# Patient Record
Sex: MALE | Race: WHITE | NOT HISPANIC OR LATINO | Employment: OTHER | URBAN - METROPOLITAN AREA
[De-identification: names, ages, dates, MRNs, and addresses within clinical notes are randomized per-mention and may not be internally consistent; named-entity substitution may affect disease eponyms.]

---

## 2017-03-29 ENCOUNTER — ALLSCRIPTS OFFICE VISIT (OUTPATIENT)
Dept: OTHER | Facility: OTHER | Age: 62
End: 2017-03-29

## 2017-04-03 ENCOUNTER — ALLSCRIPTS OFFICE VISIT (OUTPATIENT)
Dept: OTHER | Facility: OTHER | Age: 62
End: 2017-04-03

## 2017-04-03 DIAGNOSIS — E11.40 TYPE 2 DIABETES MELLITUS WITH DIABETIC NEUROPATHY (HCC): ICD-10-CM

## 2017-04-03 DIAGNOSIS — I82.409 ACUTE EMBOLISM AND THROMBOSIS OF DEEP VEIN OF LOWER EXTREMITY (HCC): ICD-10-CM

## 2017-04-03 DIAGNOSIS — R05.9 COUGH: ICD-10-CM

## 2017-04-04 ENCOUNTER — GENERIC CONVERSION - ENCOUNTER (OUTPATIENT)
Dept: OTHER | Facility: OTHER | Age: 62
End: 2017-04-04

## 2017-04-04 ENCOUNTER — HOSPITAL ENCOUNTER (OUTPATIENT)
Dept: RADIOLOGY | Facility: HOSPITAL | Age: 62
Discharge: HOME | End: 2017-04-04
Attending: FAMILY MEDICINE
Payer: COMMERCIAL

## 2017-04-04 ENCOUNTER — TRANSCRIBE ORDERS (OUTPATIENT)
Dept: ADMINISTRATIVE | Facility: HOSPITAL | Age: 62
End: 2017-04-04

## 2017-04-04 ENCOUNTER — APPOINTMENT (OUTPATIENT)
Dept: LAB | Facility: HOSPITAL | Age: 62
End: 2017-04-04
Attending: FAMILY MEDICINE
Payer: COMMERCIAL

## 2017-04-04 DIAGNOSIS — Z12.5 ENCOUNTER FOR SCREENING FOR MALIGNANT NEOPLASM OF PROSTATE: ICD-10-CM

## 2017-04-04 DIAGNOSIS — E11.9 TYPE 2 DIABETES MELLITUS WITHOUT COMPLICATIONS (HCC): ICD-10-CM

## 2017-04-04 DIAGNOSIS — R05.9 COUGH: ICD-10-CM

## 2017-04-04 DIAGNOSIS — I82.409 ACUTE EMBOLISM AND THROMBOSIS OF DEEP VEIN OF LOWER EXTREMITY (HCC): ICD-10-CM

## 2017-04-04 LAB
ALBUMIN SERPL BCP-MCNC: 3.2 G/DL (ref 3.5–5)
ALP SERPL-CCNC: 84 U/L (ref 46–116)
ALT SERPL W P-5'-P-CCNC: 15 U/L (ref 12–78)
ANION GAP SERPL CALCULATED.3IONS-SCNC: 9 MMOL/L (ref 4–13)
AST SERPL W P-5'-P-CCNC: 19 U/L (ref 5–45)
BILIRUB SERPL-MCNC: 0.6 MG/DL (ref 0.2–1)
BUN SERPL-MCNC: 21 MG/DL (ref 5–25)
CALCIUM SERPL-MCNC: 9 MG/DL (ref 8.3–10.1)
CHLORIDE SERPL-SCNC: 105 MMOL/L (ref 100–108)
CHOLEST SERPL-MCNC: 115 MG/DL (ref 50–200)
CO2 SERPL-SCNC: 27 MMOL/L (ref 21–32)
CREAT SERPL-MCNC: 1 MG/DL (ref 0.6–1.3)
CREAT UR-MCNC: 277 MG/DL
EST. AVERAGE GLUCOSE BLD GHB EST-MCNC: 183 MG/DL
GFR SERPL CREATININE-BSD FRML MDRD: >60 ML/MIN/1.73SQ M
GLUCOSE P FAST SERPL-MCNC: 178 MG/DL (ref 65–99)
HBA1C MFR BLD: 8 % (ref 4.2–6.3)
HDLC SERPL-MCNC: 37 MG/DL (ref 40–60)
INR PPP: 1.66 (ref 0.86–1.16)
LDLC SERPL CALC-MCNC: 56 MG/DL (ref 0–100)
POTASSIUM SERPL-SCNC: 4.2 MMOL/L (ref 3.5–5.3)
PROT SERPL-MCNC: 7.1 G/DL (ref 6.4–8.2)
PROT UR-MCNC: 38 MG/DL
PROT/CREAT UR: 0.14 MG/G{CREAT} (ref 0–0.1)
PROTHROMBIN TIME: 19.2 SECONDS (ref 12–14.3)
PSA SERPL-MCNC: 0.5 NG/ML (ref 0–4)
SODIUM SERPL-SCNC: 141 MMOL/L (ref 136–145)
TRIGL SERPL-MCNC: 111 MG/DL

## 2017-04-04 PROCEDURE — 83036 HEMOGLOBIN GLYCOSYLATED A1C: CPT

## 2017-04-04 PROCEDURE — 71020 HB CHEST X-RAY 2VW FRONTAL&LATL: CPT

## 2017-04-04 PROCEDURE — 80053 COMPREHEN METABOLIC PANEL: CPT

## 2017-04-04 PROCEDURE — 84156 ASSAY OF PROTEIN URINE: CPT

## 2017-04-04 PROCEDURE — 80061 LIPID PANEL: CPT

## 2017-04-04 PROCEDURE — G0103 PSA SCREENING: HCPCS

## 2017-04-04 PROCEDURE — 82570 ASSAY OF URINE CREATININE: CPT

## 2017-04-04 PROCEDURE — 36415 COLL VENOUS BLD VENIPUNCTURE: CPT

## 2017-04-04 PROCEDURE — 85610 PROTHROMBIN TIME: CPT

## 2017-04-26 ENCOUNTER — ALLSCRIPTS OFFICE VISIT (OUTPATIENT)
Dept: OTHER | Facility: OTHER | Age: 62
End: 2017-04-26

## 2017-09-18 DIAGNOSIS — E11.40 TYPE 2 DIABETES MELLITUS WITH DIABETIC NEUROPATHY (HCC): ICD-10-CM

## 2017-09-18 DIAGNOSIS — I82.409 ACUTE EMBOLISM AND THROMBOSIS OF DEEP VEIN OF LOWER EXTREMITY (HCC): ICD-10-CM

## 2017-09-19 ENCOUNTER — TRANSCRIBE ORDERS (OUTPATIENT)
Dept: ADMINISTRATIVE | Facility: HOSPITAL | Age: 62
End: 2017-09-19

## 2017-09-19 ENCOUNTER — APPOINTMENT (OUTPATIENT)
Dept: LAB | Facility: HOSPITAL | Age: 62
End: 2017-09-19
Attending: FAMILY MEDICINE
Payer: COMMERCIAL

## 2017-09-19 DIAGNOSIS — E11.40 TYPE 2 DIABETES MELLITUS WITH DIABETIC NEUROPATHY (HCC): ICD-10-CM

## 2017-09-19 LAB
ALBUMIN SERPL BCP-MCNC: 3.2 G/DL (ref 3.5–5)
ALP SERPL-CCNC: 90 U/L (ref 46–116)
ALT SERPL W P-5'-P-CCNC: 11 U/L (ref 12–78)
ANION GAP SERPL CALCULATED.3IONS-SCNC: 9 MMOL/L (ref 4–13)
AST SERPL W P-5'-P-CCNC: 11 U/L (ref 5–45)
BILIRUB SERPL-MCNC: 0.6 MG/DL (ref 0.2–1)
BUN SERPL-MCNC: 29 MG/DL (ref 5–25)
CALCIUM SERPL-MCNC: 9.4 MG/DL (ref 8.3–10.1)
CHLORIDE SERPL-SCNC: 105 MMOL/L (ref 100–108)
CHOLEST SERPL-MCNC: 118 MG/DL (ref 50–200)
CO2 SERPL-SCNC: 28 MMOL/L (ref 21–32)
CREAT SERPL-MCNC: 1.04 MG/DL (ref 0.6–1.3)
EST. AVERAGE GLUCOSE BLD GHB EST-MCNC: 154 MG/DL
GFR SERPL CREATININE-BSD FRML MDRD: 78 ML/MIN/1.73SQ M
GLUCOSE P FAST SERPL-MCNC: 91 MG/DL (ref 65–99)
HBA1C MFR BLD: 7 % (ref 4.2–6.3)
HDLC SERPL-MCNC: 44 MG/DL (ref 40–60)
LDLC SERPL CALC-MCNC: 62 MG/DL (ref 0–100)
POTASSIUM SERPL-SCNC: 4.3 MMOL/L (ref 3.5–5.3)
PROT SERPL-MCNC: 7.3 G/DL (ref 6.4–8.2)
SODIUM SERPL-SCNC: 142 MMOL/L (ref 136–145)
TRIGL SERPL-MCNC: 58 MG/DL

## 2017-09-19 PROCEDURE — 80061 LIPID PANEL: CPT

## 2017-09-19 PROCEDURE — 36415 COLL VENOUS BLD VENIPUNCTURE: CPT

## 2017-09-19 PROCEDURE — 80053 COMPREHEN METABOLIC PANEL: CPT

## 2017-09-19 PROCEDURE — 83036 HEMOGLOBIN GLYCOSYLATED A1C: CPT

## 2017-09-25 ENCOUNTER — GENERIC CONVERSION - ENCOUNTER (OUTPATIENT)
Dept: OTHER | Facility: OTHER | Age: 62
End: 2017-09-25

## 2017-10-25 DIAGNOSIS — I82.409 ACUTE EMBOLISM AND THROMBOSIS OF DEEP VEIN OF LOWER EXTREMITY (HCC): ICD-10-CM

## 2017-10-26 ENCOUNTER — GENERIC CONVERSION - ENCOUNTER (OUTPATIENT)
Dept: OTHER | Facility: OTHER | Age: 62
End: 2017-10-26

## 2017-11-25 DIAGNOSIS — I82.409 ACUTE EMBOLISM AND THROMBOSIS OF DEEP VEIN OF LOWER EXTREMITY (HCC): ICD-10-CM

## 2018-01-10 NOTE — PROGRESS NOTES
Assessment    1  Encounter for preventive health examination (V70 0) (Z00 00)   2  Type 2 diabetes mellitus (250 00) (E11 9)   3  Acute embolism and thrombosis of unspecified deep veins of unspecified lower   extremity (453 40) (I82 409)    Plan  Acute embolism and thrombosis of unspecified deep veins of unspecified lower  extremity, Prostate cancer screening    · (1) PSA (SCREEN) (Dx V76 44 Screen for Prostate Cancer); Status:Active; Requested  for:68Mqk2708;    · (1) PT WITH INR; Status:Active; Requested for:48Oka2670;   Screening for colon cancer    · COLONOSCOPY; Status:Complete;   Done: 68NLY4808 12:00AM   · COLONOSCOPY ; every 10 years; Last 25OQQ5985; Next 09KNM9421; Status:Active  Type 2 diabetes mellitus    · (1) COMPREHENSIVE METABOLIC PANEL; Status:Active; Requested for:79Qdw1474;    · (1) HEMOGLOBIN A1C; Status:Active; Requested for:91Zzl5894;    · (1) LIPID PANEL, FASTING; Status:Active; Requested for:53Sko0992;    · (1) URINE PROTEIN CREATININE RATIO; Status:Active; Requested for:77Yar9319;    · Follow-up visit in 6 months Evaluation and Treatment  Follow-up  Status: Hold For -  Scheduling  Requested for: 17Aug2016    Discussion/Summary  Impression: health maintenance visit  Currently, he eats a healthy diet  Prostate cancer screening: PSA was ordered  Testicular cancer screening: the risks and benefits of testicular cancer screening were discussed  Colorectal cancer screening: colorectal cancer screening is current  Advice and education were given regarding nutrition, weight bearing exercise and tobacco cessation  Patient discussion: discussed with the patient  Self Referrals: Yes      Chief Complaint  Pt is here for a HM exam  No issues or concerns at at this time  He is due of a foot exam and states he had an eye exam a few months ago  History of Present Illness  HM, Adult Male: The patient is being seen for a health maintenance evaluation  General Health:  The patient's health since the last visit is described as good  He has regular dental visits  He denies vision problems  He denies hearing loss  Immunizations status: up to date  Lifestyle:  He consumes a diverse and healthy diet  He does not have any weight concerns  He exercises regularly  He uses tobacco  He denies alcohol use  He denies drug use  Screening: cancer screening reviewed and current  metabolic screening reviewed and current  risk screening reviewed and current  Active Problems    1  Acute embolism and thrombosis of unspecified deep veins of unspecified lower   extremity (453 40) (I82 409)   2  Arthropathy (716 90) (M12 9)   3  Bilateral shoulder pain (719 41) (M25 511,M25 512)   4  Cough (786 2) (R05)   5  Essential hypertension (401 9) (I10)   6  Frequent headaches (784 0) (R51)   7  Headache (784 0) (R51)   8  Lumbar degenerative disc disease (722 52) (M51 36)   9  Mixed hyperlipidemia (272 2) (E78 2)   10  Need for prophylactic vaccination against Streptococcus pneumoniae (pneumococcus)    (V03 82) (Z23)   11  Need for prophylactic vaccination and inoculation against influenza (V04 81) (Z23)   12  Peripheral vascular disease (443 9) (I73 9)   13  Pure hypercholesterolemia (272 0) (E78 0)   14  Scalp cyst (706 2) (L72 9)   15  Screening for colon cancer (V76 51) (Z12 11)   16  Sinusitis (473 9) (J32 9)   17  Type 2 diabetes mellitus (250 00) (E11 9)   18  Upper respiratory tract infection, unspecified upper respiratory infection   19  Weight loss (783 21) (R63 4)    Past Medical History    · History of colonoscopy (V45 89) (Z98 89)    Surgical History    · History of Cataract Surgery   · History of Foot Surgery    Family History  Mother    · Family history of    · Family history of Dementia   · Family history of Melanoma  Father    · Family history of     Social History    · Former smoker (V15 82) (M56 989)   · Occasional alcohol use    Current Meds   1  Aspir-June 325 MG TBEC;    Therapy: (Recorded:20Oct2014) to Recorded   2  BD Insulin Syringe MicroFine 28G X 1/2" 1 ML Miscellaneous; USE AS DIRECTED with   insulin once daily  Requested for: 20Apr2016; Last Rx:20Apr2016 Ordered   3  Flonase Allergy Relief 50 MCG/ACT Nasal Suspension; USE 1 SPRAY IN EACH   NOSTRIL TWICE DAILY; Therapy: 99ACT9352 to (Last Rx:10Nov2015)  Requested for: 59VGI0383 Ordered   4  Gabapentin 600 MG Oral Tablet; take 2 tablets by mouth twice daily; Therapy: 76PSU3716 to (Evaluate:14Jun2016)  Requested for: 43GWU9919; Last   Rx:43Wrq3493 Ordered   5  Hydrocodone-Acetaminophen  MG Oral Tablet; Therapy: [id number]) to Recorded   6  Lantus 100 UNIT/ML Subcutaneous Solution; INJECT 25 UNITS SUBCUTANEOUSLY   DAILY; Last Rx:84Wij0150 Ordered   7  Lisinopril 10 MG Oral Tablet; take 1 tablet by mouth every day; Therapy: 48XWN7805 to (Evaluate:01Apr2017)  Requested for: 07GYY5693; Last   Rx:31Iwk1369 Ordered   8  MetFORMIN HCl  MG Oral Tablet Extended Release 24 Hour; take 2 tablets by   mouth twice daily; Therapy: 58WWS7580 to (Evaluate:15Apr2016)  Requested for: 17CXT5332; Last   Rx:59Wjs3325 Ordered   9  OxyCONTIN 20 MG Oral Tablet ER 12 Hour Abuse-Deterrent; TAKE 1 TABLET TWICE   DAILY; Therapy: 67TOC9025 to (Evaluate:70Kjm4741); Last Rx:06Jan2015 Ordered   10  Simvastatin 20 MG Oral Tablet; TAKE ONE TABLET BY MOUTH ONCE DAILY     Requested for: 80CMK0371; Last Rx:10Xre4129 Ordered   11  Warfarin Sodium 2 MG Oral Tablet; TAKE 3 TABLETS BY MOUTH EVERY DAY OR AS    DIRECTED; Therapy: 70PXO6973 to (Evaluate:02Mar2017)  Requested for: 04SKP4939; Last    Rx:97Lsr2572 Ordered   12  Warfarin Sodium 6 MG Oral Tablet; TAKE 1 TABLET BY MOUTH EVERY DAY AS    DIRECTED; Therapy: 84Kcy5986 to (Clare Perez)  Requested for: 386 3694; Last    Rx:31Oct2015 Ordered    Allergies    1   Byetta 10 MCG Pen SOLN    Vitals   Recorded: 87TIH7939 72:09TG   Systolic 123   Diastolic 72   Heart Rate 75   Temperature 98 6 F   O2 Saturation 98   Height 6 ft    Weight 203 lb    BMI Calculated 27 53   BSA Calculated 2 15     Physical Exam    Constitutional   General appearance: No acute distress, well appearing and well nourished  Eyes   Conjunctiva and lids: No erythema, swelling or discharge  Pupils and irises: Equal, round, reactive to light  Ears, Nose, Mouth, and Throat   Oropharynx: Normal with no erythema, edema, exudate or lesions  Neck   Neck: Supple, symmetric, trachea midline, no masses  Thyroid: Normal, no thyromegaly  Pulmonary   Auscultation of lungs: Clear to auscultation  Cardiovascular   Auscultation of heart: Normal rate and rhythm, normal S1 and S2, no murmurs  Examination of extremities for edema and/or varicosities: Normal     Abdomen   Abdomen: Non-tender, no masses  Lymphatic   Palpation of lymph nodes in neck: No lymphadenopathy  Musculoskeletal   Gait and station: Normal     Neurologic   Cranial nerves: Cranial nerves 2-12 intact  Diabetic Foot Exam: Right Foot Findings: normal foot  The toes were normal and had full range of motion  Left Foot Findings: normal foot  The toes were normal and had full range of motion  Monofilament Testing: normal tactile sensation with monofilament testing throughout both feet  Vascular: Capillary refills findings on the right were normal in the toes  Capillary refills findings on the left were normal in the toes  Assign Risk Category: 0: No loss of protective sensation, no deformity  No present risk  Psychiatric   Judgment and insight: Normal     Orientation to person, place and time: Normal        Health Management  Screening for colon cancer   COLONOSCOPY; every 10 years; Last 03ONM9992; Next Due: 29XVN3421; Active  Type 2 diabetes mellitus   (1) HEMOGLOBIN A1C; every 6 months; Last 38LWT6585; Next Due: 59Sty5068; Active  (1) MICROALBUMIN CREATININE RATIO, RANDOM URINE; every 1 year; Last  17XBA0750; Next Due: 37UQT1785;  Overdue  *VB - Eye Exam; every 1 year; Next Due: 74IXG6465; Overdue  *VB - Foot Exam; every 1 year; Next Due: 68NSE8856; Overdue  Health Maintenance   Medicare Annual Wellness Visit; every 1 year; Next Due: 09XBJ3736;  Overdue    Signatures   Electronically signed by : Shane Umanzor DO; Aug 17 2016  2:24PM EST                       (Author)

## 2018-01-13 NOTE — RESULT NOTES
Verified Results  (1) PT WITH INR 04Apr2017 02:52PM Katja Pat Order Number: [id number]     Test Name Result Flag Reference   INR 1 66 H 0 86-1 16   PT 19 2 seconds H 12 0-14 3

## 2018-01-13 NOTE — RESULT NOTES
Verified Results  (1) COMPREHENSIVE METABOLIC PANEL 99WTT0239 22:74VJ Oralee Dress Order Number: [id number]  TW Order Number: [id number] Order Number: [id number]     Test Name Result Flag Reference   GLUCOSE,RANDM 187 mg/dL H    If the patient is fasting, the ADA then defines impaired fasting glucose as > 100 mg/dL and diabetes as > or equal to 123 mg/dL  SODIUM 140 mmol/L  136-145   POTASSIUM 4 5 mmol/L  3 5-5 3   CHLORIDE 107 mmol/L  100-108   CARBON DIOXIDE 29 mmol/L  21-32   ANION GAP (CALC) 4 mmol/L  4-13   BLOOD UREA NITROGEN 23 mg/dL  5-25   CREATININE 0 93 mg/dL  0 60-1 30   Standardized to IDMS reference method   CALCIUM 8 9 mg/dL  8 3-10 1   BILI, TOTAL 0 60 mg/dL  0 20-1 00   ALK PHOSPHATAS 103 U/L     ALT (SGPT) 12 U/L  12-78   AST(SGOT) 10 U/L  5-45   ALBUMIN 3 4 g/dL L 3 5-5 0   TOTAL PROTEIN 6 7 g/dL  6 4-8 2   eGFR Non-African American      >60 0 ml/min/1 73sq [facility] Disease Education Program recommendations are as follows:  GFR calculation is accurate only with a steady state creatinine  Chronic Kidney disease less than 60 ml/min/1 73 sq  meters  Kidney failure less than 15 ml/min/1 73 sq  meters  (1) LIPID PANEL, FASTING 45LWB1880 12:07PM Oralee Dress Order Number: [id number]  TW Order Number: [id number] Order Number: [id number]     Test Name Result Flag Reference   CHOLESTEROL 131 mg/dL     HDL,DIRECT 33 mg/dL L 40-60   Specimen collection should occur prior to Metamizole administration due to the potential for falsely depressed results     LDL CHOLESTEROL CALCULATED 81 mg/dL  0-100   Triglyceride:         Normal              <150 mg/dl       Borderline High    150-199 mg/dl       High               200-499 mg/dl       Very High          >499 mg/dl  Cholesterol:         Desirable        <200 mg/dl      Borderline High  200-239 mg/dl      High             >239 mg/dl  HDL Cholesterol:        High    >59 mg/dL      Low     <41 mg/dL  LDL CALCULATED:    This screening LDL is a calculated result  It does not have the accuracy of the Direct Measured LDL in the monitoring of patients with hyperlipidemia and/or statin therapy  Direct Measure LDL (RBD581) must be ordered separately in these patients  TRIGLYCERIDES 85 mg/dL  <=150   Specimen collection should occur prior to N-Acetylcysteine or Metamizole administration due to the potential for falsely depressed results  (1) SED RATE 28TUG3325 12:07PM Yana Joe Order Number: [id number]     Test Name Result Flag Reference   SED RATE 58 mm/hour H 0-10     (1) HEMOGLOBIN A1C 12ECK6094 12:07PM Yana Joe Order Number: [id number]   Order Number: [id number]     Test Name Result Flag Reference   HEMOGLOBIN A1C 7 5 % H 4 2-6 3   EST  AVG  GLUCOSE 169 mg/dl         Plan  Type 2 diabetes mellitus    · (1) HEMOGLOBIN A1C ; every 6 months;  Last 91HKI2206; Status:Active

## 2018-01-14 VITALS
DIASTOLIC BLOOD PRESSURE: 84 MMHG | OXYGEN SATURATION: 98 % | WEIGHT: 197 LBS | BODY MASS INDEX: 26.68 KG/M2 | HEART RATE: 76 BPM | SYSTOLIC BLOOD PRESSURE: 120 MMHG | HEIGHT: 72 IN

## 2018-01-14 VITALS
WEIGHT: 198.5 LBS | SYSTOLIC BLOOD PRESSURE: 110 MMHG | DIASTOLIC BLOOD PRESSURE: 66 MMHG | BODY MASS INDEX: 26.92 KG/M2 | OXYGEN SATURATION: 97 % | TEMPERATURE: 98.6 F | HEART RATE: 73 BPM

## 2018-01-15 VITALS
OXYGEN SATURATION: 98 % | BODY MASS INDEX: 27.56 KG/M2 | HEART RATE: 101 BPM | SYSTOLIC BLOOD PRESSURE: 118 MMHG | DIASTOLIC BLOOD PRESSURE: 70 MMHG | HEIGHT: 72 IN | TEMPERATURE: 100.7 F | WEIGHT: 203.5 LBS

## 2018-01-17 NOTE — PROGRESS NOTES
History of Present Illness  Care Coordination Encounter Information:   Type of Encounter: Telephonic    Spoke to Other  Care Coordination [facility] Nurse [facility]:   The reason for call is to discuss outreach for follow up/needed services and coordination of meeting care plan treatment goals  Patient auto enrolled into care coordination  Multiple attempts made to f/u with patient in regards to health  Voicemail left; 3 with no return calls  Will close from care coordination at this time  Active Problems    1  Acute embolism and thrombosis of unspecified deep veins of unspecified lower   extremity (453 40) (I82 409)   2  Acute pharyngitis, unspecified etiology (462) (J02 9)   3  Arthropathy (716 90) (M12 9)   4  Bilateral shoulder pain (719 41) (M25 511,M25 512)   5  Cough (786 2) (R05)   6  Dermatitis (692 9) (L30 9)   7  Essential hypertension (401 9) (I10)   8  Frequent headaches (784 0) (R51)   9  Headache (784 0) (R51)   10  Lumbar degenerative disc disease (722 52) (M51 36)   11  Mixed hyperlipidemia (272 2) (E78 2)   12  Need for prophylactic vaccination against Streptococcus pneumoniae (pneumococcus)    (V03 82) (Z23)   13  Need for prophylactic vaccination and inoculation against influenza (V04 81) (Z23)   14  Noncompliance with medications (V15 81) (Z91 14)   15  Non-compliance with treatment (V15 81) (Z91 19)   16  OME (otitis media with effusion), right (381 4) (H65 91)   17  Other insomnia (780 52) (G47 09)   18  Peripheral vascular disease (443 9) (I73 9)   19  Prostate cancer screening (V76 44) (Z12 5)   20  Pure hypercholesterolemia (272 0) (E78 00)   21  Scalp cyst (706 2) (L72 9)   22  Screening for colon cancer (V76 51) (Z12 11)   23  Sinusitis (473 9) (J32 9)   24  Tinea of the body (110 5) (B35 4)   25  Type 2 diabetes mellitus (250 00) (E11 9)   26   Type 2 diabetes mellitus with diabetic neuropathy, without long-term current use of    insulin (250 60,357 2) (E11 40)   27  Upper respiratory tract infection, unspecified upper respiratory infection   28  Urinary frequency (788 41) (R35 0)   29  Weight loss (783 21) (R63 4)    Past Medical History    1  History of colonoscopy (V45 89) (T84 835)    Surgical History    1  History of Cataract Surgery   2  History of Foot Surgery    Family History  Mother    1  Family history of    2  Family history of Dementia   3  Family history of Melanoma  Father    4  Family history of     Social History    · Current smoker (305 1) (F17 200)   · Former smoker (V15 82) (B11 903)   · Occasional alcohol use    Current Meds    1  Warfarin Sodium 2 MG Oral Tablet; TAKE 3 TABLETS BY MOUTH EVERY DAY OR AS   DIRECTED; Therapy: 23WDB5909 to (Mike Castro)  Requested for: 18Utc3519; Last   Rx:59Jyv7899 Ordered   2  Warfarin Sodium 6 MG Oral Tablet; TAKE 1 TABLET BY MOUTH EVERY DAY AS   DIRECTED; Therapy: 58Cvs1375 to (YRLXFGVZ:81CJP3400)  Requested for: 87COL1832; Last   Rx:53Ljs6947 Ordered    3  Lisinopril 10 MG Oral Tablet; take 1 tablet by mouth every day; Therapy: 84MZB1021 to (MQVNVSKK:53JGP8380)  Requested for: 37PSJ2756; Last   Rx:06Gss9183 Ordered    4  Gabapentin 600 MG Oral Tablet; take 2 tablets by mouth twice daily; Therapy: 82SYY7922 to (Evaluate:84Qeg6662)  Requested for: 12DFK0498; Last   Rx:38Gtq7027 Ordered    5  Simvastatin 20 MG Oral Tablet; take 1 tablet by mouth every day; Therapy: 60LZN8841 to (Marino Smith)  Requested for: 68AIS2698; Last   Rx:68Gcg4841 Ordered    6  HydrOXYzine HCl - 25 MG Oral Tablet; take 1 tablet at bedtime as needed; Therapy: 43AQO7376 to (Last Rx:85Tcm5262)  Requested for: 43Rrk7635 Ordered    7  BD Insulin Syringe MicroFine 28G X 1/2" 1 ML Miscellaneous; USE AS DIRECTED with   insulin once daily  Requested for: 47Vde7621; Last Rx:70Gnr2552 Ordered   8  Lantus 100 UNIT/ML Subcutaneous Solution; Inject 30 units subcutaneously daily    Requested for: 82SUI7588; Last Rx:76Egw4072 Ordered   9  MetFORMIN HCl  MG Oral Tablet Extended Release 24 Hour; take 2 tablets by   mouth twice daily; Therapy: 85LXF0727 to (HIBPAHNR:25LNN7339)  Requested for: 72DCS1778; Last   Rx:01Tpq7480 Ordered    10  Aspir-June 325 MG TBEC; Therapy: (Recorded:20Oct2014) to Recorded    Allergies    1  Byetta 10 [facility]   COLONOSCOPY; every 10 years; Last 20IWS5172; Next Due: 45ZWF0460; Active   (1) HEMOGLOBIN A1C; every 6 months; Last 73RDJ2860; Next Due: 43NKQ2414; Active  (1) MICROALBUMIN CREATININE RATIO, RANDOM URINE; every 1 year; Last 80JQT6453; Next Due:  40REZ9972; Overdue  *VB - Eye Exam; every 1 year; Next Due: 63TPG3685; Overdue  *VB - Foot Exam; every 1 year; Next Due: 00RCO8107; Overdue   Medicare Annual Wellness Visit; every 1 year; Next Due: 17JXW1755; Overdue    End of Encounter Meds    1  Warfarin Sodium 2 MG Oral Tablet; TAKE 3 TABLETS BY MOUTH EVERY DAY OR AS   DIRECTED; Therapy: 17OBS0629 to (Debi Hernandez)  Requested for: 52Zon9338; Last   Rx:06Juc2269 Ordered   2  Warfarin Sodium 6 MG Oral Tablet; TAKE 1 TABLET BY MOUTH EVERY DAY AS   DIRECTED; Therapy: 06Fjd7000 to (DGBYJIGJ:41RLB1180)  Requested for: 94BOF9515; Last   Rx:88Ahw7717 Ordered    3  Lisinopril 10 MG Oral Tablet; take 1 tablet by mouth every day; Therapy: 13LGU6253 to (EWWHGJQX:55TNC5476)  Requested for: 31HOF4632; Last   Rx:89Frq6980 Ordered    4  Gabapentin 600 MG Oral Tablet; take 2 tablets by mouth twice daily; Therapy: 85MNJ4113 to (Evaluate:16Khq4138)  Requested for: 38LMX9892; Last   Rx:10Uqs2210 Ordered    5  Simvastatin 20 MG Oral Tablet; take 1 tablet by mouth every day; Therapy: 33DTZ1012 to (Ruthann Carter)  Requested for: 46IFN6256; Last   Rx:15Nhx1031 Ordered    6  HydrOXYzine HCl - 25 MG Oral Tablet; take 1 tablet at bedtime as needed; Therapy: 67HBP7315 to (Last Rx:26Apr2017)  Requested for: 26Apr2017 Ordered    7   BD Insulin Syringe MicroFine 28G X 1/2" 1 ML Miscellaneous; USE AS DIRECTED with   insulin once daily  Requested for: 20Apr2016; Last Rx:41Rdc1658 Ordered   8  Lantus 100 UNIT/ML Subcutaneous Solution; Inject 30 units subcutaneously daily    Requested for: 42DEE3815; Last Rx:23Syp8701 Ordered   9  MetFORMIN HCl  MG Oral Tablet Extended Release 24 Hour; take 2 tablets by   mouth twice daily; Therapy: 86STF2172 to (HNJHCGCQ:21WEQ9364)  Requested for: 78ISW2914; Last   Rx:01Bsz9412 Ordered    10  Aspir-June 325 MG TBEC;     Therapy: ([phone]) to Recorded    Patient Care Team    Care Team Member Role Specialty Office Number   Laci Loaiza   [phone], [place], [address] [place] [address],[address] [phone]     Signatures   Electronically signed by : Annamarie Rodriguez RN; Oct 26 2017  2:28PM EST                       (Author)

## 2018-01-22 VITALS
DIASTOLIC BLOOD PRESSURE: 80 MMHG | HEIGHT: 72 IN | RESPIRATION RATE: 16 BRPM | WEIGHT: 197 LBS | HEART RATE: 76 BPM | OXYGEN SATURATION: 98 % | SYSTOLIC BLOOD PRESSURE: 120 MMHG | BODY MASS INDEX: 26.68 KG/M2

## 2018-01-25 DIAGNOSIS — I82.409 ACUTE EMBOLISM AND THROMBOSIS OF DEEP VEIN OF LOWER EXTREMITY (HCC): ICD-10-CM

## 2018-02-13 DIAGNOSIS — I10 ESSENTIAL HYPERTENSION: ICD-10-CM

## 2018-02-13 DIAGNOSIS — E11.40 TYPE 2 DIABETES MELLITUS WITH DIABETIC NEUROPATHY, WITHOUT LONG-TERM CURRENT USE OF INSULIN (HCC): Primary | ICD-10-CM

## 2018-02-13 PROBLEM — G47.09 OTHER INSOMNIA: Status: ACTIVE | Noted: 2017-04-26

## 2018-02-13 PROCEDURE — 4010F ACE/ARB THERAPY RXD/TAKEN: CPT | Performed by: FAMILY MEDICINE

## 2018-02-13 RX ORDER — INSULIN GLARGINE 100 [IU]/ML
30 INJECTION, SOLUTION SUBCUTANEOUS DAILY
Qty: 30 ML | Refills: 3 | Status: SHIPPED | OUTPATIENT
Start: 2018-02-13 | End: 2018-02-20

## 2018-02-13 RX ORDER — BLOOD SUGAR DIAGNOSTIC
1 STRIP MISCELLANEOUS DAILY
COMMUNITY
End: 2018-02-13 | Stop reason: SDUPTHER

## 2018-02-13 RX ORDER — LISINOPRIL 10 MG/1
1 TABLET ORAL DAILY
COMMUNITY
Start: 2015-03-31 | End: 2018-02-13 | Stop reason: SDUPTHER

## 2018-02-13 RX ORDER — BLOOD SUGAR DIAGNOSTIC
1 STRIP MISCELLANEOUS DAILY
Qty: 100 EACH | Refills: 3 | Status: SHIPPED | OUTPATIENT
Start: 2018-02-13 | End: 2018-12-24 | Stop reason: SDUPTHER

## 2018-02-13 RX ORDER — LISINOPRIL 10 MG/1
10 TABLET ORAL DAILY
Qty: 90 TABLET | Refills: 3 | Status: SHIPPED | OUTPATIENT
Start: 2018-02-13 | End: 2018-12-24 | Stop reason: SDUPTHER

## 2018-02-13 RX ORDER — INSULIN GLARGINE 100 [IU]/ML
30 INJECTION, SOLUTION SUBCUTANEOUS DAILY
COMMUNITY
End: 2018-02-13 | Stop reason: SDUPTHER

## 2018-02-20 ENCOUNTER — TELEPHONE (OUTPATIENT)
Dept: FAMILY MEDICINE CLINIC | Facility: CLINIC | Age: 63
End: 2018-02-20

## 2018-02-20 DIAGNOSIS — M51.36 LUMBAR DEGENERATIVE DISC DISEASE: Primary | ICD-10-CM

## 2018-02-20 DIAGNOSIS — E11.40 TYPE 2 DIABETES MELLITUS WITH DIABETIC NEUROPATHY, WITHOUT LONG-TERM CURRENT USE OF INSULIN (HCC): ICD-10-CM

## 2018-02-20 RX ORDER — INSULIN GLARGINE 100 [IU]/ML
30 INJECTION, SOLUTION SUBCUTANEOUS
Qty: 30 ML | Refills: 0 | Status: SHIPPED | OUTPATIENT
Start: 2018-02-20 | End: 2018-12-24 | Stop reason: SDUPTHER

## 2018-02-20 RX ORDER — HYDROCODONE BITARTRATE AND ACETAMINOPHEN 5; 325 MG/1; MG/1
1 TABLET ORAL EVERY 6 HOURS PRN
Qty: 50 TABLET | Refills: 0 | Status: SHIPPED | OUTPATIENT
Start: 2018-02-20 | End: 2019-02-11

## 2018-03-20 ENCOUNTER — TELEPHONE (OUTPATIENT)
Dept: FAMILY MEDICINE CLINIC | Facility: CLINIC | Age: 63
End: 2018-03-20

## 2018-03-20 DIAGNOSIS — Z12.5 SCREENING PSA (PROSTATE SPECIFIC ANTIGEN): ICD-10-CM

## 2018-03-20 DIAGNOSIS — E11.40 TYPE 2 DIABETES MELLITUS WITH DIABETIC NEUROPATHY, WITHOUT LONG-TERM CURRENT USE OF INSULIN (HCC): Primary | ICD-10-CM

## 2018-03-20 DIAGNOSIS — I82.409 ACUTE EMBOLISM AND THROMBOSIS OF DEEP VEIN OF LOWER EXTREMITY, UNSPECIFIED LATERALITY (HCC): ICD-10-CM

## 2018-03-20 DIAGNOSIS — I10 ESSENTIAL HYPERTENSION: ICD-10-CM

## 2018-03-20 DIAGNOSIS — E78.2 MIXED HYPERLIPIDEMIA: ICD-10-CM

## 2018-04-02 RX ORDER — WARFARIN SODIUM 6 MG/1
1 TABLET ORAL DAILY
COMMUNITY
Start: 2015-09-14 | End: 2018-12-24 | Stop reason: SDUPTHER

## 2018-04-02 RX ORDER — HYDROXYZINE HYDROCHLORIDE 25 MG/1
1 TABLET, FILM COATED ORAL
COMMUNITY
Start: 2017-04-26 | End: 2021-12-21 | Stop reason: SDUPTHER

## 2018-04-02 RX ORDER — METFORMIN HYDROCHLORIDE 500 MG/1
2 TABLET, EXTENDED RELEASE ORAL 2 TIMES DAILY
COMMUNITY
Start: 2014-01-06 | End: 2018-12-24 | Stop reason: SDUPTHER

## 2018-04-02 RX ORDER — SIMVASTATIN 20 MG
1 TABLET ORAL DAILY
COMMUNITY
Start: 2017-01-20 | End: 2018-12-24 | Stop reason: SDUPTHER

## 2018-04-02 RX ORDER — GABAPENTIN 600 MG/1
2 TABLET ORAL 2 TIMES DAILY
COMMUNITY
Start: 2015-07-03 | End: 2019-08-12 | Stop reason: CLARIF

## 2018-04-03 ENCOUNTER — OFFICE VISIT (OUTPATIENT)
Dept: FAMILY MEDICINE CLINIC | Facility: CLINIC | Age: 63
End: 2018-04-03
Payer: COMMERCIAL

## 2018-04-03 VITALS
TEMPERATURE: 98.2 F | DIASTOLIC BLOOD PRESSURE: 80 MMHG | BODY MASS INDEX: 29.8 KG/M2 | WEIGHT: 220 LBS | HEIGHT: 72 IN | HEART RATE: 68 BPM | SYSTOLIC BLOOD PRESSURE: 120 MMHG | OXYGEN SATURATION: 98 %

## 2018-04-03 DIAGNOSIS — Z12.5 SCREENING PSA (PROSTATE SPECIFIC ANTIGEN): ICD-10-CM

## 2018-04-03 DIAGNOSIS — Z11.59 NEED FOR HEPATITIS C SCREENING TEST: ICD-10-CM

## 2018-04-03 DIAGNOSIS — E78.2 MIXED HYPERLIPIDEMIA: ICD-10-CM

## 2018-04-03 DIAGNOSIS — I10 ESSENTIAL HYPERTENSION: ICD-10-CM

## 2018-04-03 DIAGNOSIS — G44.209 TENSION HEADACHE: ICD-10-CM

## 2018-04-03 DIAGNOSIS — I82.409 ACUTE EMBOLISM AND THROMBOSIS OF DEEP VEIN OF LOWER EXTREMITY, UNSPECIFIED LATERALITY (HCC): ICD-10-CM

## 2018-04-03 DIAGNOSIS — E11.40 TYPE 2 DIABETES MELLITUS WITH DIABETIC NEUROPATHY, WITHOUT LONG-TERM CURRENT USE OF INSULIN (HCC): Primary | ICD-10-CM

## 2018-04-03 PROCEDURE — 99214 OFFICE O/P EST MOD 30 MIN: CPT | Performed by: FAMILY MEDICINE

## 2018-04-03 PROCEDURE — 3008F BODY MASS INDEX DOCD: CPT | Performed by: FAMILY MEDICINE

## 2018-04-03 RX ORDER — CYCLOBENZAPRINE HCL 10 MG
10 TABLET ORAL
Qty: 30 TABLET | Refills: 0 | Status: SHIPPED | OUTPATIENT
Start: 2018-04-03 | End: 2019-02-11

## 2018-04-03 NOTE — PATIENT INSTRUCTIONS
Diabetes in the Older Adult   WHAT YOU NEED TO KNOW:   Older adults with diabetes are at risk for heart disease, stroke, kidney disease, blindness, and nerve damage  You may also be at risk for any of the following:  · Poor nutrition or low blood sugar levels    · Confusion or problems with memory, attention, or learning new things    · Trouble controlling urination or frequent urinary tract infections    · Trouble with coordination or balance    · Falls and injuries    · Pain    · Depression    · Open sores on your legs or feet  DISCHARGE INSTRUCTIONS:   Call 911 for any of the following:   · You have any of the following signs of a stroke:      ¨ Numbness or drooping on one side of your face     ¨ Weakness in an arm or leg    ¨ Confusion or difficulty speaking    ¨ Dizziness, a severe headache, or vision loss    · You have any of the following signs of a heart attack:      ¨ Squeezing, pressure, or pain in your chest that lasts longer than 5 minutes or returns    ¨ Discomfort or pain in your back, neck, jaw, stomach, or arm     ¨ Trouble breathing    ¨ Nausea or vomiting    ¨ Lightheadedness or a sudden cold sweat, especially with chest pain or trouble breathing  Return to the emergency department if:   · You have severe abdominal pain, or the pain spreads to your back  You may also be vomiting  · You have trouble staying awake or focusing  · You are shaking or sweating  · You have blurred or double vision  · Your breath has a fruity, sweet smell  · Your breathing is deep and labored, or rapid and shallow  · Your heartbeat is fast and weak  · You fall and get hurt  Contact your healthcare provider if:   · You are vomiting or have diarrhea  · You have an upset stomach and cannot eat the foods on your meal plan  · You feel weak or more tired than usual      · You feel dizzy, have headaches, or are easily irritated  · Your skin is red, warm, dry, or swollen       · You have a wound that does not heal      · You have numbness in your arms or legs  · You have trouble coping with your illness, or you feel anxious or depressed  · You have problems with your memory  · You have changes in your vision  · You have questions or concerns about your condition or care  Medicines  may be given to decrease the amount of sugar in your blood  You may also need medicine to lower your blood pressure or cholesterol, or medicine to prevent blood clots  Manage the ABCs and prevent problems caused by diabetes:   · Check your blood sugar levels as directed  Your healthcare provider will tell you when and how often to check during the day  Your healthcare provider will also tell you what your blood sugar levels should be before and after a meal  You may need to check for ketones in your urine or blood if your level is higher than directed  Write down your results and show them to your healthcare provider  Your provider may use the results to make changes to your medicine, food, or exercise schedules  Ask your healthcare provider for more information about how to treat a high or low blood sugar level  · Follow your meal plan as directed  A dietitian will help you make a meal plan to keep your blood sugar level steady and make sure you get enough nutrition  Do not skip meals  Your blood sugar level may drop too low if you have taken diabetes medicine and do not eat  Ask your healthcare provider about programs in your community that can deliver the meals to your home  · Try to be active for 30 to 60 minutes most days of the week  Exercise can help keep your blood sugar level steady, decrease your risk of heart disease, and help you lose weight  It can also help improve your balance and decrease your risk for falls  Work with your healthcare provider to create an exercise plan  Ask a family member or friend to exercise with you   Start slow and exercise for 5 to 10 minutes at a time  Examples of activities include walking or swimming  Include muscle strengthening activities 2 to 3 days each week  Include balance training 2 to 3 times each week  Activities that help increase balance include yoga and taras chi      · Maintain a healthy weight  Ask your healthcare provider how much you should weigh  A healthy weight can help you control your diabetes and prevent heart disease  Ask your provider to help you create a weight loss plan if you are overweight  Together you can set manageable weight loss goals  · Do not smoke  Ask your healthcare provider for information if you currently smoke and need help to quit  Do not use e-cigarettes or smokeless tobacco in place of cigarettes or to help you quit  They still contain nicotine  · Manage stress  Stress may increase your blood sugar level  Deep breathing, muscle relaxation, and music may help you relax  Ask your healthcare provider for more information about these practices  Other ways to manage your diabetes:   · Check your feet every day for sores  Look at your whole foot, including the bottom, and between and under your toes  Check for wounds, corns, and calluses  Use a mirror to see the bottom of your feet  The skin on your feet may be shiny, tight, dry, or darker than normal  Your feet may also be cold and pale  Feel your feet by running your hands along the tops, bottoms, sides, and between your toes  Redness, swelling, and warmth are signs of blood flow problems that can lead to a foot ulcer  Do not try to remove corns or calluses yourself  · Wear medical alert identification  Wear medical alert jewelry or carry a card that says you have diabetes  Ask your healthcare provider where to get these items  · Ask about vaccines  You have a higher risk for serious illness if you get the flu, pneumonia, or hepatitis   Ask your healthcare provider if you should get a flu, pneumonia, shingles, or hepatitis B vaccine, and when to get the vaccine  · Keep all appointments  You may need to return to have your A1c checked every 3 months  You will need to return at least once each year to have your feet checked  You will need an eye exam once a year to check for retinopathy  You will also need urine tests every year to check for kidney problems  You may need tests to monitor for heart disease  Write down your questions so you remember to ask them during your visits  · Get help from family and friends  You may need help checking your blood sugar level, giving insulin injections, or preparing your meals  Ask your family and friends to help you with these tasks  Talk to your healthcare provider if you do not have someone at home to help you  A healthcare provider can come to your home to help you with these tasks  Follow up with your healthcare provider as directed: You may need to return to have your A1c checked every 3 months  You will need to return at least once each year to have your feet checked  You will need an eye exam once a year to check for retinopathy  You will also need urine tests every year to check for kidney problems  You may need tests to monitor for heart disease  Write down your questions so you remember to ask them during your visits  © 2017 [facility] Information is for End User's use only and may not be sold, redistributed or otherwise used for commercial purposes  All illustrations and images included in CareNotes® are the copyrighted property of [employer] D A M [employer] Inc  or [employer]on  The above information is an  only  It is not intended as medical advice for individual conditions or treatments  Talk to your doctor, nurse or pharmacist before following any medical regimen to see if it is safe and effective for you

## 2018-04-03 NOTE — PROGRESS NOTES
Assessment/Plan:       Diagnoses and all orders for this visit:    Type 2 diabetes mellitus with diabetic neuropathy, without long-term current use of insulin (HCC)  -     Comprehensive metabolic panel; Future  -     HEMOGLOBIN A1C W/ EAG ESTIMATION; Future    Acute embolism and thrombosis of deep vein of lower extremity, unspecified laterality (HCC)    Essential hypertension  -     CBC; Future    Mixed hyperlipidemia  -     Comprehensive metabolic panel; Future  -     Lipid panel; Future    Need for hepatitis C screening test  -     Hepatitis C antibody; Future    Screening PSA (prostate specific antigen)  -     PSA, Total Screen; Future    Tension headache  -     cyclobenzaprine (FLEXERIL) 10 mg tablet; Take 1 tablet (10 mg total) by mouth daily at bedtime    Other orders  -     warfarin (COUMADIN) 6 mg tablet; Take 1 tablet by mouth daily  -     simvastatin (ZOCOR) 20 mg tablet; Take 1 tablet by mouth daily  -     metFORMIN (GLUCOPHAGE-XR) 500 mg 24 hr tablet; Take 2 tablets by mouth 2 (two) times a day  -     hydrOXYzine HCL (ATARAX) 25 mg tablet; Take 1 tablet by mouth  -     gabapentin (NEURONTIN) 600 MG tablet; Take 2 tablets by mouth 2 (two) times a day  -     aspirin (ECOTRIN) 325 mg EC tablet; Take by mouth  -     Blood Glucose Monitoring Suppl (ACCU-CHEK MADDISON CONNECT) w/Device KIT; by In Vitro route           get  His blood work as ordered, I strongly encouraged him to make an appointment with the orthopedist, and rheumatologist   I do not want to put him on any medications at this time until he sees 1 of these for his back and hip pain  We did do blood work in the past with his elevated sed rate and I discussed this with him and emphasized the importance of him seeing the rheumatologist     Subjective:      Patient ID: Brit Colbert is a 63 y o  male  Patient comes in today for follow-up      He complains of headaches that started approximately month ago located in his left upper neck that radiate to his forehead  He states the headaches are worse when he coughs  He denies any nausea, change in vision, or dizziness  He has not got any blood work done recently as ordered, including his PT INR  He states he does check his blood sugars at home, and they are "not good"  He does continue to use his insulin daily and his metformin  He states his hip, and low back are still bothering him  He has not seen a rheumatologist or orthopedist as previously ordered  He is taking his simvastatin as prescribed, without any problems, without any compliance issues  He is taking his Coumadin, however he has not had his PT INR done recently  The following portions of the patient's history were reviewed and updated as appropriate:   He has a past medical history of Back pain; Diabetes mellitus ([address] ); and Hyperlipidemia ,   does not have any pertinent problems on file  ,   has a past surgical history that includes Hand surgery; Heel spur surgery; Cyst Removal; Cataract extraction; and Colonoscopy  ,  family history includes Dementia in his mother; Melanoma in his mother  ,   reports that he has quit smoking  He has never used smokeless tobacco  He reports that he does not drink alcohol or use drugs  ,  is allergic to byetta 10 mcg pen [exenatide]     Current Outpatient Prescriptions   Medication Sig Dispense Refill    aspirin (ECOTRIN) 325 mg EC tablet Take by mouth      Blood Glucose Monitoring Suppl (ACCU-CHEK MADDISON CONNECT) w/Device KIT by In Vitro route      HYDROcodone-acetaminophen (NORCO) 5-325 mg per tablet Take 1 tablet by mouth every 6 (six) hours as needed for pain Max Daily Amount: 4 tablets 50 tablet 0    insulin glargine (LANTUS) 100 units/mL subcutaneous injection Inject 30 Units under the skin daily at bedtime 30 mL 0    Insulin Syringe-Needle U-100 (B-D INSULIN SYRINGE) 31G X 5/16" 0 3 ML MISC 1 Syringe by Does not apply route daily 100 each 3    lisinopril (ZESTRIL) 10 mg tablet Take 1 tablet (10 mg total) by mouth daily 90 tablet 3    metFORMIN (GLUCOPHAGE-XR) 500 mg 24 hr tablet Take 2 tablets by mouth 2 (two) times a day      simvastatin (ZOCOR) 20 mg tablet Take 1 tablet by mouth daily      warfarin (COUMADIN) 6 mg tablet Take 1 tablet by mouth daily      cyclobenzaprine (FLEXERIL) 10 mg tablet Take 1 tablet (10 mg total) by mouth daily at bedtime 30 tablet 0    gabapentin (NEURONTIN) 600 MG tablet Take 2 tablets by mouth 2 (two) times a day      hydrOXYzine HCL (ATARAX) 25 mg tablet Take 1 tablet by mouth       No current facility-administered medications for this visit  Review of Systems   Constitutional: Negative for chills, fatigue and fever  HENT: Negative for congestion, ear pain, hearing loss, postnasal drip, rhinorrhea and sore throat  Eyes: Negative for pain and visual disturbance  Respiratory: Negative for chest tightness, shortness of breath and wheezing  Cardiovascular: Negative for chest pain and leg swelling  Gastrointestinal: Negative for abdominal distention, abdominal pain, constipation, diarrhea and vomiting  Endocrine: Negative for cold intolerance and heat intolerance  Genitourinary: Negative for difficulty urinating, frequency and urgency  Musculoskeletal: Positive for arthralgias, back pain and neck pain  Negative for gait problem  Skin: Negative for color change  Neurological: Positive for headaches  Negative for dizziness, tremors, syncope and numbness  Hematological: Negative for adenopathy  Psychiatric/Behavioral: Negative for agitation, confusion and sleep disturbance  The patient is not nervous/anxious  Objective:  Vitals:    04/03/18 1513   BP: 120/80   Pulse: 68   Temp: 98 2 °F (36 8 °C)   SpO2: 98%   Weight: 99 8 kg (220 lb)   Height: 6' (1 829 m)     Body mass index is 29 84 kg/m²  Physical Exam   Constitutional: He is oriented to person, place, and time  He appears well-developed and well-nourished     unkempt   HENT: Head: Normocephalic  Mouth/Throat: Oropharynx is clear and moist    Eyes: Conjunctivae are normal  No scleral icterus  Neck: Normal range of motion  No thyromegaly present  Cardiovascular: Normal rate, regular rhythm and normal heart sounds  No murmur heard  Pulmonary/Chest: Effort normal and breath sounds normal  No respiratory distress  He has no wheezes  Abdominal: Soft  Bowel sounds are normal  He exhibits no distension  There is no tenderness  Musculoskeletal: Normal range of motion  He exhibits tenderness (  Over the cervical and upper thoracic paraspinal muscles)  He exhibits no edema  Lymphadenopathy:     He has no cervical adenopathy  Neurological: He is alert and oriented to person, place, and time  No cranial nerve deficit  Skin: Skin is warm and dry  No rash noted  No pallor  Psychiatric: He has a normal mood and affect  His behavior is normal    Nursing note and vitals reviewed

## 2018-05-25 DIAGNOSIS — B35.9 TINEA: Primary | ICD-10-CM

## 2018-05-28 RX ORDER — KETOCONAZOLE 20 MG/G
CREAM TOPICAL
Qty: 15 G | Refills: 0 | Status: SHIPPED | OUTPATIENT
Start: 2018-05-28 | End: 2019-02-11

## 2018-10-08 ENCOUNTER — OFFICE VISIT (OUTPATIENT)
Dept: FAMILY MEDICINE CLINIC | Facility: CLINIC | Age: 63
End: 2018-10-08
Payer: COMMERCIAL

## 2018-10-08 VITALS
DIASTOLIC BLOOD PRESSURE: 80 MMHG | HEART RATE: 74 BPM | OXYGEN SATURATION: 97 % | BODY MASS INDEX: 29.8 KG/M2 | TEMPERATURE: 98.3 F | HEIGHT: 72 IN | SYSTOLIC BLOOD PRESSURE: 138 MMHG | WEIGHT: 220 LBS

## 2018-10-08 DIAGNOSIS — I73.9 PERIPHERAL VASCULAR DISEASE (HCC): ICD-10-CM

## 2018-10-08 DIAGNOSIS — Z12.5 SCREENING PSA (PROSTATE SPECIFIC ANTIGEN): ICD-10-CM

## 2018-10-08 DIAGNOSIS — I82.409 ACUTE EMBOLISM AND THROMBOSIS OF DEEP VEIN OF LOWER EXTREMITY, UNSPECIFIED LATERALITY (HCC): ICD-10-CM

## 2018-10-08 DIAGNOSIS — I10 ESSENTIAL HYPERTENSION: ICD-10-CM

## 2018-10-08 DIAGNOSIS — E11.40 TYPE 2 DIABETES MELLITUS WITH DIABETIC NEUROPATHY, WITHOUT LONG-TERM CURRENT USE OF INSULIN (HCC): Primary | ICD-10-CM

## 2018-10-08 DIAGNOSIS — E78.2 MIXED HYPERLIPIDEMIA: ICD-10-CM

## 2018-10-08 DIAGNOSIS — M51.36 LUMBAR DEGENERATIVE DISC DISEASE: ICD-10-CM

## 2018-10-08 PROCEDURE — 99214 OFFICE O/P EST MOD 30 MIN: CPT | Performed by: FAMILY MEDICINE

## 2018-10-08 PROCEDURE — 3079F DIAST BP 80-89 MM HG: CPT | Performed by: FAMILY MEDICINE

## 2018-10-08 PROCEDURE — 3075F SYST BP GE 130 - 139MM HG: CPT | Performed by: FAMILY MEDICINE

## 2018-10-08 NOTE — PROGRESS NOTES
Assessment/Plan:       Diagnoses and all orders for this visit:    Type 2 diabetes mellitus with diabetic neuropathy, without long-term current use of insulin (HCC)  -     Hemoglobin A1C; Future  -     Microalbumin / creatinine urine ratio; Future    Acute embolism and thrombosis of deep vein of lower extremity, unspecified laterality ([facility] )  -     Protime-INR; Future    Peripheral vascular disease ([facility] )  -     Ambulatory referral to Vascular Surgery; Future    Mixed hyperlipidemia  -     Comprehensive metabolic panel; Future  -     Lipid panel; Future    Lumbar degenerative disc disease    Essential hypertension  -     CBC; Future  -     TSH, 3rd generation; Future    Screening PSA (prostate specific antigen)  -     PSA, Total Screen; Future        No problem-specific Assessment & Plan notes found for this encounter  I emphasized the importance of him getting his blood work done especially for his Coumadin  He states he is going to get this done tomorrow  I discussed with him the importance of him seeing the vascular surgeon as I believe his leg pain is vascular in nature, include tension lead to worsening symptoms and potential for complete blockage resulting in potential loss of limb  I emphasized the importance of him checking his blood sugars, especially with the insulin  We will see him back in 4 months time, will call with results of the blood work and see him sooner if needed  Subjective:      Patient ID: Mario Membreno is a 66 y o  male  Patient comes in today for checkup  He did not get any blood work done from his previous visit  He has only been checking his blood sugars sporadically  He does continue to take his Coumadin, metformin,  Simvastatin, lisinopril, Lantus without any problems  He has not seen the neurologist, or ENT as previously referred  He does complain of worsening leg pain, especially with activity  He does still take his Coumadin as above      He states he has still Spoke with pt's mother and pt's appt was moved up to 02/20. I explained to the pt's mother that this was the soonest appt I had available due to dr Collins being in [place] all this wk, I also explained to  that if she felt that this was a really severe problem then they should go to ED and f/u with us at the next available appt.    been using his Lantus, but has not been checking his sugars  He is taking his lisinopril for his hypertension without any problems, no compliance issues  He is taking his simvastatin for his cholesterol without any problems, no compliance issues  He did not get his blood work done as previously noted        The following portions of the patient's history were reviewed and updated as appropriate:   He has a past medical history of Back pain; Diabetes mellitus ([address] ); and Hyperlipidemia ,   does not have any pertinent problems on file  ,   has a past surgical history that includes Hand surgery; Heel spur surgery; Cyst Removal; Cataract extraction; and Colonoscopy  ,  family history includes Dementia in his mother; Melanoma in his mother  ,   reports that he has quit smoking  He has never used smokeless tobacco  He reports that he does not drink alcohol or use drugs  ,  is allergic to byetta 10 mcg pen [exenatide]     Current Outpatient Prescriptions   Medication Sig Dispense Refill    aspirin (ECOTRIN) 325 mg EC tablet Take by mouth      Blood Glucose Monitoring Suppl (ACCU-CHEK MADDISON CONNECT) w/Device KIT by In Vitro route      cyclobenzaprine (FLEXERIL) 10 mg tablet Take 1 tablet (10 mg total) by mouth daily at bedtime 30 tablet 0    gabapentin (NEURONTIN) 600 MG tablet Take 2 tablets by mouth 2 (two) times a day      HYDROcodone-acetaminophen (NORCO) 5-325 mg per tablet Take 1 tablet by mouth every 6 (six) hours as needed for pain Max Daily Amount: 4 tablets 50 tablet 0    hydrOXYzine HCL (ATARAX) 25 mg tablet Take 1 tablet by mouth      insulin glargine (LANTUS) 100 units/mL subcutaneous injection Inject 30 Units under the skin daily at bedtime 30 mL 0    Insulin Syringe-Needle U-100 (B-D INSULIN SYRINGE) 31G X 5/16" 0 3 ML MISC 1 Syringe by Does not apply route daily 100 each 3    ketoconazole (NIZORAL) 2 % cream APPLY A THIN LAYER TO AFFECTED AREA(S) TWICE DAILY   15 g 0    lisinopril (ZESTRIL) 10 mg tablet Take 1 tablet (10 mg total) by mouth daily 90 tablet 3    metFORMIN (GLUCOPHAGE-XR) 500 mg 24 hr tablet Take 2 tablets by mouth 2 (two) times a day      simvastatin (ZOCOR) 20 mg tablet Take 1 tablet by mouth daily      warfarin (COUMADIN) 6 mg tablet Take 1 tablet by mouth daily       No current facility-administered medications for this visit  Review of Systems   Constitutional: Negative for chills, fatigue and fever  HENT: Negative for congestion, ear pain, hearing loss, postnasal drip, rhinorrhea and sore throat  Eyes: Negative for pain and visual disturbance  Respiratory: Negative for chest tightness, shortness of breath and wheezing  Cardiovascular: Negative for chest pain and leg swelling  Gastrointestinal: Negative for abdominal distention, abdominal pain, constipation, diarrhea and vomiting  Endocrine: Negative for cold intolerance and heat intolerance  Genitourinary: Negative for difficulty urinating, frequency and urgency  Musculoskeletal: Positive for back pain  Negative for arthralgias and gait problem  Leg pain with activity   Skin: Negative for color change  Neurological: Negative for dizziness, tremors, syncope, numbness and headaches  Hematological: Negative for adenopathy  Psychiatric/Behavioral: Negative for agitation, confusion and sleep disturbance  The patient is not nervous/anxious  Objective:  Vitals:    10/08/18 1509   BP: 138/80   Pulse: 74   Temp: 98 3 °F (36 8 °C)   SpO2: 97%   Weight: 99 8 kg (220 lb)   Height: 6' (1 829 m)     Body mass index is 29 84 kg/m²  Physical Exam   Constitutional: He is oriented to person, place, and time  He appears well-developed and well-nourished  Unkempt   HENT:   Head: Normocephalic  Mouth/Throat: Oropharynx is clear and moist    Eyes: Conjunctivae are normal  No scleral icterus  Neck: Normal range of motion  No thyromegaly present     Cardiovascular: Normal rate, regular rhythm and normal heart sounds  Pulses are weak pulses  No murmur heard  Pulses:       Dorsalis pedis pulses are 1+ on the right side, and 1+ on the left side  Posterior tibial pulses are 1+ on the right side, and 1+ on the left side  Pulmonary/Chest: Effort normal and breath sounds normal  No respiratory distress  He has no wheezes  Abdominal: Soft  Bowel sounds are normal  He exhibits no distension  There is no tenderness  Musculoskeletal: Normal range of motion  He exhibits no edema or tenderness  Feet:   Right Foot:   Skin Integrity: Negative for ulcer, skin breakdown, erythema, warmth, callus or dry skin  Left Foot:   Skin Integrity: Negative for ulcer, skin breakdown, erythema, warmth, callus or dry skin  Lymphadenopathy:     He has no cervical adenopathy  Neurological: He is alert and oriented to person, place, and time  No cranial nerve deficit  Skin: Skin is warm and dry  No rash noted  No pallor  Psychiatric: He has a normal mood and affect  His behavior is normal    Nursing note and vitals reviewed  Patient's shoes and socks removed  Right Foot/Ankle   Right Foot Inspection  Skin Exam: skin normal and skin intact no dry skin, no warmth, no callus, no erythema, no maceration, no abnormal color, no pre-ulcer, no ulcer and no callus                            Sensory       Monofilament testing: intact  Vascular    The right DP pulse is 1+  The right PT pulse is 1+  Left Foot/Ankle  Left Foot Inspection  Skin Exam: skin normal and skin intactno dry skin, no warmth, no erythema, no maceration, normal color, no pre-ulcer, no ulcer and no callus                                         Sensory       Monofilament: intact  Vascular    The left DP pulse is 1+  The left PT pulse is 1+  Assign Risk Category:  No deformity present;  No loss of protective sensation; Weak pulses       Risk: 0

## 2018-10-08 NOTE — PATIENT INSTRUCTIONS
Diabetes in the Older Adult   WHAT YOU NEED TO KNOW:   Older adults with diabetes are at risk for heart disease, stroke, kidney disease, blindness, and nerve damage  You may also be at risk for any of the following:  · Poor nutrition or low blood sugar levels    · Confusion or problems with memory, attention, or learning new things    · Trouble controlling urination or frequent urinary tract infections    · Trouble with coordination or balance    · Falls and injuries    · Pain    · Depression    · Open sores on your legs or feet  DISCHARGE INSTRUCTIONS:   Call 911 for any of the following:   · You have any of the following signs of a stroke:      ¨ Numbness or drooping on one side of your face     ¨ Weakness in an arm or leg    ¨ Confusion or difficulty speaking    ¨ Dizziness, a severe headache, or vision loss    · You have any of the following signs of a heart attack:      ¨ Squeezing, pressure, or pain in your chest that lasts longer than 5 minutes or returns    ¨ Discomfort or pain in your back, neck, jaw, stomach, or arm     ¨ Trouble breathing    ¨ Nausea or vomiting    ¨ Lightheadedness or a sudden cold sweat, especially with chest pain or trouble breathing  Return to the emergency department if:   · You have severe abdominal pain, or the pain spreads to your back  You may also be vomiting  · You have trouble staying awake or focusing  · You are shaking or sweating  · You have blurred or double vision  · Your breath has a fruity, sweet smell  · Your breathing is deep and labored, or rapid and shallow  · Your heartbeat is fast and weak  · You fall and get hurt  Contact your healthcare provider if:   · You are vomiting or have diarrhea  · You have an upset stomach and cannot eat the foods on your meal plan  · You feel weak or more tired than usual      · You feel dizzy, have headaches, or are easily irritated  · Your skin is red, warm, dry, or swollen       · You have a wound that does not heal      · You have numbness in your arms or legs  · You have trouble coping with your illness, or you feel anxious or depressed  · You have problems with your memory  · You have changes in your vision  · You have questions or concerns about your condition or care  Medicines  may be given to decrease the amount of sugar in your blood  You may also need medicine to lower your blood pressure or cholesterol, or medicine to prevent blood clots  Manage the ABCs and prevent problems caused by diabetes:   · Check your blood sugar levels as directed  Your healthcare provider will tell you when and how often to check during the day  Your healthcare provider will also tell you what your blood sugar levels should be before and after a meal  You may need to check for ketones in your urine or blood if your level is higher than directed  Write down your results and show them to your healthcare provider  Your provider may use the results to make changes to your medicine, food, or exercise schedules  Ask your healthcare provider for more information about how to treat a high or low blood sugar level  · Follow your meal plan as directed  A dietitian will help you make a meal plan to keep your blood sugar level steady and make sure you get enough nutrition  Do not skip meals  Your blood sugar level may drop too low if you have taken diabetes medicine and do not eat  Ask your healthcare provider about programs in your community that can deliver the meals to your home  · Try to be active for 30 to 60 minutes most days of the week  Exercise can help keep your blood sugar level steady, decrease your risk of heart disease, and help you lose weight  It can also help improve your balance and decrease your risk for falls  Work with your healthcare provider to create an exercise plan  Ask a family member or friend to exercise with you   Start slow and exercise for 5 to 10 minutes at a time  Examples of activities include walking or swimming  Include muscle strengthening activities 2 to 3 days each week  Include balance training 2 to 3 times each week  Activities that help increase balance include yoga and taras chi      · Maintain a healthy weight  Ask your healthcare provider how much you should weigh  A healthy weight can help you control your diabetes and prevent heart disease  Ask your provider to help you create a weight loss plan if you are overweight  Together you can set manageable weight loss goals  · Do not smoke  Ask your healthcare provider for information if you currently smoke and need help to quit  Do not use e-cigarettes or smokeless tobacco in place of cigarettes or to help you quit  They still contain nicotine  · Manage stress  Stress may increase your blood sugar level  Deep breathing, muscle relaxation, and music may help you relax  Ask your healthcare provider for more information about these practices  Other ways to manage your diabetes:   · Check your feet every day for sores  Look at your whole foot, including the bottom, and between and under your toes  Check for wounds, corns, and calluses  Use a mirror to see the bottom of your feet  The skin on your feet may be shiny, tight, dry, or darker than normal  Your feet may also be cold and pale  Feel your feet by running your hands along the tops, bottoms, sides, and between your toes  Redness, swelling, and warmth are signs of blood flow problems that can lead to a foot ulcer  Do not try to remove corns or calluses yourself  · Wear medical alert identification  Wear medical alert jewelry or carry a card that says you have diabetes  Ask your healthcare provider where to get these items  · Ask about vaccines  You have a higher risk for serious illness if you get the flu, pneumonia, or hepatitis   Ask your healthcare provider if you should get a flu, pneumonia, shingles, or hepatitis B vaccine, and when to get the vaccine  · Keep all appointments  You may need to return to have your A1c checked every 3 months  You will need to return at least once each year to have your feet checked  You will need an eye exam once a year to check for retinopathy  You will also need urine tests every year to check for kidney problems  You may need tests to monitor for heart disease  Write down your questions so you remember to ask them during your visits  · Get help from family and friends  You may need help checking your blood sugar level, giving insulin injections, or preparing your meals  Ask your family and friends to help you with these tasks  Talk to your healthcare provider if you do not have someone at home to help you  A healthcare provider can come to your home to help you with these tasks  Follow up with your healthcare provider as directed: You may need to return to have your A1c checked every 3 months  You will need to return at least once each year to have your feet checked  You will need an eye exam once a year to check for retinopathy  You will also need urine tests every year to check for kidney problems  You may need tests to monitor for heart disease  Write down your questions so you remember to ask them during your visits  © 2017 [employer] Information is for End User's use only and may not be sold, redistributed or otherwise used for commercial purposes  All illustrations and images included in CareNotes® are the copyrighted property of [employer]  or [employer]  The above information is an  only  It is not intended as medical advice for individual conditions or treatments  Talk to your doctor, nurse or pharmacist before following any medical regimen to see if it is safe and effective for you

## 2018-10-09 ENCOUNTER — APPOINTMENT (OUTPATIENT)
Dept: LAB | Facility: HOSPITAL | Age: 63
End: 2018-10-09
Attending: FAMILY MEDICINE
Payer: COMMERCIAL

## 2018-10-09 DIAGNOSIS — I10 ESSENTIAL HYPERTENSION: ICD-10-CM

## 2018-10-09 DIAGNOSIS — E11.40 TYPE 2 DIABETES MELLITUS WITH DIABETIC NEUROPATHY, WITHOUT LONG-TERM CURRENT USE OF INSULIN (HCC): ICD-10-CM

## 2018-10-09 DIAGNOSIS — I82.409 ACUTE EMBOLISM AND THROMBOSIS OF DEEP VEIN OF LOWER EXTREMITY, UNSPECIFIED LATERALITY (HCC): ICD-10-CM

## 2018-10-09 DIAGNOSIS — E78.2 MIXED HYPERLIPIDEMIA: ICD-10-CM

## 2018-10-09 DIAGNOSIS — Z12.5 SCREENING PSA (PROSTATE SPECIFIC ANTIGEN): ICD-10-CM

## 2018-10-09 LAB
ALBUMIN SERPL BCP-MCNC: 3.2 G/DL (ref 3.5–5)
ALP SERPL-CCNC: 93 U/L (ref 46–116)
ALT SERPL W P-5'-P-CCNC: 13 U/L (ref 12–78)
ANION GAP SERPL CALCULATED.3IONS-SCNC: 6 MMOL/L (ref 4–13)
AST SERPL W P-5'-P-CCNC: 11 U/L (ref 5–45)
BILIRUB SERPL-MCNC: 0.6 MG/DL (ref 0.2–1)
BUN SERPL-MCNC: 28 MG/DL (ref 5–25)
CALCIUM SERPL-MCNC: 9.1 MG/DL (ref 8.3–10.1)
CHLORIDE SERPL-SCNC: 104 MMOL/L (ref 100–108)
CHOLEST SERPL-MCNC: 141 MG/DL (ref 50–200)
CO2 SERPL-SCNC: 29 MMOL/L (ref 21–32)
CREAT SERPL-MCNC: 1.17 MG/DL (ref 0.6–1.3)
CREAT UR-MCNC: 150 MG/DL
ERYTHROCYTE [DISTWIDTH] IN BLOOD BY AUTOMATED COUNT: 14.6 % (ref 11.6–15.1)
EST. AVERAGE GLUCOSE BLD GHB EST-MCNC: 169 MG/DL
GFR SERPL CREATININE-BSD FRML MDRD: 67 ML/MIN/1.73SQ M
GLUCOSE SERPL-MCNC: 96 MG/DL (ref 65–140)
HBA1C MFR BLD: 7.5 % (ref 4.2–6.3)
HCT VFR BLD AUTO: 37.8 % (ref 36.5–49.3)
HDLC SERPL-MCNC: 42 MG/DL (ref 40–60)
HGB BLD-MCNC: 12.3 G/DL (ref 12–17)
INR PPP: 2.07 (ref 0.86–1.17)
LDLC SERPL CALC-MCNC: 85 MG/DL (ref 0–100)
MCH RBC QN AUTO: 28.3 PG (ref 26.8–34.3)
MCHC RBC AUTO-ENTMCNC: 32.5 G/DL (ref 31.4–37.4)
MCV RBC AUTO: 87 FL (ref 82–98)
MICROALBUMIN UR-MCNC: 17.9 MG/L (ref 0–20)
MICROALBUMIN/CREAT 24H UR: 12 MG/G CREATININE (ref 0–30)
NONHDLC SERPL-MCNC: 99 MG/DL
PLATELET # BLD AUTO: 265 THOUSANDS/UL (ref 149–390)
PMV BLD AUTO: 9.1 FL (ref 8.9–12.7)
POTASSIUM SERPL-SCNC: 4.6 MMOL/L (ref 3.5–5.3)
PROT SERPL-MCNC: 7.4 G/DL (ref 6.4–8.2)
PROTHROMBIN TIME: 23 SECONDS (ref 11.8–14.2)
PSA SERPL-MCNC: 0.6 NG/ML (ref 0–4)
RBC # BLD AUTO: 4.35 MILLION/UL (ref 3.88–5.62)
SODIUM SERPL-SCNC: 139 MMOL/L (ref 136–145)
TRIGL SERPL-MCNC: 70 MG/DL
TSH SERPL DL<=0.05 MIU/L-ACNC: 2.63 UIU/ML (ref 0.36–3.74)
WBC # BLD AUTO: 7.22 THOUSAND/UL (ref 4.31–10.16)

## 2018-10-09 PROCEDURE — 84443 ASSAY THYROID STIM HORMONE: CPT

## 2018-10-09 PROCEDURE — 3061F NEG MICROALBUMINURIA REV: CPT | Performed by: FAMILY MEDICINE

## 2018-10-09 PROCEDURE — 83036 HEMOGLOBIN GLYCOSYLATED A1C: CPT

## 2018-10-09 PROCEDURE — 82043 UR ALBUMIN QUANTITATIVE: CPT

## 2018-10-09 PROCEDURE — G0103 PSA SCREENING: HCPCS

## 2018-10-09 PROCEDURE — 85027 COMPLETE CBC AUTOMATED: CPT

## 2018-10-09 PROCEDURE — 82570 ASSAY OF URINE CREATININE: CPT

## 2018-10-09 PROCEDURE — 36415 COLL VENOUS BLD VENIPUNCTURE: CPT

## 2018-10-09 PROCEDURE — 85610 PROTHROMBIN TIME: CPT

## 2018-10-09 PROCEDURE — 80061 LIPID PANEL: CPT

## 2018-10-09 PROCEDURE — 80053 COMPREHEN METABOLIC PANEL: CPT

## 2018-10-10 ENCOUNTER — ANTICOAG VISIT (OUTPATIENT)
Dept: FAMILY MEDICINE CLINIC | Facility: CLINIC | Age: 63
End: 2018-10-10

## 2018-11-06 ENCOUNTER — OFFICE VISIT (OUTPATIENT)
Dept: VASCULAR SURGERY | Facility: CLINIC | Age: 63
End: 2018-11-06
Payer: COMMERCIAL

## 2018-11-06 VITALS
BODY MASS INDEX: 28.36 KG/M2 | SYSTOLIC BLOOD PRESSURE: 128 MMHG | HEIGHT: 74 IN | WEIGHT: 221 LBS | RESPIRATION RATE: 16 BRPM | HEART RATE: 77 BPM | DIASTOLIC BLOOD PRESSURE: 70 MMHG

## 2018-11-06 DIAGNOSIS — I73.9 PERIPHERAL VASCULAR DISEASE (HCC): Primary | ICD-10-CM

## 2018-11-06 DIAGNOSIS — I82.403 ACUTE EMBOLISM AND THROMBOSIS OF DEEP VEIN OF BOTH LOWER EXTREMITIES (HCC): ICD-10-CM

## 2018-11-06 DIAGNOSIS — E78.2 MIXED HYPERLIPIDEMIA: ICD-10-CM

## 2018-11-06 PROCEDURE — 99203 OFFICE O/P NEW LOW 30 MIN: CPT | Performed by: SURGERY

## 2018-11-06 RX ORDER — OXYCODONE HCL 20 MG/1
10 TABLET, FILM COATED, EXTENDED RELEASE ORAL EVERY 12 HOURS SCHEDULED
COMMUNITY
End: 2018-12-24 | Stop reason: SDUPTHER

## 2018-11-06 NOTE — ASSESSMENT & PLAN NOTE
Multiple risk factors including diabetes, hypertension, history of deep vein thrombosis noticed to have diminished pulses  Patient is here for evaluation  Overall he I believe his symptoms are related to significant lumbar spine issues rather than arthrosclerotic claudication  He does have palpable pedal pulses so I do not see the need for any further testing at this point  Patient is on aspirin Coumadin and statin which is consistent with best medical management

## 2018-11-06 NOTE — PROGRESS NOTES
Assessment/Plan:    Peripheral vascular disease (HCC)  Multiple risk factors including diabetes, hypertension, history of deep vein thrombosis noticed to have diminished pulses  Patient is here for evaluation  Overall he I believe his symptoms are related to significant lumbar spine issues rather than arthrosclerotic claudication  He does have palpable pedal pulses so I do not see the need for any further testing at this point  Patient is on aspirin Coumadin and statin which is consistent with best medical management  Acute embolism and thrombosis of unspecified deep veins of unspecified lower extremity (HCC)  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 and has been on long-term anticoagulation with Coumadin  Unclear history S patient does not recall any significant family history of blood clots  He does wear long-term compression  stockings for prevention of swelling  Mixed hyperlipidemia  Last lipid panel reviewed, appears to be well controlled with medical management       Diagnoses and all orders for this visit:    Peripheral vascular disease ([facility] [address] )  -     Ambulatory referral to Vascular Surgery    Acute embolism and thrombosis of deep vein of both lower extremities (HCC)    Mixed hyperlipidemia    Other orders  -     Cancel: VAS lower limb arterial duplex, complete bilateral; Future  -     oxyCODONE (OxyCONTIN) 20 mg 12 hr tablet; Take 10 mg by mouth every 12 (twelve) hours          Subjective: " I am here to have my legs checked "     Patient ID: Kirk Braga is a 66 y o  male  Patient is referred to see us by his family physician Dr Ruthann Mueller for peripheral vascular disease  Patient is here to see us because of bilateral lower extremity weakness  His legs feel tired and heavy making it hard for him to walk  He walks few minutes and then he has to stop  Patient is a former smoker  He currently takes Coumadin on long-term basis    He has a history of DVT in both legs   DVT was several years ago and was diagnosed at [facility] according to patient  He does not recall months family history  He used to work as a  in [employer] for many years and to leave that most of his problems secondary to joint degeneration due to hours of hard work  The following portions of the patient's history were reviewed and updated as appropriate: allergies, current medications, past family history, past medical history, past social history, past surgical history and problem list     Review of Systems   Constitutional: Positive for appetite change and unexpected weight change (wt loss 60 lbs)  HENT: Positive for dental problem, hearing loss, rhinorrhea and tinnitus  Eyes: Positive for photophobia  Respiratory: Negative  Cardiovascular: Positive for leg swelling  Endocrine: Positive for polyuria  Genitourinary: Positive for frequency  Musculoskeletal: Positive for arthralgias, back pain, gait problem, joint swelling, myalgias, neck pain and neck stiffness  Leg pain   Skin: Negative  Allergic/Immunologic: Negative  Hematological: Bruises/bleeds easily  Psychiatric/Behavioral: Negative  I have reviewed the review of systems as entered and made appropriate changes as necessary    Objective:      /70 (BP Location: Right arm, Patient Position: Sitting)   Pulse 77   Resp 16   Ht 6' 2" (1 88 m)   Wt 100 kg (221 lb)   BMI 28 37 kg/m²          Physical Exam   Constitutional: He is oriented to person, place, and time  He appears well-developed and well-nourished  HENT:   Head: Normocephalic and atraumatic  Neck:   Severe restricted painful range of motion of the neck   Cardiovascular: Normal rate, regular rhythm and intact distal pulses  Pulmonary/Chest: Effort normal    Abdominal: Soft  He exhibits no distension  There is no tenderness  There is no rebound  Musculoskeletal: Normal range of motion   He exhibits no edema  Neurological: He is alert and oriented to person, place, and time  No cranial nerve deficit  Coordination normal    Skin: Skin is warm and dry  No rash noted  No erythema  Multiple scattered varicose veins bilaterally   Psychiatric: He has a normal mood and affect  His behavior is normal    Nursing note and vitals reviewed

## 2018-11-06 NOTE — ASSESSMENT & PLAN NOTE
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

## 2018-11-06 NOTE — LETTER
November 6, 2018     [facility]  [address]  [address]    Patient: Chandler Mercedes   YOB: 1955   Date of Visit: 11/6/2018       Dear Dr Leonel Vargas: Thank you for referring Chandler Mercedes to me for evaluation  Below are my notes for this consultation  If you have questions, please do not hesitate to call me  I look forward to following your patient along with you  Sincerely,        Braeden Pardo MD        CC: No Recipients  Braeden Pardo MD  11/6/2018  6:04 PM  Sign at close encounter  Assessment/Plan:    [facility]  Multiple risk factors including diabetes, hypertension, history of deep vein thrombosis noticed to have diminished pulses  Patient is here for evaluation  Overall he I believe his symptoms are related to significant lumbar spine issues rather than arthrosclerotic claudication  He does have palpable pedal pulses so I do not see the need for any further testing at this point  Patient is on aspirin Coumadin and statin which is consistent with best medical management  Acute embolism and thrombosis of unspecified deep veins of unspecified lower extremity (HCC)  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 and has been on long-term anticoagulation with Coumadin  Unclear history S patient does not recall any significant family history of blood clots  He does wear long-term compression  stockings for prevention of swelling  Mixed hyperlipidemia  Last lipid panel reviewed, appears to be well controlled with medical management       Diagnoses and all orders for this visit:    [facility]  -     Ambulatory referral to Vascular Surgery    Acute embolism and thrombosis of deep vein of both lower extremities (HCC)    Mixed hyperlipidemia    Other orders  -     Cancel: VAS lower limb arterial duplex, complete bilateral; Future  -     oxyCODONE (OxyCONTIN) 20 mg 12 hr tablet;  Take 10 mg by mouth every 12 (twelve) hours          Subjective: " I am here to have my legs checked "     Patient ID: Roxi Norris is a 66 y o  male  Patient is referred to see us by his family physician Dr Maninder Sandoval for peripheral vascular disease  Patient is here to see us because of bilateral lower extremity weakness  His legs feel tired and heavy making it hard for him to walk  He walks few minutes and then he has to stop  Patient is a former smoker  He currently takes Coumadin on long-term basis  He has a history of DVT in both legs  DVT was several years ago and was diagnosed at [facility] according to patient  He does not recall months family history  He used to work as a  in [employer] for many years and to leave that most of his problems secondary to joint degeneration due to hours of hard work  The following portions of the patient's history were reviewed and updated as appropriate: allergies, current medications, past family history, past medical history, past social history, past surgical history and problem list     Review of Systems   Constitutional: Positive for appetite change and unexpected weight change (wt loss 60 lbs)  HENT: Positive for dental problem, hearing loss, rhinorrhea and tinnitus  Eyes: Positive for photophobia  Respiratory: Negative  Cardiovascular: Positive for leg swelling  Endocrine: Positive for polyuria  Genitourinary: Positive for frequency  Musculoskeletal: Positive for arthralgias, back pain, gait problem, joint swelling, myalgias, neck pain and neck stiffness  Leg pain   Skin: Negative  Allergic/Immunologic: Negative  Hematological: Bruises/bleeds easily  Psychiatric/Behavioral: Negative        I have reviewed the review of systems as entered and made appropriate changes as necessary    Objective:      /70 (BP Location: Right arm, Patient Position: Sitting)   Pulse 77   Resp 16   Ht 6' 2" (1 88 m)   Wt 100 kg (221 lb)   BMI 28 37 kg/m²           Physical Exam   Constitutional: He is oriented to person, place, and time  He appears well-developed and well-nourished  HENT:   Head: Normocephalic and atraumatic  Neck:   Severe restricted painful range of motion of the neck   Cardiovascular: Normal rate, regular rhythm and intact distal pulses  Pulmonary/Chest: Effort normal    Abdominal: Soft  He exhibits no distension  There is no tenderness  There is no rebound  Musculoskeletal: Normal range of motion  He exhibits no edema  Neurological: He is alert and oriented to person, place, and time  No cranial nerve deficit  Coordination normal    Skin: Skin is warm and dry  No rash noted  No erythema  Multiple scattered varicose veins bilaterally   Psychiatric: He has a normal mood and affect  His behavior is normal    Nursing note and vitals reviewed

## 2018-11-16 ENCOUNTER — TELEPHONE (OUTPATIENT)
Dept: FAMILY MEDICINE CLINIC | Facility: CLINIC | Age: 63
End: 2018-11-16

## 2018-12-24 DIAGNOSIS — I10 ESSENTIAL HYPERTENSION: ICD-10-CM

## 2018-12-24 DIAGNOSIS — M51.36 LUMBAR DEGENERATIVE DISC DISEASE: ICD-10-CM

## 2018-12-24 DIAGNOSIS — E11.40 TYPE 2 DIABETES MELLITUS WITH DIABETIC NEUROPATHY, WITHOUT LONG-TERM CURRENT USE OF INSULIN (HCC): ICD-10-CM

## 2018-12-24 DIAGNOSIS — I82.403 ACUTE EMBOLISM AND THROMBOSIS OF DEEP VEIN OF BOTH LOWER EXTREMITIES (HCC): Primary | ICD-10-CM

## 2018-12-24 DIAGNOSIS — I82.403 ACUTE EMBOLISM AND THROMBOSIS OF DEEP VEIN OF BOTH LOWER EXTREMITIES (HCC): ICD-10-CM

## 2018-12-24 RX ORDER — INSULIN GLARGINE 100 [IU]/ML
30 INJECTION, SOLUTION SUBCUTANEOUS
Qty: 30 ML | Refills: 3 | Status: SHIPPED | OUTPATIENT
Start: 2018-12-24 | End: 2019-05-14 | Stop reason: SDUPTHER

## 2018-12-24 RX ORDER — OXYCODONE HCL 20 MG/1
20 TABLET, FILM COATED, EXTENDED RELEASE ORAL EVERY 12 HOURS SCHEDULED
Qty: 60 TABLET | Refills: 0 | Status: SHIPPED | OUTPATIENT
Start: 2018-12-24 | End: 2019-06-10 | Stop reason: SDUPTHER

## 2018-12-24 RX ORDER — SIMVASTATIN 20 MG
20 TABLET ORAL DAILY
Qty: 30 TABLET | Refills: 0 | Status: SHIPPED | OUTPATIENT
Start: 2018-12-24 | End: 2019-01-28 | Stop reason: SDUPTHER

## 2018-12-24 RX ORDER — WARFARIN SODIUM 6 MG/1
6 TABLET ORAL DAILY
Qty: 30 TABLET | Refills: 0 | Status: SHIPPED | OUTPATIENT
Start: 2018-12-24 | End: 2019-01-28 | Stop reason: SDUPTHER

## 2018-12-24 RX ORDER — BLOOD SUGAR DIAGNOSTIC
1 STRIP MISCELLANEOUS DAILY
Qty: 100 EACH | Refills: 0 | Status: SHIPPED | OUTPATIENT
Start: 2018-12-24 | End: 2019-05-14 | Stop reason: SDUPTHER

## 2018-12-24 RX ORDER — METFORMIN HYDROCHLORIDE 500 MG/1
1000 TABLET, EXTENDED RELEASE ORAL 2 TIMES DAILY
Qty: 120 TABLET | Refills: 0 | Status: SHIPPED | OUTPATIENT
Start: 2018-12-24 | End: 2018-12-26 | Stop reason: SDUPTHER

## 2018-12-24 RX ORDER — LISINOPRIL 10 MG/1
10 TABLET ORAL DAILY
Qty: 90 TABLET | Refills: 0 | Status: SHIPPED | OUTPATIENT
Start: 2018-12-24 | End: 2019-01-28 | Stop reason: SDUPTHER

## 2018-12-24 NOTE — TELEPHONE ENCOUNTER
Oxycodone 10 mg   Lantus 100 units  Insulin Syring needles  Lisinopril 10 mg  Simvitatin 20 mg  Metformin  mg   Warfarin 6 mg    Needs all these printed  Call patient to make aware which office to  at  Checked site, the only medication showing on there was the [address] from April

## 2018-12-26 RX ORDER — SIMVASTATIN 20 MG
20 TABLET ORAL DAILY
Qty: 90 TABLET | Refills: 0 | Status: CANCELLED | OUTPATIENT
Start: 2018-12-26

## 2018-12-26 RX ORDER — OXYCODONE HCL 20 MG/1
20 TABLET, FILM COATED, EXTENDED RELEASE ORAL EVERY 12 HOURS SCHEDULED
Qty: 60 TABLET | Refills: 0 | Status: CANCELLED | OUTPATIENT
Start: 2018-12-26

## 2018-12-26 RX ORDER — INSULIN GLARGINE 100 [IU]/ML
30 INJECTION, SOLUTION SUBCUTANEOUS
Qty: 30 ML | Refills: 0 | Status: CANCELLED | OUTPATIENT
Start: 2018-12-26

## 2018-12-26 RX ORDER — BLOOD SUGAR DIAGNOSTIC
1 STRIP MISCELLANEOUS DAILY
Qty: 100 EACH | Refills: 0 | Status: CANCELLED | OUTPATIENT
Start: 2018-12-26

## 2018-12-26 RX ORDER — WARFARIN SODIUM 6 MG/1
6 TABLET ORAL DAILY
Qty: 90 TABLET | Refills: 0 | Status: CANCELLED | OUTPATIENT
Start: 2018-12-26

## 2018-12-26 RX ORDER — LISINOPRIL 10 MG/1
10 TABLET ORAL DAILY
Qty: 90 TABLET | Refills: 0 | Status: CANCELLED | OUTPATIENT
Start: 2018-12-26

## 2018-12-26 NOTE — TELEPHONE ENCOUNTER
Patient called in regards to medications I looked in this office and spoke to the other office, the scripts could nto be found    Patient has three weeks of medication left and is aware you are on vacation, patient is okay to wait until you are back on Monday to have medication reprinted     Patient was advised that we would send them to his mail order via the computer but he would prefer to mail in paper scripts    Patient would like meds reprinted and to be called once they are done so that he can pick them up    Sorry please reprint if you do not have them

## 2018-12-27 ENCOUNTER — ANTICOAG VISIT (OUTPATIENT)
Dept: FAMILY MEDICINE CLINIC | Facility: CLINIC | Age: 63
End: 2018-12-27

## 2018-12-27 ENCOUNTER — TELEPHONE (OUTPATIENT)
Dept: FAMILY MEDICINE CLINIC | Facility: CLINIC | Age: 63
End: 2018-12-27

## 2018-12-27 ENCOUNTER — APPOINTMENT (OUTPATIENT)
Dept: LAB | Facility: HOSPITAL | Age: 63
End: 2018-12-27
Payer: COMMERCIAL

## 2018-12-27 DIAGNOSIS — O22.30 DVT (DEEP VEIN THROMBOSIS) IN PREGNANCY: ICD-10-CM

## 2018-12-27 DIAGNOSIS — O22.30 DVT (DEEP VEIN THROMBOSIS) IN PREGNANCY: Primary | ICD-10-CM

## 2018-12-27 LAB
INR PPP: 2.06 (ref 0.86–1.17)
PROTHROMBIN TIME: 22.9 SECONDS (ref 11.8–14.2)

## 2018-12-27 PROCEDURE — 36415 COLL VENOUS BLD VENIPUNCTURE: CPT

## 2018-12-27 PROCEDURE — 85610 PROTHROMBIN TIME: CPT

## 2018-12-27 NOTE — TELEPHONE ENCOUNTER
I found the prescription in the folder, however the metformin is missing    Patient is aware that other scripts where printed and that we will complete the metformin for him on Monday    Patient will  prescriptions on Monday     Thank you

## 2018-12-27 NOTE — TELEPHONE ENCOUNTER
----- Message from [address] [place] [address]  sent at 12/27/2018  4:00 PM EST -----  Same dose and repeat in 1 month

## 2018-12-28 DIAGNOSIS — I82.403 ACUTE EMBOLISM AND THROMBOSIS OF DEEP VEIN OF BOTH LOWER EXTREMITIES (HCC): Primary | ICD-10-CM

## 2018-12-28 RX ORDER — METFORMIN HYDROCHLORIDE 500 MG/1
1000 TABLET, EXTENDED RELEASE ORAL 2 TIMES DAILY
Qty: 360 TABLET | Refills: 1 | Status: SHIPPED | OUTPATIENT
Start: 2018-12-28 | End: 2019-01-08 | Stop reason: SDUPTHER

## 2019-01-08 ENCOUNTER — TELEPHONE (OUTPATIENT)
Dept: FAMILY MEDICINE CLINIC | Facility: CLINIC | Age: 64
End: 2019-01-08

## 2019-01-08 DIAGNOSIS — E11.40 TYPE 2 DIABETES MELLITUS WITH DIABETIC NEUROPATHY, WITHOUT LONG-TERM CURRENT USE OF INSULIN (HCC): ICD-10-CM

## 2019-01-08 RX ORDER — METFORMIN HYDROCHLORIDE 500 MG/1
1000 TABLET, EXTENDED RELEASE ORAL 2 TIMES DAILY
Qty: 360 TABLET | Refills: 0 | Status: SHIPPED | OUTPATIENT
Start: 2019-01-08 | End: 2019-01-28 | Stop reason: SDUPTHER

## 2019-01-28 DIAGNOSIS — I10 ESSENTIAL HYPERTENSION: ICD-10-CM

## 2019-01-28 DIAGNOSIS — I82.403 ACUTE EMBOLISM AND THROMBOSIS OF DEEP VEIN OF BOTH LOWER EXTREMITIES (HCC): ICD-10-CM

## 2019-01-28 DIAGNOSIS — E11.40 TYPE 2 DIABETES MELLITUS WITH DIABETIC NEUROPATHY, WITHOUT LONG-TERM CURRENT USE OF INSULIN (HCC): ICD-10-CM

## 2019-01-29 PROCEDURE — 4010F ACE/ARB THERAPY RXD/TAKEN: CPT | Performed by: FAMILY MEDICINE

## 2019-01-29 RX ORDER — SIMVASTATIN 20 MG
TABLET ORAL
Qty: 90 TABLET | Refills: 3 | Status: SHIPPED | OUTPATIENT
Start: 2019-01-29 | End: 2020-01-16

## 2019-01-29 RX ORDER — LISINOPRIL 10 MG/1
TABLET ORAL
Qty: 90 TABLET | Refills: 3 | Status: SHIPPED | OUTPATIENT
Start: 2019-01-29 | End: 2020-01-16

## 2019-01-29 RX ORDER — WARFARIN SODIUM 6 MG/1
TABLET ORAL
Qty: 90 TABLET | Refills: 3 | Status: SHIPPED | OUTPATIENT
Start: 2019-01-29 | End: 2019-06-28 | Stop reason: SDUPTHER

## 2019-01-29 RX ORDER — METFORMIN HYDROCHLORIDE 500 MG/1
TABLET, EXTENDED RELEASE ORAL
Qty: 360 TABLET | Refills: 3 | Status: SHIPPED | OUTPATIENT
Start: 2019-01-29 | End: 2020-01-16

## 2019-02-11 ENCOUNTER — OFFICE VISIT (OUTPATIENT)
Dept: FAMILY MEDICINE CLINIC | Facility: CLINIC | Age: 64
End: 2019-02-11
Payer: COMMERCIAL

## 2019-02-11 VITALS
HEART RATE: 74 BPM | HEIGHT: 74 IN | WEIGHT: 222 LBS | DIASTOLIC BLOOD PRESSURE: 74 MMHG | OXYGEN SATURATION: 99 % | TEMPERATURE: 98.2 F | BODY MASS INDEX: 28.49 KG/M2 | SYSTOLIC BLOOD PRESSURE: 110 MMHG

## 2019-02-11 DIAGNOSIS — I10 ESSENTIAL HYPERTENSION: ICD-10-CM

## 2019-02-11 DIAGNOSIS — E11.40 TYPE 2 DIABETES MELLITUS WITH DIABETIC NEUROPATHY, WITHOUT LONG-TERM CURRENT USE OF INSULIN (HCC): Primary | ICD-10-CM

## 2019-02-11 DIAGNOSIS — E66.3 OVERWEIGHT (BMI 25.0-29.9): ICD-10-CM

## 2019-02-11 DIAGNOSIS — E78.2 MIXED HYPERLIPIDEMIA: ICD-10-CM

## 2019-02-11 DIAGNOSIS — Z12.11 SCREEN FOR COLON CANCER: ICD-10-CM

## 2019-02-11 DIAGNOSIS — M51.36 LUMBAR DEGENERATIVE DISC DISEASE: ICD-10-CM

## 2019-02-11 DIAGNOSIS — I82.403 ACUTE EMBOLISM AND THROMBOSIS OF DEEP VEIN OF BOTH LOWER EXTREMITIES (HCC): ICD-10-CM

## 2019-02-11 DIAGNOSIS — I73.9 PERIPHERAL VASCULAR DISEASE (HCC): ICD-10-CM

## 2019-02-11 PROCEDURE — 3074F SYST BP LT 130 MM HG: CPT | Performed by: FAMILY MEDICINE

## 2019-02-11 PROCEDURE — 3078F DIAST BP <80 MM HG: CPT | Performed by: FAMILY MEDICINE

## 2019-02-11 PROCEDURE — 3008F BODY MASS INDEX DOCD: CPT | Performed by: FAMILY MEDICINE

## 2019-02-11 PROCEDURE — 1036F TOBACCO NON-USER: CPT | Performed by: FAMILY MEDICINE

## 2019-02-11 PROCEDURE — 99214 OFFICE O/P EST MOD 30 MIN: CPT | Performed by: FAMILY MEDICINE

## 2019-02-11 NOTE — PATIENT INSTRUCTIONS
Obesity   AMBULATORY CARE:   Obesity  is when your body mass index (BMI) is greater than 30  Your healthcare provider will use your height and weight to measure your BMI  The risks of obesity include  many health problems, such as injuries or physical disability  You may need tests to check for the following:  · Diabetes     · High blood pressure or high cholesterol     · Heart disease     · Gallbladder or liver disease     · Cancer of the colon, breast, prostate, liver, or kidney     · Sleep apnea     · Arthritis or gout  Seek care immediately if:   · You have a severe headache, confusion, or difficulty speaking  · You have weakness on one side of your body  · You have chest pain, sweating, or shortness of breath  Contact your healthcare provider if:   · You have symptoms of gallbladder or liver disease, such as pain in your upper abdomen  · You have knee or hip pain and discomfort while walking  · You have symptoms of diabetes, such as intense hunger and thirst, and frequent urination  · You have symptoms of sleep apnea, such as snoring or daytime sleepiness  · You have questions or concerns about your condition or care  Treatment for obesity  focuses on helping you lose weight to improve your health  Even a small decrease in BMI can reduce the risk for many health problems  Your healthcare provider will help you set a weight-loss goal   · Lifestyle changes  are the first step in treating obesity  These include making healthy food choices and getting regular physical activity  Your healthcare provider may suggest a weight-loss program that involves coaching, education, and therapy  · Medicine  may help you lose weight when it is used with a healthy diet and physical activity  · Surgery  can help you lose weight if you are very obese and have other health problems  There are several types of weight-loss surgery  Ask your healthcare provider for more information    Be successful losing weight:   · Set small, realistic goals  An example of a small goal is to walk for 20 minutes 5 days a week  Anther goal is to lose 5% of your body weight  · Tell friends, family members, and coworkers about your goals  and ask for their support  Ask a friend to lose weight with you, or join a weight-loss support group  · Identify foods or triggers that may cause you to overeat , and find ways to avoid them  Remove tempting high-calorie foods from your home and workplace  Place a bowl of fresh fruit on your kitchen counter  If stress causes you to eat, then find other ways to cope with stress  · Keep a diary to track what you eat and drink  Also write down how many minutes of physical activity you do each day  Weigh yourself once a week and record it in your diary  Eating changes: You will need to eat 500 to 1,000 fewer calories each day than you currently eat to lose 1 to 2 pounds a week  The following changes will help you cut calories:  · Eat smaller portions  Use small plates, no larger than 9 inches in diameter  Fill your plate half full of fruits and vegetables  Measure your food using measuring cups until you know what a serving size looks like  · Eat 3 meals and 1 or 2 snacks each day  Plan your meals in advance  [place] and eat at home most of the time  Eat slowly  · Eat fruits and vegetables at every meal   They are low in calories and high in fiber, which makes you feel full  Do not add butter, margarine, or cream sauce to vegetables  Use herbs to season steamed vegetables  · Eat less fat and fewer fried foods  Eat more baked or grilled chicken and fish  These protein sources are lower in calories and fat than red meat  Limit fast food  Dress your salads with olive oil and vinegar instead of bottled dressing  · Limit the amount of sugar you eat  Do not drink sugary beverages  Limit alcohol  Activity changes:  Physical activity is good for your body in many ways   It helps you burn calories and build strong muscles  It decreases stress and depression, and improves your mood  It can also help you sleep better  Talk to your healthcare provider before you begin an exercise program   · Exercise for at least 30 minutes 5 days a week  Start slowly  Set aside time each day for physical activity that you enjoy and that is convenient for you  It is best to do both weight training and an activity that increases your heart rate, such as walking, bicycling, or swimming  · Find ways to be more active  Do yard work and housecleaning  Walk up the stairs instead of using elevators  Spend your leisure time going to events that require walking, such as outdoor festivals or fairs  This extra physical activity can help you lose weight and keep it off  Follow up with your healthcare provider as directed: You may need to meet with a dietitian  Write down your questions so you remember to ask them during your visits  © 2017 [employer] [employer] Information is for End User's use only and may not be sold, redistributed or otherwise used for commercial purposes  All illustrations and images included in CareNotes® are the copyrighted property of [employer]  or [employer]  The above information is an  only  It is not intended as medical advice for individual conditions or treatments  Talk to your doctor, nurse or pharmacist before following any medical regimen to see if it is safe and effective for you  Obesity   AMBULATORY CARE:   Obesity  is when your body mass index (BMI) is greater than 30  Your healthcare provider will use your height and weight to measure your BMI  The risks of obesity include  many health problems, such as injuries or physical disability   You may need tests to check for the following:  · Diabetes     · High blood pressure or high cholesterol     · Heart disease     · Gallbladder or liver disease     · Cancer of the colon, breast, prostate, liver, or kidney     · Sleep apnea     · Arthritis or gout  Seek care immediately if:   · You have a severe headache, confusion, or difficulty speaking  · You have weakness on one side of your body  · You have chest pain, sweating, or shortness of breath  Contact your healthcare provider if:   · You have symptoms of gallbladder or liver disease, such as pain in your upper abdomen  · You have knee or hip pain and discomfort while walking  · You have symptoms of diabetes, such as intense hunger and thirst, and frequent urination  · You have symptoms of sleep apnea, such as snoring or daytime sleepiness  · You have questions or concerns about your condition or care  Treatment for obesity  focuses on helping you lose weight to improve your health  Even a small decrease in BMI can reduce the risk for many health problems  Your healthcare provider will help you set a weight-loss goal   · Lifestyle changes  are the first step in treating obesity  These include making healthy food choices and getting regular physical activity  Your healthcare provider may suggest a weight-loss program that involves coaching, education, and therapy  · Medicine  may help you lose weight when it is used with a healthy diet and physical activity  · Surgery  can help you lose weight if you are very obese and have other health problems  There are several types of weight-loss surgery  Ask your healthcare provider for more information  Be successful losing weight:   · Set small, realistic goals  An example of a small goal is to walk for 20 minutes 5 days a week  Anther goal is to lose 5% of your body weight  · Tell friends, family members, and coworkers about your goals  and ask for their support  Ask a friend to lose weight with you, or join a weight-loss support group  · Identify foods or triggers that may cause you to overeat , and find ways to avoid them   Remove tempting high-calorie foods from your home and workplace  Place a bowl of fresh fruit on your kitchen counter  If stress causes you to eat, then find other ways to cope with stress  · Keep a diary to track what you eat and drink  Also write down how many minutes of physical activity you do each day  Weigh yourself once a week and record it in your diary  Eating changes: You will need to eat 500 to 1,000 fewer calories each day than you currently eat to lose 1 to 2 pounds a week  The following changes will help you cut calories:  · Eat smaller portions  Use small plates, no larger than 9 inches in diameter  Fill your plate half full of fruits and vegetables  Measure your food using measuring cups until you know what a serving size looks like  · Eat 3 meals and 1 or 2 snacks each day  Plan your meals in advance  [facility] and eat at home most of the time  Eat slowly  · Eat fruits and vegetables at every meal   They are low in calories and high in fiber, which makes you feel full  Do not add butter, margarine, or cream sauce to vegetables  Use herbs to season steamed vegetables  · Eat less fat and fewer fried foods  Eat more baked or grilled chicken and fish  These protein sources are lower in calories and fat than red meat  Limit fast food  Dress your salads with olive oil and vinegar instead of bottled dressing  · Limit the amount of sugar you eat  Do not drink sugary beverages  Limit alcohol  Activity changes:  Physical activity is good for your body in many ways  It helps you burn calories and build strong muscles  It decreases stress and depression, and improves your mood  It can also help you sleep better  Talk to your healthcare provider before you begin an exercise program   · Exercise for at least 30 minutes 5 days a week  Start slowly  Set aside time each day for physical activity that you enjoy and that is convenient for you   It is best to do both weight training and an activity that increases your heart rate, such as walking, bicycling, or swimming  · Find ways to be more active  Do yard work and housecleaning  Walk up the stairs instead of using elevators  Spend your leisure time going to events that require walking, such as outdoor festivals or fairs  This extra physical activity can help you lose weight and keep it off  Follow up with your healthcare provider as directed: You may need to meet with a dietitian  Write down your questions so you remember to ask them during your visits  © 2017 [address] [employer] Information is for End User's use only and may not be sold, redistributed or otherwise used for commercial purposes  All illustrations and images included in CareNotes® are the copyrighted property of [employer]  or [employer]  The above information is an  only  It is not intended as medical advice for individual conditions or treatments  Talk to your doctor, nurse or pharmacist before following any medical regimen to see if it is safe and effective for you

## 2019-02-11 NOTE — ASSESSMENT & PLAN NOTE
Lab Results   Component Value Date    HGBA1C 7 5 (H) 10/09/2018       No results for input(s): POCGLU in the last 72 hours  Blood Sugar Average: Last 72 hrs:   continue his Lantus, and metformin  Check a CMP, hemoglobin A1c  I encouraged him to monitor his blood sugar twice daily both before breakfast and before dinner

## 2019-02-11 NOTE — ASSESSMENT & PLAN NOTE
Recommended physical therapy, however he does not want to do this  He is to continue using his oxycodone for pain management

## 2019-02-11 NOTE — PROGRESS NOTES
Assessment/Plan:       Diagnoses and all orders for this visit:    Type 2 diabetes mellitus with diabetic neuropathy, without long-term current use of insulin (HCC)  -     Hemoglobin A1C; Future    Acute embolism and thrombosis of deep vein of both lower extremities (HCC)    Peripheral vascular disease (HCC)    Essential hypertension  -     CBC; Future  -     Comprehensive metabolic panel; Future  -     TSH, 3rd generation; Future    Lumbar degenerative disc disease    Mixed hyperlipidemia  -     Lipid panel; Future    Screen for colon cancer  -     Cologuard; Future    Overweight (BMI 25 0-29  9)        Type 2 diabetes mellitus with diabetic neuropathy, without long-term current use of insulin (HCC)  Lab Results   Component Value Date    HGBA1C 7 5 (H) 10/09/2018       No results for input(s): POCGLU in the last 72 hours  Blood Sugar Average: Last 72 hrs:   continue his Lantus, and metformin  Check a CMP, hemoglobin A1c  I encouraged him to monitor his blood sugar twice daily both before breakfast and before dinner  Acute embolism and thrombosis of unspecified deep veins of unspecified lower extremity (HCC)  Continue his Coumadin, he is due for PT INR, emphasized the importance of him getting this done  Essential hypertension  Controlled, continue lisinopril  Peripheral vascular disease (HCC)  Continue the Coumadin, increase activity as much as possible  Lumbar degenerative disc disease  Recommended physical therapy, however he does not want to do this  He is to continue using his oxycodone for pain management  Mixed hyperlipidemia  Continue simvastatin, check CMP, lipid profile  Emphasized the importance of him getting his PT and INR done as scheduled  He is to continue his current medications, will call with results of his blood work will see him back in 4 months time, sooner if he has any problems  Subjective:      Patient ID: Delia Sotomayor is a 66 y o  male      Patient comes in today for checkup on his diabetes, hyperlipidemia, hypertension, history of DVT  Patient continues to use his Lantus, and metformin  He states he checks his blood sugars sporadically in the morning  He states they are ranging anywhere from   He denies any hypoglycemic episodes  He continues take his lisinopril for his hypertension, no compliance issues or side effects  He continues to take his Coumadin for his history of his DVT  He had his last PT INR the end of December  He did not get it done in January  He does complain of intermittent back pain  He states that he does use his oxycodone as needed which helps  The following portions of the patient's history were reviewed and updated as appropriate:   He has a past medical history of Back pain, Diabetes mellitus ([facility] [address] ), DVT (deep vein thrombosis) in pregnancy ([facility] [address] ), Hyperlipidemia, and Hypertension  ,  does not have any pertinent problems on file  ,   has a past surgical history that includes Hand surgery; Heel spur surgery; Cyst Removal; Cataract extraction; and Colonoscopy  ,  family history includes Dementia in his mother; Melanoma in his mother  ,   reports that he has quit smoking  He has never used smokeless tobacco  He reports that he does not drink alcohol or use drugs  ,  is allergic to byetta 10 mcg pen [exenatide]     Current Outpatient Medications   Medication Sig Dispense Refill    aspirin (ECOTRIN) 325 mg EC tablet Take by mouth      Blood Glucose Monitoring Suppl (ACCU-CHEK MADDISON CONNECT) w/Device KIT by In Vitro route      insulin glargine (LANTUS) 100 units/mL subcutaneous injection Inject 30 Units under the skin daily at bedtime 30 mL 3    Insulin Syringe-Needle U-100 (B-D INSULIN SYRINGE) 31G X 5/16" 0 3 ML MISC 1 Syringe by Does not apply route daily 100 each 0    lisinopril (ZESTRIL) 10 mg tablet TAKE 1 TABLET EVERY DAY 90 tablet 3    metFORMIN (GLUCOPHAGE-XR) 500 mg 24 hr tablet TAKE 2 TABLETS TWICE DAILY 360 tablet 3    oxyCODONE (OxyCONTIN) 20 mg 12 hr tablet Take 1 tablet (20 mg total) by mouth every 12 (twelve) hours Max Daily Amount: 40 mg 60 tablet 0    simvastatin (ZOCOR) 20 mg tablet TAKE 1 TABLET EVERY DAY 90 tablet 3    warfarin (COUMADIN) 6 mg tablet TAKE 1 TABLET EVERY DAY AS DIRECTED 90 tablet 3    gabapentin (NEURONTIN) 600 MG tablet Take 2 tablets by mouth 2 (two) times a day      hydrOXYzine HCL (ATARAX) 25 mg tablet Take 1 tablet by mouth       No current facility-administered medications for this visit  Review of Systems   Constitutional: Negative for chills, fatigue and fever  HENT: Negative for congestion, ear pain, hearing loss, postnasal drip, rhinorrhea and sore throat  Eyes: Negative for pain and visual disturbance  Respiratory: Negative for chest tightness, shortness of breath and wheezing  Cardiovascular: Negative for chest pain and leg swelling  Gastrointestinal: Negative for abdominal distention, abdominal pain, constipation, diarrhea and vomiting  Endocrine: Negative for cold intolerance and heat intolerance  Genitourinary: Negative for difficulty urinating, frequency and urgency  Musculoskeletal: Positive for back pain  Negative for arthralgias and gait problem  Skin: Negative for color change  Neurological: Negative for dizziness, tremors, syncope, numbness and headaches  Hematological: Negative for adenopathy  Psychiatric/Behavioral: Negative for agitation, confusion and sleep disturbance  The patient is not nervous/anxious  Objective:  Vitals:    02/11/19 1410   BP: 110/74   Pulse: 74   Temp: 98 2 °F (36 8 °C)   SpO2: 99%   Weight: 101 kg (222 lb)   Height: 6' 2" (1 88 m)     Body mass index is 28 5 kg/m²  Physical Exam   Constitutional: He is oriented to person, place, and time  He appears well-developed and well-nourished  Unkempt   HENT:   Head: Normocephalic     Mouth/Throat: Oropharynx is clear and moist    Eyes: Conjunctivae are normal  No scleral icterus  Neck: Normal range of motion  No thyromegaly present  Cardiovascular: Normal rate, regular rhythm and normal heart sounds  No murmur heard  Pulmonary/Chest: Effort normal and breath sounds normal  No respiratory distress  He has no wheezes  Abdominal: Soft  Bowel sounds are normal  He exhibits no distension  There is no tenderness  Musculoskeletal: Normal range of motion  He exhibits no edema or tenderness  Lymphadenopathy:     He has no cervical adenopathy  Neurological: He is alert and oriented to person, place, and time  No cranial nerve deficit  Skin: Skin is warm and dry  No rash noted  No pallor  Psychiatric: He has a normal mood and affect  His behavior is normal    Nursing note and vitals reviewed  BMI Counseling: Body mass index is 28 5 kg/m²  Discussed the patient's BMI with him  The BMI is above average  BMI counseling and education was provided to the patient  Nutrition recommendations include reducing portion sizes, decreasing overall calorie intake and 3-5 servings of fruits/vegetables daily  Exercise recommendations include moderate aerobic physical activity for 150 minutes/week  BMI Counseling: Body mass index is 28 5 kg/m²  Discussed the patient's BMI with him  The BMI is above average  BMI counseling and education was provided to the patient  Nutrition recommendations include reducing portion sizes, decreasing overall calorie intake and 3-5 servings of fruits/vegetables daily  Exercise recommendations include moderate aerobic physical activity for 150 minutes/week

## 2019-03-11 ENCOUNTER — TELEPHONE (OUTPATIENT)
Dept: FAMILY MEDICINE CLINIC | Facility: CLINIC | Age: 64
End: 2019-03-11

## 2019-03-11 NOTE — TELEPHONE ENCOUNTER
----- Message from Iris Genao DO sent at 3/7/2019 12:36 PM EST -----  Call, negative, make sure health maintenance gets updated

## 2019-03-21 ENCOUNTER — OFFICE VISIT (OUTPATIENT)
Dept: FAMILY MEDICINE CLINIC | Facility: CLINIC | Age: 64
End: 2019-03-21
Payer: COMMERCIAL

## 2019-03-21 VITALS
HEIGHT: 74 IN | WEIGHT: 223 LBS | HEART RATE: 70 BPM | BODY MASS INDEX: 28.62 KG/M2 | TEMPERATURE: 97.1 F | SYSTOLIC BLOOD PRESSURE: 126 MMHG | OXYGEN SATURATION: 100 % | DIASTOLIC BLOOD PRESSURE: 72 MMHG

## 2019-03-21 DIAGNOSIS — R10.9 ABDOMINAL PAIN, UNSPECIFIED ABDOMINAL LOCATION: ICD-10-CM

## 2019-03-21 DIAGNOSIS — R31.9 HEMATURIA, UNSPECIFIED TYPE: Primary | ICD-10-CM

## 2019-03-21 PROBLEM — M54.9 BACK PAIN: Status: ACTIVE | Noted: 2019-03-21

## 2019-03-21 PROCEDURE — 99214 OFFICE O/P EST MOD 30 MIN: CPT | Performed by: FAMILY MEDICINE

## 2019-03-21 NOTE — PROGRESS NOTES
Assessment/Plan:         Diagnoses and all orders for this visit:    Hematuria, unspecified type    Abdominal pain, unspecified abdominal location        Hematuria  Discussed plan of care with patient, in light of resolution of discomfort discussed potential for passed stone, stressed importance proper hydration, will obtain steady for evaluation and referral to Urology  Abdominal pain/constipation  Stressed the importance proper hydration, discussed multiple over-the-counter remedies patient will need evaluation with GI discussed potential in regard to opioid induced constipation although patient admits to infrequent use of oxycodone, referral placed for GI eval      Subjective:      Patient ID: Bautista Piña is a 66 y o  male  Back pain last night, woke up , needing to go urinate,   Noted to have blood in the urine , over the course of the morning , today , urine has increasingly cleared   Urine frequency has also resolved ,   Denies any fever , chills, n v   No previous history of stones ,   Admittedly has a poor diet , just coffee,   Diabetes, continue take insulin controls been relatively good the past months is due for A1c ordered by      Bowel movements have been more difficult , have   But this has been an on going problem , off and on for a number of yr off and on   Has tried lisness in the past   It was rec       The following portions of the patient's history were reviewed and updated as appropriate:   He has a past medical history of Back pain, Diabetes mellitus ([address] ), DVT (deep vein thrombosis) in pregnancy ([address] ), Hyperlipidemia, and Hypertension  ,  does not have any pertinent problems on file  ,   has a past surgical history that includes Hand surgery; Heel spur surgery; Cyst Removal; Cataract extraction; and Colonoscopy  ,  family history includes Dementia in his mother; Melanoma in his mother  ,   reports that he has quit smoking   He has never used smokeless tobacco  He reports that he does not drink alcohol or use drugs  ,  is allergic to byetta 10 mcg pen [exenatide]         Review of Systems   Constitutional: Negative for appetite change, chills, fever and unexpected weight change  HENT: Negative for congestion, dental problem, ear pain, hearing loss, postnasal drip, rhinorrhea, sinus pressure, sinus pain, sneezing, sore throat, tinnitus and voice change  Eyes: Negative for visual disturbance  Respiratory: Negative for apnea, cough, chest tightness and shortness of breath  Cardiovascular: Negative for chest pain, palpitations and leg swelling  Gastrointestinal: Positive for abdominal pain and constipation  Negative for blood in stool, diarrhea, nausea and vomiting  Endocrine: Negative for cold intolerance, heat intolerance, polydipsia, polyphagia and polyuria  Genitourinary: Positive for frequency and hematuria  Negative for decreased urine volume, difficulty urinating and dysuria  Musculoskeletal: Negative for arthralgias, back pain, gait problem, joint swelling and myalgias  Skin: Negative for color change, rash and wound  Allergic/Immunologic: Negative for environmental allergies and food allergies  Neurological: Negative for dizziness, syncope, weakness, light-headedness, numbness and headaches  Hematological: Negative for adenopathy  Does not bruise/bleed easily  Psychiatric/Behavioral: Negative for sleep disturbance and suicidal ideas  The patient is not nervous/anxious  Objective:  Vitals:    03/21/19 1357   BP: 126/72   Pulse: 70   Temp: (!) 97 1 °F (36 2 °C)   SpO2: 100%      Physical Exam   Constitutional: He is oriented to person, place, and time  He appears well-developed and well-nourished  HENT:   Head: Normocephalic and atraumatic  Right Ear: External ear normal    Left Ear: External ear normal    Mouth/Throat: Oropharynx is clear and moist    Eyes: Conjunctivae and EOM are normal  No scleral icterus  Neck: Normal range of motion  Neck supple  Cardiovascular: Normal rate, regular rhythm and normal heart sounds  Pulmonary/Chest: Effort normal and breath sounds normal    Abdominal: Soft  Bowel sounds are normal  He exhibits no distension  There is no tenderness    -cva   Musculoskeletal: Normal range of motion  Neurological: He is alert and oriented to person, place, and time  He has normal reflexes  Skin: Skin is warm and dry  Psychiatric: He has a normal mood and affect   His behavior is normal  Judgment and thought content normal

## 2019-03-22 ENCOUNTER — APPOINTMENT (OUTPATIENT)
Dept: LAB | Facility: HOSPITAL | Age: 64
End: 2019-03-22
Payer: COMMERCIAL

## 2019-03-22 ENCOUNTER — TELEPHONE (OUTPATIENT)
Dept: FAMILY MEDICINE CLINIC | Facility: CLINIC | Age: 64
End: 2019-03-22

## 2019-03-22 DIAGNOSIS — R10.9 ABDOMINAL PAIN, UNSPECIFIED ABDOMINAL LOCATION: ICD-10-CM

## 2019-03-22 DIAGNOSIS — R31.9 HEMATURIA, UNSPECIFIED TYPE: ICD-10-CM

## 2019-03-22 LAB
ANION GAP SERPL CALCULATED.3IONS-SCNC: 10 MMOL/L (ref 4–13)
BACTERIA UR QL AUTO: ABNORMAL /HPF
BILIRUB UR QL STRIP: NEGATIVE
BUN SERPL-MCNC: 38 MG/DL (ref 5–25)
CALCIUM SERPL-MCNC: 9.4 MG/DL (ref 8.3–10.1)
CHLORIDE SERPL-SCNC: 105 MMOL/L (ref 100–108)
CLARITY UR: CLEAR
CO2 SERPL-SCNC: 26 MMOL/L (ref 21–32)
COLOR UR: YELLOW
CREAT SERPL-MCNC: 1.37 MG/DL (ref 0.6–1.3)
GFR SERPL CREATININE-BSD FRML MDRD: 55 ML/MIN/1.73SQ M
GLUCOSE P FAST SERPL-MCNC: 99 MG/DL (ref 65–99)
GLUCOSE UR STRIP-MCNC: NEGATIVE MG/DL
HGB UR QL STRIP.AUTO: ABNORMAL
HYALINE CASTS #/AREA URNS LPF: ABNORMAL /LPF
KETONES UR STRIP-MCNC: NEGATIVE MG/DL
LEUKOCYTE ESTERASE UR QL STRIP: NEGATIVE
NITRITE UR QL STRIP: NEGATIVE
NON-SQ EPI CELLS URNS QL MICRO: ABNORMAL /HPF
PH UR STRIP.AUTO: 5.5 [PH]
POTASSIUM SERPL-SCNC: 4.8 MMOL/L (ref 3.5–5.3)
PROT UR STRIP-MCNC: NEGATIVE MG/DL
RBC #/AREA URNS AUTO: ABNORMAL /HPF
SODIUM SERPL-SCNC: 141 MMOL/L (ref 136–145)
SP GR UR STRIP.AUTO: 1.02 (ref 1–1.03)
UROBILINOGEN UR QL STRIP.AUTO: 0.2 E.U./DL
WBC #/AREA URNS AUTO: ABNORMAL /HPF

## 2019-03-22 PROCEDURE — 81001 URINALYSIS AUTO W/SCOPE: CPT

## 2019-03-22 PROCEDURE — 80048 BASIC METABOLIC PNL TOTAL CA: CPT

## 2019-03-22 NOTE — TELEPHONE ENCOUNTER
----- Message from Margoth De La Cruz DO sent at 3/22/2019  3:58 PM EDT -----  Call, same dose, re-check one month

## 2019-03-25 ENCOUNTER — TELEPHONE (OUTPATIENT)
Dept: FAMILY MEDICINE CLINIC | Facility: CLINIC | Age: 64
End: 2019-03-25

## 2019-03-25 NOTE — TELEPHONE ENCOUNTER
----- Message from [address] [employer] sent at 3/25/2019  2:53 PM EDT -----  Positive for hematuria maintain appointment with Urology  [facility]

## 2019-03-26 NOTE — TELEPHONE ENCOUNTER
Patient returned call and informed of results and recommendations  He hasn't noticed any more blood in the urine and  has an appointment with urology but not until May

## 2019-03-27 ENCOUNTER — OFFICE VISIT (OUTPATIENT)
Dept: FAMILY MEDICINE CLINIC | Facility: CLINIC | Age: 64
End: 2019-03-27
Payer: COMMERCIAL

## 2019-03-27 VITALS
SYSTOLIC BLOOD PRESSURE: 132 MMHG | TEMPERATURE: 98.9 F | BODY MASS INDEX: 28.75 KG/M2 | OXYGEN SATURATION: 98 % | HEART RATE: 86 BPM | WEIGHT: 224 LBS | HEIGHT: 74 IN | RESPIRATION RATE: 19 BRPM | DIASTOLIC BLOOD PRESSURE: 86 MMHG

## 2019-03-27 DIAGNOSIS — J22 ACUTE RESPIRATORY INFECTION: Primary | ICD-10-CM

## 2019-03-27 DIAGNOSIS — R06.2 WHEEZES: ICD-10-CM

## 2019-03-27 DIAGNOSIS — R05.9 COUGH IN ADULT: ICD-10-CM

## 2019-03-27 PROCEDURE — 3008F BODY MASS INDEX DOCD: CPT | Performed by: NURSE PRACTITIONER

## 2019-03-27 PROCEDURE — 99213 OFFICE O/P EST LOW 20 MIN: CPT | Performed by: NURSE PRACTITIONER

## 2019-03-27 PROCEDURE — 1036F TOBACCO NON-USER: CPT | Performed by: NURSE PRACTITIONER

## 2019-03-27 RX ORDER — DEXTROMETHORPHAN HYDROBROMIDE AND PROMETHAZINE HYDROCHLORIDE 15; 6.25 MG/5ML; MG/5ML
5 SYRUP ORAL 4 TIMES DAILY PRN
Qty: 118 ML | Refills: 0 | Status: SHIPPED | OUTPATIENT
Start: 2019-03-27 | End: 2019-06-10 | Stop reason: ALTCHOICE

## 2019-03-27 RX ORDER — PREDNISONE 20 MG/1
20 TABLET ORAL DAILY
Qty: 5 TABLET | Refills: 0 | Status: SHIPPED | OUTPATIENT
Start: 2019-03-27 | End: 2019-06-10 | Stop reason: ALTCHOICE

## 2019-03-27 NOTE — PATIENT INSTRUCTIONS
Increase oral hydration  Adequate rest  Tylenol or Motrin for pain and/or fever  Nasal mist  Humidify room  Use decongestant- Sudafed or mucinex  Zinc lozenges   Good handwashing  Avoid Smoking  Antihistamine - Zyrtec      Upper Respiratory Infection   WHAT YOU NEED TO KNOW:   An upper respiratory infection is also called the common cold  It is an infection that can affect your nose, throat, ears, and sinuses  For healthy people, the common cold is usually not serious and does not need special treatment  Cold symptoms are usually worst for the first 3 to 5 days  Most people get better in 7 to 14 days  You may continue to cough for 2 to 3 weeks  Colds are caused by viruses and do not get better with antibiotics  DISCHARGE INSTRUCTIONS:   Return to the emergency department if:   · You have chest pain or trouble breathing  Contact your healthcare provider if:   · You have a fever over 102ºF (39°C)  · Your sore throat gets worse or you see white or yellow spots in your throat  · Your symptoms get worse after 3 to 5 days or your cold is not better in 14 days  · You have a rash anywhere on your skin  · You have large, tender lumps in your neck  · You have thick, green or yellow drainage from your nose  · You cough up thick yellow, green, or bloody mucus  · You have vomiting for more than 24 hours and cannot keep fluids down  · You have a bad earache  · You have questions or concerns about your condition or care  Medicines: You may need any of the following:  · Decongestants  help reduce nasal congestion and help you breathe more easily  If you take decongestant pills, they may make you feel restless or cause problems with your sleep  Do not use decongestant sprays for more than a few days  · Cough suppressants  help reduce coughing  Ask your healthcare provider which type of cough medicine is best for you  · NSAIDs , such as ibuprofen, help decrease swelling, pain, and fever  NSAIDs can cause stomach bleeding or kidney problems in certain people  If you take blood thinner medicine, always ask your healthcare provider if NSAIDs are safe for you  Always read the medicine label and follow directions  · Acetaminophen  decreases pain and fever  It is available without a doctor's order  Ask how much to take and how often to take it  Follow directions  Read the labels of all other medicines you are using to see if they also contain acetaminophen, or ask your doctor or pharmacist  Acetaminophen can cause liver damage if not taken correctly  Do not use more than 4 grams (4,000 milligrams) total of acetaminophen in one day  · Take your medicine as directed  Contact your healthcare provider if you think your medicine is not helping or if you have side effects  Tell him or her if you are allergic to any medicine  Keep a list of the medicines, vitamins, and herbs you take  Include the amounts, and when and why you take them  Bring the list or the pill bottles to follow-up visits  Carry your medicine list with you in case of an emergency  Follow up with your healthcare provider as directed:  Write down your questions so you remember to ask them during your visits  Self-care:   · Rest as much as possible  Slowly start to do more each day  · Drink more liquids as directed  Liquids will help thin and loosen mucus so you can cough it up  Liquids will also help prevent dehydration  Liquids that help prevent dehydration include water, fruit juice, and broth  Do not drink liquids that contain caffeine  Caffeine can increase your risk for dehydration  Ask your healthcare provider how much liquid to drink each day  · Soothe a sore throat  Gargle with warm salt water  This helps your sore throat feel better  Make salt water by dissolving ¼ teaspoon salt in 1 cup warm water  You may also suck on hard candy or throat lozenges  You may use a sore throat spray      · Use a humidifier or vaporizer  Use a cool mist humidifier or a vaporizer to increase air moisture in your home  This may make it easier for you to breathe and help decrease your cough  · Use saline nasal drops as directed  These help relieve congestion  · Apply petroleum-based jelly around the outside of your nostrils  This can decrease irritation from blowing your nose  · Do not smoke  Nicotine and other chemicals in cigarettes and cigars can make your symptoms worse  They can also cause infections such as bronchitis or pneumonia  Ask your healthcare provider for information if you currently smoke and need help to quit  E-cigarettes or smokeless tobacco still contain nicotine  Talk to your healthcare provider before you use these products  Prevent spreading your cold to others:   · Try to stay away from other people during the first 2 to 3 days of your cold when it is more easily spread  · Do not share food or drinks  · Do not share hand towels with household members  · Wash your hands often, especially after you blow your nose  Turn away from other people and cover your mouth and nose with a tissue when you sneeze or cough  © 2017 [facility] Information is for End User's use only and may not be sold, redistributed or otherwise used for commercial purposes  All illustrations and images included in CareNotes® are the copyrighted property of [employer]  or [employer]  The above information is an  only  It is not intended as medical advice for individual conditions or treatments  Talk to your doctor, nurse or pharmacist before following any medical regimen to see if it is safe and effective for you

## 2019-03-27 NOTE — PROGRESS NOTES
Assessment/Plan:    Cough in adult  Take 1 tessalon arturo up to 3 times a day for cough  Wheezes  Prednisone daily x 5 days      Acute respiratory infection  Increase oral hydration  Adequate rest  Tylenol or Motrin for pain and/or fever  Nasal mist  Humidify room  Use decongestant- Sudafed or mucinex  Zinc lozenges   Good handwashing  Avoid Smoking  Antihistamine - Zyrtec         Problem List Items Addressed This Visit        Respiratory    Acute respiratory infection - Primary     Increase oral hydration  Adequate rest  Tylenol or Motrin for pain and/or fever  Nasal mist  Humidify room  Use decongestant- Sudafed or mucinex  Zinc lozenges   Good handwashing  Avoid Smoking  Antihistamine - Zyrtec            Other    Cough in adult     Take 1 tessalon arturo up to 3 times a day for cough  Relevant Medications    predniSONE 20 mg tablet    promethazine-dextromethorphan (PHENERGAN-DM) 6 25-15 mg/5 mL oral syrup    Wheezes     Prednisone daily x 5 days           Relevant Medications    predniSONE 20 mg tablet            Subjective:      Patient ID: Rosa Abreu is a 66 y o  male  Patient is here with son for complaints of ear pain  Reports a productive cough,  pain, cough, congestion, sore throat  Has had symptoms for about 2 days  Has not been taking any medication  Has been afebrile  He was seen last week for hematuria, has an appointment for CT scan tomorrow  Denies having any more blood in his urine  The following portions of the patient's history were reviewed and updated as appropriate: allergies, current medications, past family history, past medical history, past social history, past surgical history and problem list     Review of Systems   HENT: Positive for congestion  Respiratory: Positive for chest tightness, shortness of breath and wheezing  Cardiovascular: Positive for palpitations  Gastrointestinal: Negative  Endocrine: Negative  Genitourinary: Negative  Musculoskeletal: Negative  Allergic/Immunologic: Negative  Neurological: Negative  Hematological: Negative  Psychiatric/Behavioral: Negative  Objective:      /86   Pulse 86   Temp 98 9 °F (37 2 °C)   Resp 19   Ht 6' 2" (1 88 m)   Wt 102 kg (224 lb)   SpO2 98%   BMI 28 76 kg/m²          Physical Exam   Constitutional: He is oriented to person, place, and time  He appears well-developed and well-nourished  He appears ill  HENT:   Head: Normocephalic and atraumatic  Eyes: Pupils are equal, round, and reactive to light  Neck: Normal range of motion  Cardiovascular: Normal rate and regular rhythm  Pulmonary/Chest: He is in respiratory distress  He has wheezes  He exhibits tenderness  Musculoskeletal: Normal range of motion  Neurological: He is oriented to person, place, and time  Skin: Skin is warm and dry  Psychiatric: He has a normal mood and affect  His behavior is normal  Judgment and thought content normal    Nursing note and vitals reviewed

## 2019-03-28 ENCOUNTER — HOSPITAL ENCOUNTER (OUTPATIENT)
Dept: CT IMAGING | Facility: HOSPITAL | Age: 64
Discharge: HOME | End: 2019-03-28
Payer: COMMERCIAL

## 2019-03-28 DIAGNOSIS — R31.9 HEMATURIA, UNSPECIFIED TYPE: ICD-10-CM

## 2019-03-28 DIAGNOSIS — R10.9 ABDOMINAL PAIN, UNSPECIFIED ABDOMINAL LOCATION: ICD-10-CM

## 2019-03-28 PROCEDURE — 74176 CT ABD & PELVIS W/O CONTRAST: CPT

## 2019-03-28 NOTE — ASSESSMENT & PLAN NOTE
Increase oral hydration  Adequate rest  Tylenol or Motrin for pain and/or fever  Nasal mist  Humidify room  Use decongestant- Sudafed or mucinex  Zinc lozenges   Good handwashing  Avoid Smoking  Antihistamine - Zyrtec

## 2019-05-14 DIAGNOSIS — E11.40 TYPE 2 DIABETES MELLITUS WITH DIABETIC NEUROPATHY, WITHOUT LONG-TERM CURRENT USE OF INSULIN (HCC): ICD-10-CM

## 2019-05-14 RX ORDER — INSULIN GLARGINE 100 [IU]/ML
INJECTION, SOLUTION SUBCUTANEOUS
Qty: 30 ML | Refills: 3 | Status: SHIPPED | OUTPATIENT
Start: 2019-05-14 | End: 2019-06-10 | Stop reason: SDUPTHER

## 2019-05-14 RX ORDER — PEN NEEDLE, DIABETIC 29 G X1/2"
NEEDLE, DISPOSABLE MISCELLANEOUS
Qty: 90 EACH | Refills: 3 | Status: SHIPPED | OUTPATIENT
Start: 2019-05-14 | End: 2020-08-03

## 2019-05-14 RX ORDER — ISOPROPYL ALCOHOL 0.75 G/1
SWAB TOPICAL
Qty: 100 EACH | Refills: 3 | Status: SHIPPED | OUTPATIENT
Start: 2019-05-14 | End: 2020-08-31

## 2019-06-10 ENCOUNTER — OFFICE VISIT (OUTPATIENT)
Dept: FAMILY MEDICINE CLINIC | Facility: CLINIC | Age: 64
End: 2019-06-10
Payer: COMMERCIAL

## 2019-06-10 VITALS
SYSTOLIC BLOOD PRESSURE: 126 MMHG | HEIGHT: 74 IN | HEART RATE: 76 BPM | WEIGHT: 227 LBS | TEMPERATURE: 98.5 F | BODY MASS INDEX: 29.13 KG/M2 | OXYGEN SATURATION: 98 % | DIASTOLIC BLOOD PRESSURE: 74 MMHG

## 2019-06-10 DIAGNOSIS — I10 ESSENTIAL HYPERTENSION: ICD-10-CM

## 2019-06-10 DIAGNOSIS — I82.403 ACUTE EMBOLISM AND THROMBOSIS OF DEEP VEIN OF BOTH LOWER EXTREMITIES (HCC): ICD-10-CM

## 2019-06-10 DIAGNOSIS — E78.2 MIXED HYPERLIPIDEMIA: ICD-10-CM

## 2019-06-10 DIAGNOSIS — Z12.5 SCREENING PSA (PROSTATE SPECIFIC ANTIGEN): ICD-10-CM

## 2019-06-10 DIAGNOSIS — E11.40 TYPE 2 DIABETES MELLITUS WITH DIABETIC NEUROPATHY, WITHOUT LONG-TERM CURRENT USE OF INSULIN (HCC): Primary | ICD-10-CM

## 2019-06-10 DIAGNOSIS — M51.36 LUMBAR DEGENERATIVE DISC DISEASE: ICD-10-CM

## 2019-06-10 DIAGNOSIS — I73.9 PERIPHERAL VASCULAR DISEASE (HCC): ICD-10-CM

## 2019-06-10 PROBLEM — J22 ACUTE RESPIRATORY INFECTION: Status: RESOLVED | Noted: 2019-03-27 | Resolved: 2019-06-10

## 2019-06-10 PROBLEM — R05.9 COUGH IN ADULT: Status: RESOLVED | Noted: 2019-03-27 | Resolved: 2019-06-10

## 2019-06-10 PROBLEM — R06.2 WHEEZES: Status: RESOLVED | Noted: 2019-03-27 | Resolved: 2019-06-10

## 2019-06-10 LAB — SL AMB POCT HEMOGLOBIN AIC: 8.4 (ref ?–6.5)

## 2019-06-10 PROCEDURE — 83036 HEMOGLOBIN GLYCOSYLATED A1C: CPT | Performed by: FAMILY MEDICINE

## 2019-06-10 PROCEDURE — 3045F PR MOST RECENT HEMOGLOBIN A1C LEVEL 7.0-9.0%: CPT | Performed by: FAMILY MEDICINE

## 2019-06-10 PROCEDURE — 3008F BODY MASS INDEX DOCD: CPT | Performed by: FAMILY MEDICINE

## 2019-06-10 PROCEDURE — 3078F DIAST BP <80 MM HG: CPT | Performed by: FAMILY MEDICINE

## 2019-06-10 PROCEDURE — 99214 OFFICE O/P EST MOD 30 MIN: CPT | Performed by: FAMILY MEDICINE

## 2019-06-10 PROCEDURE — 1036F TOBACCO NON-USER: CPT | Performed by: FAMILY MEDICINE

## 2019-06-10 PROCEDURE — 3074F SYST BP LT 130 MM HG: CPT | Performed by: FAMILY MEDICINE

## 2019-06-10 RX ORDER — INSULIN GLARGINE 100 [IU]/ML
35 INJECTION, SOLUTION SUBCUTANEOUS
Qty: 30 ML | Refills: 3
Start: 2019-06-10 | End: 2019-07-02 | Stop reason: SDUPTHER

## 2019-06-10 RX ORDER — BLOOD SUGAR DIAGNOSTIC
STRIP MISCELLANEOUS
Qty: 100 EACH | Refills: 3 | Status: SHIPPED | OUTPATIENT
Start: 2019-06-10 | End: 2020-08-03 | Stop reason: SDUPTHER

## 2019-06-10 RX ORDER — OXYCODONE HCL 20 MG/1
20 TABLET, FILM COATED, EXTENDED RELEASE ORAL EVERY 12 HOURS SCHEDULED
Qty: 14 TABLET | Refills: 0 | Status: SHIPPED | OUTPATIENT
Start: 2019-06-10 | End: 2019-10-14

## 2019-06-26 ENCOUNTER — APPOINTMENT (OUTPATIENT)
Dept: LAB | Facility: HOSPITAL | Age: 64
End: 2019-06-26
Attending: FAMILY MEDICINE
Payer: COMMERCIAL

## 2019-06-26 DIAGNOSIS — E11.40 TYPE 2 DIABETES MELLITUS WITH DIABETIC NEUROPATHY, WITHOUT LONG-TERM CURRENT USE OF INSULIN (HCC): ICD-10-CM

## 2019-06-26 DIAGNOSIS — E78.2 MIXED HYPERLIPIDEMIA: ICD-10-CM

## 2019-06-26 DIAGNOSIS — Z12.5 SCREENING PSA (PROSTATE SPECIFIC ANTIGEN): ICD-10-CM

## 2019-06-26 DIAGNOSIS — I10 ESSENTIAL HYPERTENSION: ICD-10-CM

## 2019-06-26 DIAGNOSIS — I82.403 ACUTE EMBOLISM AND THROMBOSIS OF DEEP VEIN OF BOTH LOWER EXTREMITIES (HCC): ICD-10-CM

## 2019-06-26 LAB
ALBUMIN SERPL BCP-MCNC: 3.2 G/DL (ref 3.5–5)
ALP SERPL-CCNC: 97 U/L (ref 46–116)
ALT SERPL W P-5'-P-CCNC: 9 U/L (ref 12–78)
ANION GAP SERPL CALCULATED.3IONS-SCNC: 2 MMOL/L (ref 4–13)
AST SERPL W P-5'-P-CCNC: 10 U/L (ref 5–45)
BILIRUB SERPL-MCNC: 0.5 MG/DL (ref 0.2–1)
BUN SERPL-MCNC: 22 MG/DL (ref 5–25)
CALCIUM SERPL-MCNC: 8.9 MG/DL (ref 8.3–10.1)
CHLORIDE SERPL-SCNC: 115 MMOL/L (ref 100–108)
CHOLEST SERPL-MCNC: 125 MG/DL (ref 50–200)
CO2 SERPL-SCNC: 25 MMOL/L (ref 21–32)
CREAT SERPL-MCNC: 1.21 MG/DL (ref 0.6–1.3)
ERYTHROCYTE [DISTWIDTH] IN BLOOD BY AUTOMATED COUNT: 15.1 % (ref 11.6–15.1)
GFR SERPL CREATININE-BSD FRML MDRD: 64 ML/MIN/1.73SQ M
GLUCOSE P FAST SERPL-MCNC: 114 MG/DL (ref 65–99)
HCT VFR BLD AUTO: 36.8 % (ref 36.5–49.3)
HDLC SERPL-MCNC: 40 MG/DL (ref 40–60)
HGB BLD-MCNC: 11.7 G/DL (ref 12–17)
INR PPP: 1.95 (ref 0.84–1.19)
LDLC SERPL CALC-MCNC: 68 MG/DL (ref 0–100)
MCH RBC QN AUTO: 27.5 PG (ref 26.8–34.3)
MCHC RBC AUTO-ENTMCNC: 31.8 G/DL (ref 31.4–37.4)
MCV RBC AUTO: 87 FL (ref 82–98)
NONHDLC SERPL-MCNC: 85 MG/DL
PLATELET # BLD AUTO: 282 THOUSANDS/UL (ref 149–390)
PMV BLD AUTO: 9.7 FL (ref 8.9–12.7)
POTASSIUM SERPL-SCNC: 4.5 MMOL/L (ref 3.5–5.3)
PROT SERPL-MCNC: 7.4 G/DL (ref 6.4–8.2)
PROTHROMBIN TIME: 22.4 SECONDS (ref 11.6–14.5)
PSA SERPL-MCNC: 0.9 NG/ML (ref 0–4)
RBC # BLD AUTO: 4.25 MILLION/UL (ref 3.88–5.62)
SODIUM SERPL-SCNC: 142 MMOL/L (ref 136–145)
TRIGL SERPL-MCNC: 86 MG/DL
WBC # BLD AUTO: 7.36 THOUSAND/UL (ref 4.31–10.16)

## 2019-06-26 PROCEDURE — 80061 LIPID PANEL: CPT

## 2019-06-26 PROCEDURE — G0103 PSA SCREENING: HCPCS

## 2019-06-26 PROCEDURE — 36415 COLL VENOUS BLD VENIPUNCTURE: CPT | Performed by: FAMILY MEDICINE

## 2019-06-26 PROCEDURE — 85027 COMPLETE CBC AUTOMATED: CPT

## 2019-06-26 PROCEDURE — 80053 COMPREHEN METABOLIC PANEL: CPT

## 2019-06-26 PROCEDURE — 85610 PROTHROMBIN TIME: CPT | Performed by: FAMILY MEDICINE

## 2019-06-27 ENCOUNTER — TELEPHONE (OUTPATIENT)
Dept: FAMILY MEDICINE CLINIC | Facility: CLINIC | Age: 64
End: 2019-06-27

## 2019-06-28 ENCOUNTER — ANTICOAG VISIT (OUTPATIENT)
Dept: FAMILY MEDICINE CLINIC | Facility: CLINIC | Age: 64
End: 2019-06-28

## 2019-06-28 DIAGNOSIS — I82.403 ACUTE EMBOLISM AND THROMBOSIS OF DEEP VEIN OF BOTH LOWER EXTREMITIES (HCC): ICD-10-CM

## 2019-06-28 RX ORDER — WARFARIN SODIUM 6 MG/1
6 TABLET ORAL DAILY
Qty: 90 TABLET | Refills: 3 | Status: SHIPPED | OUTPATIENT
Start: 2019-06-28 | End: 2020-01-16

## 2019-07-01 ENCOUNTER — TELEPHONE (OUTPATIENT)
Dept: FAMILY MEDICINE CLINIC | Facility: CLINIC | Age: 64
End: 2019-07-01

## 2019-07-02 ENCOUNTER — TELEPHONE (OUTPATIENT)
Dept: FAMILY MEDICINE CLINIC | Facility: CLINIC | Age: 64
End: 2019-07-02

## 2019-07-02 DIAGNOSIS — E11.40 TYPE 2 DIABETES MELLITUS WITH DIABETIC NEUROPATHY, WITHOUT LONG-TERM CURRENT USE OF INSULIN (HCC): ICD-10-CM

## 2019-07-02 DIAGNOSIS — M51.36 LUMBAR DEGENERATIVE DISC DISEASE: Primary | ICD-10-CM

## 2019-07-02 RX ORDER — INSULIN GLARGINE 100 [IU]/ML
35 INJECTION, SOLUTION SUBCUTANEOUS
Qty: 30 ML | Refills: 3
Start: 2019-07-02 | End: 2019-08-12 | Stop reason: SDUPTHER

## 2019-07-02 NOTE — TELEPHONE ENCOUNTER
Pt will try the xtampza ER  Pt refuses morphine or fentanyl d/t fear of the addiction that is reported on the news

## 2019-07-02 NOTE — TELEPHONE ENCOUNTER
Pt saw something on TV about insulin resistance  He wants to know if he is at risk for that since he has gained weight and his dose has been increased?   Pt needs Lantus refill at 35-40 u Qhs

## 2019-08-12 ENCOUNTER — OFFICE VISIT (OUTPATIENT)
Dept: FAMILY MEDICINE CLINIC | Facility: CLINIC | Age: 64
End: 2019-08-12

## 2019-08-12 VITALS
WEIGHT: 227.2 LBS | HEART RATE: 74 BPM | DIASTOLIC BLOOD PRESSURE: 76 MMHG | SYSTOLIC BLOOD PRESSURE: 118 MMHG | OXYGEN SATURATION: 98 % | BODY MASS INDEX: 29.16 KG/M2 | HEIGHT: 74 IN

## 2019-08-12 DIAGNOSIS — E11.40 TYPE 2 DIABETES MELLITUS WITH DIABETIC NEUROPATHY, WITHOUT LONG-TERM CURRENT USE OF INSULIN (HCC): ICD-10-CM

## 2019-08-12 DIAGNOSIS — M51.36 LUMBAR DEGENERATIVE DISC DISEASE: ICD-10-CM

## 2019-08-12 DIAGNOSIS — R29.898 WEAKNESS OF BOTH LOWER LIMBS: Primary | ICD-10-CM

## 2019-08-12 DIAGNOSIS — G89.29 CHRONIC BACK PAIN, UNSPECIFIED BACK LOCATION, UNSPECIFIED BACK PAIN LATERALITY: ICD-10-CM

## 2019-08-12 DIAGNOSIS — M54.9 CHRONIC BACK PAIN, UNSPECIFIED BACK LOCATION, UNSPECIFIED BACK PAIN LATERALITY: ICD-10-CM

## 2019-08-12 DIAGNOSIS — I10 ESSENTIAL HYPERTENSION: ICD-10-CM

## 2019-08-12 PROCEDURE — 3008F BODY MASS INDEX DOCD: CPT | Performed by: FAMILY MEDICINE

## 2019-08-12 PROCEDURE — 1036F TOBACCO NON-USER: CPT | Performed by: FAMILY MEDICINE

## 2019-08-12 PROCEDURE — 3078F DIAST BP <80 MM HG: CPT | Performed by: FAMILY MEDICINE

## 2019-08-12 PROCEDURE — 3074F SYST BP LT 130 MM HG: CPT | Performed by: FAMILY MEDICINE

## 2019-08-12 PROCEDURE — 99214 OFFICE O/P EST MOD 30 MIN: CPT | Performed by: FAMILY MEDICINE

## 2019-08-12 RX ORDER — INSULIN GLARGINE 100 [IU]/ML
35 INJECTION, SOLUTION SUBCUTANEOUS
Qty: 30 ML | Refills: 3
Start: 2019-08-12 | End: 2020-03-23

## 2019-08-12 NOTE — PROGRESS NOTES
Assessment/Plan:         Diagnoses and all orders for this visit:    Weakness of both lower limbs  -     Ambulatory referral to Neurology; Future    Lumbar degenerative disc disease  -     Ambulatory referral to Neurology; Future    Essential hypertension    Type 2 diabetes mellitus with diabetic neuropathy, without long-term current use of insulin (HCC)    Chronic back pain, unspecified back location, unspecified back pain laterality        Weakness bilateral lower extremities  Discussed plan care with patient previously scheduled with Neurology additional referral placed encourage patient to maintain this appointment  Chronic back pain/ degenerative disc disease  Discussed with patient if he is on happy with medications prescribed by MD Santa Dodson he is to call and schedule follow-up appointment with him, no prescription given  Diabetes  Stable, to continue current medications schedule follow-up appointment after lab work  DVT  Stable, currently under Coumadin therapy  htn  Stable, to continue current medications  DVT chronic  Currently on anticoagulant therapy          Subjective:      Patient ID: Ace Mari is a 66 y o  male  Does not like the pain management meds that was given by md mccarty , , does not go to pain management , [facility] deneid my oxy   where is my son rx what pharmacy   Dm sugars last a1c 8    Has not been checking home blood sugars continues with insulin 35 units was adjust at last visit  Denies any hypoglycemic episodes    Has been having problems with my legs, for months   General weakness and difficulty with strength , its been a long term problem was supposed to go to neurology , but has yet to get an appt   Will not go into MRI  2018, Seen by md robles, Cardiology was not felt to be related to vascular disease referrals were made  Did not follow-up  Denies swelling to extremities does wear compression hose  Continues to smoke  DVT history continues take Coumadin as directed  htn, continues with blood pressure checks infrequent continues with medications    Negative chest pain palpitations shortness of breath difficulty breathing cough lesions rash                 The following portions of the patient's history were reviewed and updated as appropriate:   He has a past medical history of Back pain, Diabetes mellitus ([facility] [address] ), DVT (deep vein thrombosis) in pregnancy ([facility] [address] ), Hyperlipidemia, and Hypertension  ,  does not have any pertinent problems on file  ,   has a past surgical history that includes Hand surgery; Heel spur surgery; Cyst Removal; Cataract extraction; and Colonoscopy  ,  family history includes Dementia in his mother; Melanoma in his mother  ,   reports that he has quit smoking  He has never used smokeless tobacco  He reports that he does not drink alcohol or use drugs  ,  is allergic to byetta 10 mcg pen [exenatide]         Review of Systems   Constitutional: Negative for appetite change, chills, fever and unexpected weight change  HENT: Negative for congestion, dental problem, ear pain, hearing loss, postnasal drip, rhinorrhea, sinus pressure, sinus pain, sneezing, sore throat, tinnitus and voice change  Eyes: Negative for visual disturbance  Respiratory: Negative for apnea, cough, chest tightness and shortness of breath  Cardiovascular: Negative for chest pain, palpitations and leg swelling  Gastrointestinal: Negative for abdominal pain, blood in stool, constipation, diarrhea, nausea and vomiting  Endocrine: Negative for cold intolerance, heat intolerance, polydipsia, polyphagia and polyuria  Genitourinary: Negative for decreased urine volume, difficulty urinating, dysuria, frequency and hematuria  Musculoskeletal: Positive for gait problem  Negative for arthralgias, back pain, joint swelling and myalgias  Skin: Negative for color change, rash and wound  Allergic/Immunologic: Negative for environmental allergies and food allergies     Neurological: Positive for weakness (Lower extremities)  Negative for dizziness, syncope, light-headedness, numbness and headaches  Hematological: Negative for adenopathy  Does not bruise/bleed easily  Psychiatric/Behavioral: Negative for sleep disturbance and suicidal ideas  The patient is not nervous/anxious  Objective:  Vitals:    08/12/19 0802   BP: 118/76   Pulse: 74   SpO2: 98%      Physical Exam   Constitutional: He is oriented to person, place, and time  He appears well-developed and well-nourished  No distress  Chronically ill in appearance  Appears older than stated age   HENT:   Head: Normocephalic and atraumatic  Eyes: EOM are normal    Neck: Normal range of motion  Neck supple  Cardiovascular: Normal rate, regular rhythm and normal heart sounds  Pulmonary/Chest: Effort normal and breath sounds normal    Abdominal: Soft  Bowel sounds are normal    Musculoskeletal: Normal range of motion  Neurological: He is alert and oriented to person, place, and time  He has normal reflexes  Skin: Skin is warm and dry  Psychiatric: He has a normal mood and affect   His behavior is normal  Judgment and thought content normal

## 2019-10-07 RX ORDER — PROMETHAZINE HYDROCHLORIDE AND CODEINE PHOSPHATE 6.25; 1 MG/5ML; MG/5ML
5 SYRUP ORAL EVERY 4 HOURS PRN
Qty: 120 ML | Refills: 0 | Status: CANCELLED | OUTPATIENT
Start: 2019-10-07

## 2019-10-08 PROBLEM — E11.51 TYPE 2 DIABETES MELLITUS WITH DIABETIC PERIPHERAL ANGIOPATHY WITHOUT GANGRENE (HCC): Status: ACTIVE | Noted: 2019-10-08

## 2019-10-09 ENCOUNTER — OFFICE VISIT (OUTPATIENT)
Dept: FAMILY MEDICINE CLINIC | Facility: CLINIC | Age: 64
End: 2019-10-09
Payer: COMMERCIAL

## 2019-10-09 VITALS
BODY MASS INDEX: 29.75 KG/M2 | DIASTOLIC BLOOD PRESSURE: 76 MMHG | HEART RATE: 93 BPM | OXYGEN SATURATION: 98 % | TEMPERATURE: 98.9 F | HEIGHT: 74 IN | WEIGHT: 231.8 LBS | SYSTOLIC BLOOD PRESSURE: 124 MMHG

## 2019-10-09 DIAGNOSIS — R06.2 WHEEZES: Primary | ICD-10-CM

## 2019-10-09 DIAGNOSIS — H65.91 OTHER NONSUPPURATIVE OTITIS MEDIA OF RIGHT EAR, UNSPECIFIED CHRONICITY: ICD-10-CM

## 2019-10-09 DIAGNOSIS — R05.9 COUGH IN ADULT: ICD-10-CM

## 2019-10-09 PROBLEM — K59.09 OTHER CONSTIPATION: Status: ACTIVE | Noted: 2019-10-09

## 2019-10-09 PROBLEM — H66.91 RIGHT OTITIS MEDIA: Status: ACTIVE | Noted: 2019-10-09

## 2019-10-09 PROCEDURE — 3008F BODY MASS INDEX DOCD: CPT | Performed by: NURSE PRACTITIONER

## 2019-10-09 PROCEDURE — 99213 OFFICE O/P EST LOW 20 MIN: CPT | Performed by: NURSE PRACTITIONER

## 2019-10-09 RX ORDER — PREDNISONE 20 MG/1
20 TABLET ORAL DAILY
Qty: 5 TABLET | Refills: 0 | Status: SHIPPED | OUTPATIENT
Start: 2019-10-09 | End: 2019-10-14

## 2019-10-09 RX ORDER — AZITHROMYCIN 250 MG/1
TABLET, FILM COATED ORAL
Qty: 6 TABLET | Refills: 0 | Status: SHIPPED | OUTPATIENT
Start: 2019-10-09 | End: 2019-10-14

## 2019-10-09 RX ORDER — PROMETHAZINE HYDROCHLORIDE AND CODEINE PHOSPHATE 6.25; 1 MG/5ML; MG/5ML
5 SYRUP ORAL EVERY 4 HOURS PRN
Qty: 60 ML | Refills: 0 | Status: SHIPPED | OUTPATIENT
Start: 2019-10-09 | End: 2019-10-14

## 2019-10-09 NOTE — PATIENT INSTRUCTIONS
Prednisone daily for 5 days  Zithromax 2 pills today, then 1 pill a day for 4 days  Cough every 6 hours as needed for cough  Increase rest and hydration  Increase nutrition  Constipation   WHAT YOU NEED TO KNOW:   Constipation is when you have hard, dry bowel movements, or you go longer than usual between bowel movements  DISCHARGE INSTRUCTIONS:   Return to the emergency department if:   · You have blood in your bowel movements  · You have a fever and abdominal pain with the constipation  Contact your healthcare provider if:   · Your constipation gets worse  · You start to vomit  · You have questions or concerns about your condition or care  Medicines:   · Medicine or a fiber supplement  may help make your bowel movement softer  A laxative may help relax and loosen your intestines to help you have a bowel movement  You may also be given medicine to increase fluid in your intestines  The fluid may help move bowel movements through your intestines  · Take your medicine as directed  Contact your healthcare provider if you think your medicine is not helping or if you have side effects  Tell him of her if you are allergic to any medicine  Keep a list of the medicines, vitamins, and herbs you take  Include the amounts, and when and why you take them  Bring the list or the pill bottles to follow-up visits  Carry your medicine list with you in case of an emergency  Manage your constipation:   · Drink liquids as directed  You may need to drink extra liquids to help soften and move your bowels  Ask how much liquid to drink each day and which liquids are best for you  · Eat high-fiber foods  This may help decrease constipation by adding bulk to your bowel movements  High-fiber foods include fruit, vegetables, whole-grain breads and cereals, and beans  Your healthcare provider or dietitian can help you create a high-fiber meal plan  · Exercise regularly    Regular physical activity can help stimulate your intestines  Ask which exercises are best for you  · Schedule a time each day to have a bowel movement  This may help train your body to have regular bowel movements  Bend forward while you are on the toilet to help move the bowel movement out  Sit on the toilet for at least 10 minutes, even if you do not have a bowel movement  Follow up with your healthcare provider as directed:  Write down your questions so you remember to ask them during your visits  © 2017 [address]  Information is for End User's use only and may not be sold, redistributed or otherwise used for commercial purposes  All illustrations and images included in CareNotes® are the copyrighted property of [employer]  or [employer]  The above information is an  only  It is not intended as medical advice for individual conditions or treatments  Talk to your doctor, nurse or pharmacist before following any medical regimen to see if it is safe and effective for you  Upper Respiratory Infection   WHAT YOU NEED TO KNOW:   An upper respiratory infection is also called the common cold  It is an infection that can affect your nose, throat, ears, and sinuses  For healthy people, the common cold is usually not serious and does not need special treatment  Cold symptoms are usually worst for the first 3 to 5 days  Most people get better in 7 to 14 days  You may continue to cough for 2 to 3 weeks  Colds are caused by viruses and do not get better with antibiotics  DISCHARGE INSTRUCTIONS:   Return to the emergency department if:   · You have chest pain or trouble breathing  Contact your healthcare provider if:   · You have a fever over 102ºF (39°C)  · Your sore throat gets worse or you see white or yellow spots in your throat  · Your symptoms get worse after 3 to 5 days or your cold is not better in 14 days  · You have a rash anywhere on your skin      · You have large, tender lumps in your neck  · You have thick, green or yellow drainage from your nose  · You cough up thick yellow, green, or bloody mucus  · You have vomiting for more than 24 hours and cannot keep fluids down  · You have a bad earache  · You have questions or concerns about your condition or care  Medicines: You may need any of the following:  · Decongestants  help reduce nasal congestion and help you breathe more easily  If you take decongestant pills, they may make you feel restless or cause problems with your sleep  Do not use decongestant sprays for more than a few days  · Cough suppressants  help reduce coughing  Ask your healthcare provider which type of cough medicine is best for you  · NSAIDs , such as ibuprofen, help decrease swelling, pain, and fever  NSAIDs can cause stomach bleeding or kidney problems in certain people  If you take blood thinner medicine, always ask your healthcare provider if NSAIDs are safe for you  Always read the medicine label and follow directions  · Acetaminophen  decreases pain and fever  It is available without a doctor's order  Ask how much to take and how often to take it  Follow directions  Read the labels of all other medicines you are using to see if they also contain acetaminophen, or ask your doctor or pharmacist  Acetaminophen can cause liver damage if not taken correctly  Do not use more than 4 grams (4,000 milligrams) total of acetaminophen in one day  · Take your medicine as directed  Contact your healthcare provider if you think your medicine is not helping or if you have side effects  Tell him or her if you are allergic to any medicine  Keep a list of the medicines, vitamins, and herbs you take  Include the amounts, and when and why you take them  Bring the list or the pill bottles to follow-up visits  Carry your medicine list with you in case of an emergency    Follow up with your healthcare provider as directed:  Write down your questions so you remember to ask them during your visits  Self-care:   · Rest as much as possible  Slowly start to do more each day  · Drink more liquids as directed  Liquids will help thin and loosen mucus so you can cough it up  Liquids will also help prevent dehydration  Liquids that help prevent dehydration include water, fruit juice, and broth  Do not drink liquids that contain caffeine  Caffeine can increase your risk for dehydration  Ask your healthcare provider how much liquid to drink each day  · Soothe a sore throat  Gargle with warm salt water  This helps your sore throat feel better  Make salt water by dissolving ¼ teaspoon salt in 1 cup warm water  You may also suck on hard candy or throat lozenges  You may use a sore throat spray  · Use a humidifier or vaporizer  Use a cool mist humidifier or a vaporizer to increase air moisture in your home  This may make it easier for you to breathe and help decrease your cough  · Use saline nasal drops as directed  These help relieve congestion  · Apply petroleum-based jelly around the outside of your nostrils  This can decrease irritation from blowing your nose  · Do not smoke  Nicotine and other chemicals in cigarettes and cigars can make your symptoms worse  They can also cause infections such as bronchitis or pneumonia  Ask your healthcare provider for information if you currently smoke and need help to quit  E-cigarettes or smokeless tobacco still contain nicotine  Talk to your healthcare provider before you use these products  Prevent spreading your cold to others:   · Try to stay away from other people during the first 2 to 3 days of your cold when it is more easily spread  · Do not share food or drinks  · Do not share hand towels with household members  · Wash your hands often, especially after you blow your nose  Turn away from other people and cover your mouth and nose with a tissue when you sneeze or cough         © 2017 [facility] [address] is for End User's use only and may not be sold, redistributed or otherwise used for commercial purposes  All illustrations and images included in CareNotes® are the copyrighted property of [employer] M [employer]  or [employer]  The above information is an  only  It is not intended as medical advice for individual conditions or treatments  Talk to your doctor, nurse or pharmacist before following any medical regimen to see if it is safe and effective for you

## 2019-10-09 NOTE — ASSESSMENT & PLAN NOTE
Swollen, tender  Patient reports it being very painful  Zithromax ordered  Increase rest and hydration  Use Mucinex to help decongest   May take Tylenol for fever or pain

## 2019-10-09 NOTE — ASSESSMENT & PLAN NOTE
Discussed the importance of increasing water hydration and fiber intake  Patient states that he can't drink a lot a water because of his acid reflux  Has not been taking any medication for acid reflux    Discussed the importance of increasing activity as tolerated help increasing peristalsis and eliminating constipation

## 2019-10-09 NOTE — PROGRESS NOTES
Assessment/Plan:     Wheezes  Prednisone daily for 5 days  Cough in adult  Phenergan with codeine ordered to help prevent cough at night and maximize rest   Patient advised that it will not be refilled  Limited amount ordered  Patient reports he has Tessalon Perles at home with does not work  Right otitis media  Swollen, tender  Patient reports it being very painful  Zithromax ordered  Increase rest and hydration  Use Mucinex to help decongest   May take Tylenol for fever or pain  Other constipation  Discussed the importance of increasing water hydration and fiber intake  Patient states that he can't drink a lot a water because of his acid reflux  Has not been taking any medication for acid reflux  Discussed the importance of increasing activity as tolerated help increasing peristalsis and eliminating constipation         Problem List Items Addressed This Visit        Nervous and Auditory    Right otitis media     Swollen, tender  Patient reports it being very painful  Zithromax ordered  Increase rest and hydration  Use Mucinex to help decongest   May take Tylenol for fever or pain  Relevant Medications    azithromycin (ZITHROMAX) 250 mg tablet       Other    Cough in adult     Phenergan with codeine ordered to help prevent cough at night and maximize rest   Patient advised that it will not be refilled  Limited amount ordered  Patient reports he has Tessalon Perles at home with does not work  Relevant Medications    promethazine-codeine (PHENERGAN WITH CODEINE) 6 25-10 mg/5 mL syrup    predniSONE 20 mg tablet    Wheezes - Primary     Prednisone daily for 5 days  Relevant Medications    predniSONE 20 mg tablet            Subjective:      Patient ID: Marisa Rodas is a 60 y o  male  All patient is here with complaints of cough, congestion, wheezing  He also has ear pain  Has multiple complaints about not getting his oxycodone due to restrictions    Patient did not follow up with Neurology regarding leg weakness  Reports having fever and chills at home  States that he has been taking all his medications  Patient also reports having a lot of constipation  States that he can't move a lot because of his back and neck pain issues  The following portions of the patient's history were reviewed and updated as appropriate: allergies, current medications, past family history, past medical history, past social history, past surgical history and problem list     Review of Systems   Constitutional: Negative  HENT: Positive for ear pain and sore throat  Eyes: Negative  Respiratory: Positive for cough, shortness of breath and wheezing  Cardiovascular: Negative  Gastrointestinal: Negative  Endocrine: Negative  Genitourinary: Negative  Musculoskeletal: Negative  Skin: Negative  Allergic/Immunologic: Negative  Neurological: Negative  Hematological: Negative  Psychiatric/Behavioral: Negative  Objective:      /76   Pulse 93   Temp 98 9 °F (37 2 °C) (Tympanic)   Ht 6' 2" (1 88 m)   Wt 105 kg (231 lb 12 8 oz)   SpO2 98%   BMI 29 76 kg/m²          Physical Exam   Constitutional: He is oriented to person, place, and time  He appears well-developed and well-nourished  HENT:   Head: Normocephalic and atraumatic  Right Ear: There is swelling and tenderness  A middle ear effusion is present  Left Ear: Hearing, tympanic membrane, external ear and ear canal normal    Eyes: Pupils are equal, round, and reactive to light  Neck: Normal range of motion  Cardiovascular: Normal rate and regular rhythm  Pulmonary/Chest: He is in respiratory distress  He has wheezes  He exhibits tenderness  Musculoskeletal: Normal range of motion  Neurological: He is oriented to person, place, and time  Skin: Skin is warm and dry  Psychiatric: He has a normal mood and affect   His behavior is normal  Judgment and thought content normal  Nursing note and vitals reviewed          Labs:    Lab Results   Component Value Date    WBC 7 36 06/26/2019    HGB 11 7 (L) 06/26/2019    HCT 36 8 06/26/2019    MCV 87 06/26/2019     06/26/2019     Lab Results   Component Value Date     12/11/2015    K 4 5 06/26/2019     (H) 06/26/2019    CO2 25 06/26/2019    ANIONGAP 2 (L) 12/11/2015    BUN 22 06/26/2019    CREATININE 1 21 06/26/2019    GLUCOSE 83 12/11/2015    GLUF 114 (H) 06/26/2019    CALCIUM 8 9 06/26/2019    AST 10 06/26/2019    ALT 9 (L) 06/26/2019    ALKPHOS 97 06/26/2019    PROT 7 0 12/11/2015    BILITOT 0 48 12/11/2015    EGFR 64 06/26/2019     Lab Results   Component Value Date    GLUCOSE 83 12/11/2015    CALCIUM 8 9 06/26/2019     12/11/2015    K 4 5 06/26/2019    CO2 25 06/26/2019     (H) 06/26/2019    BUN 22 06/26/2019    CREATININE 1 21 06/26/2019

## 2019-10-09 NOTE — ASSESSMENT & PLAN NOTE
Phenergan with codeine ordered to help prevent cough at night and maximize rest   Patient advised that it will not be refilled  Limited amount ordered  Patient reports he has Tessalon Perles at home with does not work

## 2019-10-14 ENCOUNTER — OFFICE VISIT (OUTPATIENT)
Dept: FAMILY MEDICINE CLINIC | Facility: CLINIC | Age: 64
End: 2019-10-14
Payer: COMMERCIAL

## 2019-10-14 VITALS
OXYGEN SATURATION: 95 % | SYSTOLIC BLOOD PRESSURE: 114 MMHG | BODY MASS INDEX: 29.65 KG/M2 | TEMPERATURE: 99.6 F | HEART RATE: 90 BPM | HEIGHT: 74 IN | WEIGHT: 231 LBS | DIASTOLIC BLOOD PRESSURE: 74 MMHG

## 2019-10-14 DIAGNOSIS — R10.32 LLQ PAIN: Primary | ICD-10-CM

## 2019-10-14 DIAGNOSIS — E11.51 TYPE 2 DIABETES MELLITUS WITH DIABETIC PERIPHERAL ANGIOPATHY WITHOUT GANGRENE, WITH LONG-TERM CURRENT USE OF INSULIN (HCC): ICD-10-CM

## 2019-10-14 DIAGNOSIS — I82.403 ACUTE EMBOLISM AND THROMBOSIS OF DEEP VEIN OF BOTH LOWER EXTREMITIES (HCC): ICD-10-CM

## 2019-10-14 DIAGNOSIS — E11.40 TYPE 2 DIABETES MELLITUS WITH DIABETIC NEUROPATHY, WITHOUT LONG-TERM CURRENT USE OF INSULIN (HCC): ICD-10-CM

## 2019-10-14 DIAGNOSIS — Z79.4 TYPE 2 DIABETES MELLITUS WITH DIABETIC PERIPHERAL ANGIOPATHY WITHOUT GANGRENE, WITH LONG-TERM CURRENT USE OF INSULIN (HCC): ICD-10-CM

## 2019-10-14 DIAGNOSIS — M51.36 LUMBAR DEGENERATIVE DISC DISEASE: ICD-10-CM

## 2019-10-14 DIAGNOSIS — I10 ESSENTIAL HYPERTENSION: ICD-10-CM

## 2019-10-14 LAB — SL AMB POCT HEMOGLOBIN AIC: 8 (ref ?–6.5)

## 2019-10-14 PROCEDURE — 3078F DIAST BP <80 MM HG: CPT | Performed by: FAMILY MEDICINE

## 2019-10-14 PROCEDURE — 3074F SYST BP LT 130 MM HG: CPT | Performed by: FAMILY MEDICINE

## 2019-10-14 PROCEDURE — 83036 HEMOGLOBIN GLYCOSYLATED A1C: CPT | Performed by: FAMILY MEDICINE

## 2019-10-14 PROCEDURE — 99214 OFFICE O/P EST MOD 30 MIN: CPT | Performed by: FAMILY MEDICINE

## 2019-10-14 RX ORDER — CIPROFLOXACIN 500 MG/1
500 TABLET, FILM COATED ORAL EVERY 12 HOURS SCHEDULED
Qty: 20 TABLET | Refills: 0 | Status: SHIPPED | OUTPATIENT
Start: 2019-10-14 | End: 2019-10-24

## 2019-10-14 RX ORDER — METRONIDAZOLE 500 MG/1
500 TABLET ORAL EVERY 8 HOURS SCHEDULED
Qty: 21 TABLET | Refills: 0 | Status: SHIPPED | OUTPATIENT
Start: 2019-10-14 | End: 2019-10-21

## 2019-10-14 RX ORDER — OXYCODONE HCL 20 MG/1
20 TABLET, FILM COATED, EXTENDED RELEASE ORAL EVERY 12 HOURS SCHEDULED
Qty: 30 TABLET | Refills: 0 | Status: SHIPPED | OUTPATIENT
Start: 2019-10-14 | End: 2020-08-03 | Stop reason: SDUPTHER

## 2019-10-14 NOTE — ASSESSMENT & PLAN NOTE
Continue his Lantus insulin 35 units daily, I emphasized the importance of him using this regularly    Lab Results   Component Value Date    HGBA1C 8 0 (A) 10/14/2019

## 2019-10-14 NOTE — ASSESSMENT & PLAN NOTE
Patient is noncompliant with his monitoring of his Coumadin, he was given another lab slip for his PT INR and emphasized the importance of him continuing to get this checked

## 2019-10-14 NOTE — PROGRESS NOTES
Assessment/Plan:       Diagnoses and all orders for this visit:    LLQ pain  Comments:  Believe this to be diverticulitis, check CT scan, Flagyl 500 mg t i d  And Cipro 500 mg b i d  Emphasized the importance of the CT scan  Orders:  -     metroNIDAZOLE (FLAGYL) 500 mg tablet; Take 1 tablet (500 mg total) by mouth every 8 (eight) hours for 7 days  -     ciprofloxacin (CIPRO) 500 mg tablet; Take 1 tablet (500 mg total) by mouth every 12 (twelve) hours for 10 days  -     CT abdomen pelvis w contrast; Future    Lumbar degenerative disc disease  -     oxyCODONE (OxyCONTIN) 20 mg 12 hr tablet; Take 1 tablet (20 mg total) by mouth every 12 (twelve) hoursMax Daily Amount: 40 mg    Essential hypertension  -     CBC; Future    Acute embolism and thrombosis of deep vein of both lower extremities ([facility] [address] )  -     Protime-INR; Future    Type 2 diabetes mellitus with diabetic peripheral angiopathy without gangrene, with long-term current use of insulin ([facility])  -     Comprehensive metabolic panel; Future  -     Lipid panel; Future    Type 2 diabetes mellitus with diabetic neuropathy, without long-term current use of insulin ([facility])  -     POCT hemoglobin A1c        Type 2 diabetes mellitus with diabetic neuropathy, without long-term current use of insulin ([facility])  Continue his Lantus insulin 35 units daily, I emphasized the importance of him using this regularly  Lab Results   Component Value Date    HGBA1C 8 0 (A) 10/14/2019       Type 2 diabetes mellitus with diabetic peripheral angiopathy without gangrene ([facility] [address] )  Continue to try to improve his blood sugar control, emphasized the importance of using his insulin regularly, in addition to his metformin  Continue his Coumadin, he was given a new lab slip for his PT INR    Lab Results   Component Value Date    HGBA1C 8 0 (A) 10/14/2019       Acute embolism and thrombosis of unspecified deep veins of unspecified lower extremity ([facility] [address] )  Patient is noncompliant with his monitoring of his Coumadin, he was given another lab slip for his PT INR and emphasized the importance of him continuing to get this checked  Essential hypertension  Controlled, continue his lisinopril  Lumbar degenerative disc disease  Given refills of his OxyContin to take as needed  PDMP was queried  Follow-up in 4 months, he is to get his blood work done as soon as possible, he is to get the CT scan done and we will call with results  I did discuss with him that his abdominal pain worsens or he develops any blood in his stool, he is unable to eat  He is to go to the emergency room  Subjective:      Patient ID: Onur Payan is a 60 y o  male  Patient comes in for checkup on his diabetes, hypertension, peripheral vascular disease, history DVT  He does complain of a weeks history of left lower quadrant pain, he states that he has not had normal bowel movements  He states he has also had fevers in the last 24 hours  He states that his appetite has been decreased, he has not been using his insulin since the pain started  He did not get his PT INR done as ordered, he does continue to take his Coumadin  He states that he has not been regular with using his insulin, he does take his metformin regularly  He does not monitor his blood sugars  He continues take his lisinopril for his hypertension, he does not check his blood pressures at home      The following portions of the patient's history were reviewed and updated as appropriate:   He has a past medical history of Back pain, Diabetes mellitus ([address] ), DVT (deep vein thrombosis) in pregnancy, Hyperlipidemia, and Hypertension  ,  does not have any pertinent problems on file  ,   has a past surgical history that includes Hand surgery; Heel spur surgery; Cyst Removal; Cataract extraction; and Colonoscopy  ,  family history includes Dementia in his mother; Melanoma in his mother  ,   reports that he has quit smoking   He has never used smokeless tobacco  He reports that he does not drink alcohol or use drugs  ,  is allergic to byetta 10 mcg pen [exenatide]     Current Outpatient Medications   Medication Sig Dispense Refill    Alcohol Swabs (B-D SINGLE USE SWABS REGULAR) PADS USE TO CHECK BLOOD SUGAR EVERY  each 3    aspirin (ECOTRIN) 325 mg EC tablet Take by mouth      BD INSULIN SYRINGE U/F 31G X 5/16" 0 3 ML MISC USE  TO INJECT EVERY DAY 90 each 3    Blood Glucose Monitoring Suppl (ACCU-CHEK MADDISON [employer]CT) w/Device KIT by In Vitro route      hydrOXYzine HCL (ATARAX) 25 mg tablet Take 1 tablet by mouth      insulin glargine (LANTUS) 100 units/mL subcutaneous injection Inject 35 Units under the skin daily at bedtime 30 mL 3    Insulin Syringe-Needle U-100 (B-D INS SYRINGE 0 5CC/31GX5/16) 31G X 5/16" 0 5 ML MISC For use with insulin daily 100 each 3    lisinopril (ZESTRIL) 10 mg tablet TAKE 1 TABLET EVERY DAY 90 tablet 3    metFORMIN (GLUCOPHAGE-XR) 500 mg 24 hr tablet TAKE 2 TABLETS TWICE DAILY 360 tablet 3    oxyCODONE (OxyCONTIN) 20 mg 12 hr tablet Take 1 tablet (20 mg total) by mouth every 12 (twelve) hoursMax Daily Amount: 40 mg 30 tablet 0    simvastatin (ZOCOR) 20 mg tablet TAKE 1 TABLET EVERY DAY 90 tablet 3    warfarin (COUMADIN) 6 mg tablet Take 1 tablet (6 mg total) by mouth daily As directed 90 tablet 3    ciprofloxacin (CIPRO) 500 mg tablet Take 1 tablet (500 mg total) by mouth every 12 (twelve) hours for 10 days 20 tablet 0    metroNIDAZOLE (FLAGYL) 500 mg tablet Take 1 tablet (500 mg total) by mouth every 8 (eight) hours for 7 days 21 tablet 0     No current facility-administered medications for this visit  Review of Systems   Constitutional: Negative for chills, fatigue and fever  HENT: Negative for congestion, ear pain, hearing loss, postnasal drip, rhinorrhea and sore throat  Eyes: Negative for pain and visual disturbance  Respiratory: Negative for chest tightness, shortness of breath and wheezing      Cardiovascular: Negative for chest pain and leg swelling  Gastrointestinal: Positive for abdominal pain  Negative for abdominal distention, constipation, diarrhea and vomiting  Endocrine: Negative for cold intolerance and heat intolerance  Genitourinary: Negative for difficulty urinating, frequency and urgency  Musculoskeletal: Positive for back pain  Negative for arthralgias and gait problem  Skin: Negative for color change  Neurological: Negative for dizziness, tremors, syncope, numbness and headaches  Hematological: Negative for adenopathy  Psychiatric/Behavioral: Negative for agitation, confusion and sleep disturbance  The patient is not nervous/anxious  Objective:  Vitals:    10/14/19 1351   BP: 114/74   Pulse: 90   Temp: 99 6 °F (37 6 °C)   TempSrc: Tympanic   SpO2: 95%   Weight: 105 kg (231 lb)   Height: 6' 2" (1 88 m)     Body mass index is 29 66 kg/m²  Physical Exam   Constitutional: He is oriented to person, place, and time  He appears well-developed and well-nourished  HENT:   Head: Normocephalic  Mouth/Throat: Oropharynx is clear and moist    Eyes: Conjunctivae are normal  No scleral icterus  Neck: Normal range of motion  No thyromegaly present  Cardiovascular: Normal rate, regular rhythm and normal heart sounds  Pulses are weak pulses  No murmur heard  Pulses:       Dorsalis pedis pulses are 1+ on the right side, and 1+ on the left side  Posterior tibial pulses are 1+ on the right side, and 1+ on the left side  Pulmonary/Chest: Effort normal and breath sounds normal  No respiratory distress  He has no wheezes  Abdominal: Soft  Bowel sounds are normal  He exhibits no distension  There is tenderness in the left lower quadrant  Musculoskeletal: Normal range of motion  He exhibits no edema or tenderness  Feet:   Right Foot:   Skin Integrity: Negative for ulcer, skin breakdown, erythema, warmth, callus or dry skin     Left Foot:   Skin Integrity: Negative for ulcer, skin breakdown, erythema, warmth, callus or dry skin  Lymphadenopathy:     He has no cervical adenopathy  Neurological: He is alert and oriented to person, place, and time  No cranial nerve deficit  Skin: Skin is warm and dry  No rash noted  No pallor  Psychiatric: He has a normal mood and affect  His behavior is normal    Nursing note and vitals reviewed  Patient's shoes and socks removed  Right Foot/Ankle   Right Foot Inspection  Skin Exam: skin normal and skin intact no dry skin, no warmth, no callus, no erythema, no maceration, no abnormal color, no pre-ulcer, no ulcer and no callus                            Sensory       Monofilament testing: diminished  Vascular    The right DP pulse is 1+  The right PT pulse is 1+  Left Foot/Ankle  Left Foot Inspection  Skin Exam: skin normal and skin intactno dry skin, no warmth, no erythema, no maceration, normal color, no pre-ulcer, no ulcer and no callus                                         Sensory       Monofilament: diminished  Vascular    The left DP pulse is 1+  The left PT pulse is 1+  Assign Risk Category:  No deformity present;  Loss of protective sensation; Weak pulses       Risk: 1

## 2019-10-14 NOTE — ASSESSMENT & PLAN NOTE
Continue to try to improve his blood sugar control, emphasized the importance of using his insulin regularly, in addition to his metformin  Continue his Coumadin, he was given a new lab slip for his PT INR    Lab Results   Component Value Date    HGBA1C 8 0 (A) 10/14/2019

## 2019-10-15 ENCOUNTER — TELEPHONE (OUTPATIENT)
Dept: FAMILY MEDICINE CLINIC | Facility: CLINIC | Age: 64
End: 2019-10-15

## 2019-10-15 NOTE — TELEPHONE ENCOUNTER
Called pharmacy  Spoke with [employer]  Oxycodone needs PA  Pt wanted to pay out of pocket but pharmacy is not able to give a prescription for narcotic without Dr approval     Please advise   Proceed with PA?:    Phone: [phone]  ID: [id number]

## 2019-10-15 NOTE — TELEPHONE ENCOUNTER
Patient called and would like you to call the pharmacy to tell them that the RX for OxyCodone is legitimate  That is what the pharmacy said they need to fill his RX

## 2019-10-30 ENCOUNTER — TELEPHONE (OUTPATIENT)
Dept: NEUROLOGY | Facility: CLINIC | Age: 64
End: 2019-10-30

## 2020-01-16 DIAGNOSIS — E11.40 TYPE 2 DIABETES MELLITUS WITH DIABETIC NEUROPATHY, WITHOUT LONG-TERM CURRENT USE OF INSULIN (HCC): ICD-10-CM

## 2020-01-16 DIAGNOSIS — I82.403 ACUTE EMBOLISM AND THROMBOSIS OF DEEP VEIN OF BOTH LOWER EXTREMITIES (HCC): ICD-10-CM

## 2020-01-16 DIAGNOSIS — I10 ESSENTIAL HYPERTENSION: ICD-10-CM

## 2020-01-16 RX ORDER — METFORMIN HYDROCHLORIDE 500 MG/1
TABLET, EXTENDED RELEASE ORAL
Qty: 360 TABLET | Refills: 3 | Status: SHIPPED | OUTPATIENT
Start: 2020-01-16 | End: 2020-04-02 | Stop reason: SDUPTHER

## 2020-01-16 RX ORDER — LISINOPRIL 10 MG/1
TABLET ORAL
Qty: 90 TABLET | Refills: 3 | Status: SHIPPED | OUTPATIENT
Start: 2020-01-16 | End: 2020-04-02 | Stop reason: SDUPTHER

## 2020-01-16 RX ORDER — WARFARIN SODIUM 6 MG/1
TABLET ORAL
Qty: 90 TABLET | Refills: 0 | Status: SHIPPED | OUTPATIENT
Start: 2020-01-16 | End: 2020-03-23

## 2020-01-16 RX ORDER — SIMVASTATIN 20 MG
TABLET ORAL
Qty: 90 TABLET | Refills: 3 | Status: SHIPPED | OUTPATIENT
Start: 2020-01-16 | End: 2020-04-02 | Stop reason: SDUPTHER

## 2020-02-17 ENCOUNTER — OFFICE VISIT (OUTPATIENT)
Dept: FAMILY MEDICINE CLINIC | Facility: CLINIC | Age: 65
End: 2020-02-17
Payer: COMMERCIAL

## 2020-02-17 VITALS
SYSTOLIC BLOOD PRESSURE: 120 MMHG | DIASTOLIC BLOOD PRESSURE: 60 MMHG | WEIGHT: 229 LBS | TEMPERATURE: 98.9 F | HEART RATE: 78 BPM | HEIGHT: 74 IN | BODY MASS INDEX: 29.39 KG/M2 | RESPIRATION RATE: 18 BRPM | OXYGEN SATURATION: 99 %

## 2020-02-17 DIAGNOSIS — M51.36 LUMBAR DEGENERATIVE DISC DISEASE: ICD-10-CM

## 2020-02-17 DIAGNOSIS — I82.403 ACUTE EMBOLISM AND THROMBOSIS OF DEEP VEIN OF BOTH LOWER EXTREMITIES (HCC): ICD-10-CM

## 2020-02-17 DIAGNOSIS — E11.40 TYPE 2 DIABETES MELLITUS WITH DIABETIC NEUROPATHY, WITHOUT LONG-TERM CURRENT USE OF INSULIN (HCC): Primary | ICD-10-CM

## 2020-02-17 DIAGNOSIS — E78.2 MIXED HYPERLIPIDEMIA: ICD-10-CM

## 2020-02-17 DIAGNOSIS — Z79.4 TYPE 2 DIABETES MELLITUS WITH DIABETIC PERIPHERAL ANGIOPATHY WITHOUT GANGRENE, WITH LONG-TERM CURRENT USE OF INSULIN (HCC): ICD-10-CM

## 2020-02-17 DIAGNOSIS — E11.51 TYPE 2 DIABETES MELLITUS WITH DIABETIC PERIPHERAL ANGIOPATHY WITHOUT GANGRENE, WITH LONG-TERM CURRENT USE OF INSULIN (HCC): ICD-10-CM

## 2020-02-17 DIAGNOSIS — I73.9 PERIPHERAL VASCULAR DISEASE (HCC): ICD-10-CM

## 2020-02-17 DIAGNOSIS — I10 ESSENTIAL HYPERTENSION: ICD-10-CM

## 2020-02-17 LAB — SL AMB POCT HEMOGLOBIN AIC: 7.8 (ref ?–6.5)

## 2020-02-17 PROCEDURE — 3074F SYST BP LT 130 MM HG: CPT | Performed by: FAMILY MEDICINE

## 2020-02-17 PROCEDURE — 83036 HEMOGLOBIN GLYCOSYLATED A1C: CPT | Performed by: FAMILY MEDICINE

## 2020-02-17 PROCEDURE — 3051F HG A1C>EQUAL 7.0%<8.0%: CPT | Performed by: FAMILY MEDICINE

## 2020-02-17 PROCEDURE — 3078F DIAST BP <80 MM HG: CPT | Performed by: FAMILY MEDICINE

## 2020-02-17 PROCEDURE — 3008F BODY MASS INDEX DOCD: CPT | Performed by: FAMILY MEDICINE

## 2020-02-17 PROCEDURE — 1036F TOBACCO NON-USER: CPT | Performed by: FAMILY MEDICINE

## 2020-02-17 PROCEDURE — 99214 OFFICE O/P EST MOD 30 MIN: CPT | Performed by: FAMILY MEDICINE

## 2020-02-17 NOTE — ASSESSMENT & PLAN NOTE
Continue his metformin, Lantus  Check CMP, lipid profile    Lab Results   Component Value Date    HGBA1C 7 8 (A) 02/17/2020

## 2020-02-17 NOTE — ASSESSMENT & PLAN NOTE
He is to continue his OxyContin as needed, this is working well for  He is not able to get a new prescription with his insurance

## 2020-02-17 NOTE — ASSESSMENT & PLAN NOTE
Continue on his aspirin and Coumadin    Lab Results   Component Value Date    HGBA1C 7 8 (A) 02/17/2020

## 2020-02-17 NOTE — PROGRESS NOTES
Assessment/Plan:       Diagnoses and all orders for this visit:    Type 2 diabetes mellitus with diabetic neuropathy, without long-term current use of insulin ([facility])  -     POCT hemoglobin A1c  -     Comprehensive metabolic panel; Future  -     CBC; Future    Type 2 diabetes mellitus with diabetic peripheral angiopathy without gangrene, with long-term current use of insulin ([facility])    Acute embolism and thrombosis of deep vein of both lower extremities ([facility] [address] )  -     Protime-INR; Standing  -     Protime-INR    Essential hypertension    Peripheral vascular disease (HCC)    Lumbar degenerative disc disease    Mixed hyperlipidemia  -     Lipid panel; Future        Type 2 diabetes mellitus with diabetic neuropathy, without long-term current use of insulin ([facility]CC)    Continue his metformin, Lantus  Check CMP, lipid profile  Lab Results   Component Value Date    HGBA1C 7 8 (A) 02/17/2020       Type 2 diabetes mellitus with diabetic peripheral angiopathy without gangrene ([facility] [address] )    Continue on his aspirin and Coumadin  Lab Results   Component Value Date    HGBA1C 7 8 (A) 02/17/2020       Acute embolism and thrombosis of unspecified deep veins of unspecified lower extremity ([facility] [address] )    Continue on his Coumadin, check his PT INR, I emphasized the importance of him getting this done  Essential hypertension    Controlled, continue his lisinopril  Peripheral vascular disease ([facility] [address] )    He is to continue his Coumadin  Follow-up with vascular surgeon as scheduled  Lumbar degenerative disc disease    He is to continue his OxyContin as needed, this is working well for  He is not able to get a new prescription with his insurance  Mixed hyperlipidemia    Continue simvastatin, check CMP, lipid profile     emphasized the importance of him getting his PT INR done, he is to get his other blood work done that was ordered today  Encouraged him to make a neurology appointment  We will see him back in 6 months      Subjective: Patient ID: Leonardo Heller is a 60 y o  male  Patient comes in today for checkup on his diabetes, hypertension, hyperlipidemia, low back pain, history DVT  Since his last visit he has not got his blood work done which includes his PT INR  He denies any easy bleeding or bruising  He has not seen any the specialists I recommend seeing including a neurologist for his probable radiculopathy of his lower extremities  He does  Report that he is taking his medications as prescribed  He does not check his blood sugars routinely  The following portions of the patient's history were reviewed and updated as appropriate:   He has a past medical history of Back pain, Diabetes mellitus ([address] ), DVT (deep vein thrombosis) in pregnancy, Hyperlipidemia, and Hypertension  ,  does not have any pertinent problems on file  ,   has a past surgical history that includes Hand surgery; Heel spur surgery; Cyst Removal; Cataract extraction; and Colonoscopy  ,  family history includes Dementia in his mother; Melanoma in his mother  ,   reports that he has quit smoking  He has never used smokeless tobacco  He reports that he does not drink alcohol or use drugs  ,  is allergic to byetta 10 mcg pen [exenatide] and pollen extract     Current Outpatient Medications   Medication Sig Dispense Refill    Alcohol Swabs (B-D SINGLE USE SWABS REGULAR) PADS USE TO CHECK BLOOD SUGAR EVERY  each 3    aspirin (ECOTRIN) 325 mg EC tablet Take by mouth      BD INSULIN SYRINGE U/F 31G X 5/16" 0 3 ML MISC USE  TO INJECT EVERY DAY 90 each 3    Blood Glucose Monitoring Suppl (ACCU-CHEK MADDISON [facility]) w/Device KIT by In Vitro route      hydrOXYzine HCL (ATARAX) 25 mg tablet Take 1 tablet by mouth      insulin glargine (LANTUS) 100 units/mL subcutaneous injection Inject 35 Units under the skin daily at bedtime 30 mL 3    Insulin Syringe-Needle U-100 (B-D INS SYRINGE 0 5CC/31GX5/16) 31G X 5/16" 0 5 ML MISC For use with insulin daily 100 each 3    lisinopril (ZESTRIL) 10 mg tablet TAKE 1 TABLET EVERY DAY 90 tablet 3    metFORMIN (GLUCOPHAGE-XR) 500 mg 24 hr tablet TAKE 2 TABLETS TWICE DAILY 360 tablet 3    oxyCODONE (OxyCONTIN) 20 mg 12 hr tablet Take 1 tablet (20 mg total) by mouth every 12 (twelve) hoursMax Daily Amount: 40 mg 30 tablet 0    simvastatin (ZOCOR) 20 mg tablet TAKE 1 TABLET EVERY DAY 90 tablet 3    warfarin (COUMADIN) 6 mg tablet TAKE 1 TABLET EVERY DAY AS DIRECTED 90 tablet 0     No current facility-administered medications for this visit  Review of Systems   Constitutional: Negative for chills, fatigue and fever  HENT: Negative for congestion, ear pain, hearing loss, postnasal drip, rhinorrhea and sore throat  Eyes: Negative for pain and visual disturbance  Respiratory: Negative for chest tightness, shortness of breath and wheezing  Cardiovascular: Negative for chest pain and leg swelling  Gastrointestinal: Negative for abdominal distention, abdominal pain, constipation, diarrhea and vomiting  Endocrine: Negative for cold intolerance and heat intolerance  Genitourinary: Negative for difficulty urinating, frequency and urgency  Musculoskeletal: Positive for back pain  Negative for arthralgias and gait problem  Skin: Negative for color change  Neurological: Positive for headaches  Negative for dizziness, tremors, syncope and numbness  Hematological: Negative for adenopathy  Psychiatric/Behavioral: Negative for agitation, confusion and sleep disturbance  The patient is not nervous/anxious  Objective:  Vitals:    02/17/20 1342   BP: 120/60   Pulse: 78   Resp: 18   Temp: 98 9 °F (37 2 °C)   TempSrc: Tympanic   SpO2: 99%   Weight: 104 kg (229 lb)   Height: 6' 2" (1 88 m)     Body mass index is 29 4 kg/m²  Physical Exam   Constitutional: He is oriented to person, place, and time  He appears well-developed and well-nourished  unkempt   HENT:   Head: Normocephalic     Mouth/Throat: Oropharynx is clear and moist    Eyes: Conjunctivae are normal  No scleral icterus  Neck: Normal range of motion  No thyromegaly present  Cardiovascular: Normal rate, regular rhythm and normal heart sounds  No murmur heard  Pulmonary/Chest: Effort normal  No respiratory distress  He has wheezes  Abdominal: Soft  Bowel sounds are normal  He exhibits no distension  There is no tenderness  Musculoskeletal: Normal range of motion  He exhibits tenderness (paraspinal muscle tenderness)  He exhibits no edema  Lymphadenopathy:     He has no cervical adenopathy  Neurological: He is alert and oriented to person, place, and time  No cranial nerve deficit  Skin: Skin is warm and dry  No rash noted  No pallor  Psychiatric: He has a normal mood and affect  His behavior is normal    Nursing note and vitals reviewed  BMI Counseling: Body mass index is 29 4 kg/m²  The BMI is above normal  Nutrition recommendations include decreasing portion sizes and encouraging healthy choices of fruits and vegetables  Exercise recommendations include moderate physical activity 150 minutes/week  No pharmacotherapy was ordered

## 2020-03-23 DIAGNOSIS — E11.40 TYPE 2 DIABETES MELLITUS WITH DIABETIC NEUROPATHY, WITHOUT LONG-TERM CURRENT USE OF INSULIN (HCC): ICD-10-CM

## 2020-03-23 DIAGNOSIS — I82.403 ACUTE EMBOLISM AND THROMBOSIS OF DEEP VEIN OF BOTH LOWER EXTREMITIES (HCC): ICD-10-CM

## 2020-03-23 RX ORDER — WARFARIN SODIUM 6 MG/1
TABLET ORAL
Qty: 90 TABLET | Refills: 0 | Status: SHIPPED | OUTPATIENT
Start: 2020-03-23 | End: 2020-05-04

## 2020-03-23 RX ORDER — INSULIN GLARGINE 100 [IU]/ML
INJECTION, SOLUTION SUBCUTANEOUS
Qty: 30 ML | Refills: 3 | Status: SHIPPED | OUTPATIENT
Start: 2020-03-23 | End: 2020-08-03

## 2020-04-02 DIAGNOSIS — I10 ESSENTIAL HYPERTENSION: ICD-10-CM

## 2020-04-02 DIAGNOSIS — E11.40 TYPE 2 DIABETES MELLITUS WITH DIABETIC NEUROPATHY, WITHOUT LONG-TERM CURRENT USE OF INSULIN (HCC): ICD-10-CM

## 2020-04-02 PROCEDURE — 4010F ACE/ARB THERAPY RXD/TAKEN: CPT | Performed by: FAMILY MEDICINE

## 2020-04-02 RX ORDER — SIMVASTATIN 20 MG
20 TABLET ORAL DAILY
Qty: 90 TABLET | Refills: 3 | Status: SHIPPED | OUTPATIENT
Start: 2020-04-02 | End: 2021-06-02

## 2020-04-02 RX ORDER — LISINOPRIL 10 MG/1
10 TABLET ORAL DAILY
Qty: 90 TABLET | Refills: 3 | Status: SHIPPED | OUTPATIENT
Start: 2020-04-02 | End: 2021-06-02

## 2020-04-02 RX ORDER — METFORMIN HYDROCHLORIDE 500 MG/1
1000 TABLET, EXTENDED RELEASE ORAL 2 TIMES DAILY
Qty: 360 TABLET | Refills: 3 | Status: SHIPPED | OUTPATIENT
Start: 2020-04-02 | End: 2021-06-02

## 2020-05-01 DIAGNOSIS — I82.403 ACUTE EMBOLISM AND THROMBOSIS OF DEEP VEIN OF BOTH LOWER EXTREMITIES (HCC): ICD-10-CM

## 2020-05-04 RX ORDER — WARFARIN SODIUM 6 MG/1
TABLET ORAL
Qty: 90 TABLET | Refills: 0 | Status: SHIPPED | OUTPATIENT
Start: 2020-05-04 | End: 2020-08-03 | Stop reason: SDUPTHER

## 2020-07-07 ENCOUNTER — TELEPHONE (OUTPATIENT)
Dept: FAMILY MEDICINE CLINIC | Facility: CLINIC | Age: 65
End: 2020-07-07

## 2020-07-07 NOTE — TELEPHONE ENCOUNTER
Please call patient and let him know that we cannot give him a written prescription if he is going to fill this in the state of [place]

## 2020-07-07 NOTE — TELEPHONE ENCOUNTER
Patient called wanting a written script for oxyCODONE 20mg  He asked for Dr Randi Carrizales, I did inform him that he is not in the office  Are you able to make this script for him? He would like it printed rather than sent electronically

## 2020-07-08 ENCOUNTER — ANTICOAG VISIT (OUTPATIENT)
Dept: FAMILY MEDICINE CLINIC | Facility: CLINIC | Age: 65
End: 2020-07-08

## 2020-08-03 ENCOUNTER — LAB (OUTPATIENT)
Dept: LAB | Facility: HOSPITAL | Age: 65
End: 2020-08-03
Attending: FAMILY MEDICINE
Payer: COMMERCIAL

## 2020-08-03 ENCOUNTER — OFFICE VISIT (OUTPATIENT)
Dept: FAMILY MEDICINE CLINIC | Facility: CLINIC | Age: 65
End: 2020-08-03
Payer: COMMERCIAL

## 2020-08-03 VITALS
HEIGHT: 74 IN | TEMPERATURE: 97.5 F | BODY MASS INDEX: 29.53 KG/M2 | DIASTOLIC BLOOD PRESSURE: 66 MMHG | OXYGEN SATURATION: 97 % | HEART RATE: 81 BPM | SYSTOLIC BLOOD PRESSURE: 108 MMHG | WEIGHT: 230.08 LBS

## 2020-08-03 DIAGNOSIS — E11.51 TYPE 2 DIABETES MELLITUS WITH DIABETIC PERIPHERAL ANGIOPATHY WITHOUT GANGRENE, WITH LONG-TERM CURRENT USE OF INSULIN (HCC): ICD-10-CM

## 2020-08-03 DIAGNOSIS — Z11.59 NEED FOR HEPATITIS C SCREENING TEST: ICD-10-CM

## 2020-08-03 DIAGNOSIS — M51.36 LUMBAR DEGENERATIVE DISC DISEASE: ICD-10-CM

## 2020-08-03 DIAGNOSIS — Z79.4 TYPE 2 DIABETES MELLITUS WITH DIABETIC PERIPHERAL ANGIOPATHY WITHOUT GANGRENE, WITH LONG-TERM CURRENT USE OF INSULIN (HCC): ICD-10-CM

## 2020-08-03 DIAGNOSIS — E78.2 MIXED HYPERLIPIDEMIA: ICD-10-CM

## 2020-08-03 DIAGNOSIS — E11.40 TYPE 2 DIABETES MELLITUS WITH DIABETIC NEUROPATHY, WITHOUT LONG-TERM CURRENT USE OF INSULIN (HCC): ICD-10-CM

## 2020-08-03 DIAGNOSIS — I10 ESSENTIAL HYPERTENSION: ICD-10-CM

## 2020-08-03 DIAGNOSIS — I82.403 ACUTE EMBOLISM AND THROMBOSIS OF DEEP VEIN OF BOTH LOWER EXTREMITIES (HCC): ICD-10-CM

## 2020-08-03 DIAGNOSIS — I73.9 PERIPHERAL VASCULAR DISEASE (HCC): ICD-10-CM

## 2020-08-03 DIAGNOSIS — E11.40 TYPE 2 DIABETES MELLITUS WITH DIABETIC NEUROPATHY, WITHOUT LONG-TERM CURRENT USE OF INSULIN (HCC): Primary | ICD-10-CM

## 2020-08-03 PROBLEM — H66.91 RIGHT OTITIS MEDIA: Status: RESOLVED | Noted: 2019-10-09 | Resolved: 2020-08-03

## 2020-08-03 LAB
ALBUMIN SERPL BCP-MCNC: 3.2 G/DL (ref 3.5–5)
ALP SERPL-CCNC: 94 U/L (ref 46–116)
ALT SERPL W P-5'-P-CCNC: 12 U/L (ref 12–78)
ANION GAP SERPL CALCULATED.3IONS-SCNC: 11 MMOL/L (ref 4–13)
AST SERPL W P-5'-P-CCNC: 11 U/L (ref 5–45)
BILIRUB SERPL-MCNC: 0.4 MG/DL (ref 0.2–1)
BUN SERPL-MCNC: 35 MG/DL (ref 5–25)
CALCIUM SERPL-MCNC: 9.3 MG/DL (ref 8.3–10.1)
CHLORIDE SERPL-SCNC: 107 MMOL/L (ref 100–108)
CHOLEST SERPL-MCNC: 127 MG/DL (ref 50–200)
CO2 SERPL-SCNC: 23 MMOL/L (ref 21–32)
CREAT SERPL-MCNC: 1.41 MG/DL (ref 0.6–1.3)
ERYTHROCYTE [DISTWIDTH] IN BLOOD BY AUTOMATED COUNT: 15.3 % (ref 11.6–15.1)
GFR SERPL CREATININE-BSD FRML MDRD: 53 ML/MIN/1.73SQ M
GLUCOSE P FAST SERPL-MCNC: 189 MG/DL (ref 65–99)
HCT VFR BLD AUTO: 37.5 % (ref 36.5–49.3)
HCV AB SER QL: NORMAL
HDLC SERPL-MCNC: 39 MG/DL
HGB BLD-MCNC: 12.1 G/DL (ref 12–17)
LDLC SERPL CALC-MCNC: 68 MG/DL (ref 0–100)
MCH RBC QN AUTO: 27.6 PG (ref 26.8–34.3)
MCHC RBC AUTO-ENTMCNC: 32.3 G/DL (ref 31.4–37.4)
MCV RBC AUTO: 85 FL (ref 82–98)
NONHDLC SERPL-MCNC: 88 MG/DL
PLATELET # BLD AUTO: 246 THOUSANDS/UL (ref 149–390)
PMV BLD AUTO: 9.5 FL (ref 8.9–12.7)
POTASSIUM SERPL-SCNC: 5.1 MMOL/L (ref 3.5–5.3)
PROT SERPL-MCNC: 7.3 G/DL (ref 6.4–8.2)
RBC # BLD AUTO: 4.39 MILLION/UL (ref 3.88–5.62)
SL AMB POCT HEMOGLOBIN AIC: 8.5 (ref ?–6.5)
SODIUM SERPL-SCNC: 141 MMOL/L (ref 136–145)
TRIGL SERPL-MCNC: 100 MG/DL
WBC # BLD AUTO: 7.24 THOUSAND/UL (ref 4.31–10.16)

## 2020-08-03 PROCEDURE — 80053 COMPREHEN METABOLIC PANEL: CPT

## 2020-08-03 PROCEDURE — 80061 LIPID PANEL: CPT

## 2020-08-03 PROCEDURE — 86803 HEPATITIS C AB TEST: CPT

## 2020-08-03 PROCEDURE — 3052F HG A1C>EQUAL 8.0%<EQUAL 9.0%: CPT | Performed by: FAMILY MEDICINE

## 2020-08-03 PROCEDURE — 3725F SCREEN DEPRESSION PERFORMED: CPT | Performed by: FAMILY MEDICINE

## 2020-08-03 PROCEDURE — 83036 HEMOGLOBIN GLYCOSYLATED A1C: CPT | Performed by: FAMILY MEDICINE

## 2020-08-03 PROCEDURE — 1036F TOBACCO NON-USER: CPT | Performed by: FAMILY MEDICINE

## 2020-08-03 PROCEDURE — 3008F BODY MASS INDEX DOCD: CPT | Performed by: FAMILY MEDICINE

## 2020-08-03 PROCEDURE — 85027 COMPLETE CBC AUTOMATED: CPT

## 2020-08-03 PROCEDURE — 3078F DIAST BP <80 MM HG: CPT | Performed by: FAMILY MEDICINE

## 2020-08-03 PROCEDURE — 3074F SYST BP LT 130 MM HG: CPT | Performed by: FAMILY MEDICINE

## 2020-08-03 PROCEDURE — 99214 OFFICE O/P EST MOD 30 MIN: CPT | Performed by: FAMILY MEDICINE

## 2020-08-03 RX ORDER — BLOOD SUGAR DIAGNOSTIC
STRIP MISCELLANEOUS
Qty: 100 EACH | Refills: 3 | Status: SHIPPED | OUTPATIENT
Start: 2020-08-03 | End: 2021-12-01

## 2020-08-03 RX ORDER — OXYCODONE HCL 20 MG/1
20 TABLET, FILM COATED, EXTENDED RELEASE ORAL EVERY 12 HOURS SCHEDULED
Qty: 30 TABLET | Refills: 0 | Status: SHIPPED | OUTPATIENT
Start: 2020-08-03 | End: 2020-12-07

## 2020-08-03 RX ORDER — WARFARIN SODIUM 6 MG/1
6 TABLET ORAL DAILY
Qty: 90 TABLET | Refills: 0 | Status: SHIPPED | OUTPATIENT
Start: 2020-08-03 | End: 2020-08-28

## 2020-08-03 RX ORDER — INSULIN GLARGINE 100 [IU]/ML
40 INJECTION, SOLUTION SUBCUTANEOUS DAILY
Qty: 30 ML | Refills: 3 | Status: SHIPPED | OUTPATIENT
Start: 2020-08-03 | End: 2021-06-02

## 2020-08-03 NOTE — PROGRESS NOTES
Assessment/Plan:         Problem List Items Addressed This Visit        Endocrine    Type 2 diabetes mellitus with diabetic neuropathy, without long-term current use of insulin (HCC) - Primary    Relevant Medications    insulin glargine (Lantus) 100 units/mL subcutaneous injection    Insulin Syringe-Needle U-100 (B-D INS SYRINGE 0 5CC/31GX5/16) 31G X 5/16" 0 5 ML MISC    Other Relevant Orders    POCT hemoglobin A1c (Completed)    Type 2 diabetes mellitus with diabetic peripheral angiopathy without gangrene (HCC)       Increase his Lantus to 40 units subcu daily, he is to get his blood work done as previously ordered  Encouraged him to quit smoking  Lab Results   Component Value Date    HGBA1C 8 5 (A) 08/03/2020            Relevant Medications    insulin glargine (Lantus) 100 units/mL subcutaneous injection       Cardiovascular and Mediastinum    Acute embolism and thrombosis of unspecified deep veins of unspecified lower extremity (HCC)       Continue his Coumadin, he is to get his PT INR done as he is overdue  Relevant Medications    warfarin (COUMADIN) 6 mg tablet    Essential hypertension       Well controlled, continue his lisinopril  Peripheral vascular disease ([address] )       Stop smoking, continue his Coumadin  Musculoskeletal and Integument    Lumbar degenerative disc disease       He was given  A refill of his oxycodone which he is using sparingly  This is working well for him  Relevant Medications    oxyCODONE (OxyCONTIN) 20 mg 12 hr tablet       Other    Mixed hyperlipidemia       Continue his simvastatin, get his blood work done as previously ordered  Other Visit Diagnoses     Need for hepatitis C screening test        Relevant Orders    Hepatitis C antibody            Subjective:      Patient ID: Cassidy Nelson is a 60 y o  male        Patient comes in today for checkup on his diabetes, peripheral arterial disease, hypertension, hyperlipidemia, history of pulmonary embolism  He did not get his blood work done as previously ordered  He has been sporadically checking his blood sugars, he denies any hypoglycemia  He does need refills of his oxycodone which he has been using very sparingly as he has to pay out-of-pocket for these  The following portions of the patient's history were reviewed and updated as appropriate:   He has a past medical history of Back pain, Diabetes mellitus ([address] ), DVT (deep vein thrombosis) in pregnancy, Hyperlipidemia, and Hypertension  ,  does not have any pertinent problems on file  ,   has a past surgical history that includes Hand surgery; Heel spur surgery; Cyst Removal; Cataract extraction; and Colonoscopy  ,  family history includes Dementia in his mother; Melanoma in his mother  ,   reports that he has quit smoking  He has never used smokeless tobacco  He reports that he does not drink alcohol or use drugs  ,  is allergic to byetta 10 mcg pen [exenatide] and pollen extract     Current Outpatient Medications   Medication Sig Dispense Refill    aspirin (ECOTRIN) 325 mg EC tablet Take by mouth      insulin glargine (Lantus) 100 units/mL subcutaneous injection Inject 40 Units under the skin daily 30 mL 3    lisinopril (ZESTRIL) 10 mg tablet Take 1 tablet (10 mg total) by mouth daily 90 tablet 3    metFORMIN (GLUCOPHAGE-XR) 500 mg 24 hr tablet Take 2 tablets (1,000 mg total) by mouth 2 (two) times a day 360 tablet 3    oxyCODONE (OxyCONTIN) 20 mg 12 hr tablet Take 1 tablet (20 mg total) by mouth every 12 (twelve) hoursMax Daily Amount: 40 mg 30 tablet 0    simvastatin (ZOCOR) 20 mg tablet Take 1 tablet (20 mg total) by mouth daily 90 tablet 3    warfarin (COUMADIN) 6 mg tablet Take 1 tablet (6 mg total) by mouth daily As directed 90 tablet 0    Alcohol Swabs (B-D SINGLE USE SWABS REGULAR) PADS USE TO CHECK BLOOD SUGAR EVERY DAY (Patient not taking: Reported on 8/3/2020) 100 each 3    Blood Glucose Monitoring Suppl (ACCU-CHEK MADDISON CONNECT) w/Device KIT by In Vitro route      hydrOXYzine HCL (ATARAX) 25 mg tablet Take 1 tablet by mouth      Insulin Syringe-Needle U-100 (B-D INS SYRINGE 0 5CC/31GX5/16) 31G X 5/16" 0 5 ML MISC For use with insulin daily 100 each 3     No current facility-administered medications for this visit  Review of Systems   Constitutional: Negative for chills, fatigue and fever  HENT: Negative for congestion, ear pain, hearing loss, postnasal drip, rhinorrhea and sore throat  Eyes: Negative for pain and visual disturbance  Respiratory: Negative for chest tightness, shortness of breath and wheezing  Cardiovascular: Negative for chest pain and leg swelling  Gastrointestinal: Negative for abdominal distention, abdominal pain, constipation, diarrhea and vomiting  Endocrine: Negative for cold intolerance and heat intolerance  Genitourinary: Negative for difficulty urinating, frequency and urgency  Musculoskeletal: Positive for back pain  Negative for arthralgias and gait problem  Skin: Negative for color change  Neurological: Negative for dizziness, tremors, syncope, numbness and headaches  Hematological: Negative for adenopathy  Psychiatric/Behavioral: Negative for agitation, confusion and sleep disturbance  The patient is not nervous/anxious  Objective:  Vitals:    08/03/20 1055   BP: 108/66   Pulse: 81   Temp: 97 5 °F (36 4 °C)   TempSrc: Tympanic   SpO2: 97%   Weight: 104 kg (230 lb 1 3 oz)   Height: 6' 2"     Body mass index is 29 54 kg/m²  Physical Exam   Constitutional: He is oriented to person, place, and time  He appears well-developed  Unkempt   HENT:   Head: Normocephalic  Eyes: Conjunctivae are normal  No scleral icterus  Neck: Normal range of motion  No thyromegaly present  Cardiovascular: Normal rate, regular rhythm and normal heart sounds  Pulses are weak pulses  No murmur heard    Pulses:       Dorsalis pedis pulses are 1+ on the right side and 1+ on the left side  Posterior tibial pulses are 1+ on the right side and 1+ on the left side  Pulmonary/Chest: Effort normal and breath sounds normal  No respiratory distress  He has no wheezes  Abdominal: Soft  Bowel sounds are normal  He exhibits no distension  There is no abdominal tenderness  Musculoskeletal: Normal range of motion  General: No tenderness  Feet:   Right Foot:   Skin Integrity: Negative for ulcer, skin breakdown, erythema, warmth, callus or dry skin  Left Foot:   Skin Integrity: Negative for ulcer, skin breakdown, erythema, warmth, callus or dry skin  Lymphadenopathy:     He has no cervical adenopathy  Neurological: He is alert and oriented to person, place, and time  No cranial nerve deficit  Skin: Skin is warm and dry  No rash noted  No pallor  Psychiatric: His behavior is normal    Nursing note and vitals reviewed  Patient's shoes and socks removed  Right Foot/Ankle   Right Foot Inspection  Skin Exam: skin normal and skin intact no dry skin, no warmth, no callus, no erythema, no maceration, no abnormal color, no pre-ulcer, no ulcer and no callus                            Sensory       Monofilament testing: diminished  Vascular    The right DP pulse is 1+  The right PT pulse is 1+  Left Foot/Ankle  Left Foot Inspection  Skin Exam: skin normal and skin intactno dry skin, no warmth, no erythema, no maceration, normal color, no pre-ulcer, no ulcer and no callus                                         Sensory       Monofilament: diminished  Vascular    The left DP pulse is 1+  The left PT pulse is 1+  Assign Risk Category:  No deformity present;  Loss of protective sensation; Weak pulses       Risk: 2

## 2020-08-03 NOTE — ASSESSMENT & PLAN NOTE
Increase his Lantus to 40 units subcu daily, he is to get his blood work done as previously ordered  Encouraged him to quit smoking    Lab Results   Component Value Date    HGBA1C 8 5 (A) 08/03/2020

## 2020-08-04 ENCOUNTER — TELEPHONE (OUTPATIENT)
Dept: FAMILY MEDICINE CLINIC | Facility: CLINIC | Age: 65
End: 2020-08-04

## 2020-08-04 NOTE — TELEPHONE ENCOUNTER
----- Message from Jacobo Chowdhury DO sent at 8/3/2020  5:07 PM EDT -----  Call, same dose, recheck in 1 month

## 2020-08-04 NOTE — TELEPHONE ENCOUNTER
----- Message from "[facility] [place] sent at 8/4/2020  9:14 AM EDT -----  Call, tests ok

## 2020-08-12 ENCOUNTER — TELEPHONE (OUTPATIENT)
Dept: FAMILY MEDICINE CLINIC | Facility: CLINIC | Age: 65
End: 2020-08-12

## 2020-08-12 NOTE — TELEPHONE ENCOUNTER
He called to let you know he finally got his oxy filled and it was $260 for 30 days  His insurance stiles not want to cover it, but he did get it anyway  He wanted me to let you know he will be stopping by on Monday with papers for his son Karel Calles 08/04/1994  They have to do with his mental conditions he has

## 2020-08-24 ENCOUNTER — TELEPHONE (OUTPATIENT)
Dept: FAMILY MEDICINE CLINIC | Facility: CLINIC | Age: 65
End: 2020-08-24

## 2020-08-24 NOTE — TELEPHONE ENCOUNTER
Called patient and left message that forms were completed, scanned in, and up front at our [id number] office for him to

## 2020-08-28 DIAGNOSIS — I82.403 ACUTE EMBOLISM AND THROMBOSIS OF DEEP VEIN OF BOTH LOWER EXTREMITIES (HCC): ICD-10-CM

## 2020-08-28 DIAGNOSIS — E11.40 TYPE 2 DIABETES MELLITUS WITH DIABETIC NEUROPATHY, WITHOUT LONG-TERM CURRENT USE OF INSULIN (HCC): ICD-10-CM

## 2020-08-28 RX ORDER — WARFARIN SODIUM 6 MG/1
TABLET ORAL
Qty: 90 TABLET | Refills: 0 | Status: SHIPPED | OUTPATIENT
Start: 2020-08-28 | End: 2021-01-04

## 2020-08-31 RX ORDER — ISOPROPYL ALCOHOL 0.75 G/1
SWAB TOPICAL
Qty: 100 EACH | Refills: 5 | Status: SHIPPED | OUTPATIENT
Start: 2020-08-31 | End: 2021-09-01

## 2020-10-12 ENCOUNTER — TELEPHONE (OUTPATIENT)
Dept: FAMILY MEDICINE CLINIC | Facility: CLINIC | Age: 65
End: 2020-10-12

## 2020-11-05 ENCOUNTER — TELEPHONE (OUTPATIENT)
Dept: FAMILY MEDICINE CLINIC | Facility: CLINIC | Age: 65
End: 2020-11-05

## 2020-11-05 DIAGNOSIS — M51.36 LUMBAR DEGENERATIVE DISC DISEASE: Primary | ICD-10-CM

## 2020-11-16 ENCOUNTER — VBI (OUTPATIENT)
Dept: ADMINISTRATIVE | Facility: OTHER | Age: 65
End: 2020-11-16

## 2020-12-07 ENCOUNTER — OFFICE VISIT (OUTPATIENT)
Dept: FAMILY MEDICINE CLINIC | Facility: CLINIC | Age: 65
End: 2020-12-07
Payer: COMMERCIAL

## 2020-12-07 VITALS
OXYGEN SATURATION: 98 % | TEMPERATURE: 97.7 F | SYSTOLIC BLOOD PRESSURE: 116 MMHG | DIASTOLIC BLOOD PRESSURE: 84 MMHG | HEART RATE: 76 BPM

## 2020-12-07 DIAGNOSIS — Z79.4 TYPE 2 DIABETES MELLITUS WITH DIABETIC PERIPHERAL ANGIOPATHY WITHOUT GANGRENE, WITH LONG-TERM CURRENT USE OF INSULIN (HCC): ICD-10-CM

## 2020-12-07 DIAGNOSIS — I73.9 PERIPHERAL VASCULAR DISEASE (HCC): ICD-10-CM

## 2020-12-07 DIAGNOSIS — I82.403 ACUTE EMBOLISM AND THROMBOSIS OF DEEP VEIN OF BOTH LOWER EXTREMITIES (HCC): ICD-10-CM

## 2020-12-07 DIAGNOSIS — E11.51 TYPE 2 DIABETES MELLITUS WITH DIABETIC PERIPHERAL ANGIOPATHY WITHOUT GANGRENE, WITH LONG-TERM CURRENT USE OF INSULIN (HCC): ICD-10-CM

## 2020-12-07 DIAGNOSIS — E78.2 MIXED HYPERLIPIDEMIA: ICD-10-CM

## 2020-12-07 DIAGNOSIS — E11.40 TYPE 2 DIABETES MELLITUS WITH DIABETIC NEUROPATHY, WITHOUT LONG-TERM CURRENT USE OF INSULIN (HCC): Primary | ICD-10-CM

## 2020-12-07 DIAGNOSIS — I10 ESSENTIAL HYPERTENSION: ICD-10-CM

## 2020-12-07 DIAGNOSIS — M51.36 LUMBAR DEGENERATIVE DISC DISEASE: ICD-10-CM

## 2020-12-07 PROBLEM — K59.09 OTHER CONSTIPATION: Status: RESOLVED | Noted: 2019-10-09 | Resolved: 2020-12-07

## 2020-12-07 PROBLEM — R05.9 COUGH IN ADULT: Status: RESOLVED | Noted: 2019-03-27 | Resolved: 2020-12-07

## 2020-12-07 PROBLEM — R06.2 WHEEZES: Status: RESOLVED | Noted: 2019-03-27 | Resolved: 2020-12-07

## 2020-12-07 PROBLEM — M54.9 BACK PAIN: Status: RESOLVED | Noted: 2019-03-21 | Resolved: 2020-12-07

## 2020-12-07 PROCEDURE — 1036F TOBACCO NON-USER: CPT | Performed by: FAMILY MEDICINE

## 2020-12-07 PROCEDURE — 3074F SYST BP LT 130 MM HG: CPT | Performed by: FAMILY MEDICINE

## 2020-12-07 PROCEDURE — 99214 OFFICE O/P EST MOD 30 MIN: CPT | Performed by: FAMILY MEDICINE

## 2020-12-07 PROCEDURE — 3079F DIAST BP 80-89 MM HG: CPT | Performed by: FAMILY MEDICINE

## 2020-12-07 RX ORDER — OXYCODONE HYDROCHLORIDE 10 MG/1
10 TABLET, FILM COATED, EXTENDED RELEASE ORAL 2 TIMES DAILY PRN
Status: ON HOLD | COMMUNITY
Start: 2020-11-18 | End: 2021-11-03 | Stop reason: SDUPTHER

## 2021-01-02 DIAGNOSIS — I82.403 ACUTE EMBOLISM AND THROMBOSIS OF DEEP VEIN OF BOTH LOWER EXTREMITIES (HCC): ICD-10-CM

## 2021-01-04 RX ORDER — WARFARIN SODIUM 6 MG/1
TABLET ORAL
Qty: 90 TABLET | Refills: 0 | Status: SHIPPED | OUTPATIENT
Start: 2021-01-04 | End: 2021-03-15

## 2021-01-11 ENCOUNTER — TELEPHONE (OUTPATIENT)
Dept: FAMILY MEDICINE CLINIC | Facility: CLINIC | Age: 66
End: 2021-01-11

## 2021-01-11 DIAGNOSIS — R10.9 FLANK PAIN: Primary | ICD-10-CM

## 2021-01-11 NOTE — TELEPHONE ENCOUNTER
He has had LLQ pain since last Thursday  Urinating blood since last Wednesday night  He just urinated now and no blood  He does not want to go to the ER and he wants a CT scan done  Has a history of passing Kidney stones  He does not want to go to the ER    Please advise

## 2021-01-15 ENCOUNTER — HOSPITAL ENCOUNTER (OUTPATIENT)
Dept: CT IMAGING | Facility: CLINIC | Age: 66
Discharge: HOME | End: 2021-01-15
Payer: COMMERCIAL

## 2021-01-15 DIAGNOSIS — R10.9 FLANK PAIN: ICD-10-CM

## 2021-01-15 PROCEDURE — 74176 CT ABD & PELVIS W/O CONTRAST: CPT

## 2021-01-15 PROCEDURE — G1004 CDSM NDSC: HCPCS

## 2021-01-21 ENCOUNTER — TELEPHONE (OUTPATIENT)
Dept: FAMILY MEDICINE CLINIC | Facility: CLINIC | Age: 66
End: 2021-01-21

## 2021-01-21 DIAGNOSIS — N20.0 NEPHROLITHIASIS: Primary | ICD-10-CM

## 2021-01-21 NOTE — TELEPHONE ENCOUNTER
----- Message from Masha Wylie DO sent at 1/21/2021  9:23 AM EST -----  Call, left kidney stone, he needs to drink plenty of fluids and see a urologist, this stone is too large to pass on it's own    Does he have a preference for a urologist?

## 2021-01-26 ENCOUNTER — TELEPHONE (OUTPATIENT)
Dept: UROLOGY | Facility: MEDICAL CENTER | Age: 66
End: 2021-01-26

## 2021-01-26 NOTE — TELEPHONE ENCOUNTER
New patient    Patient being referred to urology by PCP due to flank pain and gross hematuria  Patient does have history of kidney stones  Patient had CT abdomen pelvis wo contrast done on 1/15/21  IMPRESSION:     Mild left hydronephrosis and perinephric stranding, the cause of which appears to be a 7 mm calculus within the proximal ureter      Bilateral subcentimeter nonobstructing intrarenal calculi  Please review and advise  Thank you

## 2021-01-26 NOTE — TELEPHONE ENCOUNTER
Please Triage - [facility] Patient- Kidney Stones    What is the reason for the patients appointment? Patient having left side flank pain  History of Kidney stones  He was referred by this family Dr Dr Sandro Philip       Imaging/Lab Results:ct scan in epic      Do we accept the patient's insurance or is the patient Self-Pay? [employer]  Provider & Plan:   Member ID#: Has the patient had any previous urologist(s)?no        Have patient records been requested?        Has the patient had any outside testing done?[facility]      Does the patient have a personal history of cancer?no      Patient can be reached at :[phone]

## 2021-01-27 NOTE — TELEPHONE ENCOUNTER
Called and spoke to patients wife  Patient accepted appointment for Friday at 11:30 at the [facility] office  Reviewed ER precautions  They are aware to arrive 15 minutes early and were given address to the office

## 2021-01-27 NOTE — TELEPHONE ENCOUNTER
Patient should be seen by next available provider within the next 1 week  ER precautions in the meantime

## 2021-01-28 NOTE — PATIENT INSTRUCTIONS
Ureteroscopy   WHAT YOU NEED TO KNOW:   A ureteroscopy is a procedure to examine in the inside of your urinary tract, which includes your urethra, bladder, ureters, and kidneys  A ureteroscope is a small, thin tube with a light and camera on the end  Ureteroscopy can help your healthcare provider diagnose and treat problems in your urinary tract, such as kidney stones  HOW TO PREPARE:   The week before your procedure:   · Write down the correct date, time, and location of your procedure  · Ask your healthcare provider if you need to stop using aspirin or any other prescribed or over-the-counter medicine before your procedure or surgery  · Bring your medicine bottles or a list of your medicines when you see your healthcare provider  Tell your provider if you are allergic to any medicine  Tell your provider if you use any herbs, food supplements, or over-the-counter medicine  · Arrange a ride home  Ask a family member or friend to drive you home after your surgery or procedure  Do not drive yourself home  · You may need blood or urine tests before your procedure  You may also need an EKG  Ask your healthcare provider for more information about these and other tests that you may need  Write down the date, time, and location of each test     The night before your procedure:  Ask healthcare providers about directions for eating and drinking  The day of your procedure:   · Ask your healthcare provider before taking any medicine on the day of your procedure  These medicines include insulin, diabetic pills, high blood pressure pills, or heart pills  Bring a list of all the medicines you take, or your pill bottles, with you to the hospital     · You or a close family member will be asked to sign a legal document called a consent form  It gives healthcare providers permission to do the procedure or surgery  It also explains the problems that may happen, and your choices   Make sure all your questions are answered before you sign this form  · An anesthesiologist will talk to you before your surgery  You may need medicine to keep you asleep or numb an area of your body during surgery  Tell healthcare providers if you or anyone in your family has had a problem with anesthesia in the past     · Healthcare providers may insert an intravenous tube (IV) into your vein  A vein in the arm is usually chosen  Through the IV tube, you may be given liquids and medicine  WHAT WILL HAPPEN:   What will happen: Your healthcare provider will place the ureteroscope into your urethra  He will pass it through your bladder and into your ureters and kidneys  Your healthcare provider may place tools through the scope that will help him remove tissue or stones  The tools may also help him place stents or sheaths to help keep your ureters open  After your procedure: You will be taken to a room to rest until you are fully awake  Healthcare providers will monitor you closely for any problems  Do not get out of bed until your healthcare provider says it is okay  When your healthcare provider sees that you are okay, you will be able to go home or be taken to your hospital room  CONTACT YOUR HEALTHCARE PROVIDER IF:   · You cannot make it to your procedure  · You have a fever  · You get a cold or the flu  · You have questions or concerns about your procedure  SEEK CARE IMMEDIATELY IF:   · Your symptoms get worse  RISKS:   You may bleed more than expected or get an infection  One of your ureters may be injured  You may have a blockage in one of your ureters  You may need another procedure or surgery  CARE AGREEMENT:   You have the right to help plan your care  Learn about your health condition and how it may be treated  Discuss treatment options with your healthcare providers to decide what care you want to receive  You always have the right to refuse treatment     © Copyright [employer] 2018 Information is for End User's use only and may not be sold, redistributed or otherwise used for commercial purposes  All illustrations and images included in CareNotes® are the copyrighted property of A D A M , Inc  or Balwinder Wiseman  The above information is an  only  It is not intended as medical advice for individual conditions or treatments  Talk to your doctor, nurse or pharmacist before following any medical regimen to see if it is safe and effective for you

## 2021-01-28 NOTE — PROGRESS NOTES
Problem List Items Addressed This Visit     None      Visit Diagnoses     Ureterolithiasis    -  Primary    Relevant Orders    Case request operating room: CYSTOSCOPY URETEROSCOPY WITH LITHOTRIPSY HOLMIUM LASER, RETROGRADE PYELOGRAM AND INSERTION STENT URETERAL (Completed)    Left flank pain        Relevant Orders    Case request operating room: CYSTOSCOPY URETEROSCOPY WITH LITHOTRIPSY HOLMIUM LASER, RETROGRADE PYELOGRAM AND INSERTION STENT URETERAL (Completed)            Discussion:    Preet Dimas is having LUTS and some flank pain from his stone  He has a baseline of pain due to multiple orthopaedic and spine complaints  Imaging reviewed with him shows a proximal left stone with hydronephrosis  He is interested in ESWL but is not a candidate for this as he has a history of DVT/PE and must stay on anticoagulation  I discussed with him an emergency action plan for if he has fevers or chills or worsening pain  I have also booked him for our stone center option for left ureteroscopy and laser lithotripsy  Denies heart attacks and stroke history  Functional status limited by orthopaedic complaints somewhat  Assessment and plan:       ,Please see problem oriented charting for the assessment plan of today's urological complaints    Liv Huang MD      Chief Complaint     Gross hematuria  Flank pain  Left ureteroscopy      History of Present Illness     Cristina Espinoza is a 63 y o  with left flank pain and gross hematuria due to stone disease  Discussion as above  Interested in left ureteroscopy and laser lithotripsy    Voiding well, has ED as well    The following portions of the patient's history were reviewed and updated as appropriate: allergies, current medications, past family history, past medical history, past social history, past surgical history and problem list       Detailed Urologic History     - please refer to HPI    Review of Systems     Review of Systems   Constitutional: Negative      HENT: Negative  Eyes: Negative  Respiratory: Negative  Cardiovascular: Negative  Gastrointestinal: Negative  Endocrine: Negative  Genitourinary: Positive for flank pain and hematuria  Skin: Negative  Allergic/Immunologic: Negative  Neurological: Negative  Hematological: Negative  Psychiatric/Behavioral: Negative  Allergies     Allergies   Allergen Reactions    Byetta 10 Mcg Pen [Exenatide] Swelling    Pollen Extract Myalgia       Physical Exam     Physical Exam  Vitals signs reviewed  Constitutional:       General: He is in acute distress (appears to be in pain)  Appearance: Normal appearance  He is not ill-appearing, toxic-appearing or diaphoretic  HENT:      Head: Normocephalic and atraumatic  Eyes:      General: No scleral icterus  Right eye: No discharge  Left eye: No discharge  Cardiovascular:      Pulses: Normal pulses  Pulmonary:      Effort: Pulmonary effort is normal    Abdominal:      General: Abdomen is flat  There is no distension  Palpations: There is no mass  Tenderness: There is no abdominal tenderness  There is no right CVA tenderness, left CVA tenderness, guarding or rebound  Hernia: No hernia is present  Musculoskeletal: Normal range of motion  General: Tenderness and deformity present  Skin:     General: Skin is warm  Neurological:      General: No focal deficit present  Mental Status: He is alert and oriented to person, place, and time  Gait: Gait abnormal (due to pain)  Psychiatric:         Mood and Affect: Mood normal          Behavior: Behavior normal          Thought Content:  Thought content normal          Judgment: Judgment normal              Vital Signs  Vitals:    01/29/21 1129   BP: 142/68   Weight: 105 kg (232 lb 3 2 oz)   Height: 6' 2" (1 88 m)         Current Medications       Current Outpatient Medications:     Alcohol Swabs (B-D SINGLE USE SWABS REGULAR) PADS, USE TO CHECK BLOOD SUGAR EVERY DAY, Disp: 100 each, Rfl: 5    aspirin (ECOTRIN) 325 mg EC tablet, Take by mouth, Disp: , Rfl:     Blood Glucose Monitoring Suppl (ACCU-CHEK MADDISON CONNECT) w/Device KIT, by In Vitro route, Disp: , Rfl:     hydrOXYzine HCL (ATARAX) 25 mg tablet, Take 1 tablet by mouth, Disp: , Rfl:     insulin glargine (Lantus) 100 units/mL subcutaneous injection, Inject 40 Units under the skin daily, Disp: 30 mL, Rfl: 3    Insulin Syringe-Needle U-100 (B-D INS SYRINGE 0 5CC/31GX5/16) 31G X 5/16" 0 5 ML MISC, For use with insulin daily, Disp: 100 each, Rfl: 3    lisinopril (ZESTRIL) 10 mg tablet, Take 1 tablet (10 mg total) by mouth daily, Disp: 90 tablet, Rfl: 3    metFORMIN (GLUCOPHAGE-XR) 500 mg 24 hr tablet, Take 2 tablets (1,000 mg total) by mouth 2 (two) times a day, Disp: 360 tablet, Rfl: 3    OxyCONTIN 10 MG 12 hr tablet, Take 10 mg by mouth 2 (two) times a day as needed, Disp: , Rfl:     simvastatin (ZOCOR) 20 mg tablet, Take 1 tablet (20 mg total) by mouth daily, Disp: 90 tablet, Rfl: 3    warfarin (COUMADIN) 6 mg tablet, TAKE 1 TABLET EVERY DAY AS DIRECTED, Disp: 90 tablet, Rfl: 0      Active Problems     Patient Active Problem List   Diagnosis    Acute embolism and thrombosis of unspecified deep veins of unspecified lower extremity (HCC)    Essential hypertension    Lumbar degenerative disc disease    Mixed hyperlipidemia    Other insomnia    Peripheral vascular disease (HCC)    Type 2 diabetes mellitus with diabetic neuropathy, without long-term current use of insulin (HCC)    Type 2 diabetes mellitus with diabetic peripheral angiopathy without gangrene [facility])         Past Medical History     Past Medical History:   Diagnosis Date    Back pain     Diabetes mellitus ([address] )     DVT (deep vein thrombosis) in pregnancy     Hyperlipidemia     Hypertension          Surgical History     Past Surgical History:   Procedure Laterality Date    CATARACT EXTRACTION      COLONOSCOPY 6/2014    CYST REMOVAL      HAND SURGERY      HEEL SPUR SURGERY      foot surgery         Family History     Family History   Problem Relation Age of Onset    Dementia Mother     Melanoma Mother          Social History     Social History     Social History     Tobacco Use   Smoking Status Former Smoker   Smokeless Tobacco Never Used   Tobacco Comment    Per [employer], current smoker         Pertinent Lab Values     Lab Results   Component Value Date    CREATININE 1 41 (H) 08/03/2020       Lab Results   Component Value Date    PSA 0 9 06/26/2019    PSA 0 6 10/09/2018    PSA 0 5 04/04/2017             Pertinent Imaging       The patient's images were reviewed by me personally and also in real time with them in the examination room using our PACS imaging system  The imaging findings are significant for left hydronephrosis and proximal left ureteral stone

## 2021-01-29 ENCOUNTER — OFFICE VISIT (OUTPATIENT)
Dept: UROLOGY | Facility: CLINIC | Age: 66
End: 2021-01-29
Payer: COMMERCIAL

## 2021-01-29 ENCOUNTER — TELEPHONE (OUTPATIENT)
Dept: UROLOGY | Facility: CLINIC | Age: 66
End: 2021-01-29

## 2021-01-29 VITALS
SYSTOLIC BLOOD PRESSURE: 142 MMHG | HEIGHT: 74 IN | DIASTOLIC BLOOD PRESSURE: 68 MMHG | BODY MASS INDEX: 29.8 KG/M2 | WEIGHT: 232.2 LBS

## 2021-01-29 DIAGNOSIS — N20.1 URETEROLITHIASIS: Primary | ICD-10-CM

## 2021-01-29 DIAGNOSIS — R10.9 LEFT FLANK PAIN: ICD-10-CM

## 2021-01-29 PROCEDURE — 3078F DIAST BP <80 MM HG: CPT | Performed by: UROLOGY

## 2021-01-29 PROCEDURE — 3077F SYST BP >= 140 MM HG: CPT | Performed by: UROLOGY

## 2021-01-29 PROCEDURE — 1036F TOBACCO NON-USER: CPT | Performed by: UROLOGY

## 2021-01-29 PROCEDURE — 99204 OFFICE O/P NEW MOD 45 MIN: CPT | Performed by: UROLOGY

## 2021-01-29 PROCEDURE — 3008F BODY MASS INDEX DOCD: CPT | Performed by: UROLOGY

## 2021-01-29 RX ORDER — ACETAMINOPHEN 325 MG/1
975 TABLET ORAL ONCE
Status: CANCELLED | OUTPATIENT
Start: 2021-01-29 | End: 2021-01-29

## 2021-01-29 NOTE — TELEPHONE ENCOUNTER
LM advising that I am working on getting patient in for Tues 2/2 at [facility] for stone intervention  ER precautions advised for over the weekend  I will call him on Monday to confirm

## 2021-02-01 ENCOUNTER — PREP FOR PROCEDURE (OUTPATIENT)
Dept: UROLOGY | Facility: CLINIC | Age: 66
End: 2021-02-01

## 2021-02-01 NOTE — TELEPHONE ENCOUNTER
Spoke with patients wife and patient is sched for 2/4 at [facility] with Dr Shanks  Due to inclement weather they are not able to leave their house for [facility] testing  She is aware the hospital will call them day prior with time of arrival  ER precautions have been advised, she will contact us with any questions or concerns

## 2021-02-03 NOTE — TELEPHONE ENCOUNTER
Pt's wife calling to report pt had passed and collected stone last night,please contact her with physicians recommendation

## 2021-02-03 NOTE — TELEPHONE ENCOUNTER
Patient is scheduled for CYSTOSCOPY URETEROSCOPY WITH LITHOTRIPSY HOLMIUM LASER, RETROGRADE PYELOGRAM AND INSERTION STENT URETERAL (Left Bladder) tomorrow 2/3/21 with Dr Shanks     Called and spoke to patient  Patient reports that he passed stone last night  He was able to catch stone and reports that it was pretty large  He is aware that he can take stone to lab for stone analysis  Order placed for this  Patient report that he did have some pain last night right after stone came out  He denies any pain, hematuria, nausea, vomiting at this time  He reports that he is feeling much better since stone passed  Please advise on if surgery for tomorrow should be canceled/follow up plan  Thank you

## 2021-02-03 NOTE — TELEPHONE ENCOUNTER
Patient's surgery can be canceled for tomorrow    Patient should be scheduled for follow-up in approximately 2-3 months with a KUB and ultrasound

## 2021-02-03 NOTE — TELEPHONE ENCOUNTER
Called and spoke to patients wife  Made them aware that surgery can be canceled for tomorrow  Made them aware that follow up plan is for follow-up in approximately 2-3 months with a KUB and ultrasound  KUB and US ordered  They are aware that we will call them back with appointment details

## 2021-02-26 NOTE — TELEPHONE ENCOUNTER
L/M to C/B and confirm apt on 4/19 with Shanice Etienne   Informed patient they will also need to call central scheduling (provided number) and schedule US and have Xray done as well before apt

## 2021-03-09 ENCOUNTER — TELEPHONE (OUTPATIENT)
Dept: FAMILY MEDICINE CLINIC | Facility: CLINIC | Age: 66
End: 2021-03-09

## 2021-03-09 NOTE — TELEPHONE ENCOUNTER
Patient called stating he was watching "News Week" and they said 1,000 people  from the vaccine and he wants to know if with his health issues he has, if he should wait to get the vaccine?

## 2021-03-09 NOTE — TELEPHONE ENCOUNTER
I am not aware of 1000 people dying from the vaccine, I do know over 500,000 have  from covid, so I recommend he get the vaccine

## 2021-03-10 DIAGNOSIS — Z23 ENCOUNTER FOR IMMUNIZATION: ICD-10-CM

## 2021-03-15 DIAGNOSIS — I82.403 ACUTE EMBOLISM AND THROMBOSIS OF DEEP VEIN OF BOTH LOWER EXTREMITIES (HCC): ICD-10-CM

## 2021-03-15 RX ORDER — WARFARIN SODIUM 6 MG/1
TABLET ORAL
Qty: 90 TABLET | Refills: 0 | Status: SHIPPED | OUTPATIENT
Start: 2021-03-15 | End: 2021-06-02

## 2021-05-10 ENCOUNTER — VBI (OUTPATIENT)
Dept: ADMINISTRATIVE | Facility: OTHER | Age: 66
End: 2021-05-10

## 2021-05-25 ENCOUNTER — VBI (OUTPATIENT)
Dept: ADMINISTRATIVE | Facility: OTHER | Age: 66
End: 2021-05-25

## 2021-05-27 ENCOUNTER — IMMUNIZATIONS (OUTPATIENT)
Dept: FAMILY MEDICINE CLINIC | Facility: HOSPITAL | Age: 66
End: 2021-05-27

## 2021-05-27 PROCEDURE — 91303: CPT

## 2021-05-27 PROCEDURE — 0031A: CPT

## 2021-06-02 DIAGNOSIS — E11.40 TYPE 2 DIABETES MELLITUS WITH DIABETIC NEUROPATHY, WITHOUT LONG-TERM CURRENT USE OF INSULIN (HCC): ICD-10-CM

## 2021-06-02 DIAGNOSIS — I10 ESSENTIAL HYPERTENSION: ICD-10-CM

## 2021-06-02 DIAGNOSIS — I82.403 ACUTE EMBOLISM AND THROMBOSIS OF DEEP VEIN OF BOTH LOWER EXTREMITIES (HCC): ICD-10-CM

## 2021-06-02 PROCEDURE — 4010F ACE/ARB THERAPY RXD/TAKEN: CPT | Performed by: FAMILY MEDICINE

## 2021-06-02 RX ORDER — METFORMIN HYDROCHLORIDE 500 MG/1
TABLET, EXTENDED RELEASE ORAL
Qty: 360 TABLET | Refills: 3 | Status: SHIPPED | OUTPATIENT
Start: 2021-06-02 | End: 2021-12-01

## 2021-06-02 RX ORDER — SIMVASTATIN 20 MG
TABLET ORAL
Qty: 90 TABLET | Refills: 3 | Status: SHIPPED | OUTPATIENT
Start: 2021-06-02 | End: 2022-07-11 | Stop reason: SDUPTHER

## 2021-06-02 RX ORDER — WARFARIN SODIUM 6 MG/1
TABLET ORAL
Qty: 90 TABLET | Refills: 0 | Status: SHIPPED | OUTPATIENT
Start: 2021-06-02 | End: 2021-07-31

## 2021-06-02 RX ORDER — LISINOPRIL 10 MG/1
TABLET ORAL
Qty: 90 TABLET | Refills: 3 | Status: SHIPPED | OUTPATIENT
Start: 2021-06-02 | End: 2022-07-11 | Stop reason: SDUPTHER

## 2021-06-02 RX ORDER — INSULIN GLARGINE 100 [IU]/ML
INJECTION, SOLUTION SUBCUTANEOUS
Qty: 40 ML | Refills: 3 | Status: SHIPPED | OUTPATIENT
Start: 2021-06-02 | End: 2021-06-07

## 2021-06-02 NOTE — TELEPHONE ENCOUNTER
Requested medication(s) are due for refill today: Yes  Patient has already received a courtesy refill: No  Other reason request has been forwarded to provider:REquirements not met

## 2021-06-07 ENCOUNTER — OFFICE VISIT (OUTPATIENT)
Dept: FAMILY MEDICINE CLINIC | Facility: CLINIC | Age: 66
End: 2021-06-07
Payer: COMMERCIAL

## 2021-06-07 VITALS
BODY MASS INDEX: 30.67 KG/M2 | HEIGHT: 74 IN | OXYGEN SATURATION: 97 % | TEMPERATURE: 97.8 F | DIASTOLIC BLOOD PRESSURE: 72 MMHG | SYSTOLIC BLOOD PRESSURE: 112 MMHG | WEIGHT: 239 LBS | HEART RATE: 95 BPM

## 2021-06-07 DIAGNOSIS — E78.2 MIXED HYPERLIPIDEMIA: ICD-10-CM

## 2021-06-07 DIAGNOSIS — I82.403 ACUTE EMBOLISM AND THROMBOSIS OF DEEP VEIN OF BOTH LOWER EXTREMITIES (HCC): ICD-10-CM

## 2021-06-07 DIAGNOSIS — E11.51 TYPE 2 DIABETES MELLITUS WITH DIABETIC PERIPHERAL ANGIOPATHY WITHOUT GANGRENE, WITH LONG-TERM CURRENT USE OF INSULIN (HCC): ICD-10-CM

## 2021-06-07 DIAGNOSIS — I73.9 PERIPHERAL VASCULAR DISEASE (HCC): ICD-10-CM

## 2021-06-07 DIAGNOSIS — M51.36 LUMBAR DEGENERATIVE DISC DISEASE: ICD-10-CM

## 2021-06-07 DIAGNOSIS — I10 ESSENTIAL HYPERTENSION: ICD-10-CM

## 2021-06-07 DIAGNOSIS — Z12.5 SCREENING PSA (PROSTATE SPECIFIC ANTIGEN): ICD-10-CM

## 2021-06-07 DIAGNOSIS — E11.40 TYPE 2 DIABETES MELLITUS WITH DIABETIC NEUROPATHY, WITHOUT LONG-TERM CURRENT USE OF INSULIN (HCC): Primary | ICD-10-CM

## 2021-06-07 DIAGNOSIS — Z79.4 TYPE 2 DIABETES MELLITUS WITH DIABETIC PERIPHERAL ANGIOPATHY WITHOUT GANGRENE, WITH LONG-TERM CURRENT USE OF INSULIN (HCC): ICD-10-CM

## 2021-06-07 LAB — SL AMB POCT HEMOGLOBIN AIC: 9 (ref ?–6.5)

## 2021-06-07 PROCEDURE — 3008F BODY MASS INDEX DOCD: CPT | Performed by: FAMILY MEDICINE

## 2021-06-07 PROCEDURE — 3052F HG A1C>EQUAL 8.0%<EQUAL 9.0%: CPT | Performed by: FAMILY MEDICINE

## 2021-06-07 PROCEDURE — 83036 HEMOGLOBIN GLYCOSYLATED A1C: CPT | Performed by: FAMILY MEDICINE

## 2021-06-07 PROCEDURE — 3078F DIAST BP <80 MM HG: CPT | Performed by: FAMILY MEDICINE

## 2021-06-07 PROCEDURE — 3074F SYST BP LT 130 MM HG: CPT | Performed by: FAMILY MEDICINE

## 2021-06-07 PROCEDURE — 1036F TOBACCO NON-USER: CPT | Performed by: FAMILY MEDICINE

## 2021-06-07 PROCEDURE — 99214 OFFICE O/P EST MOD 30 MIN: CPT | Performed by: FAMILY MEDICINE

## 2021-06-07 RX ORDER — INSULIN GLARGINE 100 [IU]/ML
45 INJECTION, SOLUTION SUBCUTANEOUS DAILY
Qty: 40 ML | Refills: 3
Start: 2021-06-07 | End: 2021-09-02 | Stop reason: SDUPTHER

## 2021-06-07 NOTE — ASSESSMENT & PLAN NOTE
Increase his insulin as above, continue his Coumadin    Lab Results   Component Value Date    HGBA1C 9 0 (A) 06/07/2021

## 2021-06-07 NOTE — PROGRESS NOTES
Assessment/Plan:         Problem List Items Addressed This Visit        Endocrine    Type 2 diabetes mellitus with diabetic neuropathy, without long-term current use of insulin (HCC) - Primary       Increase his Lantus to 45 units subcu daily, continue his metformin  Recheck CMP, hemoglobin A1c in 6 months  I also encouraged him to  Check his sugar at home  He is to get a CMP done now  Lab Results   Component Value Date    HGBA1C 9 0 (A) 06/07/2021            Relevant Medications    insulin glargine (Lantus) 100 units/mL subcutaneous injection    Other Relevant Orders    POCT hemoglobin A1c (Completed)    Comprehensive metabolic panel    Type 2 diabetes mellitus with diabetic peripheral angiopathy without gangrene (HCC)       Increase his insulin as above, continue his Coumadin  Lab Results   Component Value Date    HGBA1C 9 0 (A) 06/07/2021            Relevant Medications    insulin glargine (Lantus) 100 units/mL subcutaneous injection    Other Relevant Orders    Comprehensive metabolic panel       Cardiovascular and Mediastinum    Acute embolism and thrombosis of unspecified deep veins of unspecified lower extremity (HCC)       Continue Coumadin, he is to get his PT INR done as previously ordered         Essential hypertension       Controlled, continue his lisinopril  Recheck his blood pressure in 6 months         Relevant Orders    TSH, 3rd generation    Peripheral vascular disease ([address] )      Continue his Coumadin, he is no longer smoking  Musculoskeletal and Integument    Lumbar degenerative disc disease       Continue his OxyContin as needed, as prescribed by his neurologist             Other    Mixed hyperlipidemia       Continue his simvastatin, check lipid profile now  Relevant Orders    Lipid panel    BMI 30 0-30 9,adult      Other Visit Diagnoses     Screening PSA (prostate specific antigen)        Relevant Orders    PSA, Total Screen        BMI Counseling:  Body mass index is 30 69 kg/m²  The BMI is above normal  Nutrition recommendations include decreasing portion sizes and encouraging healthy choices of fruits and vegetables  Exercise recommendations include moderate physical activity 150 minutes/week  No pharmacotherapy was ordered  Subjective:      Patient ID: George Barakat is a 63 y o  male  Patient comes in today for checkup on his diabetes with peripheral vascular disease, hypertension, hyperlipidemia, history DVT  He has not gotten any of his blood work done which includes his PT INR  He states that he is taking his medications as prescribed however his diet has been not very good as he has been eating a lot of sugar and carbohydrates  He does take his lisinopril for his hypertension, simvastatin for his hyperlipidemia he does continue to take his Coumadin despite not getting his blood work done  The following portions of the patient's history were reviewed and updated as appropriate:   Past Medical History:  He has a past medical history of Back pain, Diabetes mellitus ([address] ), DVT (deep vein thrombosis) in pregnancy, Hyperlipidemia, and Hypertension  ,  _______________________________________________________________________  Medical Problems:  does not have any pertinent problems on file ,  _______________________________________________________________________  Past Surgical History:   has a past surgical history that includes Hand surgery; Heel spur surgery; Cyst Removal; Cataract extraction; and Colonoscopy  ,  _______________________________________________________________________  Family History:  family history includes Dementia in his mother; Melanoma in his mother ,  _______________________________________________________________________  Social History:   reports that he has quit smoking  He has never used smokeless tobacco  He reports previous drug use   He reports that he does not drink alcohol ,  _______________________________________________________________________  Allergies:  is allergic to byetta 10 mcg pen [exenatide] and pollen extract     _______________________________________________________________________  Current Outpatient Medications   Medication Sig Dispense Refill    Alcohol Swabs (B-D SINGLE USE SWABS REGULAR) PADS USE TO CHECK BLOOD SUGAR EVERY  each 5    aspirin (ECOTRIN) 325 mg EC tablet Take by mouth      Blood Glucose Monitoring Suppl (ACCU-CHEK MADDISON CONNECT) w/Device KIT by In Vitro route      hydrOXYzine HCL (ATARAX) 25 mg tablet Take 1 tablet by mouth      insulin glargine (Lantus) 100 units/mL subcutaneous injection Inject 45 Units under the skin daily 40 mL 3    Insulin Syringe-Needle U-100 (B-D INS SYRINGE 0 5CC/31GX5/16) 31G X 5/16" 0 5 ML MISC For use with insulin daily 100 each 3    lisinopril (ZESTRIL) 10 mg tablet TAKE 1 TABLET EVERY DAY 90 tablet 3    metFORMIN (GLUCOPHAGE-XR) 500 mg 24 hr tablet TAKE 2 TABLETS TWICE DAILY 360 tablet 3    OxyCONTIN 10 MG 12 hr tablet Take 10 mg by mouth 2 (two) times a day as needed      simvastatin (ZOCOR) 20 mg tablet TAKE 1 TABLET EVERY DAY 90 tablet 3    warfarin (COUMADIN) 6 mg tablet TAKE 1 TABLET EVERY DAY AS DIRECTED 90 tablet 0     No current facility-administered medications for this visit       _______________________________________________________________________  Review of Systems   Constitutional: Negative for chills, fatigue and fever  HENT: Negative for congestion, ear pain, hearing loss, postnasal drip, rhinorrhea and sore throat  Eyes: Negative for pain and visual disturbance  Respiratory: Negative for chest tightness, shortness of breath and wheezing  Cardiovascular: Negative for chest pain and leg swelling  Gastrointestinal: Negative for abdominal distention, abdominal pain, constipation, diarrhea and vomiting  Endocrine: Negative for cold intolerance and heat intolerance  Genitourinary: Negative for difficulty urinating, frequency and urgency  Musculoskeletal: Positive for back pain  Negative for arthralgias and gait problem  Skin: Negative for color change  Neurological: Negative for dizziness, tremors, syncope, numbness and headaches  Hematological: Negative for adenopathy  Psychiatric/Behavioral: Negative for agitation, confusion and sleep disturbance  The patient is not nervous/anxious  Objective:  Vitals:    06/07/21 1423   BP: 112/72   BP Location: Left arm   Patient Position: Sitting   Pulse: 95   Temp: 97 8 °F (36 6 °C)   TempSrc: Tympanic   SpO2: 97%   Weight: 108 kg (239 lb)   Height: 6' 2" (1 88 m)     Body mass index is 30 69 kg/m²  Physical Exam  Vitals signs and nursing note reviewed  Constitutional:       Appearance: He is well-developed  HENT:      Head: Normocephalic  Eyes:      General: No scleral icterus  Conjunctiva/sclera: Conjunctivae normal    Neck:      Musculoskeletal: Normal range of motion  Thyroid: No thyromegaly  Cardiovascular:      Rate and Rhythm: Normal rate and regular rhythm  Pulses: no weak pulses          Dorsalis pedis pulses are 1+ on the right side and 1+ on the left side  Posterior tibial pulses are 1+ on the right side and 1+ on the left side  Heart sounds: Normal heart sounds  No murmur  Pulmonary:      Effort: Pulmonary effort is normal  No respiratory distress  Breath sounds: Normal breath sounds  No wheezing  Abdominal:      General: Bowel sounds are normal  There is no distension  Palpations: Abdomen is soft  Tenderness: There is no abdominal tenderness  Musculoskeletal: Normal range of motion  General: No tenderness  Feet:      Right foot:      Skin integrity: No ulcer, skin breakdown, erythema, warmth, callus or dry skin  Left foot:      Skin integrity: No ulcer, skin breakdown, erythema, warmth, callus or dry skin     Lymphadenopathy: Cervical: No cervical adenopathy  Skin:     General: Skin is warm and dry  Coloration: Skin is not pale  Findings: No rash  Neurological:      Mental Status: He is alert and oriented to person, place, and time  Cranial Nerves: No cranial nerve deficit  Psychiatric:         Behavior: Behavior normal        Patient's shoes and socks removed  Right Foot/Ankle   Right Foot Inspection  Skin Exam: skin normal and skin intact no dry skin, no warmth, no callus, no erythema, no maceration, no abnormal color, no pre-ulcer, no ulcer and no callus                            Sensory       Monofilament testing: diminished  Vascular    The right DP pulse is 1+  The right PT pulse is 1+  Left Foot/Ankle  Left Foot Inspection  Skin Exam: skin normal and skin intactno dry skin, no warmth, no erythema, no maceration, normal color, no pre-ulcer, no ulcer and no callus                                         Sensory       Monofilament: diminished  Vascular    The left DP pulse is 1+  The left PT pulse is 1+  Assign Risk Category:  No deformity present; No loss of protective sensation;  No weak pulses       Risk: 0

## 2021-06-07 NOTE — ASSESSMENT & PLAN NOTE
Increase his Lantus to 45 units subcu daily, continue his metformin  Recheck CMP, hemoglobin A1c in 6 months  I also encouraged him to  Check his sugar at home  He is to get a CMP done now    Lab Results   Component Value Date    HGBA1C 9 0 (A) 06/07/2021

## 2021-06-10 ENCOUNTER — APPOINTMENT (OUTPATIENT)
Dept: LAB | Facility: HOSPITAL | Age: 66
End: 2021-06-10
Attending: FAMILY MEDICINE
Payer: COMMERCIAL

## 2021-06-10 DIAGNOSIS — E78.2 MIXED HYPERLIPIDEMIA: ICD-10-CM

## 2021-06-10 DIAGNOSIS — Z12.5 SCREENING PSA (PROSTATE SPECIFIC ANTIGEN): ICD-10-CM

## 2021-06-10 DIAGNOSIS — Z79.4 TYPE 2 DIABETES MELLITUS WITH DIABETIC PERIPHERAL ANGIOPATHY WITHOUT GANGRENE, WITH LONG-TERM CURRENT USE OF INSULIN (HCC): ICD-10-CM

## 2021-06-10 DIAGNOSIS — E11.51 TYPE 2 DIABETES MELLITUS WITH DIABETIC PERIPHERAL ANGIOPATHY WITHOUT GANGRENE, WITH LONG-TERM CURRENT USE OF INSULIN (HCC): ICD-10-CM

## 2021-06-10 DIAGNOSIS — I10 ESSENTIAL HYPERTENSION: ICD-10-CM

## 2021-06-10 DIAGNOSIS — E11.40 TYPE 2 DIABETES MELLITUS WITH DIABETIC NEUROPATHY, WITHOUT LONG-TERM CURRENT USE OF INSULIN (HCC): ICD-10-CM

## 2021-06-10 LAB
ALBUMIN SERPL BCP-MCNC: 3.3 G/DL (ref 3.5–5)
ALP SERPL-CCNC: 103 U/L (ref 46–116)
ALT SERPL W P-5'-P-CCNC: 17 U/L (ref 12–78)
ANION GAP SERPL CALCULATED.3IONS-SCNC: 10 MMOL/L (ref 4–13)
AST SERPL W P-5'-P-CCNC: 13 U/L (ref 5–45)
BILIRUB SERPL-MCNC: 0.48 MG/DL (ref 0.2–1)
BUN SERPL-MCNC: 23 MG/DL (ref 5–25)
CALCIUM ALBUM COR SERPL-MCNC: 9.5 MG/DL (ref 8.3–10.1)
CALCIUM SERPL-MCNC: 8.9 MG/DL (ref 8.3–10.1)
CHLORIDE SERPL-SCNC: 108 MMOL/L (ref 100–108)
CHOLEST SERPL-MCNC: 128 MG/DL (ref 50–200)
CO2 SERPL-SCNC: 25 MMOL/L (ref 21–32)
CREAT SERPL-MCNC: 1.32 MG/DL (ref 0.6–1.3)
GFR SERPL CREATININE-BSD FRML MDRD: 57 ML/MIN/1.73SQ M
GLUCOSE P FAST SERPL-MCNC: 140 MG/DL (ref 65–99)
HDLC SERPL-MCNC: 35 MG/DL
LDLC SERPL CALC-MCNC: 58 MG/DL (ref 0–100)
NONHDLC SERPL-MCNC: 93 MG/DL
POTASSIUM SERPL-SCNC: 4.4 MMOL/L (ref 3.5–5.3)
PROT SERPL-MCNC: 6.9 G/DL (ref 6.4–8.2)
PSA SERPL-MCNC: 1 NG/ML (ref 0–4)
SODIUM SERPL-SCNC: 143 MMOL/L (ref 136–145)
TRIGL SERPL-MCNC: 174 MG/DL
TSH SERPL DL<=0.05 MIU/L-ACNC: 1.61 UIU/ML (ref 0.36–3.74)

## 2021-06-10 PROCEDURE — 80053 COMPREHEN METABOLIC PANEL: CPT

## 2021-06-10 PROCEDURE — G0103 PSA SCREENING: HCPCS

## 2021-06-10 PROCEDURE — 80061 LIPID PANEL: CPT

## 2021-06-10 PROCEDURE — 84443 ASSAY THYROID STIM HORMONE: CPT

## 2021-06-11 ENCOUNTER — ANTICOAG VISIT (OUTPATIENT)
Dept: FAMILY MEDICINE CLINIC | Facility: CLINIC | Age: 66
End: 2021-06-11

## 2021-08-16 ENCOUNTER — VBI (OUTPATIENT)
Dept: ADMINISTRATIVE | Facility: OTHER | Age: 66
End: 2021-08-16

## 2021-08-23 ENCOUNTER — VBI (OUTPATIENT)
Dept: ADMINISTRATIVE | Facility: OTHER | Age: 66
End: 2021-08-23

## 2021-08-30 DIAGNOSIS — I82.403 ACUTE EMBOLISM AND THROMBOSIS OF DEEP VEIN OF BOTH LOWER EXTREMITIES (HCC): ICD-10-CM

## 2021-08-31 RX ORDER — WARFARIN SODIUM 6 MG/1
TABLET ORAL
Qty: 30 TABLET | Refills: 0 | Status: SHIPPED | OUTPATIENT
Start: 2021-08-31 | End: 2021-12-01

## 2021-09-02 DIAGNOSIS — E11.40 TYPE 2 DIABETES MELLITUS WITH DIABETIC NEUROPATHY, WITHOUT LONG-TERM CURRENT USE OF INSULIN (HCC): ICD-10-CM

## 2021-09-02 RX ORDER — INSULIN GLARGINE 100 [IU]/ML
45 INJECTION, SOLUTION SUBCUTANEOUS DAILY
Qty: 40 ML | Refills: 3 | Status: SHIPPED | OUTPATIENT
Start: 2021-09-02 | End: 2021-11-10

## 2021-09-02 RX ORDER — INSULIN GLARGINE 100 [IU]/ML
45 INJECTION, SOLUTION SUBCUTANEOUS DAILY
Qty: 40 ML | Refills: 3 | Status: SHIPPED | OUTPATIENT
Start: 2021-09-02 | End: 2021-09-02 | Stop reason: SDUPTHER

## 2021-10-04 ENCOUNTER — RA CDI HCC (OUTPATIENT)
Dept: OTHER | Facility: HOSPITAL | Age: 66
End: 2021-10-04

## 2021-10-05 PROBLEM — E11.65 TYPE 2 DIABETES MELLITUS WITH HYPERGLYCEMIA (HCC): Status: ACTIVE | Noted: 2021-10-05

## 2021-10-12 PROCEDURE — 90471 IMMUNIZATION ADMIN: CPT

## 2021-10-12 PROCEDURE — 99285 EMERGENCY DEPT VISIT HI MDM: CPT

## 2021-10-13 ENCOUNTER — APPOINTMENT (EMERGENCY)
Dept: RADIOLOGY | Facility: HOSPITAL | Age: 66
DRG: 493 | End: 2021-10-13
Payer: COMMERCIAL

## 2021-10-13 ENCOUNTER — HOSPITAL ENCOUNTER (INPATIENT)
Facility: HOSPITAL | Age: 66
LOS: 21 days | Discharge: NURSING, REHAB OR LONG-TERM CARE | DRG: 493 | End: 2021-11-03
Attending: INTERNAL MEDICINE | Admitting: INTERNAL MEDICINE
Payer: COMMERCIAL

## 2021-10-13 ENCOUNTER — APPOINTMENT (OUTPATIENT)
Dept: RADIOLOGY | Facility: HOSPITAL | Age: 66
DRG: 493 | End: 2021-10-13
Payer: COMMERCIAL

## 2021-10-13 ENCOUNTER — APPOINTMENT (EMERGENCY)
Dept: CT IMAGING | Facility: HOSPITAL | Age: 66
DRG: 493 | End: 2021-10-13
Payer: COMMERCIAL

## 2021-10-13 DIAGNOSIS — S82.109A: Primary | ICD-10-CM

## 2021-10-13 DIAGNOSIS — S82.191A OTHER CLOSED FRACTURE OF PROXIMAL END OF RIGHT TIBIA, INITIAL ENCOUNTER: ICD-10-CM

## 2021-10-13 DIAGNOSIS — R26.2 UNABLE TO AMBULATE: ICD-10-CM

## 2021-10-13 DIAGNOSIS — S80.819A: ICD-10-CM

## 2021-10-13 DIAGNOSIS — R22.31 ARM MASS, RIGHT: ICD-10-CM

## 2021-10-13 PROBLEM — S82.101A CLOSED FRACTURE OF PROXIMAL END OF RIGHT TIBIA: Status: ACTIVE | Noted: 2021-10-13

## 2021-10-13 PROBLEM — N17.9 AKI (ACUTE KIDNEY INJURY) (HCC): Status: ACTIVE | Noted: 2021-10-13

## 2021-10-13 PROBLEM — E87.5 HYPERKALEMIA: Status: ACTIVE | Noted: 2021-10-13

## 2021-10-13 PROBLEM — Z86.718 HISTORY OF DVT (DEEP VEIN THROMBOSIS): Status: ACTIVE | Noted: 2021-10-13

## 2021-10-13 LAB
ANION GAP SERPL CALCULATED.3IONS-SCNC: 14 MMOL/L (ref 4–13)
BACTERIA UR QL AUTO: ABNORMAL /HPF
BASOPHILS # BLD AUTO: 0.04 THOUSANDS/ΜL (ref 0–0.1)
BASOPHILS NFR BLD AUTO: 0 % (ref 0–1)
BILIRUB UR QL STRIP: NEGATIVE
BUN SERPL-MCNC: 32 MG/DL (ref 5–25)
CALCIUM SERPL-MCNC: 9.3 MG/DL (ref 8.3–10.1)
CHLORIDE SERPL-SCNC: 103 MMOL/L (ref 100–108)
CLARITY UR: CLEAR
CO2 SERPL-SCNC: 23 MMOL/L (ref 21–32)
COLOR UR: YELLOW
CREAT SERPL-MCNC: 1.99 MG/DL (ref 0.6–1.3)
EOSINOPHIL # BLD AUTO: 0.01 THOUSAND/ΜL (ref 0–0.61)
EOSINOPHIL NFR BLD AUTO: 0 % (ref 0–6)
ERYTHROCYTE [DISTWIDTH] IN BLOOD BY AUTOMATED COUNT: 15.2 % (ref 11.6–15.1)
GFR SERPL CREATININE-BSD FRML MDRD: 34 ML/MIN/1.73SQ M
GLUCOSE SERPL-MCNC: 263 MG/DL (ref 65–140)
GLUCOSE SERPL-MCNC: 348 MG/DL (ref 65–140)
GLUCOSE SERPL-MCNC: 416 MG/DL (ref 65–140)
GLUCOSE UR STRIP-MCNC: ABNORMAL MG/DL
HCT VFR BLD AUTO: 38.5 % (ref 36.5–49.3)
HGB BLD-MCNC: 12.4 G/DL (ref 12–17)
HGB UR QL STRIP.AUTO: ABNORMAL
HYALINE CASTS #/AREA URNS LPF: ABNORMAL /LPF
IMM GRANULOCYTES # BLD AUTO: 0.07 THOUSAND/UL (ref 0–0.2)
IMM GRANULOCYTES NFR BLD AUTO: 1 % (ref 0–2)
INR PPP: 2.21 (ref 0.84–1.19)
KETONES UR STRIP-MCNC: ABNORMAL MG/DL
LEUKOCYTE ESTERASE UR QL STRIP: ABNORMAL
LYMPHOCYTES # BLD AUTO: 1.48 THOUSANDS/ΜL (ref 0.6–4.47)
LYMPHOCYTES NFR BLD AUTO: 11 % (ref 14–44)
MCH RBC QN AUTO: 28.9 PG (ref 26.8–34.3)
MCHC RBC AUTO-ENTMCNC: 32.2 G/DL (ref 31.4–37.4)
MCV RBC AUTO: 90 FL (ref 82–98)
MONOCYTES # BLD AUTO: 1.13 THOUSAND/ΜL (ref 0.17–1.22)
MONOCYTES NFR BLD AUTO: 8 % (ref 4–12)
NEUTROPHILS # BLD AUTO: 10.8 THOUSANDS/ΜL (ref 1.85–7.62)
NEUTS SEG NFR BLD AUTO: 80 % (ref 43–75)
NITRITE UR QL STRIP: NEGATIVE
NON-SQ EPI CELLS URNS QL MICRO: ABNORMAL /HPF
NRBC BLD AUTO-RTO: 0 /100 WBCS
PH UR STRIP.AUTO: 5 [PH]
PLATELET # BLD AUTO: 276 THOUSANDS/UL (ref 149–390)
PMV BLD AUTO: 9.4 FL (ref 8.9–12.7)
POTASSIUM SERPL-SCNC: 5.3 MMOL/L (ref 3.5–5.3)
PROT UR STRIP-MCNC: NEGATIVE MG/DL
PROTHROMBIN TIME: 23.4 SECONDS (ref 11.6–14.5)
RBC # BLD AUTO: 4.29 MILLION/UL (ref 3.88–5.62)
RBC #/AREA URNS AUTO: ABNORMAL /HPF
SODIUM SERPL-SCNC: 140 MMOL/L (ref 136–145)
SP GR UR STRIP.AUTO: 1.02 (ref 1–1.03)
TROPONIN I SERPL-MCNC: 0.02 NG/ML
UROBILINOGEN UR QL STRIP.AUTO: 0.2 E.U./DL
WBC # BLD AUTO: 13.53 THOUSAND/UL (ref 4.31–10.16)
WBC #/AREA URNS AUTO: ABNORMAL /HPF

## 2021-10-13 PROCEDURE — 84484 ASSAY OF TROPONIN QUANT: CPT | Performed by: EMERGENCY MEDICINE

## 2021-10-13 PROCEDURE — 73560 X-RAY EXAM OF KNEE 1 OR 2: CPT

## 2021-10-13 PROCEDURE — 82948 REAGENT STRIP/BLOOD GLUCOSE: CPT

## 2021-10-13 PROCEDURE — 84300 ASSAY OF URINE SODIUM: CPT | Performed by: INTERNAL MEDICINE

## 2021-10-13 PROCEDURE — 80048 BASIC METABOLIC PNL TOTAL CA: CPT | Performed by: EMERGENCY MEDICINE

## 2021-10-13 PROCEDURE — 36415 COLL VENOUS BLD VENIPUNCTURE: CPT | Performed by: EMERGENCY MEDICINE

## 2021-10-13 PROCEDURE — 85610 PROTHROMBIN TIME: CPT | Performed by: INTERNAL MEDICINE

## 2021-10-13 PROCEDURE — 73700 CT LOWER EXTREMITY W/O DYE: CPT

## 2021-10-13 PROCEDURE — 73590 X-RAY EXAM OF LOWER LEG: CPT

## 2021-10-13 PROCEDURE — 82607 VITAMIN B-12: CPT | Performed by: INTERNAL MEDICINE

## 2021-10-13 PROCEDURE — 90471 IMMUNIZATION ADMIN: CPT | Performed by: INTERNAL MEDICINE

## 2021-10-13 PROCEDURE — 82570 ASSAY OF URINE CREATININE: CPT | Performed by: INTERNAL MEDICINE

## 2021-10-13 PROCEDURE — 99284 EMERGENCY DEPT VISIT MOD MDM: CPT | Performed by: PHYSICIAN ASSISTANT

## 2021-10-13 PROCEDURE — 90682 RIV4 VACC RECOMBINANT DNA IM: CPT | Performed by: INTERNAL MEDICINE

## 2021-10-13 PROCEDURE — 73610 X-RAY EXAM OF ANKLE: CPT

## 2021-10-13 PROCEDURE — 99255 IP/OBS CONSLTJ NEW/EST HI 80: CPT | Performed by: ORTHOPAEDIC SURGERY

## 2021-10-13 PROCEDURE — 82306 VITAMIN D 25 HYDROXY: CPT | Performed by: INTERNAL MEDICINE

## 2021-10-13 PROCEDURE — 90715 TDAP VACCINE 7 YRS/> IM: CPT | Performed by: EMERGENCY MEDICINE

## 2021-10-13 PROCEDURE — 99223 1ST HOSP IP/OBS HIGH 75: CPT | Performed by: INTERNAL MEDICINE

## 2021-10-13 PROCEDURE — 85025 COMPLETE CBC W/AUTO DIFF WBC: CPT | Performed by: EMERGENCY MEDICINE

## 2021-10-13 PROCEDURE — 81001 URINALYSIS AUTO W/SCOPE: CPT | Performed by: INTERNAL MEDICINE

## 2021-10-13 RX ORDER — INSULIN GLARGINE 100 [IU]/ML
30 INJECTION, SOLUTION SUBCUTANEOUS
Status: DISCONTINUED | OUTPATIENT
Start: 2021-10-13 | End: 2021-10-13

## 2021-10-13 RX ORDER — ACETAMINOPHEN 325 MG/1
650 TABLET ORAL EVERY 6 HOURS PRN
Status: DISCONTINUED | OUTPATIENT
Start: 2021-10-13 | End: 2021-11-03 | Stop reason: HOSPADM

## 2021-10-13 RX ORDER — CALCIUM CARBONATE 200(500)MG
500 TABLET,CHEWABLE ORAL DAILY PRN
Status: DISCONTINUED | OUTPATIENT
Start: 2021-10-13 | End: 2021-11-03 | Stop reason: HOSPADM

## 2021-10-13 RX ORDER — INSULIN GLARGINE 100 [IU]/ML
30 INJECTION, SOLUTION SUBCUTANEOUS DAILY
Status: DISCONTINUED | OUTPATIENT
Start: 2021-10-13 | End: 2021-10-14

## 2021-10-13 RX ORDER — HYDROMORPHONE HCL IN WATER/PF 6 MG/30 ML
0.2 PATIENT CONTROLLED ANALGESIA SYRINGE INTRAVENOUS EVERY 4 HOURS PRN
Status: DISCONTINUED | OUTPATIENT
Start: 2021-10-13 | End: 2021-10-14

## 2021-10-13 RX ORDER — LIDOCAINE 50 MG/G
1 PATCH TOPICAL DAILY
Status: DISCONTINUED | OUTPATIENT
Start: 2021-10-13 | End: 2021-11-03 | Stop reason: HOSPADM

## 2021-10-13 RX ORDER — OXYCODONE HYDROCHLORIDE 10 MG/1
10 TABLET ORAL ONCE
Status: COMPLETED | OUTPATIENT
Start: 2021-10-13 | End: 2021-10-13

## 2021-10-13 RX ORDER — SODIUM CHLORIDE, SODIUM GLUCONATE, SODIUM ACETATE, POTASSIUM CHLORIDE, MAGNESIUM CHLORIDE, SODIUM PHOSPHATE, DIBASIC, AND POTASSIUM PHOSPHATE .53; .5; .37; .037; .03; .012; .00082 G/100ML; G/100ML; G/100ML; G/100ML; G/100ML; G/100ML; G/100ML
75 INJECTION, SOLUTION INTRAVENOUS CONTINUOUS
Status: DISPENSED | OUTPATIENT
Start: 2021-10-13 | End: 2021-10-14

## 2021-10-13 RX ORDER — LISINOPRIL 10 MG/1
10 TABLET ORAL DAILY
Status: DISCONTINUED | OUTPATIENT
Start: 2021-10-13 | End: 2021-10-13

## 2021-10-13 RX ORDER — OXYCODONE HCL 10 MG/1
10 TABLET, FILM COATED, EXTENDED RELEASE ORAL EVERY 12 HOURS SCHEDULED
Status: DISCONTINUED | OUTPATIENT
Start: 2021-10-13 | End: 2021-11-03 | Stop reason: HOSPADM

## 2021-10-13 RX ORDER — OXYCODONE HYDROCHLORIDE 5 MG/1
10 TABLET ORAL EVERY 4 HOURS PRN
Qty: 30 TABLET | Refills: 0 | Status: SHIPPED | OUTPATIENT
Start: 2021-10-13 | End: 2021-11-03 | Stop reason: SDUPTHER

## 2021-10-13 RX ORDER — ONDANSETRON 2 MG/ML
4 INJECTION INTRAMUSCULAR; INTRAVENOUS EVERY 6 HOURS PRN
Status: DISCONTINUED | OUTPATIENT
Start: 2021-10-13 | End: 2021-11-03 | Stop reason: HOSPADM

## 2021-10-13 RX ORDER — PRAVASTATIN SODIUM 40 MG
40 TABLET ORAL
Status: DISCONTINUED | OUTPATIENT
Start: 2021-10-13 | End: 2021-11-03 | Stop reason: HOSPADM

## 2021-10-13 RX ADMIN — ONDANSETRON 4 MG: 2 INJECTION INTRAMUSCULAR; INTRAVENOUS at 22:49

## 2021-10-13 RX ADMIN — HYDROMORPHONE HYDROCHLORIDE 0.2 MG: 0.2 INJECTION, SOLUTION INTRAMUSCULAR; INTRAVENOUS; SUBCUTANEOUS at 22:36

## 2021-10-13 RX ADMIN — INFLUENZA A VIRUS A/WISCONSIN/588/2019 (H1N1) RECOMBINANT HEMAGGLUTININ ANTIGEN, INFLUENZA A VIRUS A/TASMANIA/503/2020 (H3N2) RECOMBINANT HEMAGGLUTININ ANTIGEN, INFLUENZA B VIRUS B/WASHINGTON/02/2019 RECOMBINANT HEMAGGLUTININ ANTIGEN, AND INFLUENZA B VIRUS B/PHUKET/3073/2013 RECOMBINANT HEMAGGLUTININ ANTIGEN 0.5 ML: 45; 45; 45; 45 INJECTION INTRAMUSCULAR at 21:25

## 2021-10-13 RX ADMIN — INSULIN LISPRO 3 UNITS: 100 INJECTION, SOLUTION INTRAVENOUS; SUBCUTANEOUS at 22:36

## 2021-10-13 RX ADMIN — OXYCODONE HYDROCHLORIDE 10 MG: 10 TABLET ORAL at 07:28

## 2021-10-13 RX ADMIN — INSULIN GLARGINE 30 UNITS: 100 INJECTION, SOLUTION SUBCUTANEOUS at 16:11

## 2021-10-13 RX ADMIN — HYDROMORPHONE HYDROCHLORIDE 0.2 MG: 0.2 INJECTION, SOLUTION INTRAMUSCULAR; INTRAVENOUS; SUBCUTANEOUS at 17:48

## 2021-10-13 RX ADMIN — SODIUM CHLORIDE, SODIUM GLUCONATE, SODIUM ACETATE, POTASSIUM CHLORIDE, MAGNESIUM CHLORIDE, SODIUM PHOSPHATE, DIBASIC, AND POTASSIUM PHOSPHATE 75 ML/HR: .53; .5; .37; .037; .03; .012; .00082 INJECTION, SOLUTION INTRAVENOUS at 16:16

## 2021-10-13 RX ADMIN — TETANUS TOXOID, REDUCED DIPHTHERIA TOXOID AND ACELLULAR PERTUSSIS VACCINE, ADSORBED 0.5 ML: 5; 2.5; 8; 8; 2.5 SUSPENSION INTRAMUSCULAR at 06:53

## 2021-10-13 RX ADMIN — OXYCODONE HYDROCHLORIDE 10 MG: 10 TABLET, FILM COATED, EXTENDED RELEASE ORAL at 21:25

## 2021-10-13 RX ADMIN — INSULIN LISPRO 4 UNITS: 100 INJECTION, SOLUTION INTRAVENOUS; SUBCUTANEOUS at 16:11

## 2021-10-13 NOTE — ASSESSMENT & PLAN NOTE
Lab Results   Component Value Date    HGBA1C 9 0 (A) 06/07/2021       Recent Labs     10/13/21  1532   POCGLU 348*       Blood Sugar Average: Last 72 hrs:  (P) 348  Known diabetic with diabetic neuropathy  On metformin and lantus at home  Blood sugar on presentation 418  Start sliding scale insulin  Increase Lantus to 33 u HS ]  Hypoglycemic protocol  Goal blood sugar 140-180

## 2021-10-13 NOTE — ED NOTES
Pt unable to get up off the bed with crutches and assist of 2, provider notified     Steffany Mueller RN  10/13/21 7931

## 2021-10-13 NOTE — ASSESSMENT & PLAN NOTE
Presented with Right knee / proximal leg pain and inability to bear weight after ground level fall after twisting leg in his kitchen  Assoc deformity,mild abrasion  10/13-Imaging findings including CT confirmed Acute comminuted displaced intra-articular fracture of the proximal tibia as described above  No dislocation  Small lipohemarthrosis  Moderate distal femoral and proximal tibiofibular subcutaneous edema/contusion  On knee immobilizer  Non weight bearing  Orthopedic input noted and appreciated  Patient will need surgical correction of fracture  Continue analgesia, will increased dosing today  Add stool softeners  Once INR low patient planned for surgery, tentative on Monday by orthopedic notes

## 2021-10-13 NOTE — ASSESSMENT & PLAN NOTE
History 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  Unprovoked  Last episode was 10yrs ago  On coumadin  Currently held  Check INR  Would need heparin if INR is subtherapeutic  DVT prophylaxis with venodyne , pending INR

## 2021-10-13 NOTE — ASSESSMENT & PLAN NOTE
ROSE on CKD  May be prerenal   Cr on  Pres 1 99  Baseline 1 21 months ago  Avoid nephrotoxics  Avoid hypotension  Monitor I/O  Continue Light hydration with isolyte at 75cc/h

## 2021-10-13 NOTE — CONSULTS
Orthopedics   Stephanie León 61 y o  male MRN: [id number]  Unit/Bed#: ED 15-01      Chief Complaint:   Right leg pain    HPI:  61 y o  male with PMH of diabetes, DVT bilateral lower extremities on Warfarin complaining of Right lower extremity pain s/p mechanical fall in his home yesterday  Patient reports he slipped on his tile floor and immediately noted Right leg pain and inability to bear weight  Patient was brought to the ED by EMS where x-rays were taken demonstrating fracture of Right proximal tibia with intraarticular extension  Patient was placed in knee immobilizer and admitted to [facility]  He still complains of moderate Right leg pain  He tried to ambulate with crutches/walker while maintaining nonweightbearing with moderate difficulty and pain  At this time, patient denies chest pain, shortness of breath, fevers, chills      Review Of Systems:   · Skin: Normal  · Neuro: See HPI  · Musculoskeletal: See HPI  · 14 point review of systems negative except as stated above     Past Medical History:   Past Medical History:   Diagnosis Date    Back pain     Diabetes mellitus ([address] )     DVT (deep vein thrombosis) in pregnancy     Hyperlipidemia     Hypertension        Past Surgical History:   Past Surgical History:   Procedure Laterality Date    CATARACT EXTRACTION      COLONOSCOPY      6/2014    CYST REMOVAL      HAND SURGERY      HEEL SPUR SURGERY      foot surgery       Family History:  Family history reviewed and non-contributory  Family History   Problem Relation Age of Onset    Dementia Mother     Melanoma Mother        Social History:  Social History     Socioeconomic History    Marital status: /Civil Union     Spouse name: None    Number of children: None    Years of education: None    Highest education level: None   Occupational History    None   Tobacco Use    Smoking status: Light Tobacco Smoker    Smokeless tobacco: Never Used    Tobacco comment: Per allscripts, current smoker   Vaping Use    Vaping Use: Never used   Substance and Sexual Activity    Alcohol use: No     Comment: Per allscripts, occasional use    Drug use: Not Currently    Sexual activity: None   Other Topics Concern    None   Social History Narrative    None     Social Determinants of Health     Financial Resource Strain:     Difficulty of Paying Living Expenses:    Food Insecurity:     Worried About Running Out of Food in the Last Year:     Ran Out of Food in the Last Year:    Transportation Needs:     Lack of Transportation (Medical):  Lack of Transportation (Non-Medical):    Physical Activity:     Days of Exercise per Week:     Minutes of Exercise per Session:    Stress:     Feeling of Stress :    Social Connections:     Frequency of Communication with Friends and Family:     Frequency of Social Gatherings with Friends and Family:     Attends Yarsani Services:     Active Member of Clubs or Organizations:     Attends Club or Organization Meetings:     Marital Status:    Intimate Partner Violence:     Fear of Current or Ex-Partner:     Emotionally Abused:     Physically Abused:     Sexually Abused: Allergies:    Allergies   Allergen Reactions    Byetta 10 Mcg Pen [Exenatide] Swelling    Pollen Extract Myalgia           Labs:  0   Lab Value Date/Time    HCT 38 5 10/13/2021 0621    HCT 37 5 08/03/2020 1241    HCT 36 8 06/26/2019 1224    HCT 38 4 07/13/2015 1448    HCT 38 6 10/20/2014 1132    HGB 12 4 10/13/2021 0621    HGB 12 1 08/03/2020 1241    HGB 11 7 (L) 06/26/2019 1224    HGB 13 1 07/13/2015 1448    HGB 13 1 10/20/2014 1132    INR 2 21 (H) 10/13/2021 1536    INR 2 94 (H) 12/11/2015 1543    WBC 13 53 (H) 10/13/2021 0621    WBC 7 24 08/03/2020 1241    WBC 7 36 06/26/2019 1224    WBC 7 7 07/13/2015 1448    WBC 8 30 10/20/2014 1132    ESR 58 (H) 05/20/2016 1207       Meds:    Current Facility-Administered Medications:     acetaminophen (TYLENOL) tablet 650 mg, 650 mg, Oral, Q6H PRN, Malaika Nunez Rebecca Cardoso MD    insulin glargine (LANTUS) subcutaneous injection 30 Units 0 3 mL, 30 Units, Subcutaneous, Daily, Elisa Aleman MD, 30 Units at 10/13/21 1611    insulin lispro (HumaLOG) 100 units/mL subcutaneous injection 1-6 Units, 1-6 Units, Subcutaneous, TID AC, 4 Units at 10/13/21 1611 **AND** Fingerstick Glucose (POCT), , , TID AC, Elisa Aleman MD    lidocaine (LIDODERM) 5 % patch 1 patch, 1 patch, Topical, Daily, Elisa Aleman MD    multi-electrolyte (PLASMALYTE-A/ISOLYTE-S PH 7 4) IV solution, 75 mL/hr, Intravenous, Continuous, Elisa Aleman MD, Last Rate: 75 mL/hr at 10/13/21 1616, 75 mL/hr at 10/13/21 1616    ondansetron (ZOFRAN) injection 4 mg, 4 mg, Intravenous, Q6H PRN, Elisa Aleman MD    oxyCODONE (OxyCONTIN) 12 hr tablet 10 mg, 10 mg, Oral, Q12H [facility], Elisa Aleman MD    pravastatin (PRAVACHOL) tablet 40 mg, 40 mg, Oral, Daily With Gabe Mederos MD    Current Outpatient Medications:     Alcohol Swabs (B-D SINGLE USE SWABS REGULAR) PADS, USE TO CHECK BLOOD SUGAR EVERY DAY, Disp: 100 each, Rfl: 3    aspirin (ECOTRIN) 325 mg EC tablet, Take by mouth, Disp: , Rfl:     Blood Glucose Monitoring Suppl (ACCU-CHEK MADDISON CONNECT) w/Device KIT, by In Vitro route, Disp: , Rfl:     hydrOXYzine HCL (ATARAX) 25 mg tablet, Take 1 tablet by mouth, Disp: , Rfl:     insulin glargine (Lantus) 100 units/mL subcutaneous injection, Inject 45 Units under the skin daily, Disp: 40 mL, Rfl: 3    Insulin Syringe-Needle U-100 (B-D INS SYRINGE 0 5CC/31GX5/16) 31G X 5/16" 0 5 ML MISC, For use with insulin daily, Disp: 100 each, Rfl: 3    lisinopril (ZESTRIL) 10 mg tablet, TAKE 1 TABLET EVERY DAY, Disp: 90 tablet, Rfl: 3    metFORMIN (GLUCOPHAGE-XR) 500 mg 24 hr tablet, TAKE 2 TABLETS TWICE DAILY, Disp: 360 tablet, Rfl: 3    oxyCODONE (Roxicodone) 5 immediate release tablet, Take 2 tablets (10 mg total) by mouth every 4 (four) hours as needed for severe pain (dx tibia fracture/ongoing rx ) for up to 10 daysMax Daily Amount: 60 mg, Disp: 30 tablet, Rfl: 0    OxyCONTIN 10 MG 12 hr tablet, Take 10 mg by mouth 2 (two) times a day as needed, Disp: , Rfl:     simvastatin (ZOCOR) 20 mg tablet, TAKE 1 TABLET EVERY DAY, Disp: 90 tablet, Rfl: 3    warfarin (COUMADIN) 6 mg tablet, TAKE 1 TABLET EVERY DAY AS DIRECTED, Disp: 30 tablet, Rfl: 0    Blood Culture:   No results found for: BLOODCX    Wound Culture:   No results found for: WOUNDCULT    Ins and Outs:  No intake/output data recorded  Physical Exam:   /75 (BP Location: Left arm)   Pulse 85   Temp 98 6 °F (37 °C) (Oral)   Resp 18   Ht 6' 1" (1 854 m)   Wt 104 kg (230 lb)   SpO2 98%   BMI 30 34 kg/m²   Gen: Alert and oriented to person, place, time  HEENT: EOMI, eyes clear, moist mucus membranes, hearing intact  Respiratory: Bilateral chest rise  No audible wheezing found  Cardiovascular: Regular Rate and Rhythm  Abdomen: soft nontender/nondistended    Musculoskeletal:  Right lower extremity   · Skin with superficial abrasion located inferior to patella without active drainage   TTP throughout distal Right lower extremity at fracture site  Compartments soft and compressible   · Full active range of motion noted at ankles and toes  · SILT s/s/sp/dp/t    · 2+ DP pulse    Tertiary: no tenderness over all other joints/long bones as except already stated  Radiology:   I personally reviewed the films  CT scan performed of Right lower extremity demonstrates comminuted, displaced fracture at proximal tibial metaphysis with intraarticular extension      _*_*_*_*_*_*_*_*_*_*_*_*_*_*_*_*_*_*_*_*_*_*_*_*_*_*_*_*_*_*_*_*_*_*_*_*_*_*_*_*_*    Assessment:  66 y o male Right proximal tibia fracture      Plan:   · NWB RLE  · PT/OT  · Pain control per primary team  · DVT ppx per primary team  · Dispo: Placed compressive dressing on Right lower extremity and reapplied knee immobilizer   CT scan and XR briefly discussed with patient demonstrating fracture  Maintain NWB stats Right lower extremity  Possible transition to heparin while inpatient; discussed with SLIM  Will continue to follow for possible surgical intervention while inpatient  Ice application to Right lower extremity with continued application of knee immobilizer           Messi Barnes PA-C

## 2021-10-13 NOTE — ED PROVIDER NOTES
History  Chief Complaint   Patient presents with    Fall     pt brought via ems for fall from standing position approx 9pm, no headstrike no loc  pt on BT   Leg Pain     right sided leg pain from knee to ankle,  swelling, pain and laceration below the knee pain 9/10 pain      30-year-old male with past medical history significant for diabetes, hyperlipidemia, DVT on anticoagulation presents to the emergency department via EMS with chief complaint of fall from standing resulting in right leg injury  Onset reported as 9 p m  Last night  Location of symptoms reported as the right leg  Quality is reported as sharp pain  Severity reported as moderate to severe  Associated symptoms:  Denies paralysis paresthesia or weakness to the right lower extremity  Denies head injury or loss of consciousness  Denies chest or abdominal pain  Modifying factors: Movement exacerbates pain  Context:  Patient reports that he fell from standing around 9 p m  Last night  He did not hit his head  There was no loss of consciousness  He is on blood thinners for DVT  He reports right-sided leg pain is result of the fall  History provided by:  Patient, EMS personnel and spouse   used: No    Fall  Associated symptoms: no abdominal pain, no back pain, no chest pain, no headaches, no hearing loss, no nausea, no neck pain, no seizures and no vomiting    Leg Pain  Associated symptoms: no back pain, no fatigue, no fever and no neck pain        Prior to Admission Medications   Prescriptions Last Dose Informant Patient Reported? Taking?    Alcohol Swabs (B-D SINGLE USE SWABS REGULAR) PADS   No No   Sig: USE TO CHECK BLOOD SUGAR EVERY DAY   Blood Glucose Monitoring Suppl (ACCU-CHEK MADDISON CONNECT) w/Device KIT  Self Yes No   Sig: by In Vitro route   Insulin Syringe-Needle U-100 (B-D INS SYRINGE 0 5CC/31GX5/16) 31G X 5/16" 0 5 ML MISC  Self No No   Sig: For use with insulin daily   OxyCONTIN 10 MG 12 hr tablet  Self Yes No   Sig: Take 10 mg by mouth 2 (two) times a day as needed   aspirin (ECOTRIN) 325 mg EC tablet  Self Yes No   Sig: Take by mouth   hydrOXYzine HCL (ATARAX) 25 mg tablet  Self Yes No   Sig: Take 1 tablet by mouth   insulin glargine (Lantus) 100 units/mL subcutaneous injection   No No   Sig: Inject 45 Units under the skin daily   lisinopril (ZESTRIL) 10 mg tablet   No No   Sig: TAKE 1 TABLET EVERY DAY   metFORMIN (GLUCOPHAGE-XR) 500 mg 24 hr tablet   No No   Sig: TAKE 2 TABLETS TWICE DAILY   simvastatin (ZOCOR) 20 mg tablet   No No   Sig: TAKE 1 TABLET EVERY DAY   warfarin (COUMADIN) 6 mg tablet   No No   Sig: TAKE 1 TABLET EVERY DAY AS DIRECTED      Facility-Administered Medications: None       Past Medical History:   Diagnosis Date    Back pain     Diabetes mellitus (HCC)     DVT (deep vein thrombosis) in pregnancy     Hyperlipidemia     Hypertension        Past Surgical History:   Procedure Laterality Date    CATARACT EXTRACTION      COLONOSCOPY      6/2014    CYST REMOVAL      HAND SURGERY      HEEL SPUR SURGERY      foot surgery       Family History   Problem Relation Age of Onset    Dementia Mother     Melanoma Mother      I have reviewed and agree with the history as documented  E-Cigarette/Vaping    E-Cigarette Use Never User      E-Cigarette/Vaping Substances     Social History     Tobacco Use    Smoking status: Light Tobacco Smoker    Smokeless tobacco: Never Used    Tobacco comment: Per allscripts, current smoker   Vaping Use    Vaping Use: Never used   Substance Use Topics    Alcohol use: No     Comment: Per allscripts, occasional use    Drug use: Not Currently       Review of Systems   Constitutional: Negative for activity change, appetite change, chills, diaphoresis, fatigue, fever and unexpected weight change     HENT: Negative for congestion, dental problem, drooling, ear discharge, ear pain, facial swelling, hearing loss, mouth sores, nosebleeds, postnasal drip, rhinorrhea, sinus pressure, sinus pain, sneezing, sore throat, tinnitus, trouble swallowing and voice change  Eyes: Negative for photophobia, pain, discharge, redness, itching and visual disturbance  Respiratory: Negative for cough, chest tightness, shortness of breath and wheezing  Cardiovascular: Negative for chest pain, palpitations and leg swelling  Gastrointestinal: Negative for abdominal distention, abdominal pain, constipation, diarrhea, nausea and vomiting  Endocrine: Negative for cold intolerance, heat intolerance, polydipsia, polyphagia and polyuria  Genitourinary: Negative for difficulty urinating, dysuria, flank pain, frequency, hematuria and urgency  Musculoskeletal: Positive for arthralgias, gait problem and joint swelling  Negative for back pain, myalgias, neck pain and neck stiffness  Skin: Positive for wound  Negative for color change, pallor and rash  Allergic/Immunologic: Negative for environmental allergies, food allergies and immunocompromised state  Neurological: Negative for dizziness, tremors, seizures, syncope, facial asymmetry, speech difficulty, weakness, light-headedness, numbness and headaches  Hematological: Negative for adenopathy  Does not bruise/bleed easily  Psychiatric/Behavioral: Negative for agitation, confusion and hallucinations  The patient is not nervous/anxious  All other systems reviewed and are negative  Physical Exam  Physical Exam  Vitals and nursing note reviewed  Constitutional:       General: He is not in acute distress  Appearance: Normal appearance  He is not ill-appearing  Comments: /86 (BP Location: Right arm)   Pulse 81   Temp 98 6 °F (37 °C) (Oral)   Resp 18   Ht 6' 1" (1 854 m)   Wt 104 kg (230 lb)   SpO2 100%   BMI 30 34 kg/m²      HENT:      Head: Normocephalic and atraumatic  Right Ear: External ear normal       Left Ear: External ear normal       Nose: Nose normal    Eyes:      General: No scleral icterus  Right eye: No discharge  Left eye: No discharge  Extraocular Movements: Extraocular movements intact  Conjunctiva/sclera: Conjunctivae normal    Cardiovascular:      Rate and Rhythm: Normal rate  Pulses: Normal pulses  Pulmonary:      Effort: Pulmonary effort is normal  No respiratory distress  Musculoskeletal:         General: Swelling, tenderness, deformity and signs of injury present  Cervical back: Normal range of motion and neck supple  No rigidity or tenderness  Comments: Right lower extremity exam   There is soft tissue swelling noted to the right knee and lower leg  Right hip is normal to inspection nontender to palpation all surfaces  The right knee is tender to palpation in the anterior and medial lateral surfaces  The right ankle is normal to inspection, nontender to palpation all surfaces  Posterior tibial pulse and dorsalis pedis pulse 2/4 normal   Capillary refills less than 3 seconds to all toes  Patient is able to wiggle all 5 toes without restriction  Patient does report pain radiating from the knee down the leg  Lower extremity is warm, soft and well perfused  Skin:     Capillary Refill: Capillary refill takes less than 2 seconds  Coloration: Skin is not jaundiced or pale  Findings: No erythema or rash  Comments: Superficial abrasion right lower leg just inferior to the knee  Neurological:      General: No focal deficit present  Mental Status: He is alert and oriented to person, place, and time  Mental status is at baseline  Cranial Nerves: No cranial nerve deficit  Sensory: No sensory deficit  Motor: No weakness        Gait: Gait normal    Psychiatric:         Mood and Affect: Mood normal          Behavior: Behavior normal          Vital Signs  ED Triage Vitals [10/12/21 2353]   Temperature Pulse Respirations Blood Pressure SpO2   98 6 °F (37 °C) 96 20 158/74 100 %      Temp Source Heart Rate Source Patient Position - Orthostatic VS BP Location FiO2 (%)   Oral Monitor Sitting Left arm --      Pain Score       9           Vitals:    10/12/21 2353 10/13/21 0613   BP: 158/74 129/86   Pulse: 96 81   Patient Position - Orthostatic VS: Sitting Lying         Visual Acuity  Visual Acuity      Most Recent Value   L Pupil Size (mm)  2   R Pupil Size (mm)  2          ED Medications  Medications   tetanus-diphtheria-acellular pertussis (BOOSTRIX) IM injection 0 5 mL (0 5 mL Intramuscular Given 10/13/21 0653)   oxyCODONE (ROXICODONE) immediate release tablet 10 mg (10 mg Oral Given 10/13/21 0728)       Diagnostic Studies  Results Reviewed     Procedure Component Value Units Date/Time    Troponin I [[id number]]  (Normal) Collected: 10/13/21 0621    Lab Status: Final result Specimen: Blood from Arm, Left Updated: 10/13/21 0657     Troponin I 0 02 ng/mL     Basic metabolic panel [[id number]]  (Abnormal) Collected: 10/13/21 0621    Lab Status: Final result Specimen: Blood from Arm, Left Updated: 10/13/21 0649     Sodium 140 mmol/L      Potassium 5 3 mmol/L      Chloride 103 mmol/L      CO2 23 mmol/L      ANION GAP 14 mmol/L      BUN 32 mg/dL      Creatinine 1 99 mg/dL      Glucose 416 mg/dL      Calcium 9 3 mg/dL      eGFR 34 ml/min/1 73sq m     Narrative:      Arina guidelines for Chronic Kidney Disease (CKD):     Stage 1 with normal or high GFR (GFR > 90 mL/min/1 73 square meters)    Stage 2 Mild CKD (GFR = 60-89 mL/min/1 73 square meters)    Stage 3A Moderate CKD (GFR = 45-59 mL/min/1 73 square meters)    Stage 3B Moderate CKD (GFR = 30-44 mL/min/1 73 square meters)    Stage 4 Severe CKD (GFR = 15-29 mL/min/1 73 square meters)    Stage 5 End Stage CKD (GFR <15 mL/min/1 73 square meters)  Note: GFR calculation is accurate only with a steady state creatinine    CBC and differential [[id number]]  (Abnormal) Collected: 10/13/21 0621    Lab Status: Final result Specimen: Blood from Arm, Left Updated: 10/13/21 0630     WBC 13 53 Thousand/uL      RBC 4 29 Million/uL      Hemoglobin 12 4 g/dL      Hematocrit 38 5 %      MCV 90 fL      MCH 28 9 pg      MCHC 32 2 g/dL      RDW 15 2 %      MPV 9 4 fL      Platelets 683 Thousands/uL      nRBC 0 /100 WBCs      Neutrophils Relative 80 %      Immat GRANS % 1 %      Lymphocytes Relative 11 %      Monocytes Relative 8 %      Eosinophils Relative 0 %      Basophils Relative 0 %      Neutrophils Absolute 10 80 Thousands/µL      Immature Grans Absolute 0 07 Thousand/uL      Lymphocytes Absolute 1 48 Thousands/µL      Monocytes Absolute 1 13 Thousand/µL      Eosinophils Absolute 0 01 Thousand/µL      Basophils Absolute 0 04 Thousands/µL                  CT lower extremity wo contrast right   Final Result by Angy Crouch MD (10/13 0710)      Acute comminuted displaced intra-articular fracture of the proximal tibia as described above  No dislocation  This study demonstrates a significant  finding and was documented as such in [facility] for liaison and referring practitioner notification  Workstation performed: [id number]         XR tibia fibula 2 views RIGHT    (Results Pending)   XR ankle 3+ views RIGHT    (Results Pending)   XR knee 1 or 2 vw right    (Results Pending)              Procedures  Procedures         ED Course                             SBIRT 20yo+      Most Recent Value   SBIRT (24 yo +)   In order to provide better care to our patients, we are screening all of our patients for alcohol and drug use  Would it be okay to ask you these screening questions?   Unable to answer at this time Filed at: 10/13/2021 0546                    MDM  Number of Diagnoses or Management Options  Abrasion, lower leg, anterior: new and requires workup  Closed fracture of proximal tibia: new and requires workup  Unable to ambulate  Diagnosis management comments: ddx includes but is not limited to fracture, contusion, sprain, strain, nerve injury, tendon injury, vascular injury, tendinitis, bursitis, dislocation, plan xray to rule out fracture or dislocation  X-ray images of right tibia and fibula independently visualized interpreted by me demonstrate comminuted tibial plateau fracture  X-ray images of the right knee independently visualized interpreted by me demonstrate acute comminuted fracture of the proximal tibia  X-ray images of the right ankle independently visualized interpreted by me demonstrate no acute fracture or dislocation  Lab results reviewed  CBC demonstrates mildly elevated white blood cell count of 13, hemoglobin of 12 hematocrit 38 are normal   No anemia  Basic metabolic panel reviewed, BUN of 32 and creatinine 1 9 are elevated  Troponin is normal at 0 02  Amount and/or Complexity of Data Reviewed  Clinical lab tests: ordered and reviewed  Tests in the radiology section of CPT®: ordered and reviewed  Discussion of test results with the performing providers: yes  Obtain history from someone other than the patient: yes (EMS, spouse)  Review and summarize past medical records: yes  Discuss the patient with other providers: yes ([facility])  Independent visualization of images, tracings, or specimens: yes    Risk of Complications, Morbidity, and/or Mortality  General comments: ED course:  72-year-old male presents to the emergency department via EMS after a fall from standing resulting in a right leg injury  Patient is clinically tender to palpation to the proximal aspect of the right lower leg just below the knee on clinical exam   Neurovascular intact to the right lower extremity  Pulses intact to right lower extremity  No signs of compartment syndrome on clinical exam   Reviewed x-ray results patient at bedside showing comminuted proximal tibial fracture which appears to extend to the tibial plateau    Case was discussed with Orthopedics, Dr Damien Posada, who recommended bulky dressing, knee immobilizer and follow-up in the office this Friday for further evaluation and management of fracture  Ortho requested ct of joint for further operative management  X-ray results reviewed with patient at bedside  Discussed diagnosis of proximal tibia fracture  After patient was splinted in the emergency department attempted to ambulate patient with crutches and walker patient unable to tolerate either  Patient able to ambulate, patient currently on blood thinners and patient a high fall risk  Due to inability to ambulate because of fracture and right leg and inability tolerate crutches or walker will admit for further management and for safety  Case discussed with Dr Boris Garcia regarding admission  TT sent to Dr China Rojas, orthopedics updating him on patient's disposition  Splint check: location right lower leg,   Type static knee immobilizer, SILT, NVI, cap refill less than 3 seconds  Skin intact without redness or breakdown  Splint applied by ed tech  Splint checked by me  Patient Progress  Patient progress: stable      Disposition  Final diagnoses:   Closed fracture of proximal tibia   Abrasion, lower leg, anterior   Unable to ambulate     Time reflects when diagnosis was documented in both MDM as applicable and the Disposition within this note     Time User Action Codes Description Comment    10/13/2021  7:33 AM Ardeen Dryer Add [S82 109A] Closed fracture of proximal tibia     10/13/2021  7:33 AM Ardeen Dryer Add [S80 819A] Abrasion, lower leg, anterior     10/13/2021 10:35 AM Ardeen Dryer Add [R26 2] Unable to ambulate       ED Disposition     ED Disposition Condition Date/Time Comment    Admit Stable Wed Oct 13, 2021 10:35 AM Case was discussed with Dr Karla Rincon and the patient's admission status was agreed to be Admission Status: inpatient status to the service of Dr James Shaw           Follow-up Information     Follow up With Specialties Details Why Contact Info Additional Information    Ruperto Cramer DO Family Medicine Call in 2 days for further evaluation of symptoms [address]  [address]  [address] Emergency Department Emergency Medicine Go to  If symptoms worsen 34 [facility] 109 [facility] Emergency Department, [address] [place] [address], [facility] Giorgi Mayfield DO Orthopedic Surgery Call in 1 day for further evaluation of symptoms [address] [place] [address]  [phone]             Patient's Medications   Discharge Prescriptions    OXYCODONE (ROXICODONE) 5 IMMEDIATE RELEASE TABLET    Take 2 tablets (10 mg total) by mouth every 4 (four) hours as needed for severe pain (dx tibia fracture/ongoing rx ) for up to 10 daysMax Daily Amount: 60 mg       Start Date: 10/13/2021End Date: 10/23/2021       Order Dose: 10 mg       Quantity: 30 tablet    Refills: 0     No discharge procedures on file      PDMP Review       Value Time User    PDMP Reviewed  Yes 6/7/2021  2:46 PM Jacobo Chowdhury DO          ED Provider  Electronically Signed by           Ethan Reid PA-C  10/13/21 Kaycee Ramos PA-C  10/13/21 1038

## 2021-10-13 NOTE — H&P
INTERNAL MEDICINE RESIDENCY ADMISSION H&P     Name: Eloisa Donnelly   Age & Sex: 61 y o  male   MRN: [id number]  Unit/Bed#: -01   Encounter: [id number]  Primary Care Provider: Koffi Rivera DO    Code Status: Level 1 - Full Code  Admission Status: INPATIENT   Disposition: Patient requires Med/Surg    Admit to team: SLIM    ASSESSMENT/PLAN     Principal Problem:    Closed fracture of proximal end of right tibia  Active Problems:    ROSE (acute kidney injury) ([facility] )    Essential hypertension    Mixed hyperlipidemia    Type 2 diabetes mellitus with diabetic neuropathy, without long-term current use of insulin (HCC)    History of DVT (deep vein thrombosis)    Hyperkalemia      * Closed fracture of proximal end of right tibia  Assessment & Plan  Presented with Right knee / proximal leg pain and inability to bear weight after ground level fall after twisting leg in his kitchen  Assoc deformity,mild abrasion  10/13-Imaging findings including CT confirmed Acute comminuted displaced intra-articular fracture of the proximal tibia as described above  No dislocation  Small lipohemarthrosis  Moderate distal femoral and proximal tibiofibular subcutaneous edema/contusion  On knee immobilizer  Non weight bearing  Elevate LE  Orthopedic on board  Follow recs  Dilaudid prn for severe pain, oxycodone 10 mg q12 crystal  Tylenol prn for mild pain  Pending ortho procedure  PT/OT  ROSE (acute kidney injury) ([facility] )  Assessment & Plan  ROSE on CKD  May be prerenal   Cr on  Pres 1 99  Baseline 1 21 months ago  Check urine electrolytes, U/A , Bladder scan  Avoid nephrotoxics  Avoid hypotension  Monitor I/O  Light hydration with isolyte at 75cc/h  Hyperkalemia  Assessment & Plan  Mild -5 3 on presentation  Recheck BMP  History of DVT (deep vein thrombosis)  Assessment & Plan  History 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  Unprovoked  Last episode was 10yrs ago  On coumadin  Currently held  Check INR    Would need heparin if INR is subtherapeutic  DVT prophylaxis with venodyne , pending INR  Type 2 diabetes mellitus with diabetic neuropathy, without long-term current use of insulin [facility])  Assessment & Plan  Lab Results   Component Value Date    HGBA1C 9 0 (A) 06/07/2021       Recent Labs     10/13/21  1532   POCGLU 348*       Blood Sugar Average: Last 72 hrs:  (P) 348  Known diabetic with diabetic neuropathy  On metformin and lantus at home  Blood sugar on presentation 418  Start sliding scale insulin  Start lantus 30 u HS ]  Hypoglycemic protocol  Goal blood sugar 140-180  Mixed hyperlipidemia  Assessment & Plan  Continue statin  Essential hypertension  Assessment & Plan  On lisinopril 10mg daily at home  Hold for ROSE  BP stable  Monitor BP  VTE Pharmacologic Prophylaxis: Sequential compression device (Venodyne)   VTE Mechanical Prophylaxis: sequential compression device    CHIEF COMPLAINT     Chief Complaint   Patient presents with    Fall     pt brought via ems for fall from standing position approx 9pm, no headstrike no loc  pt on BT   Leg Pain     right sided leg pain from knee to ankle,  swelling, pain and laceration below the knee pain 9/10 pain       HISTORY OF PRESENT ILLNESS     Patient is a 74-year-old male with a past medical history type 2 diabetes complicated by diabetic neuropathy, hypertension, hyperlipidemia, CKD, history of DVT x2 on warfarin DVT x2 on warfarin who presented to [facility] on 10/13 following fall from a standing height  Patient was in his kitchen and fell after twisting his ankle on turning  while he was carrying food for his son   He landed on outstretched hand and right knee  This was associated with severe right knee pain, deformity, right upper leg swelling and pain,inability to bear weight on the right LE, abrasion in the right knee/ upper R leg  He reports some difficulty with his balance which has been ongoing    Denied any recent falls, dizziness or lightheadedness, weight loss, anorexia, fever, chills, leg swellings, chest pain shortness of breath, palpitation preceding fall  At the ED, vitals showed heart rate 96 bpm, respiratory rate 20 breaths per minute, blood pressure 158/74mmHg, oxygen saturation 100% on room air  Labs showed leukocytosis 13 53 with 80% neutrophilia, electrolytes potassium 5 3, BUN 32, cr 1 99  Glc 416  Agap 14  Imaging findings confirmed Acute comminuted displaced intra-articular fracture of the proximal tibia as described above  No dislocation  Small lipohemarthrosis  Moderate distal femoral and proximal tibiofibular subcutaneous edema/contusion  Diffuse vascular calcification  Meniscal chondrocalcinosis  During my evaluation, Knee immobilizer/splint is present  Patient is complaining of pain at the fracture site  Denied numbness or tingling, he is able to move distal extremities on pulsation intact  Patient will be admitted for further evaluation  Orthopedic saw the patient in the ED  He denied any recent falls  No alcohol or recreational drug use  Nonsmoker  REVIEW OF SYSTEMS     Review of Systems   Constitutional: Negative for chills, diaphoresis, fatigue, fever and unexpected weight change  HENT: Negative for sore throat, trouble swallowing and voice change  Respiratory: Negative for chest tightness, shortness of breath and wheezing  Cardiovascular: Negative for chest pain, palpitations and leg swelling  Gastrointestinal: Negative for abdominal pain, diarrhea and vomiting  Genitourinary: Negative for difficulty urinating and hematuria  Musculoskeletal: Positive for back pain, joint swelling and neck pain  Skin: Negative for pallor and rash  Neurological: Negative for dizziness, facial asymmetry and numbness  Psychiatric/Behavioral: Negative for agitation and behavioral problems       OBJECTIVE     Vitals:    10/13/21 1200 10/13/21 1400 10/13/21 1528 10/13/21 1851   BP: 152/71 143/71 150/75 153/82 BP Location:   Left arm    Pulse: 78 91 85 91   Resp: 16 16 18 20   Temp:    100 °F (37 8 °C)   TempSrc:       SpO2: 98% 97% 98% 97%   Weight:       Height:          Temperature:   Temp (24hrs), Av 3 °F (37 4 °C), Min:98 6 °F (37 °C), Max:100 °F (37 8 °C)    Temperature: 100 °F (37 8 °C)  Intake & Output:  I/O     None        Weights:   IBW (Ideal Body Weight): 79 9 kg    Body mass index is 30 34 kg/m²  Weight (last 2 days)     Date/Time   Weight    10/12/21 2353   104 (230)            Physical Exam  Constitutional:       General: He is not in acute distress  Appearance: Normal appearance  HENT:      Head: Normocephalic and atraumatic  Mouth/Throat:      Mouth: Mucous membranes are moist    Eyes:      Extraocular Movements: Extraocular movements intact  Cardiovascular:      Rate and Rhythm: Normal rate and regular rhythm  Heart sounds: No murmur heard  No friction rub  No gallop  Pulmonary:      Effort: Pulmonary effort is normal  No respiratory distress  Breath sounds: Normal breath sounds  No stridor  No wheezing, rhonchi or rales  Chest:      Chest wall: No tenderness  Abdominal:      General: Abdomen is flat  There is no distension  Palpations: Abdomen is soft  There is no mass  Tenderness: There is no abdominal tenderness  There is no guarding or rebound  Hernia: No hernia is present  Musculoskeletal:         General: Swelling, tenderness and signs of injury present  No deformity  Cervical back: Normal range of motion and neck supple  Right lower leg: No edema  Left lower leg: No edema  Comments: Knee immobilizer in place on the right lower extremity  Distal pulsation intact  -DP P and Posterior tibial   Skin:     General: Skin is warm  Capillary Refill: Capillary refill takes less than 2 seconds  Findings: Bruising present  Neurological:      General: No focal deficit present        Mental Status: He is alert and oriented to person, place, and time  Psychiatric:         Mood and Affect: Mood normal          Behavior: Behavior normal        PAST MEDICAL HISTORY     Past Medical History:   Diagnosis Date    Back pain     Diabetes mellitus ([address] )     DVT (deep vein thrombosis) in pregnancy     Hyperlipidemia     Hypertension      PAST SURGICAL HISTORY     Past Surgical History:   Procedure Laterality Date    CATARACT EXTRACTION      COLONOSCOPY      6/2014    CYST REMOVAL      HAND SURGERY      HEEL SPUR SURGERY      foot surgery     SOCIAL & FAMILY HISTORY     Social History     Substance and Sexual Activity   Alcohol Use No    Comment: Per allscripts, occasional use     Substance and Sexual Activity   Alcohol Use No    Comment: Per allscripts, occasional use        Substance and Sexual Activity   Drug Use Not Currently     Social History     Tobacco Use   Smoking Status Light Tobacco Smoker   Smokeless Tobacco Never Used   Tobacco Comment    Per allscripts, current smoker     Family History   Problem Relation Age of Onset    Dementia Mother     Melanoma Mother      LABORATORY DATA     Labs: I have personally reviewed pertinent reports  Results from last 7 days   Lab Units 10/13/21  0621   WBC Thousand/uL 13 53*   HEMOGLOBIN g/dL 12 4   HEMATOCRIT % 38 5   PLATELETS Thousands/uL 276   NEUTROS PCT % 80*   MONOS PCT % 8      Results from last 7 days   Lab Units 10/13/21  0621   POTASSIUM mmol/L 5 3   CHLORIDE mmol/L 103   CO2 mmol/L 23   BUN mg/dL 32*   CREATININE mg/dL 1 99*   CALCIUM mg/dL 9 3              Results from last 7 days   Lab Units 10/13/21  1536   INR  2 21*         Results from last 7 days   Lab Units 10/13/21  0621   TROPONIN I ng/mL 0 02     Micro:  No results found for: Rafia Ingram, WOUNDCULT, SPUTUMCULTUR  IMAGING & DIAGNOSTIC TESTS     Imaging: I have personally reviewed pertinent reports      XR knee 1 or 2 vw right    Result Date: 10/13/2021  Impression: Comminuted intra-articular right knee fracture  Findings concur with the referring clinician's preliminary interpretation already in the patient's electronic health record  Workstation performed: [id number]     XR tibia fibula 2 views RIGHT    Result Date: 10/13/2021  Impression: Comminuted fracture of the right proximal tibia described in further detail on right knee x-ray report  No additional fractures further distally Workstation performed: [id number]     XR ankle 3+ views RIGHT    Result Date: 10/13/2021  Impression: No acute osseous abnormality  Workstation performed: [id number]     CT lower extremity wo contrast right    Result Date: 10/13/2021  Impression: Acute comminuted displaced intra-articular fracture of the proximal tibia as described above  No dislocation  This study demonstrates a significant  finding and was documented as such in [facility] for liaison and referring practitioner notification  Workstation performed: [id number]     EKG, Pathology, and Other Studies: I have personally reviewed pertinent reports  ALLERGIES     Allergies   Allergen Reactions    Byetta 10 Mcg Pen [Exenatide] Swelling    Pollen Extract Myalgia     MEDICATIONS PRIOR TO ARRIVAL     Prior to Admission medications    Medication Sig Start Date End Date Taking?  Authorizing Provider   Alcohol Swabs (B-D SINGLE USE SWABS REGULAR) PADS USE TO CHECK BLOOD SUGAR EVERY DAY 9/1/21   Jose Nair DO   aspirin (ECOTRIN) 325 mg EC tablet Take by mouth    Historical Provider, MD   Blood Glucose Monitoring Suppl (ACCU-CHEK MADDISON CONNECT) w/Device KIT by In Vitro route 1/17/18   Historical Provider, MD   hydrOXYzine HCL (ATARAX) 25 mg tablet Take 1 tablet by mouth 4/26/17   Historical Provider, MD   insulin glargine (Lantus) 100 units/mL subcutaneous injection Inject 45 Units under the skin daily 9/2/21   Jose Nair DO   Insulin Syringe-Needle U-100 (B-D INS SYRINGE 0 5CC/31GX5/16) 31G X 5/16" 0 5 ML MISC For use with insulin daily 8/3/20   Jose Nair DO lisinopril (ZESTRIL) 10 mg tablet TAKE 1 TABLET EVERY DAY 6/2/21   Bettie Fritz DO   metFORMIN (GLUCOPHAGE-XR) 500 mg 24 hr tablet TAKE 2 TABLETS TWICE DAILY 6/2/21   Bettie Fritz DO   oxyCODONE (Roxicodone) 5 immediate release tablet Take 2 tablets (10 mg total) by mouth every 4 (four) hours as needed for severe pain (dx tibia fracture/ongoing rx ) for up to 10 daysMax Daily Amount: 60 mg 10/13/21 10/23/21  Yareli Garcia PA-C   OxyCONTIN 10 MG 12 hr tablet Take 10 mg by mouth 2 (two) times a day as needed 11/18/20   Historical Provider, MD   simvastatin (ZOCOR) 20 mg tablet TAKE 1 TABLET EVERY DAY 6/2/21   Bettie Fritz DO   warfarin (COUMADIN) 6 mg tablet TAKE 1 TABLET EVERY DAY AS DIRECTED 8/31/21   Bettie Fritz DO     MEDICATIONS ADMINISTERED IN LAST 24 HOURS     Medication Administration - last 24 hours from 10/12/2021 1854 to 10/13/2021 1854       Date/Time Order Dose Route Action Action by     10/13/2021 0653 tetanus-diphtheria-acellular pertussis (BOOSTRIX) IM injection 0 5 mL 0 5 mL Intramuscular Given Amaury Estrada RN     10/13/2021 0728 oxyCODONE (ROXICODONE) immediate release tablet 10 mg 10 mg Oral Given Jose F Clay RN     10/13/2021 1611 insulin lispro (HumaLOG) 100 units/mL subcutaneous injection 1-6 Units 4 Units Subcutaneous Given Jose F Clay RN     10/13/2021 1616 multi-electrolyte (PLASMALYTE-A/ISOLYTE-S PH 7 4) IV solution 75 mL/hr [facility]venous [facility]     10/13/2021 1611 insulin glargine (LANTUS) subcutaneous injection 30 Units 0 3 mL 30 Units Subcutaneous Given Jose F Clay RN     10/13/2021 1748 HYDROmorphone HCl (DILAUDID) injection 0 2 mg 0 2 mg Intravenous Given Jose F Clay RN        CURRENT MEDICATIONS     Current Facility-Administered Medications   Medication Dose Route Frequency Provider Last Rate    acetaminophen  650 mg Oral Q6H PRN David Rainey MD      HYDROmorphone  0 2 mg Intravenous Q4H PRN David Rainey MD  insulin glargine  30 Units Subcutaneous Daily David Rainey MD      insulin lispro  1-6 Units Subcutaneous TID AC David Rainey MD      lidocaine  1 patch Topical Daily David Rainey MD      multi-electrolyte  75 mL/hr Intravenous Continuous David Rainey MD 75 mL/hr (10/13/21 1616)    ondansetron  4 mg Intravenous Q6H PRN David Rainey MD      oxyCODONE  10 mg Oral Q12H [address] David Rainey MD      pravastatin  40 mg Oral Daily With Vanna Saba MD       multi-electrolyte, 75 mL/hr, Last Rate: 75 mL/hr (10/13/21 1616)      acetaminophen, 650 mg, Q6H PRN  HYDROmorphone, 0 2 mg, Q4H PRN  ondansetron, 4 mg, Q6H PRN        Admission Time  I spent 30 minutes admitting the patient  This involved direct patient contact where I performed a full history and physical, reviewing previous records, and reviewing laboratory and other diagnostic studies  Portions of the record may have been created with voice recognition software  Occasional wrong word or "sound a like" substitutions may have occurred due to the inherent limitations of voice recognition software    Read the chart carefully and recognize, using context, where substitutions have occurred     ==  David Rainey MD  [address]  Internal Medicine Residency PGY-3

## 2021-10-14 PROBLEM — R79.1 ELEVATED INR: Status: ACTIVE | Noted: 2021-10-14

## 2021-10-14 LAB
25(OH)D3 SERPL-MCNC: 24.2 NG/ML (ref 30–100)
ANION GAP SERPL CALCULATED.3IONS-SCNC: 11 MMOL/L (ref 4–13)
BASOPHILS # BLD AUTO: 0.02 THOUSANDS/ΜL (ref 0–0.1)
BASOPHILS NFR BLD AUTO: 0 % (ref 0–1)
BUN SERPL-MCNC: 28 MG/DL (ref 5–25)
CALCIUM SERPL-MCNC: 8.9 MG/DL (ref 8.3–10.1)
CHLORIDE SERPL-SCNC: 105 MMOL/L (ref 100–108)
CO2 SERPL-SCNC: 25 MMOL/L (ref 21–32)
CREAT SERPL-MCNC: 1.78 MG/DL (ref 0.6–1.3)
CREAT UR-MCNC: 157 MG/DL
EOSINOPHIL # BLD AUTO: 0.02 THOUSAND/ΜL (ref 0–0.61)
EOSINOPHIL NFR BLD AUTO: 0 % (ref 0–6)
ERYTHROCYTE [DISTWIDTH] IN BLOOD BY AUTOMATED COUNT: 14.9 % (ref 11.6–15.1)
GFR SERPL CREATININE-BSD FRML MDRD: 39 ML/MIN/1.73SQ M
GLUCOSE SERPL-MCNC: 141 MG/DL (ref 65–140)
GLUCOSE SERPL-MCNC: 153 MG/DL (ref 65–140)
GLUCOSE SERPL-MCNC: 230 MG/DL (ref 65–140)
GLUCOSE SERPL-MCNC: 235 MG/DL (ref 65–140)
GLUCOSE SERPL-MCNC: 304 MG/DL (ref 65–140)
HCT VFR BLD AUTO: 33.2 % (ref 36.5–49.3)
HGB BLD-MCNC: 10.9 G/DL (ref 12–17)
IMM GRANULOCYTES # BLD AUTO: 0.05 THOUSAND/UL (ref 0–0.2)
IMM GRANULOCYTES NFR BLD AUTO: 0 % (ref 0–2)
INR PPP: 2.14 (ref 0.84–1.19)
LYMPHOCYTES # BLD AUTO: 1.83 THOUSANDS/ΜL (ref 0.6–4.47)
LYMPHOCYTES NFR BLD AUTO: 16 % (ref 14–44)
MAGNESIUM SERPL-MCNC: 2.1 MG/DL (ref 1.6–2.6)
MCH RBC QN AUTO: 29.2 PG (ref 26.8–34.3)
MCHC RBC AUTO-ENTMCNC: 32.8 G/DL (ref 31.4–37.4)
MCV RBC AUTO: 89 FL (ref 82–98)
MONOCYTES # BLD AUTO: 1.37 THOUSAND/ΜL (ref 0.17–1.22)
MONOCYTES NFR BLD AUTO: 12 % (ref 4–12)
NEUTROPHILS # BLD AUTO: 8.14 THOUSANDS/ΜL (ref 1.85–7.62)
NEUTS SEG NFR BLD AUTO: 72 % (ref 43–75)
NRBC BLD AUTO-RTO: 0 /100 WBCS
PHOSPHATE SERPL-MCNC: 3 MG/DL (ref 2.3–4.1)
PLATELET # BLD AUTO: 234 THOUSANDS/UL (ref 149–390)
PMV BLD AUTO: 9.6 FL (ref 8.9–12.7)
POTASSIUM SERPL-SCNC: 4.4 MMOL/L (ref 3.5–5.3)
PROTHROMBIN TIME: 22.9 SECONDS (ref 11.6–14.5)
RBC # BLD AUTO: 3.73 MILLION/UL (ref 3.88–5.62)
SODIUM 24H UR-SCNC: 36 MOL/L
SODIUM SERPL-SCNC: 141 MMOL/L (ref 136–145)
VIT B12 SERPL-MCNC: 141 PG/ML (ref 100–900)
WBC # BLD AUTO: 11.43 THOUSAND/UL (ref 4.31–10.16)

## 2021-10-14 PROCEDURE — 85025 COMPLETE CBC W/AUTO DIFF WBC: CPT | Performed by: INTERNAL MEDICINE

## 2021-10-14 PROCEDURE — 83735 ASSAY OF MAGNESIUM: CPT | Performed by: INTERNAL MEDICINE

## 2021-10-14 PROCEDURE — NC001 PR NO CHARGE: Performed by: PHYSICIAN ASSISTANT

## 2021-10-14 PROCEDURE — 84100 ASSAY OF PHOSPHORUS: CPT | Performed by: INTERNAL MEDICINE

## 2021-10-14 PROCEDURE — 99233 SBSQ HOSP IP/OBS HIGH 50: CPT | Performed by: INTERNAL MEDICINE

## 2021-10-14 PROCEDURE — 80048 BASIC METABOLIC PNL TOTAL CA: CPT | Performed by: INTERNAL MEDICINE

## 2021-10-14 PROCEDURE — 97530 THERAPEUTIC ACTIVITIES: CPT

## 2021-10-14 PROCEDURE — 82948 REAGENT STRIP/BLOOD GLUCOSE: CPT

## 2021-10-14 PROCEDURE — 85610 PROTHROMBIN TIME: CPT | Performed by: INTERNAL MEDICINE

## 2021-10-14 PROCEDURE — 97163 PT EVAL HIGH COMPLEX 45 MIN: CPT

## 2021-10-14 PROCEDURE — 97167 OT EVAL HIGH COMPLEX 60 MIN: CPT

## 2021-10-14 RX ORDER — INSULIN GLARGINE 100 [IU]/ML
33 INJECTION, SOLUTION SUBCUTANEOUS DAILY
Status: DISCONTINUED | OUTPATIENT
Start: 2021-10-15 | End: 2021-10-15

## 2021-10-14 RX ORDER — HYDROMORPHONE HCL/PF 1 MG/ML
0.5 SYRINGE (ML) INJECTION
Status: DISCONTINUED | OUTPATIENT
Start: 2021-10-14 | End: 2021-11-03 | Stop reason: HOSPADM

## 2021-10-14 RX ORDER — SENNOSIDES 8.6 MG
2 TABLET ORAL 2 TIMES DAILY
Status: DISCONTINUED | OUTPATIENT
Start: 2021-10-14 | End: 2021-11-03 | Stop reason: HOSPADM

## 2021-10-14 RX ADMIN — INSULIN LISPRO 1 UNITS: 100 INJECTION, SOLUTION INTRAVENOUS; SUBCUTANEOUS at 17:42

## 2021-10-14 RX ADMIN — INSULIN LISPRO 4 UNITS: 100 INJECTION, SOLUTION INTRAVENOUS; SUBCUTANEOUS at 08:18

## 2021-10-14 RX ADMIN — INSULIN GLARGINE 30 UNITS: 100 INJECTION, SOLUTION SUBCUTANEOUS at 08:20

## 2021-10-14 RX ADMIN — STANDARDIZED SENNA CONCENTRATE 17.2 MG: 8.6 TABLET ORAL at 16:48

## 2021-10-14 RX ADMIN — CALCIUM CARBONATE (ANTACID) CHEW TAB 500 MG 500 MG: 500 CHEW TAB at 00:06

## 2021-10-14 RX ADMIN — HYDROMORPHONE HYDROCHLORIDE 0.5 MG: 1 INJECTION, SOLUTION INTRAMUSCULAR; INTRAVENOUS; SUBCUTANEOUS at 16:48

## 2021-10-14 RX ADMIN — LIDOCAINE 5% 1 PATCH: 700 PATCH TOPICAL at 08:19

## 2021-10-14 RX ADMIN — PRAVASTATIN SODIUM 40 MG: 40 TABLET ORAL at 16:48

## 2021-10-14 RX ADMIN — HYDROMORPHONE HYDROCHLORIDE 0.2 MG: 0.2 INJECTION, SOLUTION INTRAMUSCULAR; INTRAVENOUS; SUBCUTANEOUS at 03:05

## 2021-10-14 RX ADMIN — OXYCODONE HYDROCHLORIDE 10 MG: 10 TABLET, FILM COATED, EXTENDED RELEASE ORAL at 22:44

## 2021-10-14 RX ADMIN — HYDROMORPHONE HYDROCHLORIDE 0.5 MG: 1 INJECTION, SOLUTION INTRAMUSCULAR; INTRAVENOUS; SUBCUTANEOUS at 11:00

## 2021-10-14 RX ADMIN — INSULIN LISPRO 2 UNITS: 100 INJECTION, SOLUTION INTRAVENOUS; SUBCUTANEOUS at 11:00

## 2021-10-14 RX ADMIN — SODIUM CHLORIDE, SODIUM GLUCONATE, SODIUM ACETATE, POTASSIUM CHLORIDE, MAGNESIUM CHLORIDE, SODIUM PHOSPHATE, DIBASIC, AND POTASSIUM PHOSPHATE 75 ML/HR: .53; .5; .37; .037; .03; .012; .00082 INJECTION, SOLUTION INTRAVENOUS at 03:12

## 2021-10-14 RX ADMIN — STANDARDIZED SENNA CONCENTRATE 17.2 MG: 8.6 TABLET ORAL at 12:45

## 2021-10-14 RX ADMIN — OXYCODONE HYDROCHLORIDE 10 MG: 10 TABLET, FILM COATED, EXTENDED RELEASE ORAL at 08:20

## 2021-10-14 NOTE — UTILIZATION REVIEW
Inpatient Admission Authorization Request   NOTIFICATION OF INPATIENT ADMISSION/INPATIENT AUTHORIZATION REQUEST   SERVICING FACILITY:   [address] [place] [address]  Tax ID: [id number]  NPI: [id number]  Place of Service: Inpatient [facility]  60[address]  Place of Service Code: 24     ATTENDING PROVIDER:  Attending Name and NPI#: Isreal Figueroa [place] [[id number]]  Address: [address] [place] [address]  Phone: [phone]     UTILIZATION REVIEW CONTACT:  [facility] Utilization   Network Utilization Review Department  Phone: [phone]  Fax [phone]  Email: [email or web address]     PHYSICIAN ADVISORY SERVICES:  FOR [id number] REVIEW - MEDICAL NECESSITY DENIAL  Phone: [phone]  Fax: [phone]  Email: [email or web address]     TYPE OF REQUEST:  Inpatient Status     ADMISSION INFORMATION:  ADMISSION DATE/TIME: 10/13/21 10:35 AM  PATIENT DIAGNOSIS CODE/DESCRIPTION:  Leg injury [S89 90XA]  Closed fracture of proximal tibia [S82 109A]  Unable to ambulate [R26 2]  Abrasion, lower leg, anterior [S80 819A]  DISCHARGE DATE/TIME: No discharge date for patient encounter  DISCHARGE DISPOSITION (IF DISCHARGED): Home/Self Care     IMPORTANT INFORMATION:  Please contact the [facility] directly with any questions or concerns regarding this request  Department voicemails are confidential     Send requests for admission clinical reviews, concurrent reviews, approvals, and administrative denials due to lack of clinical to fax [phone]

## 2021-10-14 NOTE — CASE MANAGEMENT
Case Management Assessment & Discharge Planning Note    Patient name Macario Felty  Location [address] 209/-01 MRN [id number]  : 1955 Date 10/14/2021       Current Admission Date: 10/13/2021  Current Admission Diagnosis:  Closed fracture of proximal end of right tibia  Previous Admission - Discharge Date:16   LOS (days): 1  Geometric Mean LOS (GMLOS) (days):   Days to GMLOS: Previous Discharge Diagnosis:  There are no discharge diagnoses documented for the most recent discharge  OBJECTIVE:    Risk of Unplanned Readmission Score: 14   Bundle(if applicable): Bundled Patient Payment:  (no)  Current admission status: Inpatient       Preferred Pharmacy:   [facility], [address] [address]  Phone: [phone] Fax: [phone]    [address] [address]  Phone: [phone] Fax: [phone]  [address] [place] [address]  Phone: [phone] Fax: [phone]    Primary Care Provider: Alexandra Bonilla DO    Primary Insurance: BLUE CROSS  Secondary Insurance: [facility] REP    ASSESSMENT:  [address] 26 Agents    There are no active Health Care Agents on file         Advance Directives  Does patient have a [address]?: No  Was patient offered paperwork?: Yes (would like for it be his wife, unsure if it was completed)  Does patient currently have a Health Care decision maker?: No  Does patient have Advance Directives?: No  Was patient offered paperwork?: Yes              Readmission Root Cause  30 Day Readmission: No    Patient Information  Admitted from[de-identified] Home  Mental Status: Alert  During Assessment patient was accompanied by: Spouse  Assessment information provided by[de-identified] Patient  Primary Caregiver: Self  Support Systems: [facility] of Residence: [address] do you live in?: [address] [facility] entry access options  Select all that apply : Stairs  Number of steps to enter home : 7  Do the steps have railings?: Yes  Type of Current Residence: 2 story home  Upon entering residence, is there a bedroom on the main floor (no further steps)?: Yes  Upon entering residence, is there a bathroom on the main floor (no further steps)?: Yes  Living Arrangements: Lives w/ Spouse/significant other  Is patient a ?: No    Activities of Daily Living Prior to Admission  Functional Status: Independent  Completes ADLs independently?: Yes  Ambulates independently?: Yes  Does patient use assisted devices?: No  Does patient have a history of Outpatient Therapy (PT/OT)?: No  Does the patient have a history of Short-Term Rehab?: No  Does patient have a history of HHC?: No  Does patient currently have [employer] ?: No         Patient Information Continued  Income Source: Employed  Does patient have prescription coverage?: Yes  Does patient receive dialysis treatments?: No  Does patient have a history of substance abuse?: No  Does patient have a history of Mental Health Diagnosis?: No    PHQ 2/9 Screening   Reviewed PHQ 2/9 Depression Screening Score?: No    Means of Transportation  Means of Transport to Appts[de-identified] Drives Self  In the past 12 months, has lack of transportation kept you from medical appointments or from getting medications?: No  In the past 12 months, has lack of transportation kept you from meetings, work, or from getting things needed for daily living?: No  Was application for public transport provided?: No    DISCHARGE DETAILS:    Discharge planning discussed with[de-identified] pt and spouse  Freedom of Choice: Yes                 DME Referral Provided  Referral made for DME?: No    Other Referral/Resources/Interventions Provided:  Interventions: Short Term Rehab (PT eval rec STR, pt and wife would like to talk about it and discuss options   would like to wait until after surgery to make decisions)         Discharge Destination Plan[de-identified] Short Term Rehab

## 2021-10-14 NOTE — PROGRESS NOTES
[address] [facility]  Progress Note Faiza Lighter 1955, 61 y o  male MRN: [id number]  Unit/Bed#: [facility] Encounter: [id number]  Primary Care Provider: Etta Mejia DO   Date and time admitted to hospital: 10/13/2021  5:17 AM    * Closed fracture of proximal end of right tibia  Assessment & Plan  Presented with Right knee / proximal leg pain and inability to bear weight after ground level fall after twisting leg in his kitchen  Assoc deformity,mild abrasion  10/13-Imaging findings including CT confirmed Acute comminuted displaced intra-articular fracture of the proximal tibia as described above  No dislocation  Small lipohemarthrosis  Moderate distal femoral and proximal tibiofibular subcutaneous edema/contusion  On knee immobilizer  Non weight bearing  Orthopedic input noted and appreciated  Patient will need surgical correction of fracture  Continue analgesia, will increased dosing today  Add stool softeners  Once INR low patient planned for surgery, tentative on Monday by orthopedic notes  Elevated INR  Assessment & Plan  Patient previously on Coumadin due to history of DVTs  This is currently on hold due to need for surgery  Currently INR is still therapeutic  Continue to hold Coumadin  Monitor INR daily    Hyperkalemia  Assessment & Plan  Mild -5 3 on presentation  Replaced and improved  Monitor BMP  ROSE (acute kidney injury) ([facility] [address] )  Assessment & Plan  ROSE on CKD  May be prerenal   Cr on  Pres 1 99  Baseline 1 21 months ago  Avoid nephrotoxics  Avoid hypotension  Monitor I/O  Continue Light hydration with isolyte at 75cc/h  History of DVT (deep vein thrombosis)  Assessment & Plan  History 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  Unprovoked  Last episode was 10yrs ago  On coumadin  Currently held  Check INR  Would need heparin if INR is subtherapeutic  DVT prophylaxis with venodyne , pending INR      Type 2 diabetes mellitus with diabetic neuropathy, without long-term current use of insulin [facility])  Assessment & Plan  Lab Results   Component Value Date    HGBA1C 9 0 (A) 2021       Recent Labs     10/13/21  1532   POCGLU 348*       Blood Sugar Average: Last 72 hrs:  (P) 348  Known diabetic with diabetic neuropathy  On metformin and lantus at home  Blood sugar on presentation 418  Start sliding scale insulin  Increase Lantus to 33 u HS ]  Hypoglycemic protocol  Goal blood sugar 140-180  Mixed hyperlipidemia  Assessment & Plan  Continue statin  Essential hypertension  Assessment & Plan  On lisinopril 10mg daily at home  On hold for ROSE  BP stable  Monitor BP  VTE Pharmacologic Prophylaxis:   Pharmacologic: Pharmacologic VTE Prophylaxis contraindicated due to Patient previously on warfarin, INR still between 2 and 3  Mechanical VTE Prophylaxis in Place: Yes    Patient Centered Rounds: I have performed bedside rounds with nursing staff today  Discussions with Specialists or Other Care Team Provider: Discussed with care management team    Education and Discussions with Family / Patient: Patient    Time Spent for Care: 45 minutes  More than 50% of total time spent on counseling and coordination of care as described above  Current Length of Stay: 1 day(s)    Current Patient Status: Inpatient   Certification Statement: The patient will continue to require additional inpatient hospital stay due to need for surgery    Discharge Plan: Once stable    Code Status: Level 1 - Full Code      Subjective:     Patient evaluated this morning  Still complains significant pain in his right lower extremity  Pain is 9/10  INR is still therapeutic  Otherwise denies nausea vomiting, diarrhea constipation  No Other events reported      Objective:     Vitals:   Temp (24hrs), Av 7 °F (37 6 °C), Min:98 °F (36 7 °C), Max:100 4 °F (38 °C)    Temp:  [98 °F (36 7 °C)-100 4 °F (38 °C)] 98 °F (36 7 °C)  HR:  [78-99] 93  Resp:  [16-20] 20  BP: (136-161)/(67-82) 161/81  SpO2:  [95 %-98 %] 95 %  Body mass index is 30 25 kg/m²  Input and Output Summary (last 24 hours): Intake/Output Summary (Last 24 hours) at 10/14/2021 1118  Last data filed at 10/14/2021 0639  Gross per 24 hour   Intake 1240 ml   Output 300 ml   Net 940 ml       Physical Exam:     Physical Exam  Vitals and nursing note reviewed  Constitutional:       Appearance: Normal appearance  Comments: Male patient in bed, awake   HENT:      Head: Normocephalic and atraumatic  Right Ear: External ear normal       Left Ear: External ear normal       Nose: Nose normal  No congestion or rhinorrhea  Mouth/Throat:      Mouth: Mucous membranes are moist       Pharynx: Oropharynx is clear  No oropharyngeal exudate or posterior oropharyngeal erythema  Eyes:      General: No scleral icterus  Right eye: No discharge  Left eye: No discharge  Pupils: Pupils are equal, round, and reactive to light  Neck:      Vascular: No carotid bruit  Cardiovascular:      Rate and Rhythm: Normal rate and regular rhythm  Pulses: Normal pulses  Heart sounds: No murmur heard  No friction rub  No gallop  Pulmonary:      Effort: Pulmonary effort is normal  No respiratory distress  Breath sounds: Normal breath sounds  No stridor  No wheezing, rhonchi or rales  Abdominal:      General: Abdomen is flat  Bowel sounds are normal  There is no distension  Palpations: Abdomen is soft  There is no mass  Tenderness: There is no abdominal tenderness  There is no guarding or rebound  Hernia: No hernia is present  Musculoskeletal:         General: No swelling, tenderness, deformity or signs of injury  Normal range of motion  Cervical back: Normal range of motion  No rigidity  No muscular tenderness  Comments: Right lower extremities mobilize currently  There is distal pulses and good capillary refill noted  Lymphadenopathy:      Cervical: No cervical adenopathy     Skin:     General: Skin is warm and dry  Capillary Refill: Capillary refill takes less than 2 seconds  Coloration: Skin is not jaundiced or pale  Findings: No bruising or erythema  Neurological:      General: No focal deficit present  Mental Status: He is alert and oriented to person, place, and time  Mental status is at baseline  Cranial Nerves: No cranial nerve deficit  Sensory: No sensory deficit  Motor: No weakness  Coordination: Coordination normal       Deep Tendon Reflexes: Reflexes normal    Psychiatric:         Mood and Affect: Mood normal          Behavior: Behavior normal          Thought Content: Thought content normal          Judgment: Judgment normal            Additional Data:     Labs:    Results from last 7 days   Lab Units 10/14/21  0500   WBC Thousand/uL 11 43*   HEMOGLOBIN g/dL 10 9*   HEMATOCRIT % 33 2*   PLATELETS Thousands/uL 234   NEUTROS PCT % 72   LYMPHS PCT % 16   MONOS PCT % 12   EOS PCT % 0     Results from last 7 days   Lab Units 10/14/21  0500   SODIUM mmol/L 141   POTASSIUM mmol/L 4 4   CHLORIDE mmol/L 105   CO2 mmol/L 25   BUN mg/dL 28*   CREATININE mg/dL 1 78*   ANION GAP mmol/L 11   CALCIUM mg/dL 8 9   GLUCOSE RANDOM mg/dL 230*     Results from last 7 days   Lab Units 10/14/21  0500   INR  2 14*     Results from last 7 days   Lab Units 10/14/21  1100 10/14/21  0637 10/13/21  2108 10/13/21  1532   POC GLUCOSE mg/dl 235* 304* 263* 348*                   * I Have Reviewed All Lab Data Listed Above  * Additional Pertinent Lab Tests Reviewed:  All [facility] Admission Reviewed      Recent Cultures (last 7 days):           Last 24 Hours Medication List:   Current Facility-Administered Medications   Medication Dose Route Frequency Provider Last Rate    acetaminophen  650 mg Oral Q6H PRN Ashok Boogie MD      calcium carbonate  500 mg Oral Daily PRN Fidelia Ahn PA-C      HYDROmorphone  0 5 mg Intravenous Q3H PRN MD Stacie Ho [START ON 10/15/2021] insulin glargine  33 Units Subcutaneous Daily Dylan Catalan MD      insulin lispro  1-6 Units Subcutaneous 4x Daily (AC & HS) Sayra Pandey PA-C      lidocaine  1 patch Topical Daily Zaida Avila MD      multi-electrolyte  75 mL/hr Intravenous Continuous Zaida Avila MD 75 mL/hr (10/14/21 1807)    ondansetron  4 mg Intravenous Q6H PRN Zaida Avila MD      oxyCODONE  10 mg Oral Q12H [address] Zaida Avila MD      pravastatin  40 mg Oral Daily With Maryana Argueta MD      senna  2 tablet Oral BID Dylan Catalan MD          Today, Patient Was Seen By: Dylan Catalan MD    ** Please Note: Dictation voice to text software may have been used in the creation of this document   **

## 2021-10-14 NOTE — PROGRESS NOTES
Patient was evaluated this morning and skin check performed Right lower extremity  Abrasion inferior to patella without evidence of active drainage  All compartments of Right lower extremity soft  Skin without evidence of additional integrity disruption, ecchymosis, erythema  Knee immobilizer replaced  AROM of toes and ankle intact  Neurovascularly intact distally  Plan to go to OR Monday with Dr Nolon Bamberger for ORIF Right tibia fracture  Will continue to observe INR and glucose for pre op management  Will continue to follow

## 2021-10-14 NOTE — PLAN OF CARE
Problem: OCCUPATIONAL THERAPY ADULT  Goal: Performs self-care activities at highest level of function for planned discharge setting  See evaluation for individualized goals  Description: Treatment Interventions: ADL retraining, Functional transfer training, UE strengthening/ROM, Endurance training, Patient/family training, Compensatory technique education, Continued evaluation, Energy conservation, Activityengagement          See flowsheet documentation for full assessment, interventions and recommendations  Note: Limitation: Decreased ADL status, Decreased endurance, Decreased self-care trans, Decreased high-level ADLs  Prognosis: Good  Assessment: Patient is a 61 y o  male seen for OT evaluation s/p admit to [id number] [place] [address] on 10/13/2021 w/Closed fracture of proximal end of right tibia  Commorbidities affecting patient's functional performance at time of assessment include:essential hypertension, mixed hyperlipidemia, type 2 DM, history of DVT, ROSE, hyperkalemia, and elevated INR  Orders placed for OT evaluation and treatment  Performed at least two patient identifiers during session including name and wristband  Prior to admission, Patient reported independnet with ADLs/ IADLs, ambulatory with no AD, lives with spouse in a  2 story house, [address], 1st floor set up  Personal factors affecting patient at time of initial evaluation include: steps to enter, decreased initiation and engagement, difficulty performing ADLs and difficulty performing IADLs  Upon evaluation, patient requires minimal  assist for UB ADLs, maximal assist for LB ADLs  Patient is alert and oriented x 4    Occupational performance is affected by the following deficits: degenerative arthritic joint changes, impaired gross motor coordination, dynamic sit/ stand balance deficit with poor standing tolerance time for self care and functional mobility, decreased activity tolerance, decreased safety awareness, increased pain, orthopedic restrictions and postural control and postural alignment deficit, requiring external assistance to complete transitional movements  Patient to benefit from continued Occupational Therapy treatment while in the hospital to address deficits as defined above and maximize level of functional independence with ADLs and functional mobility  Occupational Performance areas to address include: bathing/ shower, dressing, toilet hygiene, transfer to all surfaces, functional ambulation and functional mobility  From OT standpoint, recommendation at time of d/c would be Short Term Rehab         OT Discharge Recommendation: Post acute rehabilitation services

## 2021-10-14 NOTE — PHYSICAL THERAPY NOTE
Physical Therapy Evaluation     Patient's Name: Inder Villasenor    Admitting Diagnosis  Leg injury [S89 90XA]  Closed fracture of proximal tibia [S82 109A]  Unable to ambulate [R26 2]  Abrasion, lower leg, anterior [S80 819A]    Problem List  Patient Active Problem List   Diagnosis    Acute embolism and thrombosis of unspecified deep veins of unspecified lower extremity ([facility] [address] )    Essential hypertension    Lumbar degenerative disc disease    Mixed hyperlipidemia    Other insomnia    Peripheral vascular disease ([facility] [address] )    Type 2 diabetes mellitus with diabetic neuropathy, without long-term current use of insulin ([facility] [address] )    Type 2 diabetes mellitus with diabetic peripheral angiopathy without gangrene ([facility] [address] )    BMI 30 0-30 9,adult    Type 2 diabetes mellitus with hyperglycemia ([facility] [address] )    History of DVT (deep vein thrombosis)    ROSE (acute kidney injury) ([facility] [address] )    Hyperkalemia    Closed fracture of proximal end of right tibia    Elevated INR     Past Medical History  Past Medical History:   Diagnosis Date    Back pain     Diabetes mellitus ([facility] [address] )     DVT (deep vein thrombosis) in pregnancy     Hyperlipidemia     Hypertension      Past Surgical History  Past Surgical History:   Procedure Laterality Date    CATARACT EXTRACTION      COLONOSCOPY      6/2014    CYST REMOVAL      HAND SURGERY      HEEL SPUR SURGERY      foot surgery      10/14/21 0834   PT Last Visit   PT Visit Date 10/14/21   Note Type   Note type Evaluation and Treatment   Pain Assessment   Pain Assessment Tool 0-10   Pain Score 9   Pain Location/Orientation Orientation: Right;Location: Leg   Home Living   Type of Home House   Home Layout Two level; Able to live on main level with bedroom/bathroom;Stairs to enter with rails  ([address])   Bathroom Shower/Tub Tub/shower unit   Bathroom Toilet Raised   Bathroom Equipment Shower chair   [facility]; Wheelchair-manual   Additional Comments Pt ambulates without an AD    Prior Function   Level of [place] Independent with ADLs and functional mobility   Lives With Spouse   Receives Help From Family   ADL Assistance Independent   IADLs Independent   Falls in the last 6 months 1 to 4   Vocational Retired   Comments (+) driving   Restrictions/Precautions   [place] Bearing Precautions Per Order Yes   RLE [place] Bearing Per Order NWB  (per ortho)   Braces or Orthoses LE Immobilizer  (right)   Other Precautions Chair Alarm; Bed Alarm;WBS;Multiple lines; Fall Risk;Pain   General   Family/Caregiver Present Yes  (pt's wife)   Cognition   Overall Cognitive Status WFL   Arousal/Participation Alert   Orientation Level Oriented X4   Memory Within functional limits   Following Commands Follows all commands and directions without difficulty   Comments Pt agreeable to PT    RUE Assessment   RUE Assessment   (defer to OT assessment)   LUE Assessment   LUE Assessment   (defer to OT assessment)   RLE Assessment   RLE Assessment X   RLE Overall AROM   R Knee Flexion +knee immobilizer; unable to assess at this time   Strength RLE   R Hip Flexion 2-/5   R Knee Extension   (deferred)   R Ankle Dorsiflexion 3/5   LLE Assessment   LLE Assessment X   Strength LLE   LLE Overall Strength 3+/5   Light Touch   RLE Light Touch Grossly intact   LLE Light Touch Grossly intact   Bed Mobility   Supine to Sit 3  Moderate assistance   Additional items Assist x 2;HOB elevated; Bedrails; Increased time required;Verbal cues;LE management   Sit to Supine 3  Moderate assistance   Additional items Assist x 2;HOB elevated; Bedrails; Increased time required;Verbal cues;LE management   Balance   Static Sitting Poor +   Dynamic Sitting Poor   Endurance Deficit   Endurance Deficit Yes   Endurance Deficit Description decreased activity tolerance   Activity Tolerance   Activity Tolerance Patient limited by pain   Medical Staff Made Aware OT Pat Farias  Co-evaluation performed with OT secondary to complex medical condition of patient  PT/OT goals were addressed separately  Nurse Made Aware Discussed case with RN [facility]; post session pt was left supine in bed in NAD, all belongings within reach, +bed alarm   Assessment   Prognosis Good   Problem List Decreased strength;Decreased range of motion;Decreased endurance; Impaired balance;Decreased mobility;Orthopedic restrictions;Pain   Assessment Pt is 61year old male seen for PT evaluation s/p admit [facility] on 10/13/2021 with Closed fracture of proximal end of right tibia  PT consulted to assess pt's functional mobility and d/c needs  Order placed for PT eval and tx, with up and OOB as tolerated and NWB R LE orders  Comorbidities affecting pt's physical performance at time of assessment include essential hypertension, mixed hyperlipidemia, type 2 DM, history of DVT, ROSE, hyperkalemia, and elevated INR  PTA, pt was independent with all functional mobility without an AD and ambulates community distances and elevations  Personal factors affecting pt at time of IE include lives in a two story house, stairs to enter home, inability to ambulate household distances, inability to navigate community distances, inability to navigate level surfaces without external assistance, unable to perform dynamic tasks in community, positive fall history, inability to perform IADLs, and inability to perform ADLs  Please find objective findings from PT assessment regarding body systems outlined above with impairments and limitations including weakness, decreased ROM, impaired balance, decreased endurance, inability to ambulate, pain, decreased activity tolerance, decreased functional mobility tolerance, fall risk, and orthopedic restrictions  The following objective measures performed on IE also reveal limitations: Barthel Index: 40/100 and Modified [place]: 4 (moderate/severe disability)   Pt's clinical presentation is currently unstable/unpredictable seen in pt's presentation of need for ongoing medical management/monitoring, pt is a fall risk, pt is NWB on R LE and has pain which is limiting functional mobility, and pt requires cues/assist for safety with functional mobility  Pt to benefit from continued PT tx to address deficits as defined above and maximize level of functional independent mobility and consistency  From PT/mobility standpoint, recommendation at time of d/c would be STR pending progress in order to facilitate return to PLOF  Barriers to Discharge Inaccessible home environment;Decreased caregiver support   Goals   STG Expiration Date 10/24/21   Short Term Goal #1 In 7-10 days: Increase bilateral LE strength 1/2 grade to facilitate independent mobility, Perform all bed mobility tasks with min A of 1 to decrease caregiver burden, Perform all transfers with mod A of 1 to improve independence, Increase static sitting and dynamic sitting balance 1/2 grade to decrease risk for falls and PT to see and establish goals for gait and dynamic standing balance when appropriate   Plan   Treatment/Interventions Functional transfer training;LE strengthening/ROM; Therapeutic exercise; Endurance training;Patient/family training;Bed mobility;Continued evaluation;Spoke to nursing;OT;Family   PT Frequency 5x/wk   Recommendation   PT Discharge Recommendation Post acute rehabilitation services   PT - OK to Discharge Yes  (when medically cleared, if to STR)   AM-PAC Basic Mobility Inpatient   Turning in Bed Without Bedrails 2   Lying on Back to Sitting on Edge of Flat Bed 1   Moving Bed to Chair 1   Standing Up From Chair 1   Walk in Room 1   Climb 3-5 Stairs 1   Basic Mobility Inpatient Raw Score 7   Turning Head Towards Sound 4   Follow Simple Instructions 4   Low Function Basic Mobility Raw Score 15   Low Function Basic Mobility Standardized Score 23 9   Modified [place] Scale   Modified [place] Scale 4   Barthel Index   Feeding 5   Bathing 0   Grooming Score 0   Dressing Score 5   Bladder Score 10   Bowels Score 10 Toilet Use Score 5   Transfers (Bed/Chair) Score 5   Mobility (Level Surface) Score 0   Stairs Score 0   Barthel Index Score 40     PT Evaluation Time: 7422-3052    Rolanda Parks PT, DPT    Physical Therapy Treatment Note       S:  Pt agreeable to PT treatment session following PT evaluation      O:  Pt seen for PT treatment session this date with interventions consisting of therapeutic activity  Focus was on safe performance and technique of sit to stand transfers  Performed 3 sit to stand transfers with patient and maximum assistance of two      A:  Pt tolerated treatment session well  Pt requires cues for proper hand and foot placement with sit to stand transfers  Pt was able to clear 5% of buttocks from bed with assist of two  Pt requires cues and assist to maintain NWB on R LE  Pt required seated rest break between each sit to stand trial secondary to fatigue  Pt continues to be functioning below baseline secondary to pain, decreased lower extremity range of motion and strength, impaired balance, decreased endurance, inability to ambulate, and decreased functional mobility      P:  Continue to recommend STR at time of d/c in order to maximize pt's functional independence and safety with mobility  PT will continue to see pt while here in order to address the deficits listed above and provide interventions consistent with POC in effort to achieve STGs      PT Treatment Time: 1043-8109  16 minutes    Rolanda Parks PT, DPT

## 2021-10-14 NOTE — ASSESSMENT & PLAN NOTE
Patient previously on Coumadin due to history of DVTs  This is currently on hold due to need for surgery  Currently INR is still therapeutic  Continue to hold Coumadin    Monitor INR daily

## 2021-10-14 NOTE — PLAN OF CARE
Problem: PHYSICAL THERAPY ADULT  Goal: Performs mobility at highest level of function for planned discharge setting  See evaluation for individualized goals  Description: Treatment/Interventions: Functional transfer training, LE strengthening/ROM, Therapeutic exercise, Endurance training, Patient/family training, Bed mobility, Continued evaluation, Spoke to nursing, OT, Family          See flowsheet documentation for full assessment, interventions and recommendations  Note: Prognosis: Good  Problem List: Decreased strength, Decreased range of motion, Decreased endurance, Impaired balance, Decreased mobility, Orthopedic restrictions, Pain  Assessment: Pt is 61year old male seen for PT evaluation s/p admit to [facility] on 10/13/2021 with Closed fracture of proximal end of right tibia  PT consulted to assess pt's functional mobility and d/c needs  Order placed for PT eval and tx, with up and OOB as tolerated and NWB R LE orders  Comorbidities affecting pt's physical performance at time of assessment include essential hypertension, mixed hyperlipidemia, type 2 DM, history of DVT, ROSE, hyperkalemia, and elevated INR  PTA, pt was independent with all functional mobility without an AD and ambulates community distances and elevations  Personal factors affecting pt at time of IE include lives in a two story house, stairs to enter home, inability to ambulate household distances, inability to navigate community distances, inability to navigate level surfaces without external assistance, unable to perform dynamic tasks in community, positive fall history, inability to perform IADLs, and inability to perform ADLs   Please find objective findings from PT assessment regarding body systems outlined above with impairments and limitations including weakness, decreased ROM, impaired balance, decreased endurance, inability to ambulate, pain, decreased activity tolerance, decreased functional mobility tolerance, fall risk, and orthopedic restrictions  The following objective measures performed on IE also reveal limitations: Barthel Index: 40/100 and Modified [place]: 4 (moderate/severe disability)  Pt's clinical presentation is currently unstable/unpredictable seen in pt's presentation of need for ongoing medical management/monitoring, pt is a fall risk, pt is NWB on R LE and has pain which is limiting functional mobility, and pt requires cues/assist for safety with functional mobility  Pt to benefit from continued PT tx to address deficits as defined above and maximize level of functional independent mobility and consistency  From PT/mobility standpoint, recommendation at time of d/c would be STR pending progress in order to facilitate return to PLOF  Barriers to Discharge: Inaccessible home environment, Decreased caregiver support     PT Discharge Recommendation: Post acute rehabilitation services     PT - OK to Discharge: Yes (when medically cleared, if to STR)    See flowsheet documentation for full assessment

## 2021-10-14 NOTE — PLAN OF CARE
Problem: Potential for Falls  Goal: Patient will remain free of falls  Description: INTERVENTIONS:  - Educate patient/family on patient safety including physical limitations  - Instruct patient to call for assistance with activity   - Consult OT/PT to assist with strengthening/mobility   - Keep Call bell within reach  - Keep bed low and locked with side rails adjusted as appropriate  - Keep care items and personal belongings within reach  - Initiate and maintain comfort rounds  - Make Fall Risk Sign visible to staff  - Offer Toileting every 2 Hours, in advance of need  - Initiate/Maintain bed alarm  - Obtain necessary fall risk management equipment: yellow socks and bracelet   - Apply yellow socks and bracelet for high fall risk patients  - Consider moving patient to room near nurses station  Outcome: Not Progressing     Problem: Prexisting or High Potential for Compromised Skin Integrity  Goal: Skin integrity is maintained or improved  Description: INTERVENTIONS:  - Identify patients at risk for skin breakdown  - Assess and monitor skin integrity  - Assess and monitor nutrition and hydration status  - Monitor labs   - Assess for incontinence   - Turn and reposition patient  - Assist with mobility/ambulation  - Relieve pressure over bony prominences  - Avoid friction and shearing  - Provide appropriate hygiene as needed including keeping skin clean and dry  - Evaluate need for skin moisturizer/barrier cream  - Collaborate with interdisciplinary team   - Patient/family teaching  - Consider wound care consult   Outcome: Not Progressing     Problem: PAIN - ADULT  Goal: Verbalizes/displays adequate comfort level or baseline comfort level  Description: Interventions:  - Encourage patient to monitor pain and request assistance  - Assess pain using appropriate pain scale  - Administer analgesics based on type and severity of pain and evaluate response  - Implement non-pharmacological measures as appropriate and evaluate response  - Consider cultural and social influences on pain and pain management  - Notify physician/advanced practitioner if interventions unsuccessful or patient reports new pain  Outcome: Not Progressing     Problem: INFECTION - ADULT  Goal: Absence or prevention of progression during hospitalization  Description: INTERVENTIONS:  - Assess and monitor for signs and symptoms of infection  - Monitor lab/diagnostic results  - Monitor all insertion sites, i e  indwelling lines, tubes, and drains  - Monitor endotracheal if appropriate and nasal secretions for changes in amount and color  - [place] appropriate cooling/warming therapies per order  - Administer medications as ordered  - Instruct and encourage patient and family to use good hand hygiene technique  - Identify and instruct in appropriate isolation precautions for identified infection/condition  Outcome: Not Progressing  Goal: Absence of fever/infection during neutropenic period  Description: INTERVENTIONS:  - Monitor WBC    Outcome: Not Progressing     Problem: SAFETY ADULT  Goal: Patient will remain free of falls  Description: INTERVENTIONS:  - Educate patient/family on patient safety including physical limitations  - Instruct patient to call for assistance with activity   - Consult OT/PT to assist with strengthening/mobility   - Keep Call bell within reach  - Keep bed low and locked with side rails adjusted as appropriate  - Keep care items and personal belongings within reach  - Initiate and maintain comfort rounds  - Make Fall Risk Sign visible to staff  - Offer Toileting every 2 Hours, in advance of need  - Initiate/Maintain alarm  - Obtain necessary fall risk management equipment:   - Apply yellow socks and bracelet for high fall risk patients  - Consider moving patient to room near nurses station  Outcome: Not Progressing  Goal: Maintain or return to baseline ADL function  Description: INTERVENTIONS:  -  Assess patient's ability to carry out ADLs; assess patient's baseline for ADL function and identify physical deficits which impact ability to perform ADLs (bathing, care of mouth/teeth, toileting, grooming, dressing, etc )  - Assess/evaluate cause of self-care deficits   - Assess range of motion  - Assess patient's mobility; develop plan if impaired  - Assess patient's need for assistive devices and provide as appropriate  - Encourage maximum independence but intervene and supervise when necessary  - Involve family in performance of ADLs  - Assess for home care needs following discharge   - Consider OT consult to assist with ADL evaluation and planning for discharge  - Provide patient education as appropriate  Outcome: Not Progressing  Goal: Maintains/Returns to pre admission functional level  Description: INTERVENTIONS:  - Perform BMAT or MOVE assessment daily    - Set and communicate daily mobility goal to care team and patient/family/caregiver  - Collaborate with rehabilitation services on mobility goals if consulted  - Perform Range of Motion 3 times a day  - Reposition patient every 2 hours    - Dangle patient 3 times a day  - Stand patient 3 times a day  - Ambulate patient 3 times a day  - Out of bed to chair 3 times a day   - Out of bed for meals 3 times a day  - Out of bed for toileting  - Record patient progress and toleration of activity level   Outcome: Not Progressing     Problem: DISCHARGE PLANNING  Goal: Discharge to home or other facility with appropriate resources  Description: INTERVENTIONS:  - Identify barriers to discharge w/patient and caregiver  - Arrange for needed discharge resources and transportation as appropriate  - Identify discharge learning needs (meds, wound care, etc )  - Arrange for interpretive services to assist at discharge as needed  - Refer to Case Management Department for coordinating discharge planning if the patient needs post-hospital services based on physician/advanced practitioner order or complex needs related to functional status, cognitive ability, or social support system  Outcome: Not Progressing     Problem: Knowledge Deficit  Goal: Patient/family/caregiver demonstrates understanding of disease process, treatment plan, medications, and discharge instructions  Description: Complete learning assessment and assess knowledge base    Interventions:  - Provide teaching at level of understanding  - Provide teaching via preferred learning methods  Outcome: Not Progressing

## 2021-10-14 NOTE — PLAN OF CARE
Problem: Potential for Falls  Goal: Patient will remain free of falls  Description: INTERVENTIONS:  - Educate patient/family on patient safety including physical limitations  - Instruct patient to call for assistance with activity   - Consult OT/PT to assist with strengthening/mobility   - Keep Call bell within reach  - Keep bed low and locked with side rails adjusted as appropriate  - Keep care items and personal belongings within reach  - Initiate and maintain comfort rounds  - Make Fall Risk Sign visible to staff  -- Apply yellow socks and bracelet for high fall risk patients  - Consider moving patient to room near nurses station  Outcome: Progressing     Problem: Prexisting or High Potential for Compromised Skin Integrity  Goal: Skin integrity is maintained or improved  Description: INTERVENTIONS:  - Identify patients at risk for skin breakdown  - Assess and monitor skin integrity  - Assess and monitor nutrition and hydration status  - Monitor labs   - Assess for incontinence   - Turn and reposition patient  - Assist with mobility/ambulation  - Relieve pressure over bony prominences  - Avoid friction and shearing  - Provide appropriate hygiene as needed including keeping skin clean and dry  - Evaluate need for skin moisturizer/barrier cream  - Collaborate with interdisciplinary team   - Patient/family teaching  - Consider wound care consult   Outcome: Progressing     Problem: PAIN - ADULT  Goal: Verbalizes/displays adequate comfort level or baseline comfort level  Description: Interventions:  - Encourage patient to monitor pain and request assistance  - Assess pain using appropriate pain scale  - Administer analgesics based on type and severity of pain and evaluate response  - Implement non-pharmacological measures as appropriate and evaluate response  - Consider cultural and social influences on pain and pain management  - Notify physician/advanced practitioner if interventions unsuccessful or patient reports new pain  Outcome: Progressing     Problem: INFECTION - ADULT  Goal: Absence or prevention of progression during hospitalization  Description: INTERVENTIONS:  - Assess and monitor for signs and symptoms of infection  - Monitor lab/diagnostic results  - Monitor all insertion sites, i e  indwelling lines, tubes, and drains  - Monitor endotracheal if appropriate and nasal secretions for changes in amount and color  - [place] appropriate cooling/warming therapies per order  - Administer medications as ordered  - Instruct and encourage patient and family to use good hand hygiene technique  - Identify and instruct in appropriate isolation precautions for identified infection/condition  Outcome: Progressing  Goal: Absence of fever/infection during neutropenic period  Description: INTERVENTIONS:  - Monitor WBC    Outcome: Progressing     Problem: SAFETY ADULT  Goal: Patient will remain free of falls  Description: INTERVENTIONS:  - Educate patient/family on patient safety including physical limitations  - Instruct patient to call for assistance with activity   - Consult OT/PT to assist with strengthening/mobility   - Keep Call bell within reach  - Keep bed low and locked with side rails adjusted as appropriate  - Keep care items and personal belongings within reach  - Initiate and maintain comfort rounds  - Make Fall Risk Sign visible to staff  - - Consider moving patient to room near nurses station  Outcome: Progressing  Goal: Maintain or return to baseline ADL function  Description: INTERVENTIONS:  -  Assess patient's ability to carry out ADLs; assess patient's baseline for ADL function and identify physical deficits which impact ability to perform ADLs (bathing, care of mouth/teeth, toileting, grooming, dressing, etc )  - Assess/evaluate cause of self-care deficits   - Assess range of motion  - Assess patient's mobility; develop plan if impaired  - Assess patient's need for assistive devices and provide as appropriate  - Encourage maximum independence but intervene and supervise when necessary  - Involve family in performance of ADLs  - Assess for home care needs following discharge   - Consider OT consult to assist with ADL evaluation and planning for discharge  - Provide patient education as appropriate  Outcome: Progressing  Goal: Maintains/Returns to pre admission functional level  Description: INTERVENTIONS:  - Perform BMAT or MOVE assessment daily    - Set and communicate daily mobility goal to care team and patient/family/caregiver  - Collaborate with rehabilitation services on mobility goals if consulted  -- Record patient progress and toleration of activity level   Outcome: Progressing     Problem: DISCHARGE PLANNING  Goal: Discharge to home or other facility with appropriate resources  Description: INTERVENTIONS:  - Identify barriers to discharge w/patient and caregiver  - Arrange for needed discharge resources and transportation as appropriate  - Identify discharge learning needs (meds, wound care, etc )  - Arrange for interpretive services to assist at discharge as needed  - Refer to Case Management Department for coordinating discharge planning if the patient needs post-hospital services based on physician/advanced practitioner order or complex needs related to functional status, cognitive ability, or social support system  Outcome: Progressing     Problem: Knowledge Deficit  Goal: Patient/family/caregiver demonstrates understanding of disease process, treatment plan, medications, and discharge instructions  Description: Complete learning assessment and assess knowledge base    Interventions:  - Provide teaching at level of understanding  - Provide teaching via preferred learning methods  Outcome: Progressing     Problem: MOBILITY - ADULT  Goal: Maintain or return to baseline ADL function  Description: INTERVENTIONS:  -  Assess patient's ability to carry out ADLs; assess patient's baseline for ADL function and identify physical deficits which impact ability to perform ADLs (bathing, care of mouth/teeth, toileting, grooming, dressing, etc )  - Assess/evaluate cause of self-care deficits   - Assess range of motion  - Assess patient's mobility; develop plan if impaired  - Assess patient's need for assistive devices and provide as appropriate  - Encourage maximum independence but intervene and supervise when necessary  - Involve family in performance of ADLs  - Assess for home care needs following discharge   - Consider OT consult to assist with ADL evaluation and planning for discharge  - Provide patient education as appropriate  Outcome: Progressing  Goal: Maintains/Returns to pre admission functional level  Description: INTERVENTIONS:  - Perform BMAT or MOVE assessment daily    - Set and communicate daily mobility goal to care team and patient/family/caregiver     - Collaborate with rehabilitation services on mobility goals if consulted  - - Record patient progress and toleration of activity level   Outcome: Progressing

## 2021-10-14 NOTE — UTILIZATION REVIEW
Initial Clinical Review    Admission: Date/Time/Statement:   Admission Orders (From admission, onward)     Ordered        10/13/21 1035  Inpatient Admission  Once                   Orders Placed This Encounter   Procedures    Inpatient Admission     Standing Status:   Standing     Number of Occurrences:   1     Order Specific Question:   Level of Care     Answer:   Med Surg [16]     Order Specific Question:   Estimated length of stay     Answer:   More than 2 Midnights     Order Specific Question:   Certification     Answer:   I certify that inpatient services are medically necessary for this patient for a duration of greater than two midnights  See H&P and MD Progress Notes for additional information about the patient's course of treatment  ED Arrival Information     Expected Arrival Acuity    - 10/12/2021 23:33 Urgent         Means of arrival Escorted by Service Admission type    [facility] Urgent         Arrival complaint    fall;leg pain        Chief Complaint   Patient presents with   Shakira  Fall     pt brought via ems for fall from standing position approx 9pm, no headstrike no loc  pt on BT   Leg Pain     right sided leg pain from knee to ankle,  swelling, pain and laceration below the knee pain 9/10 pain        Initial Presentation:61year old male to the ED from home via EMS with complaints of fall from standing injuring his right leg  Admitted to inpatient for closed fracture end of right tibia, ROSE  Mechanical fall the night prior  H/O DVT on warfarin  Arrives with right lower extremity swelling, tenderness, deformity, pain, superficial abrasion to R lower leg  CT RLE shows: Acute comminuted displaced intra-articular fracture of the proximal tibia as described above  KNee immoblizer placed in ED, attempted to ambulate with crutches and was unable to  Ortho consult  Given IV dilaudid while in the ED  ELevated LE  Creat on arrival 1 99  Baseline 1 21    Check urine electrolytes, UA, bladder scan  WBCs elevated  Ortho consult:  Continues with pain  Skin with superficial abrasion located inferior to patella without active drainage   PT/OT  Compressive dressing placed on RLE and knee immoblizer placed back on  Continue NWB RLE  Possible transition to heparin  Date: 10/14   Day 2:Per PT/OT recommend post acute rehab when cleared for DC  Increase pain medications  Add stool softener  Has elevated INR  Continue to hold Coumadin  Continue with gentle IV fluids  Recheck CReat  Elevation may be prerenal    Ortho: Wound intact  Knee immoblizer in place  Plan to observe INR and glucose for pre-op management  Plan for OR       ED Triage Vitals [10/12/21 2353]   Temperature Pulse Respirations Blood Pressure SpO2   98 6 °F (37 °C) 96 20 158/74 100 %      Temp Source Heart Rate Source Patient Position - Orthostatic VS BP Location FiO2 (%)   Oral Monitor Sitting Left arm --      Pain Score       9          Wt Readings from Last 1 Encounters:   10/14/21 104 kg (229 lb 4 5 oz)     Additional Vital Signs:   Time  Temp  Pulse  Resp  BP  MAP (mmHg)  SpO2  O2 Device Patient Position - Orthostatic VS   10/14/21 0826  --  --  --  --  --  95 %  None (Room air) --   10/14/21 06:37:45  98 °F (36 7 °C)  93  --  161/81  108  95 %  -- --   10/14/21 00:07:28  100 4 °F (38 °C)  99  --  --  --  96 %  -- --   10/13/21 21:11:17  100 3 °F (37 9 °C)  93  20  153/81  105  97 %  -- --   10/13/21 1937  --  --  --  --  --  97 %  None (Room air) --   10/13/21 1851  100 °F (37 8 °C)  91  20  153/82  106  97 %  -- --   10/13/21 1528  --  85  18  150/75  --  98 %  None (Room air) Sitting   10/13/21 1400  --  91  16  143/71  100  97 %  -- --   10/13/21 1200  --  78  16  152/71  102  98 %  -- --   10/13/21 1130  --  85  16  136/67  95  96 %  -- --   10/13/21 1100  --  84  16  119/59  80  97 %  -- --   10/13/21 1033  --  87  18  136/67  91  98 %  -- --   10/13/21 1000  --  88  16  128/69  --  100 %  -- -- 10/13/21 0900  --  79  18  131/85  --  99 %  -- --   10/13/21 0800  --  78  16  128/80  --  100 %  -- --   10/13/21 0700  --  86  18  134/84  --  99 %  None (Room air) --   10/13/21 0613  --  81  18  129/86  --  100 %  None (Room air) Lying   10/12/21 2353  98 6 °F (37 °C)  96  20  158/74  106  100 %          Pertinent Labs/Diagnostic Test Results:   10/13 CT RLE: Acute comminuted displaced intra-articular fracture of the proximal tibia as described above  No dislocation  10/13 Xray right TIb/fib: Comminuted fracture of the right proximal tibia described in further detail on right knee x-ray report   No additional fractures further distally   10/13Xray right knee: Comminuted intra-articular right knee fracture  10/13 Xray right ankle: No acute osseous abnormality         Results from last 7 days   Lab Units 10/14/21  0500 10/13/21  0621   WBC Thousand/uL 11 43* 13 53*   HEMOGLOBIN g/dL 10 9* 12 4   HEMATOCRIT % 33 2* 38 5   PLATELETS Thousands/uL 234 276   NEUTROS ABS Thousands/µL 8 14* 10 80*         Results from last 7 days   Lab Units 10/14/21  0500 10/13/21  0621   SODIUM mmol/L 141 140   POTASSIUM mmol/L 4 4 5 3   CHLORIDE mmol/L 105 103   CO2 mmol/L 25 23   ANION GAP mmol/L 11 14*   BUN mg/dL 28* 32*   CREATININE mg/dL 1 78* 1 99*   EGFR ml/min/1 73sq m 39 34   CALCIUM mg/dL 8 9 9 3   MAGNESIUM mg/dL 2 1  --    PHOSPHORUS mg/dL 3 0  --          Results from last 7 days   Lab Units 10/14/21  1100 10/14/21  0637 10/13/21  2108 10/13/21  1532   POC GLUCOSE mg/dl 235* 304* 263* 348*     Results from last 7 days   Lab Units 10/14/21  0500 10/13/21  0621   GLUCOSE RANDOM mg/dL 230* 416*         Results from last 7 days   Lab Units 10/13/21  0621   TROPONIN I ng/mL 0 02         Results from last 7 days   Lab Units 10/14/21  0500 10/13/21  1536   PROTIME seconds 22 9* 23 4*   INR  2 14* 2 21*     Results from last 7 days   Lab Units 10/13/21  1730   CLARITY UA  Clear   COLOR UA  Yellow   SPEC GRAV UA  1 025 PH UA  5 0   GLUCOSE UA mg/dl >=1000 (1%)*   KETONES UA mg/dl Trace*   BLOOD UA  Trace-Intact*   PROTEIN UA mg/dl Negative   NITRITE UA  Negative   BILIRUBIN UA  Negative   UROBILINOGEN UA E U /dl 0 2   LEUKOCYTES UA  Elevated glucose may cause decreased leukocyte values  See urine microscopic for [facility] result/*   WBC UA /hpf 0-1*   RBC UA /hpf 0-1*   BACTERIA UA /hpf Occasional   EPITHELIAL CELLS WET PREP /hpf Occasional   SODIUM UR  36   CREATININE UR mg/dL 157 0     ED Treatment:   Medication Administration from 10/12/2021 2333 to 10/13/2021 1844       Date/Time Order Dose Route Action     10/13/2021 0653 tetanus-diphtheria-acellular pertussis (BOOSTRIX) IM injection 0 5 mL 0 5 mL Intramuscular Given     10/13/2021 1151 oxyCODONE (ROXICODONE) immediate release tablet 10 mg 10 mg Oral Given     10/13/2021 1611 insulin lispro (HumaLOG) 100 units/mL subcutaneous injection 1-6 Units 4 Units Subcutaneous Given     10/13/2021 1616 multi-electrolyte (PLASMALYTE-A/ISOLYTE-S PH 7 4) IV solution 75 mL/hr Intravenous New Bag     10/13/2021 1611 insulin glargine (LANTUS) subcutaneous injection 30 Units 0 3 mL 30 Units Subcutaneous Given     10/13/2021 1748 HYDROmorphone HCl (DILAUDID) injection 0 2 mg 0 2 mg Intravenous Given        Past Medical History:   Diagnosis Date    Back pain     Diabetes mellitus ([facility] [address] )     DVT (deep vein thrombosis) in pregnancy     Hyperlipidemia     Hypertension      Present on Admission:   Essential hypertension   Type 2 diabetes mellitus with diabetic neuropathy, without long-term current use of insulin ([facility])   Mixed hyperlipidemia   Elevated INR      Admitting Diagnosis: Leg injury [S89 90XA]  Closed fracture of proximal tibia [S82 109A]  Unable to ambulate [R26 2]  Abrasion, lower leg, anterior [S80 819A]  Age/Sex: 61 y o  male  Admission Orders:   Ice to affected area  SCDS   up and oob  CBC, CMP, PT/inr, BMP  Scheduled Medications:  [START ON 10/15/2021] insulin glargine, 33 Units, Subcutaneous, Daily  insulin lispro, 1-6 Units, Subcutaneous, 4x Daily (AC & HS)  lidocaine, 1 patch, Topical, Daily  oxyCODONE, 10 mg, Oral, Q12H SHASHI  pravastatin, 40 mg, Oral, Daily With Dinner  senna, 2 tablet, Oral, BID      Continuous IV Infusions:  multi-electrolyte, 75 mL/hr, Intravenous, Continuous      PRN Meds:  acetaminophen, 650 mg, Oral, Q6H PRN  calcium carbonate, 500 mg, Oral, Daily PRN  HYDROmorphone, 0 5 mg, Intravenous, Q3H PRN x1  ondansetron, 4 mg, Intravenous, Q6H PRN  Hydromorphone 0 2 IV x 3      IP CONSULT TO ORTHOPEDIC SURGERY    Network Utilization Review Department  ATTENTION: Please call with any questions or concerns to [phone] and carefully listen to the prompts so that you are directed to the right person  All voicemails are confidential   [facility] all requests for admission clinical reviews, approved or denied determinations and any other requests to dedicated fax number below belonging to the campus where the patient is receiving treatment   List of dedicated fax numbers for the Facilities:  [address] [place] [address] DENIALS (Administrative/Medical Necessity) [phone]   [address] [place] [address] (Maternity/NICU/Pediatrics) [phone]   [address]  [address] [address] [place] [address] [address] [facility] [phone]

## 2021-10-14 NOTE — OCCUPATIONAL THERAPY NOTE
Occupational Therapy Evaluation        Patient Name: Bautista Piña  NDPPV'B Date: 10/14/2021           10/14/21 0911   OT Last Visit   OT Visit Date 10/14/21   Note Type   Note type Evaluation   Restrictions/Precautions   Weight Bearing Precautions Per Order Yes   RLE Weight Bearing Per Order NWB  (per ortho)   Braces or Orthoses LE Immobilizer  (right)   Other Precautions Bed Alarm;Limb alert; Fall Risk;Pain   Pain Assessment   Pain Assessment Tool 0-10   Pain Score 9   Pain Location/Orientation Orientation: Right;Location: Leg   Home Living   Type of [address] [place] [address] Two level; Able to live on main level with bedroom/bathroom;Stairs to enter with rails  ([address])   Bathroom Shower/Tub Tub/shower unit   Bathroom Toilet Raised   Bathroom Equipment Shower chair   [facility]; Wheelchair-manual   Additional Comments ambulatory  with no AD   Prior Function   Level of Person Independent with ADLs and functional mobility   Lives With Spouse   Receives Help From Family   ADL Assistance Independent   IADLs Independent   Falls in the last 6 months 1 to 4   Vocational Retired   Comments patient drives   Lifestyle   Autonomy Patient reported independnet with ADLs/ IADLs, ambulatory with no AD, lives with spouse in a  2 story house, [address], 1st floor set up    Reciprocal Relationships Supportive family   Service to Others Retired    Psychosocial   Psychosocial (WDL) [address]  Modified independent   [address] 5  [address] [facility] 4  Minimal Assistance   LB [address] 2  [address] 4  [address] 2  Maximal [address]  3  [address] [facility] [address] 3  Moderate Assistance   Bed Mobility   Supine to Sit 3  Moderate assistance   Additional items Assist x 2;Bedrails; Increased time required;Verbal cues;LE management   Sit to Supine 4  Minimal assistance   Additional items Assist x 2; Increased time required;Verbal cues; Bedrails;LE management   Transfers   Sit to Stand 2  Maximal assistance   Additional items Assist x 2;Verbal cues   Stand to Sit 2  Maximal assistance   Additional items Assist x 2; Increased time required;Verbal cues   Balance   Static Sitting Poor +   Dynamic Sitting Poor   Activity Tolerance   Activity Tolerance Patient limited by pain   Nurse Made Aware Discussed case with MARY Bob; post session pt was left supine in bed in NAD, all belongings within reach, +bed alarm   RUE Assessment   RUE Assessment WFL   LUE Assessment   LUE Assessment WFL   Hand Function   Gross Motor Coordination Impaired   Fine Motor Coordination Functional   Sensation   Light Touch No apparent deficits  (BUEs)   Vision-Basic Assessment   Current Vision Does not wear glasses   Cognition   Overall Cognitive Status WFL   Arousal/Participation Alert; Responsive; Cooperative   Attention Within functional limits   Orientation Level Oriented X4   Memory Within functional limits   Following Commands Follows all commands and directions without difficulty   Assessment   Limitation Decreased ADL status; Decreased endurance;Decreased self-care trans;Decreased high-level ADLs   Prognosis Good   Assessment Patient is a 61 y o  male seen for OT evaluation s/p admit to [id number] [facility] on 10/13/2021 w/Closed fracture of proximal end of right tibia  Commorbidities affecting patient's functional performance at time of assessment include:essential hypertension, mixed hyperlipidemia, type 2 DM, history of DVT, ROSE, hyperkalemia, and elevated INR  Orders placed for OT evaluation and treatment  Performed at least two patient identifiers during session including name and wristband    Prior to admission, Patient reported independnet with ADLs/ IADLs, ambulatory with no AD, lives with spouse in a  2 story house, [address], 1st floor set up  Personal factors affecting patient at time of initial evaluation include: steps to enter, decreased initiation and engagement, difficulty performing ADLs and difficulty performing IADLs  Upon evaluation, patient requires minimal  assist for UB ADLs, maximal assist for LB ADLs  Patient is alert and oriented x 4  Occupational performance is affected by the following deficits: degenerative arthritic joint changes, impaired gross motor coordination, dynamic sit/ stand balance deficit with poor standing tolerance time for self care and functional mobility, decreased activity tolerance, decreased safety awareness, increased pain, orthopedic restrictions and postural control and postural alignment deficit, requiring external assistance to complete transitional movements  Patient to benefit from continued Occupational Therapy treatment while in the hospital to address deficits as defined above and maximize level of functional independence with ADLs and functional mobility  Occupational Performance areas to address include: bathing/ shower, dressing, toilet hygiene, transfer to all surfaces, functional ambulation and functional mobility  From OT standpoint, recommendation at time of d/c would be Short Term Rehab  Plan   Treatment Interventions ADL retraining;Functional transfer training;UE strengthening/ROM; Endurance training;Patient/family training; Compensatory technique education;Continued evaluation; Energy conservation; Activityengagement   Goal Expiration Date 10/28/21   OT Frequency 3-5x/wk   Recommendation   OT Discharge Recommendation Post acute rehabilitation services   AM-PAC Daily Activity Inpatient   Lower Body Dressing 2   Bathing 2   Toileting 2   Upper Body Dressing 3   Grooming 3   Eating 4   Daily Activity Raw Score 16   Daily Activity Standardized Score (Calc for Raw Score >=11) 35 96   AM-PAC Applied Cognition Inpatient   Following a Speech/Presentation 4   Understanding Ordinary Conversation 4   Taking Medications 4   Remembering Where Things Are Placed or Put Away 4   Remembering List of 4-5 Errands 4   Taking Care of Complicated Tasks 4   Applied Cognition Raw Score 24   Applied Cognition Standardized Score 62 21   Barthel Index   Feeding 10   Bathing 0   Grooming Score 0   Dressing Score 5   Bladder Score 10   Bowels Score 10   Toilet Use Score 5   Transfers (Bed/Chair) Score 5   Mobility (Level Surface) Score 0   Stairs Score 0   Barthel Index Score 45         1 - Patient will verbalize and demonstrate use of energy conservation/ deep breathing technique and work simplification skills during functional activity with no verbal cues  2 - Patient will verbalize and demonstrate good body mechanics and joint protection techniques during  ADLs/ IADLs with no verbal cues  3 - Patient will increase OOB/ sitting tolerance to 2-4 hours per day for increased participation in self care and leisure tasks with no s/s of exertion  4 - Patient will increase standing tolerance time to 5  minutes with unilateral UE support to complete sink level ADLs@ mod I level  5 - Patient will increase sitting tolerance at edge of bed to 20 minutes to complete UB ADLs @ set up assist level  6 - Patient will transfer bed to Chair / toilet at Set up assist level with AD as indicated  7 - Patient will complete UB ADLs with set up assist      8 - Patient will complete LB ADLs with min assist with the use of adaptive equipment       9 - Patient will complete toileting hygiene with set up assist/ supervision for thoroughness    10 - Patient/ Family  will demonstrate competency with UE Home Exercise Program

## 2021-10-15 ENCOUNTER — APPOINTMENT (INPATIENT)
Dept: RADIOLOGY | Facility: HOSPITAL | Age: 66
DRG: 493 | End: 2021-10-15
Payer: COMMERCIAL

## 2021-10-15 PROBLEM — E87.5 HYPERKALEMIA: Status: RESOLVED | Noted: 2021-10-13 | Resolved: 2021-10-15

## 2021-10-15 LAB
ALBUMIN SERPL BCP-MCNC: 2.6 G/DL (ref 3.5–5)
ALP SERPL-CCNC: 79 U/L (ref 46–116)
ALT SERPL W P-5'-P-CCNC: 11 U/L (ref 12–78)
ANION GAP SERPL CALCULATED.3IONS-SCNC: 10 MMOL/L (ref 4–13)
AST SERPL W P-5'-P-CCNC: 18 U/L (ref 5–45)
BASOPHILS # BLD AUTO: 0.03 THOUSANDS/ΜL (ref 0–0.1)
BASOPHILS NFR BLD AUTO: 0 % (ref 0–1)
BILIRUB SERPL-MCNC: 1.57 MG/DL (ref 0.2–1)
BUN SERPL-MCNC: 28 MG/DL (ref 5–25)
CALCIUM ALBUM COR SERPL-MCNC: 9.7 MG/DL (ref 8.3–10.1)
CALCIUM SERPL-MCNC: 8.6 MG/DL (ref 8.3–10.1)
CHLORIDE SERPL-SCNC: 104 MMOL/L (ref 100–108)
CO2 SERPL-SCNC: 25 MMOL/L (ref 21–32)
CREAT SERPL-MCNC: 1.88 MG/DL (ref 0.6–1.3)
EOSINOPHIL # BLD AUTO: 0 THOUSAND/ΜL (ref 0–0.61)
EOSINOPHIL NFR BLD AUTO: 0 % (ref 0–6)
ERYTHROCYTE [DISTWIDTH] IN BLOOD BY AUTOMATED COUNT: 15.1 % (ref 11.6–15.1)
GFR SERPL CREATININE-BSD FRML MDRD: 37 ML/MIN/1.73SQ M
GLUCOSE SERPL-MCNC: 158 MG/DL (ref 65–140)
GLUCOSE SERPL-MCNC: 173 MG/DL (ref 65–140)
GLUCOSE SERPL-MCNC: 219 MG/DL (ref 65–140)
GLUCOSE SERPL-MCNC: 260 MG/DL (ref 65–140)
GLUCOSE SERPL-MCNC: 280 MG/DL (ref 65–140)
HCT VFR BLD AUTO: 31.3 % (ref 36.5–49.3)
HGB BLD-MCNC: 10.3 G/DL (ref 12–17)
IMM GRANULOCYTES # BLD AUTO: 0.08 THOUSAND/UL (ref 0–0.2)
IMM GRANULOCYTES NFR BLD AUTO: 1 % (ref 0–2)
INR PPP: 1.89 (ref 0.84–1.19)
LYMPHOCYTES # BLD AUTO: 1.91 THOUSANDS/ΜL (ref 0.6–4.47)
LYMPHOCYTES NFR BLD AUTO: 14 % (ref 14–44)
MCH RBC QN AUTO: 29.5 PG (ref 26.8–34.3)
MCHC RBC AUTO-ENTMCNC: 32.9 G/DL (ref 31.4–37.4)
MCV RBC AUTO: 90 FL (ref 82–98)
MONOCYTES # BLD AUTO: 1.22 THOUSAND/ΜL (ref 0.17–1.22)
MONOCYTES NFR BLD AUTO: 9 % (ref 4–12)
NEUTROPHILS # BLD AUTO: 10.71 THOUSANDS/ΜL (ref 1.85–7.62)
NEUTS SEG NFR BLD AUTO: 76 % (ref 43–75)
NRBC BLD AUTO-RTO: 0 /100 WBCS
PLATELET # BLD AUTO: 197 THOUSANDS/UL (ref 149–390)
PMV BLD AUTO: 9.3 FL (ref 8.9–12.7)
POTASSIUM SERPL-SCNC: 4.3 MMOL/L (ref 3.5–5.3)
PROT SERPL-MCNC: 6.6 G/DL (ref 6.4–8.2)
PROTHROMBIN TIME: 20.8 SECONDS (ref 11.6–14.5)
RBC # BLD AUTO: 3.49 MILLION/UL (ref 3.88–5.62)
SODIUM SERPL-SCNC: 139 MMOL/L (ref 136–145)
WBC # BLD AUTO: 13.95 THOUSAND/UL (ref 4.31–10.16)

## 2021-10-15 PROCEDURE — 82948 REAGENT STRIP/BLOOD GLUCOSE: CPT

## 2021-10-15 PROCEDURE — 99232 SBSQ HOSP IP/OBS MODERATE 35: CPT | Performed by: PHYSICIAN ASSISTANT

## 2021-10-15 PROCEDURE — 85610 PROTHROMBIN TIME: CPT | Performed by: INTERNAL MEDICINE

## 2021-10-15 PROCEDURE — 99233 SBSQ HOSP IP/OBS HIGH 50: CPT | Performed by: INTERNAL MEDICINE

## 2021-10-15 PROCEDURE — 80053 COMPREHEN METABOLIC PANEL: CPT | Performed by: INTERNAL MEDICINE

## 2021-10-15 PROCEDURE — 85025 COMPLETE CBC W/AUTO DIFF WBC: CPT | Performed by: INTERNAL MEDICINE

## 2021-10-15 PROCEDURE — 73560 X-RAY EXAM OF KNEE 1 OR 2: CPT

## 2021-10-15 RX ORDER — POLYETHYLENE GLYCOL 3350 17 G/17G
17 POWDER, FOR SOLUTION ORAL DAILY PRN
Status: DISCONTINUED | OUTPATIENT
Start: 2021-10-15 | End: 2021-11-03 | Stop reason: HOSPADM

## 2021-10-15 RX ORDER — INSULIN GLARGINE 100 [IU]/ML
5 INJECTION, SOLUTION SUBCUTANEOUS ONCE
Status: COMPLETED | OUTPATIENT
Start: 2021-10-15 | End: 2021-10-15

## 2021-10-15 RX ORDER — HEPARIN SODIUM 5000 [USP'U]/ML
5000 INJECTION, SOLUTION INTRAVENOUS; SUBCUTANEOUS EVERY 8 HOURS SCHEDULED
Status: DISCONTINUED | OUTPATIENT
Start: 2021-10-15 | End: 2021-10-16

## 2021-10-15 RX ORDER — INSULIN GLARGINE 100 [IU]/ML
38 INJECTION, SOLUTION SUBCUTANEOUS DAILY
Status: DISCONTINUED | OUTPATIENT
Start: 2021-10-16 | End: 2021-10-16

## 2021-10-15 RX ORDER — SODIUM CHLORIDE, SODIUM GLUCONATE, SODIUM ACETATE, POTASSIUM CHLORIDE, MAGNESIUM CHLORIDE, SODIUM PHOSPHATE, DIBASIC, AND POTASSIUM PHOSPHATE .53; .5; .37; .037; .03; .012; .00082 G/100ML; G/100ML; G/100ML; G/100ML; G/100ML; G/100ML; G/100ML
75 INJECTION, SOLUTION INTRAVENOUS CONTINUOUS
Status: DISCONTINUED | OUTPATIENT
Start: 2021-10-15 | End: 2021-10-25

## 2021-10-15 RX ADMIN — OXYCODONE HYDROCHLORIDE 10 MG: 10 TABLET, FILM COATED, EXTENDED RELEASE ORAL at 21:16

## 2021-10-15 RX ADMIN — OXYCODONE HYDROCHLORIDE 10 MG: 10 TABLET, FILM COATED, EXTENDED RELEASE ORAL at 09:22

## 2021-10-15 RX ADMIN — INSULIN LISPRO 4 UNITS: 100 INJECTION, SOLUTION INTRAVENOUS; SUBCUTANEOUS at 17:29

## 2021-10-15 RX ADMIN — DICLOFENAC SODIUM 2 G: 10 GEL TOPICAL at 21:16

## 2021-10-15 RX ADMIN — DICLOFENAC SODIUM 2 G: 10 GEL TOPICAL at 09:28

## 2021-10-15 RX ADMIN — LIDOCAINE 5% 1 PATCH: 700 PATCH TOPICAL at 09:21

## 2021-10-15 RX ADMIN — INSULIN LISPRO 1 UNITS: 100 INJECTION, SOLUTION INTRAVENOUS; SUBCUTANEOUS at 08:23

## 2021-10-15 RX ADMIN — INSULIN GLARGINE 5 UNITS: 100 INJECTION, SOLUTION SUBCUTANEOUS at 13:25

## 2021-10-15 RX ADMIN — SODIUM CHLORIDE, SODIUM GLUCONATE, SODIUM ACETATE, POTASSIUM CHLORIDE, MAGNESIUM CHLORIDE, SODIUM PHOSPHATE, DIBASIC, AND POTASSIUM PHOSPHATE 100 ML/HR: .53; .5; .37; .037; .03; .012; .00082 INJECTION, SOLUTION INTRAVENOUS at 21:17

## 2021-10-15 RX ADMIN — HYDROMORPHONE HYDROCHLORIDE 0.5 MG: 1 INJECTION, SOLUTION INTRAMUSCULAR; INTRAVENOUS; SUBCUTANEOUS at 05:53

## 2021-10-15 RX ADMIN — HEPARIN SODIUM 5000 UNITS: 5000 INJECTION INTRAVENOUS; SUBCUTANEOUS at 21:16

## 2021-10-15 RX ADMIN — STANDARDIZED SENNA CONCENTRATE 17.2 MG: 8.6 TABLET ORAL at 17:28

## 2021-10-15 RX ADMIN — INSULIN GLARGINE 33 UNITS: 100 INJECTION, SOLUTION SUBCUTANEOUS at 09:23

## 2021-10-15 RX ADMIN — DICLOFENAC SODIUM 2 G: 10 GEL TOPICAL at 12:25

## 2021-10-15 RX ADMIN — HYDROMORPHONE HYDROCHLORIDE 0.5 MG: 1 INJECTION, SOLUTION INTRAMUSCULAR; INTRAVENOUS; SUBCUTANEOUS at 17:28

## 2021-10-15 RX ADMIN — HEPARIN SODIUM 5000 UNITS: 5000 INJECTION INTRAVENOUS; SUBCUTANEOUS at 13:25

## 2021-10-15 RX ADMIN — INSULIN LISPRO 3 UNITS: 100 INJECTION, SOLUTION INTRAVENOUS; SUBCUTANEOUS at 12:25

## 2021-10-15 RX ADMIN — SODIUM CHLORIDE, SODIUM GLUCONATE, SODIUM ACETATE, POTASSIUM CHLORIDE, MAGNESIUM CHLORIDE, SODIUM PHOSPHATE, DIBASIC, AND POTASSIUM PHOSPHATE 100 ML/HR: .53; .5; .37; .037; .03; .012; .00082 INJECTION, SOLUTION INTRAVENOUS at 09:34

## 2021-10-15 RX ADMIN — INSULIN LISPRO 2 UNITS: 100 INJECTION, SOLUTION INTRAVENOUS; SUBCUTANEOUS at 21:16

## 2021-10-15 RX ADMIN — PRAVASTATIN SODIUM 40 MG: 40 TABLET ORAL at 17:28

## 2021-10-15 RX ADMIN — STANDARDIZED SENNA CONCENTRATE 17.2 MG: 8.6 TABLET ORAL at 09:22

## 2021-10-15 RX ADMIN — DICLOFENAC SODIUM 2 G: 10 GEL TOPICAL at 17:29

## 2021-10-15 NOTE — ASSESSMENT & PLAN NOTE
Patient previously on Coumadin due to history of DVTs  This is currently on hold due to need for surgery  Ortho recommends 1 4-1 6 for ortho procedure  INR 1 47  Continue to hold Coumadin    Monitor INR daily  Will hold heparin drip at midnight

## 2021-10-15 NOTE — PLAN OF CARE
Problem: Potential for Falls  Goal: Patient will remain free of falls  Description: INTERVENTIONS:  - Educate patient/family on patient safety including physical limitations  - Instruct patient to call for assistance with activity   - Consult OT/PT to assist with strengthening/mobility   - Keep Call bell within reach  - Keep bed low and locked with side rails adjusted as appropriate  - Keep care items and personal belongings within reach  - Initiate and maintain comfort rounds  - Make Fall Risk Sign visible to staff  - Offer Toileting every  Hours, in advance of need  - Initiate/Maintain alarm  - Obtain necessary fall risk management equipment:   - Apply yellow socks and bracelet for high fall risk patients  - Consider moving patient to room near nurses station  Outcome: Progressing     Problem: Prexisting or High Potential for Compromised Skin Integrity  Goal: Skin integrity is maintained or improved  Description: INTERVENTIONS:  - Identify patients at risk for skin breakdown  - Assess and monitor skin integrity  - Assess and monitor nutrition and hydration status  - Monitor labs   - Assess for incontinence   - Turn and reposition patient  - Assist with mobility/ambulation  - Relieve pressure over bony prominences  - Avoid friction and shearing  - Provide appropriate hygiene as needed including keeping skin clean and dry  - Evaluate need for skin moisturizer/barrier cream  - Collaborate with interdisciplinary team   - Patient/family teaching  - Consider wound care consult   Outcome: Progressing     Problem: PAIN - ADULT  Goal: Verbalizes/displays adequate comfort level or baseline comfort level  Description: Interventions:  - Encourage patient to monitor pain and request assistance  - Assess pain using appropriate pain scale  - Administer analgesics based on type and severity of pain and evaluate response  - Implement non-pharmacological measures as appropriate and evaluate response  - Consider cultural and social influences on pain and pain management  - Notify physician/advanced practitioner if interventions unsuccessful or patient reports new pain  Outcome: Progressing     Problem: INFECTION - ADULT  Goal: Absence or prevention of progression during hospitalization  Description: INTERVENTIONS:  - Assess and monitor for signs and symptoms of infection  - Monitor lab/diagnostic results  - Monitor all insertion sites, i e  indwelling lines, tubes, and drains  - Monitor endotracheal if appropriate and nasal secretions for changes in amount and color  - [place] appropriate cooling/warming therapies per order  - Administer medications as ordered  - Instruct and encourage patient and family to use good hand hygiene technique  - Identify and instruct in appropriate isolation precautions for identified infection/condition  Outcome: Progressing  Goal: Absence of fever/infection during neutropenic period  Description: INTERVENTIONS:  - Monitor WBC    Outcome: Progressing     Problem: SAFETY ADULT  Goal: Patient will remain free of falls  Description: INTERVENTIONS:  - Educate patient/family on patient safety including physical limitations  - Instruct patient to call for assistance with activity   - Consult OT/PT to assist with strengthening/mobility   - Keep Call bell within reach  - Keep bed low and locked with side rails adjusted as appropriate  - Keep care items and personal belongings within reach  - Initiate and maintain comfort rounds  - Make Fall Risk Sign visible to staff  - Offer Toileting every  Hours, in advance of need  - Initiate/Maintain alarm  - Obtain necessary fall risk management equipment:   - Apply yellow socks and bracelet for high fall risk patients  - Consider moving patient to room near nurses station  Outcome: Progressing  Goal: Maintain or return to baseline ADL function  Description: INTERVENTIONS:  -  Assess patient's ability to carry out ADLs; assess patient's baseline for ADL function and identify physical deficits which impact ability to perform ADLs (bathing, care of mouth/teeth, toileting, grooming, dressing, etc )  - Assess/evaluate cause of self-care deficits   - Assess range of motion  - Assess patient's mobility; develop plan if impaired  - Assess patient's need for assistive devices and provide as appropriate  - Encourage maximum independence but intervene and supervise when necessary  - Involve family in performance of ADLs  - Assess for home care needs following discharge   - Consider OT consult to assist with ADL evaluation and planning for discharge  - Provide patient education as appropriate  Outcome: Progressing  Goal: Maintains/Returns to pre admission functional level  Description: INTERVENTIONS:  - Perform BMAT or MOVE assessment daily    - Set and communicate daily mobility goal to care team and patient/family/caregiver  - Collaborate with rehabilitation services on mobility goals if consulted  - Perform Range of Motion  times a day  - Reposition patient every  hours    - Dangle patient  times a day  - Stand patient  times a day  - Ambulate patient  times a day  - Out of bed to chair  times a day   - Out of bed for meals  times a day  - Out of bed for toileting  - Record patient progress and toleration of activity level   Outcome: Progressing     Problem: DISCHARGE PLANNING  Goal: Discharge to home or other facility with appropriate resources  Description: INTERVENTIONS:  - Identify barriers to discharge w/patient and caregiver  - Arrange for needed discharge resources and transportation as appropriate  - Identify discharge learning needs (meds, wound care, etc )  - Arrange for interpretive services to assist at discharge as needed  - Refer to Case Management Department for coordinating discharge planning if the patient needs post-hospital services based on physician/advanced practitioner order or complex needs related to functional status, cognitive ability, or social support system  Outcome: Progressing     Problem: Knowledge Deficit  Goal: Patient/family/caregiver demonstrates understanding of disease process, treatment plan, medications, and discharge instructions  Description: Complete learning assessment and assess knowledge base  Interventions:  - Provide teaching at level of understanding  - Provide teaching via preferred learning methods  Outcome: Progressing     Problem: MOBILITY - ADULT  Goal: Maintain or return to baseline ADL function  Description: INTERVENTIONS:  -  Assess patient's ability to carry out ADLs; assess patient's baseline for ADL function and identify physical deficits which impact ability to perform ADLs (bathing, care of mouth/teeth, toileting, grooming, dressing, etc )  - Assess/evaluate cause of self-care deficits   - Assess range of motion  - Assess patient's mobility; develop plan if impaired  - Assess patient's need for assistive devices and provide as appropriate  - Encourage maximum independence but intervene and supervise when necessary  - Involve family in performance of ADLs  - Assess for home care needs following discharge   - Consider OT consult to assist with ADL evaluation and planning for discharge  - Provide patient education as appropriate  Outcome: Progressing  Goal: Maintains/Returns to pre admission functional level  Description: INTERVENTIONS:  - Perform BMAT or MOVE assessment daily    - Set and communicate daily mobility goal to care team and patient/family/caregiver  - Collaborate with rehabilitation services on mobility goals if consulted  - Perform Range of Motion  times a day  - Reposition patient every  hours    - Dangle patient  times a day  - Stand patient  times a day  - Ambulate patient  times a day  - Out of bed to chair  times a day   - Out of bed for meals  times a day  - Out of bed for toileting  - Record patient progress and toleration of activity level   Outcome: Progressing

## 2021-10-15 NOTE — PROGRESS NOTES
Progress Note - Orthopedics   Chacha Ridley 61 y o  male MRN: [id number]  Unit/Bed#: -01      Subjective:    61 y o male with Right tibia metaphyseal fracture, resting comfortably in bed, and in no acute distress  Patient reports he is doing okay this morning and pain is controlled at this time  He admits to Left knee pain since working with PT/OT  Locates his pain to entire knee with worst pain lateral knee  He has maintained knee immobilizer and ice application to Right lower extremity  Denies chest pain, shortness of breath, fevers, chills      Labs:  0   Lab Value Date/Time    HCT 31 3 (L) 10/15/2021 0522    HCT 33 2 (L) 10/14/2021 0500    HCT 38 5 10/13/2021 0621    HCT 38 4 07/13/2015 1448    HCT 38 6 10/20/2014 1132    HGB 10 3 (L) 10/15/2021 0522    HGB 10 9 (L) 10/14/2021 0500    HGB 12 4 10/13/2021 0621    HGB 13 1 07/13/2015 1448    HGB 13 1 10/20/2014 1132    INR 1 89 (H) 10/15/2021 0522    INR 2 94 (H) 12/11/2015 1543    WBC 13 95 (H) 10/15/2021 0522    WBC 11 43 (H) 10/14/2021 0500    WBC 13 53 (H) 10/13/2021 0621    WBC 7 7 07/13/2015 1448    WBC 8 30 10/20/2014 1132    ESR 58 (H) 05/20/2016 1207       Meds:    Current Facility-Administered Medications:     acetaminophen (TYLENOL) tablet 650 mg, 650 mg, Oral, Q6H PRN, Pankaj Che MD    calcium carbonate (TUMS) chewable tablet 500 mg, 500 mg, Oral, Daily PRN, Sayra Pandey PA-C, 500 mg at 10/14/21 0006    HYDROmorphone (DILAUDID) injection 0 5 mg, 0 5 mg, Intravenous, Q3H PRN, Dinora Romero MD, 0 5 mg at 10/15/21 0553    insulin glargine (LANTUS) subcutaneous injection 33 Units 0 33 mL, 33 Units, Subcutaneous, Daily, Dinora Romero MD    insulin lispro (HumaLOG) 100 units/mL subcutaneous injection 1-6 Units, 1-6 Units, Subcutaneous, 4x Daily (AC & HS), 1 Units at 10/14/21 1742 **AND** Fingerstick Glucose (POCT), , , 4x Daily AC and at bedtime, Sayra Pandey PA-C    lidocaine (LIDODERM) 5 % patch 1 patch, 1 patch, Topical, Daily, Oneida Draper MD, 1 patch at 10/14/21 0819    ondansetron (ZOFRAN) injection 4 mg, 4 mg, Intravenous, Q6H PRN, Oneida Draper MD, 4 mg at 10/13/21 2249    oxyCODONE (OxyCONTIN) 12 hr tablet 10 mg, 10 mg, Oral, Q12H [facility], Oneida Draper MD, 10 mg at 10/14/21 2244    pravastatin (PRAVACHOL) tablet 40 mg, 40 mg, Oral, Daily With Dinner, Oneida Draper MD, 40 mg at 10/14/21 1648    senna (SENOKOT) tablet 17 2 mg, 2 tablet, Oral, BID, Ana Lerma MD, 17 2 mg at 10/14/21 1648    Blood Culture:   No results found for: BLOODCX    Wound Culture:   No results found for: WOUNDCULT    Ins and Outs:  No intake/output data recorded  Physical:  Vitals:    10/15/21 0638   BP: 136/77   Pulse: 92   Resp: 18   Temp: 99 °F (37 2 °C)   SpO2: 95%     Musculoskeletal:   Right lower extremity   · Skin with mild yellow ecchymosis noted  Abrasion inferior to patella healing well without evidence of drainage  · TTP over fracture site  · Compartments soft, compressible, and nontender   · Range of motion intact of toes and ankle   · SILT s/s/sp/dp/t    · 2+ DP pulse    Left knee  · Mild effusion palpated  · No evidence of erythema, ecchymosis, or infection  · Passive range of motion to approximately 70-80 degrees flexion with diffuse knee pain   · Compartments and calf soft, nontender, and compressible   · Sensation intact L2-S1 dermatomes   · +2 DP pulse palpated     Assessment:    66 y o male Right tibia metaphyseal fracture; new onset Left knee pain      Plan:  · Nonweightbearing Right lower extremity, WBAT Left lower extremity   · Pain control as per primary team   · DVT ppx as per primary team  INR should be between 1 4-1 6 and blood glucose well controlled prior to surgical intervention  Will discuss with [facility]IM for pre op optimization  · Dispo: Plan for ORIF of Right tibia fracture Monday with Dr Roque Velez  NPO and anticoagulation held at midnight Sunday   INR and blood glucose recommendations see above  Will obtain x-rays of Left knee secondary to swelling and pain  Will continue to follow  Please continue to apply ice to Right lower extremity and keep knee immobilizer intact at all times       Marcelina Velazquez PA-C

## 2021-10-15 NOTE — ASSESSMENT & PLAN NOTE
Presented with Right knee / proximal leg pain and inability to bear weight after ground level fall after twisting leg in his kitchen  Assoc deformity,mild abrasion  10/13-Imaging findings including CT confirmed Acute comminuted displaced intra-articular fracture of the proximal tibia as described above  No dislocation  Small lipohemarthrosis  Moderate distal femoral and proximal tibiofibular subcutaneous edema/contusion  On knee immobilizer  Non weight bearing  Orthopedic input noted and appreciated  Patient will need surgical correction of fracture  Continue analgesia, will increased dosing today  Add stool softeners    Will not administer corticosteroid injection at this time into left knee as patient is having surgery tomorrow on right knee  NPO from midnight  Anticoagulation held at midnight  Surgery will be performed tomorrow

## 2021-10-15 NOTE — PROGRESS NOTES
INTERNAL MEDICINE RESIDENCY PROGRESS NOTE     Name: Inder Villasenor   Age & Sex: 61 y o  male   MRN: [id number]  Unit/Bed#: -01   Encounter: [id number]  Team:SLIM    PATIENT INFORMATION     Name: Inder Villasenor   Age & Sex: 61 y o  male   MRN: [id number]  Hospital Stay Days: 2    ASSESSMENT/PLAN     Principal Problem:    Closed fracture of proximal end of right tibia  Active Problems:    ROSE (acute kidney injury) ([facility] )    Essential hypertension    Mixed hyperlipidemia    Type 2 diabetes mellitus with diabetic neuropathy, without long-term current use of insulin (HCC)    History of DVT (deep vein thrombosis)    Elevated INR      * Closed fracture of proximal end of right tibia  Assessment & Plan  Presented with Right knee / proximal leg pain and inability to bear weight after ground level fall after twisting leg in his kitchen  Assoc deformity,mild abrasion  10/13-Imaging findings including CT confirmed Acute comminuted displaced intra-articular fracture of the proximal tibia as described above  No dislocation  Small lipohemarthrosis  Moderate distal femoral and proximal tibiofibular subcutaneous edema/contusion  On knee immobilizer  Non weight bearing  Orthopedic input noted and appreciated  Patient will need surgical correction of fracture  Continue analgesia, will increased dosing today  Add stool softeners  Once INR low patient planned for surgery, tentative on Sunday night by orthopedic notes  ROSE (acute kidney injury) ([facility] )  Assessment & Plan  ROSE on CKD  May be prerenal   Cr on  Pres 1 99  Baseline 1 21 months ago  Avoid nephrotoxics  Avoid hypotension  Monitor I/O  Continue Light hydration with isolyte at 100cc/h  Elevated INR  Assessment & Plan  Patient previously on Coumadin due to history of DVTs  This is currently on hold due to need for surgery  Ortho recommends 1 4-1 6 for ortho procedure  INR 1 89  Continue to hold Coumadin    Monitor INR daily    History of DVT (deep vein thrombosis)  Assessment & Plan  History 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  Unprovoked  Last episode was 10yrs ago  On AC with coumadin  Currently held  INR now 1 89  Would need heparin if INR is subtherapeutic  Start prophylactic heparin  DVT prophylaxis with venodyne   Daily INR  Type 2 diabetes mellitus with diabetic neuropathy, without long-term current use of insulin [facility])  Assessment & Plan  Lab Results   Component Value Date    HGBA1C 9 0 (A) 2021       Recent Labs     10/14/21  1606 10/14/21  1953 10/15/21  0703 10/15/21  1138   POCGLU 153* 141* 158* 260*       Blood Sugar Average: Last 72 hrs:  (P) 232 75  Known diabetic with diabetic neuropathy  On metformin and lantus at home  Blood sugar on presentation 418  Start sliding scale insulin  Increase Lantus to 38 u HS  Hypoglycemic protocol  Goal blood sugar 140-180  Mixed hyperlipidemia  Assessment & Plan  Continue statin  Essential hypertension  Assessment & Plan  On lisinopril 10mg daily at home  On hold for ROSE  BP stable  Monitor BP  Hyperkalemia-resolved as of 10/15/2021  Assessment & Plan  Mild -5 3 on presentation  Resolved  Monitor BMP  Disposition: continue IP     SUBJECTIVE     Patient seen and examined  No acute events overnight  Reported swelling in the left knee / above it   Noticed after physical therapy session yesterday  Assoc  Pain  No obvious trauma but feels he sprained knee  Denied SOB, chest pain, n/v, dizziness or lightheadedness        OBJECTIVE     Vitals:    10/15/21 0329 10/15/21 0638 10/15/21 1458 10/15/21 1458   BP:  136/77  147/75   BP Location:  Left arm  Left arm   Pulse:  92 98 99   Resp:  18  19   Temp: 99 6 °F (37 6 °C) 99 °F (37 2 °C) (!) 100 7 °F (38 2 °C) (!) 100 7 °F (38 2 °C)   TempSrc:    Oral   SpO2:  95% 96% 96%   Weight:       Height:          Temperature:   Temp (24hrs), Av °F (37 8 °C), Min:99 °F (37 2 °C), Max:100 7 °F (38 2 °C)    Temperature: (!) 100 7 °F (38 2 °C)  Intake & Output:  I/O       10/13 0701 - 10/14 0700 10/14 0701 - 10/15 0700 10/15 0701 - 10/16 0700    P  O  240      I V  (mL/kg) 1000 (9 6)      Total Intake(mL/kg) 1240 (11 9)      Urine (mL/kg/hr) 300 (0 1)      Total Output 300      Net +940                 Weights:   IBW (Ideal Body Weight): 79 9 kg    Body mass index is 30 25 kg/m²  Weight (last 2 days)     Date/Time   Weight    10/14/21 0600   104 (229 28)            Physical Exam  Constitutional:       General: He is not in acute distress  Appearance: Normal appearance  HENT:      Head: Normocephalic and atraumatic  Mouth/Throat:      Mouth: Mucous membranes are moist    Eyes:      Extraocular Movements: Extraocular movements intact  Cardiovascular:      Rate and Rhythm: Normal rate and regular rhythm  Heart sounds: No murmur heard  No friction rub  No gallop  Pulmonary:      Effort: Pulmonary effort is normal  No respiratory distress  Breath sounds: Normal breath sounds  No stridor  No wheezing, rhonchi or rales  Chest:      Chest wall: No tenderness  Abdominal:      General: Abdomen is flat  There is no distension  Palpations: Abdomen is soft  There is no mass  Tenderness: There is no abdominal tenderness  There is no guarding or rebound  Hernia: No hernia is present  Musculoskeletal:         General: Swelling, tenderness and signs of injury present  No deformity  Cervical back: Normal range of motion and neck supple  Right lower leg: No edema  Left lower leg: No edema  Comments: Knee immobilizer in place on the right lower extremity  Distal pulsation intact  -DP P and Posterior tibial   Skin:     General: Skin is warm  Capillary Refill: Capillary refill takes less than 2 seconds  Findings: Bruising present  Neurological:      General: No focal deficit present  Mental Status: He is alert and oriented to person, place, and time     Psychiatric:         Mood and Affect: Mood normal  Behavior: Behavior normal        LABORATORY DATA     Labs: I have personally reviewed pertinent reports  Results from last 7 days   Lab Units 10/15/21  0522 10/14/21  0500 10/13/21  0621   WBC Thousand/uL 13 95* 11 43* 13 53*   HEMOGLOBIN g/dL 10 3* 10 9* 12 4   HEMATOCRIT % 31 3* 33 2* 38 5   PLATELETS Thousands/uL 197 234 276   NEUTROS PCT % 76* 72 80*   MONOS PCT % 9 12 8      Results from last 7 days   Lab Units 10/15/21  0522 10/14/21  0500 10/13/21  0621   POTASSIUM mmol/L 4 3 4 4 5 3   CHLORIDE mmol/L 104 105 103   CO2 mmol/L 25 25 23   BUN mg/dL 28* 28* 32*   CREATININE mg/dL 1 88* 1 78* 1 99*   CALCIUM mg/dL 8 6 8 9 9 3   ALK PHOS U/L 79  --   --    ALT U/L 11*  --   --    AST U/L 18  --   --      Results from last 7 days   Lab Units 10/14/21  0500   MAGNESIUM mg/dL 2 1     Results from last 7 days   Lab Units 10/14/21  0500   PHOSPHORUS mg/dL 3 0      Results from last 7 days   Lab Units 10/15/21  0522 10/14/21  0500 10/13/21  1536   INR  1 89* 2 14* 2 21*         Results from last 7 days   Lab Units 10/13/21  0621   TROPONIN I ng/mL 0 02       IMAGING & DIAGNOSTIC TESTING     Radiology Results: I have personally reviewed pertinent reports  XR knee 1 or 2 vw right    Result Date: 10/13/2021  Impression: Comminuted intra-articular right knee fracture  Findings concur with the referring clinician's preliminary interpretation already in the patient's electronic health record  Workstation performed: [id number]     XR tibia fibula 2 views RIGHT    Result Date: 10/13/2021  Impression: Comminuted fracture of the right proximal tibia described in further detail on right knee x-ray report  No additional fractures further distally Workstation performed: [id number]     XR ankle 3+ views RIGHT    Result Date: 10/13/2021  Impression: No acute osseous abnormality   Workstation performed: [id number]     CT lower extremity wo contrast right    Result Date: 10/13/2021  Impression: Acute comminuted displaced intra-articular fracture of the proximal tibia as described above  No dislocation  This study demonstrates a significant  finding and was documented as such in [facility] for liaison and referring practitioner notification  Workstation performed: [id number]     Other Diagnostic Testing: I have personally reviewed pertinent reports  ACTIVE MEDICATIONS     Current Facility-Administered Medications   Medication Dose Route Frequency    acetaminophen (TYLENOL) tablet 650 mg  650 mg Oral Q6H PRN    calcium carbonate (TUMS) chewable tablet 500 mg  500 mg Oral Daily PRN    Diclofenac Sodium (VOLTAREN) 1 % topical gel 2 g  2 g Topical 4x Daily    heparin (porcine) subcutaneous injection 5,000 Units  5,000 Units Subcutaneous Q8H [address]    HYDROmorphone (DILAUDID) injection 0 5 mg  0 5 mg Intravenous Q3H PRN    [START ON 10/16/2021] insulin glargine (LANTUS) subcutaneous injection 38 Units 0 38 mL  38 Units Subcutaneous Daily    insulin lispro (HumaLOG) 100 units/mL subcutaneous injection 1-6 Units  1-6 Units Subcutaneous 4x Daily (AC & HS)    lidocaine (LIDODERM) 5 % patch 1 patch  1 patch Topical Daily    multi-electrolyte (PLASMALYTE-A/ISOLYTE-S PH 7 4) IV solution  100 mL/hr Intravenous Continuous    ondansetron (ZOFRAN) injection 4 mg  4 mg Intravenous Q6H PRN    oxyCODONE (OxyCONTIN) 12 hr tablet 10 mg  10 mg Oral Q12H SHASHI    polyethylene glycol (MIRALAX) packet 17 g  17 g Oral Daily PRN    pravastatin (PRAVACHOL) tablet 40 mg  40 mg Oral Daily With Dinner    senna (SENOKOT) tablet 17 2 mg  2 tablet Oral BID       VTE Pharmacologic Prophylaxis: Heparin  VTE Mechanical Prophylaxis: sequential compression device    Portions of the record may have been created with voice recognition software  Occasional wrong word or "sound a like" substitutions may have occurred due to the inherent limitations of voice recognition software    Read the chart carefully and recognize, using context, where substitutions have occurred   ==  Sherry Anthony MD  [address]  Internal Medicine Residency PGY-3

## 2021-10-15 NOTE — PLAN OF CARE
Problem: Potential for Falls  Goal: Patient will remain free of falls  Description: INTERVENTIONS:  - Educate patient/family on patient safety including physical limitations  - Instruct patient to call for assistance with activity   - Consult OT/PT to assist with strengthening/mobility   - Keep Call bell within reach  - Keep bed low and locked with side rails adjusted as appropriate  - Keep care items and personal belongings within reach  - Initiate and maintain comfort rounds  - Make Fall Risk Sign visible to staff  - Offer Toileting every 2 Hours, in advance of need  - Initiate/Maintain bed alarm  - Obtain necessary fall risk management equipment: yellow socks and bracelet   - Apply yellow socks and bracelet for high fall risk patients  - Consider moving patient to room near nurses station  Outcome: Progressing     Problem: Prexisting or High Potential for Compromised Skin Integrity  Goal: Skin integrity is maintained or improved  Description: INTERVENTIONS:  - Identify patients at risk for skin breakdown  - Assess and monitor skin integrity  - Assess and monitor nutrition and hydration status  - Monitor labs   - Assess for incontinence   - Turn and reposition patient  - Assist with mobility/ambulation  - Relieve pressure over bony prominences  - Avoid friction and shearing  - Provide appropriate hygiene as needed including keeping skin clean and dry  - Evaluate need for skin moisturizer/barrier cream  - Collaborate with interdisciplinary team   - Patient/family teaching  - Consider wound care consult   Outcome: Progressing     Problem: PAIN - ADULT  Goal: Verbalizes/displays adequate comfort level or baseline comfort level  Description: Interventions:  - Encourage patient to monitor pain and request assistance  - Assess pain using appropriate pain scale  - Administer analgesics based on type and severity of pain and evaluate response  - Implement non-pharmacological measures as appropriate and evaluate response  - Consider cultural and social influences on pain and pain management  - Notify physician/advanced practitioner if interventions unsuccessful or patient reports new pain  Outcome: Progressing     Problem: INFECTION - ADULT  Goal: Absence or prevention of progression during hospitalization  Description: INTERVENTIONS:  - Assess and monitor for signs and symptoms of infection  - Monitor lab/diagnostic results  - Monitor all insertion sites, i e  indwelling lines, tubes, and drains  - Monitor endotracheal if appropriate and nasal secretions for changes in amount and color  - [place] appropriate cooling/warming therapies per order  - Administer medications as ordered  - Instruct and encourage patient and family to use good hand hygiene technique  - Identify and instruct in appropriate isolation precautions for identified infection/condition  Outcome: Progressing  Goal: Absence of fever/infection during neutropenic period  Description: INTERVENTIONS:  - Monitor WBC    Outcome: Progressing     Problem: SAFETY ADULT  Goal: Patient will remain free of falls  Description: INTERVENTIONS:  - Educate patient/family on patient safety including physical limitations  - Instruct patient to call for assistance with activity   - Consult OT/PT to assist with strengthening/mobility   - Keep Call bell within reach  - Keep bed low and locked with side rails adjusted as appropriate  - Keep care items and personal belongings within reach  - Initiate and maintain comfort rounds  - Make Fall Risk Sign visible to staff  - Offer Toileting every 2 Hours, in advance of need  - Initiate/Maintain alarm  - Obtain necessary fall risk management equipment:   - Apply yellow socks and bracelet for high fall risk patients  - Consider moving patient to room near nurses station  Outcome: Progressing  Goal: Maintain or return to baseline ADL function  Description: INTERVENTIONS:  -  Assess patient's ability to carry out ADLs; assess patient's baseline for ADL function and identify physical deficits which impact ability to perform ADLs (bathing, care of mouth/teeth, toileting, grooming, dressing, etc )  - Assess/evaluate cause of self-care deficits   - Assess range of motion  - Assess patient's mobility; develop plan if impaired  - Assess patient's need for assistive devices and provide as appropriate  - Encourage maximum independence but intervene and supervise when necessary  - Involve family in performance of ADLs  - Assess for home care needs following discharge   - Consider OT consult to assist with ADL evaluation and planning for discharge  - Provide patient education as appropriate  Outcome: Progressing  Goal: Maintains/Returns to pre admission functional level  Description: INTERVENTIONS:  - Perform BMAT or MOVE assessment daily    - Set and communicate daily mobility goal to care team and patient/family/caregiver  - Collaborate with rehabilitation services on mobility goals if consulted  - Perform Range of Motion 3 times a day  - Reposition patient every 2 hours    - Dangle patient 3 times a day  - Stand patient 3 times a day  - Ambulate patient 3 times a day  - Out of bed to chair 3 times a day   - Out of bed for meals 3 times a day  - Out of bed for toileting  - Record patient progress and toleration of activity level   Outcome: Progressing     Problem: DISCHARGE PLANNING  Goal: Discharge to home or other facility with appropriate resources  Description: INTERVENTIONS:  - Identify barriers to discharge w/patient and caregiver  - Arrange for needed discharge resources and transportation as appropriate  - Identify discharge learning needs (meds, wound care, etc )  - Arrange for interpretive services to assist at discharge as needed  - Refer to Case Management Department for coordinating discharge planning if the patient needs post-hospital services based on physician/advanced practitioner order or complex needs related to functional status, cognitive ability, or social support system  Outcome: Progressing     Problem: Knowledge Deficit  Goal: Patient/family/caregiver demonstrates understanding of disease process, treatment plan, medications, and discharge instructions  Description: Complete learning assessment and assess knowledge base  Interventions:  - Provide teaching at level of understanding  - Provide teaching via preferred learning methods  Outcome: Progressing     Problem: MOBILITY - ADULT  Goal: Maintain or return to baseline ADL function  Description: INTERVENTIONS:  -  Assess patient's ability to carry out ADLs; assess patient's baseline for ADL function and identify physical deficits which impact ability to perform ADLs (bathing, care of mouth/teeth, toileting, grooming, dressing, etc )  - Assess/evaluate cause of self-care deficits   - Assess range of motion  - Assess patient's mobility; develop plan if impaired  - Assess patient's need for assistive devices and provide as appropriate  - Encourage maximum independence but intervene and supervise when necessary  - Involve family in performance of ADLs  - Assess for home care needs following discharge   - Consider OT consult to assist with ADL evaluation and planning for discharge  - Provide patient education as appropriate  Outcome: Progressing  Goal: Maintains/Returns to pre admission functional level  Description: INTERVENTIONS:  - Perform BMAT or MOVE assessment daily    - Set and communicate daily mobility goal to care team and patient/family/caregiver  - Collaborate with rehabilitation services on mobility goals if consulted  - Perform Range of Motion 3 times a day  - Reposition patient every 2 hours    - Dangle patient 3 times a day  - Stand patient 3 times a day  - Ambulate patient 3 times a day  - Out of bed to chair 3 times a day   - Out of bed for meals 3 times a day  - Out of bed for toileting  - Record patient progress and toleration of activity level   Outcome: Progressing

## 2021-10-15 NOTE — ASSESSMENT & PLAN NOTE
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

## 2021-10-15 NOTE — ASSESSMENT & PLAN NOTE
Lab Results   Component Value Date    HGBA1C 9 0 (A) 06/07/2021       Recent Labs     10/14/21  1606 10/14/21  1953 10/15/21  0703 10/15/21  1138   POCGLU 153* 141* 158* 260*       Blood Sugar Average: Last 72 hrs:  (P) 232 75  Known diabetic with diabetic neuropathy  On metformin and lantus at home  Blood sugar on presentation 418  Contiunue sliding scale insulin  Increase Lantus to 46 u HS  Hypoglycemic protocol  Goal blood sugar 140-180  Diabetic diet

## 2021-10-16 PROBLEM — M25.562 PAIN AND SWELLING OF LEFT KNEE: Status: ACTIVE | Noted: 2021-10-16

## 2021-10-16 PROBLEM — M25.462 PAIN AND SWELLING OF LEFT KNEE: Status: ACTIVE | Noted: 2021-10-16

## 2021-10-16 LAB
ANION GAP SERPL CALCULATED.3IONS-SCNC: 9 MMOL/L (ref 4–13)
APPEARANCE FLD: ABNORMAL
APTT PPP: 45 SECONDS (ref 23–37)
APTT PPP: 50 SECONDS (ref 23–37)
BASOPHILS # BLD AUTO: 0.04 THOUSANDS/ΜL (ref 0–0.1)
BASOPHILS NFR BLD AUTO: 0 % (ref 0–1)
BUN SERPL-MCNC: 28 MG/DL (ref 5–25)
CALCIUM SERPL-MCNC: 8.5 MG/DL (ref 8.3–10.1)
CHLORIDE SERPL-SCNC: 103 MMOL/L (ref 100–108)
CO2 SERPL-SCNC: 25 MMOL/L (ref 21–32)
COLOR FLD: YELLOW
CREAT SERPL-MCNC: 1.78 MG/DL (ref 0.6–1.3)
CRYSTALS SNV QL MICRO: NORMAL
EOSINOPHIL # BLD AUTO: 0.06 THOUSAND/ΜL (ref 0–0.61)
EOSINOPHIL NFR BLD AUTO: 1 % (ref 0–6)
ERYTHROCYTE [DISTWIDTH] IN BLOOD BY AUTOMATED COUNT: 15 % (ref 11.6–15.1)
EST. AVERAGE GLUCOSE BLD GHB EST-MCNC: 189 MG/DL
GFR SERPL CREATININE-BSD FRML MDRD: 39 ML/MIN/1.73SQ M
GLUCOSE SERPL-MCNC: 199 MG/DL (ref 65–140)
GLUCOSE SERPL-MCNC: 201 MG/DL (ref 65–140)
GLUCOSE SERPL-MCNC: 204 MG/DL (ref 65–140)
GLUCOSE SERPL-MCNC: 217 MG/DL (ref 65–140)
GLUCOSE SERPL-MCNC: 227 MG/DL (ref 65–140)
HBA1C MFR BLD: 8.2 %
HCT VFR BLD AUTO: 28.3 % (ref 36.5–49.3)
HGB BLD-MCNC: 9.4 G/DL (ref 12–17)
IMM GRANULOCYTES # BLD AUTO: 0.05 THOUSAND/UL (ref 0–0.2)
IMM GRANULOCYTES NFR BLD AUTO: 1 % (ref 0–2)
INR PPP: 1.64 (ref 0.84–1.19)
LYMPHOCYTES # BLD AUTO: 1.84 THOUSANDS/ΜL (ref 0.6–4.47)
LYMPHOCYTES # SNV MANUAL: 2 %
LYMPHOCYTES NFR BLD AUTO: 18 % (ref 14–44)
MCH RBC QN AUTO: 29.5 PG (ref 26.8–34.3)
MCHC RBC AUTO-ENTMCNC: 33.2 G/DL (ref 31.4–37.4)
MCV RBC AUTO: 89 FL (ref 82–98)
MONOCYTES # BLD AUTO: 0.77 THOUSAND/ΜL (ref 0.17–1.22)
MONOCYTES NFR BLD AUTO: 8 % (ref 4–12)
MONOCYTES NFR SNV MANUAL: 2 %
NEUTROPHILS # BLD AUTO: 7.35 THOUSANDS/ΜL (ref 1.85–7.62)
NEUTROPHILS NFR SNV MANUAL: 96 %
NEUTS SEG NFR BLD AUTO: 72 % (ref 43–75)
NRBC BLD AUTO-RTO: 0 /100 WBCS
PLATELET # BLD AUTO: 187 THOUSANDS/UL (ref 149–390)
PMV BLD AUTO: 9.4 FL (ref 8.9–12.7)
POTASSIUM SERPL-SCNC: 4.1 MMOL/L (ref 3.5–5.3)
PROTHROMBIN TIME: 18.7 SECONDS (ref 11.6–14.5)
RBC # BLD AUTO: 3.19 MILLION/UL (ref 3.88–5.62)
SITE: ABNORMAL
SODIUM SERPL-SCNC: 137 MMOL/L (ref 136–145)
TOTAL CELLS COUNTED SPEC: 100
URATE SERPL-MCNC: 8.5 MG/DL (ref 4.2–8)
WBC # BLD AUTO: 10.11 THOUSAND/UL (ref 4.31–10.16)
WBC # FLD MANUAL: ABNORMAL /UL (ref 0–200)

## 2021-10-16 PROCEDURE — 85730 THROMBOPLASTIN TIME PARTIAL: CPT | Performed by: INTERNAL MEDICINE

## 2021-10-16 PROCEDURE — 85025 COMPLETE CBC W/AUTO DIFF WBC: CPT | Performed by: INTERNAL MEDICINE

## 2021-10-16 PROCEDURE — 89060 EXAM SYNOVIAL FLUID CRYSTALS: CPT | Performed by: PHYSICIAN ASSISTANT

## 2021-10-16 PROCEDURE — 82948 REAGENT STRIP/BLOOD GLUCOSE: CPT

## 2021-10-16 PROCEDURE — 87070 CULTURE OTHR SPECIMN AEROBIC: CPT | Performed by: PHYSICIAN ASSISTANT

## 2021-10-16 PROCEDURE — 89051 BODY FLUID CELL COUNT: CPT | Performed by: PHYSICIAN ASSISTANT

## 2021-10-16 PROCEDURE — 99233 SBSQ HOSP IP/OBS HIGH 50: CPT | Performed by: INTERNAL MEDICINE

## 2021-10-16 PROCEDURE — 80048 BASIC METABOLIC PNL TOTAL CA: CPT | Performed by: INTERNAL MEDICINE

## 2021-10-16 PROCEDURE — 20610 DRAIN/INJ JOINT/BURSA W/O US: CPT | Performed by: PHYSICIAN ASSISTANT

## 2021-10-16 PROCEDURE — 83036 HEMOGLOBIN GLYCOSYLATED A1C: CPT | Performed by: INTERNAL MEDICINE

## 2021-10-16 PROCEDURE — 87205 SMEAR GRAM STAIN: CPT | Performed by: PHYSICIAN ASSISTANT

## 2021-10-16 PROCEDURE — 85610 PROTHROMBIN TIME: CPT | Performed by: INTERNAL MEDICINE

## 2021-10-16 PROCEDURE — 99232 SBSQ HOSP IP/OBS MODERATE 35: CPT | Performed by: ORTHOPAEDIC SURGERY

## 2021-10-16 PROCEDURE — 84550 ASSAY OF BLOOD/URIC ACID: CPT | Performed by: INTERNAL MEDICINE

## 2021-10-16 RX ORDER — INSULIN GLARGINE 100 [IU]/ML
5 INJECTION, SOLUTION SUBCUTANEOUS ONCE
Status: COMPLETED | OUTPATIENT
Start: 2021-10-16 | End: 2021-10-16

## 2021-10-16 RX ORDER — HEPARIN SODIUM 10000 [USP'U]/100ML
3-20 INJECTION, SOLUTION INTRAVENOUS
Status: DISCONTINUED | OUTPATIENT
Start: 2021-10-16 | End: 2021-10-18

## 2021-10-16 RX ORDER — COLCHICINE 0.6 MG/1
0.6 TABLET ORAL DAILY
Status: DISCONTINUED | OUTPATIENT
Start: 2021-10-16 | End: 2021-10-16

## 2021-10-16 RX ORDER — INSULIN GLARGINE 100 [IU]/ML
42 INJECTION, SOLUTION SUBCUTANEOUS DAILY
Status: DISCONTINUED | OUTPATIENT
Start: 2021-10-17 | End: 2021-10-17

## 2021-10-16 RX ORDER — HEPARIN SODIUM 1000 [USP'U]/ML
2000 INJECTION, SOLUTION INTRAVENOUS; SUBCUTANEOUS
Status: DISCONTINUED | OUTPATIENT
Start: 2021-10-16 | End: 2021-10-18

## 2021-10-16 RX ORDER — COLCHICINE 0.6 MG/1
0.6 TABLET ORAL DAILY
Status: COMPLETED | OUTPATIENT
Start: 2021-10-17 | End: 2021-10-21

## 2021-10-16 RX ORDER — COLCHICINE 0.6 MG/1
0.6 TABLET ORAL 2 TIMES DAILY
Status: COMPLETED | OUTPATIENT
Start: 2021-10-16 | End: 2021-10-16

## 2021-10-16 RX ORDER — HEPARIN SODIUM 1000 [USP'U]/ML
4000 INJECTION, SOLUTION INTRAVENOUS; SUBCUTANEOUS
Status: DISCONTINUED | OUTPATIENT
Start: 2021-10-16 | End: 2021-10-18

## 2021-10-16 RX ADMIN — INSULIN LISPRO 2 UNITS: 100 INJECTION, SOLUTION INTRAVENOUS; SUBCUTANEOUS at 11:31

## 2021-10-16 RX ADMIN — ACETAMINOPHEN 650 MG: 325 TABLET, FILM COATED ORAL at 23:41

## 2021-10-16 RX ADMIN — COLCHICINE 0.6 MG: 0.6 TABLET ORAL at 14:01

## 2021-10-16 RX ADMIN — COLCHICINE 0.6 MG: 0.6 TABLET ORAL at 18:13

## 2021-10-16 RX ADMIN — ACETAMINOPHEN 650 MG: 325 TABLET, FILM COATED ORAL at 00:05

## 2021-10-16 RX ADMIN — OXYCODONE HYDROCHLORIDE 10 MG: 10 TABLET, FILM COATED, EXTENDED RELEASE ORAL at 20:50

## 2021-10-16 RX ADMIN — HYDROMORPHONE HYDROCHLORIDE 0.5 MG: 1 INJECTION, SOLUTION INTRAMUSCULAR; INTRAVENOUS; SUBCUTANEOUS at 00:05

## 2021-10-16 RX ADMIN — OXYCODONE HYDROCHLORIDE 10 MG: 10 TABLET, FILM COATED, EXTENDED RELEASE ORAL at 09:49

## 2021-10-16 RX ADMIN — INSULIN LISPRO 2 UNITS: 100 INJECTION, SOLUTION INTRAVENOUS; SUBCUTANEOUS at 07:50

## 2021-10-16 RX ADMIN — DICLOFENAC SODIUM 2 G: 10 GEL TOPICAL at 13:01

## 2021-10-16 RX ADMIN — INSULIN GLARGINE 38 UNITS: 100 INJECTION, SOLUTION SUBCUTANEOUS at 09:50

## 2021-10-16 RX ADMIN — PRAVASTATIN SODIUM 40 MG: 40 TABLET ORAL at 18:13

## 2021-10-16 RX ADMIN — INSULIN LISPRO 2 UNITS: 100 INJECTION, SOLUTION INTRAVENOUS; SUBCUTANEOUS at 21:00

## 2021-10-16 RX ADMIN — INSULIN LISPRO 2 UNITS: 100 INJECTION, SOLUTION INTRAVENOUS; SUBCUTANEOUS at 16:53

## 2021-10-16 RX ADMIN — SODIUM CHLORIDE, SODIUM GLUCONATE, SODIUM ACETATE, POTASSIUM CHLORIDE, MAGNESIUM CHLORIDE, SODIUM PHOSPHATE, DIBASIC, AND POTASSIUM PHOSPHATE 100 ML/HR: .53; .5; .37; .037; .03; .012; .00082 INJECTION, SOLUTION INTRAVENOUS at 20:50

## 2021-10-16 RX ADMIN — STANDARDIZED SENNA CONCENTRATE 17.2 MG: 8.6 TABLET ORAL at 09:49

## 2021-10-16 RX ADMIN — HEPARIN SODIUM 2000 UNITS: 1000 INJECTION INTRAVENOUS; SUBCUTANEOUS at 22:02

## 2021-10-16 RX ADMIN — HEPARIN SODIUM 11.1 UNITS/KG/HR: 10000 INJECTION, SOLUTION INTRAVENOUS at 13:59

## 2021-10-16 RX ADMIN — LIDOCAINE 5% 1 PATCH: 700 PATCH TOPICAL at 09:50

## 2021-10-16 RX ADMIN — HEPARIN SODIUM 5000 UNITS: 5000 INJECTION INTRAVENOUS; SUBCUTANEOUS at 05:04

## 2021-10-16 RX ADMIN — INSULIN GLARGINE 5 UNITS: 100 INJECTION, SOLUTION SUBCUTANEOUS at 13:01

## 2021-10-16 RX ADMIN — DICLOFENAC SODIUM 2 G: 10 GEL TOPICAL at 21:00

## 2021-10-16 RX ADMIN — STANDARDIZED SENNA CONCENTRATE 17.2 MG: 8.6 TABLET ORAL at 18:12

## 2021-10-16 RX ADMIN — DICLOFENAC SODIUM 2 G: 10 GEL TOPICAL at 09:50

## 2021-10-16 NOTE — ASSESSMENT & PLAN NOTE
Pain and swelling in the left anterior knee, superiorly  Tender to touch  Developed after PT  Xray -unremarkable  Started on colchicine 0 6mg bid x1 day, the 0 6mg daily    Synovial fluid analysis shows calcium pyrophosphate crystals

## 2021-10-16 NOTE — PROGRESS NOTES
Progress Note - Orthopedics   Berta Brito 61 y o  male MRN: [id number]  Unit/Bed#: [facility] XRAY      Subjective:    61 y o male with Right tibia metaphyseal fracture and Left knee effusion, resting in bed, and in no acute distress  He reports continued Right leg pain, but adherence to ice and knee immobilizer application  He continues to report Left knee pain and difficulty with range of motion  He denies history of Left knee pain, gout, or injury  Denies chest pain, shortness of breath, fevers, chills       Labs:  0   Lab Value Date/Time    HCT 28 3 (L) 10/16/2021 0507    HCT 31 3 (L) 10/15/2021 0522    HCT 33 2 (L) 10/14/2021 0500    HCT 38 4 07/13/2015 1448    HCT 38 6 10/20/2014 1132    HGB 9 4 (L) 10/16/2021 0507    HGB 10 3 (L) 10/15/2021 0522    HGB 10 9 (L) 10/14/2021 0500    HGB 13 1 07/13/2015 1448    HGB 13 1 10/20/2014 1132    INR 1 64 (H) 10/16/2021 0507    INR 2 94 (H) 12/11/2015 1543    WBC 10 11 10/16/2021 0507    WBC 13 95 (H) 10/15/2021 0522    WBC 11 43 (H) 10/14/2021 0500    WBC 7 7 07/13/2015 1448    WBC 8 30 10/20/2014 1132    ESR 58 (H) 05/20/2016 1207       Meds:    Current Facility-Administered Medications:     acetaminophen (TYLENOL) tablet 650 mg, 650 mg, Oral, Q6H PRN, Meena Maloney MD, 650 mg at 10/16/21 0005    calcium carbonate (TUMS) chewable tablet 500 mg, 500 mg, Oral, Daily PRN, Sayra Pandey PA-C, 500 mg at 10/14/21 0006    Diclofenac Sodium (VOLTAREN) 1 % topical gel 2 g, 2 g, Topical, 4x Daily, Betty Lynn MD, 2 g at 10/16/21 0950    heparin (porcine) subcutaneous injection 5,000 Units, 5,000 Units, Subcutaneous, Q8H [address], Meena Maloney MD, 5,000 Units at 10/16/21 0504    HYDROmorphone (DILAUDID) injection 0 5 mg, 0 5 mg, Intravenous, Q3H PRN, Betty Lynn MD, 0 5 mg at 10/16/21 0005    insulin glargine (LANTUS) subcutaneous injection 38 Units 0 38 mL, 38 Units, Subcutaneous, Daily, Meena Maloney MD, 38 Units at 10/16/21 0950    insulin lispro (HumaLOG) 100 units/mL subcutaneous injection 1-6 Units, 1-6 Units, Subcutaneous, 4x Daily (AC & HS), 2 Units at 10/16/21 0750 **AND** Fingerstick Glucose (POCT), , , 4x Daily AC and at bedtime, Sayra Pandey PA-C    lidocaine (LIDODERM) 5 % patch 1 patch, 1 patch, Topical, Daily, Lonnie Pelaez MD, 1 patch at 10/16/21 0950    multi-electrolyte (PLASMALYTE-A/ISOLYTE-S PH 7 4) IV solution, 100 mL/hr, Intravenous, Continuous, Lonnie Pelaez MD, Last Rate: 100 mL/hr at 10/15/21 2117, 100 mL/hr at 10/15/21 2117    ondansetron (ZOFRAN) injection 4 mg, 4 mg, Intravenous, Q6H PRN, Lonnie Pelaez MD, 4 mg at 10/13/21 2249    oxyCODONE (OxyCONTIN) 12 hr tablet 10 mg, 10 mg, Oral, Q12H [address], Lonnie Pelaez MD, 10 mg at 10/16/21 0949    polyethylene glycol (MIRALAX) packet 17 g, 17 g, Oral, Daily PRN, Lonnie Pelaez MD    pravastatin (PRAVACHOL) tablet 40 mg, 40 mg, Oral, Daily With Dinner, Lonnie Pelaez MD, 40 mg at 10/15/21 1728    senna (SENOKOT) tablet 17 2 mg, 2 tablet, Oral, BID, Mark Nash MD, 17 2 mg at 10/16/21 0949    Blood Culture:   No results found for: BLOODCX    Wound Culture:   No results found for: WOUNDCULT    Ins and Outs:  I/O last 24 hours: In: 480 [P O :480]  Out: 700 [Urine:700]          Physical:  Vitals:    10/16/21 0510   BP: 131/80   Pulse: 102   Resp: 18   Temp: 99 6 °F (37 6 °C)   SpO2: 96%     Musculoskeletal:  Right lower extremity   · Ice and knee immobilizer applied  · Skin with continued yellow ecchymosis distal extremity  · Tenderness to palpation at fracture site   · Compartments and calf soft, compressible  · +2 DP pulse palpated     Left lower extremity    · Skin overlying Left knee without evidence of infection     · Effusion palpated  · Mild tenderness anterior knee  · Range of motion to approximately 70 degrees flexion with pain and subjective tightness    Assessment:    66 y o male  Right tibia metaphyseal fracture, Left knee osteoarthritis exacerbation vs gout/pseudo gout    Plan:  · Nonweightbearing Right lower extremity, weightbearing as tolerated Left lower extremity   · PT/OT gentle range of motion of Left knee   · Pain control as per primary team   · DVT ppx- INR should be between 1 4-1 6 pre operatively  · Continue insulin to improve blood glucose pre operatively   · Dispo: Overall, patient doing well this morning  Advised patient ORIF of Right tibial will be on Monday with Dr Blank Carrero  Will obtain formal surgical consent tomorrow  NPO and hold all anticoagulation at midnight tomorrow  Reapplied ice  In regards to Left knee, advised patient of possible knee aspiration and lab evaluation of synovial fluid  Verbalized understanding of the above  Risks and benefits of aspiration were discussed in detail  Risks including:  Post aspiration pain, bruising,  reaccumulation of effusion, and infection were discussed in detail  The patient understood and elected to proceed forward  After sterile preparation the Left knee was aspirated and 90cc of cloudy yellow synovial fluid were removed  The patient tolerated the procedure well and no immediate complications were noted  Fluid was sent for analysis with crystals, WBC, and cultures  Ace wrap and ice placed on Left knee  Advised patient to work with PT/OT for gentle range of motion of Left knee  Patient reports mild improvement of Left knee pain s/p aspiration  No injection therapy at this time  Reviewed X-rays with patient and wife  Will continue to follow and await lab results         Mary May PA-C

## 2021-10-16 NOTE — PLAN OF CARE
Problem: Potential for Falls  Goal: Patient will remain free of falls  Description: INTERVENTIONS:  - Educate patient/family on patient safety including physical limitations  - Instruct patient to call for assistance with activity   - Consult OT/PT to assist with strengthening/mobility   - Keep Call bell within reach  - Keep bed low and locked with side rails adjusted as appropriate  - Keep care items and personal belongings within reach  - Initiate and maintain comfort rounds  - Make Fall Risk Sign visible to staff  - Offer Toileting every  Hours, in advance of need  - Initiate/Maintain alarm  - Obtain necessary fall risk management equipment:   - Apply yellow socks and bracelet for high fall risk patients  - Consider moving patient to room near nurses station  Outcome: Progressing     Problem: Prexisting or High Potential for Compromised Skin Integrity  Goal: Skin integrity is maintained or improved  Description: INTERVENTIONS:  - Identify patients at risk for skin breakdown  - Assess and monitor skin integrity  - Assess and monitor nutrition and hydration status  - Monitor labs   - Assess for incontinence   - Turn and reposition patient  - Assist with mobility/ambulation  - Relieve pressure over bony prominences  - Avoid friction and shearing  - Provide appropriate hygiene as needed including keeping skin clean and dry  - Evaluate need for skin moisturizer/barrier cream  - Collaborate with interdisciplinary team   - Patient/family teaching  - Consider wound care consult   Outcome: Progressing     Problem: PAIN - ADULT  Goal: Verbalizes/displays adequate comfort level or baseline comfort level  Description: Interventions:  - Encourage patient to monitor pain and request assistance  - Assess pain using appropriate pain scale  - Administer analgesics based on type and severity of pain and evaluate response  - Implement non-pharmacological measures as appropriate and evaluate response  - Consider cultural and social influences on pain and pain management  - Notify physician/advanced practitioner if interventions unsuccessful or patient reports new pain  Outcome: Progressing     Problem: INFECTION - ADULT  Goal: Absence or prevention of progression during hospitalization  Description: INTERVENTIONS:  - Assess and monitor for signs and symptoms of infection  - Monitor lab/diagnostic results  - Monitor all insertion sites, i e  indwelling lines, tubes, and drains  - Monitor endotracheal if appropriate and nasal secretions for changes in amount and color  - [place] appropriate cooling/warming therapies per order  - Administer medications as ordered  - Instruct and encourage patient and family to use good hand hygiene technique  - Identify and instruct in appropriate isolation precautions for identified infection/condition  Outcome: Progressing  Goal: Absence of fever/infection during neutropenic period  Description: INTERVENTIONS:  - Monitor WBC    Outcome: Progressing     Problem: SAFETY ADULT  Goal: Patient will remain free of falls  Description: INTERVENTIONS:  - Educate patient/family on patient safety including physical limitations  - Instruct patient to call for assistance with activity   - Consult OT/PT to assist with strengthening/mobility   - Keep Call bell within reach  - Keep bed low and locked with side rails adjusted as appropriate  - Keep care items and personal belongings within reach  - Initiate and maintain comfort rounds  - Make Fall Risk Sign visible to staff  - Offer Toileting every  Hours, in advance of need  - Initiate/Maintain alarm  - Obtain necessary fall risk management equipment:   - Apply yellow socks and bracelet for high fall risk patients  - Consider moving patient to room near nurses station  Outcome: Progressing  Goal: Maintain or return to baseline ADL function  Description: INTERVENTIONS:  -  Assess patient's ability to carry out ADLs; assess patient's baseline for ADL function and identify physical deficits which impact ability to perform ADLs (bathing, care of mouth/teeth, toileting, grooming, dressing, etc )  - Assess/evaluate cause of self-care deficits   - Assess range of motion  - Assess patient's mobility; develop plan if impaired  - Assess patient's need for assistive devices and provide as appropriate  - Encourage maximum independence but intervene and supervise when necessary  - Involve family in performance of ADLs  - Assess for home care needs following discharge   - Consider OT consult to assist with ADL evaluation and planning for discharge  - Provide patient education as appropriate  Outcome: Progressing  Goal: Maintains/Returns to pre admission functional level  Description: INTERVENTIONS:  - Perform BMAT or MOVE assessment daily    - Set and communicate daily mobility goal to care team and patient/family/caregiver  - Collaborate with rehabilitation services on mobility goals if consulted  - Perform Range of Motion  times a day  - Reposition patient every  hours    - Dangle patient  times a day  - Stand patient  times a day  - Ambulate patient  times a day  - Out of bed to chair  times a day   - Out of bed for meals  times a day  - Out of bed for toileting  - Record patient progress and toleration of activity level   Outcome: Progressing     Problem: DISCHARGE PLANNING  Goal: Discharge to home or other facility with appropriate resources  Description: INTERVENTIONS:  - Identify barriers to discharge w/patient and caregiver  - Arrange for needed discharge resources and transportation as appropriate  - Identify discharge learning needs (meds, wound care, etc )  - Arrange for interpretive services to assist at discharge as needed  - Refer to Case Management Department for coordinating discharge planning if the patient needs post-hospital services based on physician/advanced practitioner order or complex needs related to functional status, cognitive ability, or social support system  Outcome: Progressing     Problem: Knowledge Deficit  Goal: Patient/family/caregiver demonstrates understanding of disease process, treatment plan, medications, and discharge instructions  Description: Complete learning assessment and assess knowledge base  Interventions:  - Provide teaching at level of understanding  - Provide teaching via preferred learning methods  Outcome: Progressing     Problem: MOBILITY - ADULT  Goal: Maintain or return to baseline ADL function  Description: INTERVENTIONS:  -  Assess patient's ability to carry out ADLs; assess patient's baseline for ADL function and identify physical deficits which impact ability to perform ADLs (bathing, care of mouth/teeth, toileting, grooming, dressing, etc )  - Assess/evaluate cause of self-care deficits   - Assess range of motion  - Assess patient's mobility; develop plan if impaired  - Assess patient's need for assistive devices and provide as appropriate  - Encourage maximum independence but intervene and supervise when necessary  - Involve family in performance of ADLs  - Assess for home care needs following discharge   - Consider OT consult to assist with ADL evaluation and planning for discharge  - Provide patient education as appropriate  Outcome: Progressing  Goal: Maintains/Returns to pre admission functional level  Description: INTERVENTIONS:  - Perform BMAT or MOVE assessment daily    - Set and communicate daily mobility goal to care team and patient/family/caregiver  - Collaborate with rehabilitation services on mobility goals if consulted  - Perform Range of Motion  times a day  - Reposition patient every  hours    - Dangle patient  times a day  - Stand patient  times a day  - Ambulate patient  times a day  - Out of bed to chair  times a day   - Out of bed for meals times a day  - Out of bed for toileting  - Record patient progress and toleration of activity level   Outcome: Progressing

## 2021-10-16 NOTE — PROGRESS NOTES
INTERNAL MEDICINE RESIDENCY PROGRESS NOTE     Name: Brooke Avina   Age & Sex: 61 y o  male   MRN: [id number]  Unit/Bed#: -01   Encounter: [id number]  Team: SLIM    PATIENT INFORMATION     Name: Brooke Avina   Age & Sex: 61 y o  male   MRN: [id number]  Hospital Stay Days: 3    ASSESSMENT/PLAN     Principal Problem:    Closed fracture of proximal end of right tibia  Active Problems:    ROSE (acute kidney injury) ([facility] )    Essential hypertension    Mixed hyperlipidemia    Type 2 diabetes mellitus with diabetic neuropathy, without long-term current use of insulin (HCC)    History of DVT (deep vein thrombosis)    Elevated INR    Pain and swelling of left knee      * Closed fracture of proximal end of right tibia  Assessment & Plan  Presented with Right knee / proximal leg pain and inability to bear weight after ground level fall after twisting leg in his kitchen  Assoc deformity,mild abrasion  10/13-Imaging findings including CT confirmed Acute comminuted displaced intra-articular fracture of the proximal tibia as described above  No dislocation  Small lipohemarthrosis  Moderate distal femoral and proximal tibiofibular subcutaneous edema/contusion  On knee immobilizer  Non weight bearing  Orthopedic input noted and appreciated  Patient will need surgical correction of fracture  Continue analgesia, will increased dosing today  Add stool softeners  Once INR low patient planned for surgery, tentative on Sunday night by orthopedic notes  ROSE (acute kidney injury) ([facility] )  Assessment & Plan  ROSE on CKD  May be prerenal   Cr on  Pres 1 99  Baseline 1 21 months ago  Avoid nephrotoxics  Avoid hypotension  Monitor I/O  Continue Light hydration with isolyte at 100cc/h  Pain and swelling of left knee  Assessment & Plan  Pain and swelling in the left anterior knee, superiorly  Tender to touch  Developed after PT  Xray -unremarkable  May be exacerbation of gout  Follow final synovial fluid analysis    Started on colchicine 0 6mg bid x1 day, the 0 6mg daily  Elevated INR  Assessment & Plan  Patient previously on Coumadin due to history of DVTs  This is currently on hold due to need for surgery  Ortho recommends 1 4-1 6 for ortho procedure  INR 1 89  Continue to hold Coumadin  Monitor INR daily    History of DVT (deep vein thrombosis)  Assessment & Plan  History 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  Unprovoked  Last episode was 10yrs ago  On AC with coumadin  Currently held  INR now 1 89  Would need heparin if INR is subtherapeutic  Start heparin gtt, can d/c 6h before surgery  DVT prophylaxis with venodyne   Daily INR  Type 2 diabetes mellitus with diabetic neuropathy, without long-term current use of insulin [facility])  Assessment & Plan  Lab Results   Component Value Date    HGBA1C 9 0 (A) 06/07/2021       Recent Labs     10/14/21  1606 10/14/21  1953 10/15/21  0703 10/15/21  1138   POCGLU 153* 141* 158* 260*       Blood Sugar Average: Last 72 hrs:  (P) 232 75  Known diabetic with diabetic neuropathy  On metformin and lantus at home  Blood sugar on presentation 418  Start sliding scale insulin  Increase Lantus to 42 u HS  Hypoglycemic protocol  Goal blood sugar 140-180  Diabetic diet  Mixed hyperlipidemia  Assessment & Plan  Continue statin  Essential hypertension  Assessment & Plan  On lisinopril 10mg daily at home  On hold for ROSE  BP stable  Monitor BP  Hyperkalemia-resolved as of 10/15/2021  Assessment & Plan  Mild -5 3 on presentation  Resolved  Monitor BMP  Disposition: continue IP     SUBJECTIVE     Patient seen and examined  No acute events overnight  Denied fever,chills,chest pain, shortness of breath, dizziness or lightheadedness  Still endorses pain      OBJECTIVE     Vitals:    10/16/21 0000 10/16/21 0510 10/16/21 0600 10/16/21 1439   BP: 123/84 131/80  141/82   BP Location:    Right arm   Pulse: 102 102  98   Resp:  18     Temp: 99 5 °F (37 5 °C) 99 6 °F (37 6 °C)  98 7 °F (37 1 °C) TempSrc:    Oral   SpO2: 96% 96%  97%   Weight:   104 kg (229 lb 8 oz)    Height:          Temperature:   Temp (24hrs), Av 3 °F (37 4 °C), Min:98 7 °F (37 1 °C), Max:99 6 °F (37 6 °C)    Temperature: 98 7 °F (37 1 °C)  Intake & Output:  I/O       10/14 07 - 10/15 0700 10/15 0701 - 10/16 0700 10/16 0701 - 10/17 07    P  O   480 240    I V  (mL/kg)       Total Intake(mL/kg)  480 (4 6) 240 (2 3)    Urine (mL/kg/hr)  400 (0 2) 300 (0 3)    Total Output  400 300    Net  +80 -60               Weights:   IBW (Ideal Body Weight): 79 9 kg    Body mass index is 30 28 kg/m²  Weight (last 2 days)     Date/Time   Weight    10/16/21 06   104 (229 5)    10/14/21 06   104 (229 28)            Physical Exam  Constitutional:       General: He is not in acute distress  Appearance: Normal appearance  HENT:      Head: Normocephalic and atraumatic  Mouth/Throat:      Mouth: Mucous membranes are moist    Eyes:      Extraocular Movements: Extraocular movements intact  Cardiovascular:      Rate and Rhythm: Normal rate and regular rhythm  Heart sounds: No murmur heard  No friction rub  No gallop  Pulmonary:      Effort: Pulmonary effort is normal  No respiratory distress  Breath sounds: Normal breath sounds  No stridor  No wheezing, rhonchi or rales  Chest:      Chest wall: No tenderness  Abdominal:      General: Abdomen is flat  There is no distension  Palpations: Abdomen is soft  There is no mass  Tenderness: There is no abdominal tenderness  There is no guarding or rebound  Hernia: No hernia is present  Musculoskeletal:         General: Swelling, tenderness and signs of injury present  No deformity  Cervical back: Normal range of motion and neck supple  Right lower leg: No edema  Left lower leg: No edema  Comments: Knee immobilizer in place on the right lower extremity  Tenderness and swelling in the anterior superior aspect of L knee    Distal pulsation intact  -DP P and Posterior tibial   Skin:     General: Skin is warm  Capillary Refill: Capillary refill takes less than 2 seconds  Findings: Bruising present  Neurological:      General: No focal deficit present  Mental Status: He is alert and oriented to person, place, and time  Psychiatric:         Mood and Affect: Mood normal          Behavior: Behavior normal        LABORATORY DATA     Labs: I have personally reviewed pertinent reports  Results from last 7 days   Lab Units 10/16/21  0507 10/15/21  0522 10/14/21  0500   WBC Thousand/uL 10 11 13 95* 11 43*   HEMOGLOBIN g/dL 9 4* 10 3* 10 9*   HEMATOCRIT % 28 3* 31 3* 33 2*   PLATELETS Thousands/uL 187 197 234   NEUTROS PCT % 72 76* 72   MONOS PCT % 8 9 12      Results from last 7 days   Lab Units 10/16/21  0507 10/15/21  0522 10/14/21  0500   POTASSIUM mmol/L 4 1 4 3 4 4   CHLORIDE mmol/L 103 104 105   CO2 mmol/L 25 25 25   BUN mg/dL 28* 28* 28*   CREATININE mg/dL 1 78* 1 88* 1 78*   CALCIUM mg/dL 8 5 8 6 8 9   ALK PHOS U/L  --  79  --    ALT U/L  --  11*  --    AST U/L  --  18  --      Results from last 7 days   Lab Units 10/14/21  0500   MAGNESIUM mg/dL 2 1     Results from last 7 days   Lab Units 10/14/21  0500   PHOSPHORUS mg/dL 3 0      Results from last 7 days   Lab Units 10/16/21  1352 10/16/21  0507 10/15/21  0522 10/14/21  0500   INR   --  1 64* 1 89* 2 14*   PTT seconds 45*  --   --   --          Results from last 7 days   Lab Units 10/13/21  0621   TROPONIN I ng/mL 0 02       IMAGING & DIAGNOSTIC TESTING     Radiology Results: I have personally reviewed pertinent reports  XR knee 1 or 2 vw right    Result Date: 10/13/2021  Impression: Comminuted intra-articular right knee fracture  Findings concur with the referring clinician's preliminary interpretation already in the patient's electronic health record    Workstation performed: [id number]     XR tibia fibula 2 views RIGHT    Result Date: 10/13/2021  Impression: Comminuted fracture of the right proximal tibia described in further detail on right knee x-ray report  No additional fractures further distally Workstation performed: [id number]     XR ankle 3+ views RIGHT    Result Date: 10/13/2021  Impression: No acute osseous abnormality  Workstation performed: [id number]     CT lower extremity wo contrast right    Result Date: 10/13/2021  Impression: Acute comminuted displaced intra-articular fracture of the proximal tibia as described above  No dislocation  This study demonstrates a significant  finding and was documented as such in [facility] for liaison and referring practitioner notification  Workstation performed: [id number]     Other Diagnostic Testing: I have personally reviewed pertinent reports      ACTIVE MEDICATIONS     Current Facility-Administered Medications   Medication Dose Route Frequency    acetaminophen (TYLENOL) tablet 650 mg  650 mg Oral Q6H PRN    calcium carbonate (TUMS) chewable tablet 500 mg  500 mg Oral Daily PRN    colchicine (COLCRYS) tablet 0 6 mg  0 6 mg Oral BID    [START ON 10/17/2021] colchicine (COLCRYS) tablet 0 6 mg  0 6 mg Oral Daily    Diclofenac Sodium (VOLTAREN) 1 % topical gel 2 g  2 g Topical 4x Daily    heparin (porcine) 25,000 units in 0 45% NaCl 250 mL infusion (premix)  3-20 Units/kg/hr (Order-Specific) Intravenous Titrated    heparin (porcine) injection 2,000 Units  2,000 Units Intravenous Q1H PRN    heparin (porcine) injection 4,000 Units  4,000 Units Intravenous Q1H PRN    HYDROmorphone (DILAUDID) injection 0 5 mg  0 5 mg Intravenous Q3H PRN    [START ON 10/17/2021] insulin glargine (LANTUS) subcutaneous injection 42 Units 0 42 mL  42 Units Subcutaneous Daily    insulin lispro (HumaLOG) 100 units/mL subcutaneous injection 1-6 Units  1-6 Units Subcutaneous 4x Daily (AC & HS)    lidocaine (LIDODERM) 5 % patch 1 patch  1 patch Topical Daily    multi-electrolyte (PLASMALYTE-A/ISOLYTE-S PH 7 4) IV solution  100 mL/hr Intravenous Continuous    ondansetron (ZOFRAN) injection 4 mg  4 mg Intravenous Q6H PRN    oxyCODONE (OxyCONTIN) 12 hr tablet 10 mg  10 mg Oral Q12H SHASHI    polyethylene glycol (MIRALAX) packet 17 g  17 g Oral Daily PRN    pravastatin (PRAVACHOL) tablet 40 mg  40 mg Oral Daily With Dinner    senna (SENOKOT) tablet 17 2 mg  2 tablet Oral BID       VTE Pharmacologic Prophylaxis: Heparin  VTE Mechanical Prophylaxis: sequential compression device    Portions of the record may have been created with voice recognition software  Occasional wrong word or "sound a like" substitutions may have occurred due to the inherent limitations of voice recognition software    Read the chart carefully and recognize, using context, where substitutions have occurred   ==  Zaida Avila MD  [address]  Internal Medicine Residency PGY-3

## 2021-10-17 PROBLEM — M11.262 PSEUDOGOUT OF KNEE, LEFT: Status: ACTIVE | Noted: 2021-10-17

## 2021-10-17 LAB
ANION GAP SERPL CALCULATED.3IONS-SCNC: 10 MMOL/L (ref 4–13)
APTT PPP: 58 SECONDS (ref 23–37)
APTT PPP: 61 SECONDS (ref 23–37)
APTT PPP: 63 SECONDS (ref 23–37)
BASOPHILS # BLD AUTO: 0.03 THOUSANDS/ΜL (ref 0–0.1)
BASOPHILS NFR BLD AUTO: 0 % (ref 0–1)
BUN SERPL-MCNC: 26 MG/DL (ref 5–25)
CALCIUM SERPL-MCNC: 8.4 MG/DL (ref 8.3–10.1)
CHLORIDE SERPL-SCNC: 103 MMOL/L (ref 100–108)
CO2 SERPL-SCNC: 26 MMOL/L (ref 21–32)
CREAT SERPL-MCNC: 1.61 MG/DL (ref 0.6–1.3)
EOSINOPHIL # BLD AUTO: 0.11 THOUSAND/ΜL (ref 0–0.61)
EOSINOPHIL NFR BLD AUTO: 1 % (ref 0–6)
ERYTHROCYTE [DISTWIDTH] IN BLOOD BY AUTOMATED COUNT: 14.7 % (ref 11.6–15.1)
GFR SERPL CREATININE-BSD FRML MDRD: 45 ML/MIN/1.73SQ M
GLUCOSE SERPL-MCNC: 207 MG/DL (ref 65–140)
GLUCOSE SERPL-MCNC: 224 MG/DL (ref 65–140)
GLUCOSE SERPL-MCNC: 240 MG/DL (ref 65–140)
GLUCOSE SERPL-MCNC: 257 MG/DL (ref 65–140)
GLUCOSE SERPL-MCNC: 262 MG/DL (ref 65–140)
GLUCOSE SERPL-MCNC: 271 MG/DL (ref 65–140)
HCT VFR BLD AUTO: 27.4 % (ref 36.5–49.3)
HGB BLD-MCNC: 9 G/DL (ref 12–17)
IMM GRANULOCYTES # BLD AUTO: 0.03 THOUSAND/UL (ref 0–0.2)
IMM GRANULOCYTES NFR BLD AUTO: 0 % (ref 0–2)
INR PPP: 1.47 (ref 0.84–1.19)
LYMPHOCYTES # BLD AUTO: 1.83 THOUSANDS/ΜL (ref 0.6–4.47)
LYMPHOCYTES NFR BLD AUTO: 20 % (ref 14–44)
MCH RBC QN AUTO: 29.4 PG (ref 26.8–34.3)
MCHC RBC AUTO-ENTMCNC: 32.8 G/DL (ref 31.4–37.4)
MCV RBC AUTO: 90 FL (ref 82–98)
MONOCYTES # BLD AUTO: 0.58 THOUSAND/ΜL (ref 0.17–1.22)
MONOCYTES NFR BLD AUTO: 6 % (ref 4–12)
NEUTROPHILS # BLD AUTO: 6.55 THOUSANDS/ΜL (ref 1.85–7.62)
NEUTS SEG NFR BLD AUTO: 73 % (ref 43–75)
NRBC BLD AUTO-RTO: 0 /100 WBCS
PLATELET # BLD AUTO: 200 THOUSANDS/UL (ref 149–390)
PMV BLD AUTO: 9.4 FL (ref 8.9–12.7)
POTASSIUM SERPL-SCNC: 4.5 MMOL/L (ref 3.5–5.3)
PROTHROMBIN TIME: 17.2 SECONDS (ref 11.6–14.5)
RBC # BLD AUTO: 3.06 MILLION/UL (ref 3.88–5.62)
SODIUM SERPL-SCNC: 139 MMOL/L (ref 136–145)
WBC # BLD AUTO: 9.13 THOUSAND/UL (ref 4.31–10.16)

## 2021-10-17 PROCEDURE — 82948 REAGENT STRIP/BLOOD GLUCOSE: CPT

## 2021-10-17 PROCEDURE — 85610 PROTHROMBIN TIME: CPT | Performed by: INTERNAL MEDICINE

## 2021-10-17 PROCEDURE — 85730 THROMBOPLASTIN TIME PARTIAL: CPT | Performed by: INTERNAL MEDICINE

## 2021-10-17 PROCEDURE — 80048 BASIC METABOLIC PNL TOTAL CA: CPT | Performed by: INTERNAL MEDICINE

## 2021-10-17 PROCEDURE — NC001 PR NO CHARGE: Performed by: PHYSICIAN ASSISTANT

## 2021-10-17 PROCEDURE — 85025 COMPLETE CBC W/AUTO DIFF WBC: CPT | Performed by: INTERNAL MEDICINE

## 2021-10-17 PROCEDURE — 99233 SBSQ HOSP IP/OBS HIGH 50: CPT | Performed by: INTERNAL MEDICINE

## 2021-10-17 RX ORDER — INSULIN GLARGINE 100 [IU]/ML
4 INJECTION, SOLUTION SUBCUTANEOUS ONCE
Status: COMPLETED | OUTPATIENT
Start: 2021-10-17 | End: 2021-10-17

## 2021-10-17 RX ORDER — INSULIN GLARGINE 100 [IU]/ML
46 INJECTION, SOLUTION SUBCUTANEOUS DAILY
Status: DISCONTINUED | OUTPATIENT
Start: 2021-10-18 | End: 2021-10-17

## 2021-10-17 RX ORDER — INSULIN GLARGINE 100 [IU]/ML
42 INJECTION, SOLUTION SUBCUTANEOUS DAILY
Status: DISCONTINUED | OUTPATIENT
Start: 2021-10-18 | End: 2021-10-20

## 2021-10-17 RX ADMIN — DICLOFENAC SODIUM 2 G: 10 GEL TOPICAL at 13:31

## 2021-10-17 RX ADMIN — OXYCODONE HYDROCHLORIDE 10 MG: 10 TABLET, FILM COATED, EXTENDED RELEASE ORAL at 22:00

## 2021-10-17 RX ADMIN — PRAVASTATIN SODIUM 40 MG: 40 TABLET ORAL at 17:30

## 2021-10-17 RX ADMIN — DICLOFENAC SODIUM 2 G: 10 GEL TOPICAL at 17:31

## 2021-10-17 RX ADMIN — SODIUM CHLORIDE, SODIUM GLUCONATE, SODIUM ACETATE, POTASSIUM CHLORIDE, MAGNESIUM CHLORIDE, SODIUM PHOSPHATE, DIBASIC, AND POTASSIUM PHOSPHATE 100 ML/HR: .53; .5; .37; .037; .03; .012; .00082 INJECTION, SOLUTION INTRAVENOUS at 05:42

## 2021-10-17 RX ADMIN — HEPARIN SODIUM 2000 UNITS: 1000 INJECTION INTRAVENOUS; SUBCUTANEOUS at 11:14

## 2021-10-17 RX ADMIN — INSULIN LISPRO 3 UNITS: 100 INJECTION, SOLUTION INTRAVENOUS; SUBCUTANEOUS at 22:01

## 2021-10-17 RX ADMIN — STANDARDIZED SENNA CONCENTRATE 17.2 MG: 8.6 TABLET ORAL at 09:50

## 2021-10-17 RX ADMIN — POLYETHYLENE GLYCOL 3350 17 G: 17 POWDER, FOR SOLUTION ORAL at 17:31

## 2021-10-17 RX ADMIN — STANDARDIZED SENNA CONCENTRATE 17.2 MG: 8.6 TABLET ORAL at 17:30

## 2021-10-17 RX ADMIN — HEPARIN SODIUM 15.1 UNITS/KG/HR: 10000 INJECTION, SOLUTION INTRAVENOUS at 11:14

## 2021-10-17 RX ADMIN — COLCHICINE 0.6 MG: 0.6 TABLET ORAL at 09:51

## 2021-10-17 RX ADMIN — OXYCODONE HYDROCHLORIDE 10 MG: 10 TABLET, FILM COATED, EXTENDED RELEASE ORAL at 09:50

## 2021-10-17 RX ADMIN — DICLOFENAC SODIUM 2 G: 10 GEL TOPICAL at 09:49

## 2021-10-17 RX ADMIN — INSULIN LISPRO 2 UNITS: 100 INJECTION, SOLUTION INTRAVENOUS; SUBCUTANEOUS at 09:48

## 2021-10-17 RX ADMIN — INSULIN GLARGINE 4 UNITS: 100 INJECTION, SOLUTION SUBCUTANEOUS at 13:41

## 2021-10-17 RX ADMIN — DICLOFENAC SODIUM 2 G: 10 GEL TOPICAL at 22:02

## 2021-10-17 RX ADMIN — INSULIN GLARGINE 42 UNITS: 100 INJECTION, SOLUTION SUBCUTANEOUS at 09:47

## 2021-10-17 RX ADMIN — LIDOCAINE 5% 1 PATCH: 700 PATCH TOPICAL at 09:50

## 2021-10-17 NOTE — ASSESSMENT & PLAN NOTE
Lab Results   Component Value Date    HGBA1C 8 2 (H) 10/16/2021       Recent Labs     10/16/21  2053 10/17/21  0123 10/17/21  0755 10/17/21  1152   POCGLU 204* 207* 224* 271*       Blood Sugar Average: Last 72 hrs:  (P) 756 [id number]  Known diabetic with diabetic neuropathy  On metformin and lantus at home  Blood sugar on presentation 418  Contiunue sliding scale insulin  Hypoglycemic protocol  Goal blood sugar 140-180  Diabetic diet  Received Lantus 46 u in Am 10/17  In the hospital, we will continue Lantus 45 u HS, and meal time insulin 8 TIDAC as blood sugars was slightly on the higher side    Continue SSI coverage

## 2021-10-17 NOTE — PROGRESS NOTES
Progress Note - Orthopedics   Sjolesya QuezadaSauk 61 y o  male MRN: [id number]  Unit/Bed#: -01      Subjective:    66 y o male Right tibia fracture and Left knee pseudogout, resting in bed, and in no acute distress  Yesterday, aspiration of Left knee was performed with mild alleviation of symptoms  This morning, patient reports his Left knee is feeling much better s/p aspiration  Still admits to Right leg pain; denies numbness or tingling  Has been compliant with knee immobilizer and ice application  Patient to go to OR tomorrow with Dr Antony Cortes for ORIF Right tibia fracture, all associated procedures  At this time, patient denies chest pain, shortness of breath, fevers, chills       Labs:  0   Lab Value Date/Time    HCT 27 4 (L) 10/17/2021 0333    HCT 28 3 (L) 10/16/2021 0507    HCT 31 3 (L) 10/15/2021 0522    HCT 38 4 07/13/2015 1448    HCT 38 6 10/20/2014 1132    HGB 9 0 (L) 10/17/2021 0333    HGB 9 4 (L) 10/16/2021 0507    HGB 10 3 (L) 10/15/2021 0522    HGB 13 1 07/13/2015 1448    HGB 13 1 10/20/2014 1132    INR 1 47 (H) 10/17/2021 0328    INR 2 94 (H) 12/11/2015 1543    WBC 9 13 10/17/2021 0333    WBC 10 11 10/16/2021 0507    WBC 13 95 (H) 10/15/2021 0522    WBC 7 7 07/13/2015 1448    WBC 8 30 10/20/2014 1132    ESR 58 (H) 05/20/2016 1207       Meds:    Current Facility-Administered Medications:     acetaminophen (TYLENOL) tablet 650 mg, 650 mg, Oral, Q6H PRN, Antonieta Roth MD, 650 mg at 10/16/21 2341    calcium carbonate (TUMS) chewable tablet 500 mg, 500 mg, Oral, Daily PRN, Sayra Pandey PA-C, 500 mg at 10/14/21 0006    colchicine (COLCRYS) tablet 0 6 mg, 0 6 mg, Oral, Daily, Antonieta Roth MD    Diclofenac Sodium (VOLTAREN) 1 % topical gel 2 g, 2 g, Topical, 4x Daily, Alannah Grier MD, 2 g at 10/16/21 2100    heparin (porcine) 25,000 units in 0 45% NaCl 250 mL infusion (premix), 3-20 Units/kg/hr (Order-Specific), Intravenous, Titrated, Antonieta Roth MD, Last Rate: 11 8 mL/hr at 10/16/21 2203, 13 1 Units/kg/hr at 10/16/21 2203    heparin (porcine) injection 2,000 Units, 2,000 Units, Intravenous, Q1H PRN, Vazquez Vera MD, 2,000 Units at 10/16/21 2202    heparin (porcine) injection 4,000 Units, 4,000 Units, Intravenous, Q1H PRN, Vazquez Vera MD    HYDROmorphone (DILAUDID) injection 0 5 mg, 0 5 mg, Intravenous, Q3H PRN, Jere Hines MD, 0 5 mg at 10/16/21 0005    insulin glargine (LANTUS) subcutaneous injection 42 Units 0 42 mL, 42 Units, Subcutaneous, Daily, Vazquez Vera MD    insulin lispro (HumaLOG) 100 units/mL subcutaneous injection 1-6 Units, 1-6 Units, Subcutaneous, 4x Daily (AC & HS), 2 Units at 10/16/21 2100 **AND** Fingerstick Glucose (POCT), , , 4x Daily AC and at bedtime, Sayra Pandey PA-C    lidocaine (LIDODERM) 5 % patch 1 patch, 1 patch, Topical, Daily, Vazquez Vera MD, 1 patch at 10/16/21 0950    multi-electrolyte (PLASMALYTE-A/ISOLYTE-S PH 7 4) IV solution, 100 mL/hr, Intravenous, Continuous, Vazquez Vera MD, Last Rate: 100 mL/hr at 10/17/21 0542, 100 mL/hr at 10/17/21 0542    ondansetron (ZOFRAN) injection 4 mg, 4 mg, Intravenous, Q6H PRN, Vazquez Vera MD, 4 mg at 10/13/21 2249    oxyCODONE (OxyCONTIN) 12 hr tablet 10 mg, 10 mg, Oral, Q12H [address], Vazquez Vera MD, 10 mg at 10/16/21 2050    polyethylene glycol (MIRALAX) packet 17 g, 17 g, Oral, Daily PRN, Vazquez Vera MD    pravastatin (PRAVACHOL) tablet 40 mg, 40 mg, Oral, Daily With Dinner, Vazquez Vera MD, 40 mg at 10/16/21 1813    senna (SENOKOT) tablet 17 2 mg, 2 tablet, Oral, BID, Jere Hines MD, 17 2 mg at 10/16/21 1812    Blood Culture:   No results found for: BLOODCX    Wound Culture:   No results found for: WOUNDCULT    Ins and Outs:  I/O last 24 hours:   In: 3026 [P O :480; I V :980]  Out: 1325 [Urine:1325]          Physical:  Vitals:    10/17/21 0758   BP: 169/80   Pulse: 92   Resp:    Temp: 98 8 °F (37 1 °C)   SpO2: 95%     Musculoskeletal: Right lower extremity  · Continued yellow ecchymosis throughout Right distal lower extremity  · Calf and compartments soft, compressible, and nontender  · Range of motion intact Right ankle and toes   · +2 DP pulse palpated     Left lower extremity   · Range of motion to about  degrees flexion, improved since previous exams  · Trace effusion palpated  · Decreased tenderness to medial and lateral aspects of Left knee  · No pain or laxity with valgus and varus stress  · +2 DP pulse palpated  · Sensation intact L2-S1 dermatomes     Assessment:    66 y o male Right tibia metaphyseal fracture, Left knee pseudogout     Plan:  · Nonweightbearing Right lower extremity, Weightbearing as tolerated Left lower extremity  Ice application to Right leg and Left knee   · PT/OT  · Pain control  · DVT ppx  · Dispo: Will not administer corticosteroid injection at this time into Left knee secondary to surgery tomorrow on Right leg  Surgical consent discussed with patient and wife present upon exam  Patients wife did take a picture of surgical consent with phone prior to Dr Silver Arias signature  Risks and benefits of surgical intervention discussed with patient  NPO at midnight tonight and all anticoagulation held for surgical intervention tomorrow  Will continue to follow       Dion Henriquez PA-C

## 2021-10-18 ENCOUNTER — ANESTHESIA (INPATIENT)
Dept: PERIOP | Facility: HOSPITAL | Age: 66
DRG: 493 | End: 2021-10-18
Payer: COMMERCIAL

## 2021-10-18 ENCOUNTER — ANESTHESIA EVENT (INPATIENT)
Dept: PERIOP | Facility: HOSPITAL | Age: 66
DRG: 493 | End: 2021-10-18
Payer: COMMERCIAL

## 2021-10-18 ENCOUNTER — APPOINTMENT (INPATIENT)
Dept: RADIOLOGY | Facility: HOSPITAL | Age: 66
DRG: 493 | End: 2021-10-18
Payer: COMMERCIAL

## 2021-10-18 PROBLEM — E66.811 CLASS 1 OBESITY IN ADULT: Status: ACTIVE | Noted: 2021-06-07

## 2021-10-18 PROBLEM — T88.4XXA DIFFICULT INTUBATION: Status: ACTIVE | Noted: 2021-10-18

## 2021-10-18 PROBLEM — E66.9 CLASS 1 OBESITY IN ADULT: Status: ACTIVE | Noted: 2021-06-07

## 2021-10-18 PROBLEM — T88.59XA DELAYED EMERGENCE FROM ANESTHESIA: Status: ACTIVE | Noted: 2021-10-18

## 2021-10-18 LAB
ANION GAP SERPL CALCULATED.3IONS-SCNC: 9 MMOL/L (ref 4–13)
APTT PPP: 40 SECONDS (ref 23–37)
ATRIAL RATE: 81 BPM
BASOPHILS # BLD AUTO: 0.02 THOUSANDS/ΜL (ref 0–0.1)
BASOPHILS NFR BLD AUTO: 0 % (ref 0–1)
BUN SERPL-MCNC: 28 MG/DL (ref 5–25)
CALCIUM SERPL-MCNC: 8.6 MG/DL (ref 8.3–10.1)
CHLORIDE SERPL-SCNC: 103 MMOL/L (ref 100–108)
CO2 SERPL-SCNC: 26 MMOL/L (ref 21–32)
CREAT SERPL-MCNC: 1.47 MG/DL (ref 0.6–1.3)
EOSINOPHIL # BLD AUTO: 0.15 THOUSAND/ΜL (ref 0–0.61)
EOSINOPHIL NFR BLD AUTO: 2 % (ref 0–6)
ERYTHROCYTE [DISTWIDTH] IN BLOOD BY AUTOMATED COUNT: 14.6 % (ref 11.6–15.1)
GFR SERPL CREATININE-BSD FRML MDRD: 50 ML/MIN/1.73SQ M
GLUCOSE SERPL-MCNC: 222 MG/DL (ref 65–140)
GLUCOSE SERPL-MCNC: 224 MG/DL (ref 65–140)
GLUCOSE SERPL-MCNC: 245 MG/DL (ref 65–140)
GLUCOSE SERPL-MCNC: 261 MG/DL (ref 65–140)
GLUCOSE SERPL-MCNC: 274 MG/DL (ref 65–140)
GLUCOSE SERPL-MCNC: 311 MG/DL (ref 65–140)
HCT VFR BLD AUTO: 28 % (ref 36.5–49.3)
HGB BLD-MCNC: 9.2 G/DL (ref 12–17)
IMM GRANULOCYTES # BLD AUTO: 0.03 THOUSAND/UL (ref 0–0.2)
IMM GRANULOCYTES NFR BLD AUTO: 0 % (ref 0–2)
INR PPP: 1.18 (ref 0.84–1.19)
LYMPHOCYTES # BLD AUTO: 0.91 THOUSANDS/ΜL (ref 0.6–4.47)
LYMPHOCYTES NFR BLD AUTO: 13 % (ref 14–44)
MCH RBC QN AUTO: 29.2 PG (ref 26.8–34.3)
MCHC RBC AUTO-ENTMCNC: 32.9 G/DL (ref 31.4–37.4)
MCV RBC AUTO: 89 FL (ref 82–98)
MONOCYTES # BLD AUTO: 0.64 THOUSAND/ΜL (ref 0.17–1.22)
MONOCYTES NFR BLD AUTO: 9 % (ref 4–12)
NEUTROPHILS # BLD AUTO: 5.05 THOUSANDS/ΜL (ref 1.85–7.62)
NEUTS SEG NFR BLD AUTO: 76 % (ref 43–75)
NRBC BLD AUTO-RTO: 0 /100 WBCS
P AXIS: 41 DEGREES
PLATELET # BLD AUTO: 246 THOUSANDS/UL (ref 149–390)
PMV BLD AUTO: 9.4 FL (ref 8.9–12.7)
POTASSIUM SERPL-SCNC: 4.1 MMOL/L (ref 3.5–5.3)
PR INTERVAL: 140 MS
PROTHROMBIN TIME: 14.6 SECONDS (ref 11.6–14.5)
QRS AXIS: 20 DEGREES
QRSD INTERVAL: 100 MS
QT INTERVAL: 378 MS
QTC INTERVAL: 439 MS
RBC # BLD AUTO: 3.15 MILLION/UL (ref 3.88–5.62)
SODIUM SERPL-SCNC: 138 MMOL/L (ref 136–145)
T WAVE AXIS: 40 DEGREES
VENTRICULAR RATE: 81 BPM
WBC # BLD AUTO: 6.8 THOUSAND/UL (ref 4.31–10.16)

## 2021-10-18 PROCEDURE — C1713 ANCHOR/SCREW BN/BN,TIS/BN: HCPCS | Performed by: ORTHOPAEDIC SURGERY

## 2021-10-18 PROCEDURE — 20690 APPL UNIPLN UNI EXT FIXJ SYS: CPT | Performed by: ORTHOPAEDIC SURGERY

## 2021-10-18 PROCEDURE — 99232 SBSQ HOSP IP/OBS MODERATE 35: CPT | Performed by: INTERNAL MEDICINE

## 2021-10-18 PROCEDURE — 82948 REAGENT STRIP/BLOOD GLUCOSE: CPT

## 2021-10-18 PROCEDURE — 27752 TREATMENT OF TIBIA FRACTURE: CPT | Performed by: ORTHOPAEDIC SURGERY

## 2021-10-18 PROCEDURE — 85730 THROMBOPLASTIN TIME PARTIAL: CPT | Performed by: INTERNAL MEDICINE

## 2021-10-18 PROCEDURE — 27752 TREATMENT OF TIBIA FRACTURE: CPT | Performed by: PHYSICIAN ASSISTANT

## 2021-10-18 PROCEDURE — 85025 COMPLETE CBC W/AUTO DIFF WBC: CPT | Performed by: INTERNAL MEDICINE

## 2021-10-18 PROCEDURE — 85610 PROTHROMBIN TIME: CPT | Performed by: INTERNAL MEDICINE

## 2021-10-18 PROCEDURE — 73590 X-RAY EXAM OF LOWER LEG: CPT

## 2021-10-18 PROCEDURE — 93005 ELECTROCARDIOGRAM TRACING: CPT

## 2021-10-18 PROCEDURE — 20690 APPL UNIPLN UNI EXT FIXJ SYS: CPT | Performed by: PHYSICIAN ASSISTANT

## 2021-10-18 PROCEDURE — 93010 ELECTROCARDIOGRAM REPORT: CPT | Performed by: INTERNAL MEDICINE

## 2021-10-18 PROCEDURE — 0QSG35Z REPOSITION RIGHT TIBIA WITH EXTERNAL FIXATION DEVICE, PERCUTANEOUS APPROACH: ICD-10-PCS | Performed by: ORTHOPAEDIC SURGERY

## 2021-10-18 PROCEDURE — 80048 BASIC METABOLIC PNL TOTAL CA: CPT | Performed by: INTERNAL MEDICINE

## 2021-10-18 DEVICE — OUTRIGGER POST 11MM 30DEG
Type: IMPLANTABLE DEVICE | Site: LEG | Status: NON-FUNCTIONAL
Removed: 2021-10-25

## 2021-10-18 DEVICE — 5.0MM SELF-DRILLING SCHANZ SCREW 60MM THRD/175MM
Type: IMPLANTABLE DEVICE | Site: LEG | Status: NON-FUNCTIONAL
Removed: 2021-10-25

## 2021-10-18 DEVICE — 5.0MM SELF-DRILLING SCHANZ SCREW 80MM THRD/200MM
Type: IMPLANTABLE DEVICE | Site: LEG | Status: NON-FUNCTIONAL
Removed: 2021-10-25

## 2021-10-18 DEVICE — CLAMP EXFIX LRG 6 POS PIN MRI SAFE LRG SYNTHES 390.010
Type: IMPLANTABLE DEVICE | Site: LEG | Status: NON-FUNCTIONAL
Removed: 2021-10-25

## 2021-10-18 DEVICE — ROD CARBON FIBER 11MM X 200MM
Type: IMPLANTABLE DEVICE | Site: LEG | Status: NON-FUNCTIONAL
Removed: 2021-10-25

## 2021-10-18 DEVICE — ROD CARBON FIBER 11MM X 400MM
Type: IMPLANTABLE DEVICE | Site: LEG | Status: NON-FUNCTIONAL
Removed: 2021-10-25

## 2021-10-18 DEVICE — OUT RIGGER POST STRAIGHT 11MM
Type: IMPLANTABLE DEVICE | Site: LEG | Status: NON-FUNCTIONAL
Removed: 2021-10-25

## 2021-10-18 DEVICE — CLAMP EXFIX LRG COMBINATION MRI SAFE LRG
Type: IMPLANTABLE DEVICE | Site: LEG | Status: NON-FUNCTIONAL
Removed: 2021-10-25

## 2021-10-18 RX ORDER — SUCCINYLCHOLINE/SOD CL,ISO/PF 100 MG/5ML
SYRINGE (ML) INTRAVENOUS AS NEEDED
Status: DISCONTINUED | OUTPATIENT
Start: 2021-10-18 | End: 2021-10-18

## 2021-10-18 RX ORDER — SODIUM CHLORIDE, SODIUM LACTATE, POTASSIUM CHLORIDE, CALCIUM CHLORIDE 600; 310; 30; 20 MG/100ML; MG/100ML; MG/100ML; MG/100ML
INJECTION, SOLUTION INTRAVENOUS CONTINUOUS PRN
Status: DISCONTINUED | OUTPATIENT
Start: 2021-10-18 | End: 2021-10-18

## 2021-10-18 RX ORDER — MAGNESIUM HYDROXIDE 1200 MG/15ML
LIQUID ORAL AS NEEDED
Status: DISCONTINUED | OUTPATIENT
Start: 2021-10-18 | End: 2021-10-18 | Stop reason: HOSPADM

## 2021-10-18 RX ORDER — ALBUMIN, HUMAN INJ 5% 5 %
SOLUTION INTRAVENOUS CONTINUOUS PRN
Status: DISCONTINUED | OUTPATIENT
Start: 2021-10-18 | End: 2021-10-18

## 2021-10-18 RX ORDER — ROCURONIUM BROMIDE 10 MG/ML
INJECTION, SOLUTION INTRAVENOUS AS NEEDED
Status: DISCONTINUED | OUTPATIENT
Start: 2021-10-18 | End: 2021-10-18

## 2021-10-18 RX ORDER — CEFAZOLIN SODIUM 2 G/50ML
2000 SOLUTION INTRAVENOUS ONCE
Status: COMPLETED | OUTPATIENT
Start: 2021-10-18 | End: 2021-10-18

## 2021-10-18 RX ORDER — ONDANSETRON 2 MG/ML
INJECTION INTRAMUSCULAR; INTRAVENOUS AS NEEDED
Status: DISCONTINUED | OUTPATIENT
Start: 2021-10-18 | End: 2021-10-18

## 2021-10-18 RX ORDER — SODIUM CHLORIDE, SODIUM LACTATE, POTASSIUM CHLORIDE, CALCIUM CHLORIDE 600; 310; 30; 20 MG/100ML; MG/100ML; MG/100ML; MG/100ML
50 INJECTION, SOLUTION INTRAVENOUS CONTINUOUS
Status: DISCONTINUED | OUTPATIENT
Start: 2021-10-18 | End: 2021-10-25

## 2021-10-18 RX ORDER — CEFAZOLIN SODIUM 2 G/50ML
2000 SOLUTION INTRAVENOUS EVERY 8 HOURS
Status: COMPLETED | OUTPATIENT
Start: 2021-10-18 | End: 2021-10-20

## 2021-10-18 RX ORDER — SODIUM CHLORIDE, SODIUM LACTATE, POTASSIUM CHLORIDE, CALCIUM CHLORIDE 600; 310; 30; 20 MG/100ML; MG/100ML; MG/100ML; MG/100ML
75 INJECTION, SOLUTION INTRAVENOUS CONTINUOUS
Status: DISCONTINUED | OUTPATIENT
Start: 2021-10-18 | End: 2021-11-01

## 2021-10-18 RX ORDER — HYDROMORPHONE HCL/PF 1 MG/ML
0.25 SYRINGE (ML) INJECTION
Status: DISCONTINUED | OUTPATIENT
Start: 2021-10-18 | End: 2021-10-18 | Stop reason: HOSPADM

## 2021-10-18 RX ORDER — LABETALOL 20 MG/4 ML (5 MG/ML) INTRAVENOUS SYRINGE
5 ONCE
Status: COMPLETED | OUTPATIENT
Start: 2021-10-18 | End: 2021-10-18

## 2021-10-18 RX ORDER — FENTANYL CITRATE 50 UG/ML
INJECTION, SOLUTION INTRAMUSCULAR; INTRAVENOUS AS NEEDED
Status: DISCONTINUED | OUTPATIENT
Start: 2021-10-18 | End: 2021-10-18

## 2021-10-18 RX ORDER — ALBUTEROL SULFATE 2.5 MG/3ML
2.5 SOLUTION RESPIRATORY (INHALATION) ONCE AS NEEDED
Status: DISCONTINUED | OUTPATIENT
Start: 2021-10-18 | End: 2021-10-18 | Stop reason: HOSPADM

## 2021-10-18 RX ORDER — MIDAZOLAM HYDROCHLORIDE 2 MG/2ML
INJECTION, SOLUTION INTRAMUSCULAR; INTRAVENOUS AS NEEDED
Status: DISCONTINUED | OUTPATIENT
Start: 2021-10-18 | End: 2021-10-18

## 2021-10-18 RX ORDER — PROPOFOL 10 MG/ML
INJECTION, EMULSION INTRAVENOUS AS NEEDED
Status: DISCONTINUED | OUTPATIENT
Start: 2021-10-18 | End: 2021-10-18

## 2021-10-18 RX ORDER — FENTANYL CITRATE/PF 50 MCG/ML
25 SYRINGE (ML) INJECTION
Status: DISCONTINUED | OUTPATIENT
Start: 2021-10-18 | End: 2021-10-18 | Stop reason: HOSPADM

## 2021-10-18 RX ORDER — HEPARIN SODIUM 5000 [USP'U]/ML
5000 INJECTION, SOLUTION INTRAVENOUS; SUBCUTANEOUS EVERY 8 HOURS SCHEDULED
Status: DISCONTINUED | OUTPATIENT
Start: 2021-10-19 | End: 2021-10-19

## 2021-10-18 RX ORDER — DEXAMETHASONE SODIUM PHOSPHATE 4 MG/ML
INJECTION, SOLUTION INTRA-ARTICULAR; INTRALESIONAL; INTRAMUSCULAR; INTRAVENOUS; SOFT TISSUE AS NEEDED
Status: DISCONTINUED | OUTPATIENT
Start: 2021-10-18 | End: 2021-10-18

## 2021-10-18 RX ORDER — ONDANSETRON 2 MG/ML
4 INJECTION INTRAMUSCULAR; INTRAVENOUS ONCE AS NEEDED
Status: DISCONTINUED | OUTPATIENT
Start: 2021-10-18 | End: 2021-10-18 | Stop reason: HOSPADM

## 2021-10-18 RX ORDER — METOCLOPRAMIDE HYDROCHLORIDE 5 MG/ML
10 INJECTION INTRAMUSCULAR; INTRAVENOUS ONCE AS NEEDED
Status: DISCONTINUED | OUTPATIENT
Start: 2021-10-18 | End: 2021-10-18 | Stop reason: HOSPADM

## 2021-10-18 RX ORDER — SODIUM CHLORIDE, SODIUM LACTATE, POTASSIUM CHLORIDE, CALCIUM CHLORIDE 600; 310; 30; 20 MG/100ML; MG/100ML; MG/100ML; MG/100ML
125 INJECTION, SOLUTION INTRAVENOUS CONTINUOUS
Status: DISCONTINUED | OUTPATIENT
Start: 2021-10-18 | End: 2021-10-25

## 2021-10-18 RX ADMIN — SODIUM CHLORIDE, SODIUM GLUCONATE, SODIUM ACETATE, POTASSIUM CHLORIDE, MAGNESIUM CHLORIDE, SODIUM PHOSPHATE, DIBASIC, AND POTASSIUM PHOSPHATE 75 ML/HR: .53; .5; .37; .037; .03; .012; .00082 INJECTION, SOLUTION INTRAVENOUS at 20:20

## 2021-10-18 RX ADMIN — PROPOFOL 50 MG: 10 INJECTION, EMULSION INTRAVENOUS at 13:46

## 2021-10-18 RX ADMIN — SODIUM CHLORIDE, SODIUM LACTATE, POTASSIUM CHLORIDE, AND CALCIUM CHLORIDE 50 ML/HR: .6; .31; .03; .02 INJECTION, SOLUTION INTRAVENOUS at 18:16

## 2021-10-18 RX ADMIN — SODIUM CHLORIDE, SODIUM LACTATE, POTASSIUM CHLORIDE, AND CALCIUM CHLORIDE: .6; .31; .03; .02 INJECTION, SOLUTION INTRAVENOUS at 13:20

## 2021-10-18 RX ADMIN — COLCHICINE 0.6 MG: 0.6 TABLET ORAL at 08:59

## 2021-10-18 RX ADMIN — PROPOFOL 50 MG: 10 INJECTION, EMULSION INTRAVENOUS at 13:50

## 2021-10-18 RX ADMIN — PROPOFOL 50 MG: 10 INJECTION, EMULSION INTRAVENOUS at 13:48

## 2021-10-18 RX ADMIN — STANDARDIZED SENNA CONCENTRATE 17.2 MG: 8.6 TABLET ORAL at 18:16

## 2021-10-18 RX ADMIN — PROPOFOL 50 MG: 10 INJECTION, EMULSION INTRAVENOUS at 13:53

## 2021-10-18 RX ADMIN — FENTANYL CITRATE 50 MCG: 50 INJECTION, SOLUTION INTRAMUSCULAR; INTRAVENOUS at 13:40

## 2021-10-18 RX ADMIN — PROPOFOL 50 MG: 10 INJECTION, EMULSION INTRAVENOUS at 13:44

## 2021-10-18 RX ADMIN — LIDOCAINE 5% 1 PATCH: 700 PATCH TOPICAL at 09:00

## 2021-10-18 RX ADMIN — ROCURONIUM BROMIDE 30 MG: 10 INJECTION, SOLUTION INTRAVENOUS at 14:07

## 2021-10-18 RX ADMIN — ALBUMIN (HUMAN): 12.5 INJECTION, SOLUTION INTRAVENOUS at 14:18

## 2021-10-18 RX ADMIN — DICLOFENAC SODIUM 2 G: 10 GEL TOPICAL at 22:19

## 2021-10-18 RX ADMIN — SUGAMMADEX 200 MG: 100 INJECTION, SOLUTION INTRAVENOUS at 15:30

## 2021-10-18 RX ADMIN — PROPOFOL 50 MG: 10 INJECTION, EMULSION INTRAVENOUS at 13:55

## 2021-10-18 RX ADMIN — HYDROMORPHONE HYDROCHLORIDE 0.5 MG: 1 INJECTION, SOLUTION INTRAMUSCULAR; INTRAVENOUS; SUBCUTANEOUS at 17:55

## 2021-10-18 RX ADMIN — PHENYLEPHRINE HYDROCHLORIDE 30 MCG/MIN: 10 INJECTION INTRAVENOUS at 14:05

## 2021-10-18 RX ADMIN — FENTANYL CITRATE 50 MCG: 50 INJECTION, SOLUTION INTRAMUSCULAR; INTRAVENOUS at 14:54

## 2021-10-18 RX ADMIN — Medication 100 MG: at 13:41

## 2021-10-18 RX ADMIN — CEFAZOLIN SODIUM 2000 MG: 2 SOLUTION INTRAVENOUS at 20:20

## 2021-10-18 RX ADMIN — LABETALOL 20 MG/4 ML (5 MG/ML) INTRAVENOUS SYRINGE 5 MG: at 16:22

## 2021-10-18 RX ADMIN — OXYCODONE HYDROCHLORIDE 10 MG: 10 TABLET, FILM COATED, EXTENDED RELEASE ORAL at 20:19

## 2021-10-18 RX ADMIN — PROPOFOL 50 MG: 10 INJECTION, EMULSION INTRAVENOUS at 14:01

## 2021-10-18 RX ADMIN — LIDOCAINE HYDROCHLORIDE 100 MG: 20 INJECTION, SOLUTION INTRAVENOUS at 13:39

## 2021-10-18 RX ADMIN — DEXAMETHASONE SODIUM PHOSPHATE 4 MG: 4 INJECTION, SOLUTION INTRAMUSCULAR; INTRAVENOUS at 13:55

## 2021-10-18 RX ADMIN — MIDAZOLAM HYDROCHLORIDE 2 MG: 1 INJECTION, SOLUTION INTRAMUSCULAR; INTRAVENOUS at 13:22

## 2021-10-18 RX ADMIN — INSULIN GLARGINE 42 UNITS: 100 INJECTION, SOLUTION SUBCUTANEOUS at 09:00

## 2021-10-18 RX ADMIN — PRAVASTATIN SODIUM 40 MG: 40 TABLET ORAL at 18:16

## 2021-10-18 RX ADMIN — INSULIN LISPRO 5 UNITS: 100 INJECTION, SOLUTION INTRAVENOUS; SUBCUTANEOUS at 22:18

## 2021-10-18 RX ADMIN — DICLOFENAC SODIUM 2 G: 10 GEL TOPICAL at 09:01

## 2021-10-18 RX ADMIN — ROCURONIUM BROMIDE 20 MG: 10 INJECTION, SOLUTION INTRAVENOUS at 15:04

## 2021-10-18 RX ADMIN — PROPOFOL 50 MG: 10 INJECTION, EMULSION INTRAVENOUS at 13:40

## 2021-10-18 RX ADMIN — PROPOFOL 50 MG: 10 INJECTION, EMULSION INTRAVENOUS at 13:58

## 2021-10-18 RX ADMIN — INSULIN LISPRO 4 UNITS: 100 INJECTION, SOLUTION INTRAVENOUS; SUBCUTANEOUS at 18:18

## 2021-10-18 RX ADMIN — STANDARDIZED SENNA CONCENTRATE 17.2 MG: 8.6 TABLET ORAL at 08:59

## 2021-10-18 RX ADMIN — ONDANSETRON 4 MG: 2 INJECTION INTRAMUSCULAR; INTRAVENOUS at 15:33

## 2021-10-18 RX ADMIN — PROPOFOL 50 MG: 10 INJECTION, EMULSION INTRAVENOUS at 13:41

## 2021-10-18 RX ADMIN — CEFAZOLIN SODIUM 2000 MG: 2 SOLUTION INTRAVENOUS at 14:00

## 2021-10-18 RX ADMIN — PROPOFOL 50 MG: 10 INJECTION, EMULSION INTRAVENOUS at 13:42

## 2021-10-18 RX ADMIN — HYDROMORPHONE HYDROCHLORIDE 0.5 MG: 1 INJECTION, SOLUTION INTRAMUSCULAR; INTRAVENOUS; SUBCUTANEOUS at 04:00

## 2021-10-18 RX ADMIN — HYDROMORPHONE HYDROCHLORIDE 0.5 MG: 1 INJECTION, SOLUTION INTRAMUSCULAR; INTRAVENOUS; SUBCUTANEOUS at 23:26

## 2021-10-18 RX ADMIN — OXYCODONE HYDROCHLORIDE 10 MG: 10 TABLET, FILM COATED, EXTENDED RELEASE ORAL at 08:59

## 2021-10-18 RX ADMIN — INSULIN LISPRO 3 UNITS: 100 INJECTION, SOLUTION INTRAVENOUS; SUBCUTANEOUS at 08:05

## 2021-10-18 NOTE — ASSESSMENT & PLAN NOTE
Now subtherapeutic ,  Patient previously on Coumadin due to history of DVTs  This is currently on hold due to need for surgery  Ortho recommends 1 4-1 6 for ortho procedure  INR 1 47--  1 18  Continue to hold Coumadin

## 2021-10-18 NOTE — PROGRESS NOTES
INTERNAL MEDICINE RESIDENCY PROGRESS NOTE     Name: Luli Dennis   Age & Sex: 61 y o  male   MRN: [id number]  Unit/Bed#: OR POOL   Encounter: [id number]  Team: SLIM    PATIENT INFORMATION     Name: Luli Dennis   Age & Sex: 61 y o  male   MRN: [id number]  Hospital Stay Days: 5    ASSESSMENT/PLAN     Principal Problem:    Closed fracture of proximal end of right tibia  Active Problems:    ROSE (acute kidney injury) ([facility] )    Essential hypertension    Mixed hyperlipidemia    Type 2 diabetes mellitus with diabetic neuropathy, without long-term current use of insulin (HCC)    History of DVT (deep vein thrombosis)    Elevated INR    Pseudogout of knee, left    Delayed emergence from anesthesia    Difficult intubation      * Closed fracture of proximal end of right tibia  Assessment & Plan  Presented with Right knee / proximal leg pain and inability to bear weight after ground level fall after twisting leg in his kitchen  Assoc deformity,mild abrasion  10/13-Imaging findings including CT confirmed Acute comminuted displaced intra-articular fracture of the proximal tibia as described above  No dislocation  Small lipohemarthrosis  Moderate distal femoral and proximal tibiofibular subcutaneous edema/contusion  On knee immobilizer  Non weight bearing  Orthopedic input noted and appreciated  Patient will need surgical correction of fracture  Continue analgesia, will increased dosing today  Add stool softeners  Will not administer corticosteroid injection at this time into left knee as patient is having surgery tomorrow on right knee  NPO from midnight  Anticoagulation since midnight  Surgery will be performed today  ROSE (acute kidney injury) ([facility] )  Assessment & Plan  ROSE on CKD  May be prerenal   Cr on  Pres 1 99  Baseline 1 21 months ago  Avoid nephrotoxics  Avoid hypotension  Monitor I/O  Continue Light hydration with isolyte at 75cc/h      Elevated INR  Assessment & Plan  Patient previously on Coumadin due to history of DVTs  This is currently on hold due to need for surgery  Ortho recommends 1 4-1 6 for ortho procedure  INR 1 47--  1 18  Continue to hold Coumadin  Monitor INR daily  Holding heparin gtt  For procedure  History of DVT (deep vein thrombosis)  Assessment & Plan  History 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  Unprovoked  Last episode was 10yrs ago  On AC with coumadin  Currently held  INR now 1 89  Would need heparin if INR is subtherapeutic  DVT prophylaxis with venodyne   Daily INR  Heparin drip will be stopped at midnight for procedure tomorrow     Type 2 diabetes mellitus with diabetic neuropathy, without long-term current use of insulin [facility])  Assessment & Plan  Lab Results   Component Value Date    HGBA1C 8 2 (H) 10/16/2021       Recent Labs     10/16/21  2053 10/17/21  0123 10/17/21  0755 10/17/21  1152   POCGLU 204* 207* 224* 271*       Blood Sugar Average: Last 72 hrs:  (P) 058 [id number]  Known diabetic with diabetic neuropathy  On metformin and lantus at home  Blood sugar on presentation 418  Contiunue sliding scale insulin  Hypoglycemic protocol  Goal blood sugar 140-180  Diabetic diet  Received Lantus 46 u in Am 10/17  Will continue Lantus 42 u HS, and consider meal time insulin after procedure  Continue SSI for now as patient will be NPO         Mixed hyperlipidemia  Assessment & Plan  Continue statin  Essential hypertension  Assessment & Plan  On lisinopril 10mg daily at home  On hold for ROSE  BP stable  Monitor BP  Disposition: continue IP     SUBJECTIVE     Patient seen and examined  No acute events overnight  Feels about the same  Pain in the left knee slightly improved  Denied fever, chills, chest pain , nausea or vomiting  Endorsed constipation      OBJECTIVE     Vitals:    10/18/21 1545 10/18/21 1600 10/18/21 1615 10/18/21 1630   BP: (!) 199/93  (!) 181/76 (!) 174/78   Pulse: 97  89 79   Resp: 21 20 17   Temp: 98 1 °F (36 7 °C) 97 8 °F (36 6 °C) 98 9 °F (37 2 °C) 99 °F (37 2 °C)   TempSrc:       SpO2: 99%  96% 94%   Weight:       Height:          Temperature:   Temp (24hrs), Av 7 °F (37 1 °C), Min:97 8 °F (36 6 °C), Max:100 1 °F (37 8 °C)    Temperature: 99 °F (37 2 °C)  Intake & Output:  I/O       10/16 07 - 10/17 0700 10/17 07 - 10/18 0700 10/18 07 - 10/19 07    P  O  480      I V  (mL/kg) 980 (9 4)  800 (7)    IV Piggyback   250    Total Intake(mL/kg) 1460 (14)  1050 (9 1)    Urine (mL/kg/hr) 1325 (0 5) 400 (0 1) 500 (0 4)    Total Output 1325 400 500    Net +135 -400 +550               Weights:   IBW (Ideal Body Weight): 79 9 kg    Body mass index is 33 54 kg/m²  Weight (last 2 days)     Date/Time   Weight    10/18/21 06   115 (254 19)    10/17/21 0600   104 (229 72)    10/16/21 0600   104 (229 5)            Physical Exam  Constitutional:       General: He is not in acute distress  Appearance: Normal appearance  HENT:      Head: Normocephalic and atraumatic  Mouth/Throat:      Mouth: Mucous membranes are moist    Eyes:      Extraocular Movements: Extraocular movements intact  Cardiovascular:      Rate and Rhythm: Normal rate and regular rhythm  Heart sounds: No murmur heard  No friction rub  No gallop  Pulmonary:      Effort: Pulmonary effort is normal  No respiratory distress  Breath sounds: Normal breath sounds  No stridor  No wheezing, rhonchi or rales  Chest:      Chest wall: No tenderness  Abdominal:      General: Abdomen is flat  There is no distension  Palpations: Abdomen is soft  There is no mass  Tenderness: There is no abdominal tenderness  There is no guarding or rebound  Hernia: No hernia is present  Musculoskeletal:         General: Swelling, tenderness and signs of injury present  No deformity  Cervical back: Normal range of motion and neck supple  Right lower leg: No edema  Left lower leg: No edema  Comments: Knee immobilizer in place on the right lower extremity    Tenderness and swelling in the anterior superior aspect of L knee  Distal pulsation intact  -DP P and Posterior tibial   Skin:     General: Skin is warm  Capillary Refill: Capillary refill takes less than 2 seconds  Findings: Bruising present  Neurological:      General: No focal deficit present  Mental Status: He is alert and oriented to person, place, and time  Psychiatric:         Mood and Affect: Mood normal          Behavior: Behavior normal        LABORATORY DATA     Labs: I have personally reviewed pertinent reports  Results from last 7 days   Lab Units 10/18/21  0645 10/17/21  0333 10/16/21  0507   WBC Thousand/uL 6 80 9 13 10 11   HEMOGLOBIN g/dL 9 2* 9 0* 9 4*   HEMATOCRIT % 28 0* 27 4* 28 3*   PLATELETS Thousands/uL 246 200 187   NEUTROS PCT % 76* 73 72   MONOS PCT % 9 6 8      Results from last 7 days   Lab Units 10/18/21  0645 10/17/21  0333 10/16/21  0507 10/15/21  0522   POTASSIUM mmol/L 4 1 4 5 4 1 4 3   CHLORIDE mmol/L 103 103 103 104   CO2 mmol/L 26 26 25 25   BUN mg/dL 28* 26* 28* 28*   CREATININE mg/dL 1 47* 1 61* 1 78* 1 88*   CALCIUM mg/dL 8 6 8 4 8 5 8 6   ALK PHOS U/L  --   --   --  79   ALT U/L  --   --   --  11*   AST U/L  --   --   --  18     Results from last 7 days   Lab Units 10/14/21  0500   MAGNESIUM mg/dL 2 1     Results from last 7 days   Lab Units 10/14/21  0500   PHOSPHORUS mg/dL 3 0      Results from last 7 days   Lab Units 10/18/21  0645 10/18/21  0054 10/17/21  1844 10/17/21  1038 10/17/21  0328 10/16/21  0507   INR  1 18  --   --   --  1 47* 1 64*   PTT seconds  --  40* 61* 58* 63*  --          Results from last 7 days   Lab Units 10/13/21  0621   TROPONIN I ng/mL 0 02       IMAGING & DIAGNOSTIC TESTING     Radiology Results: I have personally reviewed pertinent reports  XR knee 1 or 2 vw left    Result Date: 10/18/2021  Impression: Left knee joint effusion without an acute osseous abnormality   Workstation performed: [id number]     XR knee 1 or 2 vw right    Result Date: 10/13/2021  Impression: Comminuted intra-articular right knee fracture  Findings concur with the referring clinician's preliminary interpretation already in the patient's electronic health record  Workstation performed: [id number]     XR tibia fibula 2 views RIGHT    Result Date: 10/13/2021  Impression: Comminuted fracture of the right proximal tibia described in further detail on right knee x-ray report  No additional fractures further distally Workstation performed: [id number]     XR ankle 3+ views RIGHT    Result Date: 10/13/2021  Impression: No acute osseous abnormality  Workstation performed: [id number]     CT lower extremity wo contrast right    Result Date: 10/13/2021  Impression: Acute comminuted displaced intra-articular fracture of the proximal tibia as described above  No dislocation  This study demonstrates a significant  finding and was documented as such in [facility] for liaison and referring practitioner notification  Workstation performed: [id number]     Other Diagnostic Testing: I have personally reviewed pertinent reports      ACTIVE MEDICATIONS     Current Facility-Administered Medications   Medication Dose Route Frequency    [MAR Hold] acetaminophen (TYLENOL) tablet 650 mg  650 mg Oral Q6H PRN    albuterol inhalation solution 2 5 mg  2 5 mg Nebulization Once PRN    [MAR Hold] calcium carbonate (TUMS) chewable tablet 500 mg  500 mg Oral Daily PRN    [MAR Hold] colchicine (COLCRYS) tablet 0 6 mg  0 6 mg Oral Daily    [MAR Hold] Diclofenac Sodium (VOLTAREN) 1 % topical gel 2 g  2 g Topical 4x Daily    fentaNYL (SUBLIMAZE) injection 25 mcg  25 mcg Intravenous Q5 Min PRN    HYDROmorphone (DILAUDID) injection 0 25 mg  0 25 mg Intravenous Q10 Min PRN    [MAR Hold] HYDROmorphone (DILAUDID) injection 0 5 mg  0 5 mg Intravenous Q3H PRN    [MAR Hold] insulin glargine (LANTUS) subcutaneous injection 42 Units 0 42 mL  42 Units Subcutaneous Daily    [MAR Hold] insulin lispro (HumaLOG) 100 units/mL subcutaneous injection 1-6 Units  1-6 Units Subcutaneous 4x Daily (AC & HS)    [MAR Hold] insulin regular (HumuLIN R,NovoLIN R) injection 4 Units  4 Units Intravenous Once    lactated ringers infusion  125 mL/hr Intravenous Continuous    lactated ringers infusion  125 mL/hr Intravenous Continuous    [MAR Hold] lidocaine (LIDODERM) 5 % patch 1 patch  1 patch Topical Daily    metoclopramide (REGLAN) injection 10 mg  10 mg Intravenous Once PRN    multi-electrolyte (PLASMALYTE-A/ISOLYTE-S PH 7 4) IV solution  75 mL/hr Intravenous Continuous    [MAR Hold] ondansetron (ZOFRAN) injection 4 mg  4 mg Intravenous Q6H PRN    ondansetron (ZOFRAN) injection 4 mg  4 mg Intravenous Once PRN    [MAR Hold] oxyCODONE (OxyCONTIN) 12 hr tablet 10 mg  10 mg Oral Q12H [facility]    [MAR Hold] polyethylene glycol (MIRALAX) packet 17 g  17 g Oral Daily PRN    [MAR Hold] pravastatin (PRAVACHOL) tablet 40 mg  40 mg Oral Daily With Dinner    [MAR Hold] senna (SENOKOT) tablet 17 2 mg  2 tablet Oral BID       VTE Pharmacologic Prophylaxis: Sequential compression device (Venodyne)   VTE Mechanical Prophylaxis: sequential compression device    Portions of the record may have been created with voice recognition software  Occasional wrong word or "sound a like" substitutions may have occurred due to the inherent limitations of voice recognition software    Read the chart carefully and recognize, using context, where substitutions have occurred   ==  Partha Evans MD  [address]  Internal Medicine Residency PGY-3

## 2021-10-18 NOTE — PLAN OF CARE
Problem: Potential for Falls  Goal: Patient will remain free of falls  Description: INTERVENTIONS:  - Educate patient/family on patient safety including physical limitations  - Instruct patient to call for assistance with activity   - Consult OT/PT to assist with strengthening/mobility   - Keep Call bell within reach  - Keep bed low and locked with side rails adjusted as appropriate  - Keep care items and personal belongings within reach  - Initiate and maintain comfort rounds  - Make Fall Risk Sign visible to staff  - Offer Toileting every  Hours, in advance of need  - Initiate/Maintain alarm  - Obtain necessary fall risk management equipment:   - Apply yellow socks and bracelet for high fall risk patients  - Consider moving patient to room near nurses station  Outcome: Progressing     Problem: Prexisting or High Potential for Compromised Skin Integrity  Goal: Skin integrity is maintained or improved  Description: INTERVENTIONS:  - Identify patients at risk for skin breakdown  - Assess and monitor skin integrity  - Assess and monitor nutrition and hydration status  - Monitor labs   - Assess for incontinence   - Turn and reposition patient  - Assist with mobility/ambulation  - Relieve pressure over bony prominences  - Avoid friction and shearing  - Provide appropriate hygiene as needed including keeping skin clean and dry  - Evaluate need for skin moisturizer/barrier cream  - Collaborate with interdisciplinary team   - Patient/family teaching  - Consider wound care consult   Outcome: Progressing     Problem: PAIN - ADULT  Goal: Verbalizes/displays adequate comfort level or baseline comfort level  Description: Interventions:  - Encourage patient to monitor pain and request assistance  - Assess pain using appropriate pain scale  - Administer analgesics based on type and severity of pain and evaluate response  - Implement non-pharmacological measures as appropriate and evaluate response  - Consider cultural and social influences on pain and pain management  - Notify physician/advanced practitioner if interventions unsuccessful or patient reports new pain  Outcome: Progressing     Problem: INFECTION - ADULT  Goal: Absence or prevention of progression during hospitalization  Description: INTERVENTIONS:  - Assess and monitor for signs and symptoms of infection  - Monitor lab/diagnostic results  - Monitor all insertion sites, i e  indwelling lines, tubes, and drains  - Monitor endotracheal if appropriate and nasal secretions for changes in amount and color  - [place] appropriate cooling/warming therapies per order  - Administer medications as ordered  - Instruct and encourage patient and family to use good hand hygiene technique  - Identify and instruct in appropriate isolation precautions for identified infection/condition  Outcome: Progressing  Goal: Absence of fever/infection during neutropenic period  Description: INTERVENTIONS:  - Monitor WBC    Outcome: Progressing     Problem: SAFETY ADULT  Goal: Patient will remain free of falls  Description: INTERVENTIONS:  - Educate patient/family on patient safety including physical limitations  - Instruct patient to call for assistance with activity   - Consult OT/PT to assist with strengthening/mobility   - Keep Call bell within reach  - Keep bed low and locked with side rails adjusted as appropriate  - Keep care items and personal belongings within reach  - Initiate and maintain comfort rounds  - Make Fall Risk Sign visible to staff  - Offer Toileting every  Hours, in advance of need  - Initiate/Maintain alarm  - Obtain necessary fall risk management equipment:   - Apply yellow socks and bracelet for high fall risk patients  - Consider moving patient to room near nurses station  Outcome: Progressing  Goal: Maintain or return to baseline ADL function  Description: INTERVENTIONS:  -  Assess patient's ability to carry out ADLs; assess patient's baseline for ADL function and identify physical deficits which impact ability to perform ADLs (bathing, care of mouth/teeth, toileting, grooming, dressing, etc )  - Assess/evaluate cause of self-care deficits   - Assess range of motion  - Assess patient's mobility; develop plan if impaired  - Assess patient's need for assistive devices and provide as appropriate  - Encourage maximum independence but intervene and supervise when necessary  - Involve family in performance of ADLs  - Assess for home care needs following discharge   - Consider OT consult to assist with ADL evaluation and planning for discharge  - Provide patient education as appropriate  Outcome: Progressing  Goal: Maintains/Returns to pre admission functional level  Description: INTERVENTIONS:  - Perform BMAT or MOVE assessment daily    - Set and communicate daily mobility goal to care team and patient/family/caregiver  - Collaborate with rehabilitation services on mobility goals if consulted  - Perform Range of Motion  times a day  - Reposition patient every  hours    - Dangle patient  times a day  - Stand patient  times a day  - Ambulate patient  times a day  - Out of bed to chair  times a day   - Out of bed for meals  times a day  - Out of bed for toileting  - Record patient progress and toleration of activity level   Outcome: Progressing     Problem: DISCHARGE PLANNING  Goal: Discharge to home or other facility with appropriate resources  Description: INTERVENTIONS:  - Identify barriers to discharge w/patient and caregiver  - Arrange for needed discharge resources and transportation as appropriate  - Identify discharge learning needs (meds, wound care, etc )  - Arrange for interpretive services to assist at discharge as needed  - Refer to Case Management Department for coordinating discharge planning if the patient needs post-hospital services based on physician/advanced practitioner order or complex needs related to functional status, cognitive ability, or social support system  Outcome: Progressing     Problem: Knowledge Deficit  Goal: Patient/family/caregiver demonstrates understanding of disease process, treatment plan, medications, and discharge instructions  Description: Complete learning assessment and assess knowledge base  Interventions:  - Provide teaching at level of understanding  - Provide teaching via preferred learning methods  Outcome: Progressing     Problem: MOBILITY - ADULT  Goal: Maintain or return to baseline ADL function  Description: INTERVENTIONS:  -  Assess patient's ability to carry out ADLs; assess patient's baseline for ADL function and identify physical deficits which impact ability to perform ADLs (bathing, care of mouth/teeth, toileting, grooming, dressing, etc )  - Assess/evaluate cause of self-care deficits   - Assess range of motion  - Assess patient's mobility; develop plan if impaired  - Assess patient's need for assistive devices and provide as appropriate  - Encourage maximum independence but intervene and supervise when necessary  - Involve family in performance of ADLs  - Assess for home care needs following discharge   - Consider OT consult to assist with ADL evaluation and planning for discharge  - Provide patient education as appropriate  Outcome: Progressing  Goal: Maintains/Returns to pre admission functional level  Description: INTERVENTIONS:  - Perform BMAT or MOVE assessment daily    - Set and communicate daily mobility goal to care team and patient/family/caregiver  - Collaborate with rehabilitation services on mobility goals if consulted  - Perform Range of Motion  times a day  - Reposition patient every  hours    - Dangle patient  times a day  - Stand patient  times a day  - Ambulate patient  times a day  - Out of bed to chair  times a day   - Out of bed for meals  times a day  - Out of bed for toileting  - Record patient progress and toleration of activity level   Outcome: Progressing     Problem: Nutrition/Hydration-ADULT  Goal: Nutrient/Hydration intake appropriate for improving, restoring or maintaining nutritional needs  Description: Monitor and assess patient's nutrition/hydration status for malnutrition  Collaborate with interdisciplinary team and initiate plan and interventions as ordered  Monitor patient's weight and dietary intake as ordered or per policy  Utilize nutrition screening tool and intervene as necessary  Determine patient's food preferences and provide high-protein, high-caloric foods as appropriate       INTERVENTIONS:  - Monitor oral intake, urinary output, labs, and treatment plans  - Assess nutrition and hydration status and recommend course of action  - Evaluate amount of meals eaten  - Assist patient with eating if necessary   - Allow adequate time for meals  - Recommend/ encourage appropriate diets, oral nutritional supplements, and vitamin/mineral supplements  - Order, calculate, and assess calorie counts as needed  - Recommend, monitor, and adjust tube feedings and TPN/PPN based on assessed needs  - Assess need for intravenous fluids  - Provide specific nutrition/hydration education as appropriate  - Include patient/family/caregiver in decisions related to nutrition  Outcome: Progressing

## 2021-10-18 NOTE — OP NOTE
OPERATIVE REPORT  PATIENT NAME: Keri Hunter    :  1955  MRN: [id number]  Pt Location: [facility] OR ROOM 04    SURGERY DATE: 10/18/2021    Surgeon(s) and Role:     * Debbie Medina MD - Primary     * Rondal Kanner, PA-C - Assisting    Preop Diagnosis:  Displaced right tibia fracture    Post-Op Diagnosis Codes:  Same    Procedure(s) (LRB):  Application External Fixation Device right lower extremity (Right)    Procedures:  Application RLE uniplanar external fixator (CPT 26968)    Specimen(s):  * No specimens in log *    Estimated Blood Loss:   Minimal    Drains:  * No LDAs found *    Anesthesia Type:   General    Operative Indications:  Displaced, comminuted R tibia shaft fracture with shortening and varus deformity, soft tissues not amenable to definitive fixation    Operative Findings:  See below    Complications:   None    Implants: Synthes pins x 4    Procedure and Technique:  The patient is an obese, diabetic, active smoker who sustained a complex right proximal tibia displaced comminuted shaft fracture  Patient had what was likely an anterior fracture blister that has delaminated in the meantime full with no deep wound communication  On examination of patient's skin in the preoperative area, the area was found to not be wrinkling appropriate for definitive fixation, as such decision was made for external fixation of fracture  The patient operative site, laterality, procedure, consent were verified in the preoperative area the patient was transitioned to the operating room  General anesthesia was provided by the anesthesia team in the right lower extremity was prepped and draped in usual sterile fashion  After time-out for safety, 2 stab incisions made anteriorly over the femoral shaft I made an pre-drilling with irrigation took place and 2 pins were introduced by hand and found to have satisfactory bony purchase    Two stab incisions were then made at the anterior medial aspect of the tibia taking care to remain well distal to the zone of injury, hemostat was utilized to protect soft tissue structures  The pins were then placed here as well clamps and bars were then connected from the femur 2 tibia pins and an additional bar was placed at the level of the tibia for extra of control in the coronal plane  Fracture was then brought out to length and brought out of varus deformity  All clamps were tightened  Alignment was found to be satisfactory after final tightening on biplanar fluoroscopic imaging  Wounds were cleansed and sterile dressing consisted of Adaptic, Kerlix bolsters between the skin and clamps, sterile Webril was wraps and the leg was wrapped from foot to thigh with Webril and Ace wrap  All final counts, including but not limited to instrument, sponge, needle counts were correct  I was present for the entire procedure  The patient was extubated and awakened and transitioned to the PACU in stable condition  There were no immediate complications  There was no qualified resident available for the procedure  Critical assistance from Po Lane PA-C was required for all components of the procedure including but not limited to positioning, passage of instrumentation, assistance with proper alignment of fracture  This was particularly important given the complex fracture pattern as well as the patient's BMI of 33 54  Patient will be nonweightbearing on the operative extremity for time being  He will require return to the OR for definitive fixation which we will plan to undertake in 1 week from today's time pending soft tissue appropriate appearance  As he is a Coumadin patient, he will likely require heparin while inpatient before resuming Coumadin given the need for definitive fixation next week      Patient Disposition:  PACU     SIGNATURE: Humberto Rodarte MD  DATE: October 18, 2021  TIME: 4:29 PM

## 2021-10-18 NOTE — ASSESSMENT & PLAN NOTE
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

## 2021-10-18 NOTE — PLAN OF CARE
Problem: INFECTION - ADULT  Goal: Absence or prevention of progression during hospitalization  Description: INTERVENTIONS:  - Assess and monitor for signs and symptoms of infection  - Monitor lab/diagnostic results  - Monitor all insertion sites, i e  indwelling lines, tubes, and drains  - Monitor endotracheal if appropriate and nasal secretions for changes in amount and color  - [place] appropriate cooling/warming therapies per order  - Administer medications as ordered  - Instruct and encourage patient and family to use good hand hygiene technique  - Identify and instruct in appropriate isolation precautions for identified infection/condition  Outcome: Progressing  Goal: Absence of fever/infection during neutropenic period  Description: INTERVENTIONS:  - Monitor WBC    Outcome: Progressing

## 2021-10-18 NOTE — ASSESSMENT & PLAN NOTE
ROSE on CKD  May be prerenal   Cr on  Pres 1 99  Baseline 1 21 months ago  Avoid nephrotoxics  Avoid hypotension  Monitor I/O    Continue Light hydration while encouraging oral diet    Results from last 7 days   Lab Units 10/20/21  0640 10/18/21  0645 10/17/21  0333 10/16/21  0507 10/15/21  0522 10/14/21  0500   SODIUM mmol/L 141 138 139 137 139 141   POTASSIUM mmol/L 3 8 4 1 4 5 4 1 4 3 4 4   CHLORIDE mmol/L 106 103 103 103 104 105   CO2 mmol/L 26 26 26 25 25 25   ANION GAP mmol/L 9 9 10 9 10 11   BUN mg/dL 27* 28* 26* 28* 28* 28*   CREATININE mg/dL 1 35* 1 47* 1 61* 1 78* 1 88* 1 78*   CALCIUM mg/dL 8 1* 8 6 8 4 8 5 8 6 8 9   ALBUMIN g/dL  --   --   --   --  2 6*  --    TOTAL BILIRUBIN mg/dL  --   --   --   --  1 57*  --    ALK PHOS U/L  --   --   --   --  79  --    ALT U/L  --   --   --   --  11*  --    AST U/L  --   --   --   --  18  --    EGFR ml/min/1 73sq m 55 50 45 39 37 39   GLUCOSE RANDOM mg/dL 252* 261* 240* 217* 173* 230*

## 2021-10-18 NOTE — ASSESSMENT & PLAN NOTE
Presented with Right knee / proximal leg pain and inability to bear weight after ground level fall after twisting leg in his kitchen  Assoc deformity,mild abrasion  10/13-Imaging findings including CT confirmed Acute comminuted displaced intra-articular fracture of the proximal tibia as described above  No dislocation  Small lipohemarthrosis  Moderate distal femoral and proximal tibiofibular subcutaneous edema/contusion  On knee immobilizer  Non weight bearing  Orthopedic input noted and appreciated  Patient will need full surgical correction of fracture  Continue analgesia, will optimize dose  Add stool softeners  1 DPO application of external fixation to RLE  Ortho plan to return to OR on Monday for definitive fixation of tibial #  Restart heparin gtt > 12hr post procedure  Monitor LE  Non weight beating

## 2021-10-18 NOTE — ASSESSMENT & PLAN NOTE
On lisinopril 10mg daily at home  On hold for ROSE  BP stable  Blood pressure 113/90, pulse 72, temperature 97 9 °F (36 6 °C), resp  rate 18, height 6' 1" (1 854 m), weight 135 kg (298 lb 4 5 oz), SpO2 97 %

## 2021-10-19 LAB
APTT PPP: 46 SECONDS (ref 23–37)
APTT PPP: 48 SECONDS (ref 23–37)
BACTERIA SPEC BFLD CULT: NO GROWTH
ERYTHROCYTE [DISTWIDTH] IN BLOOD BY AUTOMATED COUNT: 14.5 % (ref 11.6–15.1)
GLUCOSE SERPL-MCNC: 229 MG/DL (ref 65–140)
GLUCOSE SERPL-MCNC: 299 MG/DL (ref 65–140)
GLUCOSE SERPL-MCNC: 319 MG/DL (ref 65–140)
GLUCOSE SERPL-MCNC: 377 MG/DL (ref 65–140)
GRAM STN SPEC: NORMAL
GRAM STN SPEC: NORMAL
HCT VFR BLD AUTO: 24.9 % (ref 36.5–49.3)
HGB BLD-MCNC: 8.2 G/DL (ref 12–17)
INR PPP: 1.35 (ref 0.84–1.19)
MCH RBC QN AUTO: 29 PG (ref 26.8–34.3)
MCHC RBC AUTO-ENTMCNC: 32.9 G/DL (ref 31.4–37.4)
MCV RBC AUTO: 88 FL (ref 82–98)
PLATELET # BLD AUTO: 256 THOUSANDS/UL (ref 149–390)
PMV BLD AUTO: 9.7 FL (ref 8.9–12.7)
PROTHROMBIN TIME: 16.1 SECONDS (ref 11.6–14.5)
RBC # BLD AUTO: 2.83 MILLION/UL (ref 3.88–5.62)
WBC # BLD AUTO: 7.33 THOUSAND/UL (ref 4.31–10.16)

## 2021-10-19 PROCEDURE — 85027 COMPLETE CBC AUTOMATED: CPT | Performed by: INTERNAL MEDICINE

## 2021-10-19 PROCEDURE — 97530 THERAPEUTIC ACTIVITIES: CPT

## 2021-10-19 PROCEDURE — 99232 SBSQ HOSP IP/OBS MODERATE 35: CPT | Performed by: INTERNAL MEDICINE

## 2021-10-19 PROCEDURE — 85610 PROTHROMBIN TIME: CPT | Performed by: INTERNAL MEDICINE

## 2021-10-19 PROCEDURE — 85730 THROMBOPLASTIN TIME PARTIAL: CPT | Performed by: PHYSICIAN ASSISTANT

## 2021-10-19 PROCEDURE — 99024 POSTOP FOLLOW-UP VISIT: CPT | Performed by: ORTHOPAEDIC SURGERY

## 2021-10-19 PROCEDURE — 82948 REAGENT STRIP/BLOOD GLUCOSE: CPT

## 2021-10-19 PROCEDURE — 85730 THROMBOPLASTIN TIME PARTIAL: CPT | Performed by: INTERNAL MEDICINE

## 2021-10-19 RX ORDER — HEPARIN SODIUM 1000 [USP'U]/ML
4000 INJECTION, SOLUTION INTRAVENOUS; SUBCUTANEOUS
Status: DISPENSED | OUTPATIENT
Start: 2021-10-19 | End: 2021-10-24

## 2021-10-19 RX ORDER — HEPARIN SODIUM 10000 [USP'U]/100ML
3-20 INJECTION, SOLUTION INTRAVENOUS
Status: DISCONTINUED | OUTPATIENT
Start: 2021-10-19 | End: 2021-10-24

## 2021-10-19 RX ORDER — HEPARIN SODIUM 1000 [USP'U]/ML
2000 INJECTION, SOLUTION INTRAVENOUS; SUBCUTANEOUS
Status: DISPENSED | OUTPATIENT
Start: 2021-10-19 | End: 2021-10-24

## 2021-10-19 RX ADMIN — SODIUM CHLORIDE, SODIUM LACTATE, POTASSIUM CHLORIDE, AND CALCIUM CHLORIDE 50 ML/HR: .6; .31; .03; .02 INJECTION, SOLUTION INTRAVENOUS at 18:26

## 2021-10-19 RX ADMIN — COLCHICINE 0.6 MG: 0.6 TABLET ORAL at 08:24

## 2021-10-19 RX ADMIN — INSULIN LISPRO 8 UNITS: 100 INJECTION, SOLUTION INTRAVENOUS; SUBCUTANEOUS at 18:31

## 2021-10-19 RX ADMIN — DICLOFENAC SODIUM 2 G: 10 GEL TOPICAL at 21:15

## 2021-10-19 RX ADMIN — INSULIN LISPRO 5 UNITS: 100 INJECTION, SOLUTION INTRAVENOUS; SUBCUTANEOUS at 08:26

## 2021-10-19 RX ADMIN — INSULIN LISPRO 5 UNITS: 100 INJECTION, SOLUTION INTRAVENOUS; SUBCUTANEOUS at 18:30

## 2021-10-19 RX ADMIN — INSULIN LISPRO 10 UNITS: 100 INJECTION, SOLUTION INTRAVENOUS; SUBCUTANEOUS at 08:25

## 2021-10-19 RX ADMIN — DICLOFENAC SODIUM 2 G: 10 GEL TOPICAL at 08:25

## 2021-10-19 RX ADMIN — PRAVASTATIN SODIUM 40 MG: 40 TABLET ORAL at 18:31

## 2021-10-19 RX ADMIN — HEPARIN SODIUM 5000 UNITS: 5000 INJECTION INTRAVENOUS; SUBCUTANEOUS at 00:47

## 2021-10-19 RX ADMIN — CEFAZOLIN SODIUM 2000 MG: 2 SOLUTION INTRAVENOUS at 04:45

## 2021-10-19 RX ADMIN — HYDROMORPHONE HYDROCHLORIDE 0.5 MG: 1 INJECTION, SOLUTION INTRAMUSCULAR; INTRAVENOUS; SUBCUTANEOUS at 04:46

## 2021-10-19 RX ADMIN — LIDOCAINE 5% 1 PATCH: 700 PATCH TOPICAL at 08:24

## 2021-10-19 RX ADMIN — HEPARIN SODIUM 5000 UNITS: 5000 INJECTION INTRAVENOUS; SUBCUTANEOUS at 08:24

## 2021-10-19 RX ADMIN — HEPARIN SODIUM 2000 UNITS: 1000 INJECTION INTRAVENOUS; SUBCUTANEOUS at 19:26

## 2021-10-19 RX ADMIN — OXYCODONE HYDROCHLORIDE 10 MG: 10 TABLET, FILM COATED, EXTENDED RELEASE ORAL at 21:15

## 2021-10-19 RX ADMIN — STANDARDIZED SENNA CONCENTRATE 17.2 MG: 8.6 TABLET ORAL at 08:24

## 2021-10-19 RX ADMIN — DICLOFENAC SODIUM 2 G: 10 GEL TOPICAL at 11:59

## 2021-10-19 RX ADMIN — SODIUM CHLORIDE, SODIUM GLUCONATE, SODIUM ACETATE, POTASSIUM CHLORIDE, MAGNESIUM CHLORIDE, SODIUM PHOSPHATE, DIBASIC, AND POTASSIUM PHOSPHATE 75 ML/HR: .53; .5; .37; .037; .03; .012; .00082 INJECTION, SOLUTION INTRAVENOUS at 11:49

## 2021-10-19 RX ADMIN — INSULIN GLARGINE 42 UNITS: 100 INJECTION, SOLUTION SUBCUTANEOUS at 08:26

## 2021-10-19 RX ADMIN — INSULIN LISPRO 6 UNITS: 100 INJECTION, SOLUTION INTRAVENOUS; SUBCUTANEOUS at 11:59

## 2021-10-19 RX ADMIN — OXYCODONE HYDROCHLORIDE 10 MG: 10 TABLET, FILM COATED, EXTENDED RELEASE ORAL at 08:24

## 2021-10-19 RX ADMIN — HEPARIN SODIUM 11.1 UNITS/KG/HR: 10000 INJECTION, SOLUTION INTRAVENOUS at 11:49

## 2021-10-19 RX ADMIN — STANDARDIZED SENNA CONCENTRATE 17.2 MG: 8.6 TABLET ORAL at 18:31

## 2021-10-19 RX ADMIN — INSULIN LISPRO 5 UNITS: 100 INJECTION, SOLUTION INTRAVENOUS; SUBCUTANEOUS at 11:58

## 2021-10-19 NOTE — PROGRESS NOTES
Progress Note - Orthopedics   Marky Lamar 61 y o  male MRN: [id number]  Unit/Bed#: -01      Subjective:    61 y o male POD1 s/p application of external fixator for displaced right tibia fracture  No acute events, no complaints  Pt doing well  Pain controlled  Denies fevers chills, CP, SOB  The patient also denies any numbness or tingling in the lower extremity       Labs:  0   Lab Value Date/Time    HCT 28 0 (L) 10/18/2021 0645    HCT 27 4 (L) 10/17/2021 0333    HCT 28 3 (L) 10/16/2021 0507    HCT 38 4 07/13/2015 1448    HCT 38 6 10/20/2014 1132    HGB 9 2 (L) 10/18/2021 0645    HGB 9 0 (L) 10/17/2021 0333    HGB 9 4 (L) 10/16/2021 0507    HGB 13 1 07/13/2015 1448    HGB 13 1 10/20/2014 1132    INR 1 18 10/18/2021 0645    INR 2 94 (H) 12/11/2015 1543    WBC 6 80 10/18/2021 0645    WBC 9 13 10/17/2021 0333    WBC 10 11 10/16/2021 0507    WBC 7 7 07/13/2015 1448    WBC 8 30 10/20/2014 1132    ESR 58 (H) 05/20/2016 1207       Meds:    Current Facility-Administered Medications:     acetaminophen (TYLENOL) tablet 650 mg, 650 mg, Oral, Q6H PRN, Yogesh Morejon PA-C, 650 mg at 10/16/21 2341    calcium carbonate (TUMS) chewable tablet 500 mg, 500 mg, Oral, Daily PRN, Yogesh Morejon PA-C, 500 mg at 10/14/21 0006    colchicine (COLCRYS) tablet 0 6 mg, 0 6 mg, Oral, Daily, Yogesh Morejon PA-C, 0 6 mg at 10/19/21 0919    Diclofenac Sodium (VOLTAREN) 1 % topical gel 2 g, 2 g, Topical, 4x Daily, Morgan Garcia PA-C, 2 g at 10/19/21 0825    heparin (porcine) 25,000 units in 0 45% NaCl 250 mL infusion (premix), 3-20 Units/kg/hr (Order-Specific), Intravenous, Titrated, Bo An MD    heparin (porcine) injection 2,000 Units, 2,000 Units, Intravenous, Q1H PRN, Bo An MD    heparin (porcine) injection 4,000 Units, 4,000 Units, Intravenous, Q1H PRN, Bo An MD    HYDROmorphone (DILAUDID) injection 0 5 mg, 0 5 mg, Intravenous, Q3H PRN, Morgan Garcia PA-C, 0 5 mg at 10/19/21 0446    insulin glargine (LANTUS) subcutaneous injection 42 Units 0 42 mL, 42 Units, Subcutaneous, Daily, Guido Pinzon PA-C, 42 Units at 10/19/21 0826    insulin lispro (HumaLOG) 100 units/mL subcutaneous injection 2-12 Units, 2-12 Units, Subcutaneous, TID AC, 10 Units at 10/19/21 0825 **AND** Fingerstick Glucose (POCT), , , TID AC, Pankaj Che MD    insulin lispro (HumaLOG) 100 units/mL subcutaneous injection 5 Units, 5 Units, Subcutaneous, TID With Meals, Pankaj Che MD, 5 Units at 10/19/21 0826    insulin regular (HumuLIN R,NovoLIN R) injection 4 Units, 4 Units, Intravenous, Once, Guido Pinzon PA-C    lactated ringers infusion, 125 mL/hr, Intravenous, Continuous, Morgan Garcia PA-C    lactated ringers infusion, 125 mL/hr, Intravenous, Continuous, Guido Pinzon PA-C    lactated ringers infusion, 50 mL/hr, Intravenous, Continuous, Piper Guzman, XOCHILT, Last Rate: 50 mL/hr at 10/18/21 1816, 50 mL/hr at 10/18/21 1816    lidocaine (LIDODERM) 5 % patch 1 patch, 1 patch, Topical, Daily, Guido Pinzon PA-C, 1 patch at 10/19/21 0824    multi-electrolyte (PLASMALYTE-A/ISOLYTE-S PH 7 4) IV solution, 75 mL/hr, Intravenous, Continuous, Guido Pinzon PA-C, Last Rate: 75 mL/hr at 10/18/21 2020, 75 mL/hr at 10/18/21 2020    ondansetron (ZOFRAN) injection 4 mg, 4 mg, Intravenous, Q6H PRN, Guido Pinzon PA-C, 4 mg at 10/13/21 2249    oxyCODONE (OxyCONTIN) 12 hr tablet 10 mg, 10 mg, Oral, Q12H [address], Morgan Garcia PA-C, 10 mg at 10/19/21 0824    polyethylene glycol (MIRALAX) packet 17 g, 17 g, Oral, Daily PRN, Guido Pinzon PA-C, 17 g at 10/17/21 1731    pravastatin (PRAVACHOL) tablet 40 mg, 40 mg, Oral, Daily With Ayaz Garcia PA-C, 40 mg at 10/18/21 1816    senna (SENOKOT) tablet 17 2 mg, 2 tablet, Oral, BID, Morgan Garcia PA-C, 17 2 mg at 10/19/21 0824    Blood Culture:   No results found for: BLOODCX    Wound Culture: No results found for: WOUNDCULT    Ins and Outs:  I/O last 24 hours: In: 1150 [I V :900; IV Piggyback:250]  Out: 1300 [Urine:1300]      Physical:  Vitals:    10/19/21 0841   BP: 145/67   Pulse: 77   Resp:    Temp:    SpO2: 97%     Musculoskeletal: right Lower Extremity  · Patient lying in bed comfortably, external fixator intact  · Dressing clean, dry, and intact  Skin above and below dressings is clean, without signs of irritation, drainage, erythema, warmth, or other signs of infection  · SILT s/s/sp/dp/t  · Patient able to wiggle toes, with intact sensation to light touch   · Soft lower extremity compartments  · 2+ DP pulse    Assessment:    66 y o male POD1 s/p application of external fixation of the right lower extremity    Plan:  · Non-weight bearing right lower extremity  · HGB pending this AM  Greater than 2 gram drop which qualifies for diagnosis of acute blood loss anemia, will monitor and administer IVF/prbc as indicated   · PT/OT  · Pain control  · DVT ppx with Heparin   · Dispo: Ortho will follow  Patient doing well postoperatively  Plan is to return to the OR next week for definitive fixation of the tibial fracture       Maurizio Bales PA-C

## 2021-10-19 NOTE — PLAN OF CARE
Problem: Potential for Falls  Goal: Patient will remain free of falls  Description: INTERVENTIONS:  - Educate patient/family on patient safety including physical limitations  - Instruct patient to call for assistance with activity   - Consult OT/PT to assist with strengthening/mobility   - Keep Call bell within reach  - Keep bed low and locked with side rails adjusted as appropriate  - Keep care items and personal belongings within reach  - Initiate and maintain comfort rounds  - Make Fall Risk Sign visible to staff  - Offer Toileting every  Hours, in advance of need  - Initiate/Maintain alarm  - Obtain necessary fall risk management equipment:   - Apply yellow socks and bracelet for high fall risk patients  - Consider moving patient to room near nurses station  Outcome: Progressing     Problem: Prexisting or High Potential for Compromised Skin Integrity  Goal: Skin integrity is maintained or improved  Description: INTERVENTIONS:  - Identify patients at risk for skin breakdown  - Assess and monitor skin integrity  - Assess and monitor nutrition and hydration status  - Monitor labs   - Assess for incontinence   - Turn and reposition patient  - Assist with mobility/ambulation  - Relieve pressure over bony prominences  - Avoid friction and shearing  - Provide appropriate hygiene as needed including keeping skin clean and dry  - Evaluate need for skin moisturizer/barrier cream  - Collaborate with interdisciplinary team   - Patient/family teaching  - Consider wound care consult   Outcome: Progressing     Problem: PAIN - ADULT  Goal: Verbalizes/displays adequate comfort level or baseline comfort level  Description: Interventions:  - Encourage patient to monitor pain and request assistance  - Assess pain using appropriate pain scale  - Administer analgesics based on type and severity of pain and evaluate response  - Implement non-pharmacological measures as appropriate and evaluate response  - Consider cultural and social influences on pain and pain management  - Notify physician/advanced practitioner if interventions unsuccessful or patient reports new pain  Outcome: Progressing     Problem: INFECTION - ADULT  Goal: Absence or prevention of progression during hospitalization  Description: INTERVENTIONS:  - Assess and monitor for signs and symptoms of infection  - Monitor lab/diagnostic results  - Monitor all insertion sites, i e  indwelling lines, tubes, and drains  - Monitor endotracheal if appropriate and nasal secretions for changes in amount and color  - [place] appropriate cooling/warming therapies per order  - Administer medications as ordered  - Instruct and encourage patient and family to use good hand hygiene technique  - Identify and instruct in appropriate isolation precautions for identified infection/condition  Outcome: Progressing  Goal: Absence of fever/infection during neutropenic period  Description: INTERVENTIONS:  - Monitor WBC    Outcome: Progressing     Problem: SAFETY ADULT  Goal: Patient will remain free of falls  Description: INTERVENTIONS:  - Educate patient/family on patient safety including physical limitations  - Instruct patient to call for assistance with activity   - Consult OT/PT to assist with strengthening/mobility   - Keep Call bell within reach  - Keep bed low and locked with side rails adjusted as appropriate  - Keep care items and personal belongings within reach  - Initiate and maintain comfort rounds  - Make Fall Risk Sign visible to staff  - Offer Toileting every  Hours, in advance of need  - Initiate/Maintain alarm  - Obtain necessary fall risk management equipment:   - Apply yellow socks and bracelet for high fall risk patients  - Consider moving patient to room near nurses station  Outcome: Progressing  Goal: Maintain or return to baseline ADL function  Description: INTERVENTIONS:  -  Assess patient's ability to carry out ADLs; assess patient's baseline for ADL function and identify physical deficits which impact ability to perform ADLs (bathing, care of mouth/teeth, toileting, grooming, dressing, etc )  - Assess/evaluate cause of self-care deficits   - Assess range of motion  - Assess patient's mobility; develop plan if impaired  - Assess patient's need for assistive devices and provide as appropriate  - Encourage maximum independence but intervene and supervise when necessary  - Involve family in performance of ADLs  - Assess for home care needs following discharge   - Consider OT consult to assist with ADL evaluation and planning for discharge  - Provide patient education as appropriate  Outcome: Progressing  Goal: Maintains/Returns to pre admission functional level  Description: INTERVENTIONS:  - Perform BMAT or MOVE assessment daily    - Set and communicate daily mobility goal to care team and patient/family/caregiver  - Collaborate with rehabilitation services on mobility goals if consulted  - Perform Range of Motion  times a day  - Reposition patient every  hours    - Dangle patient  times a day  - Stand patient  times a day  - Ambulate patient  times a day  - Out of bed to chair  times a day   - Out of bed for meals  times a day  - Out of bed for toileting  - Record patient progress and toleration of activity level   Outcome: Progressing     Problem: DISCHARGE PLANNING  Goal: Discharge to home or other facility with appropriate resources  Description: INTERVENTIONS:  - Identify barriers to discharge w/patient and caregiver  - Arrange for needed discharge resources and transportation as appropriate  - Identify discharge learning needs (meds, wound care, etc )  - Arrange for interpretive services to assist at discharge as needed  - Refer to Case Management Department for coordinating discharge planning if the patient needs post-hospital services based on physician/advanced practitioner order or complex needs related to functional status, cognitive ability, or social support system  Outcome: Progressing     Problem: Knowledge Deficit  Goal: Patient/family/caregiver demonstrates understanding of disease process, treatment plan, medications, and discharge instructions  Description: Complete learning assessment and assess knowledge base  Interventions:  - Provide teaching at level of understanding  - Provide teaching via preferred learning methods  Outcome: Progressing     Problem: MOBILITY - ADULT  Goal: Maintain or return to baseline ADL function  Description: INTERVENTIONS:  -  Assess patient's ability to carry out ADLs; assess patient's baseline for ADL function and identify physical deficits which impact ability to perform ADLs (bathing, care of mouth/teeth, toileting, grooming, dressing, etc )  - Assess/evaluate cause of self-care deficits   - Assess range of motion  - Assess patient's mobility; develop plan if impaired  - Assess patient's need for assistive devices and provide as appropriate  - Encourage maximum independence but intervene and supervise when necessary  - Involve family in performance of ADLs  - Assess for home care needs following discharge   - Consider OT consult to assist with ADL evaluation and planning for discharge  - Provide patient education as appropriate  Outcome: Progressing  Goal: Maintains/Returns to pre admission functional level  Description: INTERVENTIONS:  - Perform BMAT or MOVE assessment daily    - Set and communicate daily mobility goal to care team and patient/family/caregiver  - Collaborate with rehabilitation services on mobility goals if consulted  - Perform Range of Motion  times a day  - Reposition patient every  hours    - Dangle patient  times a day  - Stand patient  times a day  - Ambulate patient  times a day  - Out of bed to chair  times a day   - Out of bed for meals  times a day  - Out of bed for toileting  - Record patient progress and toleration of activity level   Outcome: Progressing     Problem: Nutrition/Hydration-ADULT  Goal: Nutrient/Hydration intake appropriate for improving, restoring or maintaining nutritional needs  Description: Monitor and assess patient's nutrition/hydration status for malnutrition  Collaborate with interdisciplinary team and initiate plan and interventions as ordered  Monitor patient's weight and dietary intake as ordered or per policy  Utilize nutrition screening tool and intervene as necessary  Determine patient's food preferences and provide high-protein, high-caloric foods as appropriate       INTERVENTIONS:  - Monitor oral intake, urinary output, labs, and treatment plans  - Assess nutrition and hydration status and recommend course of action  - Evaluate amount of meals eaten  - Assist patient with eating if necessary   - Allow adequate time for meals  - Recommend/ encourage appropriate diets, oral nutritional supplements, and vitamin/mineral supplements  - Order, calculate, and assess calorie counts as needed  - Recommend, monitor, and adjust tube feedings and TPN/PPN based on assessed needs  - Assess need for intravenous fluids  - Provide specific nutrition/hydration education as appropriate  - Include patient/family/caregiver in decisions related to nutrition  Outcome: Progressing

## 2021-10-19 NOTE — PLAN OF CARE
Problem: PHYSICAL THERAPY ADULT  Goal: Performs mobility at highest level of function for planned discharge setting  See evaluation for individualized goals  Description: Treatment/Interventions: Functional transfer training, LE strengthening/ROM, Therapeutic exercise, Endurance training, Patient/family training, Bed mobility, Continued evaluation, Spoke to nursing, OT, Family          See flowsheet documentation for full assessment, interventions and recommendations  Outcome: Progressing  Note: Prognosis: Good  Problem List: Decreased strength, Decreased range of motion, Decreased endurance, Impaired balance, Decreased mobility, Orthopedic restrictions, Pain  Assessment: Chart reviewed  Patient was received supine in bed in NAD and agreeable to PT session  Today's PT treatment session consisted of therapeutic activity for facilitation of transitional movements and safe performance of correct technique for bed mobility and sit to stand transfers  In comparison to the previous session the patient has made progress as evident by ability to achieve a full upright stand  Pt performed 2 sit to stand transfers: pt achieved 75% of stand on 1st trial and achieved full upright posture on second attempt  Pt required maximum assist of two and assist of a third person to ensure that the patient maintained NWB on R LE  Pt also demonstrated improved sitting tolerance at EOB  Pt exhibited fair/fair- sitting balance and was able to sit for ~12 minute with bilateral upper extremity support  Co-treatment was performed with OT secondary to complex medical condition of pt  Pt requires assist of two to achieve and maintain transitional movements; requiring the need of skilled therapeutic intervention of two therapists to achieve the delivery of services  PT/OT goals were addressed separately  Overall, patient tolerated today's session well and continues to be making progress towards achieving his STG's   Patient's prognosis for achieving their STG's is good as evident by pt's motivation  PT intervention continues to be appropriate as the patient continues to be limited by pain, decreased lower extremity range of motion and strength, impaired balance, decreased endurance, inability to ambulate, and decreased functional mobility  Continue to recommend STR  PT to continue to see patient in order to address the deficits listed above and provide interventions consistent with the POC in order to achieve STG's and optimize the patient's independence with functional mobility  Barriers to Discharge: Inaccessible home environment, Decreased caregiver support    PT Discharge Recommendation: Post acute rehabilitation services     PT - OK to Discharge: Yes (when medically cleared, if to STR)    See flowsheet documentation for full assessment

## 2021-10-19 NOTE — PHYSICAL THERAPY NOTE
Physical Therapy Treatment Note    Patient Name: Yuan Che    Diagnosis: Leg injury [S89 90XA]  Closed fracture of proximal tibia [S82 109A]  Unable to ambulate [R26 2]  Abrasion, lower leg, anterior [S80 819A]     10/19/21 1344   PT Last Visit   PT Visit Date 10/19/21   Note Type   Note Type Treatment   Pain Assessment   Pain Assessment Tool 0-10   Pain Score 5   Pain Location/Orientation Location: Back; Location: Generalized   Restrictions/Precautions   Weight Bearing Precautions Per Order Yes   RLE Weight Bearing Per Order NWB  (per ortho)   Braces or Orthoses Other (Comment)  (right LE external fixator)   Other Precautions Bed Alarm;WBS;Multiple lines; Fall Risk;Pain   General   Chart Reviewed Yes   Additional Pertinent History Pt is POD 1 s/p application of external fixator for displaced right tibia fracture  Pt remains NWB on R LE  Response to Previous Treatment Patient with no complaints from previous session  Family/Caregiver Present No   Cognition   Overall Cognitive Status   (questionable)   Arousal/Participation Alert; Responsive; Cooperative   Attention Within functional limits   Memory Within functional limits   Following Commands Follows all commands and directions without difficulty   Comments Pt agreeable to PT  Subjective   Subjective "I can sit up, if I do it my way "   Bed Mobility   Supine to Sit 3  Moderate assistance   Additional items Assist x 2;HOB elevated; Bedrails; Increased time required;Verbal cues;LE management   Sit to Supine 3  Moderate assistance   Additional items Assist x 2;HOB elevated; Bedrails; Increased time required;Verbal cues;LE management   Transfers   Sit to Stand 2  Maximal assistance   Additional items Assist x 2; Increased time required;Verbal cues  (assist of 3rd to maintain NWB on R LE)   Stand to Sit 2  Maximal assistance   Additional items Assist x 2; Increased time required;Verbal cues  (assist of 3rd to maintain NWB on R LE)   Additional Comments Pt performed 2x; able to acheive full upright stand on 2nd attempt; acheived 75% on 1st trial   Ambulation/Elevation   Gait pattern Not tested   Balance   Static Sitting Fair   Dynamic Sitting Fair -   Static Standing Poor -   Endurance Deficit   Endurance Deficit Yes   Endurance Deficit Description decreased activity tolerance   Activity Tolerance   Activity Tolerance Patient tolerated treatment well   Medical Staff Made Aware OT Ciaran Sellers performed with OT secondary to complex medical condition of patient  PT/OT goals were addressed separately  Nurse Made Aware Discussed case with RN Felipe Klinefelter; post session pt was left supine in bed in NAD, all belongings within reach, +bed alarm   Assessment   Prognosis Good   Problem List Decreased strength;Decreased range of motion;Decreased endurance; Impaired balance;Decreased mobility;Orthopedic restrictions;Pain   Assessment Chart reviewed  Patient was received supine in bed in NAD and agreeable to PT session  Today's PT treatment session consisted of therapeutic activity for facilitation of transitional movements and safe performance of correct technique for bed mobility and sit to stand transfers  In comparison to the previous session the patient has made progress as evident by ability to achieve a full upright stand  Pt performed 2 sit to stand transfers: pt achieved 75% of stand on 1st trial and achieved full upright posture on second attempt  Pt required maximum assist of two and assist of a third person to ensure that the patient maintained NWB on R LE  Pt also demonstrated improved sitting tolerance at EOB  Pt exhibited fair/fair- sitting balance and was able to sit for ~12 minute with bilateral upper extremity support  Co-treatment was performed with OT secondary to complex medical condition of pt   Pt requires assist of two to achieve and maintain transitional movements; requiring the need of skilled therapeutic intervention of two therapists to achieve the delivery of services  PT/OT goals were addressed separately  Overall, patient tolerated today's session well and continues to be making progress towards achieving his STG's  Patient's prognosis for achieving their STG's is good as evident by pt's motivation  PT intervention continues to be appropriate as the patient continues to be limited by pain, decreased lower extremity range of motion and strength, impaired balance, decreased endurance, inability to ambulate, and decreased functional mobility  Continue to recommend STR  PT to continue to see patient in order to address the deficits listed above and provide interventions consistent with the POC in order to achieve STG's and optimize the patient's independence with functional mobility  Barriers to Discharge Inaccessible home environment;Decreased caregiver support   Goals   STG Expiration Date 10/24/21   Plan   Treatment/Interventions Functional transfer training;LE strengthening/ROM; Therapeutic exercise; Endurance training;Patient/family training;Bed mobility;Continued evaluation;Spoke to nursing;OT;Family   Progress Progressing toward goals   PT Frequency 5x/wk   Recommendation   PT Discharge Recommendation Post acute rehabilitation services   PT - OK to Discharge Yes  (when medically cleared, if to STR)   AM-PAC Basic Mobility Inpatient   Turning in Bed Without Bedrails 2   Lying on Back to Sitting on Edge of Flat Bed 1   Moving Bed to Chair 1   Standing Up From Chair 1   Walk in Room 1   Climb 3-5 Stairs 1   Basic Mobility Inpatient Raw Score 7   Turning Head Towards Sound 4   Follow Simple Instructions 3   Low Function Basic Mobility Raw Score 14   Low Function Basic Mobility Standardized Score 22 01     Marques Mcdermott PT, DPT    Time of PT treatment session: 3771-7254  27 minutes

## 2021-10-19 NOTE — PROGRESS NOTES
INTERNAL MEDICINE RESIDENCY PROGRESS NOTE     Name: Kavya Marcelo   Age & Sex: 61 y o  male   MRN: [id number]  Unit/Bed#: -01   Encounter: [id number]  Team: SLIM    PATIENT INFORMATION     Name: Kavya Marcelo   Age & Sex: 61 y o  male   MRN: [id number]  Hospital Stay Days: 6    ASSESSMENT/PLAN     Principal Problem:    Closed fracture of proximal end of right tibia  Active Problems:    ROSE (acute kidney injury) ([facility] )    Essential hypertension    Mixed hyperlipidemia    Type 2 diabetes mellitus with diabetic neuropathy, without long-term current use of insulin (HCC)    History of DVT (deep vein thrombosis)    Elevated INR    Pseudogout of knee, left    Delayed emergence from anesthesia    Difficult intubation      * Closed fracture of proximal end of right tibia  Assessment & Plan  Presented with Right knee / proximal leg pain and inability to bear weight after ground level fall after twisting leg in his kitchen  Assoc deformity,mild abrasion  10/13-Imaging findings including CT confirmed Acute comminuted displaced intra-articular fracture of the proximal tibia as described above  No dislocation  Small lipohemarthrosis  Moderate distal femoral and proximal tibiofibular subcutaneous edema/contusion  On knee immobilizer  Non weight bearing  Orthopedic input noted and appreciated  Patient will need surgical correction of fracture  Continue analgesia, will increased dosing today  Add stool softeners  1 DPO application of external fixation to RLE  Ortho plan to return to OR on Monday for definitive fixation of tibial #  Restart heparin gtt > 12hr post procedure  Monitor LE  Non weight beating  ROSE (acute kidney injury) ([facility] )  Assessment & Plan  ROSE on CKD  May be prerenal   Cr on  Pres 1 99  Baseline 1 21 months ago  Avoid nephrotoxics  Avoid hypotension  Monitor I/O  Continue Light hydration with isolyte at 75cc/h      Difficult intubation  Assessment & Plan  Had difficult intubation during ortho procedure on 10/18  Pseudogout of knee, left  Assessment & Plan  Pain and swelling in the left anterior knee, superiorly  Tender to touch  Developed after PT  Xray -unremarkable  Started on colchicine 0 6mg bid x1 day, the 0 6mg daily  Synovial fluid analysis shows calcium pyrophosphate crystals    Elevated INR  Assessment & Plan  Now subtherapeutic ,  Patient previously on Coumadin due to history of DVTs  This is currently on hold due to need for surgery  Ortho recommends 1 4-1 6 for ortho procedure  INR 1 47--  1 18  Continue to hold Coumadin  History of DVT (deep vein thrombosis)  Assessment & Plan  History 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  Unprovoked  Last episode was 10yrs ago  On AC with coumadin  Currently held  INR now 1 89  Would need heparin if INR is subtherapeutic  DVT prophylaxis with venodyne   Hold coumadin for now  Restart heparin gtt > 12hr post procedure  Type 2 diabetes mellitus with diabetic neuropathy, without long-term current use of insulin [facility])  Assessment & Plan  Lab Results   Component Value Date    HGBA1C 8 2 (H) 10/16/2021       Recent Labs     10/16/21  2053 10/17/21  0123 10/17/21  0755 10/17/21  1152   POCGLU 204* 207* 224* 271*       Blood Sugar Average: Last 72 hrs:  (P) 957 [id number]  Known diabetic with diabetic neuropathy  On metformin and lantus at home  Blood sugar on presentation 418  Contiunue sliding scale insulin  Hypoglycemic protocol  Goal blood sugar 140-180  Diabetic diet  Received Lantus 46 u in Am 10/17  Will continue Lantus 42 u HS, and   Start meal time insulin 5 TIDAC  Continue SSI   Mixed hyperlipidemia  Assessment & Plan  Continue statin  Essential hypertension  Assessment & Plan  On lisinopril 10mg daily at home  On hold for ROSE  BP stable  Monitor BP  Disposition: continue IP     SUBJECTIVE     Patient seen and examined  No acute events overnight  S/p ortho ext fixation  Still complained of pain at periop site   Denied cp, sob, fever or chills,dizziness or lightheadedness  Complained of some sore throat but had a difficult intubation  OBJECTIVE     Vitals:    10/19/21 0442 10/19/21 0600 10/19/21 0841 10/19/21 1422   BP: 146/66  145/67 139/66   BP Location: Right arm      Pulse: 80  77 82   Resp: 20      Temp:       TempSrc:       SpO2: 96%  97% 94%   Weight:  124 kg (274 lb 7 6 oz)     Height:          Temperature:   Temp (24hrs), Av 3 °F (36 8 °C), Min:97 5 °F (36 4 °C), Max:99 °F (37 2 °C)    Temperature: 98 7 °F (37 1 °C)  Intake & Output:  I/O       10/17 0701 - 10/18 0700 10/18 0701 - 10/19 0700 10/19 0701 - 10/20 07    P  O        I V  (mL/kg)  900 (7 3)     IV Piggyback  250     Total Intake(mL/kg)  1150 (9 3)     Urine (mL/kg/hr) 400 (0 1) 1300 (0 4)     Total Output 400 1300     Net -400 -150                Weights:   IBW (Ideal Body Weight): 79 9 kg    Body mass index is 36 21 kg/m²  Weight (last 2 days)     Date/Time   Weight    10/19/21 06   124 (274 47)    Weight: includes leg brace weight at 10/19/21 0600    10/18/21 06   115 (254 19)    10/17/21 0600   104 (229 72)            Physical Exam  Constitutional:       General: He is not in acute distress  Appearance: Normal appearance  HENT:      Head: Normocephalic and atraumatic  Mouth/Throat:      Mouth: Mucous membranes are moist    Eyes:      Extraocular Movements: Extraocular movements intact  Cardiovascular:      Rate and Rhythm: Normal rate and regular rhythm  Heart sounds: No murmur heard  No friction rub  No gallop  Pulmonary:      Effort: Pulmonary effort is normal  No respiratory distress  Breath sounds: Normal breath sounds  No stridor  No wheezing, rhonchi or rales  Chest:      Chest wall: No tenderness  Abdominal:      General: Abdomen is flat  There is no distension  Palpations: Abdomen is soft  There is no mass  Tenderness: There is no abdominal tenderness  There is no guarding or rebound        Hernia: No hernia is present  Musculoskeletal:         General: Swelling, tenderness and signs of injury present  No deformity  Cervical back: Normal range of motion and neck supple  Right lower leg: No edema  Left lower leg: No edema  Comments: Knee immobilizer +n external fixators in place on the right lower extremity  Tenderness and swelling in the anterior superior aspect of L knee  Distal pulsation intact  -DP P and Posterior tibial   Skin:     General: Skin is warm  Capillary Refill: Capillary refill takes less than 2 seconds  Findings: Bruising present  Neurological:      General: No focal deficit present  Mental Status: He is alert and oriented to person, place, and time  Psychiatric:         Mood and Affect: Mood normal          Behavior: Behavior normal        LABORATORY DATA     Labs: I have personally reviewed pertinent reports    Results from last 7 days   Lab Units 10/18/21  0645 10/17/21  0333 10/16/21  0507   WBC Thousand/uL 6 80 9 13 10 11   HEMOGLOBIN g/dL 9 2* 9 0* 9 4*   HEMATOCRIT % 28 0* 27 4* 28 3*   PLATELETS Thousands/uL 246 200 187   NEUTROS PCT % 76* 73 72   MONOS PCT % 9 6 8      Results from last 7 days   Lab Units 10/18/21  0645 10/17/21  0333 10/16/21  0507 10/15/21  0522   POTASSIUM mmol/L 4 1 4 5 4 1 4 3   CHLORIDE mmol/L 103 103 103 104   CO2 mmol/L 26 26 25 25   BUN mg/dL 28* 26* 28* 28*   CREATININE mg/dL 1 47* 1 61* 1 78* 1 88*   CALCIUM mg/dL 8 6 8 4 8 5 8 6   ALK PHOS U/L  --   --   --  79   ALT U/L  --   --   --  11*   AST U/L  --   --   --  18     Results from last 7 days   Lab Units 10/14/21  0500   MAGNESIUM mg/dL 2 1     Results from last 7 days   Lab Units 10/14/21  0500   PHOSPHORUS mg/dL 3 0      Results from last 7 days   Lab Units 10/18/21  0645 10/18/21  0054 10/17/21  1844 10/17/21  1038 10/17/21  0328 10/16/21  0507   INR  1 18  --   --   --  1 47* 1 64*   PTT seconds  --  40* 61* 58* 63*  --          Results from last 7 days   Lab Units 10/13/21  0621   TROPONIN I ng/mL 0 02       IMAGING & DIAGNOSTIC TESTING     Radiology Results: I have personally reviewed pertinent reports  XR knee 1 or 2 vw left    Result Date: 10/18/2021  Impression: Left knee joint effusion without an acute osseous abnormality  Workstation performed: [id number]     XR knee 1 or 2 vw right    Result Date: 10/13/2021  Impression: Comminuted intra-articular right knee fracture  Findings concur with the referring clinician's preliminary interpretation already in the patient's electronic health record  Workstation performed: [id number]     XR tibia fibula 2 vw right    Result Date: 10/19/2021  Impression: Fluoroscopic guidance provided for procedure guidance  Please refer to the separate procedure notes for additional details  Workstation performed: [id number]     XR tibia fibula 2 views RIGHT    Result Date: 10/13/2021  Impression: Comminuted fracture of the right proximal tibia described in further detail on right knee x-ray report  No additional fractures further distally Workstation performed: [id number]     XR ankle 3+ views RIGHT    Result Date: 10/13/2021  Impression: No acute osseous abnormality  Workstation performed: [id number]     CT lower extremity wo contrast right    Result Date: 10/13/2021  Impression: Acute comminuted displaced intra-articular fracture of the proximal tibia as described above  No dislocation  This study demonstrates a significant  finding and was documented as such in [facility] for liaison and referring practitioner notification  Workstation performed: [id number]     Other Diagnostic Testing: I have personally reviewed pertinent reports      ACTIVE MEDICATIONS     Current Facility-Administered Medications   Medication Dose Route Frequency    acetaminophen (TYLENOL) tablet 650 mg  650 mg Oral Q6H PRN    calcium carbonate (TUMS) chewable tablet 500 mg  500 mg Oral Daily PRN    colchicine (COLCRYS) tablet 0 6 mg  0 6 mg Oral Daily    Diclofenac Sodium (VOLTAREN) 1 % topical gel 2 g  2 g Topical 4x Daily    heparin (porcine) 25,000 units in 0 45% NaCl 250 mL infusion (premix)  3-20 Units/kg/hr (Order-Specific) Intravenous Titrated    heparin (porcine) injection 2,000 Units  2,000 Units Intravenous Q1H PRN    heparin (porcine) injection 4,000 Units  4,000 Units Intravenous Q1H PRN    HYDROmorphone (DILAUDID) injection 0 5 mg  0 5 mg Intravenous Q3H PRN    insulin glargine (LANTUS) subcutaneous injection 42 Units 0 42 mL  42 Units Subcutaneous Daily    insulin lispro (HumaLOG) 100 units/mL subcutaneous injection 2-12 Units  2-12 Units Subcutaneous TID AC    insulin lispro (HumaLOG) 100 units/mL subcutaneous injection 5 Units  5 Units Subcutaneous TID With Meals    lactated ringers infusion  125 mL/hr Intravenous Continuous    lactated ringers infusion  125 mL/hr Intravenous Continuous    lactated ringers infusion  50 mL/hr Intravenous Continuous    lidocaine (LIDODERM) 5 % patch 1 patch  1 patch Topical Daily    multi-electrolyte (PLASMALYTE-A/ISOLYTE-S PH 7 4) IV solution  75 mL/hr Intravenous Continuous    ondansetron (ZOFRAN) injection 4 mg  4 mg Intravenous Q6H PRN    oxyCODONE (OxyCONTIN) 12 hr tablet 10 mg  10 mg Oral Q12H SHASHI    polyethylene glycol (MIRALAX) packet 17 g  17 g Oral Daily PRN    pravastatin (PRAVACHOL) tablet 40 mg  40 mg Oral Daily With Dinner    senna (SENOKOT) tablet 17 2 mg  2 tablet Oral BID       VTE Pharmacologic Prophylaxis: Heparin  VTE Mechanical Prophylaxis: sequential compression device    Portions of the record may have been created with voice recognition software  Occasional wrong word or "sound a like" substitutions may have occurred due to the inherent limitations of voice recognition software    Read the chart carefully and recognize, using context, where substitutions have occurred   ==  Oneida Draper MD  [address] [facility]  Internal Medicine Residency PGY-3

## 2021-10-19 NOTE — ASSESSMENT & PLAN NOTE
Had difficult intubation during ortho procedure on 10/18    Will keep in mind because he is going to have a definitive surgery again next week

## 2021-10-20 LAB
ANION GAP SERPL CALCULATED.3IONS-SCNC: 9 MMOL/L (ref 4–13)
APTT PPP: 25 SECONDS (ref 23–37)
APTT PPP: 69 SECONDS (ref 23–37)
APTT PPP: 83 SECONDS (ref 23–37)
APTT PPP: 86 SECONDS (ref 23–37)
BASOPHILS # BLD AUTO: 0.03 THOUSANDS/ΜL (ref 0–0.1)
BASOPHILS NFR BLD AUTO: 0 % (ref 0–1)
BUN SERPL-MCNC: 27 MG/DL (ref 5–25)
CALCIUM SERPL-MCNC: 8.1 MG/DL (ref 8.3–10.1)
CHLORIDE SERPL-SCNC: 106 MMOL/L (ref 100–108)
CO2 SERPL-SCNC: 26 MMOL/L (ref 21–32)
CREAT SERPL-MCNC: 1.35 MG/DL (ref 0.6–1.3)
EOSINOPHIL # BLD AUTO: 0.22 THOUSAND/ΜL (ref 0–0.61)
EOSINOPHIL NFR BLD AUTO: 3 % (ref 0–6)
ERYTHROCYTE [DISTWIDTH] IN BLOOD BY AUTOMATED COUNT: 14.6 % (ref 11.6–15.1)
GFR SERPL CREATININE-BSD FRML MDRD: 55 ML/MIN/1.73SQ M
GLUCOSE SERPL-MCNC: 155 MG/DL (ref 65–140)
GLUCOSE SERPL-MCNC: 218 MG/DL (ref 65–140)
GLUCOSE SERPL-MCNC: 234 MG/DL (ref 65–140)
GLUCOSE SERPL-MCNC: 252 MG/DL (ref 65–140)
GLUCOSE SERPL-MCNC: 299 MG/DL (ref 65–140)
HCT VFR BLD AUTO: 22.7 % (ref 36.5–49.3)
HGB BLD-MCNC: 7.5 G/DL (ref 12–17)
IMM GRANULOCYTES # BLD AUTO: 0.03 THOUSAND/UL (ref 0–0.2)
IMM GRANULOCYTES NFR BLD AUTO: 0 % (ref 0–2)
LYMPHOCYTES # BLD AUTO: 1.65 THOUSANDS/ΜL (ref 0.6–4.47)
LYMPHOCYTES NFR BLD AUTO: 24 % (ref 14–44)
MCH RBC QN AUTO: 29.3 PG (ref 26.8–34.3)
MCHC RBC AUTO-ENTMCNC: 33 G/DL (ref 31.4–37.4)
MCV RBC AUTO: 89 FL (ref 82–98)
MONOCYTES # BLD AUTO: 0.64 THOUSAND/ΜL (ref 0.17–1.22)
MONOCYTES NFR BLD AUTO: 9 % (ref 4–12)
NEUTROPHILS # BLD AUTO: 4.42 THOUSANDS/ΜL (ref 1.85–7.62)
NEUTS SEG NFR BLD AUTO: 64 % (ref 43–75)
NRBC BLD AUTO-RTO: 0 /100 WBCS
PLATELET # BLD AUTO: 313 THOUSANDS/UL (ref 149–390)
PMV BLD AUTO: 10 FL (ref 8.9–12.7)
POTASSIUM SERPL-SCNC: 3.8 MMOL/L (ref 3.5–5.3)
RBC # BLD AUTO: 2.56 MILLION/UL (ref 3.88–5.62)
SODIUM SERPL-SCNC: 141 MMOL/L (ref 136–145)
WBC # BLD AUTO: 6.99 THOUSAND/UL (ref 4.31–10.16)

## 2021-10-20 PROCEDURE — 82948 REAGENT STRIP/BLOOD GLUCOSE: CPT

## 2021-10-20 PROCEDURE — 85730 THROMBOPLASTIN TIME PARTIAL: CPT | Performed by: PHYSICIAN ASSISTANT

## 2021-10-20 PROCEDURE — 99232 SBSQ HOSP IP/OBS MODERATE 35: CPT | Performed by: INTERNAL MEDICINE

## 2021-10-20 PROCEDURE — 85730 THROMBOPLASTIN TIME PARTIAL: CPT | Performed by: INTERNAL MEDICINE

## 2021-10-20 PROCEDURE — 80048 BASIC METABOLIC PNL TOTAL CA: CPT | Performed by: INTERNAL MEDICINE

## 2021-10-20 PROCEDURE — 85025 COMPLETE CBC W/AUTO DIFF WBC: CPT | Performed by: INTERNAL MEDICINE

## 2021-10-20 PROCEDURE — 99024 POSTOP FOLLOW-UP VISIT: CPT | Performed by: PHYSICIAN ASSISTANT

## 2021-10-20 RX ORDER — INSULIN GLARGINE 100 [IU]/ML
5 INJECTION, SOLUTION SUBCUTANEOUS ONCE
Status: COMPLETED | OUTPATIENT
Start: 2021-10-20 | End: 2021-10-20

## 2021-10-20 RX ORDER — INSULIN GLARGINE 100 [IU]/ML
45 INJECTION, SOLUTION SUBCUTANEOUS DAILY
Status: DISCONTINUED | OUTPATIENT
Start: 2021-10-21 | End: 2021-10-20

## 2021-10-20 RX ORDER — INSULIN GLARGINE 100 [IU]/ML
50 INJECTION, SOLUTION SUBCUTANEOUS DAILY
Status: DISCONTINUED | OUTPATIENT
Start: 2021-10-21 | End: 2021-10-27

## 2021-10-20 RX ADMIN — STANDARDIZED SENNA CONCENTRATE 17.2 MG: 8.6 TABLET ORAL at 09:42

## 2021-10-20 RX ADMIN — INSULIN LISPRO 4 UNITS: 100 INJECTION, SOLUTION INTRAVENOUS; SUBCUTANEOUS at 09:54

## 2021-10-20 RX ADMIN — OXYCODONE HYDROCHLORIDE 10 MG: 10 TABLET, FILM COATED, EXTENDED RELEASE ORAL at 09:26

## 2021-10-20 RX ADMIN — HEPARIN SODIUM 13.11 UNITS/KG/HR: 10000 INJECTION, SOLUTION INTRAVENOUS at 09:45

## 2021-10-20 RX ADMIN — OXYCODONE HYDROCHLORIDE 10 MG: 10 TABLET, FILM COATED, EXTENDED RELEASE ORAL at 23:02

## 2021-10-20 RX ADMIN — DICLOFENAC SODIUM 2 G: 10 GEL TOPICAL at 14:38

## 2021-10-20 RX ADMIN — COLCHICINE 0.6 MG: 0.6 TABLET ORAL at 09:26

## 2021-10-20 RX ADMIN — INSULIN LISPRO 4 UNITS: 100 INJECTION, SOLUTION INTRAVENOUS; SUBCUTANEOUS at 18:53

## 2021-10-20 RX ADMIN — HEPARIN SODIUM 4000 UNITS: 1000 INJECTION INTRAVENOUS; SUBCUTANEOUS at 09:47

## 2021-10-20 RX ADMIN — INSULIN GLARGINE 42 UNITS: 100 INJECTION, SOLUTION SUBCUTANEOUS at 09:27

## 2021-10-20 RX ADMIN — STANDARDIZED SENNA CONCENTRATE 17.2 MG: 8.6 TABLET ORAL at 18:17

## 2021-10-20 RX ADMIN — INSULIN GLARGINE 5 UNITS: 100 INJECTION, SOLUTION SUBCUTANEOUS at 18:17

## 2021-10-20 RX ADMIN — DICLOFENAC SODIUM 2 G: 10 GEL TOPICAL at 23:03

## 2021-10-20 RX ADMIN — SODIUM CHLORIDE, SODIUM GLUCONATE, SODIUM ACETATE, POTASSIUM CHLORIDE, MAGNESIUM CHLORIDE, SODIUM PHOSPHATE, DIBASIC, AND POTASSIUM PHOSPHATE 75 ML/HR: .53; .5; .37; .037; .03; .012; .00082 INJECTION, SOLUTION INTRAVENOUS at 16:15

## 2021-10-20 RX ADMIN — DICLOFENAC SODIUM 2 G: 10 GEL TOPICAL at 18:19

## 2021-10-20 RX ADMIN — POLYETHYLENE GLYCOL 3350 17 G: 17 POWDER, FOR SOLUTION ORAL at 16:17

## 2021-10-20 RX ADMIN — HYDROMORPHONE HYDROCHLORIDE 0.5 MG: 1 INJECTION, SOLUTION INTRAMUSCULAR; INTRAVENOUS; SUBCUTANEOUS at 01:19

## 2021-10-20 RX ADMIN — DICLOFENAC SODIUM 2 G: 10 GEL TOPICAL at 09:43

## 2021-10-20 RX ADMIN — LIDOCAINE 5% 1 PATCH: 700 PATCH TOPICAL at 09:43

## 2021-10-20 RX ADMIN — PRAVASTATIN SODIUM 40 MG: 40 TABLET ORAL at 18:18

## 2021-10-20 RX ADMIN — INSULIN LISPRO 6 UNITS: 100 INJECTION, SOLUTION INTRAVENOUS; SUBCUTANEOUS at 14:37

## 2021-10-20 RX ADMIN — STANDARDIZED SENNA CONCENTRATE 17.2 MG: 8.6 TABLET ORAL at 09:26

## 2021-10-20 NOTE — UTILIZATION REVIEW
Continued Stay Review    Date: 10/20                         Current Patient Class: INpatient  Current Level of Care: MEd/surg    HPI:66 y o  male initially admitted on 10/13     Assessment/Plan:Synovial fluid analysis shows calcium pyrophosphate crystals  Continue to hold Coumadin  COntinue with IV fluids  Continue with IV Heparin drip  Non weight bearing to RLE  Add stool softeners  10/20 ORtho consult: Nonweight bearing to RLE  hgb with greater than 2 gram drop  Acute blood loss anemia  Continue to monitor  HGB from 8 2 to 7 5  PT/OT  Plan for return to OR for definitive fixation of tibial fracture  KEep on mind, difficult intubation  OPERATIVE NOTE:  SURGERY DATE: 10/18/2021  Procedures:  Application RLE uniplanar external fixator (CPT 93026)   Anesthesia Type:   General     Vital Signs:   Time  Temp  Pulse  Resp  BP  MAP (mmHg)  SpO2  O2 Flow Rate (L/min)  O2 Device  Cardiac (WDL)  Patient Position - Orthostatic VS   10/20/21 08:00:33  97 9 °F (36 6 °C)  72  --  113/90  98  97 %  --  --  --  --   10/19/21 21:25:49  98 3 °F (36 8 °C)  73  18  138/64  89  97 %  --  None (Room air)  --  Lying   10/19/21 21:19:45  98 4 °F (36 9 °C)  76  18  141/66  91  96 %  --  None (Room air)  --  Lying   10/19/21 14:22:57  98 6 °F (37 °C)  82  19  139/66  90  94 %  --  None (Room air)  --  Lying   10/19/21 08:41:02  --  77  --  145/67  93  97 %  --  --  --  --   10/19/21 0700  --  --  --                   Pertinent Labs/Diagnostic Results:   10/18 Xray left knee:   Left knee joint effusion without an acute osseous abnormality         Results from last 7 days   Lab Units 10/20/21  0640 10/19/21  1809 10/18/21  0645 10/17/21  0333 10/16/21  0507   WBC Thousand/uL 6 99 7 33 6 80 9 13 10 11   HEMOGLOBIN g/dL 7 5* 8 2* 9 2* 9 0* 9 4*   HEMATOCRIT % 22 7* 24 9* 28 0* 27 4* 28 3*   PLATELETS Thousands/uL 313 256 246 200 187   NEUTROS ABS Thousands/µL 4 42  --  5 05 6 55 7 35         Results from last 7 days   Lab Units 10/20/21  0640 10/18/21  0645 10/17/21  0333 10/16/21  0507 10/15/21  0522 10/14/21  0500   SODIUM mmol/L 141 138 139 137 139 141   POTASSIUM mmol/L 3 8 4 1 4 5 4 1 4 3 4 4   CHLORIDE mmol/L 106 103 103 103 104 105   CO2 mmol/L 26 26 26 25 25 25   ANION GAP mmol/L 9 9 10 9 10 11   BUN mg/dL 27* 28* 26* 28* 28* 28*   CREATININE mg/dL 1 35* 1 47* 1 61* 1 78* 1 88* 1 78*   EGFR ml/min/1 73sq m 55 50 45 39 37 39   CALCIUM mg/dL 8 1* 8 6 8 4 8 5 8 6 8 9   MAGNESIUM mg/dL  --   --   --   --   --  2 1   PHOSPHORUS mg/dL  --   --   --   --   --  3 0     Results from last 7 days   Lab Units 10/15/21  0522   AST U/L 18   ALT U/L 11*   ALK PHOS U/L 79   TOTAL PROTEIN g/dL 6 6   ALBUMIN g/dL 2 6*   TOTAL BILIRUBIN mg/dL 1 57*     Results from last 7 days   Lab Units 10/20/21  0758 10/19/21  2124 10/19/21  1546 10/19/21  1115 10/19/21  0801 10/18/21  2034 10/18/21  1747 10/18/21  1545 10/18/21  1201 10/18/21  0801 10/17/21  2121 10/17/21  1646   POC GLUCOSE mg/dl 218* 229* 319* 299* 377* 311* 274* 224* 222* 245* 257* 262*     Results from last 7 days   Lab Units 10/20/21  0640 10/18/21  0645 10/17/21  0333 10/16/21  0507 10/15/21  0522 10/14/21  0500   GLUCOSE RANDOM mg/dL 252* 261* 240* 217* 173* 230*         Results from last 7 days   Lab Units 10/16/21  0507   HEMOGLOBIN A1C % 8 2*   EAG mg/dl 189         Results from last 7 days   Lab Units 10/20/21  0640 10/20/21  0136 10/19/21  2241 10/19/21  1809 10/18/21  0645 10/17/21  0328   PROTIME seconds  --   --   --  16 1* 14 6* 17 2*   INR   --   --   --  1 35* 1 18 1 47*   PTT seconds 25 69* 48* 46*  --  63*             Results from last 7 days   Lab Units 10/13/21  1730   CLARITY UA  Clear   COLOR UA  Yellow   SPEC GRAV UA  1 025   PH UA  5 0   GLUCOSE UA mg/dl >=1000 (1%)*   KETONES UA mg/dl Trace*   BLOOD UA  Trace-Intact*   PROTEIN UA mg/dl Negative   NITRITE UA  Negative   BILIRUBIN UA  Negative   UROBILINOGEN UA E U /dl 0 2   LEUKOCYTES UA  Elevated glucose may cause decreased leukocyte values  See urine microscopic for UWBC result/*   WBC UA /hpf 0-1*   RBC UA /hpf 0-1*   BACTERIA UA /hpf Occasional   EPITHELIAL CELLS WET PREP /hpf Occasional   SODIUM UR  36   CREATININE UR mg/dL 157 0     Results from last 7 days   Lab Units 10/16/21  0925   GRAM STAIN RESULT  Rare Polys  No bacteria seen   BODY FLUID CULTURE, STERILE  No growth     Results from last 7 days   Lab Units 10/16/21  0925   TOTAL COUNTED  100   NEUTROPHIL % (SYNOVIAL) % 96   MONOCYTE % (SYNOVIAL) % 2   WBC FLUID /ul 22,607*   CRYSTALS, SYNOVIAL FLUID  Positive for Calcium Pyrophosphate Crystals         Medications:   Scheduled Medications:  colchicine, 0 6 mg, Oral, Daily  Diclofenac Sodium, 2 g, Topical, 4x Daily  [START ON 10/21/2021] insulin glargine, 45 Units, Subcutaneous, Daily  insulin lispro, 2-12 Units, Subcutaneous, TID AC  insulin lispro, 8 Units, Subcutaneous, TID With Meals  lidocaine, 1 patch, Topical, Daily  oxyCODONE, 10 mg, Oral, Q12H SHASHI  pravastatin, 40 mg, Oral, Daily With Dinner  senna, 2 tablet, Oral, BID      Continuous IV Infusions:  heparin (porcine), 3-20 Units/kg/hr (Order-Specific), Intravenous, Titrated  lactated ringers, 125 mL/hr, Intravenous, Continuous  lactated ringers, 125 mL/hr, Intravenous, Continuous  lactated ringers, 50 mL/hr, Intravenous, Continuous  multi-electrolyte, 75 mL/hr, Intravenous, Continuous      PRN Meds:  acetaminophen, 650 mg, Oral, Q6H PRN  calcium carbonate, 500 mg, Oral, Daily PRN  heparin (porcine), 2,000 Units, Intravenous, Q1H PRN  heparin (porcine), 4,000 Units, Intravenous, Q1H PRN  HYDROmorphone, 0 5 mg, Intravenous, Q3H PRN  ondansetron, 4 mg, Intravenous, Q6H PRN  polyethylene glycol, 17 g, Oral, Daily PRN        Discharge Plan:D  Network Utilization Review Department  ATTENTION: Please call with any questions or concerns to [phone] and carefully listen to the prompts so that you are directed to the right person   All voicemails are confidential   Molina Charity all requests for admission clinical reviews, approved or denied determinations and any other requests to dedicated fax number below belonging to the campus where the patient is receiving treatment   List of dedicated fax numbers for the Facilities:  [address] DENIALS (Administrative/Medical Necessity) [phone]   [address] (Maternity/NICU/Pediatrics) [phone] [address] [place] [address]  [address] Johnida Reggie Ann 8735 [address] [address] Jaleel Lin 1481 [address] [facility] [place] [address] [phone]

## 2021-10-20 NOTE — PROGRESS NOTES
Progress Note - Orthopedics   Eloisa Donnelly 61 y o  male MRN: [id number]  Unit/Bed#: -01      Subjective:    61 y o male POD2 s/p application of external fixator for displaced right tibia fracture  No acute events, no complaints  Pt doing well  Pain controlled  Denies fevers chills, CP, SOB  The patient also denies any numbness or tingling in the lower extremity  He states that he does feel some lightheadedness but states its because he was awoken from his sleep       Labs:  0   Lab Value Date/Time    HCT 22 7 (L) 10/20/2021 0640    HCT 24 9 (L) 10/19/2021 1809    HCT 28 0 (L) 10/18/2021 0645    HCT 38 4 07/13/2015 1448    HCT 38 6 10/20/2014 1132    HGB 7 5 (L) 10/20/2021 0640    HGB 8 2 (L) 10/19/2021 1809    HGB 9 2 (L) 10/18/2021 0645    HGB 13 1 07/13/2015 1448    HGB 13 1 10/20/2014 1132    INR 1 35 (H) 10/19/2021 1809    INR 2 94 (H) 12/11/2015 1543    WBC 6 99 10/20/2021 0640    WBC 7 33 10/19/2021 1809    WBC 6 80 10/18/2021 0645    WBC 7 7 07/13/2015 1448    WBC 8 30 10/20/2014 1132    ESR 58 (H) 05/20/2016 1207       Meds:    Current Facility-Administered Medications:     acetaminophen (TYLENOL) tablet 650 mg, 650 mg, Oral, Q6H PRN, Sukh Pierre PA-C, 650 mg at 10/16/21 2341    calcium carbonate (TUMS) chewable tablet 500 mg, 500 mg, Oral, Daily PRN, Sukh Pierre PA-C, 500 mg at 10/14/21 0006    colchicine (COLCRYS) tablet 0 6 mg, 0 6 mg, Oral, Daily, Sukh Pierre PA-C, 0 6 mg at 10/19/21 0102    Diclofenac Sodium (VOLTAREN) 1 % topical gel 2 g, 2 g, Topical, 4x Daily, Sukh Pierre PA-C, 2 g at 10/19/21 2115    heparin (porcine) 25,000 units in 0 45% NaCl 250 mL infusion (premix), 3-20 Units/kg/hr (Order-Specific), Intravenous, Titrated, Noel Gould MD, Last Rate: 11 8 mL/hr at 10/19/21 1930, 13 1 Units/kg/hr at 10/19/21 1930    heparin (porcine) injection 2,000 Units, 2,000 Units, Intravenous, Q1H PRN, Noel Gould MD, 2,000 Units at 10/19/21 1926   heparin (porcine) injection 4,000 Units, 4,000 Units, Intravenous, Q1H PRN, Latrice Shaffer MD    HYDROmorphone (DILAUDID) injection 0 5 mg, 0 5 mg, Intravenous, Q3H PRN, Susana Chavez PA-C, 0 5 mg at 10/20/21 0119    insulin glargine (LANTUS) subcutaneous injection 42 Units 0 42 mL, 42 Units, Subcutaneous, Daily, Susana Chavez PA-C, 42 Units at 10/19/21 0826    insulin lispro (HumaLOG) 100 units/mL subcutaneous injection 2-12 Units, 2-12 Units, Subcutaneous, TID AC, 8 Units at 10/19/21 1831 **AND** Fingerstick Glucose (POCT), , , TID AC, Latrice Shaffer MD    insulin lispro (HumaLOG) 100 units/mL subcutaneous injection 5 Units, 5 Units, Subcutaneous, TID With Meals, Latrice Shaffer MD, 5 Units at 10/19/21 1830    lactated ringers infusion, 125 mL/hr, Intravenous, Continuous, Morgan Garcia PA-C    lactated ringers infusion, 125 mL/hr, Intravenous, Continuous, Susana Chavez PA-C    lactated ringers infusion, 50 mL/hr, Intravenous, Continuous, Katherne Landau, CRNA, Last Rate: 50 mL/hr at 10/19/21 1826, 50 mL/hr at 10/19/21 1826    lidocaine (LIDODERM) 5 % patch 1 patch, 1 patch, Topical, Daily, Susana Chavez PA-C, 1 patch at 10/19/21 0824    multi-electrolyte (PLASMALYTE-A/ISOLYTE-S PH 7 4) IV solution, 75 mL/hr, Intravenous, Continuous, Morgan Garcia PA-C, Last Rate: 75 mL/hr at 10/19/21 1149, 75 mL/hr at 10/19/21 1149    ondansetron (ZOFRAN) injection 4 mg, 4 mg, Intravenous, Q6H PRN, Susana Chavez PA-C, 4 mg at 10/13/21 2249    oxyCODONE (OxyCONTIN) 12 hr tablet 10 mg, 10 mg, Oral, Q12H [facility], Morgan Garcia PA-C, 10 mg at 10/19/21 2115    polyethylene glycol (MIRALAX) packet 17 g, 17 g, Oral, Daily PRN, Susana Chavez PA-C, 17 g at 10/17/21 1731    pravastatin (PRAVACHOL) tablet 40 mg, 40 mg, Oral, Daily With Tanya Garcia PA-C, 40 mg at 10/19/21 1831    senna (SENOKOT) tablet 17 2 mg, 2 tablet, Oral, BID, Susana Chavez, JOSEF, 17 2 mg at 10/19/21 1831    Blood Culture:   No results found for: BLOODCX    Wound Culture:   No results found for: WOUNDCULT    Ins and Outs:  I/O last 24 hours: In: -   Out: 900 [Urine:900]      Physical:  Vitals:    10/19/21 2125   BP: 138/64   Pulse: 73   Resp: 18   Temp: 98 3 °F (36 8 °C)   SpO2: 97%     Musculoskeletal: right Lower Extremity  · Patient lying in bed comfortably and slightly lethargic, external fixator intact  · Dressing clean, dry, and intact  Skin above and below dressings is clean, without signs of irritation, drainage, erythema, warmth, or other signs of infection  · SILT s/s/sp/dp/t  · Patient able to move his toes, with intact sensation to light touch   · Soft lower extremity compartments  · Calf is supple and nontender  · 2+ DP pulse    Assessment:    66 y o male POD2 s/p application of external fixation of the right lower extremity    Plan:  · Non-weight bearing right lower extremity  · HGB pending this AM  Greater than 2 gram drop which qualifies for diagnosis of acute blood loss anemia, will monitor and administer IVF/prbc as indicated, patient did feel lightheaded this morning but states it was because he was awoken from his sleep  His hemoglobin did drop from 8 2 to 7 5 today   · PT/OT  · Pain control  · DVT ppx with Heparin   · Dispo: Ortho will follow  Patient doing well postoperatively  Plan is to return to the OR next week for definitive fixation of the tibial fracture  Patient should be re-evaluated by medicine team little later today when not as lethargic to evaluate for lightheadedness as his hemoglobin continued to drop from 8 2 to 7 5  Yanira Robledo PA-C

## 2021-10-20 NOTE — PLAN OF CARE
Problem: OCCUPATIONAL THERAPY ADULT  Goal: Performs self-care activities at highest level of function for planned discharge setting  See evaluation for individualized goals  Description: Treatment Interventions: ADL retraining, Functional transfer training, UE strengthening/ROM, Endurance training, Patient/family training, Equipment evaluation/education, Compensatory technique education, Continued evaluation, Energy conservation, Activityengagement          See flowsheet documentation for full assessment, interventions and recommendations  Note: Limitation: Decreased ADL status, Decreased endurance, Decreased self-care trans, Decreased high-level ADLs  Prognosis: Good  Assessment: (P) Patient participated in Skilled OT session this date with interventions consisting of  therapeutic activities to: increase activity tolerance, increase cardiovascular endurance , increase dynamic sit/ stand balance during functional activity , increase postural control and increase OOB/ sitting tolerance   Patient agreeable to OT treatment session, upon arrival patient was found supine in bed, alert and responsive   Patient completed bed mobility training, sitting tolerance exercises at edge of bed and sit to stand trials with assist of 2  Patient required assist of 2 skilled therapists to facilitate neuromuscular components of movement, provide weight bearing assistance to increase self performance in transitional movements and achieve functional sitting/ standing activities  Patient continues to be functioning below baseline level, occupational performance remains limited secondary to factors listed above and increased risk for falls and injury  From OT standpoint, recommendation at time of d/c would be Short Term Rehab     Patient to benefit from continued Occupational Therapy treatment while in the hospital to address deficits as defined above and maximize level of functional independence with ADLs and functional mobility OT Discharge Recommendation: Post acute rehabilitation services

## 2021-10-20 NOTE — OCCUPATIONAL THERAPY NOTE
Occupational Therapy Treatment Note        Patient Name: Tobias Flowers  [id number] Date: 10/20/2021     10/19/21 1411   OT Last Visit   OT Visit Date 10/19/21   Note Type   Note Type Treatment   Restrictions/Precautions   Weight Bearing Precautions Per Order Yes   RLE Weight Bearing Per Order NWB  (per ortho)   Braces or Orthoses Other (Comment)  (right LE external fixator)   Other Precautions Bed Alarm;WBS;Multiple lines; Fall Risk;Pain   Pain Assessment   Pain Assessment Tool 0-10   Pain Score 5   Pain Location/Orientation Location: Back; Location: Generalized   ADL   Eating Assistance 5  Supervision/Setup   Eating Deficit Setup; Increased time to complete   Bed Mobility   Supine to Sit 3  Moderate assistance   Additional items Assist x 2;HOB elevated; Bedrails; Increased time required;Verbal cues;LE management   Sit to Supine 3  Moderate assistance   Additional items Assist x 2; Increased time required;Verbal cues;LE management;HOB elevated; Bedrails   Transfers   Sit to Stand 2  Maximal assistance   Additional items Assist x 2; Increased time required;Verbal cues  (assist of 3rd person tosupport RLE)   Stand to Sit 2  Maximal assistance   Additional items Assist x 2; Increased time required;Verbal cues; Bed elevated   Additional Comments Pt performed 2x; able to acheive full upright stand on 2nd attempt; acheived 75% on 1st trial   Cognition   Overall Cognitive Status   (questionable)   Arousal/Participation Alert; Responsive; Cooperative   Attention Within functional limits   Memory Within functional limits   Following Commands Follows all commands and directions without difficulty   Activity Tolerance   Activity Tolerance Patient tolerated treatment well   Assessment   Assessment Patient participated in Skilled OT session this date with interventions consisting of  therapeutic activities to: increase activity tolerance, increase cardiovascular endurance , increase dynamic sit/ stand balance during functional activity , increase postural control and increase OOB/ sitting tolerance   Patient agreeable to OT treatment session, upon arrival patient was found supine in bed, alert and responsive   Patient completed bed mobility training, sitting tolerance exercises at edge of bed and sit to stand trials with assist of 2  Patient required assist of 2 skilled therapists to facilitate neuromuscular components of movement, provide weight bearing assistance to increase self performance in transitional movements and achieve functional sitting/ standing activities  Patient continues to be functioning below baseline level, occupational performance remains limited secondary to factors listed above and increased risk for falls and injury  From OT standpoint, recommendation at time of d/c would be Short Term Rehab  Patient to benefit from continued Occupational Therapy treatment while in the hospital to address deficits as defined above and maximize level of functional independence with ADLs and functional mobility   Plan   Treatment Interventions ADL retraining;Functional transfer training;UE strengthening/ROM; Endurance training;Patient/family training;Equipment evaluation/education; Compensatory technique education;Continued evaluation; Energy conservation; Activityengagement   Goal Expiration Date 10/28/21   OT Treatment Day 1   OT Frequency 3-5x/wk   Recommendation   OT Discharge Recommendation Post acute rehabilitation services   AM-PAC Daily Activity Inpatient   Lower Body Dressing 2   Bathing 2   Toileting 2   Upper Body Dressing 3   Grooming 3   Eating 4   Daily Activity Raw Score 16   Daily Activity Standardized Score (Calc for Raw Score >=11) 35 96   AM-PAC Applied Cognition Inpatient   Following a Speech/Presentation 4   Understanding Ordinary Conversation 4   Taking Medications 4   Remembering Where Things Are Placed or Put Away 4   Remembering List of 4-5 Errands 4   Taking Care of Complicated Tasks 4   Applied Cognition Raw Score 24   Applied Cognition Standardized Score 62 21

## 2021-10-20 NOTE — PROGRESS NOTES
[address] [facility]  Progress Note Remi Major 1955, 61 y o  male MRN: [id number]  Unit/Bed#: -Jeffery Encounter: [id number]  Primary Care Provider: Prasad Montenegro DO   Date and time admitted to hospital: 10/13/2021  5:17 AM    Difficult intubation  Assessment & Plan  Had difficult intubation during ortho procedure on 10/18  Will keep in mind because he is going to have a definitive surgery again next week    Pseudogout of knee, left  Assessment & Plan  Pain and swelling in the left anterior knee, superiorly  Tender to touch  Developed after PT  Xray -unremarkable  Started on colchicine 0 6mg bid x1 day, the 0 6mg daily  Synovial fluid analysis shows calcium pyrophosphate crystals    Elevated INR  Assessment & Plan  Now subtherapeutic ,  Patient previously on Coumadin due to history of DVTs  This is currently on hold due to need for surgery  Ortho recommends 1 4-1 6 for ortho procedure  INR 1 47--  1 18  Continue to hold Coumadin  ROSE (acute kidney injury) ([facility] )  Assessment & Plan  ROSE on CKD  May be prerenal   Cr on  Pres 1 99  Baseline 1 21 months ago  Avoid nephrotoxics  Avoid hypotension  Monitor I/O    Continue Light hydration while encouraging oral diet    Results from last 7 days   Lab Units 10/20/21  0640 10/18/21  0645 10/17/21  0333 10/16/21  0507 10/15/21  0522 10/14/21  0500   SODIUM mmol/L 141 138 139 137 139 141   POTASSIUM mmol/L 3 8 4 1 4 5 4 1 4 3 4 4   CHLORIDE mmol/L 106 103 103 103 104 105   CO2 mmol/L 26 26 26 25 25 25   ANION GAP mmol/L 9 9 10 9 10 11   BUN mg/dL 27* 28* 26* 28* 28* 28*   CREATININE mg/dL 1 35* 1 47* 1 61* 1 78* 1 88* 1 78*   CALCIUM mg/dL 8 1* 8 6 8 4 8 5 8 6 8 9   ALBUMIN g/dL  --   --   --   --  2 6*  --    TOTAL BILIRUBIN mg/dL  --   --   --   --  1 57*  --    ALK PHOS U/L  --   --   --   --  79  --    ALT U/L  --   --   --   --  11*  --    AST U/L  --   --   --   --  18  --    EGFR ml/min/1 73sq m 55 50 45 39 37 39   GLUCOSE RANDOM mg/dL 252* 261* 240* 217* 173* 230*         History of DVT (deep vein thrombosis)  Assessment & Plan  History 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  Unprovoked  Last episode was 10yrs ago  On AC with coumadin  Currently held  INR now 1 89  Would need heparin if INR is subtherapeutic  DVT prophylaxis with venodyne   Hold coumadin for now  Restarted heparin gtt > 12hr post procedure  Type 2 diabetes mellitus with diabetic neuropathy, without long-term current use of insulin [facility])  Assessment & Plan  Lab Results   Component Value Date    HGBA1C 8 2 (H) 10/16/2021       Recent Labs     10/16/21  2053 10/17/21  0123 10/17/21  0755 10/17/21  1152   POCGLU 204* 207* 224* 271*       Blood Sugar Average: Last 72 hrs:  (P) 239 [id number]  Known diabetic with diabetic neuropathy  On metformin and lantus at home  Blood sugar on presentation 418  Contiunue sliding scale insulin  Hypoglycemic protocol  Goal blood sugar 140-180  Diabetic diet  Received Lantus 46 u in Am 10/17  In the hospital, we will continue Lantus 45 u HS, and meal time insulin 8 TIDAC as blood sugars was slightly on the higher side  Continue SSI coverage          Mixed hyperlipidemia  Assessment & Plan  Continue statin    Essential hypertension  Assessment & Plan  On lisinopril 10mg daily at home  On hold for ROSE  BP stable  Blood pressure 113/90, pulse 72, temperature 97 9 °F (36 6 °C), resp  rate 18, height 6' 1" (1 854 m), weight 135 kg (298 lb 4 5 oz), SpO2 97 %  * Closed fracture of proximal end of right tibia  Assessment & Plan  Presented with Right knee / proximal leg pain and inability to bear weight after ground level fall after twisting leg in his kitchen  Assoc deformity,mild abrasion  10/13-Imaging findings including CT confirmed Acute comminuted displaced intra-articular fracture of the proximal tibia as described above  No dislocation  Small lipohemarthrosis   Moderate distal femoral and proximal tibiofibular subcutaneous edema/contusion  On knee immobilizer  Non weight bearing  Orthopedic input noted and appreciated  Patient will need full surgical correction of fracture  Continue analgesia, will optimize dose  Add stool softeners  1 DPO application of external fixation to RLE  Ortho plan to return to OR on Monday for definitive fixation of tibial #  Restart heparin gtt > 12hr post procedure  Monitor LE  Non weight beating  TE Pharmacologic Prophylaxis: Heparin Drip    Patient Centered Rounds: I have performed bedside rounds with nursing staff today  Discussions with Specialists or Other Care Team Provider:  Orthopedics  Education and Discussions with Family / Patient:  Patient and wife    Current Length of Stay: 7 day(s)  Current Patient Status: Inpatient     Certification Statement: The patient will continue to require additional inpatient hospital stay due to Closed fracture of proximal end of right tibia  Discharge Plan / Estimated Discharge Date:  1-2 days    Code Status: Level 1 - Full Code  ______________________________________________________________________________    Subjective:   Patient seen and examined by me  Patient states that he has pain in the leg at some of the screws  Patient does not have any chest pain, palpitations or diaphoresis  Patient is upset that he has to wait another week for the surgery  Blood sugars are slightly on the elevated side  Appetite okay  No nausea or vomiting  No high fever    Objective:   Vitals: Blood pressure 113/90, pulse 72, temperature 97 9 °F (36 6 °C), resp  rate 18, height 6' 1" (1 854 m), weight 135 kg (298 lb 4 5 oz), SpO2 97 %      Physical Exam:   General appearance: alert, appears stated age and cooperative  Constitutional- looks a little weak  HEENT - atraumatic and normocephalic  Neck- supple  Skin - no fresh rash  Extremities- status post closed fracture of the proximal end of the right tibia  Cardiovascular- S1-S2 heard  Respiratory- bilateral air entry present, no crackles or rhonchi  Skin - no fresh rash  Abdomen - normal bowel sounds present, no rebound tenderness  CNS- No fresh focal deficits  Psych- no acute psychosis     Additional Data:   Labs:  Results from last 7 days   Lab Units 10/20/21  0640 10/19/21  1809 10/18/21  0645 10/17/21  0333 10/17/21  0328 10/16/21  0507 10/15/21  0522 10/14/21  0500 10/13/21  1536   WBC Thousand/uL 6 99 7 33 6 80 9 13  --  10 11 13 95* 11 43*  --    HEMOGLOBIN g/dL 7 5* 8 2* 9 2* 9 0*  --  9 4* 10 3* 10 9*  --    HEMATOCRIT % 22 7* 24 9* 28 0* 27 4*  --  28 3* 31 3* 33 2*  --    MCV fL 89 88 89 90  --  89 90 89  --    PLATELETS Thousands/uL 313 256 246 200  --  187 197 234  --    INR   --  1 35* 1 18  --  1 47* 1 64* 1 89* 2 14* 2 21*     Results from last 7 days   Lab Units 10/20/21  0640 10/18/21  0645 10/17/21  0333 10/16/21  0507 10/15/21  0522 10/14/21  0500   SODIUM mmol/L 141 138 139 137 139 141   POTASSIUM mmol/L 3 8 4 1 4 5 4 1 4 3 4 4   CHLORIDE mmol/L 106 103 103 103 104 105   CO2 mmol/L 26 26 26 25 25 25   ANION GAP mmol/L 9 9 10 9 10 11   BUN mg/dL 27* 28* 26* 28* 28* 28*   CREATININE mg/dL 1 35* 1 47* 1 61* 1 78* 1 88* 1 78*   CALCIUM mg/dL 8 1* 8 6 8 4 8 5 8 6 8 9   ALBUMIN g/dL  --   --   --   --  2 6*  --    TOTAL BILIRUBIN mg/dL  --   --   --   --  1 57*  --    ALK PHOS U/L  --   --   --   --  79  --    ALT U/L  --   --   --   --  11*  --    AST U/L  --   --   --   --  18  --    EGFR ml/min/1 73sq m 55 50 45 39 37 39   GLUCOSE RANDOM mg/dL 252* 261* 240* 217* 173* 230*     Results from last 7 days   Lab Units 10/14/21  0500   MAGNESIUM mg/dL 2 1   PHOSPHORUS mg/dL 3 0                  Results from last 7 days   Lab Units 10/20/21  0758 10/19/21  2124 10/19/21  1546 10/19/21  1115 10/19/21  0801 10/18/21  2034 10/18/21  1747 10/18/21  1545 10/18/21  1201 10/18/21  0801 10/17/21  2121 10/17/21  1646   POC GLUCOSE mg/dl 218* 229* 319* 299* 377* 311* 274* 224* 222* 245* 257* 262*     Results from last 7 days   Lab Units 10/16/21  0507   HEMOGLOBIN A1C % 8 2*         * I Have Reviewed All Lab Data Listed Above  Cultures:   Results from last 7 days   Lab Units 10/16/21  0925   GRAM STAIN RESULT  Rare Polys  No bacteria seen   BODY FLUID CULTURE, STERILE  No growth             Imaging:  Imaging Reports Reviewed Today Include:   XR knee 1 or 2 vw left    Result Date: 10/18/2021  Impression: Left knee joint effusion without an acute osseous abnormality  Workstation performed: [id number]     XR knee 1 or 2 vw right    Result Date: 10/13/2021  Impression: Comminuted intra-articular right knee fracture  Findings concur with the referring clinician's preliminary interpretation already in the patient's electronic health record  Workstation performed: [id number]     XR tibia fibula 2 vw right    Result Date: 10/19/2021  Impression: Fluoroscopic guidance provided for procedure guidance  Please refer to the separate procedure notes for additional details  Workstation performed: [id number]     XR tibia fibula 2 views RIGHT    Result Date: 10/13/2021  Impression: Comminuted fracture of the right proximal tibia described in further detail on right knee x-ray report  No additional fractures further distally Workstation performed: [id number]     XR ankle 3+ views RIGHT    Result Date: 10/13/2021  Impression: No acute osseous abnormality  Workstation performed: [id number]     CT lower extremity wo contrast right    Result Date: 10/13/2021  Impression: Acute comminuted displaced intra-articular fracture of the proximal tibia as described above  No dislocation  This study demonstrates a significant  finding and was documented as such in [facility] for liaison and referring practitioner notification   Workstation performed: [id number]     Scheduled Meds:  Current Facility-Administered Medications   Medication Dose Route Frequency Provider Last Rate    acetaminophen  650 mg Oral Q6H PRN Rondal Kanner, PA-C      calcium carbonate  500 mg Oral Daily PRN Rogelio Velarde, PA-C      colchicine  0 6 mg Oral Daily Earleia Prachi, PA-C      Diclofenac Sodium  2 g Topical 4x Daily Rogelio Prachi, PA-C      heparin (porcine)  3-20 Units/kg/hr (Order-Specific) Intravenous Titrated Partha Evans MD 17 1 Units/kg/hr (10/20/21 0946)    heparin (porcine)  2,000 Units Intravenous Q1H PRN Partha Evans MD      heparin (porcine)  4,000 Units Intravenous Q1H PRN Partha Evans MD      HYDROmorphone  0 5 mg Intravenous Q3H PRN Rogelio Velarde, PA-C      [START ON 10/21/2021] insulin glargine  45 Units Subcutaneous Daily Marina Mora MD      insulin lispro  2-12 Units Subcutaneous TID AC Partha Evans MD      insulin lispro  8 Units Subcutaneous TID With Meals Partha Evans MD      lactated ringers  125 mL/hr Intravenous Continuous Rogelio Prachi, PA-C      lactated ringers  125 mL/hr Intravenous Continuous Rogelio Prachi, PA-C      lactated ringers  50 mL/hr Intravenous Continuous Zygmunt [place], CRNA Stopped (10/20/21 1000)    lidocaine  1 patch Topical Daily Rogelio Prachi, PA-C      multi-electrolyte  75 mL/hr Intravenous Continuous Rogelio Prachi, PA-C 75 mL/hr (10/19/21 1149)    ondansetron  4 mg Intravenous Q6H PRN Rogelio Prachi, PA-C      oxyCODONE  10 mg Oral Q12H [address] Morgan Garcia PA-C      polyethylene glycol  17 g Oral Daily PRN Rogelio Prachi, PA-C      pravastatin  40 mg Oral Daily With NewsPinJOSEF      senna  2 tablet Oral BID PRITI Trent-C         MD Charly Castellano [facility] Internal Medicine    ** Please Note: This note has been constructed using a voice recognition system   **

## 2021-10-20 NOTE — CASE MANAGEMENT
Case Management Discharge Planning Note    Patient name Eloisa Donnelly  Location [address] 209/-01 MRN [id number]  : 1955 Date 10/20/2021       Current Admission Date: 10/13/2021  Current Admission Diagnosis:  Closed fracture of proximal end of right tibia  Previous Admission - Discharge Date:16   LOS (days): 7  Geometric Mean LOS (GMLOS) (days):   Days to GMLOS: Previous Discharge Diagnosis:  There are no discharge diagnoses documented for the most recent discharge  OBJECTIVE:  Risk of Unplanned Readmission Score: 19   Bundle(if applicable): Bundled Patient Payment:  (no)  Current admission status: Inpatient  Preferred Pharmacy:   [facility], [address] [address]  Phone: [phone] Fax: [phone]    [address] [address]  Phone: [phone] Fax: [phone]  [address] [place] [address]  Phone: [phone] Fax: [phone]    Primary Care Provider: Koffi Rivera DO    Primary Insurance: BLUE CROSS  Secondary Insurance: [facility] [employer] RE[employer]    DISCHARGE DETAILS:    Discharge planning discussed with[de-identified] patient and wife-Rebecca  Freedom of Choice: Yes  Comments - Freedom of Choice: patient and spouse are agreeable to rehab  Contacts  Patient Contacts: wife-Rebecca  Relationship to Patient[de-identified] Family  Contact Method:  In Person  Reason/Outcome: Continuity of Care, Emergency Contact, Discharge [address]         Is the patient interested in [address] at discharge?: No    DME Referral Provided  Referral made for DME?: No    Other Referral/Resources/Interventions Provided:  Interventions: Short Term Rehab  Referral Comments: referrals pended 50 mile radius    Would you like to participate in our [address] service program?  : No - Declined    Discharge Destination Plan[de-identified] Short Term Rehab     Type of Transport: BLS Ambulance  Dispatcher Called: No

## 2021-10-20 NOTE — PROGRESS NOTES
Patient refusing bowel medication  Last BM 10/12  Patient said, " its my  body and he knows when it works " Told wife ," I told you not to bring it up  Everyone is an expert " Patient states, " will take one teasp of miralax    This nurse  gave miralax 8 OZ to patient and left at bedside and encouraged patient to drink to help avoid other complications   Dr Frye aware of same

## 2021-10-21 LAB
ANION GAP SERPL CALCULATED.3IONS-SCNC: 7 MMOL/L (ref 4–13)
APTT PPP: 72 SECONDS (ref 23–37)
BUN SERPL-MCNC: 24 MG/DL (ref 5–25)
CALCIUM SERPL-MCNC: 8.4 MG/DL (ref 8.3–10.1)
CHLORIDE SERPL-SCNC: 108 MMOL/L (ref 100–108)
CO2 SERPL-SCNC: 28 MMOL/L (ref 21–32)
CREAT SERPL-MCNC: 1.28 MG/DL (ref 0.6–1.3)
ERYTHROCYTE [DISTWIDTH] IN BLOOD BY AUTOMATED COUNT: 14.6 % (ref 11.6–15.1)
GFR SERPL CREATININE-BSD FRML MDRD: 59 ML/MIN/1.73SQ M
GLUCOSE SERPL-MCNC: 131 MG/DL (ref 65–140)
GLUCOSE SERPL-MCNC: 135 MG/DL (ref 65–140)
GLUCOSE SERPL-MCNC: 153 MG/DL (ref 65–140)
GLUCOSE SERPL-MCNC: 227 MG/DL (ref 65–140)
GLUCOSE SERPL-MCNC: 237 MG/DL (ref 65–140)
GLUCOSE SERPL-MCNC: 279 MG/DL (ref 65–140)
HCT VFR BLD AUTO: 26.6 % (ref 36.5–49.3)
HGB BLD-MCNC: 8.7 G/DL (ref 12–17)
MCH RBC QN AUTO: 29.1 PG (ref 26.8–34.3)
MCHC RBC AUTO-ENTMCNC: 32.7 G/DL (ref 31.4–37.4)
MCV RBC AUTO: 89 FL (ref 82–98)
PLATELET # BLD AUTO: 347 THOUSANDS/UL (ref 149–390)
PMV BLD AUTO: 9.8 FL (ref 8.9–12.7)
POTASSIUM SERPL-SCNC: 4.6 MMOL/L (ref 3.5–5.3)
RBC # BLD AUTO: 2.99 MILLION/UL (ref 3.88–5.62)
SODIUM SERPL-SCNC: 143 MMOL/L (ref 136–145)
WBC # BLD AUTO: 6.54 THOUSAND/UL (ref 4.31–10.16)

## 2021-10-21 PROCEDURE — 97530 THERAPEUTIC ACTIVITIES: CPT

## 2021-10-21 PROCEDURE — 85730 THROMBOPLASTIN TIME PARTIAL: CPT | Performed by: INTERNAL MEDICINE

## 2021-10-21 PROCEDURE — 80048 BASIC METABOLIC PNL TOTAL CA: CPT | Performed by: INTERNAL MEDICINE

## 2021-10-21 PROCEDURE — 99024 POSTOP FOLLOW-UP VISIT: CPT | Performed by: PHYSICIAN ASSISTANT

## 2021-10-21 PROCEDURE — 85027 COMPLETE CBC AUTOMATED: CPT | Performed by: INTERNAL MEDICINE

## 2021-10-21 PROCEDURE — 82948 REAGENT STRIP/BLOOD GLUCOSE: CPT

## 2021-10-21 PROCEDURE — 99232 SBSQ HOSP IP/OBS MODERATE 35: CPT | Performed by: INTERNAL MEDICINE

## 2021-10-21 RX ADMIN — INSULIN LISPRO 6 UNITS: 100 INJECTION, SOLUTION INTRAVENOUS; SUBCUTANEOUS at 16:28

## 2021-10-21 RX ADMIN — INSULIN LISPRO 4 UNITS: 100 INJECTION, SOLUTION INTRAVENOUS; SUBCUTANEOUS at 12:46

## 2021-10-21 RX ADMIN — HEPARIN SODIUM 17.1 UNITS/KG/HR: 10000 INJECTION, SOLUTION INTRAVENOUS at 21:08

## 2021-10-21 RX ADMIN — DICLOFENAC SODIUM 2 G: 10 GEL TOPICAL at 09:24

## 2021-10-21 RX ADMIN — OXYCODONE HYDROCHLORIDE 10 MG: 10 TABLET, FILM COATED, EXTENDED RELEASE ORAL at 22:55

## 2021-10-21 RX ADMIN — DICLOFENAC SODIUM 2 G: 10 GEL TOPICAL at 22:19

## 2021-10-21 RX ADMIN — ACETAMINOPHEN 650 MG: 325 TABLET, FILM COATED ORAL at 16:25

## 2021-10-21 RX ADMIN — PRAVASTATIN SODIUM 40 MG: 40 TABLET ORAL at 16:20

## 2021-10-21 RX ADMIN — COLCHICINE 0.6 MG: 0.6 TABLET ORAL at 09:23

## 2021-10-21 RX ADMIN — LIDOCAINE 5% 1 PATCH: 700 PATCH TOPICAL at 09:24

## 2021-10-21 RX ADMIN — HEPARIN SODIUM 17.1 UNITS/KG/HR: 10000 INJECTION, SOLUTION INTRAVENOUS at 02:51

## 2021-10-21 RX ADMIN — DICLOFENAC SODIUM 2 G: 10 GEL TOPICAL at 12:41

## 2021-10-21 RX ADMIN — STANDARDIZED SENNA CONCENTRATE 17.2 MG: 8.6 TABLET ORAL at 09:24

## 2021-10-21 RX ADMIN — POLYETHYLENE GLYCOL 3350 17 G: 17 POWDER, FOR SOLUTION ORAL at 16:20

## 2021-10-21 RX ADMIN — OXYCODONE HYDROCHLORIDE 10 MG: 10 TABLET, FILM COATED, EXTENDED RELEASE ORAL at 10:55

## 2021-10-21 RX ADMIN — SODIUM CHLORIDE, SODIUM GLUCONATE, SODIUM ACETATE, POTASSIUM CHLORIDE, MAGNESIUM CHLORIDE, SODIUM PHOSPHATE, DIBASIC, AND POTASSIUM PHOSPHATE 75 ML/HR: .53; .5; .37; .037; .03; .012; .00082 INJECTION, SOLUTION INTRAVENOUS at 12:38

## 2021-10-21 RX ADMIN — INSULIN GLARGINE 50 UNITS: 100 INJECTION, SOLUTION SUBCUTANEOUS at 12:41

## 2021-10-21 NOTE — CASE MANAGEMENT
Case Management Discharge Planning Note    Patient name Delia Sotomayor  Location [address] 209/-06 MRN [id number]  : 1955 Date 10/21/2021       Current Admission Date: 10/13/2021  Current Admission Diagnosis:Closed fracture of proximal end of right tibia   Patient Active Problem List    Diagnosis Date Noted    Delayed emergence from anesthesia 10/18/2021    Difficult intubation 10/18/2021    Pseudogout of knee, left 10/17/2021    Pain and swelling of left knee 10/16/2021    Elevated INR 10/14/2021    History of DVT (deep vein thrombosis) 10/13/2021    ROSE (acute kidney injury) ([facility] ) 10/13/2021    Closed fracture of proximal end of right tibia 10/13/2021    Type 2 [facility] with hyperglycemia [facility] ) 10/05/2021    Class 1 obesity in adult 2021    Type 2 diabetes mellitus with diabetic peripheral angiopathy without gangrene ([facility] ) 10/08/2019    Other insomnia 2017    Type 2 diabetes mellitus with diabetic neuropathy, without long-term current use of insulin ([facility] ) 2017    Lumbar degenerative disc disease 10/21/2014    Acute embolism and thrombosis of unspecified deep veins of unspecified lower extremity ([facility] ) 10/20/2014    Essential hypertension 10/20/2014    Mixed hyperlipidemia 10/20/2014    Peripheral vascular disease ([facility] ) 10/20/2014      LOS (days): 8  Geometric Mean LOS (GMLOS) (days):   Days to GMLOS:     OBJECTIVE:  Risk of Unplanned Readmission Score: 13   Bundled Patient Payment:  (no)     Current admission status: Inpatient   Preferred Pharmacy:   [facility]/pharmacy #[id number] [address]  Phone: [phone] Fax: [address] [address]  Phone: [phone] Fax: [address] [address]  Phone: [phone] Fax: [phone]    Primary Care Provider: Alexandra Bonilla DO    Primary Insurance: BLUE CROSS  Secondary Insurance: [facility] RE[employer]    DISCHARGE DETAILS:                                          Other Referral/Resources/Interventions Provided:  Interventions: Short Term Rehab  Referral Comments: [facility] have accepted patient but many more STR are reviewing pt

## 2021-10-21 NOTE — ASSESSMENT & PLAN NOTE
Lab Results   Component Value Date    HGBA1C 8 2 (H) 10/16/2021       Recent Labs     10/21/21  0229 10/21/21  0726 10/21/21  1118 10/21/21  1613   POCGLU 135 153* 237* 279*       Blood Sugar Average: Last 72 hrs:  (P) 913 [id number]  Known diabetic with diabetic neuropathy  On metformin and lantus at home  Blood sugar on presentation 418  Contiunue sliding scale insulin  Hypoglycemic protocol  Goal blood sugar 140-180  Diabetic diet  Received Lantus 46 u in Am 10/17  In the hospital, we will continue Lantus 45 u HS, and meal time insulin 8 TIDAC as blood sugars was slightly on the higher side    Continue SSI coverage while in hospital

## 2021-10-21 NOTE — ASSESSMENT & PLAN NOTE
ROSE on CKD  May be prerenal   Cr on admission 1 99  Baseline 1 21 months ago  Avoid nephrotoxics  Avoid hypotension  Monitor I/O    Continue Light hydration while encouraging oral diet    Results from last 7 days   Lab Units 10/21/21  0559 10/20/21  0640 10/18/21  0645 10/17/21  0333 10/16/21  0507 10/15/21  0522   SODIUM mmol/L 143 141 138 139 137 139   POTASSIUM mmol/L 4 6 3 8 4 1 4 5 4 1 4 3   CHLORIDE mmol/L 108 106 103 103 103 104   CO2 mmol/L 28 26 26 26 25 25   ANION GAP mmol/L 7 9 9 10 9 10   BUN mg/dL 24 27* 28* 26* 28* 28*   CREATININE mg/dL 1 28 1 35* 1 47* 1 61* 1 78* 1 88*   CALCIUM mg/dL 8 4 8 1* 8 6 8 4 8 5 8 6   ALBUMIN g/dL  --   --   --   --   --  2 6*   TOTAL BILIRUBIN mg/dL  --   --   --   --   --  1 57*   ALK PHOS U/L  --   --   --   --   --  79   ALT U/L  --   --   --   --   --  11*   AST U/L  --   --   --   --   --  18   EGFR ml/min/1 73sq m 59 55 50 45 39 37   GLUCOSE RANDOM mg/dL 131 252* 261* 240* 217* 173*

## 2021-10-21 NOTE — PLAN OF CARE
Problem: Potential for Falls  Goal: Patient will remain free of falls  Description: INTERVENTIONS:  - Educate patient/family on patient safety including physical limitations  - Instruct patient to call for assistance with activity   - Consult OT/PT to assist with strengthening/mobility   - Keep Call bell within reach  - Keep bed low and locked with side rails adjusted as appropriate  - Keep care items and personal belongings within reach  - Initiate and maintain comfort rounds  - Make Fall Risk Sign visible to staff  - Offer Toileting every    Hours, in advance of need  - Initiate/Maintain   alarm  - Obtain necessary fall risk management equipment:     - Apply yellow socks and bracelet for high fall risk patients  - Consider moving patient to room near nurses station  Outcome: Progressing     Problem: Prexisting or High Potential for Compromised Skin Integrity  Goal: Skin integrity is maintained or improved  Description: INTERVENTIONS:  - Identify patients at risk for skin breakdown  - Assess and monitor skin integrity  - Assess and monitor nutrition and hydration status  - Monitor labs   - Assess for incontinence   - Turn and reposition patient  - Assist with mobility/ambulation  - Relieve pressure over bony prominences  - Avoid friction and shearing  - Provide appropriate hygiene as needed including keeping skin clean and dry  - Evaluate need for skin moisturizer/barrier cream  - Collaborate with interdisciplinary team   - Patient/family teaching  - Consider wound care consult   Outcome: Progressing     Problem: PAIN - ADULT  Goal: Verbalizes/displays adequate comfort level or baseline comfort level  Description: Interventions:  - Encourage patient to monitor pain and request assistance  - Assess pain using appropriate pain scale  - Administer analgesics based on type and severity of pain and evaluate response  - Implement non-pharmacological measures as appropriate and evaluate response  - Consider cultural and social influences on pain and pain management  - Notify physician/advanced practitioner if interventions unsuccessful or patient reports new pain  Outcome: Progressing     Problem: INFECTION - ADULT  Goal: Absence or prevention of progression during hospitalization  Description: INTERVENTIONS:  - Assess and monitor for signs and symptoms of infection  - Monitor lab/diagnostic results  - Monitor all insertion sites, i e  indwelling lines, tubes, and drains  - Monitor endotracheal if appropriate and nasal secretions for changes in amount and color  - [place] appropriate cooling/warming therapies per order  - Administer medications as ordered  - Instruct and encourage patient and family to use good hand hygiene technique  - Identify and instruct in appropriate isolation precautions for identified infection/condition  Outcome: Progressing  Goal: Absence of fever/infection during neutropenic period  Description: INTERVENTIONS:  - Monitor WBC    Outcome: Progressing     Problem: SAFETY ADULT  Goal: Patient will remain free of falls  Description: INTERVENTIONS:  - Educate patient/family on patient safety including physical limitations  - Instruct patient to call for assistance with activity   - Consult OT/PT to assist with strengthening/mobility   - Keep Call bell within reach  - Keep bed low and locked with side rails adjusted as appropriate  - Keep care items and personal belongings within reach  - Initiate and maintain comfort rounds  - Make Fall Risk Sign visible to staff  - Offer Toileting every    Hours, in advance of need  - Initiate/Maintain   alarm  - Obtain necessary fall risk management equipment:     - Apply yellow socks and bracelet for high fall risk patients  - Consider moving patient to room near nurses station  Outcome: Progressing  Goal: Maintain or return to baseline ADL function  Description: INTERVENTIONS:  -  Assess patient's ability to carry out ADLs; assess patient's baseline for ADL function and identify physical deficits which impact ability to perform ADLs (bathing, care of mouth/teeth, toileting, grooming, dressing, etc )  - Assess/evaluate cause of self-care deficits   - Assess range of motion  - Assess patient's mobility; develop plan if impaired  - Assess patient's need for assistive devices and provide as appropriate  - Encourage maximum independence but intervene and supervise when necessary  - Involve family in performance of ADLs  - Assess for home care needs following discharge   - Consider OT consult to assist with ADL evaluation and planning for discharge  - Provide patient education as appropriate  Outcome: Progressing  Goal: Maintains/Returns to pre admission functional level  Description: INTERVENTIONS:  - Perform BMAT or MOVE assessment daily    - Set and communicate daily mobility goal to care team and patient/family/caregiver  - Collaborate with rehabilitation services on mobility goals if consulted  - Perform Range of Motion    times a day  - Reposition patient every    hours    - Dangle patient    times a day  - Stand patient    times a day  - Ambulate patient    times a day  - Out of bed to chair    times a day   - Out of bed for meals    times a day  - Out of bed for toileting  - Record patient progress and toleration of activity level   Outcome: Progressing     Problem: DISCHARGE PLANNING  Goal: Discharge to home or other facility with appropriate resources  Description: INTERVENTIONS:  - Identify barriers to discharge w/patient and caregiver  - Arrange for needed discharge resources and transportation as appropriate  - Identify discharge learning needs (meds, wound care, etc )  - Arrange for interpretive services to assist at discharge as needed  - Refer to Case Management Department for coordinating discharge planning if the patient needs post-hospital services based on physician/advanced practitioner order or complex needs related to functional status, cognitive ability, or social support system  Outcome: Progressing     Problem: Knowledge Deficit  Goal: Patient/family/caregiver demonstrates understanding of disease process, treatment plan, medications, and discharge instructions  Description: Complete learning assessment and assess knowledge base  Interventions:  - Provide teaching at level of understanding  - Provide teaching via preferred learning methods  Outcome: Progressing     Problem: MOBILITY - ADULT  Goal: Maintain or return to baseline ADL function  Description: INTERVENTIONS:  -  Assess patient's ability to carry out ADLs; assess patient's baseline for ADL function and identify physical deficits which impact ability to perform ADLs (bathing, care of mouth/teeth, toileting, grooming, dressing, etc )  - Assess/evaluate cause of self-care deficits   - Assess range of motion  - Assess patient's mobility; develop plan if impaired  - Assess patient's need for assistive devices and provide as appropriate  - Encourage maximum independence but intervene and supervise when necessary  - Involve family in performance of ADLs  - Assess for home care needs following discharge   - Consider OT consult to assist with ADL evaluation and planning for discharge  - Provide patient education as appropriate  Outcome: Progressing  Goal: Maintains/Returns to pre admission functional level  Description: INTERVENTIONS:  - Perform BMAT or MOVE assessment daily    - Set and communicate daily mobility goal to care team and patient/family/caregiver  - Collaborate with rehabilitation services on mobility goals if consulted  - Perform Range of Motion    times a day  - Reposition patient every    hours    - Dangle patient    times a day  - Stand patient    times a day  - Ambulate patient    times a day  - Out of bed to chair    times a day   - Out of bed for meals    times a day  - Out of bed for toileting  - Record patient progress and toleration of activity level   Outcome: Progressing     Problem: Nutrition/Hydration-ADULT  Goal: Nutrient/Hydration intake appropriate for improving, restoring or maintaining nutritional needs  Description: Monitor and assess patient's nutrition/hydration status for malnutrition  Collaborate with interdisciplinary team and initiate plan and interventions as ordered  Monitor patient's weight and dietary intake as ordered or per policy  Utilize nutrition screening tool and intervene as necessary  Determine patient's food preferences and provide high-protein, high-caloric foods as appropriate       INTERVENTIONS:  - Monitor oral intake, urinary output, labs, and treatment plans  - Assess nutrition and hydration status and recommend course of action  - Evaluate amount of meals eaten  - Assist patient with eating if necessary   - Allow adequate time for meals  - Recommend/ encourage appropriate diets, oral nutritional supplements, and vitamin/mineral supplements  - Order, calculate, and assess calorie counts as needed  - Recommend, monitor, and adjust tube feedings and TPN/PPN based on assessed needs  - Assess need for intravenous fluids  - Provide specific nutrition/hydration education as appropriate  - Include patient/family/caregiver in decisions related to nutrition  Outcome: Progressing

## 2021-10-21 NOTE — PROGRESS NOTES
[address] [facility]  Progress Note Jesus Borders 1955, 61 y o  male MRN: [id number]  Unit/Bed#: -[place] Encounter: [id number]  Primary Care Provider: Stephie Belle DO   Date and time admitted to hospital: 10/13/2021  5:17 AM    Pseudogout of knee, left  Assessment & Plan  Pain and swelling in the left anterior knee, superiorly  Tender to touch  Developed after PT  Xray -unremarkable  Started on colchicine 0 6mg bid x1 day, the 0 6mg daily  Synovial fluid analysis shows calcium pyrophosphate crystals    ROSE (acute kidney injury) ([facility] [address] )  Assessment & Plan  ROSE on CKD  May be prerenal   Cr on admission 1 99  Baseline 1 21 months ago  Avoid nephrotoxics  Avoid hypotension  Monitor I/O  Continue Light hydration while encouraging oral diet    Results from last 7 days   Lab Units 10/21/21  0559 10/20/21  0640 10/18/21  0645 10/17/21  0333 10/16/21  0507 10/15/21  0522   SODIUM mmol/L 143 141 138 139 137 139   POTASSIUM mmol/L 4 6 3 8 4 1 4 5 4 1 4 3   CHLORIDE mmol/L 108 106 103 103 103 104   CO2 mmol/L 28 26 26 26 25 25   ANION GAP mmol/L 7 9 9 10 9 10   BUN mg/dL 24 27* 28* 26* 28* 28*   CREATININE mg/dL 1 28 1 35* 1 47* 1 61* 1 78* 1 88*   CALCIUM mg/dL 8 4 8 1* 8 6 8 4 8 5 8 6   ALBUMIN g/dL  --   --   --   --   --  2 6*   TOTAL BILIRUBIN mg/dL  --   --   --   --   --  1 57*   ALK PHOS U/L  --   --   --   --   --  79   ALT U/L  --   --   --   --   --  11*   AST U/L  --   --   --   --   --  18   EGFR ml/min/1 73sq m 59 55 50 45 39 37   GLUCOSE RANDOM mg/dL 131 252* 261* 240* 217* 173*         History of DVT (deep vein thrombosis)  Assessment & Plan  History 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  Unprovoked  Last episode was 10yrs ago  On AC with coumadin  Currently held  Would need heparin if INR is subtherapeutic  DVT prophylaxis with venodyne   Hold coumadin for now  Restarted heparin gtt > 12hr post procedure        Type 2 diabetes mellitus with diabetic neuropathy, without long-term current use of insulin [facility])  Assessment & Plan  Lab Results   Component Value Date    HGBA1C 8 2 (H) 10/16/2021       Recent Labs     10/21/21  0229 10/21/21  0726 10/21/21  1118 10/21/21  1613   POCGLU 135 153* 237* 279*       Blood Sugar Average: Last 72 hrs:  (P) 182 [id number]  Known diabetic with diabetic neuropathy  On metformin and lantus at home  Blood sugar on presentation 418  Contiunue sliding scale insulin  Hypoglycemic protocol  Goal blood sugar 140-180  Diabetic diet  Received Lantus 46 u in Am 10/17  In the hospital, we will continue Lantus 45 u HS, and meal time insulin 8 TIDAC as blood sugars was slightly on the higher side  Continue SSI coverage while in hospital          Mixed hyperlipidemia  Assessment & Plan  Continue statins    Essential hypertension  Assessment & Plan  On lisinopril 10mg daily at home  On hold for ROSE  BP stable now    Blood pressure 114/79, pulse 70, temperature 98 8 °F (37 1 °C), resp  rate 18, height 6' 1" (1 854 m), weight 135 kg (297 lb 9 9 oz), SpO2 95 %  * Closed fracture of proximal end of right tibia  Assessment & Plan  Presented with Right knee / proximal leg pain and inability to bear weight after ground level fall after twisting leg in his kitchen  Assoc deformity,mild abrasion  10/13-Imaging findings including CT confirmed Acute comminuted displaced intra-articular fracture of the proximal tibia as described above  No dislocation  Small lipohemarthrosis  Moderate distal femoral and proximal tibiofibular subcutaneous edema/contusion  On knee immobilizer  Non weight bearing  Orthopedic input noted and appreciated  Patient will need full surgical correction of fracture  Continue analgesia, will optimize dose  Add stool softeners  1 DPO application of external fixation to RLE  Ortho plan to return to OR on Monday for definitive fixation of tibial #  Restart heparin gtt > 12hr post procedure  Monitor LE  Non weight beating            TE Pharmacologic Prophylaxis: Heparin    Patient Centered Rounds: I have performed bedside rounds with nursing staff today  Discussions with Specialists or Other Care Team Provider:  Care team  Education and Discussions with Family / Patient:  Patient    Current Length of Stay: 8 day(s)  Current Patient Status: Inpatient     Certification Statement: The patient will continue to require additional inpatient hospital stay due to Closed fracture of proximal end of right tibia  Discharge Plan / Estimated Discharge Date:  1-2 days    Code Status: Level 1 - Full Code  ______________________________________________________________________________    Subjective:   Patient seen and examined by me  No chest pain, palpitations or diaphoresis at this point of time  No nausea or vomiting  Patient does have pain in the right leg and requires pain medications for the same  No high fever, chills or rigors    Objective:   Vitals: Blood pressure 114/79, pulse 70, temperature 98 8 °F (37 1 °C), resp  rate 18, height 6' 1" (1 854 m), weight 135 kg (297 lb 9 9 oz), SpO2 95 %      Physical Exam:   General appearance: alert, appears stated age and cooperative  Constitutional- looks a little weak  HEENT - atraumatic and normocephalic  Neck- supple  Skin - no fresh rash  Extremities- right leg does have immobilizer and screws  Cardiovascular- S1-S2 heard  Respiratory- bilateral air entry present, no crackles or rhonchi  Skin - no fresh rash  Abdomen - normal bowel sounds present, no rebound tenderness  CNS- No fresh focal deficits  Psych- no acute psychosis     Additional Data:   Labs:  Results from last 7 days   Lab Units 10/21/21  0559 10/20/21  0640 10/19/21  1809 10/18/21  0645 10/17/21  0333 10/17/21  0328 10/16/21  0507 10/15/21  0522   WBC Thousand/uL 6 54 6 99 7 33 6 80 9 13  --  10 11 13 95*   HEMOGLOBIN g/dL 8 7* 7 5* 8 2* 9 2* 9 0*  --  9 4* 10 3*   HEMATOCRIT % 26 6* 22 7* 24 9* 28 0* 27 4*  --  28 3* 31 3*   MCV fL 89 89 88 89 90  --  89 90 PLATELETS Thousands/uL 347 313 256 246 200  --  187 197   INR   --   --  1 35* 1 18  --  1 47* 1 64* 1 89*     Results from last 7 days   Lab Units 10/21/21  0559 10/20/21  0640 10/18/21  0645 10/17/21  0333 10/16/21  0507 10/15/21  0522   SODIUM mmol/L 143 141 138 139 137 139   POTASSIUM mmol/L 4 6 3 8 4 1 4 5 4 1 4 3   CHLORIDE mmol/L 108 106 103 103 103 104   CO2 mmol/L 28 26 26 26 25 25   ANION GAP mmol/L 7 9 9 10 9 10   BUN mg/dL 24 27* 28* 26* 28* 28*   CREATININE mg/dL 1 28 1 35* 1 47* 1 61* 1 78* 1 88*   CALCIUM mg/dL 8 4 8 1* 8 6 8 4 8 5 8 6   ALBUMIN g/dL  --   --   --   --   --  2 6*   TOTAL BILIRUBIN mg/dL  --   --   --   --   --  1 57*   ALK PHOS U/L  --   --   --   --   --  79   ALT U/L  --   --   --   --   --  11*   AST U/L  --   --   --   --   --  18   EGFR ml/min/1 73sq m 59 55 50 45 39 37   GLUCOSE RANDOM mg/dL 131 252* 261* 240* 217* 173*                      Results from last 7 days   Lab Units 10/21/21  1613 10/21/21  1118 10/21/21  0726 10/21/21  0229 10/20/21  2042 10/20/21  1541 10/20/21  1122 10/20/21  0758 10/19/21  2124 10/19/21  1546 10/19/21  1115 10/19/21  0801   POC GLUCOSE mg/dl 279* 237* 153* 135 155* 234* 299* 218* 229* 319* 299* 377*     Results from last 7 days   Lab Units 10/16/21  0507   HEMOGLOBIN A1C % 8 2*         * I Have Reviewed All Lab Data Listed Above  Cultures:   Results from last 7 days   Lab Units 10/16/21  0925   GRAM STAIN RESULT  Rare Polys  No bacteria seen   BODY FLUID CULTURE, STERILE  No growth             Imaging:  Imaging Reports Reviewed Today Include:   XR knee 1 or 2 vw left    Result Date: 10/18/2021  Impression: Left knee joint effusion without an acute osseous abnormality  Workstation performed: [id number]     XR knee 1 or 2 vw right    Result Date: 10/13/2021  Impression: Comminuted intra-articular right knee fracture  Findings concur with the referring clinician's preliminary interpretation already in the patient's electronic health record  Workstation performed: [id number]     XR tibia fibula 2 vw right    Result Date: 10/19/2021  Impression: Fluoroscopic guidance provided for procedure guidance  Please refer to the separate procedure notes for additional details  Workstation performed: [id number]     XR tibia fibula 2 views RIGHT    Result Date: 10/13/2021  Impression: Comminuted fracture of the right proximal tibia described in further detail on right knee x-ray report  No additional fractures further distally Workstation performed: [id number]     XR ankle 3+ views RIGHT    Result Date: 10/13/2021  Impression: No acute osseous abnormality  Workstation performed: [id number]     CT lower extremity wo contrast right    Result Date: 10/13/2021  Impression: Acute comminuted displaced intra-articular fracture of the proximal tibia as described above  No dislocation  This study demonstrates a significant  finding and was documented as such in [facility] for liaison and referring practitioner notification   Workstation performed: [id number]     Scheduled Meds:  Current Facility-Administered Medications   Medication Dose Route Frequency Provider Last Rate    acetaminophen  650 mg Oral Q6H PRN Victor Manuel Mason PA-C      calcium carbonate  500 mg Oral Daily PRN Victor Manuel Masno PA-C      Diclofenac Sodium  2 g Topical 4x Daily Morgan Garcia PA-C      heparin (porcine)  3-20 Units/kg/hr (Order-Specific) Intravenous Titrated Herson Caceres MD 17 1 Units/kg/hr (10/21/21 0251)    heparin (porcine)  2,000 Units Intravenous Q1H PRN Herson Caceres MD      heparin (porcine)  4,000 Units Intravenous Q1H PRN Herson Caceres MD      HYDROmorphone  0 5 mg Intravenous Q3H PRN Victor Manuel Mason PA-C      insulin glargine  50 Units Subcutaneous Daily Herson Caceres MD      insulin lispro  2-12 Units Subcutaneous TID AC Herson Caceres MD      insulin lispro  8 Units Subcutaneous TID With Meals Herson Caceres MD      lactated ringers 125 mL/hr Intravenous Continuous Michelle Silvercara, PA-C      lactated ringers  125 mL/hr Intravenous Continuous Michelle Patterson, PA-C      lactated ringers  50 mL/hr Intravenous Continuous Senia CabreraXOCHILT Stopped (10/20/21 1000)    lidocaine  1 patch Topical Daily Michelle Patterson, PA-YOMI      multi-electrolyte  75 mL/hr Intravenous Continuous Michelle Patterson, PA-C 75 mL/hr (10/21/21 1238)    ondansetron  4 mg Intravenous Q6H PRN Michelle Patterson PA-C      oxyCODONE  10 mg Oral Q12H [facility] Morgan Garcia PA-C      polyethylene glycol  17 g Oral Daily PRN Michelle Patterson PA-C      pravastatin  40 mg Oral Daily With TauntrJOSEF      senna  2 tablet Oral BID JOSEF Jay MD [address] [facility]    ** Please Note: This note has been constructed using a voice recognition system   **

## 2021-10-21 NOTE — PHYSICAL THERAPY NOTE
Physical Therapy Treatment Note    Patient Name: Matthew Healy    Diagnosis: Leg injury [S89 90XA]  Closed fracture of proximal tibia [S82 109A]  Unable to ambulate [R26 2]  Abrasion, lower leg, anterior [S80 819A]     10/21/21 1419   PT Last Visit   PT Visit Date 10/21/21   Note Type   Note Type Treatment   Pain Assessment   Pain Assessment Tool 0-10   Pain Score 5   Pain Location/Orientation Orientation: Right;Location: Leg   Pain Onset/Description Onset: Ongoing   Restrictions/Precautions   Weight Bearing Precautions Per Order Yes   RLE Weight Bearing Per Order NWB  (per ortho)   Braces or Orthoses Other (Comment)  (right external fixator; ace wrapped by ortho)   Other Precautions Bed Alarm;Multiple lines; Fall Risk;Pain   General   Chart Reviewed Yes   Response to Previous Treatment Patient with no complaints from previous session  Family/Caregiver Present No   Cognition   Overall Cognitive Status WFL   Arousal/Participation Alert; Responsive; Cooperative   Attention Within functional limits   Following Commands Follows all commands and directions without difficulty   Comments Pt agreeable to PT  Subjective   Subjective "I'll try and sit up "   Bed Mobility   Supine to Sit 3  Moderate assistance   Additional items Assist x 2;HOB elevated; Bedrails; Increased time required;Verbal cues;LE management   Sit to Supine 3  Moderate assistance   Additional items Assist x 2;HOB elevated; Bedrails; Increased time required;Verbal cues;LE management   Transfers   Sit to Stand 2  Maximal assistance   Additional items Assist x 2; Increased time required;Verbal cues   Stand to Sit 3  Moderate assistance   Additional items Assist x 2; Increased time required;Verbal cues   Additional Comments Pt requires verbal cues and assist to maintain NWB on R LE; pt acheived 80% of full stand with sit to stand transfer   Ambulation/Elevation   Gait pattern Not tested   Balance   Static Sitting Fair   Dynamic Sitting Fair -   Static Standing Poor -   Endurance Deficit   Endurance Deficit Yes   Endurance Deficit Description decreased activity tolerance   Activity Tolerance   Activity Tolerance Patient tolerated treatment well   Medical Staff Made Aware OT Yazmin Orantes   Nurse Made Aware Discussed case with RN Jenna Gutierrez; post session pt was left supine in bed in NAD, all belongings within reach, +bed alarm   Exercises   Knee AROM Long Arc Quad Sitting;10 reps;AROM; Left   Assessment   Prognosis Good   Problem List Decreased strength;Decreased range of motion;Decreased endurance; Impaired balance;Decreased mobility;Orthopedic restrictions;Pain   Assessment Chart reviewed  Patient was received supine in bed in NAD and agreeable to PT session  Today's PT treatment session consisted of therapeutic activity for facilitation of transitional movements and safe performance of correct technique for bed mobility and sit to stand transfers  In comparison to the previous session the patient continues to require assist of two for bed mobility and transfers  When performing sit to stand transfer this session pt achieved 80% of a full stand; pt requires verbal cues and assist to maintain NWB on R LE  Pt requires increased time for all transfers  Co-treatment was performed with OT secondary to complex medical condition of pt  Pt requires assist of two to achieve and maintain transitional movements; requiring the need of skilled therapeutic intervention of two therapists to achieve the delivery of services  PT/OT goals were addressed separately  Overall, patient tolerated today's session well and continues to be making progress towards achieving his STG's  Patient's prognosis for achieving their STG's is good  PT intervention continues to be appropriate as the patient continues to be limited by pain, decreased lower extremity range of motion and strength, impaired balance, decreased endurance, inability to ambulate, and decreased functional mobility  Continue to recommend STR   PT to continue to see patient in order to address the deficits listed above and provide interventions consistent with the POC in order to achieve STG's and optimize the patient's independence with functional mobility  Barriers to Discharge Inaccessible home environment;Decreased caregiver support   Goals   STG Expiration Date 10/24/21   Plan   Treatment/Interventions Functional transfer training;LE strengthening/ROM; Therapeutic exercise; Endurance training;Patient/family training;Bed mobility;Continued evaluation;Spoke to nursing;Spoke to advanced practitioner;OT   Progress Progressing toward goals   PT Frequency 5x/wk   Recommendation   PT Discharge Recommendation Post acute rehabilitation services   PT - OK to Discharge Yes  (when medically cleared, if to STR)   AM-PAC Basic Mobility Inpatient   Turning in Bed Without Bedrails 2   Lying on Back to Sitting on Edge of Flat Bed 1   Moving Bed to Chair 1   Standing Up From Chair 1   Walk in Room 1   Climb 3-5 Stairs 1   Basic Mobility Inpatient Raw Score 7   Turning Head Towards Sound 4   Follow Simple Instructions 4   Low Function Basic Mobility Raw Score 15   Low Function Basic Mobility Standardized Score 23 9     Marques Mcdermott, PT, DPT    Time of PT treatment session: [id number]  32 minutes

## 2021-10-21 NOTE — ASSESSMENT & PLAN NOTE
History 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  Unprovoked  Last episode was 10yrs ago  On AC with coumadin  Currently held  Would need heparin if INR is subtherapeutic  DVT prophylaxis with venodyne   Hold coumadin for now  Restarted heparin gtt > 12hr post procedure

## 2021-10-21 NOTE — PLAN OF CARE
Problem: PHYSICAL THERAPY ADULT  Goal: Performs mobility at highest level of function for planned discharge setting  See evaluation for individualized goals  Description: Treatment/Interventions: Functional transfer training, LE strengthening/ROM, Therapeutic exercise, Endurance training, Patient/family training, Bed mobility, Continued evaluation, Spoke to nursing, OT, Family          See flowsheet documentation for full assessment, interventions and recommendations  Outcome: Progressing  Note: Prognosis: Good  Problem List: Decreased strength, Decreased range of motion, Decreased endurance, Impaired balance, Decreased mobility, Orthopedic restrictions, Pain  Assessment: Chart reviewed  Patient was received supine in bed in NAD and agreeable to PT session  Today's PT treatment session consisted of therapeutic activity for facilitation of transitional movements and safe performance of correct technique for bed mobility and sit to stand transfers  In comparison to the previous session the patient continues to require assist of two for bed mobility and transfers  When performing sit to stand transfer this session pt achieved 80% of a full stand; pt requires verbal cues and assist to maintain NWB on R LE  Pt requires increased time for all transfers  Co-treatment was performed with OT secondary to complex medical condition of pt  Pt requires assist of two to achieve and maintain transitional movements; requiring the need of skilled therapeutic intervention of two therapists to achieve the delivery of services  PT/OT goals were addressed separately  Overall, patient tolerated today's session well and continues to be making progress towards achieving his STG's  Patient's prognosis for achieving their STG's is good   PT intervention continues to be appropriate as the patient continues to be limited by pain, decreased lower extremity range of motion and strength, impaired balance, decreased endurance, inability to ambulate, and decreased functional mobility  Continue to recommend STR  PT to continue to see patient in order to address the deficits listed above and provide interventions consistent with the POC in order to achieve STG's and optimize the patient's independence with functional mobility  Barriers to Discharge: Inaccessible home environment, Decreased caregiver support     PT Discharge Recommendation: Post acute rehabilitation services     PT - OK to Discharge: Yes (when medically cleared, if to STR)    See flowsheet documentation for full assessment

## 2021-10-21 NOTE — ASSESSMENT & PLAN NOTE
On lisinopril 10mg daily at home  On hold for ROSE  BP stable now    Blood pressure 114/79, pulse 70, temperature 98 8 °F (37 1 °C), resp  rate 18, height 6' 1" (1 854 m), weight 135 kg (297 lb 9 9 oz), SpO2 95 %

## 2021-10-21 NOTE — PLAN OF CARE
Problem: OCCUPATIONAL THERAPY ADULT  Goal: Performs self-care activities at highest level of function for planned discharge setting  See evaluation for individualized goals  Description: Treatment Interventions: ADL retraining, Functional transfer training, UE strengthening/ROM, Endurance training, Continued evaluation, Energy conservation          See flowsheet documentation for full assessment, interventions and recommendations  Note: Limitation: Decreased ADL status, Decreased endurance, Decreased self-care trans, Decreased high-level ADLs  Prognosis: Good  Assessment: Patient participated in Skilled OT session this date with interventions consisting of  therapeutic activities to: increase activity tolerance, increase dynamic sit/ stand balance during functional activity , increase postural control and increase OOB/ sitting tolerance   Patient agreeable to OT treatment session, upon arrival patient was found supine in bed, alert, responsive  and in no apparent distress  Patient completed bed mobility training, sitting tolerance exercises at edge of bed and sit to stand trials with assist of 2  Patient required assist of 2 skilled therapists to facilitate neuromuscular components of movement, provide weight bearing assistance to increase self performance in transitional movements and achieve functional sitting/ standing activities  Patient requiring verbal cues for safety and ocassional safety reminders  Patient continues to be functioning below baseline level, occupational performance remains limited secondary to factors listed above and increased risk for falls and injury  From OT standpoint, recommendation at time of d/c would be Short Term Rehab  Patient to benefit from continued Occupational Therapy treatment while in the hospital to address deficits as defined above and maximize level of functional independence with ADLs and functional mobility        OT Discharge Recommendation: Post acute rehabilitation services

## 2021-10-21 NOTE — OCCUPATIONAL THERAPY NOTE
Occupational Therapy Treatment Note        Patient Name: Kristopher Navas  XPHMB'E Date: 10/21/2021       10/21/21 1347   OT Last Visit   OT Visit Date 10/21/21   Note Type   Note Type Treatment   Restrictions/Precautions   Weight Bearing Precautions Per Order Yes   RLE Weight Bearing Per Order NWB   Other Precautions Bed Alarm;Limb alert; Fall Risk;Pain   Pain Assessment   Pain Assessment Tool 0-10   Pain Score 5   Pain Location/Orientation Orientation: Right;Location: Leg   Pain Onset/Description Onset: Ongoing   ADL   Eating Assistance 6  Modified independent   Eating Comments Based on performance during session and patient's report-   Grooming Assistance 6  Modified Independent   UB Bathing Assistance 4  Minimal Assistance   UB Bathing Deficit Setup; Increased time to complete  (assist for back area)   LB Bathing Assistance 2  Maximal Assistance   LB Bathing Deficit Increased time to complete;Supervision/safety   [address]  Supervision/Setup   UB Dressing Deficit Setup; Increased time to complete   LB Dressing Assistance 2  Maximal Assistance   LB Dressing Deficit Setup   Bed Mobility   Supine to Sit 3  Moderate assistance   Additional items Assist x 2;HOB elevated; Increased time required;Verbal cues;LE management   Sit to Supine 3  Moderate assistance   Additional items Assist x 2;HOB elevated; Increased time required;Verbal cues;LE management   Transfers   Sit to Stand 2  Maximal assistance   Additional items Assist x 2; Increased time required;Verbal cues   Stand to Sit 3  Moderate assistance   Additional items Assist x 2; Increased time required;Verbal cues; Bedrails   Cognition   Overall Cognitive Status WFL   Arousal/Participation Alert; Responsive; Cooperative   Attention Within functional limits   Orientation Level Oriented X4   Memory Within functional limits   Following Commands Follows all commands and directions without difficulty   Activity Tolerance   Activity Tolerance Patient tolerated treatment well   Assessment   Assessment Patient participated in Skilled OT session this date with interventions consisting of  therapeutic activities to: increase activity tolerance, increase dynamic sit/ stand balance during functional activity , increase postural control and increase OOB/ sitting tolerance   Patient agreeable to OT treatment session, upon arrival patient was found supine in bed, alert, responsive  and in no apparent distress  Patient completed bed mobility training, sitting tolerance exercises at edge of bed and sit to stand trials with assist of 2  Patient required assist of 2 skilled therapists to facilitate neuromuscular components of movement, provide weight bearing assistance to increase self performance in transitional movements and achieve functional sitting/ standing activities  Patient requiring verbal cues for safety and ocassional safety reminders  Patient continues to be functioning below baseline level, occupational performance remains limited secondary to factors listed above and increased risk for falls and injury  From OT standpoint, recommendation at time of d/c would be Short Term Rehab  Patient to benefit from continued Occupational Therapy treatment while in the hospital to address deficits as defined above and maximize level of functional independence with ADLs and functional mobility  Plan   Treatment Interventions ADL retraining;Functional transfer training;UE strengthening/ROM; Endurance training;Continued evaluation; Energy conservation   Goal Expiration Date 10/28/21   OT Treatment Day 2   OT Frequency 3-5x/wk   Recommendation   OT Discharge Recommendation Post acute rehabilitation services   AM-PAC Daily Activity Inpatient   Lower Body Dressing 2   Bathing 2   Toileting 2   Upper Body Dressing 3   Grooming 4   Eating 4   Daily Activity Raw Score 17   Daily Activity Standardized Score (Calc for Raw Score >=11) 37 26   AM-PAC Applied Cognition Inpatient Following a Speech/Presentation 4   Understanding Ordinary Conversation 4   Taking Medications 4   Remembering Where Things Are Placed or Put Away 4   Remembering List of 4-5 Errands 4   Taking Care of Complicated Tasks 4   Applied Cognition Raw Score 24   Applied Cognition Standardized Score 62 21

## 2021-10-22 LAB
ANION GAP SERPL CALCULATED.3IONS-SCNC: 8 MMOL/L (ref 4–13)
APTT PPP: 62 SECONDS (ref 23–37)
APTT PPP: 96 SECONDS (ref 23–37)
BASOPHILS # BLD AUTO: 0.03 THOUSANDS/ΜL (ref 0–0.1)
BASOPHILS NFR BLD AUTO: 1 % (ref 0–1)
BUN SERPL-MCNC: 18 MG/DL (ref 5–25)
CALCIUM SERPL-MCNC: 8.4 MG/DL (ref 8.3–10.1)
CHLORIDE SERPL-SCNC: 106 MMOL/L (ref 100–108)
CO2 SERPL-SCNC: 28 MMOL/L (ref 21–32)
CREAT SERPL-MCNC: 1.29 MG/DL (ref 0.6–1.3)
EOSINOPHIL # BLD AUTO: 0.29 THOUSAND/ΜL (ref 0–0.61)
EOSINOPHIL NFR BLD AUTO: 5 % (ref 0–6)
ERYTHROCYTE [DISTWIDTH] IN BLOOD BY AUTOMATED COUNT: 14.5 % (ref 11.6–15.1)
GFR SERPL CREATININE-BSD FRML MDRD: 58 ML/MIN/1.73SQ M
GLUCOSE SERPL-MCNC: 187 MG/DL (ref 65–140)
GLUCOSE SERPL-MCNC: 200 MG/DL (ref 65–140)
GLUCOSE SERPL-MCNC: 207 MG/DL (ref 65–140)
GLUCOSE SERPL-MCNC: 213 MG/DL (ref 65–140)
GLUCOSE SERPL-MCNC: 239 MG/DL (ref 65–140)
GLUCOSE SERPL-MCNC: 242 MG/DL (ref 65–140)
HCT VFR BLD AUTO: 27 % (ref 36.5–49.3)
HGB BLD-MCNC: 8.7 G/DL (ref 12–17)
IMM GRANULOCYTES # BLD AUTO: 0.04 THOUSAND/UL (ref 0–0.2)
IMM GRANULOCYTES NFR BLD AUTO: 1 % (ref 0–2)
LYMPHOCYTES # BLD AUTO: 1.73 THOUSANDS/ΜL (ref 0.6–4.47)
LYMPHOCYTES NFR BLD AUTO: 29 % (ref 14–44)
MCH RBC QN AUTO: 28.8 PG (ref 26.8–34.3)
MCHC RBC AUTO-ENTMCNC: 32.2 G/DL (ref 31.4–37.4)
MCV RBC AUTO: 89 FL (ref 82–98)
MONOCYTES # BLD AUTO: 0.59 THOUSAND/ΜL (ref 0.17–1.22)
MONOCYTES NFR BLD AUTO: 10 % (ref 4–12)
NEUTROPHILS # BLD AUTO: 3.37 THOUSANDS/ΜL (ref 1.85–7.62)
NEUTS SEG NFR BLD AUTO: 54 % (ref 43–75)
NRBC BLD AUTO-RTO: 0 /100 WBCS
PLATELET # BLD AUTO: 357 THOUSANDS/UL (ref 149–390)
PMV BLD AUTO: 9.8 FL (ref 8.9–12.7)
POTASSIUM SERPL-SCNC: 3.8 MMOL/L (ref 3.5–5.3)
RBC # BLD AUTO: 3.02 MILLION/UL (ref 3.88–5.62)
SODIUM SERPL-SCNC: 142 MMOL/L (ref 136–145)
WBC # BLD AUTO: 6.05 THOUSAND/UL (ref 4.31–10.16)

## 2021-10-22 PROCEDURE — 82948 REAGENT STRIP/BLOOD GLUCOSE: CPT

## 2021-10-22 PROCEDURE — 85025 COMPLETE CBC W/AUTO DIFF WBC: CPT | Performed by: INTERNAL MEDICINE

## 2021-10-22 PROCEDURE — 85730 THROMBOPLASTIN TIME PARTIAL: CPT | Performed by: INTERNAL MEDICINE

## 2021-10-22 PROCEDURE — 80048 BASIC METABOLIC PNL TOTAL CA: CPT | Performed by: INTERNAL MEDICINE

## 2021-10-22 PROCEDURE — 99232 SBSQ HOSP IP/OBS MODERATE 35: CPT | Performed by: INTERNAL MEDICINE

## 2021-10-22 RX ORDER — LACTULOSE 20 G/30ML
30 SOLUTION ORAL ONCE
Status: COMPLETED | OUTPATIENT
Start: 2021-10-22 | End: 2021-10-22

## 2021-10-22 RX ADMIN — SODIUM CHLORIDE, SODIUM GLUCONATE, SODIUM ACETATE, POTASSIUM CHLORIDE, MAGNESIUM CHLORIDE, SODIUM PHOSPHATE, DIBASIC, AND POTASSIUM PHOSPHATE 75 ML/HR: .53; .5; .37; .037; .03; .012; .00082 INJECTION, SOLUTION INTRAVENOUS at 15:36

## 2021-10-22 RX ADMIN — DICLOFENAC SODIUM 2 G: 10 GEL TOPICAL at 13:53

## 2021-10-22 RX ADMIN — INSULIN LISPRO 4 UNITS: 100 INJECTION, SOLUTION INTRAVENOUS; SUBCUTANEOUS at 16:03

## 2021-10-22 RX ADMIN — OXYCODONE HYDROCHLORIDE 10 MG: 10 TABLET, FILM COATED, EXTENDED RELEASE ORAL at 20:40

## 2021-10-22 RX ADMIN — INSULIN LISPRO 4 UNITS: 100 INJECTION, SOLUTION INTRAVENOUS; SUBCUTANEOUS at 14:38

## 2021-10-22 RX ADMIN — PRAVASTATIN SODIUM 40 MG: 40 TABLET ORAL at 18:02

## 2021-10-22 RX ADMIN — INSULIN GLARGINE 50 UNITS: 100 INJECTION, SOLUTION SUBCUTANEOUS at 08:53

## 2021-10-22 RX ADMIN — LIDOCAINE 5% 1 PATCH: 700 PATCH TOPICAL at 09:01

## 2021-10-22 RX ADMIN — INSULIN LISPRO 2 UNITS: 100 INJECTION, SOLUTION INTRAVENOUS; SUBCUTANEOUS at 09:40

## 2021-10-22 RX ADMIN — STANDARDIZED SENNA CONCENTRATE 17.2 MG: 8.6 TABLET ORAL at 08:52

## 2021-10-22 RX ADMIN — STANDARDIZED SENNA CONCENTRATE 17.2 MG: 8.6 TABLET ORAL at 18:02

## 2021-10-22 RX ADMIN — DICLOFENAC SODIUM 2 G: 10 GEL TOPICAL at 18:02

## 2021-10-22 RX ADMIN — LACTULOSE 30 G: 20 SOLUTION ORAL at 13:57

## 2021-10-22 RX ADMIN — HEPARIN SODIUM 15 UNITS/KG/HR: 10000 INJECTION, SOLUTION INTRAVENOUS at 18:08

## 2021-10-22 RX ADMIN — ACETAMINOPHEN 650 MG: 325 TABLET, FILM COATED ORAL at 08:58

## 2021-10-22 RX ADMIN — OXYCODONE HYDROCHLORIDE 10 MG: 10 TABLET, FILM COATED, EXTENDED RELEASE ORAL at 08:53

## 2021-10-22 RX ADMIN — SODIUM CHLORIDE, SODIUM GLUCONATE, SODIUM ACETATE, POTASSIUM CHLORIDE, MAGNESIUM CHLORIDE, SODIUM PHOSPHATE, DIBASIC, AND POTASSIUM PHOSPHATE 75 ML/HR: .53; .5; .37; .037; .03; .012; .00082 INJECTION, SOLUTION INTRAVENOUS at 01:39

## 2021-10-22 RX ADMIN — DICLOFENAC SODIUM 2 G: 10 GEL TOPICAL at 08:59

## 2021-10-22 NOTE — ASSESSMENT & PLAN NOTE
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

## 2021-10-22 NOTE — PROGRESS NOTES
Patient declined specialty bed for comfort , prevent pressure sores  States", I don;t need that bed  I'll be out of her on Monday anyway " Patient reminded that surgery on Monday and that he may be here for awhile  Patient encouraged to reposition

## 2021-10-22 NOTE — PLAN OF CARE
Problem: Potential for Falls  Goal: Patient will remain free of falls  Description: INTERVENTIONS:  - Educate patient/family on patient safety including physical limitations  - Instruct patient to call for assistance with activity   - Consult OT/PT to assist with strengthening/mobility   - Keep Call bell within reach  - Keep bed low and locked with side rails adjusted as appropriate  - Keep care items and personal belongings within reach  - Initiate and maintain comfort rounds  - Make Fall Risk Sign visible to staff  - Offer Toileting every  Hours, in advance of need  - Initiate/Maintain alarm  - Obtain necessary fall risk management equipment:   - Apply yellow socks and bracelet for high fall risk patients  - Consider moving patient to room near nurses station  Outcome: Progressing     Problem: Prexisting or High Potential for Compromised Skin Integrity  Goal: Skin integrity is maintained or improved  Description: INTERVENTIONS:  - Identify patients at risk for skin breakdown  - Assess and monitor skin integrity  - Assess and monitor nutrition and hydration status  - Monitor labs   - Assess for incontinence   - Turn and reposition patient  - Assist with mobility/ambulation  - Relieve pressure over bony prominences  - Avoid friction and shearing  - Provide appropriate hygiene as needed including keeping skin clean and dry  - Evaluate need for skin moisturizer/barrier cream  - Collaborate with interdisciplinary team   - Patient/family teaching  - Consider wound care consult   Outcome: Progressing     Problem: PAIN - ADULT  Goal: Verbalizes/displays adequate comfort level or baseline comfort level  Description: Interventions:  - Encourage patient to monitor pain and request assistance  - Assess pain using appropriate pain scale  - Administer analgesics based on type and severity of pain and evaluate response  - Implement non-pharmacological measures as appropriate and evaluate response  - Consider cultural and social influences on pain and pain management  - Notify physician/advanced practitioner if interventions unsuccessful or patient reports new pain  Outcome: Progressing     Problem: INFECTION - ADULT  Goal: Absence or prevention of progression during hospitalization  Description: INTERVENTIONS:  - Assess and monitor for signs and symptoms of infection  - Monitor lab/diagnostic results  - Monitor all insertion sites, i e  indwelling lines, tubes, and drains  - Monitor endotracheal if appropriate and nasal secretions for changes in amount and color  - [place] appropriate cooling/warming therapies per order  - Administer medications as ordered  - Instruct and encourage patient and family to use good hand hygiene technique  - Identify and instruct in appropriate isolation precautions for identified infection/condition  Outcome: Progressing  Goal: Absence of fever/infection during neutropenic period  Description: INTERVENTIONS:  - Monitor WBC    Outcome: Progressing     Problem: SAFETY ADULT  Goal: Patient will remain free of falls  Description: INTERVENTIONS:  - Educate patient/family on patient safety including physical limitations  - Instruct patient to call for assistance with activity   - Consult OT/PT to assist with strengthening/mobility   - Keep Call bell within reach  - Keep bed low and locked with side rails adjusted as appropriate  - Keep care items and personal belongings within reach  - Initiate and maintain comfort rounds  - Make Fall Risk Sign visible to staff  - Offer Toileting every  Hours, in advance of need  - Initiate/Maintain alarm  - Obtain necessary fall risk management equipment:   - Apply yellow socks and bracelet for high fall risk patients  - Consider moving patient to room near nurses station  Outcome: Progressing  Goal: Maintain or return to baseline ADL function  Description: INTERVENTIONS:  -  Assess patient's ability to carry out ADLs; assess patient's baseline for ADL function and identify physical deficits which impact ability to perform ADLs (bathing, care of mouth/teeth, toileting, grooming, dressing, etc )  - Assess/evaluate cause of self-care deficits   - Assess range of motion  - Assess patient's mobility; develop plan if impaired  - Assess patient's need for assistive devices and provide as appropriate  - Encourage maximum independence but intervene and supervise when necessary  - Involve family in performance of ADLs  - Assess for home care needs following discharge   - Consider OT consult to assist with ADL evaluation and planning for discharge  - Provide patient education as appropriate  Outcome: Progressing  Goal: Maintains/Returns to pre admission functional level  Description: INTERVENTIONS:  - Perform BMAT or MOVE assessment daily    - Set and communicate daily mobility goal to care team and patient/family/caregiver  - Collaborate with rehabilitation services on mobility goals if consulted  - Perform Range of Motion  times a day  - Reposition patient every  hours    - Dangle patient  times a day  - Stand patient  times a day  - Ambulate patient  times a day  - Out of bed to chair  times a day   - Out of bed for meals  times a day  - Out of bed for toileting  - Record patient progress and toleration of activity level   Outcome: Progressing     Problem: DISCHARGE PLANNING  Goal: Discharge to home or other facility with appropriate resources  Description: INTERVENTIONS:  - Identify barriers to discharge w/patient and caregiver  - Arrange for needed discharge resources and transportation as appropriate  - Identify discharge learning needs (meds, wound care, etc )  - Arrange for interpretive services to assist at discharge as needed  - Refer to Case Management Department for coordinating discharge planning if the patient needs post-hospital services based on physician/advanced practitioner order or complex needs related to functional status, cognitive ability, or social support system  Outcome: Progressing     Problem: Knowledge Deficit  Goal: Patient/family/caregiver demonstrates understanding of disease process, treatment plan, medications, and discharge instructions  Description: Complete learning assessment and assess knowledge base  Interventions:  - Provide teaching at level of understanding  - Provide teaching via preferred learning methods  Outcome: Progressing     Problem: MOBILITY - ADULT  Goal: Maintain or return to baseline ADL function  Description: INTERVENTIONS:  -  Assess patient's ability to carry out ADLs; assess patient's baseline for ADL function and identify physical deficits which impact ability to perform ADLs (bathing, care of mouth/teeth, toileting, grooming, dressing, etc )  - Assess/evaluate cause of self-care deficits   - Assess range of motion  - Assess patient's mobility; develop plan if impaired  - Assess patient's need for assistive devices and provide as appropriate  - Encourage maximum independence but intervene and supervise when necessary  - Involve family in performance of ADLs  - Assess for home care needs following discharge   - Consider OT consult to assist with ADL evaluation and planning for discharge  - Provide patient education as appropriate  Outcome: Progressing  Goal: Maintains/Returns to pre admission functional level  Description: INTERVENTIONS:  - Perform BMAT or MOVE assessment daily    - Set and communicate daily mobility goal to care team and patient/family/caregiver  - Collaborate with rehabilitation services on mobility goals if consulted  - Perform Range of Motion  times a day  - Reposition patient every  hours    - Dangle patient  times a day  - Stand patient  times a day  - Ambulate patient  times a day  - Out of bed to chair  times a day   - Out of bed for meals  times a day  - Out of bed for toileting  - Record patient progress and toleration of activity level   Outcome: Progressing     Problem: Nutrition/Hydration-ADULT  Goal: Nutrient/Hydration intake appropriate for improving, restoring or maintaining nutritional needs  Description: Monitor and assess patient's nutrition/hydration status for malnutrition  Collaborate with interdisciplinary team and initiate plan and interventions as ordered  Monitor patient's weight and dietary intake as ordered or per policy  Utilize nutrition screening tool and intervene as necessary  Determine patient's food preferences and provide high-protein, high-caloric foods as appropriate       INTERVENTIONS:  - Monitor oral intake, urinary output, labs, and treatment plans  - Assess nutrition and hydration status and recommend course of action  - Evaluate amount of meals eaten  - Assist patient with eating if necessary   - Allow adequate time for meals  - Recommend/ encourage appropriate diets, oral nutritional supplements, and vitamin/mineral supplements  - Order, calculate, and assess calorie counts as needed  - Recommend, monitor, and adjust tube feedings and TPN/PPN based on assessed needs  - Assess need for intravenous fluids  - Provide specific nutrition/hydration education as appropriate  - Include patient/family/caregiver in decisions related to nutrition  Outcome: Progressing

## 2021-10-22 NOTE — ASSESSMENT & PLAN NOTE
Had difficult intubation  Due to severe kyphosis during ortho procedure on 10/18    Will keep in mind because he is going to have a definitive surgery again next week

## 2021-10-22 NOTE — ASSESSMENT & PLAN NOTE
Presented with Right knee / proximal leg pain and inability to bear weight after ground level fall after twisting leg in his kitchen  Assoc deformity,mild abrasion  10/13-Imaging findings including CT confirmed Acute comminuted displaced intra-articular fracture of the proximal tibia as described above  No dislocation  Small lipohemarthrosis  Moderate distal femoral and proximal tibiofibular subcutaneous edema/contusion  On knee immobilizer  Non weight bearing  Orthopedic input noted and appreciated  Patient will need full surgical correction of fracture  Continue analgesia, will optimize dose  Add stool softeners  4 DPO application of external fixation to RLE  Ortho plan to return to OR on Monday for definitive fixation of tibial #  On heparin gtt- to be held preoperatively  Monitor LE  Non weight beating

## 2021-10-22 NOTE — PLAN OF CARE
Problem: Potential for Falls  Goal: Patient will remain free of falls  Description: INTERVENTIONS:  - Educate patient/family on patient safety including physical limitations  - Instruct patient to call for assistance with activity   - Consult OT/PT to assist with strengthening/mobility   - Keep Call bell within reach  - Keep bed low and locked with side rails adjusted as appropriate  - Keep care items and personal belongings within reach  - Initiate and maintain comfort rounds  - Make Fall Risk Sign visible to staff  - Offer Toileting every 2 Hours, in advance of need  - Initiate/Maintain 2alarm  - Obtain necessary fall risk management equipment: 2  - Apply yellow socks and bracelet for high fall risk patients  - Consider moving patient to room near nurses station  Outcome: Progressing     Problem: Prexisting or High Potential for Compromised Skin Integrity  Goal: Skin integrity is maintained or improved  Description: INTERVENTIONS:  - Identify patients at risk for skin breakdown  - Assess and monitor skin integrity  - Assess and monitor nutrition and hydration status  - Monitor labs   - Assess for incontinence   - Turn and reposition patient  - Assist with mobility/ambulation  - Relieve pressure over bony prominences  - Avoid friction and shearing  - Provide appropriate hygiene as needed including keeping skin clean and dry  - Evaluate need for skin moisturizer/barrier cream  - Collaborate with interdisciplinary team   - Patient/family teaching  - Consider wound care consult   Outcome: Progressing     Problem: PAIN - ADULT  Goal: Verbalizes/displays adequate comfort level or baseline comfort level  Description: Interventions:  - Encourage patient to monitor pain and request assistance  - Assess pain using appropriate pain scale  - Administer analgesics based on type and severity of pain and evaluate response  - Implement non-pharmacological measures as appropriate and evaluate response  - Consider cultural and social influences on pain and pain management  - Notify physician/advanced practitioner if interventions unsuccessful or patient reports new pain  Outcome: Progressing     Problem: INFECTION - ADULT  Goal: Absence or prevention of progression during hospitalization  Description: INTERVENTIONS:  - Assess and monitor for signs and symptoms of infection  - Monitor lab/diagnostic results  - Monitor all insertion sites, i e  indwelling lines, tubes, and drains  - Monitor endotracheal if appropriate and nasal secretions for changes in amount and color  - [place] appropriate cooling/warming therapies per order  - Administer medications as ordered  - Instruct and encourage patient and family to use good hand hygiene technique  - Identify and instruct in appropriate isolation precautions for identified infection/condition  Outcome: Progressing  Goal: Absence of fever/infection during neutropenic period  Description: INTERVENTIONS:  - Monitor WBC    Outcome: Progressing     Problem: SAFETY ADULT  Goal: Patient will remain free of falls  Description: INTERVENTIONS:  - Educate patient/family on patient safety including physical limitations  - Instruct patient to call for assistance with activity   - Consult OT/PT to assist with strengthening/mobility   - Keep Call bell within reach  - Keep bed low and locked with side rails adjusted as appropriate  - Keep care items and personal belongings within reach  - Initiate and maintain comfort rounds  - Make Fall Risk Sign visible to staff  - Offer Toileting every 2 Hours, in advance of need  - Initiate/Maintain 2alarm  - Obtain necessary fall risk management equipment: 2  - Apply yellow socks and bracelet for high fall risk patients  - Consider moving patient to room near nurses station  Outcome: Progressing  Goal: Maintain or return to baseline ADL function  Description: INTERVENTIONS:  -  Assess patient's ability to carry out ADLs; assess patient's baseline for ADL function and identify physical deficits which impact ability to perform ADLs (bathing, care of mouth/teeth, toileting, grooming, dressing, etc )  - Assess/evaluate cause of self-care deficits   - Assess range of motion  - Assess patient's mobility; develop plan if impaired  - Assess patient's need for assistive devices and provide as appropriate  - Encourage maximum independence but intervene and supervise when necessary  - Involve family in performance of ADLs  - Assess for home care needs following discharge   - Consider OT consult to assist with ADL evaluation and planning for discharge  - Provide patient education as appropriate  Outcome: Progressing  Goal: Maintains/Returns to pre admission functional level  Description: INTERVENTIONS:  - Perform BMAT or MOVE assessment daily    - Set and communicate daily mobility goal to care team and patient/family/caregiver  - Collaborate with rehabilitation services on mobility goals if consulted  - Perform Range of Motion 2 times a day  - Reposition patient every 2 hours    - Dangle patient 2 times a day  - Stand patient 2 times a day  - Ambulate patient 2 times a day  - Out of bed to chair 2 times a day   - Out of bed for meals 2 times a day  - Out of bed for toileting  - Record patient progress and toleration of activity level   Outcome: Progressing     Problem: DISCHARGE PLANNING  Goal: Discharge to home or other facility with appropriate resources  Description: INTERVENTIONS:  - Identify barriers to discharge w/patient and caregiver  - Arrange for needed discharge resources and transportation as appropriate  - Identify discharge learning needs (meds, wound care, etc )  - Arrange for interpretive services to assist at discharge as needed  - Refer to Case Management Department for coordinating discharge planning if the patient needs post-hospital services based on physician/advanced practitioner order or complex needs related to functional status, cognitive ability, or social support system  Outcome: Progressing     Problem: Knowledge Deficit  Goal: Patient/family/caregiver demonstrates understanding of disease process, treatment plan, medications, and discharge instructions  Description: Complete learning assessment and assess knowledge base  Interventions:  - Provide teaching at level of understanding  - Provide teaching via preferred learning methods  Outcome: Progressing     Problem: MOBILITY - ADULT  Goal: Maintain or return to baseline ADL function  Description: INTERVENTIONS:  -  Assess patient's ability to carry out ADLs; assess patient's baseline for ADL function and identify physical deficits which impact ability to perform ADLs (bathing, care of mouth/teeth, toileting, grooming, dressing, etc )  - Assess/evaluate cause of self-care deficits   - Assess range of motion  - Assess patient's mobility; develop plan if impaired  - Assess patient's need for assistive devices and provide as appropriate  - Encourage maximum independence but intervene and supervise when necessary  - Involve family in performance of ADLs  - Assess for home care needs following discharge   - Consider OT consult to assist with ADL evaluation and planning for discharge  - Provide patient education as appropriate  Outcome: Progressing  Goal: Maintains/Returns to pre admission functional level  Description: INTERVENTIONS:  - Perform BMAT or MOVE assessment daily    - Set and communicate daily mobility goal to care team and patient/family/caregiver  - Collaborate with rehabilitation services on mobility goals if consulted  - Perform Range of Motion 2 times a day  - Reposition patient every 2 hours    - Dangle patient 2 times a day  - Stand patient 2 times a day  - Ambulate patient 2 times a day  - Out of bed to chair 2 times a day   - Out of bed for meals 2 times a day  - Out of bed for toileting  - Record patient progress and toleration of activity level   Outcome: Progressing     Problem: Nutrition/Hydration-ADULT  Goal: Nutrient/Hydration intake appropriate for improving, restoring or maintaining nutritional needs  Description: Monitor and assess patient's nutrition/hydration status for malnutrition  Collaborate with interdisciplinary team and initiate plan and interventions as ordered  Monitor patient's weight and dietary intake as ordered or per policy  Utilize nutrition screening tool and intervene as necessary  Determine patient's food preferences and provide high-protein, high-caloric foods as appropriate       INTERVENTIONS:  - Monitor oral intake, urinary output, labs, and treatment plans  - Assess nutrition and hydration status and recommend course of action  - Evaluate amount of meals eaten  - Assist patient with eating if necessary   - Allow adequate time for meals  - Recommend/ encourage appropriate diets, oral nutritional supplements, and vitamin/mineral supplements  - Order, calculate, and assess calorie counts as needed  - Recommend, monitor, and adjust tube feedings and TPN/PPN based on assessed needs  - Assess need for intravenous fluids  - Provide specific nutrition/hydration education as appropriate  - Include patient/family/caregiver in decisions related to nutrition  Outcome: Progressing

## 2021-10-22 NOTE — PROGRESS NOTES
INTERNAL MEDICINE RESIDENCY PROGRESS NOTE     Name: Millicent Barajas   Age & Sex: 61 y o  male   MRN: [id number]  Unit/Bed#: -01   Encounter: [id number]  Team:SLIM    PATIENT INFORMATION     Name: Millicent Barajas   Age & Sex: 61 y o  male   MRN: [id number]  Hospital Stay Days: 9    ASSESSMENT/PLAN     Principal Problem:    Closed fracture of proximal end of right tibia  Active Problems:    ROSE (acute kidney injury) ([facility] )    Essential hypertension    Mixed hyperlipidemia    Type 2 diabetes mellitus with diabetic neuropathy, without long-term current use of insulin (HCC)    History of DVT (deep vein thrombosis)    Elevated INR    Pseudogout of knee, left    Delayed emergence from anesthesia    Difficult intubation      * Closed fracture of proximal end of right tibia  Assessment & Plan  Presented with Right knee / proximal leg pain and inability to bear weight after ground level fall after twisting leg in his kitchen  Assoc deformity,mild abrasion  10/13-Imaging findings including CT confirmed Acute comminuted displaced intra-articular fracture of the proximal tibia as described above  No dislocation  Small lipohemarthrosis  Moderate distal femoral and proximal tibiofibular subcutaneous edema/contusion  On knee immobilizer  Non weight bearing  Orthopedic input noted and appreciated  Patient will need full surgical correction of fracture  Continue analgesia, will optimize dose  Add stool softeners  4 DPO application of external fixation to RLE  Ortho plan to return to OR on Monday for definitive fixation of tibial #  Restarted heparin gtt > 12hr post procedure  Monitor LE  Non weight beating  ROSE (acute kidney injury) ([facility] )  Assessment & Plan  ROSE on CKD  May be prerenal   Cr on admission 1 99  Baseline 1 21 months ago  Avoid nephrotoxics  Avoid hypotension  Monitor I/O    Continue Light hydration while encouraging oral diet    Results from last 7 days   Lab Units 10/22/21  0959 10/21/21  0559 10/20/21  0640 10/18/21  0645 10/17/21  0333 10/16/21  0507   SODIUM mmol/L 142 143 141 138 139 137   POTASSIUM mmol/L 3 8 4 6 3 8 4 1 4 5 4 1   CHLORIDE mmol/L 106 108 106 103 103 103   CO2 mmol/L 28 28 26 26 26 25   ANION GAP mmol/L 8 7 9 9 10 9   BUN mg/dL 18 24 27* 28* 26* 28*   CREATININE mg/dL 1 29 1 28 1 35* 1 47* 1 61* 1 78*   CALCIUM mg/dL 8 4 8 4 8 1* 8 6 8 4 8 5   EGFR ml/min/1 73sq m 58 59 55 50 45 39   GLUCOSE RANDOM mg/dL 213* 131 252* 261* 240* 217*         Difficult intubation  Assessment & Plan  Had difficult intubation  Due to severe kyphosis during ortho procedure on 10/18  Will keep in mind because he is going to have a definitive surgery again next week    Pseudogout of knee, left  Assessment & Plan  Pain and swelling in the left anterior knee, superiorly  Tender to touch  Developed after PT  Xray -unremarkable  Started on colchicine 0 6mg bid x1 day, the 0 6mg daily  Synovial fluid analysis shows calcium pyrophosphate crystals    Elevated INR  Assessment & Plan  Now subtherapeutic ,  Patient previously on Coumadin due to history of DVTs  This is currently on hold due to need for surgery  Ortho recommends 1 4-1 6 for ortho procedure  INR 1 47--  1 18  Continue to hold Coumadin  History of DVT (deep vein thrombosis)  Assessment & Plan  History 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  Unprovoked  Last episode was 10yrs ago  On AC with coumadin  Currently held  Would need heparin if INR is subtherapeutic  DVT prophylaxis with venodyne   Hold coumadin for now  Restarted heparin gtt > 12hr post procedure  Type 2 diabetes mellitus with diabetic neuropathy, without long-term current use of insulin [facility])  Assessment & Plan  Lab Results   Component Value Date    HGBA1C 8 2 (H) 10/16/2021       Recent Labs     10/21/21  2044 10/22/21  0738 10/22/21  0951 10/22/21  1253   POCGLU 227* 187* 239* 207*       Blood Sugar Average: Last 72 hrs:  (P) 237 125  Known diabetic with diabetic neuropathy  On metformin and lantus at home  Blood sugar on presentation 418  Contiunue sliding scale insulin  Hypoglycemic protocol  Goal blood sugar 140-180  Diabetic diet  Received Lantus 46 u in Am 10/17  In the hospital, we will continue Lantus 50u HS, and meal time insulin 5 TIDAC as blood sugars was slightly on the higher side  Continue SSI coverage while in hospital          Mixed hyperlipidemia  Assessment & Plan  Continue statins    Essential hypertension  Assessment & Plan  On lisinopril 10mg daily at home  On hold for ROSE  BP stable now    Blood pressure 129/79, pulse 65, temperature 99 1 °F (37 3 °C), resp  rate 18, height 6' 1" (1 854 m), weight 135 kg (297 lb 9 9 oz), SpO2 95 %  Disposition: continue IP     SUBJECTIVE     Patient seen and examined  No acute events overnight  Complained of constipation  Leg pain well controlled  Denied fever, chills, dizziness or lightheadedness  OBJECTIVE     Vitals:    10/21/21 2225 10/21/21 2300 10/22/21 0300 10/22/21 0738   BP: 112/81   129/79   BP Location:       Pulse: 69   65   Resp: 16   18   Temp: 97 7 °F (36 5 °C)   99 1 °F (37 3 °C)   TempSrc:       SpO2: 97% 94% 95% 95%   Weight:       Height:          Temperature:   Temp (24hrs), Av 5 °F (36 9 °C), Min:97 7 °F (36 5 °C), Max:99 1 °F (37 3 °C)    Temperature: 99 1 °F (37 3 °C)  Intake & Output:  I/O       10/20 0701 - 10/21 0700 10/21 0701 - 10/22 0700 10/22 0701 - 10/23 0700    P  O  600 300 200    Total Intake(mL/kg) 600 (4 4) 300 (2 2) 200 (1 5)    Urine (mL/kg/hr)  (0 6) 1700 (0 5)     Total Output  1700     Net -1425 -1400 +200               Weights:   IBW (Ideal Body Weight): 79 9 kg    Body mass index is 39 27 kg/m²  Weight (last 2 days)     Date/Time   Weight    10/21/21 0538   135 (297 62)    10/20/21 0600   135 (298 28)            Physical Exam  Constitutional:       General: He is not in acute distress  Appearance: Normal appearance  HENT:      Head: Normocephalic and atraumatic  Mouth/Throat:      Mouth: Mucous membranes are moist    Eyes:      Extraocular Movements: Extraocular movements intact  Cardiovascular:      Rate and Rhythm: Normal rate and regular rhythm  Heart sounds: No murmur heard  No friction rub  No gallop  Pulmonary:      Effort: Pulmonary effort is normal  No respiratory distress  Breath sounds: Normal breath sounds  No stridor  No wheezing, rhonchi or rales  Chest:      Chest wall: No tenderness  Abdominal:      General: Abdomen is flat  There is no distension  Palpations: Abdomen is soft  There is no mass  Tenderness: There is no abdominal tenderness  There is no guarding or rebound  Hernia: No hernia is present  Musculoskeletal:         General: Swelling, tenderness and signs of injury present  No deformity  Cervical back: Normal range of motion and neck supple  Right lower leg: No edema  Left lower leg: No edema  Comments: Knee immobilizer +n external fixators in place on the right lower extremity  Tenderness and swelling in the anterior superior aspect of L knee  Distal pulsation intact  -DP P and Posterior tibial   Skin:     General: Skin is warm  Capillary Refill: Capillary refill takes less than 2 seconds  Findings: Bruising present  Neurological:      General: No focal deficit present  Mental Status: He is alert and oriented to person, place, and time  Psychiatric:         Mood and Affect: Mood normal          Behavior: Behavior normal        LABORATORY DATA     Labs: I have personally reviewed pertinent reports    Results from last 7 days   Lab Units 10/22/21  0959 10/21/21  0559 10/20/21  0640 10/18/21  0645   WBC Thousand/uL 6 05 6 54 6 99 6 80   HEMOGLOBIN g/dL 8 7* 8 7* 7 5* 9 2*   HEMATOCRIT % 27 0* 26 6* 22 7* 28 0*   PLATELETS Thousands/uL 357 347 313 246   NEUTROS PCT % 54  --  64 76*   MONOS PCT % 10  --  9 9      Results from last 7 days   Lab Units 10/22/21  0959 10/21/21  0559 10/20/21  0640   POTASSIUM mmol/L 3 8 4 6 3 8   CHLORIDE mmol/L 106 108 106   CO2 mmol/L 28 28 26   BUN mg/dL 18 24 27*   CREATININE mg/dL 1 29 1 28 1 35*   CALCIUM mg/dL 8 4 8 4 8 1*              Results from last 7 days   Lab Units 10/22/21  0602 10/21/21  0559 10/20/21  2311 10/19/21  1809 10/18/21  0645 10/17/21  0328   INR   --   --   --  1 35* 1 18 1 47*   PTT seconds 96* 72* 83* 46*  --  63*               IMAGING & DIAGNOSTIC TESTING     Radiology Results: I have personally reviewed pertinent reports  XR knee 1 or 2 vw left    Result Date: 10/18/2021  Impression: Left knee joint effusion without an acute osseous abnormality  Workstation performed: [id number]     XR knee 1 or 2 vw right    Result Date: 10/13/2021  Impression: Comminuted intra-articular right knee fracture  Findings concur with the referring clinician's preliminary interpretation already in the patient's electronic health record  Workstation performed: [id number]     XR tibia fibula 2 vw right    Result Date: 10/19/2021  Impression: Fluoroscopic guidance provided for procedure guidance  Please refer to the separate procedure notes for additional details  Workstation performed: [id number]     XR tibia fibula 2 views RIGHT    Result Date: 10/13/2021  Impression: Comminuted fracture of the right proximal tibia described in further detail on right knee x-ray report  No additional fractures further distally Workstation performed: [id number]     XR ankle 3+ views RIGHT    Result Date: 10/13/2021  Impression: No acute osseous abnormality  Workstation performed: [id number]     CT lower extremity wo contrast right    Result Date: 10/13/2021  Impression: Acute comminuted displaced intra-articular fracture of the proximal tibia as described above  No dislocation  This study demonstrates a significant  finding and was documented as such in [facility] for liaison and referring practitioner notification   Workstation performed: [id number] Other Diagnostic Testing: I have personally reviewed pertinent reports  ACTIVE MEDICATIONS     Current Facility-Administered Medications   Medication Dose Route Frequency    acetaminophen (TYLENOL) tablet 650 mg  650 mg Oral Q6H PRN    calcium carbonate (TUMS) chewable tablet 500 mg  500 mg Oral Daily PRN    Diclofenac Sodium (VOLTAREN) 1 % topical gel 2 g  2 g Topical 4x Daily    heparin (porcine) 25,000 units in 0 45% NaCl 250 mL infusion (premix)  3-20 Units/kg/hr (Order-Specific) Intravenous Titrated    heparin (porcine) injection 2,000 Units  2,000 Units Intravenous Q1H PRN    heparin (porcine) injection 4,000 Units  4,000 Units Intravenous Q1H PRN    HYDROmorphone (DILAUDID) injection 0 5 mg  0 5 mg Intravenous Q3H PRN    insulin glargine (LANTUS) subcutaneous injection 50 Units 0 5 mL  50 Units Subcutaneous Daily    insulin lispro (HumaLOG) 100 units/mL subcutaneous injection 2-12 Units  2-12 Units Subcutaneous TID AC    insulin lispro (HumaLOG) 100 units/mL subcutaneous injection 5 Units  5 Units Subcutaneous TID With Meals    lactated ringers infusion  125 mL/hr Intravenous Continuous    lactated ringers infusion  125 mL/hr Intravenous Continuous    lactated ringers infusion  50 mL/hr Intravenous Continuous    lidocaine (LIDODERM) 5 % patch 1 patch  1 patch Topical Daily    multi-electrolyte (PLASMALYTE-A/ISOLYTE-S PH 7 4) IV solution  75 mL/hr Intravenous Continuous    ondansetron (ZOFRAN) injection 4 mg  4 mg Intravenous Q6H PRN    oxyCODONE (OxyCONTIN) 12 hr tablet 10 mg  10 mg Oral Q12H SHASHI    polyethylene glycol (MIRALAX) packet 17 g  17 g Oral Daily PRN    pravastatin (PRAVACHOL) tablet 40 mg  40 mg Oral Daily With Dinner    senna (SENOKOT) tablet 17 2 mg  2 tablet Oral BID       VTE Pharmacologic Prophylaxis: Heparin  VTE Mechanical Prophylaxis: sequential compression device    Portions of the record may have been created with voice recognition software    Occasional wrong word or "sound a like" substitutions may have occurred due to the inherent limitations of voice recognition software    Read the chart carefully and recognize, using context, where substitutions have occurred   ==  Pankaj Che MD  [address]  Internal Medicine Residency PGY-3

## 2021-10-22 NOTE — ASSESSMENT & PLAN NOTE
On lisinopril 10mg daily at home  On hold for ROSE  BP stable now  Blood pressure 129/79, pulse 65, temperature 99 1 °F (37 3 °C), resp  rate 18, height 6' 1" (1 854 m), weight 135 kg (297 lb 9 9 oz), SpO2 95 %

## 2021-10-22 NOTE — ASSESSMENT & PLAN NOTE
Lab Results   Component Value Date    HGBA1C 8 2 (H) 10/16/2021       Recent Labs     10/21/21  2044 10/22/21  0738 10/22/21  0951 10/22/21  1253   POCGLU 227* 187* 239* 207*       Blood Sugar Average: Last 72 hrs:  (P) 237 125  Known diabetic with diabetic neuropathy  On metformin and lantus at home  Blood sugar on presentation 418  Contiunue sliding scale insulin  Hypoglycemic protocol  Goal blood sugar 140-180  Diabetic diet  Received Lantus 46 u in Am 10/17  In the hospital, we will continue Lantus 50u HS, and meal time insulin 5 TIDAC as blood sugars was slightly on the higher side    Continue SSI coverage while in hospital

## 2021-10-22 NOTE — ASSESSMENT & PLAN NOTE
Pain and swelling in the left anterior knee, superiorly  Tender to touch  Developed after PT  Xray -unremarkable  Started on colchicine 0 6mg daily    Synovial fluid analysis shows calcium pyrophosphate crystals

## 2021-10-22 NOTE — ASSESSMENT & PLAN NOTE
ROSE on CKD  May be prerenal   Cr on admission 1 99  Baseline 1 21 months ago  Avoid nephrotoxics  Avoid hypotension  Monitor I/O    Continue Light hydration while encouraging oral diet  Renal function stable for planned surgery tomorrow    Results from last 7 days   Lab Units 10/22/21  0959 10/21/21  0559 10/20/21  0640 10/18/21  0645 10/17/21  0333 10/16/21  0507   SODIUM mmol/L 142 143 141 138 139 137   POTASSIUM mmol/L 3 8 4 6 3 8 4 1 4 5 4 1   CHLORIDE mmol/L 106 108 106 103 103 103   CO2 mmol/L 28 28 26 26 26 25   ANION GAP mmol/L 8 7 9 9 10 9   BUN mg/dL 18 24 27* 28* 26* 28*   CREATININE mg/dL 1 29 1 28 1 35* 1 47* 1 61* 1 78*   CALCIUM mg/dL 8 4 8 4 8 1* 8 6 8 4 8 5   EGFR ml/min/1 73sq m 58 59 55 50 45 39   GLUCOSE RANDOM mg/dL 213* 131 252* 261* 240* 217*

## 2021-10-22 NOTE — PLAN OF CARE
Problem: Potential for Falls  Goal: Patient will remain free of falls  Description: INTERVENTIONS:  - Educate patient/family on patient safety including physical limitations  - Instruct patient to call for assistance with activity   - Consult OT/PT to assist with strengthening/mobility   - Keep Call bell within reach  - Keep bed low and locked with side rails adjusted as appropriate  - Keep care items and personal belongings within reach  - Initiate and maintain comfort rounds  - Make Fall Risk Sign visible to staff  - Offer Toileting every 2 Hours, in advance of need  - Initiate/Maintain bed/chair alarm  - Obtain necessary fall risk management equipment: bed/chair alarms, nonskid socks  - Apply yellow socks and bracelet for high fall risk patients  - Consider moving patient to room near nurses station  Outcome: Progressing     Problem: Prexisting or High Potential for Compromised Skin Integrity  Goal: Skin integrity is maintained or improved  Description: INTERVENTIONS:  - Identify patients at risk for skin breakdown  - Assess and monitor skin integrity  - Assess and monitor nutrition and hydration status  - Monitor labs   - Assess for incontinence   - Turn and reposition patient  - Assist with mobility/ambulation  - Relieve pressure over bony prominences  - Avoid friction and shearing  - Provide appropriate hygiene as needed including keeping skin clean and dry  - Evaluate need for skin moisturizer/barrier cream  - Collaborate with interdisciplinary team   - Patient/family teaching  - Consider wound care consult   Outcome: Progressing     Problem: PAIN - ADULT  Goal: Verbalizes/displays adequate comfort level or baseline comfort level  Description: Interventions:  - Encourage patient to monitor pain and request assistance  - Assess pain using appropriate pain scale  - Administer analgesics based on type and severity of pain and evaluate response  - Implement non-pharmacological measures as appropriate and evaluate response  - Consider cultural and social influences on pain and pain management  - Notify physician/advanced practitioner if interventions unsuccessful or patient reports new pain  Outcome: Progressing     Problem: INFECTION - ADULT  Goal: Absence or prevention of progression during hospitalization  Description: INTERVENTIONS:  - Assess and monitor for signs and symptoms of infection  - Monitor lab/diagnostic results  - Monitor all insertion sites, i e  indwelling lines, tubes, and drains  - Monitor endotracheal if appropriate and nasal secretions for changes in amount and color  - [place] appropriate cooling/warming therapies per order  - Administer medications as ordered  - Instruct and encourage patient and family to use good hand hygiene technique  - Identify and instruct in appropriate isolation precautions for identified infection/condition  Outcome: Progressing  Goal: Absence of fever/infection during neutropenic period  Description: INTERVENTIONS:  - Monitor WBC    Outcome: Progressing     Goal: Maintain or return to baseline ADL function  Description: INTERVENTIONS:  -  Assess patient's ability to carry out ADLs; assess patient's baseline for ADL function and identify physical deficits which impact ability to perform ADLs (bathing, care of mouth/teeth, toileting, grooming, dressing, etc )  - Assess/evaluate cause of self-care deficits   - Assess range of motion  - Assess patient's mobility; develop plan if impaired  - Assess patient's need for assistive devices and provide as appropriate  - Encourage maximum independence but intervene and supervise when necessary  - Involve family in performance of ADLs  - Assess for home care needs following discharge   - Consider OT consult to assist with ADL evaluation and planning for discharge  - Provide patient education as appropriate  Outcome: Progressing  Goal: Maintains/Returns to pre admission functional level  Description: INTERVENTIONS:  - Perform BMAT or MOVE assessment daily    - Set and communicate daily mobility goal to care team and patient/family/caregiver  - Collaborate with rehabilitation services on mobility goals if consulted  - Perform Range of Motion 3 times a day  - Reposition patient every 2 hours  - Dangle patient 3 times a day  - Stand patient 3 times a day  - Ambulate patient 3 times a day  - Out of bed to chair 3 times a day   - Out of bed for meals 3 times a day  - Out of bed for toileting  - Record patient progress and toleration of activity level   Outcome: Progressing     Problem: DISCHARGE PLANNING  Goal: Discharge to home or other facility with appropriate resources  Description: INTERVENTIONS:  - Identify barriers to discharge w/patient and caregiver  - Arrange for needed discharge resources and transportation as appropriate  - Identify discharge learning needs (meds, wound care, etc )  - Arrange for interpretive services to assist at discharge as needed  - Refer to Case Management Department for coordinating discharge planning if the patient needs post-hospital services based on physician/advanced practitioner order or complex needs related to functional status, cognitive ability, or social support system  Outcome: Progressing     Problem: Knowledge Deficit  Goal: Patient/family/caregiver demonstrates understanding of disease process, treatment plan, medications, and discharge instructions  Description: Complete learning assessment and assess knowledge base    Interventions:  - Provide teaching at level of understanding  - Provide teaching via preferred learning methods  Outcome: Progressing     Problem: MOBILITY - ADULT  Goal: Maintain or return to baseline ADL function  Description: INTERVENTIONS:  -  Assess patient's ability to carry out ADLs; assess patient's baseline for ADL function and identify physical deficits which impact ability to perform ADLs (bathing, care of mouth/teeth, toileting, grooming, dressing, etc )  - Assess/evaluate cause of self-care deficits   - Assess range of motion  - Assess patient's mobility; develop plan if impaired  - Assess patient's need for assistive devices and provide as appropriate  - Encourage maximum independence but intervene and supervise when necessary  - Involve family in performance of ADLs  - Assess for home care needs following discharge   - Consider OT consult to assist with ADL evaluation and planning for discharge  - Provide patient education as appropriate  Outcome: Progressing       Problem: Nutrition/Hydration-ADULT  Goal: Nutrient/Hydration intake appropriate for improving, restoring or maintaining nutritional needs  Description: Monitor and assess patient's nutrition/hydration status for malnutrition  Collaborate with interdisciplinary team and initiate plan and interventions as ordered  Monitor patient's weight and dietary intake as ordered or per policy  Utilize nutrition screening tool and intervene as necessary  Determine patient's food preferences and provide high-protein, high-caloric foods as appropriate       INTERVENTIONS:  - Monitor oral intake, urinary output, labs, and treatment plans  - Assess nutrition and hydration status and recommend course of action  - Evaluate amount of meals eaten  - Assist patient with eating if necessary   - Allow adequate time for meals  - Recommend/ encourage appropriate diets, oral nutritional supplements, and vitamin/mineral supplements  - Order, calculate, and assess calorie counts as needed  - Recommend, monitor, and adjust tube feedings and TPN/PPN based on assessed needs  - Assess need for intravenous fluids  - Provide specific nutrition/hydration education as appropriate  - Include patient/family/caregiver in decisions related to nutrition  Outcome: Progressing

## 2021-10-22 NOTE — PROGRESS NOTES
POD#3 s/p application of external fixator secondary to Right tibia fracture  Patient was seen and evaluated today during PT/OT session  Patient reports he is doing well, sitting with legs over side of the bed with Right lower extremity placed on multiple pillows  Ace wrap and bulky dressing clean, dry, and intact over external fixator  Overall, patient doing well and no current complaints

## 2021-10-23 LAB
ANION GAP SERPL CALCULATED.3IONS-SCNC: 9 MMOL/L (ref 4–13)
APTT PPP: 36 SECONDS (ref 23–37)
APTT PPP: 42 SECONDS (ref 23–37)
APTT PPP: 71 SECONDS (ref 23–37)
BASOPHILS # BLD AUTO: 0.03 THOUSANDS/ΜL (ref 0–0.1)
BASOPHILS NFR BLD AUTO: 1 % (ref 0–1)
BUN SERPL-MCNC: 17 MG/DL (ref 5–25)
CALCIUM SERPL-MCNC: 8.7 MG/DL (ref 8.3–10.1)
CHLORIDE SERPL-SCNC: 105 MMOL/L (ref 100–108)
CO2 SERPL-SCNC: 27 MMOL/L (ref 21–32)
CREAT SERPL-MCNC: 1.4 MG/DL (ref 0.6–1.3)
EOSINOPHIL # BLD AUTO: 0.28 THOUSAND/ΜL (ref 0–0.61)
EOSINOPHIL NFR BLD AUTO: 5 % (ref 0–6)
ERYTHROCYTE [DISTWIDTH] IN BLOOD BY AUTOMATED COUNT: 14.6 % (ref 11.6–15.1)
GFR SERPL CREATININE-BSD FRML MDRD: 53 ML/MIN/1.73SQ M
GLUCOSE SERPL-MCNC: 195 MG/DL (ref 65–140)
GLUCOSE SERPL-MCNC: 202 MG/DL (ref 65–140)
GLUCOSE SERPL-MCNC: 208 MG/DL (ref 65–140)
GLUCOSE SERPL-MCNC: 241 MG/DL (ref 65–140)
GLUCOSE SERPL-MCNC: 296 MG/DL (ref 65–140)
HCT VFR BLD AUTO: 28.8 % (ref 36.5–49.3)
HGB BLD-MCNC: 9.3 G/DL (ref 12–17)
IMM GRANULOCYTES # BLD AUTO: 0.03 THOUSAND/UL (ref 0–0.2)
IMM GRANULOCYTES NFR BLD AUTO: 1 % (ref 0–2)
LYMPHOCYTES # BLD AUTO: 1.68 THOUSANDS/ΜL (ref 0.6–4.47)
LYMPHOCYTES NFR BLD AUTO: 29 % (ref 14–44)
MCH RBC QN AUTO: 29 PG (ref 26.8–34.3)
MCHC RBC AUTO-ENTMCNC: 32.3 G/DL (ref 31.4–37.4)
MCV RBC AUTO: 90 FL (ref 82–98)
MONOCYTES # BLD AUTO: 0.52 THOUSAND/ΜL (ref 0.17–1.22)
MONOCYTES NFR BLD AUTO: 9 % (ref 4–12)
NEUTROPHILS # BLD AUTO: 3.31 THOUSANDS/ΜL (ref 1.85–7.62)
NEUTS SEG NFR BLD AUTO: 55 % (ref 43–75)
NRBC BLD AUTO-RTO: 0 /100 WBCS
PLATELET # BLD AUTO: 396 THOUSANDS/UL (ref 149–390)
PMV BLD AUTO: 9.9 FL (ref 8.9–12.7)
POTASSIUM SERPL-SCNC: 3.9 MMOL/L (ref 3.5–5.3)
RBC # BLD AUTO: 3.21 MILLION/UL (ref 3.88–5.62)
SODIUM SERPL-SCNC: 141 MMOL/L (ref 136–145)
WBC # BLD AUTO: 5.85 THOUSAND/UL (ref 4.31–10.16)

## 2021-10-23 PROCEDURE — 85025 COMPLETE CBC W/AUTO DIFF WBC: CPT | Performed by: INTERNAL MEDICINE

## 2021-10-23 PROCEDURE — 82948 REAGENT STRIP/BLOOD GLUCOSE: CPT

## 2021-10-23 PROCEDURE — 85730 THROMBOPLASTIN TIME PARTIAL: CPT | Performed by: INTERNAL MEDICINE

## 2021-10-23 PROCEDURE — 80048 BASIC METABOLIC PNL TOTAL CA: CPT | Performed by: INTERNAL MEDICINE

## 2021-10-23 PROCEDURE — 99024 POSTOP FOLLOW-UP VISIT: CPT | Performed by: ORTHOPAEDIC SURGERY

## 2021-10-23 RX ADMIN — DICLOFENAC SODIUM 2 G: 10 GEL TOPICAL at 20:36

## 2021-10-23 RX ADMIN — OXYCODONE HYDROCHLORIDE 10 MG: 10 TABLET, FILM COATED, EXTENDED RELEASE ORAL at 09:03

## 2021-10-23 RX ADMIN — PRAVASTATIN SODIUM 40 MG: 40 TABLET ORAL at 17:10

## 2021-10-23 RX ADMIN — HEPARIN SODIUM 4000 UNITS: 1000 INJECTION INTRAVENOUS; SUBCUTANEOUS at 10:26

## 2021-10-23 RX ADMIN — INSULIN GLARGINE 50 UNITS: 100 INJECTION, SOLUTION SUBCUTANEOUS at 09:04

## 2021-10-23 RX ADMIN — HEPARIN SODIUM 4000 UNITS: 1000 INJECTION INTRAVENOUS; SUBCUTANEOUS at 19:22

## 2021-10-23 RX ADMIN — INSULIN LISPRO 4 UNITS: 100 INJECTION, SOLUTION INTRAVENOUS; SUBCUTANEOUS at 11:28

## 2021-10-23 RX ADMIN — STANDARDIZED SENNA CONCENTRATE 17.2 MG: 8.6 TABLET ORAL at 09:03

## 2021-10-23 RX ADMIN — DICLOFENAC SODIUM 2 G: 10 GEL TOPICAL at 09:01

## 2021-10-23 RX ADMIN — INSULIN LISPRO 2 UNITS: 100 INJECTION, SOLUTION INTRAVENOUS; SUBCUTANEOUS at 07:49

## 2021-10-23 RX ADMIN — DICLOFENAC SODIUM 2 G: 10 GEL TOPICAL at 12:27

## 2021-10-23 RX ADMIN — SODIUM CHLORIDE, SODIUM GLUCONATE, SODIUM ACETATE, POTASSIUM CHLORIDE, MAGNESIUM CHLORIDE, SODIUM PHOSPHATE, DIBASIC, AND POTASSIUM PHOSPHATE 75 ML/HR: .53; .5; .37; .037; .03; .012; .00082 INJECTION, SOLUTION INTRAVENOUS at 04:24

## 2021-10-23 RX ADMIN — DICLOFENAC SODIUM 2 G: 10 GEL TOPICAL at 17:10

## 2021-10-23 RX ADMIN — STANDARDIZED SENNA CONCENTRATE 17.2 MG: 8.6 TABLET ORAL at 17:10

## 2021-10-23 RX ADMIN — INSULIN LISPRO 4 UNITS: 100 INJECTION, SOLUTION INTRAVENOUS; SUBCUTANEOUS at 16:11

## 2021-10-23 RX ADMIN — OXYCODONE HYDROCHLORIDE 10 MG: 10 TABLET, FILM COATED, EXTENDED RELEASE ORAL at 20:35

## 2021-10-23 RX ADMIN — LIDOCAINE 5% 1 PATCH: 700 PATCH TOPICAL at 09:01

## 2021-10-23 NOTE — PROGRESS NOTES
Progress Note - Orthopedics   Frida Lai 61 y o  male MRN: [id number]  Unit/Bed#: -01      Subjective:    61 y o male POD5 s/p application of external fixator secondary to right tibia fracture  No acute overnight events, no complaints  Pt doing well  Pain controlled  Denies fevers chills, CP, SOB  The patient denies numbness or tingling in the lower extremity  He has no other concerns or questions       Labs:  0   Lab Value Date/Time    HCT 28 8 (L) 10/23/2021 0924    HCT 27 0 (L) 10/22/2021 0959    HCT 26 6 (L) 10/21/2021 0559    HCT 38 4 07/13/2015 1448    HCT 38 6 10/20/2014 1132    HGB 9 3 (L) 10/23/2021 0924    HGB 8 7 (L) 10/22/2021 0959    HGB 8 7 (L) 10/21/2021 0559    HGB 13 1 07/13/2015 1448    HGB 13 1 10/20/2014 1132    INR 1 35 (H) 10/19/2021 1809    INR 2 94 (H) 12/11/2015 1543    WBC 5 85 10/23/2021 0924    WBC 6 05 10/22/2021 0959    WBC 6 54 10/21/2021 0559    WBC 7 7 07/13/2015 1448    WBC 8 30 10/20/2014 1132    ESR 58 (H) 05/20/2016 1207       Meds:    Current Facility-Administered Medications:     acetaminophen (TYLENOL) tablet 650 mg, 650 mg, Oral, Q6H PRN, Victor Manuel Mason PA-C, 650 mg at 10/22/21 0858    calcium carbonate (TUMS) chewable tablet 500 mg, 500 mg, Oral, Daily PRN, Victor Manuel Mason PA-C, 500 mg at 10/14/21 0006    Diclofenac Sodium (VOLTAREN) 1 % topical gel 2 g, 2 g, Topical, 4x Daily, Morgan Garcia PA-C, 2 g at 10/23/21 0901    heparin (porcine) 25,000 units in 0 45% NaCl 250 mL infusion (premix), 3-20 Units/kg/hr (Order-Specific), Intravenous, Titrated, Herson Caceres MD, Last Rate: 13 5 mL/hr at 10/22/21 1808, 15 Units/kg/hr at 10/22/21 1808    heparin (porcine) injection 2,000 Units, 2,000 Units, Intravenous, Q1H PRN, Herson Caceres MD, 2,000 Units at 10/19/21 1926    heparin (porcine) injection 4,000 Units, 4,000 Units, Intravenous, Q1H PRN, Herson Caceres MD, 4,000 Units at 10/20/21 0947    HYDROmorphone (DILAUDID) injection 0 5 mg, 0 5 mg, Intravenous, Q3H PRN, Derrick Hoffman PA-C, 0 5 mg at 10/20/21 0119    insulin glargine (LANTUS) subcutaneous injection 50 Units 0 5 mL, 50 Units, Subcutaneous, Daily, Raza Suarez MD, 50 Units at 10/23/21 0904    insulin lispro (HumaLOG) 100 units/mL subcutaneous injection 2-12 Units, 2-12 Units, Subcutaneous, TID AC, 2 Units at 10/23/21 0749 **AND** Fingerstick Glucose (POCT), , , TID AC, Raza Suarez MD    insulin lispro (HumaLOG) 100 units/mL subcutaneous injection 7 Units, 7 Units, Subcutaneous, TID With Meals, Raza Suarez MD    lactated ringers infusion, 125 mL/hr, Intravenous, Continuous, Morgan Garcia PA-C    lactated ringers infusion, 125 mL/hr, Intravenous, Continuous, Derrick Hoffman PA-C    lactated ringers infusion, 50 mL/hr, Intravenous, Continuous, Chao Blanco CRNA, Stopped at 10/20/21 1000    lidocaine (LIDODERM) 5 % patch 1 patch, 1 patch, Topical, Daily, Derrick Hoffman PA-C, 1 patch at 10/23/21 0901    multi-electrolyte (PLASMALYTE-A/ISOLYTE-S PH 7 4) IV solution, 75 mL/hr, Intravenous, Continuous, Morgan Garcia PA-C, Last Rate: 75 mL/hr at 10/23/21 0424, 75 mL/hr at 10/23/21 0424    ondansetron (ZOFRAN) injection 4 mg, 4 mg, Intravenous, Q6H PRN, Derrick Hoffman PA-C, 4 mg at 10/13/21 2249    oxyCODONE (OxyCONTIN) 12 hr tablet 10 mg, 10 mg, Oral, Q12H [address], Morgan Garcia PA-C, 10 mg at 10/23/21 8676    polyethylene glycol (MIRALAX) packet 17 g, 17 g, Oral, Daily PRN, Derrick Hoffman PA-C, 17 g at 10/21/21 1620    pravastatin (PRAVACHOL) tablet 40 mg, 40 mg, Oral, Daily With Teodoro Garcia PA-C, 40 mg at 10/22/21 1802    senna (SENOKOT) tablet 17 2 mg, 2 tablet, Oral, BID, Morgan Garcia PA-C, 17 2 mg at 10/23/21 6370    Blood Culture:   No results found for: BLOODCX    Wound Culture:   No results found for: WOUNDCULT    Ins and Outs:  I/O last 24 hours:   In: 800 [P O :800]  Out: 5759 [Urine:2750]      Physical:  Vitals:    10/23/21 0723   BP: 157/76   Pulse: 75   Resp: 18   Temp: 98 9 °F (37 2 °C)   SpO2: 94%     Musculoskeletal: right Lower Extremity  · Patient currently sitting in his chair, comfortably in no acute distress  · Ace wrap and bulky dressing clean, dry, and intact over external fixator  · Patient is able to wiggle toes  · SILT s/s/sp/dp/t  · Lower extremity compartments soft and nontender  · +fhl/ehl, +ankle dorsi/plantar flexion  · 2+ DP pulse  · Brisk cap refill in all toes    Assessment:    66 y o male POD5 s/p application of external fixator secondary to right tibia fracture  Patient doing well  Plan:  · Non-weight bearing RLE  · Maintain external fixator and dressings, keep clean, dry, and intact  · HGB 9 3 this AM  Greater than 2 gram drop qualifies for diagnosis of acute blood loss anemia, will monitor and administer IVF/prbc as indicated   · PT/OT  · Pain control per SLIM  · DVT ppx with Heparin  · Dispo: Ortho will follow   · Plan is to return to the OR on Monday with Dr Karis Leon for definitive fixation of tibial fracture  Patient agrees with plan  Heparin should be stopped prior to surgery at appropriate time       Alana Mims PA-C

## 2021-10-23 NOTE — PLAN OF CARE
Problem: Potential for Falls  Goal: Patient will remain free of falls  Description: INTERVENTIONS:  - Educate patient/family on patient safety including physical limitations  - Instruct patient to call for assistance with activity   - Consult OT/PT to assist with strengthening/mobility   - Keep Call bell within reach  - Keep bed low and locked with side rails adjusted as appropriate  - Keep care items and personal belongings within reach  - Initiate and maintain comfort rounds  - Make Fall Risk Sign visible to staff  - Offer Toileting every 2 Hours, in advance of need  - Initiate/Maintain bed/chair alarm  - Obtain necessary fall risk management equipment: bed/chair alarm,nonskid socks  - Apply yellow socks and bracelet for high fall risk patients  - Consider moving patient to room near nurses station  Outcome: Progressing     Problem: Prexisting or High Potential for Compromised Skin Integrity  Goal: Skin integrity is maintained or improved  Description: INTERVENTIONS:  - Identify patients at risk for skin breakdown  - Assess and monitor skin integrity  - Assess and monitor nutrition and hydration status  - Monitor labs   - Assess for incontinence   - Turn and reposition patient  - Assist with mobility/ambulation  - Relieve pressure over bony prominences  - Avoid friction and shearing  - Provide appropriate hygiene as needed including keeping skin clean and dry  - Evaluate need for skin moisturizer/barrier cream  - Collaborate with interdisciplinary team   - Patient/family teaching  - Consider wound care consult   Outcome: Progressing     Problem: PAIN - ADULT  Goal: Verbalizes/displays adequate comfort level or baseline comfort level  Description: Interventions:  - Encourage patient to monitor pain and request assistance  - Assess pain using appropriate pain scale  - Administer analgesics based on type and severity of pain and evaluate response  - Implement non-pharmacological measures as appropriate and evaluate response  - Consider cultural and social influences on pain and pain management  - Notify physician/advanced practitioner if interventions unsuccessful or patient reports new pain  Outcome: Progressing     Problem: INFECTION - ADULT  Goal: Absence or prevention of progression during hospitalization  Description: INTERVENTIONS:  - Assess and monitor for signs and symptoms of infection  - Monitor lab/diagnostic results  - Monitor all insertion sites, i e  indwelling lines, tubes, and drains  - Monitor endotracheal if appropriate and nasal secretions for changes in amount and color  - [place] appropriate cooling/warming therapies per order  - Administer medications as ordered  - Instruct and encourage patient and family to use good hand hygiene technique  - Identify and instruct in appropriate isolation precautions for identified infection/condition  Outcome: Progressing  Goal: Absence of fever/infection during neutropenic period  Description: INTERVENTIONS:  - Monitor WBC    Outcome: Progressing       Goal: Maintain or return to baseline ADL function  Description: INTERVENTIONS:  -  Assess patient's ability to carry out ADLs; assess patient's baseline for ADL function and identify physical deficits which impact ability to perform ADLs (bathing, care of mouth/teeth, toileting, grooming, dressing, etc )  - Assess/evaluate cause of self-care deficits   - Assess range of motion  - Assess patient's mobility; develop plan if impaired  - Assess patient's need for assistive devices and provide as appropriate  - Encourage maximum independence but intervene and supervise when necessary  - Involve family in performance of ADLs  - Assess for home care needs following discharge   - Consider OT consult to assist with ADL evaluation and planning for discharge  - Provide patient education as appropriate  Outcome: Progressing  Goal: Maintains/Returns to pre admission functional level  Description: INTERVENTIONS:  - Perform BMAT or MOVE assessment daily    - Set and communicate daily mobility goal to care team and patient/family/caregiver  - Collaborate with rehabilitation services on mobility goals if consulted  - Perform Range of Motion 3 times a day  - Reposition patient every 2 hours  - Dangle patient 3 times a day  - Stand patient 3 times a day  - Ambulate patient 3 times a day  - Out of bed to chair 3 times a day   - Out of bed for meals 3 times a day  - Out of bed for toileting  - Record patient progress and toleration of activity level   Outcome: Progressing     Problem: DISCHARGE PLANNING  Goal: Discharge to home or other facility with appropriate resources  Description: INTERVENTIONS:  - Identify barriers to discharge w/patient and caregiver  - Arrange for needed discharge resources and transportation as appropriate  - Identify discharge learning needs (meds, wound care, etc )  - Arrange for interpretive services to assist at discharge as needed  - Refer to Case Management Department for coordinating discharge planning if the patient needs post-hospital services based on physician/advanced practitioner order or complex needs related to functional status, cognitive ability, or social support system  Outcome: Progressing     Problem: Knowledge Deficit  Goal: Patient/family/caregiver demonstrates understanding of disease process, treatment plan, medications, and discharge instructions  Description: Complete learning assessment and assess knowledge base    Interventions:  - Provide teaching at level of understanding  - Provide teaching via preferred learning methods  Outcome: Progressing     Problem: MOBILITY - ADULT  Goal: Maintain or return to baseline ADL function  Description: INTERVENTIONS:  -  Assess patient's ability to carry out ADLs; assess patient's baseline for ADL function and identify physical deficits which impact ability to perform ADLs (bathing, care of mouth/teeth, toileting, grooming, dressing, etc )  - Assess/evaluate cause of self-care deficits   - Assess range of motion  - Assess patient's mobility; develop plan if impaired  - Assess patient's need for assistive devices and provide as appropriate  - Encourage maximum independence but intervene and supervise when necessary  - Involve family in performance of ADLs  - Assess for home care needs following discharge   - Consider OT consult to assist with ADL evaluation and planning for discharge  - Provide patient education as appropriate  Outcome: Progressing       Problem: Nutrition/Hydration-ADULT  Goal: Nutrient/Hydration intake appropriate for improving, restoring or maintaining nutritional needs  Description: Monitor and assess patient's nutrition/hydration status for malnutrition  Collaborate with interdisciplinary team and initiate plan and interventions as ordered  Monitor patient's weight and dietary intake as ordered or per policy  Utilize nutrition screening tool and intervene as necessary  Determine patient's food preferences and provide high-protein, high-caloric foods as appropriate       INTERVENTIONS:  - Monitor oral intake, urinary output, labs, and treatment plans  - Assess nutrition and hydration status and recommend course of action  - Evaluate amount of meals eaten  - Assist patient with eating if necessary   - Allow adequate time for meals  - Recommend/ encourage appropriate diets, oral nutritional supplements, and vitamin/mineral supplements  - Order, calculate, and assess calorie counts as needed  - Recommend, monitor, and adjust tube feedings and TPN/PPN based on assessed needs  - Assess need for intravenous fluids  - Provide specific nutrition/hydration education as appropriate  - Include patient/family/caregiver in decisions related to nutrition  Outcome: Progressing

## 2021-10-24 ENCOUNTER — ANESTHESIA EVENT (INPATIENT)
Dept: PERIOP | Facility: HOSPITAL | Age: 66
DRG: 493 | End: 2021-10-24
Payer: COMMERCIAL

## 2021-10-24 LAB
ANION GAP SERPL CALCULATED.3IONS-SCNC: 7 MMOL/L (ref 4–13)
APTT PPP: 104 SECONDS (ref 23–37)
APTT PPP: 35 SECONDS (ref 23–37)
APTT PPP: 37 SECONDS (ref 23–37)
BASOPHILS # BLD AUTO: 0.02 THOUSANDS/ΜL (ref 0–0.1)
BASOPHILS NFR BLD AUTO: 0 % (ref 0–1)
BUN SERPL-MCNC: 18 MG/DL (ref 5–25)
CALCIUM SERPL-MCNC: 8.8 MG/DL (ref 8.3–10.1)
CHLORIDE SERPL-SCNC: 104 MMOL/L (ref 100–108)
CO2 SERPL-SCNC: 28 MMOL/L (ref 21–32)
CREAT SERPL-MCNC: 1.46 MG/DL (ref 0.6–1.3)
EOSINOPHIL # BLD AUTO: 0.34 THOUSAND/ΜL (ref 0–0.61)
EOSINOPHIL NFR BLD AUTO: 5 % (ref 0–6)
ERYTHROCYTE [DISTWIDTH] IN BLOOD BY AUTOMATED COUNT: 15 % (ref 11.6–15.1)
GFR SERPL CREATININE-BSD FRML MDRD: 50 ML/MIN/1.73SQ M
GLUCOSE SERPL-MCNC: 188 MG/DL (ref 65–140)
GLUCOSE SERPL-MCNC: 239 MG/DL (ref 65–140)
GLUCOSE SERPL-MCNC: 240 MG/DL (ref 65–140)
GLUCOSE SERPL-MCNC: 302 MG/DL (ref 65–140)
GLUCOSE SERPL-MCNC: 318 MG/DL (ref 65–140)
HCT VFR BLD AUTO: 28.3 % (ref 36.5–49.3)
HGB BLD-MCNC: 9 G/DL (ref 12–17)
IMM GRANULOCYTES # BLD AUTO: 0.03 THOUSAND/UL (ref 0–0.2)
IMM GRANULOCYTES NFR BLD AUTO: 1 % (ref 0–2)
LYMPHOCYTES # BLD AUTO: 1.98 THOUSANDS/ΜL (ref 0.6–4.47)
LYMPHOCYTES NFR BLD AUTO: 31 % (ref 14–44)
MCH RBC QN AUTO: 28.8 PG (ref 26.8–34.3)
MCHC RBC AUTO-ENTMCNC: 31.8 G/DL (ref 31.4–37.4)
MCV RBC AUTO: 90 FL (ref 82–98)
MONOCYTES # BLD AUTO: 0.66 THOUSAND/ΜL (ref 0.17–1.22)
MONOCYTES NFR BLD AUTO: 10 % (ref 4–12)
NEUTROPHILS # BLD AUTO: 3.39 THOUSANDS/ΜL (ref 1.85–7.62)
NEUTS SEG NFR BLD AUTO: 53 % (ref 43–75)
NRBC BLD AUTO-RTO: 0 /100 WBCS
PLATELET # BLD AUTO: 362 THOUSANDS/UL (ref 149–390)
PMV BLD AUTO: 9.4 FL (ref 8.9–12.7)
POTASSIUM SERPL-SCNC: 3.9 MMOL/L (ref 3.5–5.3)
RBC # BLD AUTO: 3.13 MILLION/UL (ref 3.88–5.62)
SODIUM SERPL-SCNC: 139 MMOL/L (ref 136–145)
WBC # BLD AUTO: 6.42 THOUSAND/UL (ref 4.31–10.16)

## 2021-10-24 PROCEDURE — 85730 THROMBOPLASTIN TIME PARTIAL: CPT | Performed by: INTERNAL MEDICINE

## 2021-10-24 PROCEDURE — 80048 BASIC METABOLIC PNL TOTAL CA: CPT | Performed by: INTERNAL MEDICINE

## 2021-10-24 PROCEDURE — 82948 REAGENT STRIP/BLOOD GLUCOSE: CPT

## 2021-10-24 PROCEDURE — 99232 SBSQ HOSP IP/OBS MODERATE 35: CPT | Performed by: INTERNAL MEDICINE

## 2021-10-24 PROCEDURE — 85025 COMPLETE CBC W/AUTO DIFF WBC: CPT | Performed by: INTERNAL MEDICINE

## 2021-10-24 PROCEDURE — 99024 POSTOP FOLLOW-UP VISIT: CPT | Performed by: ORTHOPAEDIC SURGERY

## 2021-10-24 RX ORDER — HEPARIN SODIUM 10000 [USP'U]/100ML
3-20 INJECTION, SOLUTION INTRAVENOUS
Status: DISPENSED | OUTPATIENT
Start: 2021-10-24 | End: 2021-10-25

## 2021-10-24 RX ADMIN — HEPARIN SODIUM 4000 UNITS: 1000 INJECTION INTRAVENOUS; SUBCUTANEOUS at 02:32

## 2021-10-24 RX ADMIN — OXYCODONE HYDROCHLORIDE 10 MG: 10 TABLET, FILM COATED, EXTENDED RELEASE ORAL at 21:16

## 2021-10-24 RX ADMIN — DICLOFENAC SODIUM 2 G: 10 GEL TOPICAL at 21:20

## 2021-10-24 RX ADMIN — DICLOFENAC SODIUM 2 G: 10 GEL TOPICAL at 11:28

## 2021-10-24 RX ADMIN — INSULIN LISPRO 8 UNITS: 100 INJECTION, SOLUTION INTRAVENOUS; SUBCUTANEOUS at 11:28

## 2021-10-24 RX ADMIN — INSULIN LISPRO 8 UNITS: 100 INJECTION, SOLUTION INTRAVENOUS; SUBCUTANEOUS at 16:37

## 2021-10-24 RX ADMIN — HEPARIN SODIUM 4000 UNITS: 1000 INJECTION INTRAVENOUS; SUBCUTANEOUS at 09:00

## 2021-10-24 RX ADMIN — DICLOFENAC SODIUM 2 G: 10 GEL TOPICAL at 17:37

## 2021-10-24 RX ADMIN — HEPARIN SODIUM 27.1 UNITS/KG/HR: 10000 INJECTION, SOLUTION INTRAVENOUS at 02:37

## 2021-10-24 RX ADMIN — INSULIN GLARGINE 50 UNITS: 100 INJECTION, SOLUTION SUBCUTANEOUS at 09:16

## 2021-10-24 RX ADMIN — OXYCODONE HYDROCHLORIDE 10 MG: 10 TABLET, FILM COATED, EXTENDED RELEASE ORAL at 09:57

## 2021-10-24 RX ADMIN — STANDARDIZED SENNA CONCENTRATE 17.2 MG: 8.6 TABLET ORAL at 17:38

## 2021-10-24 RX ADMIN — HEPARIN SODIUM 31.1 UNITS/KG/HR: 10000 INJECTION, SOLUTION INTRAVENOUS at 15:42

## 2021-10-24 RX ADMIN — LIDOCAINE 5% 1 PATCH: 700 PATCH TOPICAL at 09:54

## 2021-10-24 RX ADMIN — HEPARIN SODIUM 31 UNITS/KG/HR: 10000 INJECTION, SOLUTION INTRAVENOUS at 11:33

## 2021-10-24 RX ADMIN — INSULIN LISPRO 4 UNITS: 100 INJECTION, SOLUTION INTRAVENOUS; SUBCUTANEOUS at 07:16

## 2021-10-24 RX ADMIN — DICLOFENAC SODIUM 2 G: 10 GEL TOPICAL at 09:56

## 2021-10-24 RX ADMIN — PRAVASTATIN SODIUM 40 MG: 40 TABLET ORAL at 16:38

## 2021-10-24 RX ADMIN — STANDARDIZED SENNA CONCENTRATE 17.2 MG: 8.6 TABLET ORAL at 09:57

## 2021-10-24 NOTE — PROGRESS NOTES
[facility]  Progress Note Ben Sifuentes 1955, 61 y o  male MRN: [id number]  Unit/Bed#: -Jeffery Encounter: [id number]  Primary Care Provider: Margoth De La Cruz DO   Date and time admitted to hospital: 10/13/2021  5:17 AM    Difficult intubation  Assessment & Plan  Had difficult intubation  Due to severe kyphosis during ortho procedure on 10/18  Will keep in mind because he is going to have a definitive surgery again next week    Pseudogout of knee, left  Assessment & Plan  Pain and swelling in the left anterior knee, superiorly  Tender to touch  Developed after PT  Xray -unremarkable  Started on colchicine 0 6mg daily  Synovial fluid analysis shows calcium pyrophosphate crystals    Elevated INR  Assessment & Plan  Now subtherapeutic ,  Patient previously on Coumadin due to history of DVTs  This is currently on hold due to need for surgery  Ortho recommends 1 4-1 6 for ortho procedure  INR 1 47--  1 18  Continue to hold Coumadin  ROSE (acute kidney injury) ([facility] [address] )  Assessment & Plan  ROSE on CKD  May be prerenal   Cr on admission 1 99  Baseline 1 21 months ago  Avoid nephrotoxics  Avoid hypotension  Monitor I/O  Continue Light hydration while encouraging oral diet  Renal function stable for planned surgery tomorrow    Results from last 7 days   Lab Units 10/22/21  0959 10/21/21  0559 10/20/21  0640 10/18/21  0645 10/17/21  0333 10/16/21  0507   SODIUM mmol/L 142 143 141 138 139 137   POTASSIUM mmol/L 3 8 4 6 3 8 4 1 4 5 4 1   CHLORIDE mmol/L 106 108 106 103 103 103   CO2 mmol/L 28 28 26 26 26 25   ANION GAP mmol/L 8 7 9 9 10 9   BUN mg/dL 18 24 27* 28* 26* 28*   CREATININE mg/dL 1 29 1 28 1 35* 1 47* 1 61* 1 78*   CALCIUM mg/dL 8 4 8 4 8 1* 8 6 8 4 8 5   EGFR ml/min/1 73sq m 58 59 55 50 45 39   GLUCOSE RANDOM mg/dL 213* 131 252* 261* 240* 217*         History of DVT (deep vein thrombosis)  Assessment & Plan  History 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  Unprovoked  Last episode was 10yrs ago  On [facility] with coumadin  Currently held  Would need heparin if INR is subtherapeutic  DVT prophylaxis with venodyne   Hold coumadin for now  On heparin drip and is preop for his orthopedic surgery tomorrow      Type 2 diabetes mellitus with diabetic neuropathy, without long-term current use of insulin [facility])  Assessment & Plan  Lab Results   Component Value Date    HGBA1C 8 2 (H) 10/16/2021       Recent Labs     10/21/21  2044 10/22/21  0738 10/22/21  0951 10/22/21  1253   POCGLU 227* 187* 239* 207*       Blood Sugar Average: Last 72 hrs:  (P) 237 125  Known diabetic with diabetic neuropathy  On metformin and lantus at home  Blood sugar on presentation 418  Contiunue sliding scale insulin  Hypoglycemic protocol  Goal blood sugar 140-180  Diabetic diet  Received Lantus 46 u in Am 10/17  In the hospital, we will continue Lantus 50u HS, and meal time insulin 5 TIDAC as blood sugars was slightly on the higher side  Continue SSI coverage while in hospital           Mixed hyperlipidemia  Assessment & Plan  Continue statins  Essential hypertension  Assessment & Plan  On lisinopril 10mg daily at home  On hold for ROSE  BP stable now  Blood pressure 129/79, pulse 65, temperature 99 1 °F (37 3 °C), resp  rate 18, height 6' 1" (1 854 m), weight 135 kg (297 lb 9 9 oz), SpO2 95 %  * Closed fracture of proximal end of right tibia  Assessment & Plan  Presented with Right knee / proximal leg pain and inability to bear weight after ground level fall after twisting leg in his kitchen  Assoc deformity,mild abrasion  10/13-Imaging findings including CT confirmed Acute comminuted displaced intra-articular fracture of the proximal tibia as described above  No dislocation  Small lipohemarthrosis  Moderate distal femoral and proximal tibiofibular subcutaneous edema/contusion  On knee immobilizer  Non weight bearing  Orthopedic input noted and appreciated  Patient will need full surgical correction of fracture    Continue analgesia, will optimize dose  Add stool softeners  4 DPO application of external fixation to RLE  Ortho plan to return to OR on Monday for definitive fixation of tibial #  On heparin gtt- to be held preoperatively  Monitor LE  Non weight beating  TE Pharmacologic Prophylaxis: Heparin Drip    Patient Centered Rounds: I have performed bedside rounds with nursing staff today  Discussions with Specialists or Other Care Team Provider:  Orthopedics  Education and Discussions with Family / Patient:  Patient    Current Length of Stay: 6 day(s)  Current Patient Status: Inpatient     Certification Statement: The patient will continue to require additional inpatient hospital stay due to Closed fracture of proximal end of right tibia  Discharge Plan / Estimated Discharge Date:  2-3 days    Code Status: Level 1 - Full Code  ______________________________________________________________________________    Subjective:   Patient seen and examined by me  Patient was anxious today when I saw him  He was upset at the long time prior to his definitive surgery  He wants to see his orthopedic physician and expressed the desire to do so prior to the surgery tomorrow  He does not have any excessive bleeding  Continues to be on heparin drip  No fever, chills or rigors  Some pain in the right leg but overall is much better    Objective:   Vitals: Blood pressure 157/72, pulse 72, temperature 98 4 °F (36 9 °C), resp  rate 18, height 6' 1" (1 854 m), weight (!) 138 kg (303 lb 2 1 oz), SpO2 95 %      Physical Exam:   General appearance: alert, appears stated age and cooperative  Constitutional- looks a little weak  HEENT - atraumatic and normocephalic  Neck- supple  Skin - no fresh rash  Extremities no fresh focal deformities  Cardiovascular- S1-S2 heard  Respiratory- bilateral air entry present, no crackles or rhonchi  Skin - no fresh rash  Abdomen - normal bowel sounds present, no rebound tenderness  CNS- No fresh focal deficits  Psych- no acute psychosis     Additional Data:   Labs:  Results from last 7 days   Lab Units 10/24/21  0859 10/23/21  0924 10/22/21  0959 10/21/21  0559 10/20/21  0640 10/19/21  1809 10/18/21  0645   WBC Thousand/uL 6 42 5 85 6 05 6 54 6 99 7 33 6 80   HEMOGLOBIN g/dL 9 0* 9 3* 8 7* 8 7* 7 5* 8 2* 9 2*   HEMATOCRIT % 28 3* 28 8* 27 0* 26 6* 22 7* 24 9* 28 0*   MCV fL 90 90 89 89 89 88 89   PLATELETS Thousands/uL 362 396* 357 347 313 256 246   INR   --   --   --   --   --  1 35* 1 18     Results from last 7 days   Lab Units 10/24/21  0859 10/23/21  0924 10/22/21  0959 10/21/21  0559 10/20/21  0640 10/18/21  0645   SODIUM mmol/L 139 141 142 143 141 138   POTASSIUM mmol/L 3 9 3 9 3 8 4 6 3 8 4 1   CHLORIDE mmol/L 104 105 106 108 106 103   CO2 mmol/L 28 27 28 28 26 26   ANION GAP mmol/L 7 9 8 7 9 9   BUN mg/dL 18 17 18 24 27* 28*   CREATININE mg/dL 1 46* 1 40* 1 29 1 28 1 35* 1 47*   CALCIUM mg/dL 8 8 8 7 8 4 8 4 8 1* 8 6   EGFR ml/min/1 73sq m 50 53 58 59 55 50   GLUCOSE RANDOM mg/dL 239* 202* 213* 131 252* 261*                      Results from last 7 days   Lab Units 10/24/21  1120 10/24/21  0709 10/23/21  2105 10/23/21  1543 10/23/21  1109 10/23/21  0721 10/22/21  2136 10/22/21  1524 10/22/21  1253 10/22/21  0951 10/22/21  0738 10/21/21  2044   POC GLUCOSE mg/dl 318* 240* 296* 208* 241* 195* 242* 200* 207* 239* 187* 227*             * I Have Reviewed All Lab Data Listed Above  Cultures:               Imaging:  Imaging Reports Reviewed Today Include:   XR knee 1 or 2 vw left    Result Date: 10/18/2021  Impression: Left knee joint effusion without an acute osseous abnormality  Workstation performed: [id number]     XR knee 1 or 2 vw right    Result Date: 10/13/2021  Impression: Comminuted intra-articular right knee fracture  Findings concur with the referring clinician's preliminary interpretation already in the patient's electronic health record    Workstation performed: [id number]     XR tibia fibula 2 vw right    Result Date: 10/19/2021  Impression: Fluoroscopic guidance provided for procedure guidance  Please refer to the separate procedure notes for additional details  Workstation performed: [id number]     XR tibia fibula 2 views RIGHT    Result Date: 10/13/2021  Impression: Comminuted fracture of the right proximal tibia described in further detail on right knee x-ray report  No additional fractures further distally Workstation performed: [id number]     XR ankle 3+ views RIGHT    Result Date: 10/13/2021  Impression: No acute osseous abnormality  Workstation performed: [id number]     CT lower extremity wo contrast right    Result Date: 10/13/2021  Impression: Acute comminuted displaced intra-articular fracture of the proximal tibia as described above  No dislocation  This study demonstrates a significant  finding and was documented as such in [facility] for liaison and referring practitioner notification   Workstation performed: [id number]     Scheduled Meds:  Current Facility-Administered Medications   Medication Dose Route Frequency Provider Last Rate    acetaminophen  650 mg Oral Q6H PRN Arlana Nabila, PA-C      calcium carbonate  500 mg Oral Daily PRN Arlana Nabila, PA-C      Diclofenac Sodium  2 g Topical 4x Daily Arlana Nabila, PA-C      heparin (porcine)  3-20 Units/kg/hr Intravenous Titrated Mar Albino, PA-C      heparin (porcine)  2,000 Units Intravenous Q1H PRN Mar Albino, PA-C      heparin (porcine)  4,000 Units Intravenous Q1H PRN Mar Albino, PA-C      HYDROmorphone  0 5 mg Intravenous Q3H PRN Arlana Nabila, PA-C      insulin glargine  50 Units Subcutaneous Daily Veronica Langley MD      insulin lispro  2-12 Units Subcutaneous TID AC Veronica Langley MD      insulin lispro  7 Units Subcutaneous TID With Meals Veronica Langley MD      lactated ringers  125 mL/hr Intravenous Continuous Arlana Nabila, PA-C      lactated ringers  125 mL/hr Intravenous Continuous Guido Pinzon [place]      lactated ringers  50 mL/hr Intravenous Continuous Remonia XOCHILT Guzman Stopped (10/20/21 1000)    lidocaine  1 patch Topical Daily Guido Pinzon PA-C      multi-electrolyte  75 mL/hr Intravenous Continuous Guido Pinzon PA-C 75 mL/hr (10/23/21 0424)    ondansetron  4 mg Intravenous Q6H PRN Guido Pinzon PA-C      oxyCODONE  10 mg Oral Q12H [address] Morgan Garcia PA-C      polyethylene glycol  17 g Oral Daily PRN Guido Pinzon PA-C      pravastatin  40 mg Oral Daily With PhoodeezJOSEF      senna  2 tablet Oral BID Ranelle Desanctis, PA-C Leopold Ewings, MD [address] Internal Medicine    ** Please Note: This note has been constructed using a voice recognition system   **

## 2021-10-24 NOTE — PLAN OF CARE
Problem: Potential for Falls  Goal: Patient will remain free of falls  Description: INTERVENTIONS:  - Educate patient/family on patient safety including physical limitations  - Instruct patient to call for assistance with activity   - Consult OT/PT to assist with strengthening/mobility   - Keep Call bell within reach  - Keep bed low and locked with side rails adjusted as appropriate  - Keep care items and personal belongings within reach  - Initiate and maintain comfort rounds  - Make Fall Risk Sign visible to staff  - Offer Toileting every    Hours, in advance of need  - Initiate/Maintain   alarm  - Obtain necessary fall risk management equipment:   - Apply yellow socks and bracelet for high fall risk patients  - Consider moving patient to room near nurses station  Outcome: Progressing

## 2021-10-24 NOTE — PROGRESS NOTES
Progress Note - Orthopedics   Farhad Terry 61 y o  male MRN: [id number]  Unit/Bed#: -01      Subjective:    61 y o male POD6 s/p application of external fixator secondary to right tibia fracture  No acute overnight events, no complaints  Pt doing well  Pain controlled  Denies fevers chills, CP, SOB  The patient denies numbness or tingling in the lower extremity  He has no other concerns or questions       Labs:  0   Lab Value Date/Time    HCT 28 3 (L) 10/24/2021 0859    HCT 28 8 (L) 10/23/2021 0924    HCT 27 0 (L) 10/22/2021 0959    HCT 38 4 07/13/2015 1448    HCT 38 6 10/20/2014 1132    HGB 9 0 (L) 10/24/2021 0859    HGB 9 3 (L) 10/23/2021 0924    HGB 8 7 (L) 10/22/2021 0959    HGB 13 1 07/13/2015 1448    HGB 13 1 10/20/2014 1132    INR 1 35 (H) 10/19/2021 1809    INR 2 94 (H) 12/11/2015 1543    WBC 6 42 10/24/2021 0859    WBC 5 85 10/23/2021 0924    WBC 6 05 10/22/2021 0959    WBC 7 7 07/13/2015 1448    WBC 8 30 10/20/2014 1132    ESR 58 (H) 05/20/2016 1207       Meds:    Current Facility-Administered Medications:     acetaminophen (TYLENOL) tablet 650 mg, 650 mg, Oral, Q6H PRN, Aristides Laboy PA-C, 650 mg at 10/22/21 0858    calcium carbonate (TUMS) chewable tablet 500 mg, 500 mg, Oral, Daily PRN, Aristides Laboy PA-C, 500 mg at 10/14/21 0006    Diclofenac Sodium (VOLTAREN) 1 % topical gel 2 g, 2 g, Topical, 4x Daily, Morgan Garcia PA-C, 2 g at 10/24/21 0956    heparin (porcine) 25,000 units in 0 45% NaCl 250 mL infusion (premix), 3-20 Units/kg/hr (Order-Specific), Intravenous, Titrated, Christian Green MD, Last Rate: 24 4 mL/hr at 10/24/21 0712, 27 1 Units/kg/hr at 10/24/21 0712    heparin (porcine) injection 2,000 Units, 2,000 Units, Intravenous, Q1H PRN, Christian Green MD, 2,000 Units at 10/19/21 1926    heparin (porcine) injection 4,000 Units, 4,000 Units, Intravenous, Q1H PRN, Christian Green MD, 4,000 Units at 10/24/21 0900    HYDROmorphone (DILAUDID) injection 0 5 mg, 0 5 mg, Intravenous, Q3H PRN, Guido Pinzon PA-C, 0 5 mg at 10/20/21 0119    insulin glargine (LANTUS) subcutaneous injection 50 Units 0 5 mL, 50 Units, Subcutaneous, Daily, Pankaj Che MD, 50 Units at 10/24/21 0916    insulin lispro (HumaLOG) 100 units/mL subcutaneous injection 2-12 Units, 2-12 Units, Subcutaneous, TID AC, 4 Units at 10/24/21 0716 **AND** Fingerstick Glucose (POCT), , , TID AC, Pankaj Che MD    insulin lispro (HumaLOG) 100 units/mL subcutaneous injection 7 Units, 7 Units, Subcutaneous, TID With Meals, Pankaj Che MD, 7 Units at 10/24/21 0717    lactated ringers infusion, 125 mL/hr, Intravenous, Continuous, Guido Pinzon PA-C    lactated ringers infusion, 125 mL/hr, Intravenous, Continuous, Guido Pinzon PA-C    lactated ringers infusion, 50 mL/hr, Intravenous, Continuous, Piper Guzman, CRNA, Stopped at 10/20/21 1000    lidocaine (LIDODERM) 5 % patch 1 patch, 1 patch, Topical, Daily, Guido Pinzon PA-C, 1 patch at 10/24/21 0954    multi-electrolyte (PLASMALYTE-A/ISOLYTE-S PH 7 4) IV solution, 75 mL/hr, Intravenous, Continuous, Morgan Garcia PA-C, Last Rate: 75 mL/hr at 10/23/21 0424, 75 mL/hr at 10/23/21 0424    ondansetron (ZOFRAN) injection 4 mg, 4 mg, Intravenous, Q6H PRN, Guido Pinzon PA-C, 4 mg at 10/13/21 2249    oxyCODONE (OxyCONTIN) 12 hr tablet 10 mg, 10 mg, Oral, Q12H [address], Morgan Garcia PA-C, 10 mg at 10/24/21 0957    polyethylene glycol (MIRALAX) packet 17 g, 17 g, Oral, Daily PRN, Guido Pinzon PA-C, 17 g at 10/21/21 1620    pravastatin (PRAVACHOL) tablet 40 mg, 40 mg, Oral, Daily With Digna Castillo PA-C, 40 mg at 10/23/21 1710    senna (SENOKOT) tablet 17 2 mg, 2 tablet, Oral, BID, Morgan Garcia PA-C, 17 2 mg at 10/24/21 0957    Blood Culture:   No results found for: BLOODCX    Wound Culture:   No results found for: WOUNDCULT    Ins and Outs:  I/O last 24 hours:   In: 65 [P O :660]  Out: 700 [Urine:700]      Physical:  Vitals:    10/24/21 0711   BP: 157/72   Pulse: 72   Resp: 18   Temp: 98 4 °F (36 9 °C)   SpO2: 95%     Gen: Alert and oriented to person, place, time  HEENT: EOMI, eyes clear, moist mucus membranes, hearing intact  Respiratory: Bilateral chest rise  No audible wheezing found  Cardiovascular: Regular Rate and Rhythm, strong distal pulses  Abdomen: soft nontender/nondistended    Musculoskeletal: right Lower Extremity  · Patient currently lying in bed, comfortably in no acute distress  · Ace wrap and bulky dressing clean, dry, and intact over external fixator upon initial evaluation  The dressings were removed for examination  Fixator pin sites were without active drainage  Swelling of the lower extremity has decreased  Skin without ecchymosis, erythema, warmth, drainage, or other signs of infection  Bulky dressings with webril and ace wrap re-applied without complication  · Patient is able to wiggle toes  · SILT s/s/sp/dp/t  · Lower extremity compartments soft and nontender  · +fhl/ehl, +ankle dorsi/plantar flexion  · 2+ DP pulse  · Brisk cap refill in all toes    Assessment:    66 y o male POD6 s/p application of external fixator secondary to right tibia fracture  Patient doing well  Dressings removed for examination prior to surgery scheduled tomorrow  The lower extremity was re-dressed with webril and ace wrap without complication  The patient tolerated the dressing change well  Plan:  · Non-weight bearing RLE  · Maintain external fixator and dressings, keep clean, dry, and intact  · HGB 9 0 this AM  Greater than 2 gram drop qualifies for diagnosis of acute blood loss anemia, will monitor and administer IVF/prbc as indicated   · PT/OT  · Pain control per SLIM  · DVT ppx with Heparin  · Heparin to be discontinued at midnight tonight in preparation for OR tomorrow  · NPO status at midnight in preparation for OR tomorrow    · Labs to be drawn tomorrow morning: CBC, BMP, INR, type and screen in preparation for OR tomorrow  · Dispo: Ortho will follow   · Plan is to return to the OR on Monday with Dr Ni Dawn for definitive fixation of tibial fracture  Patient agrees with plan      Nas Stark PA-C

## 2021-10-25 ENCOUNTER — APPOINTMENT (INPATIENT)
Dept: RADIOLOGY | Facility: HOSPITAL | Age: 66
DRG: 493 | End: 2021-10-25
Payer: COMMERCIAL

## 2021-10-25 ENCOUNTER — ANESTHESIA (INPATIENT)
Dept: PERIOP | Facility: HOSPITAL | Age: 66
DRG: 493 | End: 2021-10-25
Payer: COMMERCIAL

## 2021-10-25 PROBLEM — M40.203 KYPHOSIS OF CERVICOTHORACIC REGION: Status: ACTIVE | Noted: 2021-10-25

## 2021-10-25 PROBLEM — E66.812 CLASS 2 OBESITY IN ADULT: Status: ACTIVE | Noted: 2021-06-07

## 2021-10-25 LAB
ABO GROUP BLD: NORMAL
ABO GROUP BLD: NORMAL
ANION GAP SERPL CALCULATED.3IONS-SCNC: 8 MMOL/L (ref 4–13)
BASOPHILS # BLD AUTO: 0.02 THOUSANDS/ΜL (ref 0–0.1)
BASOPHILS NFR BLD AUTO: 0 % (ref 0–1)
BLD GP AB SCN SERPL QL: NEGATIVE
BLD GP AB SCN SERPL QL: NORMAL
BUN SERPL-MCNC: 18 MG/DL (ref 5–25)
CALCIUM SERPL-MCNC: 8.5 MG/DL (ref 8.3–10.1)
CHLORIDE SERPL-SCNC: 105 MMOL/L (ref 100–108)
CO2 SERPL-SCNC: 27 MMOL/L (ref 21–32)
CREAT SERPL-MCNC: 1.41 MG/DL (ref 0.6–1.3)
EOSINOPHIL # BLD AUTO: 0.29 THOUSAND/ΜL (ref 0–0.61)
EOSINOPHIL NFR BLD AUTO: 5 % (ref 0–6)
ERYTHROCYTE [DISTWIDTH] IN BLOOD BY AUTOMATED COUNT: 15 % (ref 11.6–15.1)
GFR SERPL CREATININE-BSD FRML MDRD: 52 ML/MIN/1.73SQ M
GLUCOSE SERPL-MCNC: 163 MG/DL (ref 65–140)
GLUCOSE SERPL-MCNC: 165 MG/DL (ref 65–140)
GLUCOSE SERPL-MCNC: 168 MG/DL (ref 65–140)
GLUCOSE SERPL-MCNC: 307 MG/DL (ref 65–140)
GLUCOSE SERPL-MCNC: 328 MG/DL (ref 65–140)
HCT VFR BLD AUTO: 27.3 % (ref 36.5–49.3)
HGB BLD-MCNC: 8.6 G/DL (ref 12–17)
IMM GRANULOCYTES # BLD AUTO: 0.02 THOUSAND/UL (ref 0–0.2)
IMM GRANULOCYTES NFR BLD AUTO: 0 % (ref 0–2)
INR PPP: 1.21 (ref 0.84–1.19)
LYMPHOCYTES # BLD AUTO: 1.84 THOUSANDS/ΜL (ref 0.6–4.47)
LYMPHOCYTES NFR BLD AUTO: 29 % (ref 14–44)
MCH RBC QN AUTO: 28.6 PG (ref 26.8–34.3)
MCHC RBC AUTO-ENTMCNC: 31.5 G/DL (ref 31.4–37.4)
MCV RBC AUTO: 91 FL (ref 82–98)
MONOCYTES # BLD AUTO: 0.64 THOUSAND/ΜL (ref 0.17–1.22)
MONOCYTES NFR BLD AUTO: 10 % (ref 4–12)
NEUTROPHILS # BLD AUTO: 3.5 THOUSANDS/ΜL (ref 1.85–7.62)
NEUTS SEG NFR BLD AUTO: 56 % (ref 43–75)
NRBC BLD AUTO-RTO: 0 /100 WBCS
PLATELET # BLD AUTO: 399 THOUSANDS/UL (ref 149–390)
PMV BLD AUTO: 9.8 FL (ref 8.9–12.7)
POTASSIUM SERPL-SCNC: 3.9 MMOL/L (ref 3.5–5.3)
PROTHROMBIN TIME: 14.8 SECONDS (ref 11.6–14.5)
RBC # BLD AUTO: 3.01 MILLION/UL (ref 3.88–5.62)
RH BLD: NORMAL
RH BLD: POSITIVE
SODIUM SERPL-SCNC: 140 MMOL/L (ref 136–145)
SPECIMEN EXPIRATION DATE: NORMAL
SPECIMEN EXPIRATION DATE: NORMAL
WBC # BLD AUTO: 6.31 THOUSAND/UL (ref 4.31–10.16)

## 2021-10-25 PROCEDURE — C1713 ANCHOR/SCREW BN/BN,TIS/BN: HCPCS | Performed by: ORTHOPAEDIC SURGERY

## 2021-10-25 PROCEDURE — 99232 SBSQ HOSP IP/OBS MODERATE 35: CPT | Performed by: INTERNAL MEDICINE

## 2021-10-25 PROCEDURE — 27536 TREAT KNEE FRACTURE: CPT | Performed by: ORTHOPAEDIC SURGERY

## 2021-10-25 PROCEDURE — 27536 TREAT KNEE FRACTURE: CPT | Performed by: PHYSICIAN ASSISTANT

## 2021-10-25 PROCEDURE — 85610 PROTHROMBIN TIME: CPT | Performed by: ORTHOPAEDIC SURGERY

## 2021-10-25 PROCEDURE — 86850 RBC ANTIBODY SCREEN: CPT | Performed by: ORTHOPAEDIC SURGERY

## 2021-10-25 PROCEDURE — 86901 BLOOD TYPING SEROLOGIC RH(D): CPT | Performed by: ORTHOPAEDIC SURGERY

## 2021-10-25 PROCEDURE — 86850 RBC ANTIBODY SCREEN: CPT | Performed by: PHYSICIAN ASSISTANT

## 2021-10-25 PROCEDURE — 20694 RMVL EXT FIXJ SYS UNDER ANES: CPT | Performed by: ORTHOPAEDIC SURGERY

## 2021-10-25 PROCEDURE — 82948 REAGENT STRIP/BLOOD GLUCOSE: CPT

## 2021-10-25 PROCEDURE — 80048 BASIC METABOLIC PNL TOTAL CA: CPT | Performed by: ORTHOPAEDIC SURGERY

## 2021-10-25 PROCEDURE — 86900 BLOOD TYPING SEROLOGIC ABO: CPT | Performed by: ORTHOPAEDIC SURGERY

## 2021-10-25 PROCEDURE — 20694 RMVL EXT FIXJ SYS UNDER ANES: CPT | Performed by: PHYSICIAN ASSISTANT

## 2021-10-25 PROCEDURE — 85025 COMPLETE CBC W/AUTO DIFF WBC: CPT | Performed by: ORTHOPAEDIC SURGERY

## 2021-10-25 PROCEDURE — 0QPGX5Z REMOVAL OF EXTERNAL FIXATION DEVICE FROM RIGHT TIBIA, EXTERNAL APPROACH: ICD-10-PCS | Performed by: ORTHOPAEDIC SURGERY

## 2021-10-25 PROCEDURE — 86901 BLOOD TYPING SEROLOGIC RH(D): CPT | Performed by: PHYSICIAN ASSISTANT

## 2021-10-25 PROCEDURE — 0QSG04Z REPOSITION RIGHT TIBIA WITH INTERNAL FIXATION DEVICE, OPEN APPROACH: ICD-10-PCS | Performed by: ORTHOPAEDIC SURGERY

## 2021-10-25 PROCEDURE — 86900 BLOOD TYPING SEROLOGIC ABO: CPT | Performed by: PHYSICIAN ASSISTANT

## 2021-10-25 PROCEDURE — 73590 X-RAY EXAM OF LOWER LEG: CPT

## 2021-10-25 DEVICE — 3.5MM CORTEX SCREW SELF-TAPPING 90MM: Type: IMPLANTABLE DEVICE | Site: TIBIA | Status: FUNCTIONAL

## 2021-10-25 DEVICE — 2.7MM CORTEX SCREW SELF-TAPPING 45MM: Type: IMPLANTABLE DEVICE | Site: TIBIA | Status: FUNCTIONAL

## 2021-10-25 DEVICE — 3.5MM CORTEX SCREW SELF-TAPPING 38MM: Type: IMPLANTABLE DEVICE | Site: TIBIA | Status: FUNCTIONAL

## 2021-10-25 DEVICE — 3.5MM CORTEX SCREW SELF-TAPPING 65MM
Type: IMPLANTABLE DEVICE | Site: TIBIA | Status: NON-FUNCTIONAL
Removed: 2022-08-08

## 2021-10-25 DEVICE — 3.5MM CORTEX SCREW SELF-TAPPING 40MM: Type: IMPLANTABLE DEVICE | Site: TIBIA | Status: FUNCTIONAL

## 2021-10-25 DEVICE — 3.5MM LOCKING SCREW SLF-TPNG W/STARDRIVE RECESS 48MM: Type: IMPLANTABLE DEVICE | Site: TIBIA | Status: FUNCTIONAL

## 2021-10-25 DEVICE — 3.5MM CORTEX SCREW SELF-TAPPING 36MM
Type: IMPLANTABLE DEVICE | Site: TIBIA | Status: NON-FUNCTIONAL
Removed: 2022-08-08

## 2021-10-25 DEVICE — 3.5MM CORTEX SCREW SELF-TAPPING 44MM: Type: IMPLANTABLE DEVICE | Site: TIBIA | Status: FUNCTIONAL

## 2021-10-25 DEVICE — 3.5MM LOCKING SCREW SLF-TPNG W/STARDRIVE(TM) RECESS 60MM: Type: IMPLANTABLE DEVICE | Site: TIBIA | Status: FUNCTIONAL

## 2021-10-25 DEVICE — 3.5MM VARIABLE ANGLE LOCKING SCREW/SLF-TPNG/STRDRV/90MM
Type: IMPLANTABLE DEVICE | Site: TIBIA | Status: NON-FUNCTIONAL
Removed: 2022-08-08

## 2021-10-25 DEVICE — 3.5MM VARIABLE ANGLE LOCKING SCREW/SLF-TPNG/STRDRV/70MM
Type: IMPLANTABLE DEVICE | Site: TIBIA | Status: NON-FUNCTIONAL
Removed: 2022-08-08

## 2021-10-25 DEVICE — 3.5MM VA-LCP PROX TIBIA PLATE SMALL BEND/10H/177MM/RIGHT
Type: IMPLANTABLE DEVICE | Site: TIBIA | Status: NON-FUNCTIONAL
Brand: VA-LCP
Removed: 2022-08-08

## 2021-10-25 DEVICE — 3.5MM LCP® MEDIAL PROXIMAL TIBIA PLATE 6H/RIGHT 119MM
Type: IMPLANTABLE DEVICE | Status: FUNCTIONAL
Brand: LCP

## 2021-10-25 DEVICE — 2.7MM CORTEX SCREW SELF-TAPPING 50MM: Type: IMPLANTABLE DEVICE | Site: TIBIA | Status: FUNCTIONAL

## 2021-10-25 DEVICE — 3.5MM VARIABLE ANGLE LOCKING SCREW/SLF-TPNG/STRDRV/80MM
Type: IMPLANTABLE DEVICE | Site: TIBIA | Status: NON-FUNCTIONAL
Removed: 2022-08-08

## 2021-10-25 DEVICE — 3.5MM VARIABLE ANGLE LOCKING SCREW/SLF-TPNG/STRDRV/85MM
Type: IMPLANTABLE DEVICE | Site: TIBIA | Status: NON-FUNCTIONAL
Removed: 2022-08-08

## 2021-10-25 RX ORDER — HYDROMORPHONE HCL/PF 1 MG/ML
0.5 SYRINGE (ML) INJECTION
Status: DISCONTINUED | OUTPATIENT
Start: 2021-10-25 | End: 2021-10-25 | Stop reason: HOSPADM

## 2021-10-25 RX ORDER — LIDOCAINE HYDROCHLORIDE 10 MG/ML
INJECTION, SOLUTION EPIDURAL; INFILTRATION; INTRACAUDAL; PERINEURAL AS NEEDED
Status: DISCONTINUED | OUTPATIENT
Start: 2021-10-25 | End: 2021-10-25

## 2021-10-25 RX ORDER — BUPIVACAINE HYDROCHLORIDE 2.5 MG/ML
INJECTION, SOLUTION EPIDURAL; INFILTRATION; INTRACAUDAL AS NEEDED
Status: DISCONTINUED | OUTPATIENT
Start: 2021-10-25 | End: 2021-10-25 | Stop reason: HOSPADM

## 2021-10-25 RX ORDER — DEXAMETHASONE SODIUM PHOSPHATE 4 MG/ML
INJECTION, SOLUTION INTRA-ARTICULAR; INTRALESIONAL; INTRAMUSCULAR; INTRAVENOUS; SOFT TISSUE AS NEEDED
Status: DISCONTINUED | OUTPATIENT
Start: 2021-10-25 | End: 2021-10-25

## 2021-10-25 RX ORDER — HYDROMORPHONE HCL 110MG/55ML
PATIENT CONTROLLED ANALGESIA SYRINGE INTRAVENOUS AS NEEDED
Status: DISCONTINUED | OUTPATIENT
Start: 2021-10-25 | End: 2021-10-25

## 2021-10-25 RX ORDER — MIDAZOLAM HYDROCHLORIDE 2 MG/2ML
INJECTION, SOLUTION INTRAMUSCULAR; INTRAVENOUS AS NEEDED
Status: DISCONTINUED | OUTPATIENT
Start: 2021-10-25 | End: 2021-10-25

## 2021-10-25 RX ORDER — EPHEDRINE SULFATE 50 MG/ML
INJECTION INTRAVENOUS AS NEEDED
Status: DISCONTINUED | OUTPATIENT
Start: 2021-10-25 | End: 2021-10-25

## 2021-10-25 RX ORDER — METOCLOPRAMIDE HYDROCHLORIDE 5 MG/ML
10 INJECTION INTRAMUSCULAR; INTRAVENOUS ONCE AS NEEDED
Status: DISCONTINUED | OUTPATIENT
Start: 2021-10-25 | End: 2021-10-25 | Stop reason: HOSPADM

## 2021-10-25 RX ORDER — CEFAZOLIN SODIUM 2 G/50ML
2000 SOLUTION INTRAVENOUS EVERY 8 HOURS
Status: COMPLETED | OUTPATIENT
Start: 2021-10-25 | End: 2021-10-27

## 2021-10-25 RX ORDER — ONDANSETRON 2 MG/ML
4 INJECTION INTRAMUSCULAR; INTRAVENOUS ONCE AS NEEDED
Status: DISCONTINUED | OUTPATIENT
Start: 2021-10-25 | End: 2021-10-25 | Stop reason: HOSPADM

## 2021-10-25 RX ORDER — ROCURONIUM BROMIDE 10 MG/ML
INJECTION, SOLUTION INTRAVENOUS AS NEEDED
Status: DISCONTINUED | OUTPATIENT
Start: 2021-10-25 | End: 2021-10-25

## 2021-10-25 RX ORDER — ONDANSETRON 2 MG/ML
INJECTION INTRAMUSCULAR; INTRAVENOUS AS NEEDED
Status: DISCONTINUED | OUTPATIENT
Start: 2021-10-25 | End: 2021-10-25

## 2021-10-25 RX ORDER — CEFAZOLIN SODIUM 1 G/3ML
INJECTION, POWDER, FOR SOLUTION INTRAMUSCULAR; INTRAVENOUS AS NEEDED
Status: DISCONTINUED | OUTPATIENT
Start: 2021-10-25 | End: 2021-10-25

## 2021-10-25 RX ORDER — FENTANYL CITRATE/PF 50 MCG/ML
25 SYRINGE (ML) INJECTION
Status: DISCONTINUED | OUTPATIENT
Start: 2021-10-25 | End: 2021-10-25 | Stop reason: HOSPADM

## 2021-10-25 RX ORDER — FENTANYL CITRATE 50 UG/ML
INJECTION, SOLUTION INTRAMUSCULAR; INTRAVENOUS AS NEEDED
Status: DISCONTINUED | OUTPATIENT
Start: 2021-10-25 | End: 2021-10-25

## 2021-10-25 RX ORDER — MAGNESIUM HYDROXIDE 1200 MG/15ML
LIQUID ORAL AS NEEDED
Status: DISCONTINUED | OUTPATIENT
Start: 2021-10-25 | End: 2021-10-25 | Stop reason: HOSPADM

## 2021-10-25 RX ORDER — PROPOFOL 10 MG/ML
INJECTION, EMULSION INTRAVENOUS AS NEEDED
Status: DISCONTINUED | OUTPATIENT
Start: 2021-10-25 | End: 2021-10-25

## 2021-10-25 RX ADMIN — CEFAZOLIN SODIUM 2000 MG: 2 SOLUTION INTRAVENOUS at 19:52

## 2021-10-25 RX ADMIN — LIDOCAINE HYDROCHLORIDE 50 MG: 10 INJECTION, SOLUTION EPIDURAL; INFILTRATION; INTRACAUDAL; PERINEURAL at 10:43

## 2021-10-25 RX ADMIN — HYDROMORPHONE HYDROCHLORIDE 0.5 MG: 1 INJECTION, SOLUTION INTRAMUSCULAR; INTRAVENOUS; SUBCUTANEOUS at 19:53

## 2021-10-25 RX ADMIN — SUGAMMADEX 275 MG: 100 INJECTION, SOLUTION INTRAVENOUS at 15:34

## 2021-10-25 RX ADMIN — OXYCODONE HYDROCHLORIDE 10 MG: 10 TABLET, FILM COATED, EXTENDED RELEASE ORAL at 22:08

## 2021-10-25 RX ADMIN — CEFAZOLIN SODIUM 3000 MG: 1 INJECTION, POWDER, FOR SOLUTION INTRAMUSCULAR; INTRAVENOUS at 14:41

## 2021-10-25 RX ADMIN — ROCURONIUM BROMIDE 50 MG: 10 INJECTION, SOLUTION INTRAVENOUS at 10:50

## 2021-10-25 RX ADMIN — DEXAMETHASONE SODIUM PHOSPHATE 4 MG: 4 INJECTION, SOLUTION INTRAMUSCULAR; INTRAVENOUS at 10:50

## 2021-10-25 RX ADMIN — EPHEDRINE SULFATE 5 MG: 50 INJECTION, SOLUTION INTRAVENOUS at 14:00

## 2021-10-25 RX ADMIN — SODIUM CHLORIDE, SODIUM LACTATE, POTASSIUM CHLORIDE, AND CALCIUM CHLORIDE: .6; .31; .03; .02 INJECTION, SOLUTION INTRAVENOUS at 10:42

## 2021-10-25 RX ADMIN — ROCURONIUM BROMIDE 20 MG: 10 INJECTION, SOLUTION INTRAVENOUS at 13:15

## 2021-10-25 RX ADMIN — SODIUM CHLORIDE, SODIUM LACTATE, POTASSIUM CHLORIDE, AND CALCIUM CHLORIDE: .6; .31; .03; .02 INJECTION, SOLUTION INTRAVENOUS at 13:18

## 2021-10-25 RX ADMIN — INSULIN LISPRO 8 UNITS: 100 INJECTION, SOLUTION INTRAVENOUS; SUBCUTANEOUS at 16:32

## 2021-10-25 RX ADMIN — Medication 3000 MG: at 10:50

## 2021-10-25 RX ADMIN — MIDAZOLAM HYDROCHLORIDE 2 MG: 1 INJECTION, SOLUTION INTRAMUSCULAR; INTRAVENOUS at 10:43

## 2021-10-25 RX ADMIN — ONDANSETRON 4 MG: 2 INJECTION INTRAMUSCULAR; INTRAVENOUS at 13:45

## 2021-10-25 RX ADMIN — HYDROMORPHONE HYDROCHLORIDE 0.5 MG: 2 INJECTION, SOLUTION INTRAMUSCULAR; INTRAVENOUS; SUBCUTANEOUS at 11:48

## 2021-10-25 RX ADMIN — PROPOFOL 200 MG: 10 INJECTION, EMULSION INTRAVENOUS at 10:46

## 2021-10-25 RX ADMIN — HYDROMORPHONE HYDROCHLORIDE 0.5 MG: 2 INJECTION, SOLUTION INTRAMUSCULAR; INTRAVENOUS; SUBCUTANEOUS at 12:11

## 2021-10-25 RX ADMIN — EPHEDRINE SULFATE 5 MG: 50 INJECTION, SOLUTION INTRAVENOUS at 14:15

## 2021-10-25 RX ADMIN — ROCURONIUM BROMIDE 30 MG: 10 INJECTION, SOLUTION INTRAVENOUS at 11:24

## 2021-10-25 RX ADMIN — FENTANYL CITRATE 100 MCG: 50 INJECTION, SOLUTION INTRAMUSCULAR; INTRAVENOUS at 10:43

## 2021-10-25 RX ADMIN — HYDROMORPHONE HYDROCHLORIDE 0.5 MG: 2 INJECTION, SOLUTION INTRAMUSCULAR; INTRAVENOUS; SUBCUTANEOUS at 14:53

## 2021-10-25 RX ADMIN — DICLOFENAC SODIUM 2 G: 10 GEL TOPICAL at 20:00

## 2021-10-25 RX ADMIN — PROPOFOL 50 MG: 10 INJECTION, EMULSION INTRAVENOUS at 10:49

## 2021-10-25 RX ADMIN — STANDARDIZED SENNA CONCENTRATE 17.2 MG: 8.6 TABLET ORAL at 19:52

## 2021-10-25 RX ADMIN — ROCURONIUM BROMIDE 20 MG: 10 INJECTION, SOLUTION INTRAVENOUS at 12:07

## 2021-10-25 RX ADMIN — HYDROMORPHONE HYDROCHLORIDE 0.5 MG: 2 INJECTION, SOLUTION INTRAMUSCULAR; INTRAVENOUS; SUBCUTANEOUS at 15:29

## 2021-10-25 RX ADMIN — SODIUM CHLORIDE, SODIUM LACTATE, POTASSIUM CHLORIDE, AND CALCIUM CHLORIDE 125 ML/HR: .6; .31; .03; .02 INJECTION, SOLUTION INTRAVENOUS at 20:00

## 2021-10-25 RX ADMIN — PHENYLEPHRINE HYDROCHLORIDE 20 MCG/MIN: 10 INJECTION INTRAVENOUS at 14:32

## 2021-10-25 NOTE — PROGRESS NOTES
[address] [facility]  Progress Note Amy Barkley 1955, 61 y o  male MRN: [id number]  Unit/Bed#: [facility] Encounter: [id number]  Primary Care Provider: Edy Bales DO   Date and time admitted to hospital: 10/13/2021  5:17 AM    * Closed fracture of proximal end of right tibia  Assessment & Plan  Presented with Right knee / proximal leg pain and inability to bear weight after ground level fall after twisting leg in his kitchen  Assoc deformity,mild abrasion  10/13-Imaging findings including CT confirmed Acute comminuted displaced intra-articular fracture of the proximal tibia as described above  No dislocation  Small lipohemarthrosis  Moderate distal femoral and proximal tibiofibular subcutaneous edema/contusion  On knee immobilizer  Non weight bearing  Orthopedic input noted and appreciated  Patient will need full surgical correction of fracture  Continue analgesia, will optimize dose  Add stool softeners  7 DPO application of external fixation to RLE  Heparin gtt held preoperatively  Monitor LE  Non weight beating  Definitive fixation of tibia fracture in OR - 10/25/2021    Type 2 diabetes mellitus with diabetic neuropathy, without long-term current use of insulin [facility])  Assessment & Plan  Lab Results   Component Value Date    HGBA1C 8 2 (H) 10/16/2021       Recent Labs     10/24/21  1120 10/24/21  1555 10/24/21  2158 10/25/21  0718   POCGLU 318* 302* 188* 163*       Blood Sugar Average: Last 72 hrs:  (P) 230 [id number]  Known diabetic with diabetic neuropathy  On metformin and lantus at home  Blood sugar on presentation 418  Contiunue sliding scale insulin  Hypoglycemic protocol  Goal blood sugar not at goal of 140-180  Diabetic diet  Initially on Lantus 46 units in a m  Now on 50 units HS and Humalog 5u TIDAC and SSI coverage  Blood glucose still on the high side    Will increase mealtime insulin to Humalog 10u with meals  Continue SSI coverage while in hospital      Difficult intubation  Assessment & Plan  Had difficult intubation  Due to severe kyphosis during ortho procedure on 10/18  Will keep in mind because he is going to have a definitive surgery again next week    Pseudogout of knee, left  Assessment & Plan  Pain and swelling in the left anterior knee, superiorly  Tender to touch  Developed after PT  Xray -unremarkable  Synovial fluid analysis shows calcium pyrophosphate crystals  Started on colchicine 0 6mg daily  ROSE (acute kidney injury) ([facility] [address] )  Assessment & Plan  ROSE on CKD  May be prerenal   Cr on admission 1 99  Baseline 1 21 months ago  Avoid nephrotoxics  Avoid hypotension  Monitor I/O  Will hold off IV infusions  Continue oral hydration as tolerated  Renal function stable for planned surgery tomorrow    Results from last 7 days   Lab Units 10/25/21  0540 10/24/21  0859 10/23/21  0924 10/22/21  0959 10/21/21  0559 10/20/21  0640   SODIUM mmol/L 140 139 141 142 143 141   POTASSIUM mmol/L 3 9 3 9 3 9 3 8 4 6 3 8   CHLORIDE mmol/L 105 104 105 106 108 106   CO2 mmol/L 27 28 27 28 28 26   ANION GAP mmol/L 8 7 9 8 7 9   BUN mg/dL 18 18 17 18 24 27*   CREATININE mg/dL 1 41* 1 46* 1 40* 1 29 1 28 1 35*   CALCIUM mg/dL 8 5 8 8 8 7 8 4 8 4 8 1*   EGFR ml/min/1 73sq m 52 50 53 58 59 55   GLUCOSE RANDOM mg/dL 165* 239* 202* 213* 131 252*         History of DVT (deep vein thrombosis)  Assessment & Plan  History 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  Unprovoked  Last episode was 10yrs ago  On AC with coumadin  Currently held  Would need heparin if INR is subtherapeutic  DVT prophylaxis with venodyne   Hold coumadin for now  Heparin drip discontinued on ground of OR procedure - ORIF today  Mixed hyperlipidemia  Assessment & Plan  Continue statins  Essential hypertension  Assessment & Plan  On lisinopril 10mg daily at home  On hold for ROSE  BP stable now  Blood pressure 167/73, pulse 73, temperature 98 8 °F (37 1 °C), resp   rate 20, height 6' 1" (1 854 m), weight (!) 137 kg (302 lb 0 5 oz), SpO2 96 %  VTE Pharmacologic Prophylaxis:   VTE Score: 5 Held due to ORIF procedure  Mechanical VTE Prophylaxis in Place: No    Patient Centered Rounds: I have performed bedside rounds with nursing staff today  Discussions with Specialists or Other Care Team Provider:  Ortho    Education and Discussions with Family / Patient: Patient declined call to   Current Length of Stay: 12 day(s)    Current Patient Status: Inpatient     Discharge Plan / Estimated Discharge Date: TBD    Code Status: Level 1 - Full Code      Subjective:   Patient seen and examined at bedside  He appeared infuriated about the frequent blood draws - explained about the necessity for blood work evaluation, however will hold off unless very necessary  He is agreeable to the plan  He scheduled for ORIF today  Objective:     Vitals:   Temp (24hrs), Av 3 °F (36 8 °C), Min:98 °F (36 7 °C), Max:98 8 °F (37 1 °C)    Temp:  [98 °F (36 7 °C)-98 8 °F (37 1 °C)] 98 8 °F (37 1 °C)  HR:  [73-80] 73  Resp:  [18-20] 20  BP: (135-167)/(57-79) 167/73  SpO2:  [94 %-96 %] 96 %  Body mass index is 39 85 kg/m²  Input and Output Summary (last 24 hours): Intake/Output Summary (Last 24 hours) at 10/25/2021 1134  Last data filed at 10/25/2021 0300  Gross per 24 hour   Intake 220 ml   Output 400 ml   Net -180 ml       Physical Exam:     Physical Exam  Vitals reviewed  Constitutional:       General: He is not in acute distress  Appearance: He is not toxic-appearing  HENT:      Head: Normocephalic and atraumatic  Eyes:      Pupils: Pupils are equal, round, and reactive to light  Cardiovascular:      Rate and Rhythm: Normal rate and regular rhythm  Pulses: Normal pulses  Heart sounds: Normal heart sounds  Pulmonary:      Effort: Pulmonary effort is normal  No respiratory distress  Breath sounds: Normal breath sounds     Abdominal:      General: Bowel sounds are normal  There is no distension  Palpations: Abdomen is soft  Musculoskeletal:      Comments: Rt LE: In external fixator   Neurological:      Mental Status: He is alert and oriented to person, place, and time            Additional Data:     Labs:  Results from last 7 days   Lab Units 10/25/21  0540   WBC Thousand/uL 6 31   HEMOGLOBIN g/dL 8 6*   HEMATOCRIT % 27 3*   PLATELETS Thousands/uL 399*   NEUTROS PCT % 56   LYMPHS PCT % 29   MONOS PCT % 10   EOS PCT % 5     Results from last 7 days   Lab Units 10/25/21  0540   SODIUM mmol/L 140   POTASSIUM mmol/L 3 9   CHLORIDE mmol/L 105   CO2 mmol/L 27   BUN mg/dL 18   CREATININE mg/dL 1 41*   ANION GAP mmol/L 8   CALCIUM mg/dL 8 5   GLUCOSE RANDOM mg/dL 165*     Results from last 7 days   Lab Units 10/25/21  0900   INR  1 21*     Results from last 7 days   Lab Units 10/25/21  1020 10/25/21  0718 10/24/21  2158 10/24/21  1555 10/24/21  1120 10/24/21  0709 10/23/21  2105 10/23/21  1543 10/23/21  1109 10/23/21  0721 10/22/21  2136 10/22/21  1524   POC GLUCOSE mg/dl 168* 163* 188* 302* 318* 240* 296* 208* 241* 195* 242* 200*               Imaging: Reviewed radiology reports from this admission including: Farnaz Reid bilateral LE    Recent Cultures (last 7 days):           Lines/Drains:  Invasive Devices     Peripheral Intravenous Line            Peripheral IV 10/21/21 Right;Ventral (anterior) Forearm 4 days    Peripheral IV 10/24/21 Distal;Left;Upper;Ventral (anterior) Arm <1 day                Telemetry:        Last 24 Hours Medication List:   Current Facility-Administered Medications   Medication Dose Route Frequency Provider Last Rate    [MAR Hold] acetaminophen  650 mg Oral Q6H PRN Milana Arnold PA-C      [facility] Hold] calcium carbonate  500 mg Oral Daily PRN Milana Arnold PA-C      [MAR Hold] Diclofenac Sodium  2 g Topical 4x Daily Milana Arnold PA-C      [facility] Hold] HYDROmorphone  0 5 mg Intravenous Q3H PRN Milana Arnold PA-C      [facility] Hold] insulin glargine  50 Units Subcutaneous Daily Herson Caceres MD      insulin lispro  10 Units Subcutaneous TID With Meals Shruthi Eric MD      [facility] Hold] insulin lispro  2-12 Units Subcutaneous TID AC Herson Caceres MD      lactated ringers  125 mL/hr Intravenous Continuous Lonia Louder, PA-C      lactated ringers  125 mL/hr Intravenous Continuous Lonia Louder, PA-C      lactated ringers  50 mL/hr Intravenous Continuous [place] XOCHILT Soliz Stopped (10/20/21 1000)   Gabriel Cramer [TMK Hold] lidocaine  1 patch Topical Daily Lonia Louder, PA-C      multi-electrolyte  75 mL/hr Intravenous Continuous Lonia Louder, PA-C 75 mL/hr (10/23/21 0424)    [MAR Hold] ondansetron  4 mg Intravenous Q6H PRN Lonia Louder, PA-C      [MAR Hold] oxyCODONE  10 mg Oral Q12H [address] Lonia Louder, PA-C      [MAR Hold] polyethylene glycol  17 g Oral Daily PRN Lonia Louder, PA-C      [facility]] pravastatin  40 mg Oral Daily With Masha Garcia PA-C      [facility] Hold] senna  2 tablet Oral BID Lonia Louder, PA-C       Facility-Administered Medications Ordered in Other Encounters   Medication Dose Route Frequency Provider Last Rate    fentanyl citrate (PF)   Intravenous PRN Tej Gee CRNA      lidocaine (PF)   Intravenous PRN Tej Gee CRNA      midazolam   Intravenous PRN eTj Gee CRNA      phenylephrine   Intravenous PRN Tej Gee, CRNA      ROCuronium   Intravenous PRN Tej Gee CRNA          Today, Patient Was Seen By: Shruthi Eric MD    ** Please Note: This note has been constructed using a voice recognition system   **

## 2021-10-25 NOTE — OP NOTE
OPERATIVE REPORT  PATIENT NAME: Roxi Norris    :  1955  MRN: [id number]  Pt Location: [facility] OR ROOM 03    SURGERY DATE: 10/25/2021    Surgeon(s) and Role:     * Debby Jay MD - Primary     * Sabine Pradhan PA-C - Assisting     * Keena Mckinnon PA-C - Assisting    Preop Diagnosis:  Right tibia bicondylar plateau fracture  Right lower extremity retained external fixator    Post-Op Diagnosis Codes:  Same    Procedure(s) (LRB):  OPEN REDUCTION W/ INTERNAL FIXATION (ORIF) RIGHT PROXIMAL TIBIA SHAFT FRACTURE, REMOVAL OF EXTERNAL FIXATOR (Right)    Procedures:  ORIF R tibia bicondylar plateau fracture (CPT 44959)  Removal RLE external fixator under anesthesia (CPT 09339)    Specimen(s):  * No specimens in log *    Estimated Blood Loss:   300 mL    Drains:  [REMOVED] Urethral Catheter Non-latex 16 Fr  (Removed)   Number of days: 0       Anesthesia Type:   General    Operative Indications:  Displaced bicondylar tibial plateau fracture in ambulatory patient    Operative Findings:  See below    Complications:   None    Implants: Synthes 6 hl medial proximal tibia 3 5 mm plate, 10 hl 3 5 mm VA proximal lateral tibia plate, 3 5 mm cortical lag screw x1, 2 7 mm cortical lag screw x2    Procedure and Technique:   The patient is a 72-year-old male who had a complex proximal tibia fracture which involved articular surface of the lateral plateau and shearing off of the entire proximal medial condyle of the tibia including articular surface, this resulted in dissociation of metaphysis and diaphysis  Given the instability of length and soft tissues not being ready for surgery 1 week ago, external fixator was placed to allow for soft tissue rest and today's return to the OR represents a planned return to the OR for definitive management of fracture    Advised the patient that he is at significantly elevated risk for postoperative complications including but not limited to infection, nerve or blood vessel damage, malunion, nonunion, numbness, posttraumatic arthritis, hardware failure, fixation failure given his diabetic and smoker and morbid obesity status and high energy injury pattern  He voiced understanding of all of these risks  The patient's operative site, laterality, procedure, consent were verified in the preoperative area the patient was transitioned to the operating room  General anesthesia was provided by the anesthesia team and a nonsterile tourniquet was applied to the the operative extremity  A Peñaloza catheter was placed which was removed at the end of the case  The tourniquet was used for a total of 120 minutes in the case and not reinflated  The entire external fixator and operative extremity were prepped and draped in the usual sterile fashion  After time-out for safety, attention was 1st turned medially given the large condylar shear component which would likely provide a satisfactory fracture read  Medial incision was made and dissection was taken down to the level of the fascia  Saphenous structures and hamstring tendons were protected and we were able to visualize the fracture which was cleared of hematoma and early callus  The patient was brought out of varus and brought out to length however we were unable to fully obtain reduction at this time  Attention was then turned to the lateral incision which was made in a curvilinear fashion over the proximal lateral tibial plateau  Dissection was taken down to the level of the anterior compartment fascia which was sharply incised as well as the ITB band  No arthrotomy was made, the distal aspect of the lateral fracture was identified and cleared of hematoma and early callus which consisted of 2 separate comminuted spikes of the lateral cortex which were reduced and held in place with pointed reduction clamps and K-wires    We were then able to reduce the medial condylar component which was held in place with a pointed reduction clamp and K-wires  A 3 5 mm cortical lag screw was placed medially from anterior to posterior to capture this fragment, to 2 7 mm cortical lag screws were placed from lateral to medial to capture the lateral fracture spikes  Plates as listed above were then applied to medial lateral aspect of the bone  Balance fixation took place medially with screws proximally and distally with locking screws proximally  Lateral fixation took place with cortical screws distally and the periarticular clamp was then placed to reduce proximal lateral plate to bone and reduce articular split; true lag screw was then placed proximally through the plate to provide compression of the articular split, locking cluster was placed without difficulty the initial cortical screw was replaced for a locking screw  Kickstand locking screw was also placed to provide buttress affect  After all clamps and wires were removed reduction was found to be satisfactory  The knee was taken through range of motion on live fluoroscopy and there was noted to be no motion at the fracture sites  As such, decision was made not to provide an anterior medial buttress plate  All wounds were copiously irrigated with sterile saline  Deep layer closure medially to place with 0 Vicryl suture in interrupted fashion, deep flare fascial layer laterally to place with 0 Vicryl suture  Subcutaneous layer closure took place with 2-0 Vicryl suture, skin layer closure took place with 3-0 nylon suture  Sterile dressing consisted of Steri-Strips, Adaptic, sterile gauze, sterile ABD pad, sterile  After the sterile dressings were placed, the external fixator components were removed in their entirety  The ex fix sites were noted not to have significant drainage and redressed with Adaptic, gauze, sterile Webril  All final counts, including but not limited to instrument, sponge, needle counts were correct  I was present for the entire procedure    The patient was placed in a knee immobilizer with the knee in full extension  The patient was extubated and awakened and transitioned to the PACU in stable condition  There were no immediate complications  No qualified resident was available for the procedure  Critical assistance from Mikel Mccarty PA-C was required for all components of the procedure including but not limited to positioning, soft tissue retraction, assistance with reduction of fracture and passage of instrumentation  This was particularly important given the high energy nature pattern of the patient's injury and BMI 39 85  The patient will be nonweightbearing on the operative extremity for 12 weeks  Will allow for knee ROM starting at POD7  The patient may resume Coumadin for deep vein thrombosis prophylaxis    Will see the patient in 2 weeks for consideration of suture removal    Patient Disposition:  PACU     SIGNATURE: Debby Jay MD  DATE: October 25, 2021  TIME: 4:34 PM

## 2021-10-25 NOTE — ASSESSMENT & PLAN NOTE
On lisinopril 10mg daily at home  On hold for ROSE  BP stable now  Blood pressure 167/73, pulse 73, temperature 98 8 °F (37 1 °C), resp  rate 20, height 6' 1" (1 854 m), weight (!) 137 kg (302 lb 0 5 oz), SpO2 96 %

## 2021-10-25 NOTE — ASSESSMENT & PLAN NOTE
Lab Results   Component Value Date    HGBA1C 8 2 (H) 10/16/2021       Recent Labs     10/24/21  1120 10/24/21  1555 10/24/21  2158 10/25/21  0718   POCGLU 318* 302* 188* 163*       Blood Sugar Average: Last 72 hrs:  (P) 230 [id number]  Known diabetic with diabetic neuropathy  On metformin and lantus at home  Blood sugar on presentation 418  Contiunue sliding scale insulin  Hypoglycemic protocol  Goal blood sugar not at goal of 140-180  Diabetic diet  Initially on Lantus 46 units in a m  Now on 50 units HS and Humalog 5u TIDAC and SSI coverage  Blood glucose still on the high side    Will increase mealtime insulin to Humalog 10u with meals  Continue SSI coverage while in hospital

## 2021-10-25 NOTE — ASSESSMENT & PLAN NOTE
History 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  Unprovoked  Last episode was 10yrs ago  On AC with coumadin  Currently held  Would need heparin if INR is subtherapeutic  DVT prophylaxis with venodyne   Hold coumadin for now  Heparin drip discontinued on ground of OR procedure - ORIF today

## 2021-10-25 NOTE — ASSESSMENT & PLAN NOTE
Pain and swelling in the left anterior knee, superiorly  Tender to touch  Developed after PT  Xray -unremarkable  Synovial fluid analysis shows calcium pyrophosphate crystals  Started on colchicine 0 6mg daily

## 2021-10-25 NOTE — ASSESSMENT & PLAN NOTE
Presented with Right knee / proximal leg pain and inability to bear weight after ground level fall after twisting leg in his kitchen  Assoc deformity,mild abrasion  10/13-Imaging findings including CT confirmed Acute comminuted displaced intra-articular fracture of the proximal tibia as described above  No dislocation  Small lipohemarthrosis  Moderate distal femoral and proximal tibiofibular subcutaneous edema/contusion  On knee immobilizer  Non weight bearing  Orthopedic input noted and appreciated  Patient will need full surgical correction of fracture  Continue analgesia, will optimize dose  Add stool softeners  7 DPO application of external fixation to RLE  Heparin gtt held preoperatively  Monitor LE   Non weight beating  Definitive fixation of tibia fracture in OR - 10/25/2021

## 2021-10-25 NOTE — PLAN OF CARE
Problem: Potential for Falls  Goal: Patient will remain free of falls  Description: INTERVENTIONS:  - Educate patient/family on patient safety including physical limitations  - Instruct patient to call for assistance with activity   - Consult OT/PT to assist with strengthening/mobility   - Keep Call bell within reach  - Keep bed low and locked with side rails adjusted as appropriate  - Keep care items and personal belongings within reach  - Initiate and maintain comfort rounds  - Make Fall Risk Sign visible to staff  - Offer Toileting every    Hours, in advance of need  - Initiate/Maintain   alarm  - Obtain necessary fall risk management equipment:     - Apply yellow socks and bracelet for high fall risk patients  - Consider moving patient to room near nurses station  Outcome: Progressing     Problem: Prexisting or High Potential for Compromised Skin Integrity  Goal: Skin integrity is maintained or improved  Description: INTERVENTIONS:  - Identify patients at risk for skin breakdown  - Assess and monitor skin integrity  - Assess and monitor nutrition and hydration status  - Monitor labs   - Assess for incontinence   - Turn and reposition patient  - Assist with mobility/ambulation  - Relieve pressure over bony prominences  - Avoid friction and shearing  - Provide appropriate hygiene as needed including keeping skin clean and dry  - Evaluate need for skin moisturizer/barrier cream  - Collaborate with interdisciplinary team   - Patient/family teaching  - Consider wound care consult   Outcome: Progressing     Problem: PAIN - ADULT  Goal: Verbalizes/displays adequate comfort level or baseline comfort level  Description: Interventions:  - Encourage patient to monitor pain and request assistance  - Assess pain using appropriate pain scale  - Administer analgesics based on type and severity of pain and evaluate response  - Implement non-pharmacological measures as appropriate and evaluate response  - Consider cultural and social influences on pain and pain management  - Notify physician/advanced practitioner if interventions unsuccessful or patient reports new pain  Outcome: Progressing     Problem: INFECTION - ADULT  Goal: Absence or prevention of progression during hospitalization  Description: INTERVENTIONS:  - Assess and monitor for signs and symptoms of infection  - Monitor lab/diagnostic results  - Monitor all insertion sites, i e  indwelling lines, tubes, and drains  - Monitor endotracheal if appropriate and nasal secretions for changes in amount and color  - [place] appropriate cooling/warming therapies per order  - Administer medications as ordered  - Instruct and encourage patient and family to use good hand hygiene technique  - Identify and instruct in appropriate isolation precautions for identified infection/condition  Outcome: Progressing  Goal: Absence of fever/infection during neutropenic period  Description: INTERVENTIONS:  - Monitor WBC    Outcome: Progressing     Problem: SAFETY ADULT  Goal: Patient will remain free of falls  Description: INTERVENTIONS:  - Educate patient/family on patient safety including physical limitations  - Instruct patient to call for assistance with activity   - Consult OT/PT to assist with strengthening/mobility   - Keep Call bell within reach  - Keep bed low and locked with side rails adjusted as appropriate  - Keep care items and personal belongings within reach  - Initiate and maintain comfort rounds  - Make Fall Risk Sign visible to staff  - Offer Toileting every    Hours, in advance of need  - Initiate/Maintain   alarm  - Obtain necessary fall risk management equipment:     - Apply yellow socks and bracelet for high fall risk patients  - Consider moving patient to room near nurses station  Outcome: Progressing  Goal: Maintain or return to baseline ADL function  Description: INTERVENTIONS:  -  Assess patient's ability to carry out ADLs; assess patient's baseline for ADL function and identify physical deficits which impact ability to perform ADLs (bathing, care of mouth/teeth, toileting, grooming, dressing, etc )  - Assess/evaluate cause of self-care deficits   - Assess range of motion  - Assess patient's mobility; develop plan if impaired  - Assess patient's need for assistive devices and provide as appropriate  - Encourage maximum independence but intervene and supervise when necessary  - Involve family in performance of ADLs  - Assess for home care needs following discharge   - Consider OT consult to assist with ADL evaluation and planning for discharge  - Provide patient education as appropriate  Outcome: Progressing  Goal: Maintains/Returns to pre admission functional level  Description: INTERVENTIONS:  - Perform BMAT or MOVE assessment daily    - Set and communicate daily mobility goal to care team and patient/family/caregiver  - Collaborate with rehabilitation services on mobility goals if consulted  - Perform Range of Motion  times a day  - Reposition patient every  hours    - Dangle patient  times a day  - Stand patient  times a day  - Ambulate patient  times a day  - Out of bed to chair  times a day   - Out of bed for meals  times a day  - Out of bed for toileting  - Record patient progress and toleration of activity level   Outcome: Progressing     Problem: DISCHARGE PLANNING  Goal: Discharge to home or other facility with appropriate resources  Description: INTERVENTIONS:  - Identify barriers to discharge w/patient and caregiver  - Arrange for needed discharge resources and transportation as appropriate  - Identify discharge learning needs (meds, wound care, etc )  - Arrange for interpretive services to assist at discharge as needed  - Refer to Case Management Department for coordinating discharge planning if the patient needs post-hospital services based on physician/advanced practitioner order or complex needs related to functional status, cognitive ability, or social support system  Outcome: Progressing     Problem: Knowledge Deficit  Goal: Patient/family/caregiver demonstrates understanding of disease process, treatment plan, medications, and discharge instructions  Description: Complete learning assessment and assess knowledge base  Interventions:  - Provide teaching at level of understanding  - Provide teaching via preferred learning methods  Outcome: Progressing     Problem: MOBILITY - ADULT  Goal: Maintain or return to baseline ADL function  Description: INTERVENTIONS:  -  Assess patient's ability to carry out ADLs; assess patient's baseline for ADL function and identify physical deficits which impact ability to perform ADLs (bathing, care of mouth/teeth, toileting, grooming, dressing, etc )  - Assess/evaluate cause of self-care deficits   - Assess range of motion  - Assess patient's mobility; develop plan if impaired  - Assess patient's need for assistive devices and provide as appropriate  - Encourage maximum independence but intervene and supervise when necessary  - Involve family in performance of ADLs  - Assess for home care needs following discharge   - Consider OT consult to assist with ADL evaluation and planning for discharge  - Provide patient education as appropriate  Outcome: Progressing  Goal: Maintains/Returns to pre admission functional level  Description: INTERVENTIONS:  - Perform BMAT or MOVE assessment daily    - Set and communicate daily mobility goal to care team and patient/family/caregiver  - Collaborate with rehabilitation services on mobility goals if consulted  - Perform Range of Motion  times a day  - Reposition patient every  hours    - Dangle patient  times a day  - Stand patient  times a day  - Ambulate patient  times a day  - Out of bed to chair  times a day   - Out of bed for meals times a day  - Out of bed for toileting  - Record patient progress and toleration of activity level   Outcome: Progressing     Problem: Nutrition/Hydration-ADULT  Goal: Nutrient/Hydration intake appropriate for improving, restoring or maintaining nutritional needs  Description: Monitor and assess patient's nutrition/hydration status for malnutrition  Collaborate with interdisciplinary team and initiate plan and interventions as ordered  Monitor patient's weight and dietary intake as ordered or per policy  Utilize nutrition screening tool and intervene as necessary  Determine patient's food preferences and provide high-protein, high-caloric foods as appropriate       INTERVENTIONS:  - Monitor oral intake, urinary output, labs, and treatment plans  - Assess nutrition and hydration status and recommend course of action  - Evaluate amount of meals eaten  - Assist patient with eating if necessary   - Allow adequate time for meals  - Recommend/ encourage appropriate diets, oral nutritional supplements, and vitamin/mineral supplements  - Order, calculate, and assess calorie counts as needed  - Recommend, monitor, and adjust tube feedings and TPN/PPN based on assessed needs  - Assess need for intravenous fluids  - Provide specific nutrition/hydration education as appropriate  - Include patient/family/caregiver in decisions related to nutrition  Outcome: Progressing

## 2021-10-25 NOTE — PLAN OF CARE
Problem: Potential for Falls  Goal: Patient will remain free of falls  Description: INTERVENTIONS:  - Educate patient/family on patient safety including physical limitations  - Instruct patient to call for assistance with activity   - Consult OT/PT to assist with strengthening/mobility   - Keep Call bell within reach  - Keep bed low and locked with side rails adjusted as appropriate  - Keep care items and personal belongings within reach  - Initiate and maintain comfort rounds  - Make Fall Risk Sign visible to staff  - Offer Toileting every 3 Hours, in advance of need  - Initiate/Maintain bed alarm  - Obtain necessary fall risk management equipment:   - Apply yellow socks and bracelet for high fall risk patients  - Consider moving patient to room near nurses station  Outcome: Progressing     Problem: Prexisting or High Potential for Compromised Skin Integrity  Goal: Skin integrity is maintained or improved  Description: INTERVENTIONS:  - Identify patients at risk for skin breakdown  - Assess and monitor skin integrity  - Assess and monitor nutrition and hydration status  - Monitor labs   - Assess for incontinence   - Turn and reposition patient  - Assist with mobility/ambulation  - Relieve pressure over bony prominences  - Avoid friction and shearing  - Provide appropriate hygiene as needed including keeping skin clean and dry  - Evaluate need for skin moisturizer/barrier cream  - Collaborate with interdisciplinary team   - Patient/family teaching  - Consider wound care consult   Outcome: Progressing     Problem: PAIN - ADULT  Goal: Verbalizes/displays adequate comfort level or baseline comfort level  Description: Interventions:  - Encourage patient to monitor pain and request assistance  - Assess pain using appropriate pain scale  - Administer analgesics based on type and severity of pain and evaluate response  - Implement non-pharmacological measures as appropriate and evaluate response  - Consider cultural and social influences on pain and pain management  - Notify physician/advanced practitioner if interventions unsuccessful or patient reports new pain  Outcome: Progressing     Problem: INFECTION - ADULT  Goal: Absence or prevention of progression during hospitalization  Description: INTERVENTIONS:  - Assess and monitor for signs and symptoms of infection  - Monitor lab/diagnostic results  - Monitor all insertion sites, i e  indwelling lines, tubes, and drains  - Monitor endotracheal if appropriate and nasal secretions for changes in amount and color  - [place] appropriate cooling/warming therapies per order  - Administer medications as ordered  - Instruct and encourage patient and family to use good hand hygiene technique  - Identify and instruct in appropriate isolation precautions for identified infection/condition  Outcome: Progressing     Problem: MOBILITY - ADULT  Goal: Maintain or return to baseline ADL function  Description: INTERVENTIONS:  -  Assess patient's ability to carry out ADLs; assess patient's baseline for ADL function and identify physical deficits which impact ability to perform ADLs (bathing, care of mouth/teeth, toileting, grooming, dressing, etc )  - Assess/evaluate cause of self-care deficits   - Assess range of motion  - Assess patient's mobility; develop plan if impaired  - Assess patient's need for assistive devices and provide as appropriate  - Encourage maximum independence but intervene and supervise when necessary  - Involve family in performance of ADLs  - Assess for home care needs following discharge   - Consider OT consult to assist with ADL evaluation and planning for discharge  - Provide patient education as appropriate  Outcome: Progressing

## 2021-10-25 NOTE — ASSESSMENT & PLAN NOTE
ROSE on CKD  May be prerenal   Cr on admission 1 99  Baseline 1 21 months ago  Avoid nephrotoxics  Avoid hypotension  Monitor I/O  Will hold off IV infusions   Continue oral hydration as tolerated  Renal function stable for planned surgery tomorrow    Results from last 7 days   Lab Units 10/25/21  0540 10/24/21  0859 10/23/21  0924 10/22/21  0959 10/21/21  0559 10/20/21  0640   SODIUM mmol/L 140 139 141 142 143 141   POTASSIUM mmol/L 3 9 3 9 3 9 3 8 4 6 3 8   CHLORIDE mmol/L 105 104 105 106 108 106   CO2 mmol/L 27 28 27 28 28 26   ANION GAP mmol/L 8 7 9 8 7 9   BUN mg/dL 18 18 17 18 24 27*   CREATININE mg/dL 1 41* 1 46* 1 40* 1 29 1 28 1 35*   CALCIUM mg/dL 8 5 8 8 8 7 8 4 8 4 8 1*   EGFR ml/min/1 73sq m 52 50 53 58 59 55   GLUCOSE RANDOM mg/dL 165* 239* 202* 213* 131 252*

## 2021-10-26 ENCOUNTER — APPOINTMENT (INPATIENT)
Dept: VASCULAR ULTRASOUND | Facility: HOSPITAL | Age: 66
DRG: 493 | End: 2021-10-26
Payer: COMMERCIAL

## 2021-10-26 ENCOUNTER — TELEPHONE (OUTPATIENT)
Dept: OBGYN CLINIC | Facility: HOSPITAL | Age: 66
End: 2021-10-26

## 2021-10-26 PROBLEM — D64.9 ANEMIA: Status: ACTIVE | Noted: 2021-10-26

## 2021-10-26 PROBLEM — R79.1 ELEVATED INR: Status: RESOLVED | Noted: 2021-10-14 | Resolved: 2021-10-26

## 2021-10-26 LAB
ANION GAP SERPL CALCULATED.3IONS-SCNC: 9 MMOL/L (ref 4–13)
APTT PPP: 36 SECONDS (ref 23–37)
APTT PPP: 56 SECONDS (ref 23–37)
APTT PPP: 65 SECONDS (ref 23–37)
BASOPHILS # BLD AUTO: 0.02 THOUSANDS/ΜL (ref 0–0.1)
BASOPHILS NFR BLD AUTO: 0 % (ref 0–1)
BUN SERPL-MCNC: 25 MG/DL (ref 5–25)
CALCIUM SERPL-MCNC: 8.2 MG/DL (ref 8.3–10.1)
CHLORIDE SERPL-SCNC: 103 MMOL/L (ref 100–108)
CO2 SERPL-SCNC: 25 MMOL/L (ref 21–32)
CREAT SERPL-MCNC: 1.62 MG/DL (ref 0.6–1.3)
EOSINOPHIL # BLD AUTO: 0.04 THOUSAND/ΜL (ref 0–0.61)
EOSINOPHIL NFR BLD AUTO: 0 % (ref 0–6)
ERYTHROCYTE [DISTWIDTH] IN BLOOD BY AUTOMATED COUNT: 15.4 % (ref 11.6–15.1)
GFR SERPL CREATININE-BSD FRML MDRD: 44 ML/MIN/1.73SQ M
GLUCOSE SERPL-MCNC: 295 MG/DL (ref 65–140)
GLUCOSE SERPL-MCNC: 323 MG/DL (ref 65–140)
GLUCOSE SERPL-MCNC: 334 MG/DL (ref 65–140)
GLUCOSE SERPL-MCNC: 359 MG/DL (ref 65–140)
GLUCOSE SERPL-MCNC: 368 MG/DL (ref 65–140)
HCT VFR BLD AUTO: 24.5 % (ref 36.5–49.3)
HGB BLD-MCNC: 7.8 G/DL (ref 12–17)
IMM GRANULOCYTES # BLD AUTO: 0.07 THOUSAND/UL (ref 0–0.2)
IMM GRANULOCYTES NFR BLD AUTO: 1 % (ref 0–2)
INR PPP: 1.35 (ref 0.84–1.19)
LYMPHOCYTES # BLD AUTO: 1.41 THOUSANDS/ΜL (ref 0.6–4.47)
LYMPHOCYTES NFR BLD AUTO: 14 % (ref 14–44)
MCH RBC QN AUTO: 28.8 PG (ref 26.8–34.3)
MCHC RBC AUTO-ENTMCNC: 31.8 G/DL (ref 31.4–37.4)
MCV RBC AUTO: 90 FL (ref 82–98)
MONOCYTES # BLD AUTO: 0.9 THOUSAND/ΜL (ref 0.17–1.22)
MONOCYTES NFR BLD AUTO: 9 % (ref 4–12)
NEUTROPHILS # BLD AUTO: 7.75 THOUSANDS/ΜL (ref 1.85–7.62)
NEUTS SEG NFR BLD AUTO: 76 % (ref 43–75)
NRBC BLD AUTO-RTO: 0 /100 WBCS
PLATELET # BLD AUTO: 341 THOUSANDS/UL (ref 149–390)
PMV BLD AUTO: 9.5 FL (ref 8.9–12.7)
POTASSIUM SERPL-SCNC: 4.3 MMOL/L (ref 3.5–5.3)
PROTHROMBIN TIME: 16.1 SECONDS (ref 11.6–14.5)
RBC # BLD AUTO: 2.71 MILLION/UL (ref 3.88–5.62)
SODIUM SERPL-SCNC: 137 MMOL/L (ref 136–145)
WBC # BLD AUTO: 10.19 THOUSAND/UL (ref 4.31–10.16)

## 2021-10-26 PROCEDURE — 85730 THROMBOPLASTIN TIME PARTIAL: CPT | Performed by: INTERNAL MEDICINE

## 2021-10-26 PROCEDURE — 85025 COMPLETE CBC W/AUTO DIFF WBC: CPT | Performed by: PHYSICIAN ASSISTANT

## 2021-10-26 PROCEDURE — 93971 EXTREMITY STUDY: CPT

## 2021-10-26 PROCEDURE — 85610 PROTHROMBIN TIME: CPT | Performed by: INTERNAL MEDICINE

## 2021-10-26 PROCEDURE — 80048 BASIC METABOLIC PNL TOTAL CA: CPT | Performed by: PHYSICIAN ASSISTANT

## 2021-10-26 PROCEDURE — 99024 POSTOP FOLLOW-UP VISIT: CPT | Performed by: PHYSICIAN ASSISTANT

## 2021-10-26 PROCEDURE — 99232 SBSQ HOSP IP/OBS MODERATE 35: CPT | Performed by: INTERNAL MEDICINE

## 2021-10-26 PROCEDURE — 93971 EXTREMITY STUDY: CPT | Performed by: SURGERY

## 2021-10-26 PROCEDURE — 82948 REAGENT STRIP/BLOOD GLUCOSE: CPT

## 2021-10-26 RX ORDER — WARFARIN SODIUM 3 MG/1
6 TABLET ORAL
Status: DISCONTINUED | OUTPATIENT
Start: 2021-10-27 | End: 2021-11-03 | Stop reason: HOSPADM

## 2021-10-26 RX ORDER — HEPARIN SODIUM 1000 [USP'U]/ML
2000 INJECTION, SOLUTION INTRAVENOUS; SUBCUTANEOUS
Status: DISCONTINUED | OUTPATIENT
Start: 2021-10-26 | End: 2021-11-01

## 2021-10-26 RX ORDER — HEPARIN SODIUM 1000 [USP'U]/ML
4000 INJECTION, SOLUTION INTRAVENOUS; SUBCUTANEOUS
Status: DISCONTINUED | OUTPATIENT
Start: 2021-10-26 | End: 2021-11-01

## 2021-10-26 RX ORDER — WARFARIN SODIUM 5 MG/1
10 TABLET ORAL
Status: COMPLETED | OUTPATIENT
Start: 2021-10-26 | End: 2021-10-26

## 2021-10-26 RX ORDER — INSULIN GLARGINE 100 [IU]/ML
25 INJECTION, SOLUTION SUBCUTANEOUS ONCE
Status: DISCONTINUED | OUTPATIENT
Start: 2021-10-26 | End: 2021-10-30

## 2021-10-26 RX ORDER — HEPARIN SODIUM 10000 [USP'U]/100ML
3-20 INJECTION, SOLUTION INTRAVENOUS
Status: DISCONTINUED | OUTPATIENT
Start: 2021-10-26 | End: 2021-11-01

## 2021-10-26 RX ORDER — INSULIN GLARGINE 100 [IU]/ML
25 INJECTION, SOLUTION SUBCUTANEOUS ONCE
Status: COMPLETED | OUTPATIENT
Start: 2021-10-26 | End: 2021-10-26

## 2021-10-26 RX ORDER — WARFARIN SODIUM 3 MG/1
6 TABLET ORAL
Status: DISCONTINUED | OUTPATIENT
Start: 2021-10-26 | End: 2021-10-26

## 2021-10-26 RX ORDER — WARFARIN SODIUM 5 MG/1
10 TABLET ORAL
Status: DISCONTINUED | OUTPATIENT
Start: 2021-10-26 | End: 2021-10-26

## 2021-10-26 RX ADMIN — CEFAZOLIN SODIUM 2000 MG: 2 SOLUTION INTRAVENOUS at 04:56

## 2021-10-26 RX ADMIN — INSULIN GLARGINE 25 UNITS: 100 INJECTION, SOLUTION SUBCUTANEOUS at 20:55

## 2021-10-26 RX ADMIN — WARFARIN SODIUM 10 MG: 5 TABLET ORAL at 17:43

## 2021-10-26 RX ADMIN — OXYCODONE HYDROCHLORIDE 10 MG: 10 TABLET, FILM COATED, EXTENDED RELEASE ORAL at 08:23

## 2021-10-26 RX ADMIN — HEPARIN SODIUM 11.1 UNITS/KG/HR: 10000 INJECTION, SOLUTION INTRAVENOUS at 08:49

## 2021-10-26 RX ADMIN — PRAVASTATIN SODIUM 40 MG: 40 TABLET ORAL at 16:22

## 2021-10-26 RX ADMIN — DICLOFENAC SODIUM 2 G: 10 GEL TOPICAL at 11:48

## 2021-10-26 RX ADMIN — SODIUM CHLORIDE, SODIUM LACTATE, POTASSIUM CHLORIDE, AND CALCIUM CHLORIDE 125 ML/HR: .6; .31; .03; .02 INJECTION, SOLUTION INTRAVENOUS at 04:56

## 2021-10-26 RX ADMIN — SODIUM CHLORIDE, SODIUM LACTATE, POTASSIUM CHLORIDE, AND CALCIUM CHLORIDE 125 ML/HR: .6; .31; .03; .02 INJECTION, SOLUTION INTRAVENOUS at 13:23

## 2021-10-26 RX ADMIN — STANDARDIZED SENNA CONCENTRATE 17.2 MG: 8.6 TABLET ORAL at 17:46

## 2021-10-26 RX ADMIN — SODIUM CHLORIDE, SODIUM LACTATE, POTASSIUM CHLORIDE, AND CALCIUM CHLORIDE 125 ML/HR: .6; .31; .03; .02 INJECTION, SOLUTION INTRAVENOUS at 21:01

## 2021-10-26 RX ADMIN — INSULIN LISPRO 8 UNITS: 100 INJECTION, SOLUTION INTRAVENOUS; SUBCUTANEOUS at 07:47

## 2021-10-26 RX ADMIN — CEFAZOLIN SODIUM 2000 MG: 2 SOLUTION INTRAVENOUS at 11:48

## 2021-10-26 RX ADMIN — STANDARDIZED SENNA CONCENTRATE 17.2 MG: 8.6 TABLET ORAL at 08:24

## 2021-10-26 RX ADMIN — DICLOFENAC SODIUM 2 G: 10 GEL TOPICAL at 21:01

## 2021-10-26 RX ADMIN — INSULIN GLARGINE 50 UNITS: 100 INJECTION, SOLUTION SUBCUTANEOUS at 08:36

## 2021-10-26 RX ADMIN — OXYCODONE HYDROCHLORIDE 10 MG: 10 TABLET, FILM COATED, EXTENDED RELEASE ORAL at 20:56

## 2021-10-26 RX ADMIN — DICLOFENAC SODIUM 2 G: 10 GEL TOPICAL at 08:26

## 2021-10-26 RX ADMIN — LIDOCAINE 5% 1 PATCH: 700 PATCH TOPICAL at 08:25

## 2021-10-26 RX ADMIN — HEPARIN SODIUM 2000 UNITS: 1000 INJECTION INTRAVENOUS; SUBCUTANEOUS at 15:22

## 2021-10-26 RX ADMIN — DICLOFENAC SODIUM 2 G: 10 GEL TOPICAL at 17:43

## 2021-10-26 RX ADMIN — INSULIN LISPRO 6 UNITS: 100 INJECTION, SOLUTION INTRAVENOUS; SUBCUTANEOUS at 11:48

## 2021-10-26 RX ADMIN — INSULIN LISPRO 10 UNITS: 100 INJECTION, SOLUTION INTRAVENOUS; SUBCUTANEOUS at 16:30

## 2021-10-26 NOTE — ASSESSMENT & PLAN NOTE
On lisinopril 10mg daily at home  On hold for ROSE  BP stable now    Blood pressure 133/62, pulse 74, temperature 99 9 °F (37 7 °C), temperature source Oral, resp  rate 18, height 6' 1" (1 854 m), weight 136 kg (299 lb 13 2 oz), SpO2 94 %

## 2021-10-26 NOTE — ASSESSMENT & PLAN NOTE
Hemoglobin holding steady at 8  Hemoglobin dropped dramatically since admission from 12 units to 7 5 and has been currently stable for few days  Most likely secondary blood loss during  surgery  Concerning for acute blood loss anemia  Patient has persistently refused blood transfusion     - will give IV Venofer 200 mg daily for 2 days  - discussed need for PRBC transfusion with patient,he appears not willing to accept transfusion  Discussed risk and benefit of said therapy and alternatives  Decline to sign consent form  Will touch base with spouse as he requests  -- no consent in the chart  -- will check current panel and replete iron if indicated     Will monitor closely

## 2021-10-26 NOTE — ASSESSMENT & PLAN NOTE
Lab Results   Component Value Date    HGBA1C 8 2 (H) 10/16/2021       Recent Labs     10/28/21  1548 10/28/21  2106 10/29/21  0726 10/29/21  1124   POCGLU 75 196* 168* 207*       Blood Sugar Average: Last 72 hrs:  (P) 243 [id number]  Known diabetic with diabetic neuropathy  On metformin and lantus at home  Blood sugar on presentation 418  Contiunue sliding scale insulin  Hypoglycemic protocol  Goal blood sugar not at goal of 140-180  · Continue Diabetic diet  · Continue Lantus 60 units q a m  · Insulin Humulog 15 unit to meals  · Continue SSI coverage while in hospital  · Continue before meals and bedtime glucose checks

## 2021-10-26 NOTE — PROGRESS NOTES
Progress Note - Orthopedics   Sol Garrido 61 y o  male MRN: [id number]  Unit/Bed#: [address] [facility]      Subjective:    61 y o male POD#1 right tibial plateau fracture ORIF  Patient states that his knees doing overall well  Patient states that he has approximately 9/10 pain in the knee at this time  Patient states that his pain is controlled with pain medication at this time  Patient states he has been compliant with anticoagulation  Patient states he has been compliant with nonweightbearing status in the right lower extremity  Patient states that status post surgery he has noticed left lower leg pain  Patient denies any fevers any chills  Patient denies any shortness of breath chest tightness chest pain  Patient denies any numbness or tingling in his legs  Patient offers no other complaints at this time      Labs:  0   Lab Value Date/Time    HCT 27 3 (L) 10/25/2021 0540    HCT 28 3 (L) 10/24/2021 0859    HCT 28 8 (L) 10/23/2021 0924    HCT 38 4 07/13/2015 1448    HCT 38 6 10/20/2014 1132    HGB 8 6 (L) 10/25/2021 0540    HGB 9 0 (L) 10/24/2021 0859    HGB 9 3 (L) 10/23/2021 0924    HGB 13 1 07/13/2015 1448    HGB 13 1 10/20/2014 1132    INR 1 21 (H) 10/25/2021 0900    INR 2 94 (H) 12/11/2015 1543    WBC 6 31 10/25/2021 0540    WBC 6 42 10/24/2021 0859    WBC 5 85 10/23/2021 0924    WBC 7 7 07/13/2015 1448    WBC 8 30 10/20/2014 1132    ESR 58 (H) 05/20/2016 1207       Meds:    Current Facility-Administered Medications:     acetaminophen (TYLENOL) tablet 650 mg, 650 mg, Oral, Q6H PRN, Randy Mercer PA-C, 650 mg at 10/22/21 5710    calcium carbonate (TUMS) chewable tablet 500 mg, 500 mg, Oral, Daily PRN, Randy Mercer PA-C, 500 mg at 10/14/21 0006    ceFAZolin (ANCEF) IVPB (premix in dextrose) 2,000 mg 50 mL, 2,000 mg, Intravenous, Q8H, Chanell Chavira PA-C, Last Rate: 100 mL/hr at 10/26/21 0456, 2,000 mg at 10/26/21 0456    Diclofenac Sodium (VOLTAREN) 1 % topical gel 2 g, 2 g, Topical, 4x Daily, Keaton Eleazar, PA-C, 2 g at 10/25/21 2000    HYDROmorphone (DILAUDID) injection 0 5 mg, 0 5 mg, Intravenous, Q3H PRN, Keaton Eleazar, PA-C, 0 5 mg at 10/25/21 1953    insulin glargine (LANTUS) subcutaneous injection 25 Units 0 25 mL, 25 Units, Subcutaneous, Once, Shruthi Eric MD    insulin glargine (LANTUS) subcutaneous injection 50 Units 0 5 mL, 50 Units, Subcutaneous, Daily, Keaton Eleazar, PA-C, 50 Units at 10/24/21 4851    insulin lispro (HumaLOG) 100 units/mL subcutaneous injection 10 Units, 10 Units, Subcutaneous, TID With Meals, Keaton Eleazar, PA-C    insulin lispro (HumaLOG) 100 units/mL subcutaneous injection 2-12 Units, 2-12 Units, Subcutaneous, TID AC, 8 Units at 10/25/21 1632 **AND** Fingerstick Glucose (POCT), , , TID AC, Keaton Eleazar, PA-C    lactated ringers infusion, 125 mL/hr, Intravenous, Continuous, Lonia Marlon PA-C, Last Rate: 125 mL/hr at 10/26/21 0456, 125 mL/hr at 10/26/21 0456    lidocaine (LIDODERM) 5 % patch 1 patch, 1 patch, Topical, Daily, Keaton Eleazar, PA-C, 1 patch at 10/24/21 0954    ondansetron [facility]) injection 4 mg, 4 mg, Intravenous, Q6H PRN, Keaton Eleazar, PA-C, 4 mg at 10/13/21 2249    oxyCODONE (OxyCONTIN) 12 hr tablet 10 mg, 10 mg, Oral, Q12H [address], Keaton Eleazar, PA-C, 10 mg at 10/25/21 2208    polyethylene glycol (MIRALAX) packet 17 g, 17 g, Oral, Daily PRN, Keaton Eleazar, PA-C, 17 g at 10/21/21 1620    pravastatin (PRAVACHOL) tablet 40 mg, 40 mg, Oral, Daily With Sumaya Salcedo PA-C, 40 mg at 10/24/21 1638    senna (SENOKOT) tablet 17 2 mg, 2 tablet, Oral, BID, Keaton Bush PA-C, 17 2 mg at 10/25/21 1952    Blood Culture:   No results found for: BLOODCX    Wound Culture:   No results found for: WOUNDCULT    Ins and Outs:  I/O last 24 hours:   In: 2516 [P O :816; I V :1700]  Out: 625 [Urine:325; Blood:300]      Physical:  Vitals:    10/26/21 0736   BP: 122/64   Pulse: 79   Resp: 18   Temp: 98 4 °F (36 9 °C)   SpO2: 93% Musculoskeletal: right Lower Extremity  · Patient resting in bed in no acute distress   · Skin  :  Right lower extremity appears well-perfused overall  No acute visible abnormalities present  · Dressing  :  Dressing clean dry and intact with no visible soilage present at this time  · TTP  :  Tenderness palpation noted over the anterior knee  Tenderness palpation appreciated in the posterior calf of the left lower extremity  No other bony or soft tissue tenderness palpation noted at this time  Compartments appear to be soft and compressible  · SILT s/s/sp/dp/t  +fhl/ehl,  2+ DP pulse  Patient unable to perform dorsiflexion or plantar flexion of the right lower extremity  Assessment:    66 y o male POD#1 right tibial plateau fracture ORIF      Plan:  · Continue nonweightbearing status of the right lower extremity  Maintain knee immobilizer at all times  · Greater than 2 gram drop which qualifies for diagnosis of acute blood loss anemia, will monitor and administer IVF/prbc as indicated   · PT/OT for ambulation assistance, gait training  · Pain control as directed  · DVT ppx per primary team  · Dispo: Ortho will follow  Continue nonweightbearing status of the right lower extremity  Stat ultrasound of the left lower extremity to evaluate acute DVT ordered  Will continue to monitor inability to perform dorsiflexion and plantar flexion of the right foot  Neurovascularly intact otherwise  DVT prophylaxis per primary team   Delmy Alexander will continue to follow while in hospital       Susan Cueva PA-C             Portions of the record may have been created with voice recognition software  Occasional wrong word or "sound a like" substitutions may have occurred due to the inherent limitations of voice recognition software  Read the chart carefully and recognize, using context, where substitutions have occurred

## 2021-10-26 NOTE — PROGRESS NOTES
[facility]  Progress Note Harriet Leary 1955, 61 y o  male MRN: [id number]  Unit/Bed#: -01 Encounter: [id number]  Primary Care Provider: Adis Babcock DO   Date and time admitted to hospital: 10/13/2021  5:17 AM    * Closed fracture of proximal end of right tibia  Assessment & Plan  Presented with Right knee / proximal leg pain and inability to bear weight after ground level fall after twisting leg in his kitchen  Assoc deformity,mild abrasion  10/13-Imaging findings including CT confirmed Acute comminuted displaced intra-articular fracture of the proximal tibia as described above  No dislocation  Small lipohemarthrosis  Moderate distal femoral and proximal tibiofibular subcutaneous edema/contusion  On knee immobilizer  Non weight bearing  Orthopedic input noted and appreciated  Patient will need full surgical correction of fracture  Continue analgesia, will optimize dose  Add stool softeners  Lower extremity Doppler study negative for DVT    7 DPO application of external fixation to RLE  Monitor LE  Non weight beating  ORIF- POD1  Will continue anticoagulation    Type 2 diabetes mellitus with diabetic neuropathy, without long-term current use of insulin [facility])  Assessment & Plan  Lab Results   Component Value Date    HGBA1C 8 2 (H) 10/16/2021       Recent Labs     10/25/21  2040 10/26/21  0733 10/26/21  1121 10/26/21  1538   POCGLU 328* 334* 295* 359*       Blood Sugar Average: Last 72 hrs:  (P) 262  8  Known diabetic with diabetic neuropathy  On metformin and lantus at home  Blood sugar on presentation 418  Contiunue sliding scale insulin  Hypoglycemic protocol  Goal blood sugar not at goal of 140-180  Diabetic diet  Initially on Lantus 46 units in a m  Now on 50 units HS and Humalog 5u TIDAC and SSI coverage  Blood glucose still on the high side    Will increase mealtime insulin to Humalog 10u with meals  Continue SSI coverage while in hospital      Anemia  Assessment & Plan  Hemoglobin of 7 8 today  Baseline of 9  Could be related to surgery  Will monitor closely  Pseudogout of knee, left  Assessment & Plan  Pain and swelling in the left anterior knee, superiorly  Tender to touch  Developed after PT  Xray -unremarkable  Synovial fluid analysis shows calcium pyrophosphate crystals  Started on colchicine 0 6mg daily  ROSE (acute kidney injury) ([facility] [address] )  Assessment & Plan  ROSE on CKD  May be prerenal   Cr on admission 1 99  Baseline 1 21 months ago  Avoid nephrotoxics  Avoid hypotension  Monitor I/O  Will hold off IV infusions  Continue oral hydration as tolerated  Renal function stable for planned surgery tomorrow    Results from last 7 days   Lab Units 10/26/21  0812 10/25/21  0540 10/24/21  0859 10/23/21  0924 10/22/21  0959 10/21/21  0559 10/20/21  0640   SODIUM mmol/L 137 140 139 141 142 143 141   POTASSIUM mmol/L 4 3 3 9 3 9 3 9 3 8 4 6 3 8   CHLORIDE mmol/L 103 105 104 105 106 108 106   CO2 mmol/L 25 27 28 27 28 28 26   ANION GAP mmol/L 9 8 7 9 8 7 9   BUN mg/dL 25 18 18 17 18 24 27*   CREATININE mg/dL 1 62* 1 41* 1 46* 1 40* 1 29 1 28 1 35*   CALCIUM mg/dL 8 2* 8 5 8 8 8 7 8 4 8 4 8 1*   EGFR ml/min/1 73sq m 44 52 50 53 58 59 55   GLUCOSE RANDOM mg/dL 323* 165* 239* 202* 213* 131 252*         History of DVT (deep vein thrombosis)  Assessment & Plan  History 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  Unprovoked  Last episode was 10yrs ago  On AC with coumadin  Currently held  Resume heparin gtt  Resume Coumadin   DVT prophylaxis with venodyne   Mixed hyperlipidemia  Assessment & Plan  Continue statins  Essential hypertension  Assessment & Plan  On lisinopril 10mg daily at home  On hold for ROSE  BP stable now  Blood pressure 133/62, pulse 74, temperature 99 9 °F (37 7 °C), temperature source Oral, resp  rate 18, height 6' 1" (1 854 m), weight 136 kg (299 lb 13 2 oz), SpO2 94 %          Elevated INR-resolved as of 10/26/2021  Assessment & Plan  Now subtherapeutic ,  Patient previously on Coumadin due to history of DVTs  This is currently on hold due to need for surgery  Ortho recommends 1 4-1 6 for ortho procedure  INR 1 47--  1 18  Continue to hold Coumadin  VTE Pharmacologic Prophylaxis:   VTE Score: 5 Moderate Risk (Score 3-4) - Pharmacological DVT Prophylaxis Ordered: Heparin Drip  Mechanical VTE Prophylaxis in Place: No    Patient Centered Rounds: I have performed bedside rounds with nursing staff today  Discussions with Specialists or Other Care Team Provider:  Ortho    Education and Discussions with Family / Patient: Patient declined call to   Current Length of Stay: 13 day(s)    Current Patient Status: Inpatient     Discharge Plan / Estimated Discharge Date: TBD    Code Status: Level 1 - Full Code      Subjective:       POD #1 ORIF  Patient seen and examined at bedside  He remains resistant to blood draws and medications  Denies any complaints and does not want to participate in PT  Objective:     Vitals:   Temp (24hrs), Av 5 °F (36 9 °C), Min:98 °F (36 7 °C), Max:99 9 °F (37 7 °C)    Temp:  [98 °F (36 7 °C)-99 9 °F (37 7 °C)] 99 9 °F (37 7 °C)  HR:  [74-92] 74  Resp:  [16-18] 18  BP: (122-149)/(62-79) 133/62  SpO2:  [93 %-95 %] 94 %  Body mass index is 39 56 kg/m²  Input and Output Summary (last 24 hours): Intake/Output Summary (Last 24 hours) at 10/26/2021 1711  Last data filed at 10/26/2021 1523  Gross per 24 hour   Intake 1416 ml   Output 750 ml   Net 666 ml       Physical Exam:     Physical Exam  Vitals reviewed  Constitutional:       General: He is not in acute distress  Appearance: He is not toxic-appearing  HENT:      Head: Normocephalic and atraumatic  Eyes:      Pupils: Pupils are equal, round, and reactive to light  Cardiovascular:      Rate and Rhythm: Normal rate and regular rhythm  Pulses: Normal pulses  Heart sounds: Normal heart sounds     Pulmonary:      Effort: Pulmonary effort is normal  No respiratory distress  Breath sounds: Normal breath sounds  Abdominal:      General: Bowel sounds are normal  There is no distension  Palpations: Abdomen is soft  Neurological:      Mental Status: He is alert and oriented to person, place, and time            Additional Data:     Labs:  Results from last 7 days   Lab Units 10/26/21  0812   WBC Thousand/uL 10 19*   HEMOGLOBIN g/dL 7 8*   HEMATOCRIT % 24 5*   PLATELETS Thousands/uL 341   NEUTROS PCT % 76*   LYMPHS PCT % 14   MONOS PCT % 9   EOS PCT % 0     Results from last 7 days   Lab Units 10/26/21  0812   SODIUM mmol/L 137   POTASSIUM mmol/L 4 3   CHLORIDE mmol/L 103   CO2 mmol/L 25   BUN mg/dL 25   CREATININE mg/dL 1 62*   ANION GAP mmol/L 9   CALCIUM mg/dL 8 2*   GLUCOSE RANDOM mg/dL 323*     Results from last 7 days   Lab Units 10/26/21  0812   INR  1 35*     Results from last 7 days   Lab Units 10/26/21  1538 10/26/21  1121 10/26/21  0733 10/25/21  2040 10/25/21  1616 10/25/21  1020 10/25/21  0718 10/24/21  2158 10/24/21  1555 10/24/21  1120 10/24/21  0709 10/23/21  2105   POC GLUCOSE mg/dl 359* 295* 334* 328* 307* 168* 163* 188* 302* 318* 240* 296*               Imaging: Reviewed radiology reports from this admission including: Avanell Cowden bilateral LE    Recent Cultures (last 7 days):           Lines/Drains:  Invasive Devices     Peripheral Intravenous Line            Peripheral IV 10/21/21 Right;Ventral (anterior) Forearm 5 days    Peripheral IV 10/24/21 Distal;Left;Upper;Ventral (anterior) Arm 2 days    Peripheral IV 10/25/21 Right Forearm 1 day                Telemetry:        Last 24 Hours Medication List:   Current Facility-Administered Medications   Medication Dose Route Frequency Provider Last Rate    acetaminophen  650 mg Oral Q6H PRN RPITI Hduson-YOMI      calcium carbonate  500 mg Oral Daily PRN PRITI Hudson-YOMI      Diclofenac Sodium  2 g Topical 4x Daily Chanell Chavira PA-C      heparin (porcine)  3-20 Units/kg/hr (Order-Specific) Intravenous Titrated Jeff Woodson MD 13 1 Units/kg/hr (10/26/21 1523)    heparin (porcine)  2,000 Units Intravenous Q1H PRN Jeff Woodson MD      heparin (porcine)  4,000 Units Intravenous Q1H PRN Jeff Woodson MD      HYDROmorphone  0 5 mg Intravenous Q3H PRN Cayetano Montenegro PA-C      insulin glargine  25 Units Subcutaneous Once Jeff Woodson MD      insulin glargine  25 Units Subcutaneous Once Jeff Woodson MD      insulin glargine  50 Units Subcutaneous Daily Cayetano Montenegro PA-C      insulin lispro  10 Units Subcutaneous TID With Meals Cayetano Montenegro PA-C      insulin lispro  2-12 Units Subcutaneous TID AC Cayetano Montenegro PA-C      lactated ringers  125 mL/hr Intravenous Continuous Lilianmanoj Reid PA-C 125 mL/hr (10/26/21 1323)    lidocaine  1 patch Topical Daily Cayetano Montenegro PA-C      ondansetron  4 mg Intravenous Q6H PRN Cayetano Montenegro PA-C      oxyCODONE  10 mg Oral Q12H [address] Cayetano Montenegro PA-C      polyethylene glycol  17 g Oral Daily PRN Cayetano Montenegro PA-C      pravastatin  40 mg Oral Daily With Jamari Carlos PA-C      senna  2 tablet Oral BID Cayetano Montenegro PA-C      warfarin  10 mg Oral Once (warfarin) Jeff Woodson MD     Stacie Lawanda Shirley ON 10/27/2021] warfarin  6 mg Oral Daily (warfarin) Jeff Woodson MD          Today, Patient Was Seen By: Jeff Woodson MD    ** Please Note: This note has been constructed using a voice recognition system   **

## 2021-10-26 NOTE — ASSESSMENT & PLAN NOTE
History 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  Unprovoked  Last episode was 10yrs ago  On AC with coumadin  Currently held  Resume heparin gtt  Patient is on Coumadin   INR today 1 4  Per medicine review, he received 10 mg of Coumadin on 10/29/2021  Nursing uncertain if medication was ingested by patient  Given decreasing  INR there is concern for medication nonadherence  Advise nursing staff on direct observed therapy  -- continue Coumadin 6 mg daily   Will add an extra 6mg today  -- continue bridging with heparin for now

## 2021-10-26 NOTE — PHYSICAL THERAPY NOTE
Physical Therapy Cancellation Note    Chart review performed  Attempted to see patient for physical therapy treatment session  Pt was received supine in bed in NAD  Pt refusing to participate with PT  Pt reported that he is in pain and doesn't want to move  Pt reported, "how am I supposed to move, if I can't put weight on my leg " Pt educated on the benefits of mobility and the risks of immobility  However, the pt continued to refuse PT at this time  PT to continue to follow and treat as appropriate        10/26/21 1326   PT Last Visit   PT Visit Date 10/26/21   Note Type   Note Type Treatment   Cancel Reasons Other  (pt refusal)     Addy Cote, PT, DPT

## 2021-10-26 NOTE — CASE MANAGEMENT
Case Management Progress Note    Patient name Adrienne Clement  Location [address] [id number] MRN [id number]  : 1955 Date 10/25/2021       LOS (days): 12  Geometric Mean LOS (GMLOS) (days):   Days to GMLOS:        OBJECTIVE:  Bundled Patient Payment:  (no)     Current admission status: Inpatient  Preferred Pharmacy:   [facility], [address] [place] [address]  Phone: [phone] Fax: [phone]    [facility] - [address] [address]  Phone: [phone] Fax: [phone] [address]  Phone: [phone] Fax: [phone]    Primary Care Provider: Eve Gan DO    Primary Insurance: BLUE CROSS  Secondary Insurance: [facility] RE[facility]    PROGRESS NOTE:    Patient is in the OR today

## 2021-10-26 NOTE — OCCUPATIONAL THERAPY NOTE
Occupational Therapy Cancel Note        Patient Name: Keri BARBOZAP'E Date: 10/26/2021         10/26/21 1327   OT Last Visit   OT Visit Date 10/26/21   Note Type   Note Type Treatment   Cancel Reasons Other  (patient declined)         Chart review performed  Attempted to see patient for ocuupational therapy treatment session  Pt was received supine in bed in NAD  Pt refusing to participate with OT/PT  Pt reported that he is in pain and doesn't want to move  Pt reported, "how am I supposed to move, if I can't put weight on my leg " Pt educated on the benefits of mobility and the risks of immobility  However, the pt continued to refuse OT/PT at this time  OT to continue to follow and treat as appropriate

## 2021-10-26 NOTE — ASSESSMENT & PLAN NOTE
Resolved  Now at baseline  ROSE on CKD  May be prerenal   Cr trended since admission 1 99->1 4  Baseline 1 21 months ago  Avoid nephrotoxics  Avoid hypotension  Monitor I/O      Results from last 7 days   Lab Units 10/29/21  0444 10/28/21  0512 10/27/21  0308 10/26/21  6206 10/25/21  0540 10/24/21  0859 10/23/21  0924   SODIUM mmol/L 142 142 139 137 140 139 141   POTASSIUM mmol/L 4 2 4 0 4 5 4 3 3 9 3 9 3 9   CHLORIDE mmol/L 108 107 105 103 105 104 105   CO2 mmol/L 28 27 29 25 27 28 27   ANION GAP mmol/L 6 8 5 9 8 7 9   BUN mg/dL 16 14 21 25 18 18 17   CREATININE mg/dL 1 40* 1 32* 1 53* 1 62* 1 41* 1 46* 1 40*   CALCIUM mg/dL 8 0* 8 0* 7 7* 8 2* 8 5 8 8 8 7   EGFR ml/min/1 73sq m 53 57 47 44 52 50 53   GLUCOSE RANDOM mg/dL 174* 199* 360* 323* 165* 239* 202*

## 2021-10-26 NOTE — PLAN OF CARE
Problem: Prexisting or High Potential for Compromised Skin Integrity  Goal: Skin integrity is maintained or improved  Description: INTERVENTIONS:  - Identify patients at risk for skin breakdown  - Assess and monitor skin integrity  - Assess and monitor nutrition and hydration status  - Monitor labs   - Assess for incontinence   - Turn and reposition patient  - Assist with mobility/ambulation  - Relieve pressure over bony prominences  - Avoid friction and shearing  - Provide appropriate hygiene as needed including keeping skin clean and dry  - Evaluate need for skin moisturizer/barrier cream  - Collaborate with interdisciplinary team   - Patient/family teaching  - Consider wound care consult   Outcome: Progressing     Problem: PAIN - ADULT  Goal: Verbalizes/displays adequate comfort level or baseline comfort level  Description: Interventions:  - Encourage patient to monitor pain and request assistance  - Assess pain using appropriate pain scale  - Administer analgesics based on type and severity of pain and evaluate response  - Implement non-pharmacological measures as appropriate and evaluate response  - Consider cultural and social influences on pain and pain management  - Notify physician/advanced practitioner if interventions unsuccessful or patient reports new pain  Outcome: Progressing     Problem: INFECTION - ADULT  Goal: Absence or prevention of progression during hospitalization  Description: INTERVENTIONS:  - Assess and monitor for signs and symptoms of infection  - Monitor lab/diagnostic results  - Monitor all insertion sites, i e  indwelling lines, tubes, and drains  - Monitor endotracheal if appropriate and nasal secretions for changes in amount and color  - [place] appropriate cooling/warming therapies per order  - Administer medications as ordered  - Instruct and encourage patient and family to use good hand hygiene technique  - Identify and instruct in appropriate isolation precautions for identified infection/condition  Outcome: Progressing     Problem: SAFETY ADULT  Goal: Patient will remain free of falls  Description: INTERVENTIONS:  - Educate patient/family on patient safety including physical limitations  - Instruct patient to call for assistance with activity   - Consult OT/PT to assist with strengthening/mobility   - Keep Call bell within reach  - Keep bed low and locked with side rails adjusted as appropriate  - Keep care items and personal belongings within reach  - Initiate and maintain comfort rounds  - Make Fall Risk Sign visible to staff  - Offer Toileting every 2 Hours, in advance of need  - Initiate/Maintain bed alarm  - Obtain necessary fall risk management equipment:   - Apply yellow socks and bracelet for high fall risk patients  - Consider moving patient to room near nurses station  Outcome: Progressing

## 2021-10-26 NOTE — ASSESSMENT & PLAN NOTE
Sustained mechanical fracture to the proximal end of the right tibia  Initially on external fixators   Status post ORIF and removal of external fixators  - POD#5   Intact distal pulsation and neurological function - able to wiggle toes    Per review of PT notes not agreeable to participating PT due to fear of falling  Reassured patient about PT measures to prevent fall  Appropriate DVT prophylaxis  PT OT   Appreciate orthopedic input

## 2021-10-26 NOTE — UTILIZATION REVIEW
Continued Stay Review    Date:   10/26                        Current Patient Class:  IP   Current Level of Care:   MS     HPI:66 y o  male initially admitted on    10/13  With complex proximal tibia fracture which involved articular surface of the lateral plateau and shearing off of the entire proximal medial condyle of the tibia including articular surface, this resulted in dissociation of metaphysis and diaphysis  Given the instability of length and soft tissues not being ready for surgery 1 week ago, external fixator was placed to allow for soft tissue rest and return to the OR on 10/25 when performed  ORIF R tibia bicondylar plateau fracture  With  Removal RLE external fixator under general anesthesia    10/26   POD 1    Pain controlled;  NWB RLE - appears well perfused overall  Weaned to rm air this AM   Cont Heparin gtt  Continue nonweightbearing status of the right lower extremity  Maintain knee immobilizer at all times  PT/OT for ambulation assistance, gait training  Pain control as directed  STAT US  LLE to r/o dvt this am  (NEG)   Unable to perform dorsiflexion and plantar flexion of the right foot  Neurovascularly intact otherwise     Cont exercise/ enc dorsiflexion     Vital Signs:     10/26/21 15:11:47  99 9 °F (37 7 °C)  74  18  133/62  86  94 %  --  --  --  None (Room air)  --  Lying   10/26/21 07:36:32  98 4 °F (36 9 °C)  79  18  122/64  83  93 %                     Pertinent Labs/Diagnostic Results:       Results from last 7 days   Lab Units 10/26/21  0812 10/25/21  0540 10/24/21  0859 10/23/21  0924 10/22/21  0959   WBC Thousand/uL 10 19* 6 31 6 42 5 85 6 05   HEMOGLOBIN g/dL 7 8* 8 6* 9 0* 9 3* 8 7*   HEMATOCRIT % 24 5* 27 3* 28 3* 28 8* 27 0*   PLATELETS Thousands/uL 341 399* 362 396* 357   NEUTROS ABS Thousands/µL 7 75* 3 50 3 39 3 31 3 37         Results from last 7 days   Lab Units 10/26/21  0812 10/25/21  0540 10/24/21  0859 10/23/21  0924 10/22/21  0959   SODIUM mmol/L 137 140 139 141 142   POTASSIUM mmol/L 4 3 3 9 3 9 3 9 3 8   CHLORIDE mmol/L 103 105 104 105 106   CO2 mmol/L 25 27 28 27 28   ANION GAP mmol/L 9 8 7 9 8   BUN mg/dL 25 18 18 17 18   CREATININE mg/dL 1 62* 1 41* 1 46* 1 40* 1 29   EGFR ml/min/1 73sq m 44 52 50 53 58   CALCIUM mg/dL 8 2* 8 5 8 8 8 7 8 4         Results from last 7 days   Lab Units 10/26/21  1538 10/26/21  1121 10/26/21  0733 10/25/21  2040 10/25/21  1616 10/25/21  1020 10/25/21  0718 10/24/21  2158 10/24/21  1555 10/24/21  1120 10/24/21  0709 10/23/21  2105   POC GLUCOSE mg/dl 359* 295* 334* 328* 307* 168* 163* 188* 302* 318* 240* 296*     Results from last 7 days   Lab Units 10/26/21  0812 10/25/21  0540 10/24/21  0859 10/23/21  0924 10/22/21  0959 10/21/21  0559 10/20/21  0640   GLUCOSE RANDOM mg/dL 323* 165* 239* 202* 213* 131 252*     Results from last 7 days   Lab Units 10/26/21  1450 10/26/21  0812 10/25/21  0900 10/24/21  1646 10/19/21  1809   PROTIME seconds  --  16 1* 14 8*  --  16 1*   INR   --  1 35* 1 21*  --  1 35*   PTT seconds 56* 36  --  104* 46*       Medications:   Scheduled Medications:  Diclofenac Sodium, 2 g, Topical, 4x Daily  insulin glargine, 25 Units, Subcutaneous, Once  insulin glargine, 25 Units, Subcutaneous, Once  insulin glargine, 50 Units, Subcutaneous, Daily  insulin lispro, 10 Units, Subcutaneous, TID With Meals  insulin lispro, 2-12 Units, Subcutaneous, TID AC  lidocaine, 1 patch, Topical, Daily  oxyCODONE, 10 mg, Oral, Q12H SHASHI  pravastatin, 40 mg, Oral, Daily With Dinner  senna, 2 tablet, Oral, BID  warfarin, 10 mg, Oral, Once (warfarin)  [START ON 10/27/2021] warfarin, 6 mg, Oral, Daily (warfarin)      Continuous IV Infusions:  heparin (porcine), 3-20 Units/kg/hr (Order-Specific), Intravenous, Titrated  lactated ringers, 125 mL/hr, Intravenous, Continuous      PRN Meds:  acetaminophen, 650 mg, Oral, Q6H PRN  calcium carbonate, 500 mg, Oral, Daily PRN  heparin (porcine), 2,000 Units, Intravenous, Q1H PRN  heparin (porcine), 4,000 Units, Intravenous, Q1H PRN  HYDROmorphone, 0 5 mg, Intravenous, Q3H PRN  ondansetron, 4 mg, Intravenous, Q6H PRN  polyethylene glycol, 17 g, Oral, Daily PRN        Discharge Plan:  [facility]    Network Utilization Review Department  ATTENTION: Please call with any questions or concerns to [phone] and carefully listen to the prompts so that you are directed to the right person  All voicemails are confidential   Dana Sinclairer all requests for admission clinical reviews, approved or denied determinations and any other requests to dedicated fax number below belonging to the campus where the patient is receiving treatment   List of dedicated fax numbers for the Facilities:  [address] DENIALS (Administrative/Medical Necessity) [phone]   [address] (Maternity/NICU/Pediatrics) [phone]   [address] [place] [address]  [address] [address] [place] [address] [address] [facility] [place] [address] [phone]

## 2021-10-27 ENCOUNTER — TELEPHONE (OUTPATIENT)
Dept: FAMILY MEDICINE CLINIC | Facility: CLINIC | Age: 66
End: 2021-10-27

## 2021-10-27 LAB
ANION GAP SERPL CALCULATED.3IONS-SCNC: 5 MMOL/L (ref 4–13)
APTT PPP: 64 SECONDS (ref 23–37)
BASOPHILS # BLD AUTO: 0.03 THOUSANDS/ΜL (ref 0–0.1)
BASOPHILS NFR BLD AUTO: 0 % (ref 0–1)
BUN SERPL-MCNC: 21 MG/DL (ref 5–25)
CALCIUM SERPL-MCNC: 7.7 MG/DL (ref 8.3–10.1)
CHLORIDE SERPL-SCNC: 105 MMOL/L (ref 100–108)
CO2 SERPL-SCNC: 29 MMOL/L (ref 21–32)
CREAT SERPL-MCNC: 1.53 MG/DL (ref 0.6–1.3)
EOSINOPHIL # BLD AUTO: 0.18 THOUSAND/ΜL (ref 0–0.61)
EOSINOPHIL NFR BLD AUTO: 2 % (ref 0–6)
ERYTHROCYTE [DISTWIDTH] IN BLOOD BY AUTOMATED COUNT: 15.6 % (ref 11.6–15.1)
GFR SERPL CREATININE-BSD FRML MDRD: 47 ML/MIN/1.73SQ M
GLUCOSE SERPL-MCNC: 186 MG/DL (ref 65–140)
GLUCOSE SERPL-MCNC: 195 MG/DL (ref 65–140)
GLUCOSE SERPL-MCNC: 293 MG/DL (ref 65–140)
GLUCOSE SERPL-MCNC: 309 MG/DL (ref 65–140)
GLUCOSE SERPL-MCNC: 360 MG/DL (ref 65–140)
HCT VFR BLD AUTO: 23.2 % (ref 36.5–49.3)
HGB BLD-MCNC: 7.4 G/DL (ref 12–17)
IMM GRANULOCYTES # BLD AUTO: 0.05 THOUSAND/UL (ref 0–0.2)
IMM GRANULOCYTES NFR BLD AUTO: 1 % (ref 0–2)
INR PPP: 1.44 (ref 0.84–1.19)
LYMPHOCYTES # BLD AUTO: 1.88 THOUSANDS/ΜL (ref 0.6–4.47)
LYMPHOCYTES NFR BLD AUTO: 24 % (ref 14–44)
MCH RBC QN AUTO: 29 PG (ref 26.8–34.3)
MCHC RBC AUTO-ENTMCNC: 31.9 G/DL (ref 31.4–37.4)
MCV RBC AUTO: 91 FL (ref 82–98)
MONOCYTES # BLD AUTO: 0.74 THOUSAND/ΜL (ref 0.17–1.22)
MONOCYTES NFR BLD AUTO: 9 % (ref 4–12)
NEUTROPHILS # BLD AUTO: 5.05 THOUSANDS/ΜL (ref 1.85–7.62)
NEUTS SEG NFR BLD AUTO: 64 % (ref 43–75)
NRBC BLD AUTO-RTO: 0 /100 WBCS
PLATELET # BLD AUTO: 344 THOUSANDS/UL (ref 149–390)
PMV BLD AUTO: 9.6 FL (ref 8.9–12.7)
POTASSIUM SERPL-SCNC: 4.5 MMOL/L (ref 3.5–5.3)
PROTHROMBIN TIME: 16.9 SECONDS (ref 11.6–14.5)
RBC # BLD AUTO: 2.55 MILLION/UL (ref 3.88–5.62)
SODIUM SERPL-SCNC: 139 MMOL/L (ref 136–145)
WBC # BLD AUTO: 7.93 THOUSAND/UL (ref 4.31–10.16)

## 2021-10-27 PROCEDURE — 97530 THERAPEUTIC ACTIVITIES: CPT

## 2021-10-27 PROCEDURE — 99232 SBSQ HOSP IP/OBS MODERATE 35: CPT | Performed by: INTERNAL MEDICINE

## 2021-10-27 PROCEDURE — 85610 PROTHROMBIN TIME: CPT | Performed by: INTERNAL MEDICINE

## 2021-10-27 PROCEDURE — 80048 BASIC METABOLIC PNL TOTAL CA: CPT | Performed by: INTERNAL MEDICINE

## 2021-10-27 PROCEDURE — 99024 POSTOP FOLLOW-UP VISIT: CPT | Performed by: ORTHOPAEDIC SURGERY

## 2021-10-27 PROCEDURE — 85730 THROMBOPLASTIN TIME PARTIAL: CPT | Performed by: INTERNAL MEDICINE

## 2021-10-27 PROCEDURE — 82948 REAGENT STRIP/BLOOD GLUCOSE: CPT

## 2021-10-27 PROCEDURE — 85025 COMPLETE CBC W/AUTO DIFF WBC: CPT | Performed by: INTERNAL MEDICINE

## 2021-10-27 RX ORDER — INSULIN GLARGINE 100 [IU]/ML
60 INJECTION, SOLUTION SUBCUTANEOUS DAILY
Status: DISCONTINUED | OUTPATIENT
Start: 2021-10-28 | End: 2021-11-03 | Stop reason: HOSPADM

## 2021-10-27 RX ORDER — INSULIN GLARGINE 100 [IU]/ML
10 INJECTION, SOLUTION SUBCUTANEOUS ONCE
Status: DISCONTINUED | OUTPATIENT
Start: 2021-10-27 | End: 2021-10-30

## 2021-10-27 RX ADMIN — SODIUM CHLORIDE, SODIUM LACTATE, POTASSIUM CHLORIDE, AND CALCIUM CHLORIDE 125 ML/HR: .6; .31; .03; .02 INJECTION, SOLUTION INTRAVENOUS at 21:57

## 2021-10-27 RX ADMIN — LIDOCAINE 5% 1 PATCH: 700 PATCH TOPICAL at 09:58

## 2021-10-27 RX ADMIN — OXYCODONE HYDROCHLORIDE 10 MG: 10 TABLET, FILM COATED, EXTENDED RELEASE ORAL at 09:59

## 2021-10-27 RX ADMIN — INSULIN LISPRO 15 UNITS: 100 INJECTION, SOLUTION INTRAVENOUS; SUBCUTANEOUS at 16:00

## 2021-10-27 RX ADMIN — SODIUM CHLORIDE, SODIUM LACTATE, POTASSIUM CHLORIDE, AND CALCIUM CHLORIDE 125 ML/HR: .6; .31; .03; .02 INJECTION, SOLUTION INTRAVENOUS at 03:12

## 2021-10-27 RX ADMIN — DICLOFENAC SODIUM 2 G: 10 GEL TOPICAL at 21:57

## 2021-10-27 RX ADMIN — DICLOFENAC SODIUM 2 G: 10 GEL TOPICAL at 18:45

## 2021-10-27 RX ADMIN — STANDARDIZED SENNA CONCENTRATE 17.2 MG: 8.6 TABLET ORAL at 09:59

## 2021-10-27 RX ADMIN — WARFARIN SODIUM 6 MG: 3 TABLET ORAL at 18:45

## 2021-10-27 RX ADMIN — STANDARDIZED SENNA CONCENTRATE 17.2 MG: 8.6 TABLET ORAL at 18:45

## 2021-10-27 RX ADMIN — INSULIN LISPRO 10 UNITS: 100 INJECTION, SOLUTION INTRAVENOUS; SUBCUTANEOUS at 07:36

## 2021-10-27 RX ADMIN — INSULIN LISPRO 2 UNITS: 100 INJECTION, SOLUTION INTRAVENOUS; SUBCUTANEOUS at 16:00

## 2021-10-27 RX ADMIN — OXYCODONE HYDROCHLORIDE 10 MG: 10 TABLET, FILM COATED, EXTENDED RELEASE ORAL at 21:57

## 2021-10-27 RX ADMIN — DICLOFENAC SODIUM 2 G: 10 GEL TOPICAL at 09:59

## 2021-10-27 RX ADMIN — HEPARIN SODIUM 13.1 UNITS/KG/HR: 10000 INJECTION, SOLUTION INTRAVENOUS at 07:23

## 2021-10-27 RX ADMIN — SODIUM CHLORIDE, SODIUM LACTATE, POTASSIUM CHLORIDE, AND CALCIUM CHLORIDE 125 ML/HR: .6; .31; .03; .02 INJECTION, SOLUTION INTRAVENOUS at 11:40

## 2021-10-27 RX ADMIN — PRAVASTATIN SODIUM 40 MG: 40 TABLET ORAL at 16:18

## 2021-10-27 RX ADMIN — INSULIN GLARGINE 50 UNITS: 100 INJECTION, SOLUTION SUBCUTANEOUS at 09:48

## 2021-10-27 RX ADMIN — DICLOFENAC SODIUM 2 G: 10 GEL TOPICAL at 11:40

## 2021-10-27 RX ADMIN — INSULIN LISPRO 6 UNITS: 100 INJECTION, SOLUTION INTRAVENOUS; SUBCUTANEOUS at 11:41

## 2021-10-27 NOTE — CASE MANAGEMENT
Case Management Discharge Planning Note    Patient name Marisela Woodard  Location [phone] MRN [id number]  : 1955 Date 10/27/2021       Current Admission Date: 10/13/2021  Current Admission Diagnosis:Closed fracture of proximal end of right tibia   Patient Active Problem List    Diagnosis Date Noted    Anemia 10/26/2021    Kyphosis of cervicothoracic region 10/25/2021    Delayed emergence from anesthesia 10/18/2021    Difficult intubation 10/18/2021    Pseudogout of knee, left 10/17/2021    Pain and swelling of left knee 10/16/2021    History of DVT (deep vein thrombosis) 10/13/2021    ROSE (acute kidney injury) ([facility] [address] ) 10/13/2021    Closed fracture of proximal end of right tibia 10/13/2021    Type 2 diabetes mellitus with hyperglycemia ([facility] [address] ) 10/05/2021    Class 2 obesity in adult 2021    Type 2 diabetes mellitus with diabetic peripheral angiopathy without gangrene ([facility] [address] ) 10/08/2019    Other insomnia 2017    Type 2 diabetes mellitus with diabetic neuropathy, without long-term current use of insulin ([facility] [address] ) 2017    Lumbar degenerative disc disease 10/21/2014    Acute embolism and thrombosis of unspecified deep veins of unspecified lower extremity ([facility] [address] ) 10/20/2014    Essential hypertension 10/20/2014    Mixed hyperlipidemia 10/20/2014    Peripheral vascular disease ([facility] [address] ) 10/20/2014      LOS (days): 14  Geometric Mean LOS (GMLOS) (days):   Days to GMLOS:     OBJECTIVE:  Risk of Unplanned Readmission Score: 19   Bundled Patient Payment:  (no)     Current admission status: Inpatient   Preferred Pharmacy:   [address] [facility], [address] [facility] [address]  Phone: [phone] Fax: [address] [address]  Phone: [phone] Fax: [phone]  #[id number] - [place] [place]  [address] [place] [address]  [address] [facility] [place] [address]  Phone: [phone] Fax: [phone]    Primary Care Provider: Jason Lo DO    Primary Insurance: BLUE CROSS  Secondary Insurance: [facility] RE[facility]    DISCHARGE DETAILS:    Discharge planning discussed with[de-identified] wife--rebecca  Freedom of Choice: Yes  Comments - Freedom of Choice: wife-rebecca want [facility] as first preference  CM called family/caregiver?: Yes  Were Treatment Team discharge recommendations reviewed with patient/caregiver?: Yes  Did patient/caregiver verbalize understanding of patient care needs?: Yes  Were patient/caregiver advised of the risks associated with not following Treatment Team discharge recommendations?: Yes    Contacts  Patient Contacts: wife-Rebecca  Relationship to Patient[de-identified] Family  Contact Method: Phone  Phone Number: see facesheet  Reason/Outcome: Continuity of Care, Emergency Contact, Discharge [address]         Is the patient interested in [facility] at discharge?: No    DME Referral Provided  Referral made for DME?: No    Other Referral/Resources/Interventions Provided:  Interventions: Acute Rehab, Short Term Rehab  Referral Comments: patient requested referral sent to [facility]   [facility] have accepted patient but many more STR are reviewing pt    Would you like to participate in our [facility] service program?  : No - Declined    Treatment Team Recommendation: Short Term Rehab, Acute Rehab  Discharge Destination Plan[de-identified] Short Term Rehab, Acute Rehab (referrals pended)  Transport at Discharge : [facility]  Dispatcher Called: No

## 2021-10-27 NOTE — QUICK NOTE
Notified by wound care nurse that patient has hospital-acquired stage III pressure ulcer on the left upper buttock and hospital-acquired deep tissue pressure injury to the right buttocks  Also stated that patient had refused P 500 low air loss mattress twice now even after being educated on its benefits and he remains very resistant to turning  Media Information     Document Information    Clinical Image - Mobile Device   Wound 10/22/21 Pressure Injury Buttocks Left   10/27/2021 10:04   Attached To: Hospital Encounter on 10/13/21   Source Information    Dior Agosto RN  [facility]               Media Information     Document Information    Clinical Image - Mobile Device   Wound 10/27/21 Buttocks Right   10/27/2021 10:17   Attached To: Hospital Encounter on 10/13/21   Source 106 Yun Hartman RN  [facility]     Reiterated  benefit of frequent turning and benefit of P-500 low air loss mattress

## 2021-10-27 NOTE — WOUND OSTOMY CARE
Progress Note - Wound   Eloisa Donnelly 61 y o  male MRN: [id number]  Unit/Bed#: -01 Encounter: [id number]      Assessment:   Patient admitted due to closed fracture of proximal end of right tibia  History of diabetes, HLD, HTN  Wound care consulted for buttock wound  Patient agreeable to assessment once my role was explained and the need to manage the wound on the buttock to prevent the wound from progressing to a worse stage  Patient alert and oriented x3, assist of 2-3 to turn for assessment, incontinent of urine at times, continent of bowel, wedges at bedside for turning and repositioning, heels elevated off of mattress, dependent for care, ORIF to right leg with brace in place  Orthopedic surgery is following right leg  Per nursing patient has been refusing turning and repositioning, refused PT session, and has refused a P-500 low air loss mattress twice  I explained to patient the importance of a P-500 low air loss mattress to assist with the healing process of the pressure injuries to his buttock and patient continues to refuse a low air loss mattress  Patient also has severe kyphosis of the spine and states it is difficult for him to turn from side to side  Primary RN at bedside for assessment and aware of assessment findings  Unit manager and SLIM made aware of assessment findings  1  Pressure injury left upper buttock, stage 3-HA- Wound is oval in shape, moist, full-thickness, is approx  100% non-blanchable beefy red tissue, small amount of sanguinous drainage noted  Ashleigh-wound is intact, macerated, blanchable pink skin  Wound bed unable to be visualized properly due to patient unable to turn any further for a closer assessment  2  Pressure injury right buttock, evolving DTI- Suspect etiology to be mixture of pressure and friction  Wound is linear in shape, dry, true depth unknown due to devitalized tissue obscuring wound bed  Wound is approx   20% dry brown adhered tissue vs eschar, 30% pink non-blanchable tissue, and 50% non-blanchable purple tissue, no drainage noted  Ashleigh-wound is dry, intact, blanchable pink skin  This wound has the potential to evolve to a full thickness injury, stage 3 or 4     3  Bilateral sacrum and heels are dry, intact, blanchable pink skin, no open aspects, no discoloration  Educated patient on importance of frequent offloading of pressure via turning, repositioning, and weight-shifting  No induration, fluctuance, odor, warmth, redness, or purulence noted to the above noted wounds  New dressings applied  Patient tolerated fairly well, reports pain to the left buttock wound  Primary nurse aware of plan of care  See flow sheets for more detailed assessment findings  Will follow along  Skin care Plan:  1-Cleanse sacro-buttocks with soap and water  Apply Maxorb Alginate over left buttock wound and cover with Allevyn foam dressing  Dawson with T for treatment  Change every other day and PRN  Peel back and check skin daily  2-Turn/reposition q2h or when medically stable for pressure re-distribution on skin   3-Elevate heels to offload pressure  4-Moisturize skin daily with skin nourishing cream  5-Ehob cushion in chair when out of bed  6-Hydraguard to B/l buttocks below allevyn foam dressing, and heels BID and PRN  7-Right buttock- Cleanse wound with NSS, pat dry  Apply Allevyn foam dressing over wound  Change every 3 days and PRN  Peel back and check skin daily  8-Wound care will follow weekly, tiger text with questions or concerns  Wound 10/22/21 Pressure Injury Buttocks Left (Active)   Wound Image   10/27/21 1004   Wound Description Beefy red 10/27/21 1004   Pressure Injury Stage 3 10/27/21 1004   Ashleigh-wound Assessment Intact; Maceration;[place] 10/27/21 1004   Wound Length (cm) 3 cm 10/27/21 1004   Wound Width (cm) 2 cm 10/27/21 1004   Wound Depth (cm) 0 3 cm 10/27/21 1004   Wound Surface Area (cm^2) 6 cm^2 10/27/21 1004   Wound Volume (cm^3) 1 8 cm^3 10/27/21 1004 Calculated Wound Volume (cm^3) 1 8 cm^3 10/27/21 1004   Tunneling 0 cm 10/27/21 1004   Undermining 0 10/27/21 1004   Wound Site Closure     Drainage Amount Small 10/27/21 1004   Drainage Description Sanguineous 10/27/21 1004   Non-staged Wound Description Full thickness 10/27/21 1004   Treatments Cleansed;Site care 10/27/21 1004   Dressing Calcium Alginate; Foam, Silicon (eg  Allevyn, etc) 10/27/21 1004   Dressing Changed Changed 10/27/21 1004   Patient Tolerance Tolerated poorly 10/27/21 1004   Dressing Status Clean;Dry; Intact 10/27/21 1004       Wound 10/27/21 Buttocks Right (Active)   Wound Image   10/27/21 1017   Wound Description Dry;Brown;Light purple;Pink 10/27/21 1017   Pressure Injury Stage DTPI 10/27/21 1017   Ashleigh-wound Assessment Dry; Intact; Pink 10/27/21 1017   Wound Length (cm) 0 3 cm 10/27/21 1017   Wound Width (cm) 2 cm 10/27/21 1017   Wound Depth (cm) 0 1 cm 10/27/21 1017   Wound Surface Area (cm^2) 0 6 cm^2 10/27/21 1017   Wound Volume (cm^3) 0 06 cm^3 10/27/21 1017   Calculated Wound Volume (cm^3) 0 06 cm^3 10/27/21 1017   Tunneling 0 cm 10/27/21 1017   Undermining 0 10/27/21 1017   Drainage Amount None 10/27/21 1017   Non-staged Wound Description Not applicable 39/86/61 7299   Treatments Cleansed;Site care 10/27/21 1017   Dressing Moisture barrier 10/27/21 1017   Wound packed?  No 10/27/21 1017   Dressing Changed New 10/27/21 1017   Patient Tolerance Tolerated well 10/27/21 1017       Call or tigertext with any questions  Wound Care will continue to follow    Juan José Mustafa RN BSN  Wound care

## 2021-10-27 NOTE — PROGRESS NOTES
Progress Note - Orthopedics   [facility] 61 y o  male MRN: [id number]  Unit/Bed#: -01      Subjective:    61 y o male POD2 status post ORIF right proximal tibia shaft fracture, removal of external fixator  No acute overnight events, no complaints  Yesterday it was noted by the PT team that the patient refused to get up from bed and participate in the session  This morning the patient states that he does not want to get up out of bed because he is concerned that he may fall again and sustain another injury  Pain controlled  Denies fevers chills, CP, SOB  He denies numbness or tingling in the lower extremity  Yesterday the patient also complained of left calf pain, a doppler was performed for DVT assessment, which was negative      Labs:  0   Lab Value Date/Time    HCT 23 2 (L) 10/27/2021 0308    HCT 24 5 (L) 10/26/2021 0812    HCT 27 3 (L) 10/25/2021 0540    HCT 38 4 07/13/2015 1448    HCT 38 6 10/20/2014 1132    HGB 7 4 (L) 10/27/2021 0308    HGB 7 8 (L) 10/26/2021 0812    HGB 8 6 (L) 10/25/2021 0540    HGB 13 1 07/13/2015 1448    HGB 13 1 10/20/2014 1132    INR 1 44 (H) 10/27/2021 0308    INR 2 94 (H) 12/11/2015 1543    WBC 7 93 10/27/2021 0308    WBC 10 19 (H) 10/26/2021 0812    WBC 6 31 10/25/2021 0540    WBC 7 7 07/13/2015 1448    WBC 8 30 10/20/2014 1132    ESR 58 (H) 05/20/2016 1207       Meds:    Current Facility-Administered Medications:     acetaminophen (TYLENOL) tablet 650 mg, 650 mg, Oral, Q6H PRN, Fatimah Ambrose PA-C, 650 mg at 10/22/21 0570    calcium carbonate (TUMS) chewable tablet 500 mg, 500 mg, Oral, Daily PRN, Fatimah Ambrose PA-C, 500 mg at 10/14/21 0006    Diclofenac Sodium (VOLTAREN) 1 % topical gel 2 g, 2 g, Topical, 4x Daily, Fatimah Ambrose PA-C, 2 g at 10/26/21 2101    heparin (porcine) 25,000 units in 0 45% NaCl 250 mL infusion (premix), 3-20 Units/kg/hr (Order-Specific), Intravenous, Titrated, José Miguel Parrish MD, Last Rate: 11 8 mL/hr at 10/27/21 0723, 13 1 Units/kg/hr at 10/27/21 0723    heparin (porcine) injection 2,000 Units, 2,000 Units, Intravenous, Q1H PRN, Chelsea Jackson MD, 2,000 Units at 10/26/21 1522    heparin (porcine) injection 4,000 Units, 4,000 Units, Intravenous, Q1H PRN, Chelsea Jackson MD    HYDROmorphone (DILAUDID) injection 0 5 mg, 0 5 mg, Intravenous, Q3H PRN, Krista Peaks, PA-C, 0 5 mg at 10/25/21 1953    insulin glargine (LANTUS) subcutaneous injection 25 Units 0 25 mL, 25 Units, Subcutaneous, Once, Chelsea Jackson MD    insulin glargine (LANTUS) subcutaneous injection 50 Units 0 5 mL, 50 Units, Subcutaneous, Daily, Krista Peaks, PA-C, 50 Units at 10/26/21 0836    insulin lispro (HumaLOG) 100 units/mL subcutaneous injection 12 Units, 12 Units, Subcutaneous, TID With Meals, Chelsea Jackson MD, 12 Units at 10/27/21 0737    insulin lispro (HumaLOG) 100 units/mL subcutaneous injection 2-12 Units, 2-12 Units, Subcutaneous, TID AC, 10 Units at 10/27/21 0736 **AND** Fingerstick Glucose (POCT), , , TID AC, Chanell Chavira, PA-YOMI    lactated ringers infusion, 125 mL/hr, Intravenous, Continuous, Karthik Brown PA-C, Last Rate: 125 mL/hr at 10/27/21 0312, 125 mL/hr at 10/27/21 0312    lidocaine (LIDODERM) 5 % patch 1 patch, 1 patch, Topical, Daily, Krista Peaks, PA-C, 1 patch at 10/26/21 0825    ondansetron [facility]) injection 4 mg, 4 mg, Intravenous, Q6H PRN, Krista Peaks, PA-C, 4 mg at 10/13/21 2249    oxyCODONE (OxyCONTIN) 12 hr tablet 10 mg, 10 mg, Oral, Q12H [address], Krista Peaks, PA-C, 10 mg at 10/26/21 2056    polyethylene glycol (MIRALAX) packet 17 g, 17 g, Oral, Daily PRN, Krista Erasmo, PA-C, 17 g at 10/21/21 1620    pravastatin (PRAVACHOL) tablet 40 mg, 40 mg, Oral, Daily With Holly Nicole, PA-C, 40 mg at 10/26/21 1622    senna (SENOKOT) tablet 17 2 mg, 2 tablet, Oral, BID, Krista Erasmo, PA-C, 17 2 mg at 10/26/21 1746    warfarin (COUMADIN) tablet 6 mg, 6 mg, Oral, Daily (warfarin), Chelsea Jackson MD    Blood Culture: No results found for: BLOODCX    Wound Culture:   No results found for: WOUNDCULT    Ins and Outs:  I/O last 24 hours: In: 850 [P O :850]  Out: 1450 [Urine:1450]        Physical:  Vitals:    10/27/21 0719   BP: 134/62   Pulse: 75   Resp: 18   Temp: 99 1 °F (37 3 °C)   SpO2: 91%     Musculoskeletal: right Lower Extremity  · Skin above and below the dressings is clean and dry, without signs of irritation, drainage, ecchymosis, or swelling  · Dressing clean, dry, and intact  · Soft lower extremity compartments  · Patient refusing to participate in active examination this morning, and did not wiggle his toes  He did admit to having normal sensation in his toes and plantar/dorsum of the foot with light touch  · 2+ DP pulse  · Brisk cap refill in all toes    Assessment:    66 y o male POD2 s/o right proximal tibia shaft fracture, removal of external fixator  Patient refused to participate in active exam this morning  Will continue to monitor and return later for examination  Plan:  · Non-weight bearing RLE  · Maintain dressings, keep clean, dry, and intact  · Maintain immobilizer at all times  · HGB 7 4 this AM, mildly decreased from 7 8 yesterday  Greater than 2 gram drop which qualifies for diagnosis of acute blood loss anemia, will monitor and administer IVF/prbc as indicated   · PT/OT  · Discussion was had with the patient regarding the importance of participating in PT, including decreasing risk of DVT, maintaining strength, and determining discharge plan from the hospital  He was made aware that all safety measures are in place per hospital protocol to prevent falls and injury while participating in PT  The patient agreed to work with PT today  · Pain control per primary team  · DVT ppx with coumadin  · Dispo: Ortho will continue to follow       Bryn Leary PA-C

## 2021-10-27 NOTE — DISCHARGE INSTR - OTHER ORDERS
Skin care Plan:  1-Cleanse sacro-buttocks with soap and water  Apply Maxorb Alginate over left buttock wound and cover with Allevyn foam dressing  Dawson with T for treatment  Change other day and PRN  Peel back and check skin daily  2-Turn/reposition q2h or when medically stable for pressure re-distribution on skin   3-Elevate heels to offload pressure  4-Moisturize skin daily with skin nourishing cream  5-Ehob cushion in chair when out of bed  6-Hydraguard to bilateral heels BID and PRN  7-Right buttock- Cleanse wound with NSS, pat dry  Apply Allevyn foam dressing over wound  Change every 3 days and PRN  Peel back and check skin daily

## 2021-10-27 NOTE — PLAN OF CARE
Problem: PHYSICAL THERAPY ADULT  Goal: Performs mobility at highest level of function for planned discharge setting  See evaluation for individualized goals  Description: Treatment/Interventions: Functional transfer training, LE strengthening/ROM, Therapeutic exercise, Endurance training, Patient/family training, Bed mobility, Continued evaluation, Spoke to nursing, OT, Family          See flowsheet documentation for full assessment, interventions and recommendations  Outcome: Progressing  Note: Prognosis: Guarded  Problem List: Decreased strength, Decreased range of motion, Decreased endurance, Impaired balance, Decreased mobility, Orthopedic restrictions, Pain  Assessment: Chart reviewed  Patient was received supine in bed in NAD, +right knee immobilizer and agreeable to PT session (sit at the edge of the bed)  Today's PT treatment session consisted of therapeutic activity for facilitation of transitional movements and safe performance of correct technique for bed mobility  In comparison to the previous session the patient required decreased assistance with bed mobility  Pt requiring significantly increased time to perform supine to sit transfer  Pt educated on the importance of mobility, as well as the risks of mobility  Pt deferred to trial sit to stand transfers  Pt tolerated sitting unsupported at EOB for ~5 minutes  Pt with one episode of LOB requiring minimal assist to recover  Overall, pt tolerated today's treatment session fairly  Pt is making slow progress towards achieving his STG's  Pt's STG's were updated this session to reflect pt's current functional status  Patient's prognosis for achieving their STG's is guarded; pt demonstrates self limiting behavior  PT intervention continues to be appropriate as the patient continues to be limited by pain, decreased lower extremity range of motion and strength, impaired balance, decreased endurance, inability to ambulate, and decreased functional mobility  Continue to recommend STR  PT to continue to see patient in order to address the deficits listed above and provide interventions consistent with the POC in order to achieve STG's and optimize the patient's independence with functional mobility  Barriers to Discharge: Inaccessible home environment, Decreased caregiver support     PT Discharge Recommendation: Post acute rehabilitation services     PT - OK to Discharge: Yes (when medically cleared, if to STR)    See flowsheet documentation for full assessment

## 2021-10-27 NOTE — OCCUPATIONAL THERAPY NOTE
Occupational Therapy Cancellation Note        Patient Name: Inder Villasenor  [id number] Date: 10/27/2021     10/27/21 1314   OT Last Visit   OT Visit Date 10/27/21   Note Type   Note Type Treatment for insurance authorization   Cancel Reasons Other  (patient declined/ agreed to sit at edge of bed with PT )         Patient educated on the benefits of engaging in self care tasks and other therapeutic exercises while sitting at edge of bed  Patient discussed his medical history related to long history of back problems, patient not in  agreement with any of the information presented by therapist   Patient stated he only agreed  to sit at edge of bed with PT, therefore OT treatment session was cancelled  OT will continue to follow patient as able

## 2021-10-27 NOTE — PHYSICAL THERAPY NOTE
Physical Therapy Treatment Note    Patient Name: Nakita Cherry    Diagnosis: Leg injury [S89 90XA]  Closed fracture of proximal tibia [S82 109A]  Unable to ambulate [R26 2]  Abrasion, lower leg, anterior [S80 819A]     10/27/21 1237   PT Last Visit   PT Visit Date 10/27/21   Note Type   Note Type Treatment   Pain Assessment   Pain Assessment Tool 0-10   Pain Score 7   Pain Location/Orientation Orientation: Right;Location: Leg   Restrictions/Precautions   Weight Bearing Precautions Per Order Yes   RLE Weight Bearing Per Order NWB  (per ortho)   Braces or Orthoses LE Immobilizer  (right)   Other Precautions Bed Alarm;WBS;Multiple lines; Fall Risk;Pain   General   Chart Reviewed Yes   Additional Pertinent History POD 2 status post ORIF right proximal tibia shaft fracture, removal of external fixator   Response to Previous Treatment Patient with no complaints from previous session  Family/Caregiver Present No   Cognition   Overall Cognitive Status WFL   Arousal/Participation Alert   Memory Within functional limits   Following Commands Follows all commands and directions without difficulty   Comments Pt agreeable to sit at the EOB  Subjective   Subjective "I will sit at the EOB at my pace "   Bed Mobility   Supine to Sit 4  Minimal assistance   Additional items Assist x 1;HOB elevated; Bedrails; Increased time required;Verbal cues;LE management   Sit to Supine 3  Moderate assistance   Additional items Assist x 1;HOB elevated; Bedrails; Increased time required;LE management;Verbal cues   Additional Comments Pt requiring significantly increased time to perform supine to sit transfer      Transfers   Sit to Stand Unable to assess  (pt deferred)   Balance   Static Sitting Fair   Dynamic Sitting Fair -  (1 episode of LOB requiring poor+)   Endurance Deficit   Endurance Deficit Yes   Endurance Deficit Description decreased activity tolerance   Activity Tolerance   Activity Tolerance Patient limited by fatigue;Patient limited by pain   Medical Staff Made Aware PT Student Felipe Hogue present in room   Nurse Made Aware Discussed case with RN Leti Sánchez; post session pt was left supine in bed in NAD, all belongings within reach, +bed alarm   Assessment   Prognosis Guarded   Problem List Decreased strength;Decreased range of motion;Decreased endurance; Impaired balance;Decreased mobility;Orthopedic restrictions;Pain   Assessment Chart reviewed  Patient was received supine in bed in NAD, +right knee immobilizer and agreeable to PT session (sit at the edge of the bed)  Today's PT treatment session consisted of therapeutic activity for facilitation of transitional movements and safe performance of correct technique for bed mobility  In comparison to the previous session the patient required decreased assistance with bed mobility  Pt requiring significantly increased time to perform supine to sit transfer  Pt educated on the importance of mobility, as well as the risks of mobility  Pt deferred to trial sit to stand transfers  Pt tolerated sitting unsupported at EOB for ~5 minutes  Pt with one episode of LOB requiring minimal assist to recover  Overall, pt tolerated today's treatment session fairly  Pt is making slow progress towards achieving his STG's  Pt's STG's were updated this session to reflect pt's current functional status  Patient's prognosis for achieving their STG's is guarded; pt demonstrates self limiting behavior  PT intervention continues to be appropriate as the patient continues to be limited by pain, decreased lower extremity range of motion and strength, impaired balance, decreased endurance, inability to ambulate, and decreased functional mobility  Continue to recommend STR  PT to continue to see patient in order to address the deficits listed above and provide interventions consistent with the POC in order to achieve STG's and optimize the patient's independence with functional mobility     Barriers to Discharge Inaccessible home environment;Decreased caregiver support   Goals   STG Expiration Date 11/06/21   Short Term Goal #1 In 7-10 days: Increase left LE and R hip srengthstrength 1/2 grade to facilitate independent mobility, Perform all bed mobility tasks with close supervision to decrease caregiver burden, Increase static sitting and dynamic sitting balance 1/2 grade to decrease risk for falls and PT to see and establish goals for transfers, gait, and standing static/dynamic balance when appropriate   Plan   Treatment/Interventions Functional transfer training;LE strengthening/ROM; Therapeutic exercise; Endurance training;Patient/family training;Bed mobility;Continued evaluation;Spoke to nursing;OT   Progress Slow progress, decreased activity tolerance   PT Frequency 5x/wk   Recommendation   PT Discharge Recommendation Post acute rehabilitation services   PT - OK to Discharge Yes  (when medically cleared, if to STR)   AM-PAC Basic Mobility Inpatient   Turning in Bed Without Bedrails 3   Lying on Back to Sitting on Edge of Flat Bed 3   Moving Bed to Chair 1   Standing Up From Chair 1   Walk in Room 1   Climb 3-5 Stairs 1   Basic Mobility Inpatient Raw Score 10   Turning Head Towards Sound 4   Follow Simple Instructions 4   Low Function Basic Mobility Raw Score 18   Low Function Basic Mobility Standardized Score 29 25     Madelaine Denny, PT, DPT  Time of PT treatment session: 5201-8545  33 minutes

## 2021-10-27 NOTE — PLAN OF CARE
Problem: Potential for Falls  Goal: Patient will remain free of falls  Description: INTERVENTIONS:  - Educate patient/family on patient safety including physical limitations  - Instruct patient to call for assistance with activity   - Consult OT/PT to assist with strengthening/mobility   - Keep Call bell within reach  - Keep bed low and locked with side rails adjusted as appropriate  - Keep care items and personal belongings within reach  - Initiate and maintain comfort rounds  - Make Fall Risk Sign visible to staff  - Offer Toileting every  Hours, in advance of need  - Initiate/Maintain alarm  - Obtain necessary fall risk management equipment:   - Apply yellow socks and bracelet for high fall risk patients  - Consider moving patient to room near nurses station  Outcome: Progressing     Problem: Prexisting or High Potential for Compromised Skin Integrity  Goal: Skin integrity is maintained or improved  Description: INTERVENTIONS:  - Identify patients at risk for skin breakdown  - Assess and monitor skin integrity  - Assess and monitor nutrition and hydration status  - Monitor labs   - Assess for incontinence   - Turn and reposition patient  - Assist with mobility/ambulation  - Relieve pressure over bony prominences  - Avoid friction and shearing  - Provide appropriate hygiene as needed including keeping skin clean and dry  - Evaluate need for skin moisturizer/barrier cream  - Collaborate with interdisciplinary team   - Patient/family teaching  - Consider wound care consult   Outcome: Progressing     Problem: PAIN - ADULT  Goal: Verbalizes/displays adequate comfort level or baseline comfort level  Description: Interventions:  - Encourage patient to monitor pain and request assistance  - Assess pain using appropriate pain scale  - Administer analgesics based on type and severity of pain and evaluate response  - Implement non-pharmacological measures as appropriate and evaluate response  - Consider cultural and social influences on pain and pain management  - Notify physician/advanced practitioner if interventions unsuccessful or patient reports new pain  Outcome: Progressing     Problem: INFECTION - ADULT  Goal: Absence or prevention of progression during hospitalization  Description: INTERVENTIONS:  - Assess and monitor for signs and symptoms of infection  - Monitor lab/diagnostic results  - Monitor all insertion sites, i e  indwelling lines, tubes, and drains  - Monitor endotracheal if appropriate and nasal secretions for changes in amount and color  - [place] appropriate cooling/warming therapies per order  - Administer medications as ordered  - Instruct and encourage patient and family to use good hand hygiene technique  - Identify and instruct in appropriate isolation precautions for identified infection/condition  Outcome: Progressing  Goal: Absence of fever/infection during neutropenic period  Description: INTERVENTIONS:  - Monitor WBC    Outcome: Progressing     Problem: SAFETY ADULT  Goal: Patient will remain free of falls  Description: INTERVENTIONS:  - Educate patient/family on patient safety including physical limitations  - Instruct patient to call for assistance with activity   - Consult OT/PT to assist with strengthening/mobility   - Keep Call bell within reach  - Keep bed low and locked with side rails adjusted as appropriate  - Keep care items and personal belongings within reach  - Initiate and maintain comfort rounds  - Make Fall Risk Sign visible to staff  - Offer Toileting every  Hours, in advance of need  - Initiate/Maintain alarm  - Obtain necessary fall risk management equipment:   - Apply yellow socks and bracelet for high fall risk patients  - Consider moving patient to room near nurses station  Outcome: Progressing  Goal: Maintain or return to baseline ADL function  Description: INTERVENTIONS:  -  Assess patient's ability to carry out ADLs; assess patient's baseline for ADL function and identify physical deficits which impact ability to perform ADLs (bathing, care of mouth/teeth, toileting, grooming, dressing, etc )  - Assess/evaluate cause of self-care deficits   - Assess range of motion  - Assess patient's mobility; develop plan if impaired  - Assess patient's need for assistive devices and provide as appropriate  - Encourage maximum independence but intervene and supervise when necessary  - Involve family in performance of ADLs  - Assess for home care needs following discharge   - Consider OT consult to assist with ADL evaluation and planning for discharge  - Provide patient education as appropriate  Outcome: Progressing  Goal: Maintains/Returns to pre admission functional level  Description: INTERVENTIONS:  - Perform BMAT or MOVE assessment daily    - Set and communicate daily mobility goal to care team and patient/family/caregiver  - Collaborate with rehabilitation services on mobility goals if consulted  - Perform Range of Motion  times a day  - Reposition patient every  hours    - Dangle patient  times a day  - Stand patient  times a day  - Ambulate patient  times a day  - Out of bed to chair  times a day   - Out of bed for meals  times a day  - Out of bed for toileting  - Record patient progress and toleration of activity level   Outcome: Progressing     Problem: DISCHARGE PLANNING  Goal: Discharge to home or other facility with appropriate resources  Description: INTERVENTIONS:  - Identify barriers to discharge w/patient and caregiver  - Arrange for needed discharge resources and transportation as appropriate  - Identify discharge learning needs (meds, wound care, etc )  - Arrange for interpretive services to assist at discharge as needed  - Refer to Case Management Department for coordinating discharge planning if the patient needs post-hospital services based on physician/advanced practitioner order or complex needs related to functional status, cognitive ability, or social support system  Outcome: Progressing     Problem: Knowledge Deficit  Goal: Patient/family/caregiver demonstrates understanding of disease process, treatment plan, medications, and discharge instructions  Description: Complete learning assessment and assess knowledge base  Interventions:  - Provide teaching at level of understanding  - Provide teaching via preferred learning methods  Outcome: Progressing     Problem: MOBILITY - ADULT  Goal: Maintain or return to baseline ADL function  Description: INTERVENTIONS:  -  Assess patient's ability to carry out ADLs; assess patient's baseline for ADL function and identify physical deficits which impact ability to perform ADLs (bathing, care of mouth/teeth, toileting, grooming, dressing, etc )  - Assess/evaluate cause of self-care deficits   - Assess range of motion  - Assess patient's mobility; develop plan if impaired  - Assess patient's need for assistive devices and provide as appropriate  - Encourage maximum independence but intervene and supervise when necessary  - Involve family in performance of ADLs  - Assess for home care needs following discharge   - Consider OT consult to assist with ADL evaluation and planning for discharge  - Provide patient education as appropriate  Outcome: Progressing  Goal: Maintains/Returns to pre admission functional level  Description: INTERVENTIONS:  - Perform BMAT or MOVE assessment daily    - Set and communicate daily mobility goal to care team and patient/family/caregiver  - Collaborate with rehabilitation services on mobility goals if consulted  - Perform Range of Motion  times a day  - Reposition patient every  hours    - Dangle patient  times a day  - Stand patient  times a day  - Ambulate patient  times a day  - Out of bed to chair  times a day   - Out of bed for meals  times a day  - Out of bed for toileting  - Record patient progress and toleration of activity level   Outcome: Progressing     Problem: Nutrition/Hydration-ADULT  Goal: Nutrient/Hydration intake appropriate for improving, restoring or maintaining nutritional needs  Description: Monitor and assess patient's nutrition/hydration status for malnutrition  Collaborate with interdisciplinary team and initiate plan and interventions as ordered  Monitor patient's weight and dietary intake as ordered or per policy  Utilize nutrition screening tool and intervene as necessary  Determine patient's food preferences and provide high-protein, high-caloric foods as appropriate       INTERVENTIONS:  - Monitor oral intake, urinary output, labs, and treatment plans  - Assess nutrition and hydration status and recommend course of action  - Evaluate amount of meals eaten  - Assist patient with eating if necessary   - Allow adequate time for meals  - Recommend/ encourage appropriate diets, oral nutritional supplements, and vitamin/mineral supplements  - Order, calculate, and assess calorie counts as needed  - Recommend, monitor, and adjust tube feedings and TPN/PPN based on assessed needs  - Assess need for intravenous fluids  - Provide specific nutrition/hydration education as appropriate  - Include patient/family/caregiver in decisions related to nutrition  Outcome: Progressing

## 2021-10-27 NOTE — [facility] ADMISSION
Referral received for consideration of patient for inpatient acute rehab  Patient will need to demonstrate willingness/ability to participate in therapy in order to be considered for acute rehab  Will continue to follow and update as able

## 2021-10-27 NOTE — PROGRESS NOTES
[address] [facility]  Progress Note Jamie Ladd 1955, 61 y o  male MRN: [id number]  Unit/Bed#: -Jeffery Encounter: [id number]  Primary Care Provider: Mariana Tanner, DO   Date and time admitted to hospital: 10/13/2021  5:17 AM    * Closed fracture of proximal end of right tibia  Assessment & Plan  Sustained mechanical fracture to the proximal end of the right tibia  Initially on external fixators   Status post ORIF and removal of external fixators  - POD#2   Intact distal pulsation and neurological function - able to wiggle toes    Per review of PT notes not agreeable to participating PT due to fear of falling  Reassured patient about PT measures to prevent fall  Appropriate DVT prophylaxis  PT OT  Appreciate orthopedic input    Type 2 diabetes mellitus with diabetic neuropathy, without long-term current use of insulin [facility])  Assessment & Plan  Lab Results   Component Value Date    HGBA1C 8 2 (H) 10/16/2021       Recent Labs     10/26/21  1121 10/26/21  1538 10/26/21  2035 10/27/21  0717   POCGLU 295* 359* 368* 309*       Blood Sugar Average: Last 72 hrs:  (P) 283  Known diabetic with diabetic neuropathy  On metformin and lantus at home  Blood sugar on presentation 418  Contiunue sliding scale insulin  Hypoglycemic protocol  Goal blood sugar not at goal of 140-180  · Continue Diabetic diet  · Continue Lantus 50 units q a m  · Increased meal time insulin Humulog 12 unit to meals  · Continue SSI coverage while in hospital  · Continue before meals and bedtime glucose checks      Anemia  Assessment & Plan  Hemoglobin of 7 4 today  Baseline of 12 on admission  Could be related to surgery  Concerning for acute blood loss anemia  Plan is to transfuse 1u PRBC, will Obtain consent     - discussed need for PRBC transfusion with patient,he appears not willing to accept transfusion  Discussed risk and benefit of said therapy and alternatives  Decline to sign consent form   Will touch base with spouse as he requests  Will monitor closely  Difficult intubation  Assessment & Plan  Had difficult intubation  Due to severe kyphosis during ortho procedure on 10/18  Will keep in mind because he is going to have a definitive surgery again next week    Pseudogout of knee, left  Assessment & Plan  Pain and swelling in the left anterior knee, superiorly  Tender to touch  Developed after PT  Xray -unremarkable  Synovial fluid analysis shows calcium pyrophosphate crystals  Started on colchicine 0 6mg daily  ROSE (acute kidney injury) ([facility] [address] )  Assessment & Plan  ROSE on CKD  May be prerenal   Cr on admission 1 99  Baseline 1 21 months ago  Avoid nephrotoxics  Avoid hypotension  Monitor I/O  Results from last 7 days   Lab Units 10/27/21  0308 10/26/21  8316 10/25/21  0540 10/24/21  0859 10/23/21  0924 10/22/21  0959 10/21/21  0559   SODIUM mmol/L 139 137 140 139 141 142 143   POTASSIUM mmol/L 4 5 4 3 3 9 3 9 3 9 3 8 4 6   CHLORIDE mmol/L 105 103 105 104 105 106 108   CO2 mmol/L 29 25 27 28 27 28 28   ANION GAP mmol/L 5 9 8 7 9 8 7   BUN mg/dL 21 25 18 18 17 18 24   CREATININE mg/dL 1 53* 1 62* 1 41* 1 46* 1 40* 1 29 1 28   CALCIUM mg/dL 7 7* 8 2* 8 5 8 8 8 7 8 4 8 4   EGFR ml/min/1 73sq m 47 44 52 50 53 58 59   GLUCOSE RANDOM mg/dL 360* 323* 165* 239* 202* 213* 131         History of DVT (deep vein thrombosis)  Assessment & Plan  History 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  Unprovoked  Last episode was 10yrs ago  On AC with coumadin  Currently held  Resume heparin gtt  Resume Coumadin   DVT prophylaxis with venodyne   Mixed hyperlipidemia  Assessment & Plan  Continue statins  Essential hypertension  Assessment & Plan  On lisinopril 10mg daily at home  On hold for ROSE  BP stable now  Blood pressure 133/62, pulse 74, temperature 99 9 °F (37 7 °C), temperature source Oral, resp  rate 18, height 6' 1" (1 854 m), weight 136 kg (299 lb 13 2 oz), SpO2 94 %          Elevated INR-resolved as of 10/26/2021  Assessment & Plan  Now subtherapeutic ,  Patient previously on Coumadin due to history of DVTs  This is currently on hold due to need for surgery  Ortho recommends 1 4-1 6 for ortho procedure  INR 1 47--  1 18  Continue to hold Coumadin  VTE Pharmacologic Prophylaxis:   VTE Score: 5 Moderate Risk (Score 3-4) - Pharmacological DVT Prophylaxis Ordered: Heparin Drip  Mechanical VTE Prophylaxis in Place: No    Patient Centered Rounds: I have performed bedside rounds with nursing staff today  Discussions with Specialists or Other Care Team Provider:  Ortho    Education and Discussions with Family / Patient: Patient declined call to   Current Length of Stay: 14 day(s)    Current Patient Status: Inpatient     Discharge Plan / Estimated Discharge Date: TBD    Code Status: Level 1 - Full Code      Subjective:       POD #2 ORIF  Patient seen and examined at bedside  He denies any numbness or tingling in the right lower extremity  Was able to wiggle toes and has good distal capillary refill  He denies nausea, cough, chest pain or shortness of breath    Due to concern for right lower extremity tingling and hxt of unprovoked DVTs, venous Doppler studies was obtained which did not show evidence for DVT  Discussed need for PRBC transfusion due to possible acute blood loss the setting of surgery  At this time, he remains resistant to the idea  Discussed risk and benefit of said therapy in addition to alternatives  Does not want to sign consent form  Will continue to monitor  Objective:     Vitals:   Temp (24hrs), Av 4 °F (37 4 °C), Min:99 1 °F (37 3 °C), Max:99 9 °F (37 7 °C)    Temp:  [99 1 °F (37 3 °C)-99 9 °F (37 7 °C)] 99 1 °F (37 3 °C)  HR:  [74-79] 75  Resp:  [18] 18  BP: (133-134)/(62-63) 134/62  SpO2:  [91 %-96 %] 91 %  Body mass index is 39 79 kg/m²  Input and Output Summary (last 24 hours):        Intake/Output Summary (Last 24 hours) at 10/27/2021 1401  Last data filed at 10/26/2021 2056  Gross per 24 hour   Intake 370 ml   Output 800 ml   Net -430 ml       Physical Exam:     Physical Exam  Vitals reviewed  Constitutional:       General: He is not in acute distress  Appearance: He is not toxic-appearing  HENT:      Head: Normocephalic and atraumatic  Eyes:      Pupils: Pupils are equal, round, and reactive to light  Cardiovascular:      Rate and Rhythm: Normal rate and regular rhythm  Pulses: Normal pulses  Heart sounds: Normal heart sounds  Pulmonary:      Effort: Pulmonary effort is normal  No respiratory distress  Breath sounds: Normal breath sounds  Abdominal:      General: Bowel sounds are normal  There is no distension  Palpations: Abdomen is soft  Musculoskeletal:      Comments: Right lower extremity in Acewrap and high need braces  Intact neurological function  Capillary refill < 2 sec     Skin:     Coloration: Skin is not jaundiced  Neurological:      Mental Status: He is alert and oriented to person, place, and time            Additional Data:     Labs:  Results from last 7 days   Lab Units 10/27/21  0308   WBC Thousand/uL 7 93   HEMOGLOBIN g/dL 7 4*   HEMATOCRIT % 23 2*   PLATELETS Thousands/uL 344   NEUTROS PCT % 64   LYMPHS PCT % 24   MONOS PCT % 9   EOS PCT % 2     Results from last 7 days   Lab Units 10/27/21  0308   SODIUM mmol/L 139   POTASSIUM mmol/L 4 5   CHLORIDE mmol/L 105   CO2 mmol/L 29   BUN mg/dL 21   CREATININE mg/dL 1 53*   ANION GAP mmol/L 5   CALCIUM mg/dL 7 7*   GLUCOSE RANDOM mg/dL 360*     Results from last 7 days   Lab Units 10/27/21  0308   INR  1 44*     Results from last 7 days   Lab Units 10/27/21  1103 10/27/21  0717 10/26/21  2035 10/26/21  1538 10/26/21  1121 10/26/21  0733 10/25/21  2040 10/25/21  1616 10/25/21  1020 10/25/21  0718 10/24/21  2158 10/24/21  1555   POC GLUCOSE mg/dl 293* 309* 368* 359* 295* 334* 328* 307* 168* 163* 188* 302*               Imaging: Reviewed radiology reports from this admission including: Xay bilateral LE    Recent Cultures (last 7 days):           Lines/Drains:  Invasive Devices     Peripheral Intravenous Line            Peripheral IV 10/25/21 Right Forearm 2 days    Peripheral IV 10/27/21 Proximal;Right;Ventral (anterior) Forearm <1 day                Telemetry:        Last 24 Hours Medication List:   Current Facility-Administered Medications   Medication Dose Route Frequency Provider Last Rate    acetaminophen  650 mg Oral Q6H PRN Keyon Ernst PA-C      calcium carbonate  500 mg Oral Daily PRN Keyon Ernst PA-C      Diclofenac Sodium  2 g Topical 4x Daily Rhunette Radha Chavira PA-C      heparin (porcine)  3-20 Units/kg/hr (Order-Specific) Intravenous Titrated Olive Coyle MD 13 1 Units/kg/hr (10/27/21 0723)    heparin (porcine)  2,000 Units Intravenous Q1H PRN Olive Coyle MD      heparin (porcine)  4,000 Units Intravenous Q1H PRN Olive Cyole MD      HYDROmorphone  0 5 mg Intravenous Q3H PRN Keyon Ernst PA-C      insulin glargine  25 Units Subcutaneous Once Olive Coyle MD      insulin glargine  50 Units Subcutaneous Daily Chanell Chavira PA-C      insulin lispro  12 Units Subcutaneous TID With Meals Olive Coyle MD      insulin lispro  2-12 Units Subcutaneous TID AC Keyon Ernst PA-C      lactated ringers  125 mL/hr Intravenous Continuous Zoladilia Cheng PA-C 125 mL/hr (10/27/21 1140)    lidocaine  1 patch Topical Daily Keyon Ernst PA-C      ondansetron  4 mg Intravenous Q6H PRN Keyon Ernst PA-C      oxyCODONE  10 mg Oral Q12H [address] Keyon Ernst PA-C      polyethylene glycol  17 g Oral Daily PRN Keyon Ernst PA-C      pravastatin  40 mg Oral Daily With Cristhian Chavira PA-C      senna  2 tablet Oral BID Keyon Ernst PA-C      warfarin  6 mg Oral Daily (warfarin) Olive Coyle MD          Today, Patient Was Seen By: Olive Coyle MD    ** Please Note: This note has been constructed using a voice recognition system   **

## 2021-10-28 ENCOUNTER — VBI (OUTPATIENT)
Dept: ADMINISTRATIVE | Facility: OTHER | Age: 66
End: 2021-10-28

## 2021-10-28 LAB
ABO GROUP BLD: NORMAL
ANION GAP SERPL CALCULATED.3IONS-SCNC: 8 MMOL/L (ref 4–13)
APTT PPP: 55 SECONDS (ref 23–37)
BASOPHILS # BLD AUTO: 0.03 THOUSANDS/ΜL (ref 0–0.1)
BASOPHILS NFR BLD AUTO: 0 % (ref 0–1)
BLD GP AB SCN SERPL QL: NEGATIVE
BUN SERPL-MCNC: 14 MG/DL (ref 5–25)
CALCIUM SERPL-MCNC: 8 MG/DL (ref 8.3–10.1)
CHLORIDE SERPL-SCNC: 107 MMOL/L (ref 100–108)
CO2 SERPL-SCNC: 27 MMOL/L (ref 21–32)
CREAT SERPL-MCNC: 1.32 MG/DL (ref 0.6–1.3)
EOSINOPHIL # BLD AUTO: 0.29 THOUSAND/ΜL (ref 0–0.61)
EOSINOPHIL NFR BLD AUTO: 4 % (ref 0–6)
ERYTHROCYTE [DISTWIDTH] IN BLOOD BY AUTOMATED COUNT: 15.5 % (ref 11.6–15.1)
FERRITIN SERPL-MCNC: 103 NG/ML (ref 8–388)
GFR SERPL CREATININE-BSD FRML MDRD: 57 ML/MIN/1.73SQ M
GLUCOSE SERPL-MCNC: 196 MG/DL (ref 65–140)
GLUCOSE SERPL-MCNC: 199 MG/DL (ref 65–140)
GLUCOSE SERPL-MCNC: 200 MG/DL (ref 65–140)
GLUCOSE SERPL-MCNC: 225 MG/DL (ref 65–140)
GLUCOSE SERPL-MCNC: 75 MG/DL (ref 65–140)
HCT VFR BLD AUTO: 24.1 % (ref 36.5–49.3)
HGB BLD-MCNC: 7.5 G/DL (ref 12–17)
IMM GRANULOCYTES # BLD AUTO: 0.03 THOUSAND/UL (ref 0–0.2)
IMM GRANULOCYTES NFR BLD AUTO: 0 % (ref 0–2)
INR PPP: 1.58 (ref 0.84–1.19)
IRON SATN MFR SERPL: 9 % (ref 20–50)
IRON SERPL-MCNC: 16 UG/DL (ref 65–175)
LYMPHOCYTES # BLD AUTO: 1.92 THOUSANDS/ΜL (ref 0.6–4.47)
LYMPHOCYTES NFR BLD AUTO: 28 % (ref 14–44)
MCH RBC QN AUTO: 28.5 PG (ref 26.8–34.3)
MCHC RBC AUTO-ENTMCNC: 31.1 G/DL (ref 31.4–37.4)
MCV RBC AUTO: 92 FL (ref 82–98)
MONOCYTES # BLD AUTO: 0.67 THOUSAND/ΜL (ref 0.17–1.22)
MONOCYTES NFR BLD AUTO: 10 % (ref 4–12)
NEUTROPHILS # BLD AUTO: 3.94 THOUSANDS/ΜL (ref 1.85–7.62)
NEUTS SEG NFR BLD AUTO: 58 % (ref 43–75)
NRBC BLD AUTO-RTO: 0 /100 WBCS
PLATELET # BLD AUTO: 391 THOUSANDS/UL (ref 149–390)
PMV BLD AUTO: 9.9 FL (ref 8.9–12.7)
POTASSIUM SERPL-SCNC: 4 MMOL/L (ref 3.5–5.3)
PROTHROMBIN TIME: 18.1 SECONDS (ref 11.6–14.5)
RBC # BLD AUTO: 2.63 MILLION/UL (ref 3.88–5.62)
RH BLD: POSITIVE
SODIUM SERPL-SCNC: 142 MMOL/L (ref 136–145)
SPECIMEN EXPIRATION DATE: NORMAL
TIBC SERPL-MCNC: 179 UG/DL (ref 250–450)
WBC # BLD AUTO: 6.88 THOUSAND/UL (ref 4.31–10.16)

## 2021-10-28 PROCEDURE — 86900 BLOOD TYPING SEROLOGIC ABO: CPT | Performed by: INTERNAL MEDICINE

## 2021-10-28 PROCEDURE — 97535 SELF CARE MNGMENT TRAINING: CPT

## 2021-10-28 PROCEDURE — 82728 ASSAY OF FERRITIN: CPT | Performed by: INTERNAL MEDICINE

## 2021-10-28 PROCEDURE — 99232 SBSQ HOSP IP/OBS MODERATE 35: CPT | Performed by: INTERNAL MEDICINE

## 2021-10-28 PROCEDURE — 85610 PROTHROMBIN TIME: CPT | Performed by: INTERNAL MEDICINE

## 2021-10-28 PROCEDURE — 83540 ASSAY OF IRON: CPT | Performed by: INTERNAL MEDICINE

## 2021-10-28 PROCEDURE — 80048 BASIC METABOLIC PNL TOTAL CA: CPT | Performed by: INTERNAL MEDICINE

## 2021-10-28 PROCEDURE — 85025 COMPLETE CBC W/AUTO DIFF WBC: CPT | Performed by: INTERNAL MEDICINE

## 2021-10-28 PROCEDURE — 83550 IRON BINDING TEST: CPT | Performed by: INTERNAL MEDICINE

## 2021-10-28 PROCEDURE — 97530 THERAPEUTIC ACTIVITIES: CPT

## 2021-10-28 PROCEDURE — 86901 BLOOD TYPING SEROLOGIC RH(D): CPT | Performed by: INTERNAL MEDICINE

## 2021-10-28 PROCEDURE — 86850 RBC ANTIBODY SCREEN: CPT | Performed by: INTERNAL MEDICINE

## 2021-10-28 PROCEDURE — 82948 REAGENT STRIP/BLOOD GLUCOSE: CPT

## 2021-10-28 PROCEDURE — 85730 THROMBOPLASTIN TIME PARTIAL: CPT | Performed by: INTERNAL MEDICINE

## 2021-10-28 RX ADMIN — INSULIN LISPRO 15 UNITS: 100 INJECTION, SOLUTION INTRAVENOUS; SUBCUTANEOUS at 10:05

## 2021-10-28 RX ADMIN — INSULIN LISPRO 2 UNITS: 100 INJECTION, SOLUTION INTRAVENOUS; SUBCUTANEOUS at 10:04

## 2021-10-28 RX ADMIN — SODIUM CHLORIDE, SODIUM LACTATE, POTASSIUM CHLORIDE, AND CALCIUM CHLORIDE 75 ML/HR: .6; .31; .03; .02 INJECTION, SOLUTION INTRAVENOUS at 21:55

## 2021-10-28 RX ADMIN — SODIUM CHLORIDE, SODIUM LACTATE, POTASSIUM CHLORIDE, AND CALCIUM CHLORIDE 125 ML/HR: .6; .31; .03; .02 INJECTION, SOLUTION INTRAVENOUS at 05:38

## 2021-10-28 RX ADMIN — STANDARDIZED SENNA CONCENTRATE 17.2 MG: 8.6 TABLET ORAL at 18:33

## 2021-10-28 RX ADMIN — DICLOFENAC SODIUM 2 G: 10 GEL TOPICAL at 10:08

## 2021-10-28 RX ADMIN — HEPARIN SODIUM 13.11 UNITS/KG/HR: 10000 INJECTION, SOLUTION INTRAVENOUS at 05:29

## 2021-10-28 RX ADMIN — LIDOCAINE 5% 1 PATCH: 700 PATCH TOPICAL at 09:59

## 2021-10-28 RX ADMIN — INSULIN GLARGINE 60 UNITS: 100 INJECTION, SOLUTION SUBCUTANEOUS at 10:02

## 2021-10-28 RX ADMIN — STANDARDIZED SENNA CONCENTRATE 17.2 MG: 8.6 TABLET ORAL at 10:03

## 2021-10-28 RX ADMIN — PRAVASTATIN SODIUM 40 MG: 40 TABLET ORAL at 18:07

## 2021-10-28 RX ADMIN — DICLOFENAC SODIUM 2 G: 10 GEL TOPICAL at 18:33

## 2021-10-28 RX ADMIN — OXYCODONE HYDROCHLORIDE 10 MG: 10 TABLET, FILM COATED, EXTENDED RELEASE ORAL at 21:16

## 2021-10-28 RX ADMIN — INSULIN LISPRO 15 UNITS: 100 INJECTION, SOLUTION INTRAVENOUS; SUBCUTANEOUS at 13:43

## 2021-10-28 RX ADMIN — HEPARIN SODIUM 2000 UNITS: 1000 INJECTION INTRAVENOUS; SUBCUTANEOUS at 06:04

## 2021-10-28 RX ADMIN — INSULIN LISPRO 4 UNITS: 100 INJECTION, SOLUTION INTRAVENOUS; SUBCUTANEOUS at 13:42

## 2021-10-28 RX ADMIN — HEPARIN SODIUM 15.1 UNITS/KG/HR: 10000 INJECTION, SOLUTION INTRAVENOUS at 21:55

## 2021-10-28 RX ADMIN — ACETAMINOPHEN 650 MG: 325 TABLET, FILM COATED ORAL at 15:16

## 2021-10-28 RX ADMIN — OXYCODONE HYDROCHLORIDE 10 MG: 10 TABLET, FILM COATED, EXTENDED RELEASE ORAL at 10:02

## 2021-10-28 RX ADMIN — WARFARIN SODIUM 6 MG: 3 TABLET ORAL at 18:33

## 2021-10-28 RX ADMIN — SODIUM CHLORIDE, SODIUM LACTATE, POTASSIUM CHLORIDE, AND CALCIUM CHLORIDE 75 ML/HR: .6; .31; .03; .02 INJECTION, SOLUTION INTRAVENOUS at 14:03

## 2021-10-28 NOTE — PLAN OF CARE
Problem: Potential for Falls  Goal: Patient will remain free of falls  Description: INTERVENTIONS:  - Educate patient/family on patient safety including physical limitations  - Instruct patient to call for assistance with activity   - Consult OT/PT to assist with strengthening/mobility   - Keep Call bell within reach  - Keep bed low and locked with side rails adjusted as appropriate  - Keep care items and personal belongings within reach  - Initiate and maintain comfort rounds  - Make Fall Risk Sign visible to staff  - Offer Toileting every  Hours, in advance of need  - Initiate/Maintain alarm  - Obtain necessary fall risk management equipment:   - Apply yellow socks and bracelet for high fall risk patients  - Consider moving patient to room near nurses station  Outcome: Progressing     Problem: Prexisting or High Potential for Compromised Skin Integrity  Goal: Skin integrity is maintained or improved  Description: INTERVENTIONS:  - Identify patients at risk for skin breakdown  - Assess and monitor skin integrity  - Assess and monitor nutrition and hydration status  - Monitor labs   - Assess for incontinence   - Turn and reposition patient  - Assist with mobility/ambulation  - Relieve pressure over bony prominences  - Avoid friction and shearing  - Provide appropriate hygiene as needed including keeping skin clean and dry  - Evaluate need for skin moisturizer/barrier cream  - Collaborate with interdisciplinary team   - Patient/family teaching  - Consider wound care consult   Outcome: Progressing     Problem: PAIN - ADULT  Goal: Verbalizes/displays adequate comfort level or baseline comfort level  Description: Interventions:  - Encourage patient to monitor pain and request assistance  - Assess pain using appropriate pain scale  - Administer analgesics based on type and severity of pain and evaluate response  - Implement non-pharmacological measures as appropriate and evaluate response  - Consider cultural and social influences on pain and pain management  - Notify physician/advanced practitioner if interventions unsuccessful or patient reports new pain  Outcome: Progressing     Problem: INFECTION - ADULT  Goal: Absence or prevention of progression during hospitalization  Description: INTERVENTIONS:  - Assess and monitor for signs and symptoms of infection  - Monitor lab/diagnostic results  - Monitor all insertion sites, i e  indwelling lines, tubes, and drains  - Monitor endotracheal if appropriate and nasal secretions for changes in amount and color  - [place] appropriate cooling/warming therapies per order  - Administer medications as ordered  - Instruct and encourage patient and family to use good hand hygiene technique  - Identify and instruct in appropriate isolation precautions for identified infection/condition  Outcome: Progressing  Goal: Absence of fever/infection during neutropenic period  Description: INTERVENTIONS:  - Monitor WBC    Outcome: Progressing     Problem: SAFETY ADULT  Goal: Patient will remain free of falls  Description: INTERVENTIONS:  - Educate patient/family on patient safety including physical limitations  - Instruct patient to call for assistance with activity   - Consult OT/PT to assist with strengthening/mobility   - Keep Call bell within reach  - Keep bed low and locked with side rails adjusted as appropriate  - Keep care items and personal belongings within reach  - Initiate and maintain comfort rounds  - Make Fall Risk Sign visible to staff  - Offer Toileting every  Hours, in advance of need  - Initiate/Maintain alarm  - Obtain necessary fall risk management equipment:   - Apply yellow socks and bracelet for high fall risk patients  - Consider moving patient to room near nurses station  Outcome: Progressing  Goal: Maintain or return to baseline ADL function  Description: INTERVENTIONS:  -  Assess patient's ability to carry out ADLs; assess patient's baseline for ADL function and identify physical deficits which impact ability to perform ADLs (bathing, care of mouth/teeth, toileting, grooming, dressing, etc )  - Assess/evaluate cause of self-care deficits   - Assess range of motion  - Assess patient's mobility; develop plan if impaired  - Assess patient's need for assistive devices and provide as appropriate  - Encourage maximum independence but intervene and supervise when necessary  - Involve family in performance of ADLs  - Assess for home care needs following discharge   - Consider OT consult to assist with ADL evaluation and planning for discharge  - Provide patient education as appropriate  Outcome: Progressing  Goal: Maintains/Returns to pre admission functional level  Description: INTERVENTIONS:  - Perform BMAT or MOVE assessment daily    - Set and communicate daily mobility goal to care team and patient/family/caregiver  - Collaborate with rehabilitation services on mobility goals if consulted  - Perform Range of Motion  times a day  - Reposition patient every  hours    - Dangle patient  times a day  - Stand patient  times a day  - Ambulate patient  times a day  - Out of bed to chair  times a day   - Out of bed for meals  times a day  - Out of bed for toileting  - Record patient progress and toleration of activity level   Outcome: Progressing     Problem: DISCHARGE PLANNING  Goal: Discharge to home or other facility with appropriate resources  Description: INTERVENTIONS:  - Identify barriers to discharge w/patient and caregiver  - Arrange for needed discharge resources and transportation as appropriate  - Identify discharge learning needs (meds, wound care, etc )  - Arrange for interpretive services to assist at discharge as needed  - Refer to Case Management Department for coordinating discharge planning if the patient needs post-hospital services based on physician/advanced practitioner order or complex needs related to functional status, cognitive ability, or social support system  Outcome: Progressing     Problem: Knowledge Deficit  Goal: Patient/family/caregiver demonstrates understanding of disease process, treatment plan, medications, and discharge instructions  Description: Complete learning assessment and assess knowledge base  Interventions:  - Provide teaching at level of understanding  - Provide teaching via preferred learning methods  Outcome: Progressing     Problem: MOBILITY - ADULT  Goal: Maintain or return to baseline ADL function  Description: INTERVENTIONS:  -  Assess patient's ability to carry out ADLs; assess patient's baseline for ADL function and identify physical deficits which impact ability to perform ADLs (bathing, care of mouth/teeth, toileting, grooming, dressing, etc )  - Assess/evaluate cause of self-care deficits   - Assess range of motion  - Assess patient's mobility; develop plan if impaired  - Assess patient's need for assistive devices and provide as appropriate  - Encourage maximum independence but intervene and supervise when necessary  - Involve family in performance of ADLs  - Assess for home care needs following discharge   - Consider OT consult to assist with ADL evaluation and planning for discharge  - Provide patient education as appropriate  Outcome: Progressing  Goal: Maintains/Returns to pre admission functional level  Description: INTERVENTIONS:  - Perform BMAT or MOVE assessment daily    - Set and communicate daily mobility goal to care team and patient/family/caregiver  - Collaborate with rehabilitation services on mobility goals if consulted  - Perform Range of Motion  times a day  - Reposition patient every  hours    - Dangle patient  times a day  - Stand patient  times a day  - Ambulate patient  times a day  - Out of bed to chair  times a day   - Out of bed for meals  times a day  - Out of bed for toileting  - Record patient progress and toleration of activity level   Outcome: Progressing     Problem: Nutrition/Hydration-ADULT  Goal: Nutrient/Hydration intake appropriate for improving, restoring or maintaining nutritional needs  Description: Monitor and assess patient's nutrition/hydration status for malnutrition  Collaborate with interdisciplinary team and initiate plan and interventions as ordered  Monitor patient's weight and dietary intake as ordered or per policy  Utilize nutrition screening tool and intervene as necessary  Determine patient's food preferences and provide high-protein, high-caloric foods as appropriate       INTERVENTIONS:  - Monitor oral intake, urinary output, labs, and treatment plans  - Assess nutrition and hydration status and recommend course of action  - Evaluate amount of meals eaten  - Assist patient with eating if necessary   - Allow adequate time for meals  - Recommend/ encourage appropriate diets, oral nutritional supplements, and vitamin/mineral supplements  - Order, calculate, and assess calorie counts as needed  - Recommend, monitor, and adjust tube feedings and TPN/PPN based on assessed needs  - Assess need for intravenous fluids  - Provide specific nutrition/hydration education as appropriate  - Include patient/family/caregiver in decisions related to nutrition  Outcome: Progressing

## 2021-10-28 NOTE — OCCUPATIONAL THERAPY NOTE
Occupational Therapy Treatment Note        Patient Name: Marky Lamar  FPBJC'Y Date: 10/28/2021       10/28/21 1053   OT Last Visit   OT Visit Date 10/28/21   Note Type   Note Type Treatment for insurance authorization   Restrictions/Precautions   Weight Bearing Precautions Per Order Yes   RLE Weight Bearing Per Order NWB  (per orthopedics)   Braces or Orthoses LE Immobilizer  (RLE)   Other Precautions Bed Alarm;WBS;Multiple lines; Fall Risk;Pain   Pain Assessment   Pain Assessment Tool 0-10   Pain Score   (no numeric provided; "no worse" after mobility)   Pain Rating: FLACC (Rest) - Face 0   Pain Rating: FLACC (Rest) - Legs 0   Pain Rating: FLACC (Rest) - Activity 1   Pain Rating: FLACC (Rest) - Cry 1   Pain Rating: FLACC (Rest) - Consolability 1   Score: FLACC (Rest) 3   Pain Rating: FLACC (Activity) - Face 1   Pain Rating: FLACC (Activity) - Legs 1   Pain Rating: FLACC (Activity) - Activity 1   Pain Rating: FLACC (Activity) - Cry 1   Pain Rating: FLACC (Activity) - Consolability 1   Score: FLACC (Activity) 5   ADL   Where Assessed Edge of bed   Eating Assistance 6  Modified independent   Eating Comments Based on pt's functional performance during OT treatment session   Grooming Assistance 6  Modified Independent   Grooming Deficit Setup; Increased time to complete;Wash/dry face   UB Bathing Assistance 4  Minimal Assistance   UB Bathing Deficit Setup;Supervision/safety; Increased time to complete  (assist for back area)   LB Bathing Assistance 2  Maximal Assistance   LB Bathing Deficit Supervision/safety; Increased time to complete; Buttocks; Left lower leg including foot; Left upper leg   UB Dressing Assistance 5  Supervision/Setup   UB Dressing Deficit Setup; Increased time to complete; Fasteners   LB Dressing Assistance 2  Maximal Assistance   LB Dressing Comments Based on pt's functional performance during OT treatment session   Toileting Assistance  3  Moderate Assistance   Toileting Comments Based on pt's functional performance during OT treatment session   Functional Standing Tolerance   Time ~5 seconds x1 trial   Activity Functional transfers   Bed Mobility   Rolling R 4  Minimal assistance   Additional items Assist x 2;Bedrails; Increased time required;Verbal cues;LE management   Sit to Supine 4  Minimal assistance   Additional items Assist x 2; Increased time required;Verbal cues;LE management   Additional Comments Patient received seated EOB with PT Erin Montez upon OT arrival; at end of session: pt returned lying sidelying on R side in bed w/ all needs within reach and bed alarm activated  Performed rolling R in bed in order to perform perineal hygiene/LB bathing   Transfers   Sit to Stand 3  Moderate assistance  (A of 3rd for maintaining NWB R LE)   Additional items Assist x 2; Increased time required;Verbal cues   Stand to Sit 3  Moderate assistance   Additional items Assist x 2; Increased time required;Verbal cues   Additional Comments Performed functional transfers using RW  Patient maintained NWB RLE throughout session with assist of third during transfer to maintain WB restriction   Cognition   Overall Cognitive Status WFL   Arousal/Participation Alert   Attention Within functional limits   Orientation Level Oriented X4   Memory Within functional limits   Following Commands Follows all commands and directions without difficulty   Comments Patient agreeable to OT treatment session   Activity Tolerance   Activity Tolerance Patient tolerated treatment well   Medical Staff Made Aware MARY robert; PT Erin Montez   Assessment   Assessment Patient participated in Skilled OT session this date with interventions consisting of ADL re training with the use of correct body mechnaics, safety awareness and fall prevention techniques, maintaining weight bearing restrictions,  therapeutic activities to: increase activity tolerance and increase dynamic sit/ stand balance during functional activity , and increasing EOB sitting tolerance  Patient agreeable to OT treatment session, upon arrival patient was found seated at edge of bed, alert and responsive   In comparison to previous session, patient with improvements in activity tolerance and task engagement  Patient participated in ADLs while seated unsupported at EOB; patient required minimal assistance for UB bathing, supervision/setup for UB dressing, and maximal assistance for LB ADLs  Patient demonstrated good static and fair dynamic sitting balance during functional tasks at EOB  Patient performed rolling R in bed with minimal assist x2 for perineal hygiene  Co treatment session with Physical Therapy secondary to complex medical condition, requiring two skilled therapists to provide therapeutic techniques to position, facilitate and elicit targeted outcome  Patient requiring verbal cues for safety and verbal cues for correct technique  Patient continues to be functioning below baseline level, occupational performance remains limited secondary to factors listed above and increased risk for falls and injury  From OT standpoint, recommendation at time of d/c would be post-acute rehabilitation services  Patient to benefit from continued Occupational Therapy treatment while in the hospital to address deficits as defined above and maximize level of functional independence with ADLs and functional mobility  Plan   Treatment Interventions ADL retraining;Functional transfer training;UE strengthening/ROM; Endurance training;Patient/family training; Compensatory technique education;Continued evaluation; Energy conservation; Activityengagement   Goal Expiration Date 11/04/21  (Goals extended from IE)   OT Treatment Day 3   OT Frequency 3-5x/wk   Recommendation   OT Discharge Recommendation Post acute rehabilitation services   AM-PAC Daily Activity Inpatient   Lower Body Dressing 2   Bathing 2   Toileting 2   Upper Body Dressing 3   Grooming 4   Eating 4   Daily Activity Raw Score 17   Daily Activity Standardized Score (Calc for Raw Score >=11) 37 26   AM-PAC Applied Cognition Inpatient   Following a Speech/Presentation 4   Understanding Ordinary Conversation 4   Taking Medications 4   Remembering Where Things Are Placed or Put Away 4   Remembering List of 4-5 Errands 4   Taking Care of Complicated Tasks 4   Applied Cognition Raw Score 24   Applied Cognition Standardized Score 62 21   Modified Sunil Scale   Modified [place] Scale 4     Goals extended from IE as they are still appropriate for pt's current level of occupational functionin - Patient will verbalize and demonstrate use of energy conservation/ deep breathing technique and work simplification skills during functional activity with no verbal cues  2 - Patient will verbalize and demonstrate good body mechanics and joint protection techniques during  ADLs/ IADLs with no verbal cues  3 - Patient will increase OOB/ sitting tolerance to 2-4 hours per day for increased participation in self care and leisure tasks with no s/s of exertion  4 - Patient will increase standing tolerance time to 5 minutes with unilateral UE support to complete sink level ADLs@ mod I level while maintaining WB restriction  5 - Patient will increase sitting tolerance at edge of bed to 20 minutes to complete UB ADLs @ set up assist level      6 - Patient will transfer bed to Chair / toilet at Set up assist level with AD as indicated      7 - Patient will complete UB ADLs with set up assist      8 - Patient will complete LB ADLs with min assist with the use of adaptive equipment      9 - Patient will complete toileting hygiene with set up assist/ supervision for thoroughness     10 - Patient/ Family will demonstrate competency with UE Home Exercise Program       Elsie Manzo, OTR/L

## 2021-10-28 NOTE — UTILIZATION REVIEW
Continued Stay Review    Date: 10/28/21                        Current Patient Class: INPT Current Level of Care: MED-SURG    HPI:66 y o  male initially admitted INPT on 10/13 D/T complex proximal tibia fracture which involved articular surface of the lateral plateau and shearing off of the entire proximal medial condyle of the tibia including articular surface, this resulted in dissociation of metaphysis and diaphysis   external fixator was placed to allow for soft tissue rest and return to the OR on 10/25  ORIF R tibia bicondylar plateau fracture with removal RLE external fixator  Assessment/Plan: POD 3 S/P ORIF right proximal tibia shaft fracture, removal of external fixator  Intact vascular and neurological function  does not want to get up out of bed because he is concerned that he may fall again and sustain another injury  agreed to work with PT  10/26 C/O left calf pain, doppler was performed for DVT assessment, which was negative  HGB 7 5  Discussed need for PRBC transfusion due to possible acute blood loss, resistant to the idea  Blood sugar significantly elevated  lantus  Pain controlled  Non-weight bearing RLE  dressings, keep clean, dry, and intact  immobilizer at all times  PT/OT      Vital Signs:   10/28/21 07:12:21  99 5 °F (37 5 °C)  71  18  148/60  89  91 %  --  --   10/27/21 2300  --  --  --  --  --  98 %  None (Room air)  --   10/27/21 22:04:40  98 6 °F (37 °C)  74  18  138/56  83  93 %  None (Room air)  Lying   10/27/21 1900  --  --  --  --  --  95 %  None (Room air)  --   10/27/21 15:50:46  98 5 °F (36 9 °C)  78  18  157/65  96  95 %  --  --   10/27/21 07:19:35  99 1 °F (37 3 °C)  75  18  134/62  86  91 %  None (Room air)           Pertinent Labs/Diagnostic Results:       Results from last 7 days   Lab Units 10/28/21  0511 10/27/21  0308 10/26/21  0812 10/25/21  0540 10/24/21  0859   WBC Thousand/uL 6 88 7 93 10 19* 6 31 6 42   HEMOGLOBIN g/dL 7 5* 7 4* 7 8* 8 6* 9 0*   HEMATOCRIT % 24 1* 23 2* 24 5* 27 3* 28 3*   PLATELETS Thousands/uL 391* 344 341 399* 362   NEUTROS ABS Thousands/µL 3 94 5 05 7 75* 3 50 3 39         Results from last 7 days   Lab Units 10/28/21  0512 10/27/21  0308 10/26/21  0812 10/25/21  0540 10/24/21  0859   SODIUM mmol/L 142 139 137 140 139   POTASSIUM mmol/L 4 0 4 5 4 3 3 9 3 9   CHLORIDE mmol/L 107 105 103 105 104   CO2 mmol/L 27 29 25 27 28   ANION GAP mmol/L 8 5 9 8 7   BUN mg/dL 14 21 25 18 18   CREATININE mg/dL 1 32* 1 53* 1 62* 1 41* 1 46*   EGFR ml/min/1 73sq m 57 47 44 52 50   CALCIUM mg/dL 8 0* 7 7* 8 2* 8 5 8 8         Results from last 7 days   Lab Units 10/28/21  0710 10/27/21  2042 10/27/21  1549 10/27/21  1103 10/27/21  0717 10/26/21  2035 10/26/21  1538 10/26/21  1121 10/26/21  0733 10/25/21  2040 10/25/21  1616 10/25/21  1020   POC GLUCOSE mg/dl 200* 186* 195* 293* 309* 368* 359* 295* 334* 328* 307* 168*     Results from last 7 days   Lab Units 10/28/21  0512 10/27/21  0308 10/26/21  0812 10/25/21  0540 10/24/21  0859 10/23/21  0924 10/22/21  0959   GLUCOSE RANDOM mg/dL 199* 360* 323* 165* 239* 202* 213*       Results from last 7 days   Lab Units 10/28/21  0511 10/27/21  0308 10/26/21  2055 10/26/21  0812   PROTIME seconds 18 1* 16 9*  --  16 1*   INR  1 58* 1 44*  --  1 35*   PTT seconds 55* 64* 65* 36     10/26 VAS lower limb venous duplex:  RIGHT LOWER LIMB LIMITED:  Unable to obtain images  LEFT LOWER LIMB:  No evidence of gross acute deep vein thrombosis  No evidence of superficial thrombophlebitis noted  Doppler evaluation shows a normal response to augmentation maneuvers  Popliteal, posterior tibial and anterior tibial arterial Doppler waveforms are  Biphasic      Medications:   Scheduled Medications:  Diclofenac Sodium, 2 g, Topical, 4x Daily  insulin glargine, 10 Units, Subcutaneous, Once  insulin glargine, 25 Units, Subcutaneous, Once  insulin glargine, 60 Units, Subcutaneous, Daily  insulin lispro, 15 Units, Subcutaneous, TID With Meals  insulin lispro, 2-12 Units, Subcutaneous, TID AC  lidocaine, 1 patch, Topical, Daily  oxyCODONE, 10 mg, Oral, Q12H SHASHI  pravastatin, 40 mg, Oral, Daily With Dinner  senna, 2 tablet, Oral, BID  warfarin, 6 mg, Oral, Daily (warfarin)      Continuous IV Infusions:  heparin (porcine), 3-20 Units/kg/hr (Order-Specific), Intravenous, Titrated  lactated ringers, 125 mL/hr, Intravenous, Continuous      PRN Meds:  acetaminophen, 650 mg, Oral, Q6H PRN  calcium carbonate, 500 mg, Oral, Daily PRN  heparin (porcine), 2,000 Units, Intravenous, Q1H PRN 10/28 X 1  heparin (porcine), 4,000 Units, Intravenous, Q1H PRN  HYDROmorphone, 0 5 mg, Intravenous, Q3H PRN  ondansetron, 4 mg, Intravenous, Q6H PRN  polyethylene glycol, 17 g, Oral, Daily PRN        Discharge Plan: D    Network Utilization Review Department  ATTENTION: Please call with any questions or concerns to [phone] and carefully listen to the prompts so that you are directed to the right person  All voicemails are confidential   Sang Ramirez all requests for admission clinical reviews, approved or denied determinations and any other requests to dedicated fax number below belonging to the campus where the patient is receiving treatment   List of dedicated fax numbers for the Facilities:  [address] DENIALS (Administrative/Medical Necessity) [phone]   [address] (Maternity/NICU/Pediatrics) [phone]   [address]  [address] [id number] [place] [address] [address] [phone]   [address]  [address] [facility] [phone]

## 2021-10-28 NOTE — PLAN OF CARE
Problem: PHYSICAL THERAPY ADULT  Goal: Performs mobility at highest level of function for planned discharge setting  See evaluation for individualized goals  Description: Treatment/Interventions: Functional transfer training, LE strengthening/ROM, Therapeutic exercise, Endurance training, Patient/family training, Bed mobility, Continued evaluation, Spoke to nursing, OT, Family          See flowsheet documentation for full assessment, interventions and recommendations  Outcome: Progressing  Note: Prognosis: Good  Problem List: Decreased strength, Decreased range of motion, Decreased endurance, Impaired balance, Decreased mobility, Orthopedic restrictions, Pain, Decreased skin integrity  Assessment: Pt requires PT /OT co-treat due to signficant assistance with mobility and cognitive-behavioral impairments  PT and OT goals addressed separately  Pt seen for PT treatment session this date, consisting of ther act focused on bed mobility, supine<>sit transfers, sit to stand transfers, seated weight shifts and lateral scoots at EOB; static sitting tolerance at EOB without trunk support for 25min duration  Since previous session, pt has made good progress in terms of decreased A for transfers, improved sitting balance, complete bottom clearance on 1 sit to stand trial for 75% upright posture  Current goals and POC remain appropriate, pt continues to have rehab potential and is making progress towards STGs  Pt prognosis for achieving goals is good, pending pt progress with hospitalization/medical status improvements, and indicated by ability to follow directions, recent history of independence with functional skills/High PLOF and supportive family/caregivers  Pt limited d/t fear of pain provocation and self limiting behaviors  PT recommends post acute rehabilitation services upon discharge  Pt continues to be functioning below baseline level, and remains limited 2* factors listed above   PT will continue to see pt during current hospitalization in order to address the deficits listed above and provide interventions consistent w/ POC in effort to achieve STGs  Barriers to Discharge: Inaccessible home environment, Decreased caregiver support        PT Discharge Recommendation: Post acute rehabilitation services     PT - OK to Discharge: Yes (when medically cleared, if to STR)    See flowsheet documentation for full assessment

## 2021-10-28 NOTE — PROGRESS NOTES
[address] [facility], [place]  Progress Note Brea Espino 1955, 61 y o  male MRN: [id number]  Unit/Bed#: -Jeffery Encounter: [id number]  Primary Care Provider: Alexandra Bonilla DO   Date and time admitted to hospital: 10/13/2021  5:17 AM    Anemia  Assessment & Plan  Hemoglobin dropped dramatically since admission from 12 units to 7 5 and has been currently stable for few days  Most likely secondary blood loss during  surgery  Concerning for acute blood loss anemia  Patient refused blood transfusion yesterday    - discussed need for PRBC transfusion with patient,he appears not willing to accept transfusion  Discussed risk and benefit of said therapy and alternatives  Decline to sign consent form  Will touch base with spouse as he requests  -- no consent in the chart  -- will check current panel and replete iron if indicated     Will monitor closely  Difficult intubation  Assessment & Plan  Had difficult intubation  Due to severe kyphosis during ortho procedure on 10/18  Will keep in mind because he is going to have a definitive surgery again next week    Pseudogout of knee, left  Assessment & Plan  Pain and swelling in the left anterior knee, superiorly  Tender to touch  Developed after PT  Xray -unremarkable  Synovial fluid analysis shows calcium pyrophosphate crystals  Started on colchicine 0 6mg daily  ROSE (acute kidney injury) ([facility] [address] )  Assessment & Plan  ROSE on CKD  May be prerenal   Patient received IV hydration  Cr trended since admission 1 99->1  32  Baseline 1 21 months ago  Avoid nephrotoxics  Avoid hypotension  Monitor I/O      Results from last 7 days   Lab Units 10/27/21  0308 10/26/21  3828 10/25/21  0540 10/24/21  0859 10/23/21  0924 10/22/21  0959 10/21/21  0559   SODIUM mmol/L 139 137 140 139 141 142 143   POTASSIUM mmol/L 4 5 4 3 3 9 3 9 3 9 3 8 4 6   CHLORIDE mmol/L 105 103 105 104 105 106 108   CO2 mmol/L 29 25 27 28 27 28 28   ANION GAP mmol/L 5 9 8 7 9 8 7   BUN mg/dL 21 25 18 18 17 18 24   CREATININE mg/dL 1 53* 1 62* 1 41* 1 46* 1 40* 1 29 1 28   CALCIUM mg/dL 7 7* 8 2* 8 5 8 8 8 7 8 4 8 4   EGFR ml/min/1 73sq m 47 44 52 50 53 58 59   GLUCOSE RANDOM mg/dL 360* 323* 165* 239* 202* 213* 131         History of DVT (deep vein thrombosis)  Assessment & Plan  History 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  Unprovoked  Last episode was 10yrs ago  On AC with coumadin  Currently held  Resume heparin gtt  Patient is on Coumadin   INR today 1 6    -- continue Coumadin 6 mg daily  -- continue bridging with heparin for now    Type 2 diabetes mellitus with diabetic neuropathy, without long-term current use of insulin [facility])  Assessment & Plan  Lab Results   Component Value Date    HGBA1C 8 2 (H) 10/16/2021       Recent Labs     10/26/21  1121 10/26/21  1538 10/26/21  2035 10/27/21  0717   POCGLU 295* 359* 368* 309*       Blood Sugar Average: Last 72 hrs:  (P) 283  Known diabetic with diabetic neuropathy  On metformin and lantus at home  Blood sugar on presentation 418  Contiunue sliding scale insulin  Hypoglycemic protocol  Goal blood sugar not at goal of 140-180  · Continue Diabetic diet  · Continue Lantus 50 units q a m  · Increased meal time insulin Humulog 12 unit to meals  · Continue SSI coverage while in hospital  · Continue before meals and bedtime glucose checks      Mixed hyperlipidemia  Assessment & Plan  Continue statins  Essential hypertension  Assessment & Plan  On lisinopril 10mg daily at home  On hold for ROSE  BP stable now  Blood pressure 133/62, pulse 74, temperature 99 9 °F (37 7 °C), temperature source Oral, resp  rate 18, height 6' 1" (1 854 m), weight 136 kg (299 lb 13 2 oz), SpO2 94 %          * Closed fracture of proximal end of right tibia  Assessment & Plan  Sustained mechanical fracture to the proximal end of the right tibia  Initially on external fixators   Status post ORIF and removal of external fixators  - POD#2   Intact distal pulsation and neurological function - able to wiggle toes    Per review of PT notes not agreeable to participating PT due to fear of falling  Reassured patient about PT measures to prevent fall  Appropriate DVT prophylaxis  PT OT  Appreciate orthopedic input    Elevated INR-resolved as of 10/26/2021  Assessment & Plan  Now subtherapeutic ,  Patient previously on Coumadin due to history of DVTs  This is currently on hold due to need for surgery  Ortho recommends 1 4-1 6 for ortho procedure  INR 1 47--  1 18  Continue to hold Coumadin  VTE Pharmacologic Prophylaxis:   VTE Score: 5 Moderate Risk (Score 3-4) - Pharmacological DVT Prophylaxis Ordered: Heparin Drip  Mechanical VTE Prophylaxis in Place: No    Patient Centered Rounds: I have performed bedside rounds with nursing staff today  Discussions with Specialists or Other Care Team Provider:  Ortho    Education and Discussions with Family / Patient: Patient declined call to   Current Length of Stay: 15 day(s)    Current Patient Status: Inpatient     Discharge Plan / Estimated Discharge Date: TBD    Code Status: Level 1 - Full Code      Subjective:       POD #3 ORIF  Patient seen and examined at bedside  He denies any numbness or tingling in the right lower extremity  Was able to wiggle toes and has good distal capillary refill  Patient has no complaints today, review of systems is unremarkable  Distal cast with patient at length that he may need blood transfusion during this hospital stay  Patient is not cooperative  His wife is at bedside and comments that if needed he will agree that blood transfusion  Objective:     Vitals:   Temp (24hrs), Av 9 °F (37 2 °C), Min:98 5 °F (36 9 °C), Max:99 5 °F (37 5 °C)    Temp:  [98 5 °F (36 9 °C)-99 5 °F (37 5 °C)] 99 5 °F (37 5 °C)  HR:  [71-78] 71  Resp:  [18] 18  BP: (138-157)/(56-65) 148/60  SpO2:  [91 %-98 %] 91 %  Body mass index is 39 5 kg/m²  Input and Output Summary (last 24 hours):        Intake/Output Summary (Last 24 hours) at 10/28/2021 1228  Last data filed at 10/28/2021 3639  Gross per 24 hour   Intake 840 ml   Output 2525 ml   Net -1685 ml       Physical Exam:     Physical Exam  Vitals reviewed  Constitutional:       General: He is not in acute distress  Appearance: He is not toxic-appearing  HENT:      Head: Normocephalic and atraumatic  Eyes:      Pupils: Pupils are equal, round, and reactive to light  Cardiovascular:      Rate and Rhythm: Normal rate and regular rhythm  Pulses: Normal pulses  Heart sounds: Normal heart sounds  Pulmonary:      Effort: Pulmonary effort is normal  No respiratory distress  Breath sounds: Normal breath sounds  Abdominal:      General: Bowel sounds are normal  There is no distension  Palpations: Abdomen is soft  Musculoskeletal:      Comments: Right lower extremity in Acewrap and high need braces  Intact neurological function  Capillary refill < 2 sec     Skin:     Coloration: Skin is not jaundiced  Neurological:      Mental Status: He is alert and oriented to person, place, and time            Additional Data:     Labs:  Results from last 7 days   Lab Units 10/28/21  0511   WBC Thousand/uL 6 88   HEMOGLOBIN g/dL 7 5*   HEMATOCRIT % 24 1*   PLATELETS Thousands/uL 391*   NEUTROS PCT % 58   LYMPHS PCT % 28   MONOS PCT % 10   EOS PCT % 4     Results from last 7 days   Lab Units 10/28/21  0512   SODIUM mmol/L 142   POTASSIUM mmol/L 4 0   CHLORIDE mmol/L 107   CO2 mmol/L 27   BUN mg/dL 14   CREATININE mg/dL 1 32*   ANION GAP mmol/L 8   CALCIUM mg/dL 8 0*   GLUCOSE RANDOM mg/dL 199*     Results from last 7 days   Lab Units 10/28/21  0511   INR  1 58*     Results from last 7 days   Lab Units 10/28/21  1142 10/28/21  0710 10/27/21  2042 10/27/21  1549 10/27/21  1103 10/27/21  0717 10/26/21  2035 10/26/21  1538 10/26/21  1121 10/26/21  0733 10/25/21  2040 10/25/21  1616   POC GLUCOSE mg/dl 225* 200* 186* 195* 293* 309* 368* 359* 295* 334* 328* 307* Imaging: Reviewed radiology reports from this admission including: Carley [place] bilateral LE    Recent Cultures (last 7 days):           Lines/Drains:  Invasive Devices     Peripheral Intravenous Line            Peripheral IV 10/25/21 Right Forearm 3 days    Peripheral IV 10/27/21 Proximal;Right;Ventral (anterior) Forearm 1 day                Telemetry:        Last 24 Hours Medication List:   Current Facility-Administered Medications   Medication Dose Route Frequency Provider Last Rate    acetaminophen  650 mg Oral Q6H PRN Paddy Skates, PA-C      calcium carbonate  500 mg Oral Daily PRN Juandy Skates, PA-C      Diclofenac Sodium  2 g Topical 4x Daily Sudarshan Chavira PA-C      heparin (porcine)  3-20 Units/kg/hr (Order-Specific) Intravenous Titrated Solange Zimmerman MD 15 1 Units/kg/hr (10/28/21 0605)    heparin (porcine)  2,000 Units Intravenous Q1H PRN Solange Zimmerman MD      heparin (porcine)  4,000 Units Intravenous Q1H PRN Solange Zimmerman MD      HYDROmorphone  0 5 mg Intravenous Q3H PRN Nas Stark PA-C      insulin glargine  10 Units Subcutaneous Once Solange Zimmerman MD      insulin glargine  25 Units Subcutaneous Once Solange Zimmerman MD      insulin glargine  60 Units Subcutaneous Daily José Miguel Parrish MD      insulin lispro  15 Units Subcutaneous TID With Meals Solange Zimmerman MD      insulin lispro  2-12 Units Subcutaneous TID AC Sudarshan Chavira PA-C      lactated ringers  75 mL/hr Intravenous Continuous Morales Jean  mL/hr (10/28/21 0538)    lidocaine  1 patch Topical Daily Nas Stark, PA-C      ondansetron  4 mg Intravenous Q6H PRN Paddy Skates, PA-C      oxyCODONE  10 mg Oral Q12H [address] Paddy Skates, PA-C      polyethylene glycol  17 g Oral Daily PRN Paddy Skates, PA-C      pravastatin  40 mg Oral Daily With PRITI Rodriguez-C      senna  2 tablet Oral BID Paddy Skates, PA-C      warfarin  6 mg Oral Daily (warfarin) Solange Zimmerman MD Today, Patient Was Seen By: Tanja Millan MD    ** Please Note: This note has been constructed using a voice recognition system   **

## 2021-10-28 NOTE — PHYSICAL THERAPY NOTE
Physical Therapy Treatment Note       10/28/21 1041   PT Last Visit   PT Visit Date 10/28/21   Note Type   Note Type Treatment for insurance authorization   Pain Assessment   Pain Assessment Tool 0-10   Pain Score   (no numeric provided; "no worse" after mobility)   Pain Rating: FLACC (Rest) - Face 0   Pain Rating: FLACC (Rest) - Legs 0   Pain Rating: FLACC (Rest) - Activity 1   Pain Rating: FLACC (Rest) - Cry 1   Pain Rating: FLACC (Rest) - Consolability 1   Score: FLACC (Rest) 3   Pain Rating: FLACC (Activity) - Face 1   Pain Rating: FLACC (Activity) - Legs 1   Pain Rating: FLACC (Activity) - Activity 1   Pain Rating: FLACC (Activity) - Cry 1   Pain Rating: FLACC (Activity) - Consolability 1   Score: FLACC (Activity) 5   Wound 10/18/21 Foot Right   Date First Assessed/Time First Assessed: 10/18/21 1516   Location: Foot  Wound Location Orientation: Right  Incision's 1st Dressing: DRESSING OIL EMULSION 3 X 8 IN (x1), KERLIX (x2), PADDING CAST 4 IN  COTTON STRL (x2), WEBRIL 6 IN UNSTERILE (x2), ACE    Wound Description MATTHIAS   Wound 10/22/21 Pressure Injury Buttocks Left   Date First Assessed/Time First Assessed: 10/22/21 1952   Pre-Existing Wound: No  Primary Wound Type: Pressure Injury  Location: Buttocks  Wound Location Orientation: Left   Wound Description MATTHIAS   Wound 10/25/21 Thigh Right   Date First Assessed/Time First Assessed: 10/25/21 1501   Location: Thigh  Wound Location Orientation: Right  Wound Description (Comments): Sutures, dressing oil emulsion, 4x4, abd pad, ace, knee immobilizer  Incision's 1st Dressing: DRESSING OIL EMULS       Wound Description MATTHIAS   Wound 10/27/21 Buttocks Right   Date First Assessed/Time First Assessed: 10/27/21 1017   Location: Buttocks  Wound Location Orientation: Right   Wound Description MATTHIAS   Restrictions/Precautions   Weight Bearing Precautions Per Order Yes   RLE Weight Bearing Per Order NWB   Braces or Orthoses LE Immobilizer  (R)   Other Precautions Bed Alarm;WBS;Multiple lines; Fall Risk;Pain   General   Chart Reviewed Yes   Response to Previous Treatment Patient with no complaints from previous session  Family/Caregiver Present Yes  (wife present 1st half of session)   Cognition   Overall Cognitive Status WFL   Arousal/Participation Alert   Attention Within functional limits   Orientation Level Oriented X4   Memory Within functional limits   Following Commands Follows all commands and directions without difficulty   Comments pt agreeable to PT session   Subjective   Subjective "there are things I can do and things I can't do"   Bed Mobility   Supine to Sit 3  Moderate assistance   Additional items Assist x 1;HOB elevated; Increased time required;Verbal cues;LE management   Sit to Supine 4  Minimal assistance   Additional items Assist x 2;HOB elevated; Bedrails; Increased time required;Verbal cues;LE management   Transfers   Sit to Stand 3  Moderate assistance  (A of 3rd for maintaining NWB R LE)   Additional items Assist x 2;Armrests; Increased time required;Verbal cues   Stand to Sit 3  Moderate assistance  (A of 3rd for maintaining NWB R LE)   Additional items Assist x 2;Armrests; Increased time required;Verbal cues   Additional Comments seated weight shifts and lateral scoots at EOB while maintaining NWB R LE initiated  Pt able to tolerate seated at EOB for 25min without trunk support required  Ambulation/Elevation   Gait pattern Not tested   Balance   Static Sitting Fair +   Dynamic Sitting Fair   Static Standing Poor   Endurance Deficit   Endurance Deficit Yes   Activity Tolerance   Activity Tolerance Patient limited by fatigue;Patient limited by pain   Medical Staff Made Aware care coordination with OT Reymundo Montgomery   Nurse Made Aware MARY Ordoñez verbalized pt appropriate to see, made aware of session outcome/recs   Assessment   Prognosis Good   Problem List Decreased strength;Decreased range of motion;Decreased endurance; Impaired balance;Decreased mobility;Orthopedic restrictions;Pain;Decreased skin integrity   Assessment Pt requires PT /OT co-treat due to signficant assistance with mobility and cognitive-behavioral impairments  PT and OT goals addressed separately  Pt seen for PT treatment session this date, consisting of ther act focused on bed mobility, supine<>sit transfers, sit to stand transfers, seated weight shifts and lateral scoots at EOB; static sitting tolerance at EOB without trunk support for 25min duration  Since previous session, pt has made good progress in terms of decreased A for transfers, improved sitting balance, complete bottom clearance on 1 sit to stand trial for 75% upright posture  Current goals and POC remain appropriate, pt continues to have rehab potential and is making progress towards STGs  Pt prognosis for achieving goals is good, pending pt progress with hospitalization/medical status improvements, and indicated by ability to follow directions, recent history of independence with functional skills/High PLOF and supportive family/caregivers  Pt limited d/t fear of pain provocation and self limiting behaviors  PT recommends post acute rehabilitation services upon discharge  Pt continues to be functioning below baseline level, and remains limited 2* factors listed above  PT will continue to see pt during current hospitalization in order to address the deficits listed above and provide interventions consistent w/ POC in effort to achieve STGs  Barriers to Discharge Inaccessible home environment;Decreased caregiver support   Goals   Patient Goals "to get back to doing everything"   STG Expiration Date 11/06/21   Short Term Goal #2 Additional: Perform sit to stand transfer to full upright posture with min A of 1 to improve independence and Increase static standing balance 1/2 grade to decrease risk for falls   PT Treatment Day 5   Plan   Treatment/Interventions Functional transfer training;LE strengthening/ROM; Therapeutic exercise; Endurance training;Patient/family training;Bed mobility;Continued evaluation;Spoke to nursing;OT   Progress Slow progress, decreased activity tolerance   PT Frequency 5x/wk   Recommendation   PT Discharge Recommendation Post acute rehabilitation services   Equipment Recommended   (TBD at STR)   PT - OK to Discharge Yes  (when medically cleared, if to STR)   AM-PAC Basic Mobility Inpatient   Turning in Bed Without Bedrails 3   Lying on Back to Sitting on Edge of Flat Bed 3   Moving Bed to Chair 1   Standing Up From Chair 1   Walk in Room 1   Climb 3-5 Stairs 1   Basic Mobility Inpatient Raw Score 10   Turning Head Towards Sound 4   Follow Simple Instructions 4   Low Function Basic Mobility Raw Score 18   Low Function Basic Mobility Standardized Score 29 25    The patient's AM-PAC Basic Mobility Inpatient Short Form Low Function Raw Score 18 , Standardized Score is 29 25  A standardized score less 42 9 suggests the patient may benefit from discharge to post-acute rehab services  Please also refer to the recommendation of the Physical Therapist for safe discharge planning                 Galileo Canchola, PT, DPT    Time of PT treatment session: 5751-9197 (total treatment time = 39 minutes)

## 2021-10-28 NOTE — NURSING NOTE
Pt refused to have his blood work done unable to get PTT still on heparin MD made aware and advised it was [facility]

## 2021-10-28 NOTE — PLAN OF CARE
Problem: OCCUPATIONAL THERAPY ADULT  Goal: Performs self-care activities at highest level of function for planned discharge setting  See evaluation for individualized goals  Description: Treatment Interventions: ADL retraining, Functional transfer training, UE strengthening/ROM, Endurance training, Patient/family training, Compensatory technique education, Continued evaluation, Energy conservation, Activityengagement          See flowsheet documentation for full assessment, interventions and recommendations  Outcome: Progressing  Note: Limitation: Decreased ADL status, Decreased endurance, Decreased self-care trans, Decreased high-level ADLs  Prognosis: Good  Assessment: Patient participated in Skilled OT session this date with interventions consisting of ADL re training with the use of correct body mechnaics, safety awareness and fall prevention techniques, maintaining weight bearing restrictions,  therapeutic activities to: increase activity tolerance and increase dynamic sit/ stand balance during functional activity , and increasing EOB sitting tolerance  Patient agreeable to OT treatment session, upon arrival patient was found seated at edge of bed, alert and responsive   In comparison to previous session, patient with improvements in activity tolerance and task engagement  Patient participated in ADLs while seated unsupported at EOB; patient required minimal assistance for UB bathing, supervision/setup for UB dressing, and maximal assistance for LB ADLs  Patient demonstrated good static and fair dynamic sitting balance during functional tasks at EOB  Patient performed rolling R in bed with minimal assist x2 for perineal hygiene  Co treatment session with Physical Therapy secondary to complex medical condition, requiring two skilled therapists to provide therapeutic techniques to position, facilitate and elicit targeted outcome   Patient requiring verbal cues for safety and verbal cues for correct technique  Patient continues to be functioning below baseline level, occupational performance remains limited secondary to factors listed above and increased risk for falls and injury  From OT standpoint, recommendation at time of d/c would be post-acute rehabilitation services  Patient to benefit from continued Occupational Therapy treatment while in the hospital to address deficits as defined above and maximize level of functional independence with ADLs and functional mobility        OT Discharge Recommendation: Post acute rehabilitation services

## 2021-10-28 NOTE — PLAN OF CARE
Problem: Potential for Falls  Goal: Patient will remain free of falls  Description: INTERVENTIONS:  - Educate patient/family on patient safety including physical limitations  - Instruct patient to call for assistance with activity   - Consult OT/PT to assist with strengthening/mobility   - Keep Call bell within reach  - Keep bed low and locked with side rails adjusted as appropriate  - Keep care items and personal belongings within reach  - Initiate and maintain comfort rounds  - Make Fall Risk Sign visible to staff  - Offer Toileting every  Hours, in advance of need  - Initiate/Maintain alarm  - Obtain necessary fall risk management equipment:   - Apply yellow socks and bracelet for high fall risk patients  - Consider moving patient to room near nurses station  Outcome: Progressing     Problem: Prexisting or High Potential for Compromised Skin Integrity  Goal: Skin integrity is maintained or improved  Description: INTERVENTIONS:  - Identify patients at risk for skin breakdown  - Assess and monitor skin integrity  - Assess and monitor nutrition and hydration status  - Monitor labs   - Assess for incontinence   - Turn and reposition patient  - Assist with mobility/ambulation  - Relieve pressure over bony prominences  - Avoid friction and shearing  - Provide appropriate hygiene as needed including keeping skin clean and dry  - Evaluate need for skin moisturizer/barrier cream  - Collaborate with interdisciplinary team   - Patient/family teaching  - Consider wound care consult   Outcome: Progressing     Problem: PAIN - ADULT  Goal: Verbalizes/displays adequate comfort level or baseline comfort level  Description: Interventions:  - Encourage patient to monitor pain and request assistance  - Assess pain using appropriate pain scale  - Administer analgesics based on type and severity of pain and evaluate response  - Implement non-pharmacological measures as appropriate and evaluate response  - Consider cultural and social influences on pain and pain management  - Notify physician/advanced practitioner if interventions unsuccessful or patient reports new pain  Outcome: Progressing     Problem: INFECTION - ADULT  Goal: Absence or prevention of progression during hospitalization  Description: INTERVENTIONS:  - Assess and monitor for signs and symptoms of infection  - Monitor lab/diagnostic results  - Monitor all insertion sites, i e  indwelling lines, tubes, and drains  - Monitor endotracheal if appropriate and nasal secretions for changes in amount and color  - [place] appropriate cooling/warming therapies per order  - Administer medications as ordered  - Instruct and encourage patient and family to use good hand hygiene technique  - Identify and instruct in appropriate isolation precautions for identified infection/condition  Outcome: Progressing  Goal: Absence of fever/infection during neutropenic period  Description: INTERVENTIONS:  - Monitor WBC    Outcome: Progressing     Problem: SAFETY ADULT  Goal: Patient will remain free of falls  Description: INTERVENTIONS:  - Educate patient/family on patient safety including physical limitations  - Instruct patient to call for assistance with activity   - Consult OT/PT to assist with strengthening/mobility   - Keep Call bell within reach  - Keep bed low and locked with side rails adjusted as appropriate  - Keep care items and personal belongings within reach  - Initiate and maintain comfort rounds  - Make Fall Risk Sign visible to staff  - Offer Toileting every  Hours, in advance of need  - Initiate/Maintain alarm  - Obtain necessary fall risk management equipment:   - Apply yellow socks and bracelet for high fall risk patients  - Consider moving patient to room near nurses station  Outcome: Progressing  Goal: Maintain or return to baseline ADL function  Description: INTERVENTIONS:  -  Assess patient's ability to carry out ADLs; assess patient's baseline for ADL function and identify physical deficits which impact ability to perform ADLs (bathing, care of mouth/teeth, toileting, grooming, dressing, etc )  - Assess/evaluate cause of self-care deficits   - Assess range of motion  - Assess patient's mobility; develop plan if impaired  - Assess patient's need for assistive devices and provide as appropriate  - Encourage maximum independence but intervene and supervise when necessary  - Involve family in performance of ADLs  - Assess for home care needs following discharge   - Consider OT consult to assist with ADL evaluation and planning for discharge  - Provide patient education as appropriate  Outcome: Progressing  Goal: Maintains/Returns to pre admission functional level  Description: INTERVENTIONS:  - Perform BMAT or MOVE assessment daily    - Set and communicate daily mobility goal to care team and patient/family/caregiver  - Collaborate with rehabilitation services on mobility goals if consulted  - Perform Range of Motion  times a day  - Reposition patient every  hours    - Dangle patient  times a day  - Stand patient  times a day  - Ambulate patient  times a day  - Out of bed to chair  times a day   - Out of bed for meals  times a day  - Out of bed for toileting  - Record patient progress and toleration of activity level   Outcome: Progressing     Problem: DISCHARGE PLANNING  Goal: Discharge to home or other facility with appropriate resources  Description: INTERVENTIONS:  - Identify barriers to discharge w/patient and caregiver  - Arrange for needed discharge resources and transportation as appropriate  - Identify discharge learning needs (meds, wound care, etc )  - Arrange for interpretive services to assist at discharge as needed  - Refer to Case Management Department for coordinating discharge planning if the patient needs post-hospital services based on physician/advanced practitioner order or complex needs related to functional status, cognitive ability, or social support system  Outcome: Progressing     Problem: Knowledge Deficit  Goal: Patient/family/caregiver demonstrates understanding of disease process, treatment plan, medications, and discharge instructions  Description: Complete learning assessment and assess knowledge base  Interventions:  - Provide teaching at level of understanding  - Provide teaching via preferred learning methods  Outcome: Progressing     Problem: MOBILITY - ADULT  Goal: Maintain or return to baseline ADL function  Description: INTERVENTIONS:  -  Assess patient's ability to carry out ADLs; assess patient's baseline for ADL function and identify physical deficits which impact ability to perform ADLs (bathing, care of mouth/teeth, toileting, grooming, dressing, etc )  - Assess/evaluate cause of self-care deficits   - Assess range of motion  - Assess patient's mobility; develop plan if impaired  - Assess patient's need for assistive devices and provide as appropriate  - Encourage maximum independence but intervene and supervise when necessary  - Involve family in performance of ADLs  - Assess for home care needs following discharge   - Consider OT consult to assist with ADL evaluation and planning for discharge  - Provide patient education as appropriate  Outcome: Progressing  Goal: Maintains/Returns to pre admission functional level  Description: INTERVENTIONS:  - Perform BMAT or MOVE assessment daily    - Set and communicate daily mobility goal to care team and patient/family/caregiver  - Collaborate with rehabilitation services on mobility goals if consulted  - Perform Range of Motion  times a day  - Reposition patient every  hours    - Dangle patient  times a day  - Stand patient  times a day  - Ambulate patient  times a day  - Out of bed to chair  times a day   - Out of bed for meal times a day  - Out of bed for toileting  - Record patient progress and toleration of activity level   Outcome: Progressing     Problem: Nutrition/Hydration-ADULT  Goal: Nutrient/Hydration intake appropriate for improving, restoring or maintaining nutritional needs  Description: Monitor and assess patient's nutrition/hydration status for malnutrition  Collaborate with interdisciplinary team and initiate plan and interventions as ordered  Monitor patient's weight and dietary intake as ordered or per policy  Utilize nutrition screening tool and intervene as necessary  Determine patient's food preferences and provide high-protein, high-caloric foods as appropriate       INTERVENTIONS:  - Monitor oral intake, urinary output, labs, and treatment plans  - Assess nutrition and hydration status and recommend course of action  - Evaluate amount of meals eaten  - Assist patient with eating if necessary   - Allow adequate time for meals  - Recommend/ encourage appropriate diets, oral nutritional supplements, and vitamin/mineral supplements  - Order, calculate, and assess calorie counts as needed  - Recommend, monitor, and adjust tube feedings and TPN/PPN based on assessed needs  - Assess need for intravenous fluids  - Provide specific nutrition/hydration education as appropriate  - Include patient/family/caregiver in decisions related to nutrition  Outcome: Progressing

## 2021-10-29 LAB
ANION GAP SERPL CALCULATED.3IONS-SCNC: 6 MMOL/L (ref 4–13)
APTT PPP: 60 SECONDS (ref 23–37)
BASOPHILS # BLD AUTO: 0.04 THOUSANDS/ΜL (ref 0–0.1)
BASOPHILS NFR BLD AUTO: 1 % (ref 0–1)
BUN SERPL-MCNC: 16 MG/DL (ref 5–25)
CALCIUM SERPL-MCNC: 8 MG/DL (ref 8.3–10.1)
CHLORIDE SERPL-SCNC: 108 MMOL/L (ref 100–108)
CO2 SERPL-SCNC: 28 MMOL/L (ref 21–32)
CREAT SERPL-MCNC: 1.4 MG/DL (ref 0.6–1.3)
EOSINOPHIL # BLD AUTO: 0.26 THOUSAND/ΜL (ref 0–0.61)
EOSINOPHIL NFR BLD AUTO: 4 % (ref 0–6)
ERYTHROCYTE [DISTWIDTH] IN BLOOD BY AUTOMATED COUNT: 15.5 % (ref 11.6–15.1)
GFR SERPL CREATININE-BSD FRML MDRD: 53 ML/MIN/1.73SQ M
GLUCOSE SERPL-MCNC: 122 MG/DL (ref 65–140)
GLUCOSE SERPL-MCNC: 156 MG/DL (ref 65–140)
GLUCOSE SERPL-MCNC: 168 MG/DL (ref 65–140)
GLUCOSE SERPL-MCNC: 174 MG/DL (ref 65–140)
GLUCOSE SERPL-MCNC: 207 MG/DL (ref 65–140)
GLUCOSE SERPL-MCNC: 78 MG/DL (ref 65–140)
HCT VFR BLD AUTO: 25.5 % (ref 36.5–49.3)
HGB BLD-MCNC: 8 G/DL (ref 12–17)
IMM GRANULOCYTES # BLD AUTO: 0.03 THOUSAND/UL (ref 0–0.2)
IMM GRANULOCYTES NFR BLD AUTO: 1 % (ref 0–2)
INR PPP: 1.57 (ref 0.84–1.19)
LYMPHOCYTES # BLD AUTO: 1.64 THOUSANDS/ΜL (ref 0.6–4.47)
LYMPHOCYTES NFR BLD AUTO: 26 % (ref 14–44)
MCH RBC QN AUTO: 28.9 PG (ref 26.8–34.3)
MCHC RBC AUTO-ENTMCNC: 31.4 G/DL (ref 31.4–37.4)
MCV RBC AUTO: 92 FL (ref 82–98)
MONOCYTES # BLD AUTO: 0.64 THOUSAND/ΜL (ref 0.17–1.22)
MONOCYTES NFR BLD AUTO: 10 % (ref 4–12)
NEUTROPHILS # BLD AUTO: 3.68 THOUSANDS/ΜL (ref 1.85–7.62)
NEUTS SEG NFR BLD AUTO: 58 % (ref 43–75)
NRBC BLD AUTO-RTO: 0 /100 WBCS
PLATELET # BLD AUTO: 452 THOUSANDS/UL (ref 149–390)
PMV BLD AUTO: 9.7 FL (ref 8.9–12.7)
POTASSIUM SERPL-SCNC: 4.2 MMOL/L (ref 3.5–5.3)
PROTHROMBIN TIME: 18.1 SECONDS (ref 11.6–14.5)
RBC # BLD AUTO: 2.77 MILLION/UL (ref 3.88–5.62)
SODIUM SERPL-SCNC: 142 MMOL/L (ref 136–145)
WBC # BLD AUTO: 6.29 THOUSAND/UL (ref 4.31–10.16)

## 2021-10-29 PROCEDURE — 85610 PROTHROMBIN TIME: CPT | Performed by: INTERNAL MEDICINE

## 2021-10-29 PROCEDURE — 99024 POSTOP FOLLOW-UP VISIT: CPT | Performed by: ORTHOPAEDIC SURGERY

## 2021-10-29 PROCEDURE — 82948 REAGENT STRIP/BLOOD GLUCOSE: CPT

## 2021-10-29 PROCEDURE — 99232 SBSQ HOSP IP/OBS MODERATE 35: CPT | Performed by: INTERNAL MEDICINE

## 2021-10-29 PROCEDURE — 85730 THROMBOPLASTIN TIME PARTIAL: CPT | Performed by: INTERNAL MEDICINE

## 2021-10-29 PROCEDURE — 80048 BASIC METABOLIC PNL TOTAL CA: CPT | Performed by: INTERNAL MEDICINE

## 2021-10-29 PROCEDURE — 85025 COMPLETE CBC W/AUTO DIFF WBC: CPT | Performed by: INTERNAL MEDICINE

## 2021-10-29 RX ORDER — WARFARIN SODIUM 4 MG/1
4 TABLET ORAL
Status: COMPLETED | OUTPATIENT
Start: 2021-10-29 | End: 2021-10-29

## 2021-10-29 RX ADMIN — INSULIN LISPRO 4 UNITS: 100 INJECTION, SOLUTION INTRAVENOUS; SUBCUTANEOUS at 13:55

## 2021-10-29 RX ADMIN — DICLOFENAC SODIUM 2 G: 10 GEL TOPICAL at 17:34

## 2021-10-29 RX ADMIN — INSULIN LISPRO 2 UNITS: 100 INJECTION, SOLUTION INTRAVENOUS; SUBCUTANEOUS at 17:33

## 2021-10-29 RX ADMIN — INSULIN LISPRO 15 UNITS: 100 INJECTION, SOLUTION INTRAVENOUS; SUBCUTANEOUS at 13:56

## 2021-10-29 RX ADMIN — INSULIN GLARGINE 60 UNITS: 100 INJECTION, SOLUTION SUBCUTANEOUS at 09:30

## 2021-10-29 RX ADMIN — STANDARDIZED SENNA CONCENTRATE 17.2 MG: 8.6 TABLET ORAL at 08:34

## 2021-10-29 RX ADMIN — WARFARIN SODIUM 6 MG: 3 TABLET ORAL at 17:41

## 2021-10-29 RX ADMIN — OXYCODONE HYDROCHLORIDE 10 MG: 10 TABLET, FILM COATED, EXTENDED RELEASE ORAL at 21:02

## 2021-10-29 RX ADMIN — DICLOFENAC SODIUM 2 G: 10 GEL TOPICAL at 13:57

## 2021-10-29 RX ADMIN — OXYCODONE HYDROCHLORIDE 10 MG: 10 TABLET, FILM COATED, EXTENDED RELEASE ORAL at 08:34

## 2021-10-29 RX ADMIN — STANDARDIZED SENNA CONCENTRATE 17.2 MG: 8.6 TABLET ORAL at 17:42

## 2021-10-29 RX ADMIN — INSULIN LISPRO 15 UNITS: 100 INJECTION, SOLUTION INTRAVENOUS; SUBCUTANEOUS at 09:00

## 2021-10-29 RX ADMIN — WARFARIN SODIUM 4 MG: 3 TABLET ORAL at 17:41

## 2021-10-29 RX ADMIN — INSULIN LISPRO 2 UNITS: 100 INJECTION, SOLUTION INTRAVENOUS; SUBCUTANEOUS at 09:00

## 2021-10-29 RX ADMIN — IRON SUCROSE 200 MG: 20 INJECTION, SOLUTION INTRAVENOUS at 11:57

## 2021-10-29 RX ADMIN — HEPARIN SODIUM 15.1 UNITS/KG/HR: 10000 INJECTION, SOLUTION INTRAVENOUS at 17:21

## 2021-10-29 RX ADMIN — DICLOFENAC SODIUM 2 G: 10 GEL TOPICAL at 09:00

## 2021-10-29 RX ADMIN — PRAVASTATIN SODIUM 40 MG: 40 TABLET ORAL at 17:41

## 2021-10-29 RX ADMIN — INSULIN LISPRO 15 UNITS: 100 INJECTION, SOLUTION INTRAVENOUS; SUBCUTANEOUS at 17:34

## 2021-10-29 RX ADMIN — LIDOCAINE 5% 1 PATCH: 700 PATCH TOPICAL at 08:37

## 2021-10-29 RX ADMIN — SODIUM CHLORIDE, SODIUM LACTATE, POTASSIUM CHLORIDE, AND CALCIUM CHLORIDE 75 ML/HR: .6; .31; .03; .02 INJECTION, SOLUTION INTRAVENOUS at 17:22

## 2021-10-29 NOTE — PLAN OF CARE
Problem: Potential for Falls  Goal: Patient will remain free of falls  Description: INTERVENTIONS:  - Educate patient/family on patient safety including physical limitations  - Instruct patient to call for assistance with activity   - Consult OT/PT to assist with strengthening/mobility   - Keep Call bell within reach  - Keep bed low and locked with side rails adjusted as appropriate  - Keep care items and personal belongings within reach  - Initiate and maintain comfort rounds  - Make Fall Risk Sign visible to staff  - Offer Toileting every  Hours, in advance of need  - Initiate/Maintain alarm  - Obtain necessary fall risk management equipment:   - Apply yellow socks and bracelet for high fall risk patients  - Consider moving patient to room near nurses station  Outcome: Progressing     Problem: Prexisting or High Potential for Compromised Skin Integrity  Goal: Skin integrity is maintained or improved  Description: INTERVENTIONS:  - Identify patients at risk for skin breakdown  - Assess and monitor skin integrity  - Assess and monitor nutrition and hydration status  - Monitor labs   - Assess for incontinence   - Turn and reposition patient  - Assist with mobility/ambulation  - Relieve pressure over bony prominences  - Avoid friction and shearing  - Provide appropriate hygiene as needed including keeping skin clean and dry  - Evaluate need for skin moisturizer/barrier cream  - Collaborate with interdisciplinary team   - Patient/family teaching  - Consider wound care consult   Outcome: Progressing     Problem: PAIN - ADULT  Goal: Verbalizes/displays adequate comfort level or baseline comfort level  Description: Interventions:  - Encourage patient to monitor pain and request assistance  - Assess pain using appropriate pain scale  - Administer analgesics based on type and severity of pain and evaluate response  - Implement non-pharmacological measures as appropriate and evaluate response  - Consider cultural and social influences on pain and pain management  - Notify physician/advanced practitioner if interventions unsuccessful or patient reports new pain  Outcome: Progressing     Problem: INFECTION - ADULT  Goal: Absence or prevention of progression during hospitalization  Description: INTERVENTIONS:  - Assess and monitor for signs and symptoms of infection  - Monitor lab/diagnostic results  - Monitor all insertion sites, i e  indwelling lines, tubes, and drains  - Monitor endotracheal if appropriate and nasal secretions for changes in amount and color  - [place] appropriate cooling/warming therapies per order  - Administer medications as ordered  - Instruct and encourage patient and family to use good hand hygiene technique  - Identify and instruct in appropriate isolation precautions for identified infection/condition  Outcome: Progressing  Goal: Absence of fever/infection during neutropenic period  Description: INTERVENTIONS:  - Monitor WBC    Outcome: Progressing     Problem: SAFETY ADULT  Goal: Patient will remain free of falls  Description: INTERVENTIONS:  - Educate patient/family on patient safety including physical limitations  - Instruct patient to call for assistance with activity   - Consult OT/PT to assist with strengthening/mobility   - Keep Call bell within reach  - Keep bed low and locked with side rails adjusted as appropriate  - Keep care items and personal belongings within reach  - Initiate and maintain comfort rounds  - Make Fall Risk Sign visible to staff  - Offer Toileting every  Hours, in advance of need  - Initiate/Maintain alarm  - Obtain necessary fall risk management equipment:   - Apply yellow socks and bracelet for high fall risk patients  - Consider moving patient to room near nurses station  Outcome: Progressing  Goal: Maintain or return to baseline ADL function  Description: INTERVENTIONS:  -  Assess patient's ability to carry out ADLs; assess patient's baseline for ADL function and identify physical deficits which impact ability to perform ADLs (bathing, care of mouth/teeth, toileting, grooming, dressing, etc )  - Assess/evaluate cause of self-care deficits   - Assess range of motion  - Assess patient's mobility; develop plan if impaired  - Assess patient's need for assistive devices and provide as appropriate  - Encourage maximum independence but intervene and supervise when necessary  - Involve family in performance of ADLs  - Assess for home care needs following discharge   - Consider OT consult to assist with ADL evaluation and planning for discharge  - Provide patient education as appropriate  Outcome: Progressing  Goal: Maintains/Returns to pre admission functional level  Description: INTERVENTIONS:  - Perform BMAT or MOVE assessment daily    - Set and communicate daily mobility goal to care team and patient/family/caregiver  - Collaborate with rehabilitation services on mobility goals if consulted  - Perform Range of Motion  times a day  - Reposition patient every  hours    - Dangle patient  times a day  - Stand patient  times a day  - Ambulate patient  times a day  - Out of bed to chair  times a day   - Out of bed for meals  times a day  - Out of bed for toileting  - Record patient progress and toleration of activity level   Outcome: Progressing     Problem: DISCHARGE PLANNING  Goal: Discharge to home or other facility with appropriate resources  Description: INTERVENTIONS:  - Identify barriers to discharge w/patient and caregiver  - Arrange for needed discharge resources and transportation as appropriate  - Identify discharge learning needs (meds, wound care, etc )  - Arrange for interpretive services to assist at discharge as needed  - Refer to Case Management Department for coordinating discharge planning if the patient needs post-hospital services based on physician/advanced practitioner order or complex needs related to functional status, cognitive ability, or social support system  Outcome: Progressing     Problem: Knowledge Deficit  Goal: Patient/family/caregiver demonstrates understanding of disease process, treatment plan, medications, and discharge instructions  Description: Complete learning assessment and assess knowledge base  Interventions:  - Provide teaching at level of understanding  - Provide teaching via preferred learning methods  Outcome: Progressing     Problem: MOBILITY - ADULT  Goal: Maintain or return to baseline ADL function  Description: INTERVENTIONS:  -  Assess patient's ability to carry out ADLs; assess patient's baseline for ADL function and identify physical deficits which impact ability to perform ADLs (bathing, care of mouth/teeth, toileting, grooming, dressing, etc )  - Assess/evaluate cause of self-care deficits   - Assess range of motion  - Assess patient's mobility; develop plan if impaired  - Assess patient's need for assistive devices and provide as appropriate  - Encourage maximum independence but intervene and supervise when necessary  - Involve family in performance of ADLs  - Assess for home care needs following discharge   - Consider OT consult to assist with ADL evaluation and planning for discharge  - Provide patient education as appropriate  Outcome: Progressing  Goal: Maintains/Returns to pre admission functional level  Description: INTERVENTIONS:  - Perform BMAT or MOVE assessment daily    - Set and communicate daily mobility goal to care team and patient/family/caregiver  - Collaborate with rehabilitation services on mobility goals if consulted  - Perform Range of Motion  times a day  - Reposition patient every  hours    - Dangle patient  times a day  - Stand patient  times a day  - Ambulate patient  times a day  - Out of bed to chair  times a day   - Out of bed for meals  times a day  - Out of bed for toileting  - Record patient progress and toleration of activity level   Outcome: Progressing     Problem: Nutrition/Hydration-ADULT  Goal: Nutrient/Hydration intake appropriate for improving, restoring or maintaining nutritional needs  Description: Monitor and assess patient's nutrition/hydration status for malnutrition  Collaborate with interdisciplinary team and initiate plan and interventions as ordered  Monitor patient's weight and dietary intake as ordered or per policy  Utilize nutrition screening tool and intervene as necessary  Determine patient's food preferences and provide high-protein, high-caloric foods as appropriate       INTERVENTIONS:  - Monitor oral intake, urinary output, labs, and treatment plans  - Assess nutrition and hydration status and recommend course of action  - Evaluate amount of meals eaten  - Assist patient with eating if necessary   - Allow adequate time for meals  - Recommend/ encourage appropriate diets, oral nutritional supplements, and vitamin/mineral supplements  - Order, calculate, and assess calorie counts as needed  - Recommend, monitor, and adjust tube feedings and TPN/PPN based on assessed needs  - Assess need for intravenous fluids  - Provide specific nutrition/hydration education as appropriate  - Include patient/family/caregiver in decisions related to nutrition  Outcome: Progressing

## 2021-10-29 NOTE — PROGRESS NOTES
[facility]  Progress Note Cony Band 1955, 61 y o  male MRN: [id number]  Unit/Bed#: -01 Encounter: [id number]  Primary Care Provider: Paulette Javed DO   Date and time admitted to hospital: 10/13/2021  5:17 AM    * Closed fracture of proximal end of right tibia  Assessment & Plan  Sustained mechanical fracture to the proximal end of the right tibia  Initially on external fixators   Status post ORIF and removal of external fixators  - POD#4   Intact distal pulsation and neurological function - able to wiggle toes    Per review of PT notes not agreeable to participating PT due to fear of falling  Reassured patient about PT measures to prevent fall  Appropriate DVT prophylaxis  PT OT  Appreciate orthopedic input    Type 2 diabetes mellitus with diabetic neuropathy, without long-term current use of insulin [facility])  Assessment & Plan  Lab Results   Component Value Date    HGBA1C 8 2 (H) 10/16/2021       Recent Labs     10/28/21  1548 10/28/21  2106 10/29/21  0726 10/29/21  1124   POCGLU 75 196* 168* 207*       Blood Sugar Average: Last 72 hrs:  (P) 243 [id number]  Known diabetic with diabetic neuropathy  On metformin and lantus at home  Blood sugar on presentation 418  Contiunue sliding scale insulin  Hypoglycemic protocol  Goal blood sugar not at goal of 140-180  · Continue Diabetic diet  · Continue Lantus 60 units q a m  · Insulin Humulog 15 unit to meals  · Continue SSI coverage while in hospital  · Continue before meals and bedtime glucose checks      Anemia  Assessment & Plan  Hemoglobin holding steady at 7 8  Hemoglobin dropped dramatically since admission from 12 units to 7 5 and has been currently stable for few days  Most likely secondary blood loss during  surgery    Concerning for acute blood loss anemia  Patient has persistently refused blood transfusion     - will give IV Venofer 10 mg daily for 2 days  - discussed need for PRBC transfusion with patient,he appears not willing to accept transfusion  Discussed risk and benefit of said therapy and alternatives  Decline to sign consent form  Will touch base with spouse as he requests  -- no consent in the chart  -- will check current panel and replete iron if indicated     Will monitor closely  Difficult intubation  Assessment & Plan  Had difficult intubation  Due to severe kyphosis during ortho procedure on 10/18  Will keep in mind because he is going to have a definitive surgery again next week    Pseudogout of knee, left  Assessment & Plan  Pain and swelling in the left anterior knee, superiorly  Tender to touch  Developed after PT  Xray -unremarkable  Synovial fluid analysis shows calcium pyrophosphate crystals  Started on colchicine 0 6mg daily  ROSE (acute kidney injury) [facility])  Assessment & Plan  Resolved  Now at baseline  ROSE on CKD  May be prerenal   Cr trended since admission 1 99->1  32  Baseline 1 21 months ago  Avoid nephrotoxics  Avoid hypotension  Monitor I/O  Results from last 7 days   Lab Units 10/29/21  0444 10/28/21  0512 10/27/21  0308 10/26/21  2372 10/25/21  0540 10/24/21  0859 10/23/21  0924   SODIUM mmol/L 142 142 139 137 140 139 141   POTASSIUM mmol/L 4 2 4 0 4 5 4 3 3 9 3 9 3 9   CHLORIDE mmol/L 108 107 105 103 105 104 105   CO2 mmol/L 28 27 29 25 27 28 27   ANION GAP mmol/L 6 8 5 9 8 7 9   BUN mg/dL 16 14 21 25 18 18 17   CREATININE mg/dL 1 40* 1 32* 1 53* 1 62* 1 41* 1 46* 1 40*   CALCIUM mg/dL 8 0* 8 0* 7 7* 8 2* 8 5 8 8 8 7   EGFR ml/min/1 73sq m 53 57 47 44 52 50 53   GLUCOSE RANDOM mg/dL 174* 199* 360* 323* 165* 239* 202*         History of DVT (deep vein thrombosis)  Assessment & Plan  History 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  Unprovoked  Last episode was 10yrs ago  On AC with coumadin  Currently held  Resume heparin gtt  Patient is on Coumadin   INR today 1 6    -- continue Coumadin 6 mg daily   Will add an extra 4mg today  -- continue bridging with heparin for now    Mixed hyperlipidemia  Assessment & Plan  Continue statins  Essential hypertension  Assessment & Plan  On lisinopril 10mg daily at home  On hold for ROSE  BP stable now  Blood pressure 133/62, pulse 74, temperature 99 9 °F (37 7 °C), temperature source Oral, resp  rate 18, height 6' 1" (1 854 m), weight 136 kg (299 lb 13 2 oz), SpO2 94 %  Elevated INR-resolved as of 10/26/2021  Assessment & Plan  Now subtherapeutic ,  Patient previously on Coumadin due to history of DVTs  This is currently on hold due to need for surgery  Ortho recommends 1 4-1 6 for ortho procedure  INR 1 47--  1 18  Continue to hold Coumadin  VTE Pharmacologic Prophylaxis:   VTE Score: 5 Moderate Risk (Score 3-4) - Pharmacological DVT Prophylaxis Ordered: Heparin Drip  Mechanical VTE Prophylaxis in Place: No    Patient Centered Rounds: I have performed bedside rounds with nursing staff today  Discussions with Specialists or Other Care Team Provider:  Ortho    Education and Discussions with Family / Patient: Patient declined call to   Current Length of Stay: 16 day(s)    Current Patient Status: Inpatient     Discharge Plan / Estimated Discharge Date: TBD    Code Status: Level 1 - Full Code      Subjective:       POD #4 ORIF  Patient seen and examined at bedside  He denies any numbness or tingling in the right lower extremity  Was able to wiggle toes and has good distal capillary refill  Patient has no complaints today, review of systems is unremarkable  Distal cast with patient at length that he may need blood transfusion during this hospital stay  Patient is not cooperative  Objective:     Vitals:   Temp (24hrs), Av 9 °F (37 2 °C), Min:98 6 °F (37 °C), Max:99 1 °F (37 3 °C)    Temp:  [98 6 °F (37 °C)-99 1 °F (37 3 °C)] 99 1 °F (37 3 °C)  HR:  [72-76] 76  Resp:  [16-18] 16  BP: (140-161)/(63-75) 156/73  SpO2:  [94 %-96 %] 96 %  Body mass index is 39 5 kg/m²       Input and Output Summary (last 24 hours): Intake/Output Summary (Last 24 hours) at 10/29/2021 1156  Last data filed at 10/29/2021 1129  Gross per 24 hour   Intake 540 ml   Output 1600 ml   Net -1060 ml       Physical Exam:     Physical Exam  Vitals reviewed  Constitutional:       General: He is not in acute distress  Appearance: He is not toxic-appearing  HENT:      Head: Normocephalic and atraumatic  Eyes:      Pupils: Pupils are equal, round, and reactive to light  Cardiovascular:      Rate and Rhythm: Normal rate and regular rhythm  Pulses: Normal pulses  Heart sounds: Normal heart sounds  Pulmonary:      Effort: Pulmonary effort is normal  No respiratory distress  Breath sounds: Normal breath sounds  Abdominal:      General: Bowel sounds are normal  There is no distension  Palpations: Abdomen is soft  Musculoskeletal:      Comments: Right lower extremity in Acewrap and high knee braces  Intact neurological function  Capillary refill < 2 sec     Skin:     Coloration: Skin is not jaundiced  Neurological:      Mental Status: He is alert and oriented to person, place, and time  Mental status is at baseline            Additional Data:     Labs:  Results from last 7 days   Lab Units 10/29/21  0444   WBC Thousand/uL 6 29   HEMOGLOBIN g/dL 8 0*   HEMATOCRIT % 25 5*   PLATELETS Thousands/uL 452*   NEUTROS PCT % 58   LYMPHS PCT % 26   MONOS PCT % 10   EOS PCT % 4     Results from last 7 days   Lab Units 10/29/21  0444   SODIUM mmol/L 142   POTASSIUM mmol/L 4 2   CHLORIDE mmol/L 108   CO2 mmol/L 28   BUN mg/dL 16   CREATININE mg/dL 1 40*   ANION GAP mmol/L 6   CALCIUM mg/dL 8 0*   GLUCOSE RANDOM mg/dL 174*     Results from last 7 days   Lab Units 10/29/21  0444   INR  1 57*     Results from last 7 days   Lab Units 10/29/21  1124 10/29/21  0726 10/28/21  2106 10/28/21  1548 10/28/21  1142 10/28/21  0710 10/27/21  2042 10/27/21  1549 10/27/21  1103 10/27/21  0717 10/26/21  2035 10/26/21  1538 POC GLUCOSE mg/dl 207* 168* 196* 75 225* 200* 186* 195* 293* 309* 368* 359*               Imaging: Reviewed radiology reports from this admission including: Makenzie Sharon bilateral LE    Recent Cultures (last 7 days):           Lines/Drains:  Invasive Devices     Peripheral Intravenous Line            Peripheral IV 10/27/21 Proximal;Right;Ventral (anterior) Forearm 2 days    Peripheral IV 10/29/21 Right;Ventral (anterior) Forearm <1 day                Telemetry:        Last 24 Hours Medication List:   Current Facility-Administered Medications   Medication Dose Route Frequency Provider Last Rate    acetaminophen  650 mg Oral Q6H PRN Ravi Spence PA-C      calcium carbonate  500 mg Oral Daily PRN Ravi Spence PA-C      Diclofenac Sodium  2 g Topical 4x Daily Brigid Chavira PA-C      heparin (porcine)  3-20 Units/kg/hr (Order-Specific) Intravenous Titrated Angelica Teague MD 15 1 Units/kg/hr (10/28/21 2155)    heparin (porcine)  2,000 Units Intravenous Q1H PRN Angelica Teague MD      heparin (porcine)  4,000 Units Intravenous Q1H PRN Angelica Teague MD      HYDROmorphone  0 5 mg Intravenous Q3H PRN Ravi Spence PA-C      insulin glargine  10 Units Subcutaneous Once Angelica Teague MD      insulin glargine  25 Units Subcutaneous Once Angelica Teague MD      insulin glargine  60 Units Subcutaneous Daily José Miguel Parrish MD      insulin lispro  15 Units Subcutaneous TID With Meals Angelica Teague MD      insulin lispro  2-12 Units Subcutaneous TID AC Ravi Spence PA-C      iron sucrose  200 mg Intravenous Daily Angelica Teague MD      lactated ringers  75 mL/hr Intravenous Continuous Steven Gayle MD 75 mL/hr (10/28/21 2155)    lidocaine  1 patch Topical Daily Ravi Spence PA-C      ondansetron  4 mg Intravenous Q6H PRN Ravi Spence PA-C      oxyCODONE  10 mg Oral Q12H [address] Ravi Spence PA-C      polyethylene glycol  17 g Oral Daily PRN Ravi Spence PA-C      pravastatin 40 mg Oral Daily With Darius Orosco PA-C      senna  2 tablet Oral BID Reyes Barone PA-C      warfarin  4 mg Oral Once (warfarin) Camacho Mckenna MD      warfarin  6 mg Oral Daily (warfarin) Camacho Mckenna MD          Today, Patient Was Seen By: Camacho Mckenna MD    ** Please Note: This note has been constructed using a voice recognition system   **

## 2021-10-29 NOTE — PLAN OF CARE
Problem: Potential for Falls  Goal: Patient will remain free of falls  Description: INTERVENTIONS:  - Educate patient/family on patient safety including physical limitations  - Instruct patient to call for assistance with activity   - Consult OT/PT to assist with strengthening/mobility   - Keep Call bell within reach  - Keep bed low and locked with side rails adjusted as appropriate  - Keep care items and personal belongings within reach  - Initiate and maintain comfort rounds  - Make Fall Risk Sign visible to staff  - Offer Toileting every  Hours, in advance of need  - Initiate/Maintain alarm  - Obtain necessary fall risk management equipment:   - Apply yellow socks and bracelet for high fall risk patients  - Consider moving patient to room near nurses station  10/29/2021 1353 by Christina Harkins RN  Outcome: Progressing  10/29/2021 1353 by Christina Harkins RN  Outcome: Progressing     Problem: Prexisting or High Potential for Compromised Skin Integrity  Goal: Skin integrity is maintained or improved  Description: INTERVENTIONS:  - Identify patients at risk for skin breakdown  - Assess and monitor skin integrity  - Assess and monitor nutrition and hydration status  - Monitor labs   - Assess for incontinence   - Turn and reposition patient  - Assist with mobility/ambulation  - Relieve pressure over bony prominences  - Avoid friction and shearing  - Provide appropriate hygiene as needed including keeping skin clean and dry  - Evaluate need for skin moisturizer/barrier cream  - Collaborate with interdisciplinary team   - Patient/family teaching  - Consider wound care consult   Outcome: Progressing     Problem: PAIN - ADULT  Goal: Verbalizes/displays adequate comfort level or baseline comfort level  Description: Interventions:  - Encourage patient to monitor pain and request assistance  - Assess pain using appropriate pain scale  - Administer analgesics based on type and severity of pain and evaluate response  - Implement non-pharmacological measures as appropriate and evaluate response  - Consider cultural and social influences on pain and pain management  - Notify physician/advanced practitioner if interventions unsuccessful or patient reports new pain  Outcome: Progressing     Problem: INFECTION - ADULT  Goal: Absence or prevention of progression during hospitalization  Description: INTERVENTIONS:  - Assess and monitor for signs and symptoms of infection  - Monitor lab/diagnostic results  - Monitor all insertion sites, i e  indwelling lines, tubes, and drains  - Monitor endotracheal if appropriate and nasal secretions for changes in amount and color  - Moran appropriate cooling/warming therapies per order  - Administer medications as ordered  - Instruct and encourage patient and family to use good hand hygiene technique  - Identify and instruct in appropriate isolation precautions for identified infection/condition  Outcome: Progressing  Goal: Absence of fever/infection during neutropenic period  Description: INTERVENTIONS:  - Monitor WBC    Outcome: Progressing     Problem: SAFETY ADULT  Goal: Patient will remain free of falls  Description: INTERVENTIONS:  - Educate patient/family on patient safety including physical limitations  - Instruct patient to call for assistance with activity   - Consult OT/PT to assist with strengthening/mobility   - Keep Call bell within reach  - Keep bed low and locked with side rails adjusted as appropriate  - Keep care items and personal belongings within reach  - Initiate and maintain comfort rounds  - Make Fall Risk Sign visible to staff  - Offer Toileting every  Hours, in advance of need  - Initiate/Maintain alarm  - Obtain necessary fall risk management equipment:   - Apply yellow socks and bracelet for high fall risk patients  - Consider moving patient to room near nurses station  10/29/2021 1353 by Vladimir Reynaga RN  Outcome: Progressing  10/29/2021 1353 by Vladimir Reynaga RN  Outcome: Progressing  Goal: Maintain or return to baseline ADL function  Description: INTERVENTIONS:  -  Assess patient's ability to carry out ADLs; assess patient's baseline for ADL function and identify physical deficits which impact ability to perform ADLs (bathing, care of mouth/teeth, toileting, grooming, dressing, etc )  - Assess/evaluate cause of self-care deficits   - Assess range of motion  - Assess patient's mobility; develop plan if impaired  - Assess patient's need for assistive devices and provide as appropriate  - Encourage maximum independence but intervene and supervise when necessary  - Involve family in performance of ADLs  - Assess for home care needs following discharge   - Consider OT consult to assist with ADL evaluation and planning for discharge  - Provide patient education as appropriate  Outcome: Progressing  Goal: Maintains/Returns to pre admission functional level  Description: INTERVENTIONS:  - Perform BMAT or MOVE assessment daily    - Set and communicate daily mobility goal to care team and patient/family/caregiver  - Collaborate with rehabilitation services on mobility goals if consulted  - Perform Range of Motion times a day  - Reposition patient every  hours    - Dangle patient  times a day  - Stand patient  times a day  - Ambulate patient  times a day  - Out of bed to chair  times a day   - Out of bed for meals  times a day  - Out of bed for toileting  - Record patient progress and toleration of activity level   Outcome: Progressing     Problem: DISCHARGE PLANNING  Goal: Discharge to home or other facility with appropriate resources  Description: INTERVENTIONS:  - Identify barriers to discharge w/patient and caregiver  - Arrange for needed discharge resources and transportation as appropriate  - Identify discharge learning needs (meds, wound care, etc )  - Arrange for interpretive services to assist at discharge as needed  - Refer to Case Management Department for coordinating discharge planning if the patient needs post-hospital services based on physician/advanced practitioner order or complex needs related to functional status, cognitive ability, or social support system  Outcome: Progressing     Problem: Knowledge Deficit  Goal: Patient/family/caregiver demonstrates understanding of disease process, treatment plan, medications, and discharge instructions  Description: Complete learning assessment and assess knowledge base  Interventions:  - Provide teaching at level of understanding  - Provide teaching via preferred learning methods  Outcome: Progressing     Problem: MOBILITY - ADULT  Goal: Maintain or return to baseline ADL function  Description: INTERVENTIONS:  -  Assess patient's ability to carry out ADLs; assess patient's baseline for ADL function and identify physical deficits which impact ability to perform ADLs (bathing, care of mouth/teeth, toileting, grooming, dressing, etc )  - Assess/evaluate cause of self-care deficits   - Assess range of motion  - Assess patient's mobility; develop plan if impaired  - Assess patient's need for assistive devices and provide as appropriate  - Encourage maximum independence but intervene and supervise when necessary  - Involve family in performance of ADLs  - Assess for home care needs following discharge   - Consider OT consult to assist with ADL evaluation and planning for discharge  - Provide patient education as appropriate  Outcome: Progressing  Goal: Maintains/Returns to pre admission functional level  Description: INTERVENTIONS:  - Perform BMAT or MOVE assessment daily    - Set and communicate daily mobility goal to care team and patient/family/caregiver  - Collaborate with rehabilitation services on mobility goals if consulted  - Perform Range of Motion  times a day  - Reposition patient every  hours    - Dangle patient  times a day  - Stand patient  times a day  - Ambulate patient times a day  - Out of bed to chair times a day   - Out of bed for meals times a day  - Out of bed for toileting  - Record patient progress and toleration of activity level   Outcome: Progressing     Problem: Nutrition/Hydration-ADULT  Goal: Nutrient/Hydration intake appropriate for improving, restoring or maintaining nutritional needs  Description: Monitor and assess patient's nutrition/hydration status for malnutrition  Collaborate with interdisciplinary team and initiate plan and interventions as ordered  Monitor patient's weight and dietary intake as ordered or per policy  Utilize nutrition screening tool and intervene as necessary  Determine patient's food preferences and provide high-protein, high-caloric foods as appropriate       INTERVENTIONS:  - Monitor oral intake, urinary output, labs, and treatment plans  - Assess nutrition and hydration status and recommend course of action  - Evaluate amount of meals eaten  - Assist patient with eating if necessary   - Allow adequate time for meals  - Recommend/ encourage appropriate diets, oral nutritional supplements, and vitamin/mineral supplements  - Order, calculate, and assess calorie counts as needed  - Recommend, monitor, and adjust tube feedings and TPN/PPN based on assessed needs  - Assess need for intravenous fluids  - Provide specific nutrition/hydration education as appropriate  - Include patient/family/caregiver in decisions related to nutrition  Outcome: Progressing

## 2021-10-29 NOTE — PROGRESS NOTES
Progress Note - Orthopedics   Matthew Healy 61 y o  male MRN: [id number]  Unit/Bed#: -01      Subjective:    61 y o male POD4 status post ORIF right proximal tibia shaft fracture, removal of external fixator  No acute overnight events, no complaints  Pain controlled  Denies fevers chills, CP, SOB  He denies numbness or tingling in the lower extremity  The patient states that he has been up and working with PT, reports continued pain and weakness in the right lower extremity  He states that following PT his leg hurts worse       Labs:  0   Lab Value Date/Time    HCT 25 5 (L) 10/29/2021 0444    HCT 24 1 (L) 10/28/2021 0511    HCT 23 2 (L) 10/27/2021 0308    HCT 38 4 07/13/2015 1448    HCT 38 6 10/20/2014 1132    HGB 8 0 (L) 10/29/2021 0444    HGB 7 5 (L) 10/28/2021 0511    HGB 7 4 (L) 10/27/2021 0308    HGB 13 1 07/13/2015 1448    HGB 13 1 10/20/2014 1132    INR 1 57 (H) 10/29/2021 0444    INR 2 94 (H) 12/11/2015 1543    WBC 6 29 10/29/2021 0444    WBC 6 88 10/28/2021 0511    WBC 7 93 10/27/2021 0308    WBC 7 7 07/13/2015 1448    WBC 8 30 10/20/2014 1132    ESR 58 (H) 05/20/2016 1207       Meds:    Current Facility-Administered Medications:     acetaminophen (TYLENOL) tablet 650 mg, 650 mg, Oral, Q6H PRN, Evon Ours, PA-C, 650 mg at 10/28/21 1516    calcium carbonate (TUMS) chewable tablet 500 mg, 500 mg, Oral, Daily PRN, Evon Ours, PA-C, 500 mg at 10/14/21 0006    Diclofenac Sodium (VOLTAREN) 1 % topical gel 2 g, 2 g, Topical, 4x Daily, Evon Ours, PA-C, 2 g at 10/29/21 1357    heparin (porcine) 25,000 units in 0 45% NaCl 250 mL infusion (premix), 3-20 Units/kg/hr (Order-Specific), Intravenous, Titrated, José Miguel Parrish MD, Last Rate: 13 6 mL/hr at 10/28/21 2155, 15 1 Units/kg/hr at 10/28/21 2155    heparin (porcine) injection 2,000 Units, 2,000 Units, Intravenous, Q1H PRN, Patel Parker MD, 2,000 Units at 10/28/21 0604    heparin (porcine) injection 4,000 Units, 4,000 Units, Intravenous, Q1H PRN, Romelia Rogers MD    HYDROmorphone (DILAUDID) injection 0 5 mg, 0 5 mg, Intravenous, Q3H PRN, Cornelia Valdez PA-C, 0 5 mg at 10/25/21 1953    insulin glargine (LANTUS) subcutaneous injection 10 Units 0 1 mL, 10 Units, Subcutaneous, Once, Romelia Rogers MD    insulin glargine (LANTUS) subcutaneous injection 25 Units 0 25 mL, 25 Units, Subcutaneous, Once, José Miguel Parrish MD    insulin glargine (LANTUS) subcutaneous injection 60 Units 0 6 mL, 60 Units, Subcutaneous, Daily, José Miguel Parrish MD, 60 Units at 10/29/21 0930    insulin lispro (HumaLOG) 100 units/mL subcutaneous injection 15 Units, 15 Units, Subcutaneous, TID With Meals, Romelia Rogers MD, 15 Units at 10/29/21 1356    insulin lispro (HumaLOG) 100 units/mL subcutaneous injection 2-12 Units, 2-12 Units, Subcutaneous, TID AC, 4 Units at 10/29/21 1355 **AND** Fingerstick Glucose (POCT), , , TID AC, Chanell Chavira PA-C    iron sucrose (VENOFER) 200 mg in sodium chloride 0 9 % 100 mL IVPB, 200 mg, Intravenous, Daily, José Miguel Parrish MD, 200 mg at 10/29/21 1157    lactated ringers infusion, 75 mL/hr, Intravenous, Continuous, Kassi Hill MD, Last Rate: 75 mL/hr at 10/28/21 2155, 75 mL/hr at 10/28/21 2155    lidocaine (LIDODERM) 5 % patch 1 patch, 1 patch, Topical, Daily, Cornelia Valdez PA-C, 1 patch at 10/29/21 0837    ondansetron (ZOFRAN) injection 4 mg, 4 mg, Intravenous, Q6H PRN, Cornelia Valdez PA-C, 4 mg at 10/13/21 2249    oxyCODONE (OxyCONTIN) 12 hr tablet 10 mg, 10 mg, Oral, Q12H [address], Cornelia Valdez PA-C, 10 mg at 10/29/21 3905    polyethylene glycol (MIRALAX) packet 17 g, 17 g, Oral, Daily PRN, Cornelia Valdez PA-C, 17 g at 10/21/21 1620    pravastatin (PRAVACHOL) tablet 40 mg, 40 mg, Oral, Daily With Kelli De La Rosa PA-C, 40 mg at 10/28/21 1807    senna (SENOKOT) tablet 17 2 mg, 2 tablet, Oral, BID, Cornelia Valdez PA-C, 17 2 mg at 10/29/21 9969    warfarin (COUMADIN) tablet 4 mg, 4 mg, Oral, Once (warfarin), Marla Sultana MD    warfarin (COUMADIN) tablet 6 mg, 6 mg, Oral, Daily (warfarin), Marla Sultana MD, 6 mg at 10/28/21 6127    Blood Culture:   No results found for: BLOODCX    Wound Culture:   No results found for: WOUNDCULT    Ins and Outs:  I/O last 24 hours: In: 780 [P O :780]  Out: 1600 [Urine:1600]        Physical:  Vitals:    10/29/21 0728   BP: 156/73   Pulse: 76   Resp: 16   Temp: 99 1 °F (37 3 °C)   SpO2: 96%     Musculoskeletal: right Lower Extremity   · Patient lying in bed, with blankets over his head  · Skin above and below the dressings is clean and dry, without signs of irritation, drainage, ecchymosis, or swelling  · Dressing clean, dry, and intact  · Soft lower extremity compartments  · Patient able to wiggle toes, intact plantar/dorsiflexion  ·  Sensation intact to light touch  · Soft lower extremity compartments  · 2+ DP pulse  · Brisk cap refill in all toes    Assessment:    66 y o male POD4 s/o right proximal tibia shaft fracture, removal of external fixator  Doing well from orthopedic standpoint  Plan:  · Non-weight bearing RLE  · Maintain dressings, keep clean, dry, and intact  · Maintain immobilizer at all times  · HGB 8 0 this AM, trending upward  Greater than 2 gram drop which qualifies for diagnosis of acute blood loss anemia, will monitor and administer IVF/prbc as indicated   · PT/OT for gait training  · Pain control per primary team  · DVT ppx with coumadin  · Dispo: Ortho will continue to follow throughout the patient's hospital stay   He will follow up with Dr Raji Duke in office once discharged  · Discharge plan to [facility], case management on board    Mack Aranda PA-C

## 2021-10-30 LAB
APTT PPP: 77 SECONDS (ref 23–37)
GLUCOSE SERPL-MCNC: 123 MG/DL (ref 65–140)
GLUCOSE SERPL-MCNC: 179 MG/DL (ref 65–140)
GLUCOSE SERPL-MCNC: 190 MG/DL (ref 65–140)
GLUCOSE SERPL-MCNC: 226 MG/DL (ref 65–140)
INR PPP: 1.44 (ref 0.84–1.19)
PROTHROMBIN TIME: 16.9 SECONDS (ref 11.6–14.5)

## 2021-10-30 PROCEDURE — 99232 SBSQ HOSP IP/OBS MODERATE 35: CPT | Performed by: INTERNAL MEDICINE

## 2021-10-30 PROCEDURE — 99024 POSTOP FOLLOW-UP VISIT: CPT | Performed by: PHYSICIAN ASSISTANT

## 2021-10-30 PROCEDURE — 85730 THROMBOPLASTIN TIME PARTIAL: CPT | Performed by: INTERNAL MEDICINE

## 2021-10-30 PROCEDURE — 82948 REAGENT STRIP/BLOOD GLUCOSE: CPT

## 2021-10-30 PROCEDURE — 85610 PROTHROMBIN TIME: CPT | Performed by: INTERNAL MEDICINE

## 2021-10-30 RX ORDER — WARFARIN SODIUM 3 MG/1
6 TABLET ORAL
Status: COMPLETED | OUTPATIENT
Start: 2021-10-30 | End: 2021-10-30

## 2021-10-30 RX ADMIN — INSULIN LISPRO 2 UNITS: 100 INJECTION, SOLUTION INTRAVENOUS; SUBCUTANEOUS at 10:10

## 2021-10-30 RX ADMIN — DICLOFENAC SODIUM 2 G: 10 GEL TOPICAL at 13:42

## 2021-10-30 RX ADMIN — WARFARIN SODIUM 6 MG: 3 TABLET ORAL at 17:23

## 2021-10-30 RX ADMIN — OXYCODONE HYDROCHLORIDE 10 MG: 10 TABLET, FILM COATED, EXTENDED RELEASE ORAL at 20:43

## 2021-10-30 RX ADMIN — INSULIN GLARGINE 60 UNITS: 100 INJECTION, SOLUTION SUBCUTANEOUS at 10:09

## 2021-10-30 RX ADMIN — HEPARIN SODIUM 15.1 UNITS/KG/HR: 10000 INJECTION, SOLUTION INTRAVENOUS at 20:47

## 2021-10-30 RX ADMIN — LIDOCAINE 5% 1 PATCH: 700 PATCH TOPICAL at 10:11

## 2021-10-30 RX ADMIN — PRAVASTATIN SODIUM 40 MG: 40 TABLET ORAL at 17:22

## 2021-10-30 RX ADMIN — HEPARIN SODIUM 15.1 UNITS/KG/HR: 10000 INJECTION, SOLUTION INTRAVENOUS at 03:42

## 2021-10-30 RX ADMIN — WARFARIN SODIUM 6 MG: 3 TABLET ORAL at 17:22

## 2021-10-30 RX ADMIN — SODIUM CHLORIDE, SODIUM LACTATE, POTASSIUM CHLORIDE, AND CALCIUM CHLORIDE 75 ML/HR: .6; .31; .03; .02 INJECTION, SOLUTION INTRAVENOUS at 03:43

## 2021-10-30 RX ADMIN — DICLOFENAC SODIUM 2 G: 10 GEL TOPICAL at 10:09

## 2021-10-30 RX ADMIN — IRON SUCROSE 200 MG: 20 INJECTION, SOLUTION INTRAVENOUS at 10:08

## 2021-10-30 RX ADMIN — DICLOFENAC SODIUM 2 G: 10 GEL TOPICAL at 17:24

## 2021-10-30 RX ADMIN — INSULIN LISPRO 15 UNITS: 100 INJECTION, SOLUTION INTRAVENOUS; SUBCUTANEOUS at 13:40

## 2021-10-30 RX ADMIN — OXYCODONE HYDROCHLORIDE 10 MG: 10 TABLET, FILM COATED, EXTENDED RELEASE ORAL at 10:07

## 2021-10-30 RX ADMIN — INSULIN LISPRO 15 UNITS: 100 INJECTION, SOLUTION INTRAVENOUS; SUBCUTANEOUS at 10:10

## 2021-10-30 RX ADMIN — STANDARDIZED SENNA CONCENTRATE 17.2 MG: 8.6 TABLET ORAL at 17:23

## 2021-10-30 RX ADMIN — STANDARDIZED SENNA CONCENTRATE 17.2 MG: 8.6 TABLET ORAL at 10:08

## 2021-10-30 RX ADMIN — INSULIN LISPRO 2 UNITS: 100 INJECTION, SOLUTION INTRAVENOUS; SUBCUTANEOUS at 13:40

## 2021-10-30 NOTE — QUICK NOTE
Went to patient room to turn and repositioned with [employer]  Pt shifts his weight but did not want staff to turn him at this time  Educated patient with importance of repositioning to prevent skin breakdown and applying cream  Pt stills" refuses" to let staff turn him at present time   Continued educating and encouraging patient

## 2021-10-30 NOTE — QUICK NOTE
INR not at goal despite multiple doses of Coumadin  At this time, there is concern for possible medication nonadherence  Advised direct nursing observed therapy for daily coumadin  Nursing in agreement

## 2021-10-30 NOTE — QUICK NOTE
Pt states" I will only have one tube of blood drawn now he will do the PTT one   Explained and educated to the patient  the  importance of blood work   Pt refuses the CBC and BMP but will have PT INR added with the one tube drawn   Slim notified and aware

## 2021-10-30 NOTE — PLAN OF CARE
Problem: Potential for Falls  Goal: Patient will remain free of falls  Description: INTERVENTIONS:  - Educate patient/family on patient safety including physical limitations  - Instruct patient to call for assistance with activity   - Consult OT/PT to assist with strengthening/mobility   - Keep Call bell within reach  - Keep bed low and locked with side rails adjusted as appropriate  - Keep care items and personal belongings within reach  - Initiate and maintain comfort rounds  - Make Fall Risk Sign visible to staff  - Offer Toileting every 2 Hours, in advance of need  - Initiate/Maintain 24alarm  - Obtain necessary fall risk management equipment:  - Apply yellow socks and bracelet for high fall risk patients  - Consider moving patient to room near nurses station  Outcome: Progressing

## 2021-10-30 NOTE — QUICK NOTE
Returned to patients room   to assist with turning  Pt was able to turn himself onto right side   Conituned educating and encouraging patient

## 2021-10-30 NOTE — PROGRESS NOTES
Progress Note - Orthopedics   Adrienne Traylor 61 y o  male MRN: [id number]  Unit/Bed#: -01      Subjective:    61 y o male POD#5 status post ORIF right proximal tibia shaft fracture, removal of external fixator  Patient states that his leg is doing well overall  Patient states that he has minimal pain in his leg currently  Patient states that when he does have pain is over the anterior knee and becomes worse with direct contact  Patient states that he has been compliant with nonweightbearing restriction of the right lower extremity  Patient states he has been working physical therapy as directed  Patient states he has been compliant with anticoagulation  Patient denies any fevers any chills  Patient denies any shortness of breath chest tightness chest pain  Patient denies any numbness or tingling in his leg  Patient offers no other complaints at this time       Labs:  0   Lab Value Date/Time    HCT 25 5 (L) 10/29/2021 0444    HCT 24 1 (L) 10/28/2021 0511    HCT 23 2 (L) 10/27/2021 0308    HCT 38 4 07/13/2015 1448    HCT 38 6 10/20/2014 1132    HGB 8 0 (L) 10/29/2021 0444    HGB 7 5 (L) 10/28/2021 0511    HGB 7 4 (L) 10/27/2021 0308    HGB 13 1 07/13/2015 1448    HGB 13 1 10/20/2014 1132    INR 1 44 (H) 10/30/2021 0558    INR 2 94 (H) 12/11/2015 1543    WBC 6 29 10/29/2021 0444    WBC 6 88 10/28/2021 0511    WBC 7 93 10/27/2021 0308    WBC 7 7 07/13/2015 1448    WBC 8 30 10/20/2014 1132    ESR 58 (H) 05/20/2016 1207       Meds:    Current Facility-Administered Medications:     acetaminophen (TYLENOL) tablet 650 mg, 650 mg, Oral, Q6H PRN, Reyes Sylvieles, PA-C, 650 mg at 10/28/21 1516    calcium carbonate (TUMS) chewable tablet 500 mg, 500 mg, Oral, Daily PRN, Reyes Pickles, PA-C, 500 mg at 10/14/21 0006    Diclofenac Sodium (VOLTAREN) 1 % topical gel 2 g, 2 g, Topical, 4x Daily, Reyes Barone PA-C, 2 g at 10/29/21 5859    heparin (porcine) 25,000 units in 0 45% NaCl 250 mL infusion (premix), 3-20 Units/kg/hr (Order-Specific), Intravenous, Titrated, José Miguel Parrish MD, Last Rate: 13 6 mL/hr at 10/30/21 0342, 15 1 Units/kg/hr at 10/30/21 0342    heparin (porcine) injection 2,000 Units, 2,000 Units, Intravenous, Q1H PRN, Addy Ortiz MD, 2,000 Units at 10/28/21 0604    heparin (porcine) injection 4,000 Units, 4,000 Units, Intravenous, Q1H PRN, Addy Ortiz MD    HYDROmorphone (DILAUDID) injection 0 5 mg, 0 5 mg, Intravenous, Q3H PRN, Saman Medley PA-C, 0 5 mg at 10/25/21 1953    insulin glargine (LANTUS) subcutaneous injection 60 Units 0 6 mL, 60 Units, Subcutaneous, Daily, Addy Ortiz MD, 60 Units at 10/29/21 0930    insulin lispro (HumaLOG) 100 units/mL subcutaneous injection 15 Units, 15 Units, Subcutaneous, TID With Meals, Addy Ortiz MD, 15 Units at 10/29/21 1734    insulin lispro (HumaLOG) 100 units/mL subcutaneous injection 2-12 Units, 2-12 Units, Subcutaneous, TID AC, 2 Units at 10/29/21 1733 **AND** Fingerstick Glucose (POCT), , , TID AC, Chanell Chavira PA-C    iron sucrose (VENOFER) 200 mg in sodium chloride 0 9 % 100 mL IVPB, 200 mg, Intravenous, Daily, José Miguel Parrish MD, 200 mg at 10/29/21 1157    lactated ringers infusion, 75 mL/hr, Intravenous, Continuous, Kassi Hill MD, Last Rate: 75 mL/hr at 10/30/21 0343, 75 mL/hr at 10/30/21 0343    lidocaine (LIDODERM) 5 % patch 1 patch, 1 patch, Topical, Daily, Saman Medley PA-C, 1 patch at 10/29/21 0837    ondansetron (ZOFRAN) injection 4 mg, 4 mg, Intravenous, Q6H PRN, Saman Medley PA-C, 4 mg at 10/13/21 2249    oxyCODONE (OxyCONTIN) 12 hr tablet 10 mg, 10 mg, Oral, Q12H [facility], Saman Medley PA-C, 10 mg at 10/29/21 2102    polyethylene glycol (MIRALAX) packet 17 g, 17 g, Oral, Daily PRN, Saman Medley PA-C, 17 g at 10/21/21 1620    pravastatin (PRAVACHOL) tablet 40 mg, 40 mg, Oral, Daily With Marilyn Garcia PA-C, 40 mg at 10/29/21 1741    senna (SENOKOT) tablet 17 2 mg, 2 tablet, Oral, BID, Cayetano Montenegro PA-C, 17 2 mg at 10/29/21 1742    warfarin (COUMADIN) tablet 6 mg, 6 mg, Oral, Daily (warfarin), Jeff Woodson MD, 6 mg at 10/29/21 1741    warfarin (COUMADIN) tablet 6 mg, 6 mg, Oral, Once (warfarin), Jeff Woodson MD    Blood Culture:   No results found for: BLOODCX    Wound Culture:   No results found for: WOUNDCULT    Ins and Outs:  I/O last 24 hours: In: 7717 [P O :400; I V :1124]  Out: 2075 [Urine:2075]          Physical:  Vitals:    10/30/21 0729   BP: 147/74   Pulse: 82   Resp: 18   Temp: 99 8 °F (37 7 °C)   SpO2: 96%     Musculoskeletal: right Lower Extremity  · Patient resting comfrotably in hospital bed in no acute distress   · Skin :  Extremity appears well perfused overall   · Dressing : clean dry and intact   · TTP : anterior knee   · SILT s/s/sp/dp/t  +fhl/ehl, +ankle dorsi/plantar flexion  2+ DP pulse      Assessment:    66 y o male POD#5 status post ORIF right proximal tibia shaft fracture, removal of external fixator      Plan:  · NON - WEIGHT BEARING right lower extremity   · PT/OT for ambulation asssitance   · Pain control as directed   · DVT ppx as directed   · Dispo: Ortho will follow at this time  Continue with non-weight beraing status of the right lower extremity  Maintain knee immolibzer at all times  Ice / elevate extremity         Devi Kim PA-C

## 2021-10-30 NOTE — PROGRESS NOTES
[facility]  Progress Note Alma Parker 1955, 61 y o  male MRN: [id number]  Unit/Bed#: -01 Encounter: [id number]  Primary Care Provider: Maninder Sandoval DO   Date and time admitted to hospital: 10/13/2021  5:17 AM    * Closed fracture of proximal end of right tibia  Assessment & Plan  Sustained mechanical fracture to the proximal end of the right tibia  Initially on external fixators   Status post ORIF and removal of external fixators  - POD#5   Intact distal pulsation and neurological function - able to wiggle toes    Per review of PT notes not agreeable to participating PT due to fear of falling  Reassured patient about PT measures to prevent fall  Appropriate DVT prophylaxis  PT OT  Appreciate orthopedic input    Type 2 diabetes mellitus with diabetic neuropathy, without long-term current use of insulin [facility])  Assessment & Plan  Lab Results   Component Value Date    HGBA1C 8 2 (H) 10/16/2021       Recent Labs     10/28/21  1548 10/28/21  2106 10/29/21  0726 10/29/21  1124   POCGLU 75 196* 168* 207*       Blood Sugar Average: Last 72 hrs:  (P) 243 [id number]  Known diabetic with diabetic neuropathy  On metformin and lantus at home  Blood sugar on presentation 418  Contiunue sliding scale insulin  Hypoglycemic protocol  Goal blood sugar not at goal of 140-180  · Continue Diabetic diet  · Continue Lantus 60 units q a m  · Insulin Humulog 15 unit to meals  · Continue SSI coverage while in hospital  · Continue before meals and bedtime glucose checks      Anemia  Assessment & Plan  Hemoglobin holding steady at 8  Hemoglobin dropped dramatically since admission from 12 units to 7 5 and has been currently stable for few days  Most likely secondary blood loss during  surgery    Concerning for acute blood loss anemia  Patient has persistently refused blood transfusion     - will give IV Venofer 200 mg daily for 2 days  - discussed need for PRBC transfusion with patient,he appears not willing to accept transfusion  Discussed risk and benefit of said therapy and alternatives  Decline to sign consent form  Will touch base with spouse as he requests  -- no consent in the chart  -- will check current panel and replete iron if indicated     Will monitor closely  Difficult intubation  Assessment & Plan  Had difficult intubation  Due to severe kyphosis during ortho procedure on 10/18  Will keep in mind because he is going to have a definitive surgery again next week    Pseudogout of knee, left  Assessment & Plan  Pain and swelling in the left anterior knee, superiorly  Tender to touch  Developed after PT  Xray -unremarkable  Synovial fluid analysis shows calcium pyrophosphate crystals  Started on colchicine 0 6mg daily  ROSE (acute kidney injury) [facility])  Assessment & Plan  Resolved  Now at baseline  ROSE on CKD  May be prerenal   Cr trended since admission 1 99->1  32  Baseline 1 21 months ago  Avoid nephrotoxics  Avoid hypotension  Monitor I/O  Results from last 7 days   Lab Units 10/29/21  0444 10/28/21  0512 10/27/21  0308 10/26/21  7699 10/25/21  0540 10/24/21  0859 10/23/21  0924   SODIUM mmol/L 142 142 139 137 140 139 141   POTASSIUM mmol/L 4 2 4 0 4 5 4 3 3 9 3 9 3 9   CHLORIDE mmol/L 108 107 105 103 105 104 105   CO2 mmol/L 28 27 29 25 27 28 27   ANION GAP mmol/L 6 8 5 9 8 7 9   BUN mg/dL 16 14 21 25 18 18 17   CREATININE mg/dL 1 40* 1 32* 1 53* 1 62* 1 41* 1 46* 1 40*   CALCIUM mg/dL 8 0* 8 0* 7 7* 8 2* 8 5 8 8 8 7   EGFR ml/min/1 73sq m 53 57 47 44 52 50 53   GLUCOSE RANDOM mg/dL 174* 199* 360* 323* 165* 239* 202*         History of DVT (deep vein thrombosis)  Assessment & Plan  History 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  Unprovoked  Last episode was 10yrs ago  On AC with coumadin  Currently held  Resume heparin gtt  Patient is on Coumadin   INR today 1 4  Per medicine review, he received 10 mg of Coumadin on 10/29/2021   Nursing uncertain if medication was ingested by patient  Given decreasing  INR there is concern for medication nonadherence  Advise nursing staff on direct observed therapy  -- continue Coumadin 6 mg daily  Will add an extra 6mg today  -- continue bridging with heparin for now    Mixed hyperlipidemia  Assessment & Plan  Continue statins  Essential hypertension  Assessment & Plan  On lisinopril 10mg daily at home  On hold for ROSE  BP stable now  Blood pressure 133/62, pulse 74, temperature 99 9 °F (37 7 °C), temperature source Oral, resp  rate 18, height 6' 1" (1 854 m), weight 136 kg (299 lb 13 2 oz), SpO2 94 %  Elevated INR-resolved as of 10/26/2021  Assessment & Plan  Now subtherapeutic ,  Patient previously on Coumadin due to history of DVTs  This is currently on hold due to need for surgery  Ortho recommends 1 4-1 6 for ortho procedure  INR 1 47--  1 18  Continue to hold Coumadin  VTE Pharmacologic Prophylaxis:   VTE Score: 5 Moderate Risk (Score 3-4) - Pharmacological DVT Prophylaxis Ordered: Heparin Drip  Mechanical VTE Prophylaxis in Place: No    Patient Centered Rounds: I have performed bedside rounds with nursing staff today  Discussions with Specialists or Other Care Team Provider:  Ortho    Education and Discussions with Family / Patient: Patient declined call to   Current Length of Stay: 17 day(s)    Current Patient Status: Inpatient     Discharge Plan / Estimated Discharge Date: TBD    Code Status: Level 1 - Full Code      Subjective:       POD #5 ORIF  Patient seen at bedside  He states that he does not want to be disturbed  Objective:     Vitals:   Temp (24hrs), Av 1 °F (37 3 °C), Min:98 6 °F (37 °C), Max:99 8 °F (37 7 °C)    Temp:  [98 6 °F (37 °C)-99 8 °F (37 7 °C)] 99 8 °F (37 7 °C)  HR:  [74-84] 82  Resp:  [17-18] 18  BP: (131-147)/(55-74) 147/74  SpO2:  [93 %-96 %] 96 %  Body mass index is 39 5 kg/m²  Input and Output Summary (last 24 hours):        Intake/Output Summary (Last 24 hours) at 10/30/2021 1321  Last data filed at 10/30/2021 1008  Gross per 24 hour   Intake 1524 ml   Output 2275 ml   Net -751 ml       Physical Exam:     Physical Exam  Vitals reviewed  Constitutional:       General: He is not in acute distress  Appearance: He is not toxic-appearing  HENT:      Head: Normocephalic and atraumatic  Eyes:      Pupils: Pupils are equal, round, and reactive to light  Cardiovascular:      Rate and Rhythm: Normal rate and regular rhythm  Pulses: Normal pulses  Heart sounds: Normal heart sounds  Pulmonary:      Effort: Pulmonary effort is normal  No respiratory distress  Breath sounds: Normal breath sounds  Abdominal:      General: Bowel sounds are normal  There is no distension  Palpations: Abdomen is soft  Musculoskeletal:      Comments: Right lower extremity in Acewrap and high knee braces  Intact neurological function  Capillary refill < 2 sec     Skin:     Coloration: Skin is not jaundiced  Neurological:      Mental Status: He is alert and oriented to person, place, and time  Mental status is at baseline            Additional Data:     Labs:  Results from last 7 days   Lab Units 10/29/21  0444   WBC Thousand/uL 6 29   HEMOGLOBIN g/dL 8 0*   HEMATOCRIT % 25 5*   PLATELETS Thousands/uL 452*   NEUTROS PCT % 58   LYMPHS PCT % 26   MONOS PCT % 10   EOS PCT % 4     Results from last 7 days   Lab Units 10/29/21  0444   SODIUM mmol/L 142   POTASSIUM mmol/L 4 2   CHLORIDE mmol/L 108   CO2 mmol/L 28   BUN mg/dL 16   CREATININE mg/dL 1 40*   ANION GAP mmol/L 6   CALCIUM mg/dL 8 0*   GLUCOSE RANDOM mg/dL 174*     Results from last 7 days   Lab Units 10/30/21  0558   INR  1 44*     Results from last 7 days   Lab Units 10/30/21  1129 10/30/21  0728 10/29/21  2134 10/29/21  2058 10/29/21  1626 10/29/21  1124 10/29/21  0726 10/28/21  2106 10/28/21  1548 10/28/21  1142 10/28/21  0710 10/27/21  2042   POC GLUCOSE mg/dl 190* 179* 122 78 156* 207* 168* 196* 75 225* 200* 186*               Imaging: Reviewed radiology reports from this admission including: Avanell Cowden bilateral LE    Recent Cultures (last 7 days):           Lines/Drains:  Invasive Devices     Peripheral Intravenous Line            Peripheral IV 10/27/21 Proximal;Right;Ventral (anterior) Forearm 3 days    Peripheral IV 10/29/21 Right;Ventral (anterior) Forearm 1 day                Telemetry:        Last 24 Hours Medication List:   Current Facility-Administered Medications   Medication Dose Route Frequency Provider Last Rate    acetaminophen  650 mg Oral Q6H PRN Lisa Irby PA-C      calcium carbonate  500 mg Oral Daily PRN Lisa Irby PA-C      Diclofenac Sodium  2 g Topical 4x Daily Heather Chavira PA-C      heparin (porcine)  3-20 Units/kg/hr (Order-Specific) Intravenous Titrated Kandi Turner MD 15 1 Units/kg/hr (10/30/21 0342)    heparin (porcine)  2,000 Units Intravenous Q1H PRN Kandi Turner MD      heparin (porcine)  4,000 Units Intravenous Q1H PRN Kandi Turner MD      HYDROmorphone  0 5 mg Intravenous Q3H PRN Lisa Irby PA-C      insulin glargine  60 Units Subcutaneous Daily José Miguel Parrish MD      insulin lispro  15 Units Subcutaneous TID With Meals Kandi Turner MD      insulin lispro  2-12 Units Subcutaneous TID AC Heather Chavira PA-C      lactated ringers  75 mL/hr Intravenous Continuous Evans Escobar MD 75 mL/hr (10/30/21 0343)    lidocaine  1 patch Topical Daily Lisa Irby PA-C      ondansetron  4 mg Intravenous Q6H PRN Lisa Irby PA-C      oxyCODONE  10 mg Oral Q12H [address] Lisa Irby PA-C      polyethylene glycol  17 g Oral Daily PRN Lisa Irby PA-C      pravastatin  40 mg Oral Daily With Theodoro Fothergill M Gower, PA-C      senna  2 tablet Oral BID Lisa Irby PA-C      warfarin  6 mg Oral Daily (warfarin) Kandi Turner MD      warfarin  6 mg Oral Once (warfarin) Kandi Turner MD          Today, Patient Was Seen By: Mary Dorantes MD Francois    ** Please Note: This note has been constructed using a voice recognition system   **

## 2021-10-31 LAB
ANION GAP SERPL CALCULATED.3IONS-SCNC: 8 MMOL/L (ref 4–13)
APTT PPP: 110 SECONDS (ref 23–37)
APTT PPP: 78 SECONDS (ref 23–37)
BUN SERPL-MCNC: 13 MG/DL (ref 5–25)
CALCIUM SERPL-MCNC: 8.3 MG/DL (ref 8.3–10.1)
CHLORIDE SERPL-SCNC: 106 MMOL/L (ref 100–108)
CO2 SERPL-SCNC: 28 MMOL/L (ref 21–32)
CREAT SERPL-MCNC: 1.3 MG/DL (ref 0.6–1.3)
GFR SERPL CREATININE-BSD FRML MDRD: 58 ML/MIN/1.73SQ M
GLUCOSE SERPL-MCNC: 179 MG/DL (ref 65–140)
GLUCOSE SERPL-MCNC: 204 MG/DL (ref 65–140)
GLUCOSE SERPL-MCNC: 217 MG/DL (ref 65–140)
GLUCOSE SERPL-MCNC: 236 MG/DL (ref 65–140)
GLUCOSE SERPL-MCNC: 236 MG/DL (ref 65–140)
INR PPP: 1.9 (ref 0.84–1.19)
POTASSIUM SERPL-SCNC: 3.9 MMOL/L (ref 3.5–5.3)
PROTHROMBIN TIME: 20.9 SECONDS (ref 11.6–14.5)
SODIUM SERPL-SCNC: 142 MMOL/L (ref 136–145)

## 2021-10-31 PROCEDURE — 85730 THROMBOPLASTIN TIME PARTIAL: CPT | Performed by: INTERNAL MEDICINE

## 2021-10-31 PROCEDURE — 99232 SBSQ HOSP IP/OBS MODERATE 35: CPT | Performed by: INTERNAL MEDICINE

## 2021-10-31 PROCEDURE — 82948 REAGENT STRIP/BLOOD GLUCOSE: CPT

## 2021-10-31 PROCEDURE — 99024 POSTOP FOLLOW-UP VISIT: CPT | Performed by: PHYSICIAN ASSISTANT

## 2021-10-31 PROCEDURE — 80048 BASIC METABOLIC PNL TOTAL CA: CPT | Performed by: INTERNAL MEDICINE

## 2021-10-31 PROCEDURE — 85610 PROTHROMBIN TIME: CPT | Performed by: INTERNAL MEDICINE

## 2021-10-31 RX ADMIN — SODIUM CHLORIDE, SODIUM LACTATE, POTASSIUM CHLORIDE, AND CALCIUM CHLORIDE 75 ML/HR: .6; .31; .03; .02 INJECTION, SOLUTION INTRAVENOUS at 15:47

## 2021-10-31 RX ADMIN — DICLOFENAC SODIUM 2 G: 10 GEL TOPICAL at 17:40

## 2021-10-31 RX ADMIN — STANDARDIZED SENNA CONCENTRATE 17.2 MG: 8.6 TABLET ORAL at 17:40

## 2021-10-31 RX ADMIN — SODIUM CHLORIDE, SODIUM LACTATE, POTASSIUM CHLORIDE, AND CALCIUM CHLORIDE 75 ML/HR: .6; .31; .03; .02 INJECTION, SOLUTION INTRAVENOUS at 02:16

## 2021-10-31 RX ADMIN — STANDARDIZED SENNA CONCENTRATE 17.2 MG: 8.6 TABLET ORAL at 09:08

## 2021-10-31 RX ADMIN — LIDOCAINE 5% 1 PATCH: 700 PATCH TOPICAL at 09:08

## 2021-10-31 RX ADMIN — PRAVASTATIN SODIUM 40 MG: 40 TABLET ORAL at 17:40

## 2021-10-31 RX ADMIN — INSULIN LISPRO 2 UNITS: 100 INJECTION, SOLUTION INTRAVENOUS; SUBCUTANEOUS at 09:08

## 2021-10-31 RX ADMIN — INSULIN LISPRO 15 UNITS: 100 INJECTION, SOLUTION INTRAVENOUS; SUBCUTANEOUS at 09:07

## 2021-10-31 RX ADMIN — INSULIN LISPRO 15 UNITS: 100 INJECTION, SOLUTION INTRAVENOUS; SUBCUTANEOUS at 17:40

## 2021-10-31 RX ADMIN — INSULIN LISPRO 4 UNITS: 100 INJECTION, SOLUTION INTRAVENOUS; SUBCUTANEOUS at 17:40

## 2021-10-31 RX ADMIN — INSULIN GLARGINE 60 UNITS: 100 INJECTION, SOLUTION SUBCUTANEOUS at 09:07

## 2021-10-31 RX ADMIN — INSULIN LISPRO 4 UNITS: 100 INJECTION, SOLUTION INTRAVENOUS; SUBCUTANEOUS at 12:12

## 2021-10-31 RX ADMIN — OXYCODONE HYDROCHLORIDE 10 MG: 10 TABLET, FILM COATED, EXTENDED RELEASE ORAL at 20:54

## 2021-10-31 RX ADMIN — OXYCODONE HYDROCHLORIDE 10 MG: 10 TABLET, FILM COATED, EXTENDED RELEASE ORAL at 09:07

## 2021-10-31 RX ADMIN — DICLOFENAC SODIUM 2 G: 10 GEL TOPICAL at 09:08

## 2021-10-31 RX ADMIN — HEPARIN SODIUM 13.1 UNITS/KG/HR: 10000 INJECTION, SOLUTION INTRAVENOUS at 15:46

## 2021-10-31 RX ADMIN — WARFARIN SODIUM 6 MG: 3 TABLET ORAL at 17:40

## 2021-10-31 RX ADMIN — DICLOFENAC SODIUM 2 G: 10 GEL TOPICAL at 12:13

## 2021-10-31 NOTE — PROGRESS NOTES
[address] [facility]  Progress Note Bandar Lisater 1955, 61 y o  male MRN: [id number]  Unit/Bed#: -Jeffery Encounter: [id number]  Primary Care Provider: Koffi Rivera DO   Date and time admitted to hospital: 10/13/2021  5:17 AM    Anemia  Assessment & Plan  Hemoglobin holding steady at 8  Hemoglobin dropped dramatically since admission from 12 units to 7 5 and has been currently stable for few days  Most likely secondary blood loss during  surgery  Concerning for acute blood loss anemia  Patient has persistently refused blood transfusion     - will give IV Venofer 200 mg daily for 2 days  - discussed need for PRBC transfusion with patient,he appears not willing to accept transfusion  Discussed risk and benefit of said therapy and alternatives  Decline to sign consent form  Will touch base with spouse as he requests  -- no consent in the chart  -- will check current panel and replete iron if indicated     Will monitor closely  Difficult intubation  Assessment & Plan  Had difficult intubation  Due to severe kyphosis during ortho procedure on 10/18  Will keep in mind because he is going to have a definitive surgery again next week    Pseudogout of knee, left  Assessment & Plan  Pain and swelling in the left anterior knee, superiorly  Tender to touch  Developed after PT  Xray -unremarkable  Synovial fluid analysis shows calcium pyrophosphate crystals  Started on colchicine 0 6mg daily  ROSE (acute kidney injury) [facility])  Assessment & Plan  Resolved  Now at baseline  ROSE on CKD  May be prerenal   Cr trended since admission 1 99->1 4  Baseline 1 21 months ago  Avoid nephrotoxics  Avoid hypotension  Monitor I/O      Results from last 7 days   Lab Units 10/29/21  0444 10/28/21  0512 10/27/21  0308 10/26/21  8730 10/25/21  0540 10/24/21  0859 10/23/21  0924   SODIUM mmol/L 142 142 139 137 140 139 141   POTASSIUM mmol/L 4 2 4 0 4 5 4 3 3 9 3 9 3 9   CHLORIDE mmol/L 108 107 105 103 105 104 105   CO2 mmol/L 28 27 29 25 27 28 27   ANION GAP mmol/L 6 8 5 9 8 7 9   BUN mg/dL 16 14 21 25 18 18 17   CREATININE mg/dL 1 40* 1 32* 1 53* 1 62* 1 41* 1 46* 1 40*   CALCIUM mg/dL 8 0* 8 0* 7 7* 8 2* 8 5 8 8 8 7   EGFR ml/min/1 73sq m 53 57 47 44 52 50 53   GLUCOSE RANDOM mg/dL 174* 199* 360* 323* 165* 239* 202*         History of DVT (deep vein thrombosis)  Assessment & Plan  History 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  Unprovoked  Last episode was 10yrs ago  On AC with coumadin  Currently held  Resume heparin gtt  Patient is on Coumadin   INR today 1 4  Per medicine review, he received 10 mg of Coumadin on 10/29/2021  Nursing uncertain if medication was ingested by patient  Given decreasing  INR there is concern for medication nonadherence  Advise nursing staff on direct observed therapy  -- continue Coumadin 6 mg daily  Will add an extra 6mg today  -- continue bridging with heparin for now    Type 2 diabetes mellitus with diabetic neuropathy, without long-term current use of insulin [facility])  Assessment & Plan  Lab Results   Component Value Date    HGBA1C 8 2 (H) 10/16/2021       Recent Labs     10/28/21  1548 10/28/21  2106 10/29/21  0726 10/29/21  1124   POCGLU 75 196* 168* 207*       Blood Sugar Average: Last 72 hrs:  (P) 243 [id number]  Known diabetic with diabetic neuropathy  On metformin and lantus at home  Blood sugar on presentation 418  Contiunue sliding scale insulin  Hypoglycemic protocol  Goal blood sugar not at goal of 140-180  · Continue Diabetic diet  · Continue Lantus 60 units q a m  · Insulin Humulog 15 unit to meals  · Continue SSI coverage while in hospital  · Continue before meals and bedtime glucose checks  Mixed hyperlipidemia  Assessment & Plan  Continue statins  Essential hypertension  Assessment & Plan  On lisinopril 10mg daily at home  On hold for ROSE  BP stable now    Blood pressure 133/62, pulse 74, temperature 99 9 °F (37 7 °C), temperature source Oral, resp   rate 18, height 6' 1" (1 854 m), weight 136 kg (299 lb 13 2 oz), SpO2 94 %  * Closed fracture of proximal end of right tibia  Assessment & Plan  Sustained mechanical fracture to the proximal end of the right tibia  Initially on external fixators   Status post ORIF and removal of external fixators  - POD#5   Intact distal pulsation and neurological function - able to wiggle toes    Per review of PT notes not agreeable to participating PT due to fear of falling  Reassured patient about PT measures to prevent fall  Appropriate DVT prophylaxis  PT OT  Appreciate orthopedic input    Elevated INR-resolved as of 10/26/2021  Assessment & Plan  Now subtherapeutic ,  Patient previously on Coumadin due to history of DVTs  This is currently on hold due to need for surgery  Ortho recommends 1 4-1 6 for ortho procedure  INR 1 47--  1 18  Continue to hold Coumadin  TE Pharmacologic Prophylaxis: Heparin Drip    Patient Centered Rounds: I have performed bedside rounds with nursing staff today  Discussions with Specialists or Other Care Team Provider:  Care team  Education and Discussions with Family / Patient:  Patient    Current Length of Stay: 25 day(s)  Current Patient Status: Inpatient     Certification Statement: The patient will continue to require additional inpatient hospital stay due to Closed fracture of proximal end of right tibia  Discharge Plan / Estimated Discharge Date:  1-2 days to SNF when bed available    Code Status: Level 1 - Full Code  ______________________________________________________________________________    Subjective:   Patient seen and examined by me  Does not have any chest pain, palpitations or diaphoresis  Does have some pain in the right leg but otherwise okay  No fever, chills or rigors  Patient looks irritable    Objective:   Vitals: Blood pressure 148/70, pulse 74, temperature 98 7 °F (37 1 °C), temperature source Oral, resp   rate 18, height 6' 1" (1 854 m), weight 136 kg (299 lb 6 2 oz), SpO2 95 %      Physical Exam:   General appearance: alert, appears stated age and cooperative  Constitutional- looks a little weak  HEENT - atraumatic and normocephalic  Neck- supple  Skin - no fresh rash  Extremities- status post surgery on the right foot for the tibial fracture  Cardiovascular- S1-S2 heard  Respiratory- bilateral air entry present, no crackles or rhonchi  Skin - no fresh rash  Abdomen - normal bowel sounds present, no rebound tenderness  CNS- No fresh focal deficits  Psych- no acute psychosis     Additional Data:   Labs:  Results from last 7 days   Lab Units 10/31/21  0627 10/30/21  0558 10/29/21  0444 10/28/21  0511 10/27/21  0308 10/26/21  0812 10/25/21  0900 10/25/21  0540   WBC Thousand/uL  --   --  6 29 6 88 7 93 10 19*  --  6 31   HEMOGLOBIN g/dL  --   --  8 0* 7 5* 7 4* 7 8*  --  8 6*   HEMATOCRIT %  --   --  25 5* 24 1* 23 2* 24 5*  --  27 3*   MCV fL  --   --  92 92 91 90  --  91   PLATELETS Thousands/uL  --   --  452* 391* 344 341  --  399*   INR  1 90* 1 44* 1 57* 1 58* 1 44* 1 35* 1 21*  --      Results from last 7 days   Lab Units 10/31/21  1253 10/29/21  0444 10/28/21  0512 10/27/21  0308 10/26/21  0812 10/25/21  0540   SODIUM mmol/L 142 142 142 139 137 140   POTASSIUM mmol/L 3 9 4 2 4 0 4 5 4 3 3 9   CHLORIDE mmol/L 106 108 107 105 103 105   CO2 mmol/L 28 28 27 29 25 27   ANION GAP mmol/L 8 6 8 5 9 8   BUN mg/dL 13 16 14 21 25 18   CREATININE mg/dL 1 30 1 40* 1 32* 1 53* 1 62* 1 41*   CALCIUM mg/dL 8 3 8 0* 8 0* 7 7* 8 2* 8 5   EGFR ml/min/1 73sq m 58 53 57 47 44 52   GLUCOSE RANDOM mg/dL 204* 174* 199* 360* 323* 165*                      Results from last 7 days   Lab Units 10/31/21  1056 10/31/21  0728 10/30/21  2047 10/30/21  1546 10/30/21  1129 10/30/21  0728 10/29/21  2134 10/29/21  2058 10/29/21  1626 10/29/21  1124 10/29/21  0726 10/28/21  2106   POC GLUCOSE mg/dl 236* 179* 226* 123 190* 179* 122 78 156* 207* 168* 196*             * I Have Reviewed All Lab Data Listed Above     Cultures:               Imaging:  Imaging Reports Reviewed Today Include:   XR knee 1 or 2 vw left    Result Date: 10/18/2021  Impression: Left knee joint effusion without an acute osseous abnormality  Workstation performed: [id number]     XR knee 1 or 2 vw right    Result Date: 10/13/2021  Impression: Comminuted intra-articular right knee fracture  Findings concur with the referring clinician's preliminary interpretation already in the patient's electronic health record  Workstation performed: [id number]     XR tibia fibula 2 vw right    Result Date: 10/19/2021  Impression: Fluoroscopic guidance provided for procedure guidance  Please refer to the separate procedure notes for additional details  Workstation performed: [id number]     XR tibia fibula 2 views RIGHT    Result Date: 10/13/2021  Impression: Comminuted fracture of the right proximal tibia described in further detail on right knee x-ray report  No additional fractures further distally Workstation performed: [id number]     XR ankle 3+ views RIGHT    Result Date: 10/13/2021  Impression: No acute osseous abnormality  Workstation performed: [id number]     CT lower extremity wo contrast right    Result Date: 10/13/2021  Impression: Acute comminuted displaced intra-articular fracture of the proximal tibia as described above  No dislocation  This study demonstrates a significant  finding and was documented as such in [facility] for liaison and referring practitioner notification   Workstation performed: [id number]     Scheduled Meds:  Current Facility-Administered Medications   Medication Dose Route Frequency Provider Last Rate    acetaminophen  650 mg Oral Q6H PRN [place] Fear, PA-C      calcium carbonate  500 mg Oral Daily PRN Caterina Brian, PA-C      Diclofenac Sodium  2 g Topical 4x Daily Caterina Fear, PA-C      heparin (porcine)  3-20 Units/kg/hr (Order-Specific) Intravenous Titrated Joseph Olivia MD 13 1 Units/kg/hr (10/31/21 9122)   Catrachito Nicole heparin (porcine)  2,000 Units Intravenous Q1H PRN Allie Vasquez MD      heparin (porcine)  4,000 Units Intravenous Q1H PRN Allie Vasquez MD      HYDROmorphone  0 5 mg Intravenous Q3H PRN Ricky Wall PA-C      insulin glargine  60 Units Subcutaneous Daily José Miguel Parrish MD      insulin lispro  15 Units Subcutaneous TID With Meals Allie Vasquez MD      insulin lispro  2-12 Units Subcutaneous TID AC Ankush Chavira PA-C      lactated ringers  75 mL/hr Intravenous Continuous Stna Bowen MD 75 mL/hr (10/31/21 0930)    lidocaine  1 patch Topical Daily Ricky Wall PA-C      ondansetron  4 mg Intravenous Q6H PRN Ricky Wall PA-C      oxyCODONE  10 mg Oral Q12H [facility] Ricky Wall PA-C      polyethylene glycol  17 g Oral Daily PRN Ricky Wall PA-C      pravastatin  40 mg Oral Daily With Shoaib Holland PA-C      senna  2 tablet Oral BID Ricky Wall PA-C      warfarin  6 mg Oral Daily (warfarin) MD Andres Peña MD [address] [facility]    ** Please Note: This note has been constructed using a voice recognition system   **

## 2021-10-31 NOTE — PLAN OF CARE
Problem: Potential for Falls  Goal: Patient will remain free of falls  Description: INTERVENTIONS:  - Educate patient/family on patient safety including physical limitations  - Instruct patient to call for assistance with activity   - Consult OT/PT to assist with strengthening/mobility   - Keep Call bell within reach  - Keep bed low and locked with side rails adjusted as appropriate  - Keep care items and personal belongings within reach  - Initiate and maintain comfort rounds  - Make Fall Risk Sign visible to staff  - Offer Toileting every 2 Hours, in advance of need  - Initiate/Maintain alarm  - Obtain necessary fall risk management equipment:   - Apply yellow socks and bracelet for high fall risk patients  - Consider moving patient to room near nurses station  Outcome: Progressing     Problem: Prexisting or High Potential for Compromised Skin Integrity  Goal: Skin integrity is maintained or improved  Description: INTERVENTIONS:  - Identify patients at risk for skin breakdown  - Assess and monitor skin integrity  - Assess and monitor nutrition and hydration status  - Monitor labs   - Assess for incontinence   - Turn and reposition patient  - Assist with mobility/ambulation  - Relieve pressure over bony prominences  - Avoid friction and shearing  - Provide appropriate hygiene as needed including keeping skin clean and dry  - Evaluate need for skin moisturizer/barrier cream  - Collaborate with interdisciplinary team   - Patient/family teaching  - Consider wound care consult   Outcome: Progressing     Problem: PAIN - ADULT  Goal: Verbalizes/displays adequate comfort level or baseline comfort level  Description: Interventions:  - Encourage patient to monitor pain and request assistance  - Assess pain using appropriate pain scale  - Administer analgesics based on type and severity of pain and evaluate response  - Implement non-pharmacological measures as appropriate and evaluate response  - Consider cultural and social influences on pain and pain management  - Notify physician/advanced practitioner if interventions unsuccessful or patient reports new pain  Outcome: Progressing     Problem: INFECTION - ADULT  Goal: Absence or prevention of progression during hospitalization  Description: INTERVENTIONS:  - Assess and monitor for signs and symptoms of infection  - Monitor lab/diagnostic results  - Monitor all insertion sites, i e  indwelling lines, tubes, and drains  - Monitor endotracheal if appropriate and nasal secretions for changes in amount and color  - [place] appropriate cooling/warming therapies per order  - Administer medications as ordered  - Instruct and encourage patient and family to use good hand hygiene technique  - Identify and instruct in appropriate isolation precautions for identified infection/condition  Outcome: Progressing  Goal: Absence of fever/infection during neutropenic period  Description: INTERVENTIONS:  - Monitor WBC    Outcome: Progressing     Problem: SAFETY ADULT  Goal: Patient will remain free of falls  Description: INTERVENTIONS:  - Educate patient/family on patient safety including physical limitations  - Instruct patient to call for assistance with activity   - Consult OT/PT to assist with strengthening/mobility   - Keep Call bell within reach  - Keep bed low and locked with side rails adjusted as appropriate  - Keep care items and personal belongings within reach  - Initiate and maintain comfort rounds  - Make Fall Risk Sign visible to staff  - Offer Toileting every 2 Hours, in advance of need  - Initiate/Maintain bedalarm  - Obtain necessary fall risk management equipment:   - Apply yellow socks and bracelet for high fall risk patients  - Consider moving patient to room near nurses station  Outcome: Progressing  Goal: Maintain or return to baseline ADL function  Description: INTERVENTIONS:  -  Assess patient's ability to carry out ADLs; assess patient's baseline for ADL function and identify physical deficits which impact ability to perform ADLs (bathing, care of mouth/teeth, toileting, grooming, dressing, etc )  - Assess/evaluate cause of self-care deficits   - Assess range of motion  - Assess patient's mobility; develop plan if impaired  - Assess patient's need for assistive devices and provide as appropriate  - Encourage maximum independence but intervene and supervise when necessary  - Involve family in performance of ADLs  - Assess for home care needs following discharge   - Consider OT consult to assist with ADL evaluation and planning for discharge  - Provide patient education as appropriate  Outcome: Progressing  Goal: Maintains/Returns to pre admission functional level  Description: INTERVENTIONS:  - Perform BMAT or MOVE assessment daily    - Set and communicate daily mobility goal to care team and patient/family/caregiver  - Collaborate with rehabilitation services on mobility goals if consulted  - Perform Range of Motion 2 times a day  - Reposition patient every 2 hours    - Dangle patient 3 times a day  - Stand patient 3 times a day  - Ambulate patient 3 times a day  - Out of bed to chair 3 times a day   - Out of bed for meals 3 times a day  - Out of bed for toileting  - Record patient progress and toleration of activity level   Outcome: Progressing     Problem: DISCHARGE PLANNING  Goal: Discharge to home or other facility with appropriate resources  Description: INTERVENTIONS:  - Identify barriers to discharge w/patient and caregiver  - Arrange for needed discharge resources and transportation as appropriate  - Identify discharge learning needs (meds, wound care, etc )  - Arrange for interpretive services to assist at discharge as needed  - Refer to Case Management Department for coordinating discharge planning if the patient needs post-hospital services based on physician/advanced practitioner order or complex needs related to functional status, cognitive ability, or social support system  Outcome: Progressing     Problem: Knowledge Deficit  Goal: Patient/family/caregiver demonstrates understanding of disease process, treatment plan, medications, and discharge instructions  Description: Complete learning assessment and assess knowledge base  Interventions:  - Provide teaching at level of understanding  - Provide teaching via preferred learning methods  Outcome: Progressing     Problem: MOBILITY - ADULT  Goal: Maintain or return to baseline ADL function  Description: INTERVENTIONS:  -  Assess patient's ability to carry out ADLs; assess patient's baseline for ADL function and identify physical deficits which impact ability to perform ADLs (bathing, care of mouth/teeth, toileting, grooming, dressing, etc )  - Assess/evaluate cause of self-care deficits   - Assess range of motion  - Assess patient's mobility; develop plan if impaired  - Assess patient's need for assistive devices and provide as appropriate  - Encourage maximum independence but intervene and supervise when necessary  - Involve family in performance of ADLs  - Assess for home care needs following discharge   - Consider OT consult to assist with ADL evaluation and planning for discharge  - Provide patient education as appropriate  Outcome: Progressing  Goal: Maintains/Returns to pre admission functional level  Description: INTERVENTIONS:  - Perform BMAT or MOVE assessment daily    - Set and communicate daily mobility goal to care team and patient/family/caregiver  - Collaborate with rehabilitation services on mobility goals if consulted  - Perform Range of Motion 3 times a day  - Reposition patient every 2 hours    - Dangle patient 3 times a day  - Stand patient 3 times a day  - Ambulate patient 3 times a day  - Out of bed to chair 3 times a day   - Out of bed for meals 3 times a day  - Out of bed for toileting  - Record patient progress and toleration of activity level   Outcome: Progressing     Problem: Nutrition/Hydration-ADULT  Goal: Nutrient/Hydration intake appropriate for improving, restoring or maintaining nutritional needs  Description: Monitor and assess patient's nutrition/hydration status for malnutrition  Collaborate with interdisciplinary team and initiate plan and interventions as ordered  Monitor patient's weight and dietary intake as ordered or per policy  Utilize nutrition screening tool and intervene as necessary  Determine patient's food preferences and provide high-protein, high-caloric foods as appropriate       INTERVENTIONS:  - Monitor oral intake, urinary output, labs, and treatment plans  - Assess nutrition and hydration status and recommend course of action  - Evaluate amount of meals eaten  - Assist patient with eating if necessary   - Allow adequate time for meals  - Recommend/ encourage appropriate diets, oral nutritional supplements, and vitamin/mineral supplements  - Order, calculate, and assess calorie counts as needed  - Recommend, monitor, and adjust tube feedings and TPN/PPN based on assessed needs  - Assess need for intravenous fluids  - Provide specific nutrition/hydration education as appropriate  - Include patient/family/caregiver in decisions related to nutrition  Outcome: Progressing

## 2021-10-31 NOTE — DISCHARGE INSTR - AVS FIRST PAGE
· Non-weight bearing RLE  · Maintain dressings, keep clean, dry, and intact  · Maintain immobilizer at all times  · Take Keflex 500 mg every 6 hours for the next 4 days  · Continue Coumadin as previously prescribed

## 2021-10-31 NOTE — PROGRESS NOTES
Progress Note - Orthopedics   Frida Lai 61 y o  male MRN: [id number]  Unit/Bed#: MS 209Lina      Subjective:    61 y o male POD#6 status post ORIF right proximal tibia shaft fracture, removal of external fixator  Patient states that his leg is doing well overall  Patient states that he has minimal pain in his leg currently  Patient states that when he does have pain is over the anterior knee and becomes worse with direct contact  Patient states that he has been compliant with nonweightbearing restriction of the right lower extremity  Patient states he has been working physical therapy as directed  Patient states he has been compliant with anticoagulation  Patient denies any fevers any chills  Patient denies any shortness of breath chest tightness chest pain  Patient denies any numbness or tingling in his leg  Patient offers no other complaints at this time       Labs:  0   Lab Value Date/Time    HCT 25 5 (L) 10/29/2021 0444    HCT 24 1 (L) 10/28/2021 0511    HCT 23 2 (L) 10/27/2021 0308    HCT 38 4 07/13/2015 1448    HCT 38 6 10/20/2014 1132    HGB 8 0 (L) 10/29/2021 0444    HGB 7 5 (L) 10/28/2021 0511    HGB 7 4 (L) 10/27/2021 0308    HGB 13 1 07/13/2015 1448    HGB 13 1 10/20/2014 1132    INR 1 90 (H) 10/31/2021 0627    INR 2 94 (H) 12/11/2015 1543    WBC 6 29 10/29/2021 0444    WBC 6 88 10/28/2021 0511    WBC 7 93 10/27/2021 0308    WBC 7 7 07/13/2015 1448    WBC 8 30 10/20/2014 1132    ESR 58 (H) 05/20/2016 1207       Meds:    Current Facility-Administered Medications:     acetaminophen (TYLENOL) tablet 650 mg, 650 mg, Oral, Q6H PRN, PRITI Morrow-YOMI, 650 mg at 10/28/21 1516    calcium carbonate (TUMS) chewable tablet 500 mg, 500 mg, Oral, Daily PRN, Keaton Bush PA-C, 500 mg at 10/14/21 0006    Diclofenac Sodium (VOLTAREN) 1 % topical gel 2 g, 2 g, Topical, 4x Daily, Keaton Bush PA-C, 2 g at 10/31/21 0908    heparin (porcine) 25,000 units in 0 45% NaCl 250 mL infusion (premix), 3-20 Units/kg/hr (Order-Specific), Intravenous, Titrated, José Miguel Parrish MD, Last Rate: 11 8 mL/hr at 10/31/21 0718, 13 1 Units/kg/hr at 10/31/21 0718    heparin (porcine) injection 2,000 Units, 2,000 Units, Intravenous, Q1H PRN, Lissett Calloway MD, 2,000 Units at 10/28/21 0604    heparin (porcine) injection 4,000 Units, 4,000 Units, Intravenous, Q1H PRN, Lissett Calloway MD    HYDROmorphone (DILAUDID) injection 0 5 mg, 0 5 mg, Intravenous, Q3H PRN, Randy Mercer PA-C, 0 5 mg at 10/25/21 1953    insulin glargine (LANTUS) subcutaneous injection 60 Units 0 6 mL, 60 Units, Subcutaneous, Daily, José Miguel Parrish MD, 60 Units at 10/31/21 0907    insulin lispro (HumaLOG) 100 units/mL subcutaneous injection 15 Units, 15 Units, Subcutaneous, TID With Meals, Lissett Calloway MD, 15 Units at 10/31/21 0907    insulin lispro (HumaLOG) 100 units/mL subcutaneous injection 2-12 Units, 2-12 Units, Subcutaneous, TID AC, 2 Units at 10/31/21 0908 **AND** Fingerstick Glucose (POCT), , , TID AC, Chanell Chavira PA-C    lactated ringers infusion, 75 mL/hr, Intravenous, Continuous, Kassi Hill MD, Last Rate: 75 mL/hr at 10/31/21 0216, 75 mL/hr at 10/31/21 0216    lidocaine (LIDODERM) 5 % patch 1 patch, 1 patch, Topical, Daily, Randy Mercer PA-C, 1 patch at 10/31/21 0908    ondansetron [facility]) injection 4 mg, 4 mg, Intravenous, Q6H PRN, Randy Mercer PA-C, 4 mg at 10/13/21 2249    oxyCODONE (OxyCONTIN) 12 hr tablet 10 mg, 10 mg, Oral, Q12H [address], Randy Mercer PA-C, 10 mg at 10/31/21 9589    polyethylene glycol (MIRALAX) packet 17 g, 17 g, Oral, Daily PRN, Randy Mercer PA-C, 17 g at 10/21/21 1620    pravastatin (PRAVACHOL) tablet 40 mg, 40 mg, Oral, Daily With Lacrbrook Sexton PA-C, 40 mg at 10/30/21 1722    senna (SENOKOT) tablet 17 2 mg, 2 tablet, Oral, BID, Randy Mercer PA-C, 17 2 mg at 10/31/21 0908    warfarin (COUMADIN) tablet 6 mg, 6 mg, Oral, Daily (warfarin), Lissett Calloway MD, 6 mg at 10/30/21 1722    Blood Culture:   No results found for: BLOODCX    Wound Culture:   No results found for: WOUNDCULT    Ins and Outs:  I/O last 24 hours: In: 1823 6 [P O :1020; I V :803 6]  Out: 3350 [Urine:3350]          Physical:  Vitals:    10/31/21 0726   BP: 152/72   Pulse: 70   Resp: 18   Temp: 98 7 °F (37 1 °C)   SpO2: 94%     Musculoskeletal: right Lower Extremity  · Patient resting comfrotably in hospital bed in no acute distress   · Skin :  Extremity appears well perfused overall   · Dressing : clean dry and intact   · TTP : anterior knee   · SILT s/s/sp/dp/t  +fhl/ehl, +ankle dorsi/plantar flexion  2+ DP pulse       Assessment:    66 y o male POD#6 status post ORIF right proximal tibia shaft fracture, removal of external fixator  Plan:  · NON - WEIGHT BEARING right lower extremity   · PT/OT for ambulation asssitance   · Pain control as directed   · DVT ppx as directed   · Dispo: Ortho will follow at this time  Continue with non-weight beraing status of the right lower extremity  Maintain knee immolibzer at all times  Ice / elevate extremity  had a long discussion with patient and patient's wife in regards to operation, blood levels  Advised patient and patient's wife that without current or recent CBC will be unable to assess anemia at this time  The patient again refused to receive testing today and opted for a CBC to be drawn tomorrow  Patient is currently asymptomatic in regards to anemia the but without a recent CBC, unable to fully assess        Corona Posada PA-C

## 2021-10-31 NOTE — PLAN OF CARE
Problem: Potential for Falls  Goal: Patient will remain free of falls  Description: INTERVENTIONS:  - Educate patient/family on patient safety including physical limitations  - Instruct patient to call for assistance with activity   - Consult OT/PT to assist with strengthening/mobility   - Keep Call bell within reach  - Keep bed low and locked with side rails adjusted as appropriate  - Keep care items and personal belongings within reach  - Initiate and maintain comfort rounds  - Make Fall Risk Sign visible to staff  - Offer Toileting every 2 Hours, in advance of need  - Initiate/Maintain 24alarm  - Obtain necessary fall risk management equipment:   - Apply yellow socks and bracelet for high fall risk patients  - Consider moving patient to room near nurses station  10/30/2021 2034 by Dorian Owen, RN  Outcome: Progressing  10/30/2021 2034 by Dorian Owen, RN  Outcome: Progressing

## 2021-10-31 NOTE — QUICK NOTE
Patient states " he only wants one tube of blood drawn and that is the PTT"   Educated the patient to the importance of all the bloodwork to be drawn Pt still "refusing " Slim notiifed

## 2021-11-01 LAB
ANION GAP SERPL CALCULATED.3IONS-SCNC: 9 MMOL/L (ref 4–13)
APTT PPP: 66 SECONDS (ref 23–37)
BASOPHILS # BLD AUTO: 0.03 THOUSANDS/ΜL (ref 0–0.1)
BASOPHILS NFR BLD AUTO: 1 % (ref 0–1)
BUN SERPL-MCNC: 11 MG/DL (ref 5–25)
CALCIUM SERPL-MCNC: 8 MG/DL (ref 8.3–10.1)
CHLORIDE SERPL-SCNC: 108 MMOL/L (ref 100–108)
CO2 SERPL-SCNC: 26 MMOL/L (ref 21–32)
CREAT SERPL-MCNC: 1.34 MG/DL (ref 0.6–1.3)
EOSINOPHIL # BLD AUTO: 0.23 THOUSAND/ΜL (ref 0–0.61)
EOSINOPHIL NFR BLD AUTO: 4 % (ref 0–6)
ERYTHROCYTE [DISTWIDTH] IN BLOOD BY AUTOMATED COUNT: 15.5 % (ref 11.6–15.1)
GFR SERPL CREATININE-BSD FRML MDRD: 56 ML/MIN/1.73SQ M
GLUCOSE SERPL-MCNC: 108 MG/DL (ref 65–140)
GLUCOSE SERPL-MCNC: 143 MG/DL (ref 65–140)
GLUCOSE SERPL-MCNC: 161 MG/DL (ref 65–140)
GLUCOSE SERPL-MCNC: 242 MG/DL (ref 65–140)
GLUCOSE SERPL-MCNC: 323 MG/DL (ref 65–140)
GLUCOSE SERPL-MCNC: 84 MG/DL (ref 65–140)
HCT VFR BLD AUTO: 25.5 % (ref 36.5–49.3)
HGB BLD-MCNC: 7.9 G/DL (ref 12–17)
IMM GRANULOCYTES # BLD AUTO: 0.03 THOUSAND/UL (ref 0–0.2)
IMM GRANULOCYTES NFR BLD AUTO: 1 % (ref 0–2)
INR PPP: 2.34 (ref 0.84–1.19)
LYMPHOCYTES # BLD AUTO: 1.88 THOUSANDS/ΜL (ref 0.6–4.47)
LYMPHOCYTES NFR BLD AUTO: 30 % (ref 14–44)
MCH RBC QN AUTO: 28.3 PG (ref 26.8–34.3)
MCHC RBC AUTO-ENTMCNC: 31 G/DL (ref 31.4–37.4)
MCV RBC AUTO: 91 FL (ref 82–98)
MONOCYTES # BLD AUTO: 0.58 THOUSAND/ΜL (ref 0.17–1.22)
MONOCYTES NFR BLD AUTO: 9 % (ref 4–12)
NEUTROPHILS # BLD AUTO: 3.45 THOUSANDS/ΜL (ref 1.85–7.62)
NEUTS SEG NFR BLD AUTO: 55 % (ref 43–75)
NRBC BLD AUTO-RTO: 0 /100 WBCS
PLATELET # BLD AUTO: 434 THOUSANDS/UL (ref 149–390)
PMV BLD AUTO: 9.6 FL (ref 8.9–12.7)
POTASSIUM SERPL-SCNC: 3.7 MMOL/L (ref 3.5–5.3)
PROTHROMBIN TIME: 24.5 SECONDS (ref 11.6–14.5)
RBC # BLD AUTO: 2.79 MILLION/UL (ref 3.88–5.62)
SODIUM SERPL-SCNC: 143 MMOL/L (ref 136–145)
WBC # BLD AUTO: 6.2 THOUSAND/UL (ref 4.31–10.16)

## 2021-11-01 PROCEDURE — 97530 THERAPEUTIC ACTIVITIES: CPT

## 2021-11-01 PROCEDURE — 82948 REAGENT STRIP/BLOOD GLUCOSE: CPT

## 2021-11-01 PROCEDURE — 99024 POSTOP FOLLOW-UP VISIT: CPT | Performed by: ORTHOPAEDIC SURGERY

## 2021-11-01 PROCEDURE — 85610 PROTHROMBIN TIME: CPT | Performed by: INTERNAL MEDICINE

## 2021-11-01 PROCEDURE — 85025 COMPLETE CBC W/AUTO DIFF WBC: CPT | Performed by: INTERNAL MEDICINE

## 2021-11-01 PROCEDURE — 85730 THROMBOPLASTIN TIME PARTIAL: CPT | Performed by: INTERNAL MEDICINE

## 2021-11-01 PROCEDURE — 99232 SBSQ HOSP IP/OBS MODERATE 35: CPT | Performed by: INTERNAL MEDICINE

## 2021-11-01 PROCEDURE — 80048 BASIC METABOLIC PNL TOTAL CA: CPT | Performed by: INTERNAL MEDICINE

## 2021-11-01 RX ORDER — LISINOPRIL 10 MG/1
10 TABLET ORAL DAILY
Status: DISCONTINUED | OUTPATIENT
Start: 2021-11-01 | End: 2021-11-03 | Stop reason: HOSPADM

## 2021-11-01 RX ADMIN — DICLOFENAC SODIUM 2 G: 10 GEL TOPICAL at 21:16

## 2021-11-01 RX ADMIN — DICLOFENAC SODIUM 2 G: 10 GEL TOPICAL at 09:41

## 2021-11-01 RX ADMIN — STANDARDIZED SENNA CONCENTRATE 17.2 MG: 8.6 TABLET ORAL at 09:44

## 2021-11-01 RX ADMIN — INSULIN LISPRO 15 UNITS: 100 INJECTION, SOLUTION INTRAVENOUS; SUBCUTANEOUS at 09:47

## 2021-11-01 RX ADMIN — LISINOPRIL 10 MG: 10 TABLET ORAL at 15:28

## 2021-11-01 RX ADMIN — INSULIN GLARGINE 60 UNITS: 100 INJECTION, SOLUTION SUBCUTANEOUS at 09:46

## 2021-11-01 RX ADMIN — INSULIN LISPRO 4 UNITS: 100 INJECTION, SOLUTION INTRAVENOUS; SUBCUTANEOUS at 13:34

## 2021-11-01 RX ADMIN — OXYCODONE HYDROCHLORIDE 10 MG: 10 TABLET, FILM COATED, EXTENDED RELEASE ORAL at 13:33

## 2021-11-01 RX ADMIN — WARFARIN SODIUM 6 MG: 3 TABLET ORAL at 18:02

## 2021-11-01 RX ADMIN — PRAVASTATIN SODIUM 40 MG: 40 TABLET ORAL at 15:30

## 2021-11-01 RX ADMIN — SODIUM CHLORIDE, SODIUM LACTATE, POTASSIUM CHLORIDE, AND CALCIUM CHLORIDE 75 ML/HR: .6; .31; .03; .02 INJECTION, SOLUTION INTRAVENOUS at 04:32

## 2021-11-01 RX ADMIN — INSULIN LISPRO 15 UNITS: 100 INJECTION, SOLUTION INTRAVENOUS; SUBCUTANEOUS at 13:35

## 2021-11-01 RX ADMIN — LIDOCAINE 5% 1 PATCH: 700 PATCH TOPICAL at 09:41

## 2021-11-01 RX ADMIN — INSULIN LISPRO 2 UNITS: 100 INJECTION, SOLUTION INTRAVENOUS; SUBCUTANEOUS at 09:46

## 2021-11-01 RX ADMIN — HEPARIN SODIUM 13.1 UNITS/KG/HR: 10000 INJECTION, SOLUTION INTRAVENOUS at 04:32

## 2021-11-01 RX ADMIN — OXYCODONE HYDROCHLORIDE 10 MG: 10 TABLET, FILM COATED, EXTENDED RELEASE ORAL at 21:16

## 2021-11-01 NOTE — CASE MANAGEMENT
Case Management Progress Note    Patient name Frida Lai  Location [address] [id number]/-[id number] MRN [id number]  : 1955 Date 2021       LOS (days): 19  Geometric Mean LOS (GMLOS) (days):   Days to GMLOS:        OBJECTIVE:  Bundled Patient Payment:  (no)     Current admission status: Inpatient  Preferred Pharmacy:   [id number], [address] [place] [address]  Phone: [phone] Fax: [phone]    [facility] - [place] [address]  Phone: [phone] Fax: [phone]  [address] [address]  Phone: [phone] Fax: [phone]    Primary Care Provider: Stephie Belle DO    Primary Insurance: BLUE CROSS  Secondary Insurance: [facility]    PROGRESS NOTE:  Per SLIM clear for dc  CM requested updated pt/ot from PT Supervisor for [place]  Per Acute need updated pt/ot  CM requested updates from subacute as well  CM unable to secure placement without update pt/ot for auth

## 2021-11-01 NOTE — ASSESSMENT & PLAN NOTE
Lab Results   Component Value Date    HGBA1C 8 2 (H) 10/16/2021       Recent Labs     10/31/21  1539 10/31/21  2110 11/01/21  0739 11/01/21  1105   POCGLU 217* 236* 161* 242*       Blood Sugar Average: Last 72 hrs:  (P) 836 [id number]  Known diabetic with diabetic neuropathy  On metformin and lantus at home  Blood sugar on presentation 418  Contiunue sliding scale insulin  Hypoglycemic protocol  Goal blood sugar not at goal of 140-180  · Continue Diabetic diet  · Noted to not have eaten this morning-  Give 30u Lantus toda  · Insulin Humulog 18 unit with meals  · Continue mealtime SSI coverage while in hospital   Hold off bedtime sliding scale coverage  · Continue before meals and bedtime glucose checks

## 2021-11-01 NOTE — PLAN OF CARE
Problem: OCCUPATIONAL THERAPY ADULT  Goal: Performs self-care activities at highest level of function for planned discharge setting  See evaluation for individualized goals  Description: Treatment Interventions: ADL retraining, Functional transfer training, UE strengthening/ROM, Endurance training, Patient/family training, Compensatory technique education, Continued evaluation, Energy conservation, Activityengagement          See flowsheet documentation for full assessment, interventions and recommendations  Note: Limitation: Decreased ADL status, Decreased endurance, Decreased self-care trans, Decreased high-level ADLs  Prognosis: Good  Assessment: Pt participated in skilled OT session today addressing the following interventions ADL retraining with proper body mechanics, Patient / Family Education, Transfer Training, Safety Awareness, Fall Prevention and Activity tolerance training  Patient agreeable to OT treatment session, upon arrival patient was found alert, responsive  and in no apparent distress  Pt was a co-treat with PT given his increased need for A should he decide to stand and also due to his increased level of agitation  Patient was agitated throughout nearly entire session  At one time, pt threatened to edwar anyone who "made him move or do anything he doesn't want to"  Pt became very agitated and speaking very loudly  OT asked him to stop yelling  Pt stated he "could yell if he wanted to" OT informed him she would call security if he didn't calm down  Pt refused to complete LB dressing, sit to stand, xfer to chair, sit pivot chair to drop arm chair, and LE exercises (with PT)  OT provided extensive DC edu for the pt given his plan to go to rehab  Pt completed bed mobility with min A, needing VCs throughout for safety  Pt demanded therapy apply lotion to his butt  Therapy encouraged the pt to complete this task and he refused and demanded therapy do it    Pt sat on EOB for about 17 minutes prior to stating he wasn't going to do anything else and laid down  Pt also required min A for managing linens in bed "to be straighten" before resting in a comfortable position  Patient continues to be functioning below baseline level, occupational performance remains limited secondary to factors listed above and increased risk for falls and injury  From OT standpoint, recommendation at time of d/c would be post acute rehab  Patient to benefit from continued Occupational Therapy treatment while in the hospital to address deficits as defined above and maximize level of functional independence with ADLs and functional mobility       OT Discharge Recommendation: Post acute rehabilitation services

## 2021-11-01 NOTE — PROGRESS NOTES
[facility]  Progress Note Preston Sigala 1955, 61 y o  male MRN: [id number]  Unit/Bed#: -Jeffery Encounter: [id number]  Primary Care Provider: Corey Burciaga DO   Date and time admitted to hospital: 10/13/2021  5:17 AM    * Closed fracture of proximal end of right tibia  Assessment & Plan  Sustained mechanical fracture to the proximal end of the right tibia  Initially on external fixators   Status post ORIF and removal of external fixators  - POD#8   Intact distal pulsation and neurological function - able to wiggle toes    Per review of PT notes not agreeable to participating PT due to fear of falling  Reassured patient about PT measures to prevent fall  Appropriate DVT prophylaxis  PT OT  Appreciate orthopedic input    Type 2 diabetes mellitus with diabetic neuropathy, without long-term current use of insulin [facility])  Assessment & Plan  Lab Results   Component Value Date    HGBA1C 8 2 (H) 10/16/2021       Recent Labs     10/31/21  1539 10/31/21  2110 11/01/21  0739 11/01/21  1105   POCGLU 217* 236* 161* 242*       Blood Sugar Average: Last 72 hrs:  (P) 534 [id number]  Known diabetic with diabetic neuropathy  On metformin and lantus at home  Blood sugar on presentation 418  Contiunue sliding scale insulin  Hypoglycemic protocol  Goal blood sugar not at goal of 140-180  · Continue Diabetic diet  · Continue Lantus 60 units q a m  · Insulin Humulog 18 unit with meals  · Continue mealtime SSI coverage while in hospital   Hold off bedtime sliding scale coverage  · Continue before meals and bedtime glucose checks  History of DVT (deep vein thrombosis)  Assessment & Plan  History 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  Unprovoked  Last episode was 10yrs ago  On AC with coumadin  Currently held  Resume heparin gtt  Patient is on Coumadin   INR today 2 4    -- discontinue IV heparin  -- continue Coumadin 6 mg daily    --Daily PT INR    Mixed hyperlipidemia  Assessment & Plan  Continue statins  Essential hypertension  Assessment & Plan  On lisinopril 10mg daily at home  Initially on hold due to ROSE  Present creatinine could be new baseline as he had remained stable at 1 2 - 1 4  Resume home lisinopril  Blood pressure 143/66, pulse 77, temperature 98 2 °F (36 8 °C), resp  rate 18, height 6' 1" (1 854 m), weight 136 kg (299 lb 6 2 oz), SpO2 92 %  VTE Pharmacologic Prophylaxis:   VTE Score: 5 Moderate Risk (Score 3-4) - Pharmacological DVT Prophylaxis Ordered: Heparin Drip  Mechanical VTE Prophylaxis in Place: No    Patient Centered Rounds: I have performed bedside rounds with nursing staff today  Discussions with Specialists or Other Care Team Provider:  Ortho    Education and Discussions with Family / Patient: Patient declined call to   Current Length of Stay: 19 day(s)    Current Patient Status: Inpatient     Discharge Plan / Estimated Discharge Date: TBD    Code Status: Level 1 - Full Code      Subjective:       POD #8 ORIF  Patient seen at bedside  Per nurse, there was no acute overnight events  INR goal today  Will discontinue heparin drip  At this time, patient is medically cleared for discharge to rehab  Objective:     Vitals:   Temp (24hrs), Av 6 °F (37 °C), Min:98 2 °F (36 8 °C), Max:98 8 °F (37 1 °C)    Temp:  [98 2 °F (36 8 °C)-98 8 °F (37 1 °C)] 98 2 °F (36 8 °C)  HR:  [70-80] 77  Resp:  [18] 18  BP: (143-148)/(66-70) 143/66  SpO2:  [92 %-96 %] 92 %  Body mass index is 39 5 kg/m²  Input and Output Summary (last 24 hours): Intake/Output Summary (Last 24 hours) at 2021 1350  Last data filed at 2021 2046  Gross per 24 hour   Intake 3146 54 ml   Output 1500 ml   Net 1646 54 ml       Physical Exam:     Physical Exam  Vitals reviewed  Constitutional:       General: He is not in acute distress  Appearance: He is not toxic-appearing  HENT:      Head: Normocephalic and atraumatic     Eyes:      Pupils: Pupils are equal, round, and reactive to light  Cardiovascular:      Rate and Rhythm: Normal rate and regular rhythm  Pulses: Normal pulses  Heart sounds: Normal heart sounds  Pulmonary:      Effort: Pulmonary effort is normal  No respiratory distress  Breath sounds: Normal breath sounds  Abdominal:      General: Bowel sounds are normal  There is no distension  Palpations: Abdomen is soft  Musculoskeletal:      Comments: Right lower extremity in Acewrap and high knee braces  Intact neurological function  Capillary refill < 2 sec     Skin:     Coloration: Skin is not jaundiced  Neurological:      Mental Status: He is alert and oriented to person, place, and time  Mental status is at baseline            Additional Data:     Labs:  Results from last 7 days   Lab Units 11/01/21  0734   WBC Thousand/uL 6 20   HEMOGLOBIN g/dL 7 9*   HEMATOCRIT % 25 5*   PLATELETS Thousands/uL 434*   NEUTROS PCT % 55   LYMPHS PCT % 30   MONOS PCT % 9   EOS PCT % 4     Results from last 7 days   Lab Units 11/01/21  0734   SODIUM mmol/L 143   POTASSIUM mmol/L 3 7   CHLORIDE mmol/L 108   CO2 mmol/L 26   BUN mg/dL 11   CREATININE mg/dL 1 34*   ANION GAP mmol/L 9   CALCIUM mg/dL 8 0*   GLUCOSE RANDOM mg/dL 143*     Results from last 7 days   Lab Units 11/01/21  0734   INR  2 34*     Results from last 7 days   Lab Units 11/01/21  1105 11/01/21  0739 10/31/21  2110 10/31/21  1539 10/31/21  1056 10/31/21  0728 10/30/21  2047 10/30/21  1546 10/30/21  1129 10/30/21  0728 10/29/21  2134 10/29/21  2058   POC GLUCOSE mg/dl 242* 161* 236* 217* 236* 179* 226* 123 190* 179* 122 78               Imaging: Reviewed radiology reports from this admission including: Kj Marion bilateral LE    Recent Cultures (last 7 days):           Lines/Drains:  Invasive Devices     Peripheral Intravenous Line            Peripheral IV 10/29/21 Right;Ventral (anterior) Forearm 3 days    Peripheral IV 10/30/21 Proximal;Right;Ventral (anterior) Forearm 1 day Telemetry:        Last 24 Hours Medication List:   Current Facility-Administered Medications   Medication Dose Route Frequency Provider Last Rate    acetaminophen  650 mg Oral Q6H PRN Alen Sánchez PA-C      calcium carbonate  500 mg Oral Daily PRN Alen Sánchez PA-C      Diclofenac Sodium  2 g Topical 4x Daily [facility] [place]      HYDROmorphone  0 5 mg Intravenous Q3H PRN Alen Sánchez PA-C      insulin glargine  60 Units Subcutaneous Daily Ivet Tapia MD      insulin lispro  18 Units Subcutaneous TID With Meals Ivet Tapia MD      insulin lispro  2-12 Units Subcutaneous TID AC Alen Sánchez PA-C      lidocaine  1 patch Topical Daily Alen Sánchez PA-C      lisinopril  10 mg Oral Daily Ivet Tapia MD      ondansetron  4 mg Intravenous Q6H PRN Alen Sánchez PA-C      oxyCODONE  10 mg Oral Q12H [address] Alen Sánchez PA-C      polyethylene glycol  17 g Oral Daily PRN Alenotoniel Sánchez PA-C      pravastatin  40 mg Oral Daily With Denia Chavira PA-C      senna  2 tablet Oral BID Alen Sánchez PA-C      warfarin  6 mg Oral Daily (warfarin) Ivet Tapia MD          Today, Patient Was Seen By: Ivet Tapia MD    ** Please Note: This note has been constructed using a voice recognition system   **

## 2021-11-01 NOTE — ASSESSMENT & PLAN NOTE
On lisinopril 10mg daily at home  Initially on hold due to ROSE  Present creatinine could be new baseline as he had remained stable at 1 2 - 1 4  Resume home lisinopril  Blood pressure 143/66, pulse 77, temperature 98 2 °F (36 8 °C), resp  rate 18, height 6' 1" (1 854 m), weight 136 kg (299 lb 6 2 oz), SpO2 92 %

## 2021-11-01 NOTE — OCCUPATIONAL THERAPY NOTE
Occupational Therapy Treatment Note      Lysle Furnace    11/1/2021    Principal Problem:    Closed fracture of proximal end of right tibia  Active Problems:    Essential hypertension    Mixed hyperlipidemia    Type 2 diabetes mellitus with diabetic neuropathy, without long-term current use of insulin (HCC)    History of DVT (deep vein thrombosis)    ROSE (acute kidney injury) ([facility] [address] )    Pseudogout of knee, left    Delayed emergence from anesthesia    Difficult intubation    Kyphosis of cervicothoracic region    Anemia      Past Medical History:   Diagnosis Date    Back pain     Diabetes mellitus ([facility] [address] )     DVT (deep vein thrombosis) in pregnancy     Hard to intubate 10/18/2021    Glidescope with #3 blade and bougie used to pass 6 0 ETT    Hyperlipidemia     Hypertension        Past Surgical History:   Procedure Laterality Date    CATARACT EXTRACTION      COLONOSCOPY      6/2014    CYST REMOVAL      EXTERNAL FIXATOR APPLICATION Right 41/45/6320    Procedure: Application External Fixation Device right lower extremity;  Surgeon: Bert Thorne MD;  Location: [facility];  Service: Orthopedics    HAND SURGERY      HEEL SPUR SURGERY      foot surgery    ORIF TIBIA FRACTURE Right 10/25/2021    Procedure: OPEN REDUCTION W/ INTERNAL FIXATION (ORIF) RIGHT PROXIMAL TIBIA SHAFT FRACTURE, REMOVAL OF EXTERNAL FIXATOR;  Surgeon: Bert Thorne MD;  Location: [facility];  Service: Orthopedics      11/01/21 1411   OT Last Visit   OT Visit Date 11/01/21   Note Type   Note Type Treatment for insurance authorization   Restrictions/Precautions   Weight Bearing Precautions Per Order Yes   RLE Weight Bearing Per Order NWB  (per ortho)   Braces or Orthoses LE Immobilizer  (right)   Other Precautions Chair Alarm; Bed Alarm;WBS;Multiple lines; Fall Risk;Pain   Lifestyle   Autonomy Patient reported independnet with ADLs/ IADLs, ambulatory with no AD, lives with spouse in a  2 story house, [address] [facility], 1st floor set up    Reciprocal Relationships Supportive family   Service to Others Retired    Pain Assessment   Pain Assessment Tool 0-10   Pain Score 9   Pain Location/Orientation Location: Leg;Orientation: Right   ADL   Eating Assistance 5  Supervision/Setup   Eating Deficit Increased time to complete;Supervision/safety;Verbal cueing;Setup   Grooming Assistance 5  Supervision/Setup   Grooming Deficit Increased time to complete;Supervision/safety;Verbal cueing;Steadying;Setup   UB Bathing Assistance 4  Minimal Assistance   UB Bathing Deficit Increased time to complete;Supervision/safety;Verbal cueing;Steadying;Setup   LB Bathing Assistance 2  Maximal Assistance   LB Bathing Deficit Increased time to complete;Supervision/safety;Verbal cueing;Steadying;Setup   UB Dressing Assistance 4  Minimal Assistance   UB Dressing Deficit Increased time to complete;Supervision/safety;Verbal cueing;Steadying;Setup   LB Dressing Assistance 2  Maximal Assistance   LB Dressing Deficit Increased time to complete;Supervision/safety;Verbal cueing;Steadying;Setup   Toileting Assistance  2  Maximal Assistance   Toileting Deficit Setup;Steadying;Verbal cueing;Supervison/safety; Increased time to complete   Bed Mobility   Rolling R 4  Minimal assistance   Additional items Assist x 1;Bedrails; Increased time required;LE management;Verbal cues   Rolling L 4  Minimal assistance   Additional items Assist x 1;Bedrails; Increased time required;LE management;Verbal cues   Supine to Sit 4  Minimal assistance   Additional items Assist x 1;Bedrails; Increased time required;LE management;Verbal cues   Sit to Supine 4  Minimal assistance   Additional items Assist x 1;Bedrails; Increased time required;LE management;Verbal cues   Cognition   Overall Cognitive Status [facility]   Arousal/Participation Alert; Responsive; Cooperative   Attention Within functional limits   Orientation Level Oriented X4   Memory Within functional limits   Following Commands Follows all commands and directions without difficulty   Comments Pt was aggitated throughout OT session  Activity Tolerance   Activity Tolerance Treatment limited secondary to agitation  (Pt refused to cooperate with most requested activities )   Medical Staff Made Aware Spoke with pt's RN who stated pt was appropriate for OT and made aware of toucomes   Assessment   Assessment Pt participated in skilled OT session today addressing the following interventions ADL retraining with proper body mechanics, Patient / Family Education, Transfer Training, Safety Awareness, Fall Prevention and Activity tolerance training  Patient agreeable to OT treatment session, upon arrival patient was found alert, responsive  and in no apparent distress  Pt was a co-treat with PT given his increased need for A should he decide to stand and also due to his increased level of agitation  Patient was agitated throughout nearly entire session  At one time, pt threatened to edwar anyone who "made him move or do anything he doesn't want to"  Pt became very agitated and speaking very loudly  OT asked him to stop yelling  Pt stated he "could yell if he wanted to" OT informed him she would call security if he didn't calm down  Pt refused to complete LB dressing, sit to stand, xfer to chair, sit pivot chair to drop arm chair, and LE exercises (with PT)  OT provided extensive DC edu for the pt given his plan to go to rehab  Pt completed bed mobility with min A, needing VCs throughout for safety  Pt demanded therapy apply lotion to his butt  Therapy encouraged the pt to complete this task and he refused and demanded therapy do it  Pt sat on EOB for about 17 minutes prior to stating he wasn't going to do anything else and laid down  Pt also required min A for managing linens in bed "to be straighten" before resting in a comfortable position   Patient continues to be functioning below baseline level, occupational performance remains limited secondary to factors listed above and increased risk for falls and injury  From OT standpoint, recommendation at time of d/c would be post acute rehab  Patient to benefit from continued Occupational Therapy treatment while in the hospital to address deficits as defined above and maximize level of functional independence with ADLs and functional mobility     Plan   Goal Expiration Date 11/07/21   OT Frequency 3-5x/wk   Recommendation   OT Discharge Recommendation Post acute rehabilitation services   AM-PAC Daily Activity Inpatient   Lower Body Dressing 2   Bathing 2   Toileting 2   Upper Body Dressing 3   Grooming 3   Eating 4   Daily Activity Raw Score 16   Daily Activity Standardized Score (Calc for Raw Score >=11) 35 96   AM-PAC Applied Cognition Inpatient   Following a Speech/Presentation 4   Understanding Ordinary Conversation 4   Taking Medications 4   Remembering Where Things Are Placed or Put Away 4   Remembering List of 4-5 Errands 4   Taking Care of Complicated Tasks 4   Applied Cognition Raw Score 24   Applied Cognition Standardized Score 62 21   Barthel Index   Grooming Score 0   Dressing Score 5   Toilet Use Score 5   Transfers (Bed/Chair) Score 5   Mobility (Level Surface) Score 0       Bartolome Avila MS OTR/L

## 2021-11-01 NOTE — PLAN OF CARE
Problem: PHYSICAL THERAPY ADULT  Goal: Performs mobility at highest level of function for planned discharge setting  See evaluation for individualized goals  Description: Treatment/Interventions: Functional transfer training, LE strengthening/ROM, Therapeutic exercise, Endurance training, Patient/family training, Bed mobility, Continued evaluation, Spoke to nursing, OT, Family          See flowsheet documentation for full assessment, interventions and recommendations  Outcome: Not Progressing  Note: Prognosis: Good  Problem List: Decreased strength, Decreased range of motion, Decreased endurance, Impaired balance, Decreased mobility, Decreased skin integrity, Orthopedic restrictions, Pain  Assessment: pt required encouragement + education to participate in PT session  co-treatment c OT 2* pt requiring (A)x2 to complete mobility tasks during previous PT session  pt stated, "how am I supposed to do therapy c my leg like this  I can't even stand s my other leg giving out"  pt performed bed mobility tasks min (A)x1  able to sit EOB x17min unsupported  required min verbal cues for positioning + technique of R LE management  pt cont to decline further mobility despite education  pt requested w/c, however educated pt on safety c transfers prior to utilizing w/c  pt also refused LE ther ex  returned to supine at end of session c all needs in reach  will cont skilled PT to further maximize functional mobility + improve quality of life  AM-PAC low function score 18 indicating pt would benefit from skilled PT, however please refer to PT d/c recommendation for safe d/c planning  Barriers to Discharge: Inaccessible home environment, Decreased caregiver support        PT Discharge Recommendation: Post acute rehabilitation services     PT - OK to Discharge: Yes (to STR when stable )    See flowsheet documentation for full assessment

## 2021-11-01 NOTE — PROGRESS NOTES
Progress Note - Orthopedics   Farhad Terry 61 y o  male MRN: [id number]  Unit/Bed#: -01      Subjective:    61 y o male POD#7 status post ORIF right proximal tibia shaft fracture, removal of external fixator  Overall the patient is dong well post-operatively  He denies any overnight events  No complications  The patient states that he has been working with PT as instructed  He denies any numbness or tingling in the lower extremity  He denies any chest pain, shortness of breath, dizziness, fever, or chills         Labs:  0   Lab Value Date/Time    HCT 25 5 (L) 11/01/2021 0734    HCT 25 5 (L) 10/29/2021 0444    HCT 24 1 (L) 10/28/2021 0511    HCT 38 4 07/13/2015 1448    HCT 38 6 10/20/2014 1132    HGB 7 9 (L) 11/01/2021 0734    HGB 8 0 (L) 10/29/2021 0444    HGB 7 5 (L) 10/28/2021 0511    HGB 13 1 07/13/2015 1448    HGB 13 1 10/20/2014 1132    INR 2 34 (H) 11/01/2021 0734    INR 2 94 (H) 12/11/2015 1543    WBC 6 20 11/01/2021 0734    WBC 6 29 10/29/2021 0444    WBC 6 88 10/28/2021 0511    WBC 7 7 07/13/2015 1448    WBC 8 30 10/20/2014 1132    ESR 58 (H) 05/20/2016 1207       Meds:    Current Facility-Administered Medications:     acetaminophen (TYLENOL) tablet 650 mg, 650 mg, Oral, Q6H PRN, Berwyn Dakins, PA-C, 650 mg at 10/28/21 1516    calcium carbonate (TUMS) chewable tablet 500 mg, 500 mg, Oral, Daily PRN, Berwyn Dakins, PA-C, 500 mg at 10/14/21 0006    Diclofenac Sodium (VOLTAREN) 1 % topical gel 2 g, 2 g, Topical, 4x Daily, Berwyn Dakins, PA-C, 2 g at 11/01/21 0941    HYDROmorphone (DILAUDID) injection 0 5 mg, 0 5 mg, Intravenous, Q3H PRN, Berwyn Dakins, PA-C, 0 5 mg at 10/25/21 1953    insulin glargine (LANTUS) subcutaneous injection 60 Units 0 6 mL, 60 Units, Subcutaneous, Daily, José Miguel Parrish MD, 60 Units at 11/01/21 0946    insulin lispro (HumaLOG) 100 units/mL subcutaneous injection 18 Units, 18 Units, Subcutaneous, TID With Meals, West Graham MD    insulin lispro (HumaLOG) 100 units/mL subcutaneous injection 2-12 Units, 2-12 Units, Subcutaneous, TID AC, 4 Units at 11/01/21 1334 **AND** Fingerstick Glucose (POCT), , , TID AC, Chanell Chavira PA-C    lidocaine (LIDODERM) 5 % patch 1 patch, 1 patch, Topical, Daily, PRITI Barry-YOMI, 1 patch at 11/01/21 0941    lisinopril (ZESTRIL) tablet 10 mg, 10 mg, Oral, Daily, José Miguel Parrish MD, 10 mg at 11/01/21 1528    ondansetron (ZOFRAN) injection 4 mg, 4 mg, Intravenous, Q6H PRN, Estevan Cronin PA-C, 4 mg at 10/13/21 2249    oxyCODONE (OxyCONTIN) 12 hr tablet 10 mg, 10 mg, Oral, Q12H [facility], Estevan Cronin PA-C, 10 mg at 11/01/21 1333    polyethylene glycol (MIRALAX) packet 17 g, 17 g, Oral, Daily PRN, Estevan Mehrdad PA-C, 17 g at 10/21/21 1620    pravastatin (PRAVACHOL) tablet 40 mg, 40 mg, Oral, Daily With Alla Borden, PA-C, 40 mg at 11/01/21 1530    senna (SENOKOT) tablet 17 2 mg, 2 tablet, Oral, BID, Estevan Cronin PA-C, 17 2 mg at 11/01/21 0944    warfarin (COUMADIN) tablet 6 mg, 6 mg, Oral, Daily (warfarin), Sherice Jesus MD, 6 mg at 10/31/21 1740    Blood Culture:   No results found for: BLOODCX    Wound Culture:   No results found for: WOUNDCULT    Ins and Outs:  I/O last 24 hours: In: 3806 5 [P O :1400; I V :2406 5]  Out: 2500 [Urine:2500]      Physical:  Vitals:    11/01/21 1525   BP: 136/65   Pulse: 73   Resp:    Temp: 98 3 °F (36 8 °C)   SpO2: 94%     Musculoskeletal: right Lower Extremity  · Patient resting comfrotably in hospital bed in no acute distress  · Extremity appears well perfused overall, no signs of erythema, ecchymosis, swelling, drainage, or lesions above or below the dressings   · Dressing are clean, dry, and intact   · TTP : anterior knee   · SILT s/s/sp/dp/t    · +fhl/ehl, +ankle dorsi/plantar flexion  · 2+ DP pulse       Assessment:    66 y o male POD#7 status post ORIF right proximal tibia shaft fracture, removal of external fixator        Plan:  · Non-weight bearing RLE  · Maintain dressings, keep clean, dry, and intact  · Maintain immobilizer at all times  · HGB 7 9 this AM, stable from last HGB taken on 10/29  Greater than 2 gram drop which qualifies for diagnosis of acute blood loss anemia, will monitor and administer IVF/prbc as indicated   · PT/OT for gait training  · Ice, elevation of RLE for pain/swelling control  · Pain control per primary team  · DVT ppx with coumadin  · Dispo: Ortho will continue to follow throughout the patient's hospital stay  He will follow up with Dr Jonna Torres in office once discharged  ?  Discharge plan to [facility], case management on board      Alen Sánchez PA-C

## 2021-11-01 NOTE — ASSESSMENT & PLAN NOTE
History 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  Unprovoked  Last episode was 10yrs ago  On AC with coumadin  INR at goal    -- continue Coumadin 6 mg daily    --Daily PT INR

## 2021-11-01 NOTE — PLAN OF CARE
Problem: Potential for Falls  Goal: Patient will remain free of falls  Description: INTERVENTIONS:  - Educate patient/family on patient safety including physical limitations  - Instruct patient to call for assistance with activity   - Consult OT/PT to assist with strengthening/mobility   - Keep Call bell within reach  - Keep bed low and locked with side rails adjusted as appropriate  - Keep care items and personal belongings within reach  - Initiate and maintain comfort rounds  - Make Fall Risk Sign visible to staff  - Offer Toileting every  Hours, in advance of need  - Initiate/Maintain alarm  - Obtain necessary fall risk management equipment:   - Apply yellow socks and bracelet for high fall risk patients  - Consider moving patient to room near nurses station  Outcome: Progressing     Problem: Prexisting or High Potential for Compromised Skin Integrity  Goal: Skin integrity is maintained or improved  Description: INTERVENTIONS:  - Identify patients at risk for skin breakdown  - Assess and monitor skin integrity  - Assess and monitor nutrition and hydration status  - Monitor labs   - Assess for incontinence   - Turn and reposition patient  - Assist with mobility/ambulation  - Relieve pressure over bony prominences  - Avoid friction and shearing  - Provide appropriate hygiene as needed including keeping skin clean and dry  - Evaluate need for skin moisturizer/barrier cream  - Collaborate with interdisciplinary team   - Patient/family teaching  - Consider wound care consult   Outcome: Progressing     Problem: PAIN - ADULT  Goal: Verbalizes/displays adequate comfort level or baseline comfort level  Description: Interventions:  - Encourage patient to monitor pain and request assistance  - Assess pain using appropriate pain scale  - Administer analgesics based on type and severity of pain and evaluate response  - Implement non-pharmacological measures as appropriate and evaluate response  - Consider cultural and social influences on pain and pain management  - Notify physician/advanced practitioner if interventions unsuccessful or patient reports new pain  Outcome: Progressing     Problem: INFECTION - ADULT  Goal: Absence or prevention of progression during hospitalization  Description: INTERVENTIONS:  - Assess and monitor for signs and symptoms of infection  - Monitor lab/diagnostic results  - Monitor all insertion sites, i e  indwelling lines, tubes, and drains  - Monitor endotracheal if appropriate and nasal secretions for changes in amount and color  - [place] appropriate cooling/warming therapies per order  - Administer medications as ordered  - Instruct and encourage patient and family to use good hand hygiene technique  - Identify and instruct in appropriate isolation precautions for identified infection/condition  Outcome: Progressing  Goal: Absence of fever/infection during neutropenic period  Description: INTERVENTIONS:  - Monitor WBC    Outcome: Progressing     Problem: SAFETY ADULT  Goal: Patient will remain free of falls  Description: INTERVENTIONS:  - Educate patient/family on patient safety including physical limitations  - Instruct patient to call for assistance with activity   - Consult OT/PT to assist with strengthening/mobility   - Keep Call bell within reach  - Keep bed low and locked with side rails adjusted as appropriate  - Keep care items and personal belongings within reach  - Initiate and maintain comfort rounds  - Make Fall Risk Sign visible to staff  - Offer Toileting every  Hours, in advance of need  - Initiate/Maintain alarm  - Obtain necessary fall risk management equipment:   - Apply yellow socks and bracelet for high fall risk patients  - Consider moving patient to room near nurses station  Outcome: Progressing  Goal: Maintain or return to baseline ADL function  Description: INTERVENTIONS:  -  Assess patient's ability to carry out ADLs; assess patient's baseline for ADL function and identify physical deficits which impact ability to perform ADLs (bathing, care of mouth/teeth, toileting, grooming, dressing, etc )  - Assess/evaluate cause of self-care deficits   - Assess range of motion  - Assess patient's mobility; develop plan if impaired  - Assess patient's need for assistive devices and provide as appropriate  - Encourage maximum independence but intervene and supervise when necessary  - Involve family in performance of ADLs  - Assess for home care needs following discharge   - Consider OT consult to assist with ADL evaluation and planning for discharge  - Provide patient education as appropriate  Outcome: Progressing  Goal: Maintains/Returns to pre admission functional level  Description: INTERVENTIONS:  - Perform BMAT or MOVE assessment daily    - Set and communicate daily mobility goal to care team and patient/family/caregiver  - Collaborate with rehabilitation services on mobility goals if consulted  - Perform Range of Motion  times a day  - Reposition patient every  hours    - Dangle patient  times a day  - Stand patient  times a day  - Ambulate patient  times a day  - Out of bed to chair  times a day   - Out of bed for meals  times a day  - Out of bed for toileting  - Record patient progress and toleration of activity level   Outcome: Progressing     Problem: DISCHARGE PLANNING  Goal: Discharge to home or other facility with appropriate resources  Description: INTERVENTIONS:  - Identify barriers to discharge w/patient and caregiver  - Arrange for needed discharge resources and transportation as appropriate  - Identify discharge learning needs (meds, wound care, etc )  - Arrange for interpretive services to assist at discharge as needed  - Refer to Case Management Department for coordinating discharge planning if the patient needs post-hospital services based on physician/advanced practitioner order or complex needs related to functional status, cognitive ability, or social support system  Outcome: Progressing     Problem: Knowledge Deficit  Goal: Patient/family/caregiver demonstrates understanding of disease process, treatment plan, medications, and discharge instructions  Description: Complete learning assessment and assess knowledge base  Interventions:  - Provide teaching at level of understanding  - Provide teaching via preferred learning methods  Outcome: Progressing     Problem: MOBILITY - ADULT  Goal: Maintain or return to baseline ADL function  Description: INTERVENTIONS:  -  Assess patient's ability to carry out ADLs; assess patient's baseline for ADL function and identify physical deficits which impact ability to perform ADLs (bathing, care of mouth/teeth, toileting, grooming, dressing, etc )  - Assess/evaluate cause of self-care deficits   - Assess range of motion  - Assess patient's mobility; develop plan if impaired  - Assess patient's need for assistive devices and provide as appropriate  - Encourage maximum independence but intervene and supervise when necessary  - Involve family in performance of ADLs  - Assess for home care needs following discharge   - Consider OT consult to assist with ADL evaluation and planning for discharge  - Provide patient education as appropriate  Outcome: Progressing  Goal: Maintains/Returns to pre admission functional level  Description: INTERVENTIONS:  - Perform BMAT or MOVE assessment daily    - Set and communicate daily mobility goal to care team and patient/family/caregiver  - Collaborate with rehabilitation services on mobility goals if consulted  - Perform Range of Motion  times a day  - Reposition patient every  hours    - Dangle patient  times a day  - Stand patient  times a day  - Ambulate patient  times a day  - Out of bed to chair  times a day   - Out of bed for meals times a day  - Out of bed for toileting  - Record patient progress and toleration of activity level   Outcome: Progressing     Problem: Nutrition/Hydration-ADULT  Goal: Nutrient/Hydration intake appropriate for improving, restoring or maintaining nutritional needs  Description: Monitor and assess patient's nutrition/hydration status for malnutrition  Collaborate with interdisciplinary team and initiate plan and interventions as ordered  Monitor patient's weight and dietary intake as ordered or per policy  Utilize nutrition screening tool and intervene as necessary  Determine patient's food preferences and provide high-protein, high-caloric foods as appropriate       INTERVENTIONS:  - Monitor oral intake, urinary output, labs, and treatment plans  - Assess nutrition and hydration status and recommend course of action  - Evaluate amount of meals eaten  - Assist patient with eating if necessary   - Allow adequate time for meals  - Recommend/ encourage appropriate diets, oral nutritional supplements, and vitamin/mineral supplements  - Order, calculate, and assess calorie counts as needed  - Recommend, monitor, and adjust tube feedings and TPN/PPN based on assessed needs  - Assess need for intravenous fluids  - Provide specific nutrition/hydration education as appropriate  - Include patient/family/caregiver in decisions related to nutrition  Outcome: Progressing

## 2021-11-01 NOTE — ASSESSMENT & PLAN NOTE
Sustained mechanical fracture to the proximal end of the right tibia  Initially on external fixators   Status post ORIF and removal of external fixators  - POD#9  Intact distal pulsation and neurological function - able to wiggle toes  Continue Coumadin  Will need post acute rehab services  Plan to discharged to [facility]

## 2021-11-01 NOTE — PHYSICAL THERAPY NOTE
PT Progress Note (23min)  (14:11-14:34)       11/01/21 1434   PT Last Visit   PT Visit Date 11/01/21   Note Type   Note Type Treatment for insurance authorization   Pain Assessment   Pain Assessment Tool 0-10   Pain Score 9   Pain Location/Orientation Orientation: Right;Location: Leg   Hospital Pain Intervention(s) Repositioned   Restrictions/Precautions   Weight Bearing Precautions Per Order Yes   RLE Weight Bearing Per Order NWB   Braces or Orthoses LE Immobilizer  (R LE)   Other Precautions Chair Alarm; Bed Alarm;Multiple lines;Telemetry; Fall Risk;Pain;WBS  (NWB R LE c knee immobilizer)   General   Chart Reviewed Yes   Response to Previous Treatment Patient with no complaints from previous session  Family/Caregiver Present No   Cognition   Orientation Level Oriented X4   Subjective   Subjective "what's therapy going to do for me?"   Bed Mobility   Rolling R 4  Minimal assistance   Additional items Assist x 1;HOB elevated; Bedrails; Increased time required;Verbal cues;LE management   Rolling L 4  Minimal assistance   Additional items Assist x 1;Bedrails; Increased time required;Verbal cues;LE management   Supine to Sit 4  Minimal assistance   Additional items Assist x 1;HOB elevated; Bedrails; Increased time required;Verbal cues;LE management   Sit to Supine 4  Minimal assistance   Additional items Assist x 1; Increased time required;Verbal cues;LE management   Transfers   Sit to Stand Unable to assess  (pt refused stating, "my other leg is going to give out"  )   Ambulation/Elevation   Gait pattern Not tested   Balance   Static Sitting Fair +  (pt able to sit EOB x17min unsupported)   Dynamic Sitting Fair -   Endurance Deficit   Endurance Deficit Yes   Activity Tolerance   Activity Tolerance Patient limited by fatigue;Patient limited by pain   Nurse Made Aware MARY Hanna aware of outcomes of PT session  pt resting comfortably in bed at end of session c all needs in reach     Exercises   THR   (pt refused stating, "I've been doing them and know them")   Assessment   Prognosis Good   Problem List Decreased strength;Decreased range of motion;Decreased endurance; Impaired balance;Decreased mobility; Decreased skin integrity;Orthopedic restrictions;Pain   Assessment pt required encouragement + education to participate in PT session  co-treatment c OT 2* pt requiring (A)x2 to complete mobility tasks during previous PT session  pt stated, "how am I supposed to do therapy c my leg like this  I can't even stand s my other leg giving out"  pt performed bed mobility tasks min (A)x1  able to sit EOB x17min unsupported  required min verbal cues for positioning + technique of R LE management  pt cont to decline further mobility despite education  pt requested w/c, however educated pt on safety c transfers prior to utilizing w/c  pt also refused LE ther ex  returned to supine at end of session c all needs in reach  will cont skilled PT to further maximize functional mobility + improve quality of life  AM-PAC low function score 18 indicating pt would benefit from skilled PT, however please refer to PT d/c recommendation for safe d/c planning  Barriers to Discharge Inaccessible home environment;Decreased caregiver support   Goals   Patient Goals "to get my leg better"  STG Expiration Date 11/06/21   PT Treatment Day 6   Plan   Treatment/Interventions Functional transfer training;LE strengthening/ROM; Therapeutic exercise; Endurance training;Patient/family training;Equipment eval/education; Bed mobility;Spoke to nursing;Spoke to case management;OT   Progress Slow progress, decreased activity tolerance   PT Frequency 5x/wk   Recommendation   PT Discharge Recommendation Post acute rehabilitation services   PT - OK to Discharge Yes  (to STR when stable  )   AM-PAC Basic Mobility Inpatient   Turning in Bed Without Bedrails 3   Lying on Back to Sitting on Edge of Flat Bed 3   Moving Bed to Chair 1   Standing Up From Chair 1   Walk in Room 1   Climb 3-5 Stairs 1   Basic Mobility Inpatient Raw Score 10   Turning Head Towards Sound 4   Follow Simple Instructions 4   Low Function Basic Mobility Raw Score 18   Low Function Basic Mobility Standardized Score 29 25     Kisha Thomas, PT, DPT

## 2021-11-02 ENCOUNTER — APPOINTMENT (INPATIENT)
Dept: ULTRASOUND IMAGING | Facility: HOSPITAL | Age: 66
DRG: 493 | End: 2021-11-02
Payer: COMMERCIAL

## 2021-11-02 PROBLEM — R22.31 ARM MASS, RIGHT: Status: ACTIVE | Noted: 2021-11-02

## 2021-11-02 LAB
ANION GAP SERPL CALCULATED.3IONS-SCNC: 7 MMOL/L (ref 4–13)
BASOPHILS # BLD AUTO: 0.04 THOUSANDS/ΜL (ref 0–0.1)
BASOPHILS NFR BLD AUTO: 1 % (ref 0–1)
BUN SERPL-MCNC: 12 MG/DL (ref 5–25)
CALCIUM SERPL-MCNC: 8 MG/DL (ref 8.3–10.1)
CHLORIDE SERPL-SCNC: 110 MMOL/L (ref 100–108)
CO2 SERPL-SCNC: 28 MMOL/L (ref 21–32)
CREAT SERPL-MCNC: 1.35 MG/DL (ref 0.6–1.3)
EOSINOPHIL # BLD AUTO: 0.25 THOUSAND/ΜL (ref 0–0.61)
EOSINOPHIL NFR BLD AUTO: 4 % (ref 0–6)
ERYTHROCYTE [DISTWIDTH] IN BLOOD BY AUTOMATED COUNT: 15.9 % (ref 11.6–15.1)
GFR SERPL CREATININE-BSD FRML MDRD: 55 ML/MIN/1.73SQ M
GLUCOSE SERPL-MCNC: 116 MG/DL (ref 65–140)
GLUCOSE SERPL-MCNC: 124 MG/DL (ref 65–140)
GLUCOSE SERPL-MCNC: 133 MG/DL (ref 65–140)
GLUCOSE SERPL-MCNC: 180 MG/DL (ref 65–140)
GLUCOSE SERPL-MCNC: 183 MG/DL (ref 65–140)
GLUCOSE SERPL-MCNC: 89 MG/DL (ref 65–140)
HCT VFR BLD AUTO: 24.2 % (ref 36.5–49.3)
HGB BLD-MCNC: 7.4 G/DL (ref 12–17)
IMM GRANULOCYTES # BLD AUTO: 0.03 THOUSAND/UL (ref 0–0.2)
IMM GRANULOCYTES NFR BLD AUTO: 1 % (ref 0–2)
INR PPP: 2.56 (ref 0.84–1.19)
LYMPHOCYTES # BLD AUTO: 1.76 THOUSANDS/ΜL (ref 0.6–4.47)
LYMPHOCYTES NFR BLD AUTO: 28 % (ref 14–44)
MCH RBC QN AUTO: 28 PG (ref 26.8–34.3)
MCHC RBC AUTO-ENTMCNC: 30.6 G/DL (ref 31.4–37.4)
MCV RBC AUTO: 92 FL (ref 82–98)
MONOCYTES # BLD AUTO: 0.71 THOUSAND/ΜL (ref 0.17–1.22)
MONOCYTES NFR BLD AUTO: 11 % (ref 4–12)
NEUTROPHILS # BLD AUTO: 3.48 THOUSANDS/ΜL (ref 1.85–7.62)
NEUTS SEG NFR BLD AUTO: 55 % (ref 43–75)
NRBC BLD AUTO-RTO: 0 /100 WBCS
PLATELET # BLD AUTO: 402 THOUSANDS/UL (ref 149–390)
PMV BLD AUTO: 9.2 FL (ref 8.9–12.7)
POTASSIUM SERPL-SCNC: 4 MMOL/L (ref 3.5–5.3)
PROTHROMBIN TIME: 26.3 SECONDS (ref 11.6–14.5)
RBC # BLD AUTO: 2.64 MILLION/UL (ref 3.88–5.62)
SODIUM SERPL-SCNC: 145 MMOL/L (ref 136–145)
WBC # BLD AUTO: 6.27 THOUSAND/UL (ref 4.31–10.16)

## 2021-11-02 PROCEDURE — 99232 SBSQ HOSP IP/OBS MODERATE 35: CPT | Performed by: STUDENT IN AN ORGANIZED HEALTH CARE EDUCATION/TRAINING PROGRAM

## 2021-11-02 PROCEDURE — 82948 REAGENT STRIP/BLOOD GLUCOSE: CPT

## 2021-11-02 PROCEDURE — 80048 BASIC METABOLIC PNL TOTAL CA: CPT | Performed by: INTERNAL MEDICINE

## 2021-11-02 PROCEDURE — 85610 PROTHROMBIN TIME: CPT | Performed by: INTERNAL MEDICINE

## 2021-11-02 PROCEDURE — 85025 COMPLETE CBC W/AUTO DIFF WBC: CPT | Performed by: INTERNAL MEDICINE

## 2021-11-02 PROCEDURE — 76882 US LMTD JT/FCL EVL NVASC XTR: CPT

## 2021-11-02 RX ORDER — CEPHALEXIN 500 MG/1
500 CAPSULE ORAL EVERY 6 HOURS SCHEDULED
Status: DISCONTINUED | OUTPATIENT
Start: 2021-11-02 | End: 2021-11-03 | Stop reason: HOSPADM

## 2021-11-02 RX ADMIN — ACETAMINOPHEN 650 MG: 325 TABLET, FILM COATED ORAL at 10:32

## 2021-11-02 RX ADMIN — DICLOFENAC SODIUM 2 G: 10 GEL TOPICAL at 17:56

## 2021-11-02 RX ADMIN — OXYCODONE HYDROCHLORIDE 10 MG: 10 TABLET, FILM COATED, EXTENDED RELEASE ORAL at 22:00

## 2021-11-02 RX ADMIN — CEPHALEXIN 500 MG: 500 CAPSULE ORAL at 14:24

## 2021-11-02 RX ADMIN — WARFARIN SODIUM 6 MG: 3 TABLET ORAL at 17:46

## 2021-11-02 RX ADMIN — INSULIN GLARGINE 60 UNITS: 100 INJECTION, SOLUTION SUBCUTANEOUS at 10:23

## 2021-11-02 RX ADMIN — PRAVASTATIN SODIUM 40 MG: 40 TABLET ORAL at 17:46

## 2021-11-02 RX ADMIN — INSULIN LISPRO 2 UNITS: 100 INJECTION, SOLUTION INTRAVENOUS; SUBCUTANEOUS at 14:19

## 2021-11-02 RX ADMIN — OXYCODONE HYDROCHLORIDE 10 MG: 10 TABLET, FILM COATED, EXTENDED RELEASE ORAL at 10:21

## 2021-11-02 RX ADMIN — DICLOFENAC SODIUM 2 G: 10 GEL TOPICAL at 10:22

## 2021-11-02 RX ADMIN — STANDARDIZED SENNA CONCENTRATE 17.2 MG: 8.6 TABLET ORAL at 17:46

## 2021-11-02 RX ADMIN — INSULIN LISPRO 2 UNITS: 100 INJECTION, SOLUTION INTRAVENOUS; SUBCUTANEOUS at 16:56

## 2021-11-02 RX ADMIN — CEPHALEXIN 500 MG: 500 CAPSULE ORAL at 17:59

## 2021-11-02 RX ADMIN — DICLOFENAC SODIUM 2 G: 10 GEL TOPICAL at 14:20

## 2021-11-02 RX ADMIN — DICLOFENAC SODIUM 2 G: 10 GEL TOPICAL at 22:00

## 2021-11-02 NOTE — ASSESSMENT & PLAN NOTE
Right forearm mass noted in the marleen-elbow region concerning for soft tissue infection  Denies pre-existing trauma however recollect possible IV line in the area  Ultrasound soft tissue showed superficial thrombophlebitis  Patient currently anticoagulated on Coumadin  Advised to continue same    Due to concern for possible soft tissue infection, he was initiated on Keflex 500 mg q 6 -  advised to complete for a total duration of 5 days - 11/06/2021

## 2021-11-02 NOTE — ASSESSMENT & PLAN NOTE
Resolved  Now at baseline  ROSE on CKD  May be prerenal   Cr trended since admission 1 99->1 4  Baseline 1 21 months ago  Avoid nephrotoxics  Avoid hypotension  Monitor I/O      Results from last 7 days   Lab Units 11/02/21  0800 11/01/21  0734 10/31/21  1253 10/29/21  0444 10/28/21  0512 10/27/21  0308   SODIUM mmol/L 145 143 142 142 142 139   POTASSIUM mmol/L 4 0 3 7 3 9 4 2 4 0 4 5   CHLORIDE mmol/L 110* 108 106 108 107 105   CO2 mmol/L 28 26 28 28 27 29   ANION GAP mmol/L 7 9 8 6 8 5   BUN mg/dL 12 11 13 16 14 21   CREATININE mg/dL 1 35* 1 34* 1 30 1 40* 1 32* 1 53*   CALCIUM mg/dL 8 0* 8 0* 8 3 8 0* 8 0* 7 7*   EGFR ml/min/1 73sq m 55 56 58 53 57 47   GLUCOSE RANDOM mg/dL 124 143* 204* 174* 199* 360*

## 2021-11-02 NOTE — ESCALATED TEAM TX
Team Meeting Note    Patient name Brooke Avina  Location [address] 209/-[id number] MRN [id number]  : 1955 Date 2021       Team Meeting  Initial Conference Date: 21    Team Members Present  Team Members Present: , Nurse, Physician, Other (Discipline and Name)  Physician Team Member: Dr Dinah Riedel Team Member: Jocelyn Quintana and Chris Meeks Management Team Member: [facility]  Other (Discipline and Name): 2nd floor nurse suppervisor -Ilsa Pastor    Patient/Family Present  Patient Present: Yes  Patient's Family Present: Yes  Family Relationship: Spouse  Spouse: beth        During this Care Team mtg patient's and beth's  questions were answered  SLIM has patient on iv abx for new hard redness on right inside of arm  Imaging will be done to clarify additional treatment needs for that site  Patient states he is agreeable to going to [facility]    Shaq Rasheed is requested by  discharge support team

## 2021-11-02 NOTE — PROGRESS NOTES
[facility]  Progress Note Bebeto Dangelo 1955, 61 y o  male MRN: [id number]  Unit/Bed#: -Jeffery Encounter: [id number]  Primary Care Provider: Remington Hanna DO   Date and time admitted to hospital: 10/13/2021  5:17 AM    * Closed fracture of proximal end of right tibia  Assessment & Plan  Sustained mechanical fracture to the proximal end of the right tibia  Initially on external fixators   Status post ORIF and removal of external fixators  - POD#8   Intact distal pulsation and neurological function - able to wiggle toes    Per review of PT notes not agreeable to participating PT due to fear of falling  Reassured patient about PT measures to prevent fall  Appropriate DVT prophylaxis  PT OT  Appreciate orthopedic input    Type 2 diabetes mellitus with diabetic neuropathy, without long-term current use of insulin [facility])  Assessment & Plan  Lab Results   Component Value Date    HGBA1C 8 2 (H) 10/16/2021       Recent Labs     10/31/21  1539 10/31/21  2110 11/01/21  0739 11/01/21  1105   POCGLU 217* 236* 161* 242*       Blood Sugar Average: Last 72 hrs:  (P) 793 [id number]  Known diabetic with diabetic neuropathy  On metformin and lantus at home  Blood sugar on presentation 418  Contiunue sliding scale insulin  Hypoglycemic protocol  Goal blood sugar not at goal of 140-180  · Continue Diabetic diet  · Continue Lantus 60 units q a m  · Insulin Humulog 18 unit with meals  · Continue mealtime SSI coverage while in hospital   Hold off bedtime sliding scale coverage  · Continue before meals and bedtime glucose checks  Right forearm mass  Assessment & Plan  Right forearm mass noted in the marleen-elbow region concerning for soft tissue infection  Denies pre-existing trauma however recollect possible IV line in the area  On exam, Erythematous and mildly tender mass, nonfluctuant    Will obtain soft tissue ultrasound of the area  Will initiate Keflex 500 mg q 6 for possible soft tissue infection      Anemia  Assessment & Plan  Hemoglobin holding steady at 8  Hemoglobin dropped dramatically since admission from 12 units to 7 5 and has been currently stable for few days  Most likely secondary blood loss during  surgery  Concerning for acute blood loss anemia  Patient has persistently refused blood transfusion     - will give IV Venofer 200 mg daily for 2 days  - discussed need for PRBC transfusion with patient,he appears not willing to accept transfusion  Discussed risk and benefit of said therapy and alternatives  Decline to sign consent form  Will touch base with spouse as he requests  -- no consent in the chart  -- will check current panel and replete iron if indicated     Will monitor closely  Difficult intubation  Assessment & Plan  Had difficult intubation  Due to severe kyphosis during ortho procedure on 10/18  Will keep in mind because he is going to have a definitive surgery again next week    Pseudogout of knee, left  Assessment & Plan  Pain and swelling in the left anterior knee, superiorly  Tender to touch  Developed after PT  Xray -unremarkable  Synovial fluid analysis shows calcium pyrophosphate crystals  Started on colchicine 0 6mg daily  ROSE (acute kidney injury) [facility])  Assessment & Plan  Resolved  Now at baseline  ROSE on CKD  May be prerenal   Cr trended since admission 1 99->1 4  Baseline 1 21 months ago  Avoid nephrotoxics  Avoid hypotension  Monitor I/O      Results from last 7 days   Lab Units 11/02/21  0800 11/01/21  0734 10/31/21  1253 10/29/21  0444 10/28/21  0512 10/27/21  0308   SODIUM mmol/L 145 143 142 142 142 139   POTASSIUM mmol/L 4 0 3 7 3 9 4 2 4 0 4 5   CHLORIDE mmol/L 110* 108 106 108 107 105   CO2 mmol/L 28 26 28 28 27 29   ANION GAP mmol/L 7 9 8 6 8 5   BUN mg/dL 12 11 13 16 14 21   CREATININE mg/dL 1 35* 1 34* 1 30 1 40* 1 32* 1 53*   CALCIUM mg/dL 8 0* 8 0* 8 3 8 0* 8 0* 7 7*   EGFR ml/min/1 73sq m 55 56 58 53 57 47   GLUCOSE RANDOM mg/dL 124 143* 204* 174* 199* 360*         History of DVT (deep vein thrombosis)  Assessment & Plan  History 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  Unprovoked  Last episode was 10yrs ago  On AC with coumadin  Currently held  Resume heparin gtt  Patient is on Coumadin   INR today 2 4    -- discontinue IV heparin  -- continue Coumadin 6 mg daily  --Daily PT INR    Mixed hyperlipidemia  Assessment & Plan  Continue statins  Essential hypertension  Assessment & Plan  On lisinopril 10mg daily at home  Initially on hold due to ROSE  Present creatinine could be new baseline as he had remained stable at 1 2 - 1 4  Resume home lisinopril  Blood pressure 143/66, pulse 77, temperature 98 2 °F (36 8 °C), resp  rate 18, height 6' 1" (1 854 m), weight 136 kg (299 lb 6 2 oz), SpO2 92 %  VTE Pharmacologic Prophylaxis:   VTE Score: 5 Moderate Risk (Score 3-4) - Pharmacological DVT Prophylaxis Ordered: Heparin Drip  Mechanical VTE Prophylaxis in Place: No    Patient Centered Rounds: I have performed bedside rounds with nursing staff today  Discussions with Specialists or Other Care Team Provider:  Ortho    Education and Discussions with Family / Patient: Patient declined call to   Current Length of Stay: 20 day(s)    Current Patient Status: Inpatient     Discharge Plan / Estimated Discharge Date: TBD    Code Status: Level 1 - Full Code      Subjective:       POD #8 ORIF  Patient seen at bedside  Per nurse, there was no acute overnight events  Heparin drip discontinued yesterday with continuation of home dose of Coumadin  Denies any bleeding from any site  Objective:     Vitals:   Temp (24hrs), Av 1 °F (37 3 °C), Min:98 3 °F (36 8 °C), Max:100 1 °F (37 8 °C)    Temp:  [98 3 °F (36 8 °C)-100 1 °F (37 8 °C)] 100 1 °F (37 8 °C)  HR:  [68-88] 88  BP: (107-136)/(49-65) 107/49  SpO2:  [94 %-97 %] 97 %  Body mass index is 39 56 kg/m²  Input and Output Summary (last 24 hours):        Intake/Output Summary (Last 24 hours) at 11/2/2021 1309  Last data filed at 11/2/2021 0909  Gross per 24 hour   Intake 720 ml   Output 1170 ml   Net -450 ml       Physical Exam:     Physical Exam  Vitals reviewed  Constitutional:       General: He is not in acute distress  Appearance: He is not toxic-appearing  HENT:      Head: Normocephalic and atraumatic  Eyes:      Pupils: Pupils are equal, round, and reactive to light  Cardiovascular:      Rate and Rhythm: Normal rate and regular rhythm  Pulses: Normal pulses  Heart sounds: Normal heart sounds  Pulmonary:      Effort: Pulmonary effort is normal  No respiratory distress  Breath sounds: Normal breath sounds  Abdominal:      General: Bowel sounds are normal  There is no distension  Palpations: Abdomen is soft  Musculoskeletal:         General: Deformity present  Comments: Right lower extremity in Acewrap and high knee braces  Intact neurological function  Capillary refill < 2 sec    Right Forearm:  Erythematous nodular lesion noted in the flexor surface of the proximal forearm  4 x 8 cm in maximal diameter and hard  Skin:     Capillary Refill: Capillary refill takes less than 2 seconds  Coloration: Skin is not jaundiced  Neurological:      Mental Status: He is alert and oriented to person, place, and time  Mental status is at baseline     Psychiatric:      Comments: Non-cooperative          Additional Data:     Labs:  Results from last 7 days   Lab Units 11/02/21  0800   WBC Thousand/uL 6 27   HEMOGLOBIN g/dL 7 4*   HEMATOCRIT % 24 2*   PLATELETS Thousands/uL 402*   NEUTROS PCT % 55   LYMPHS PCT % 28   MONOS PCT % 11   EOS PCT % 4     Results from last 7 days   Lab Units 11/02/21  0800   SODIUM mmol/L 145   POTASSIUM mmol/L 4 0   CHLORIDE mmol/L 110*   CO2 mmol/L 28   BUN mg/dL 12   CREATININE mg/dL 1 35*   ANION GAP mmol/L 7   CALCIUM mg/dL 8 0*   GLUCOSE RANDOM mg/dL 124     Results from last 7 days   Lab Units 11/02/21  0800   INR 2 56*     Results from last 7 days   Lab Units 11/02/21  1112 11/02/21  0757 11/01/21  2113 11/01/21  1805 11/01/21  1642 11/01/21  1105 11/01/21  0739 10/31/21  2110 10/31/21  1539 10/31/21  1056 10/31/21  0728 10/30/21  2047   POC GLUCOSE mg/dl 183* 133 323* 108 84 242* 161* 236* 217* 236* 179* 226*               Imaging: Reviewed radiology reports from this admission including: Kobe Mario bilateral LE    Recent Cultures (last 7 days):           Lines/Drains:  Invasive Devices     Peripheral Intravenous Line            Peripheral IV 10/29/21 Right;Ventral (anterior) Forearm 4 days    Peripheral IV 10/30/21 Proximal;Right;Ventral (anterior) Forearm 2 days                Telemetry:        Last 24 Hours Medication List:   Current Facility-Administered Medications   Medication Dose Route Frequency Provider Last Rate    acetaminophen  650 mg Oral Q6H PRN Estevan Scriver, PA-C      calcium carbonate  500 mg Oral Daily PRN Estevan Scriver, PA-C      cephalexin  500 mg Oral Q6H [address] Sherice Jesus MD      Diclofenac Sodium  2 g Topical 4x Daily Estevan Scriver, PA-C      HYDROmorphone  0 5 mg Intravenous Q3H PRN Estevan Scriver, PA-C      insulin glargine  60 Units Subcutaneous Daily Sherice Jesus MD      insulin lispro  18 Units Subcutaneous TID With Meals Sherice Jesus MD      insulin lispro  2-12 Units Subcutaneous TID AC Estevan Scriver, PA-C      lidocaine  1 patch Topical Daily Estevan Scriver, PA-C      lisinopril  10 mg Oral Daily Sherice Jesus MD      ondansetron  4 mg Intravenous Q6H PRN Estevan Scriver, PA-C      oxyCODONE  10 mg Oral Q12H [address] Estevan Scriver, PA-C      polyethylene glycol  17 g Oral Daily PRN Estevan Scriver, PA-C      pravastatin  40 mg Oral Daily With Zakia Chavira, PA-C      senna  2 tablet Oral BID Estevan Scriver, PA-C      warfarin  6 mg Oral Daily (warfarin) Sherice Jesus MD          Today, Patient Was Seen By: Sherice Jesus MD    ** Please Note: This note has been constructed using a voice recognition system   **

## 2021-11-02 NOTE — PLAN OF CARE
Problem: Potential for Falls  Goal: Patient will remain free of falls  Description: INTERVENTIONS:  - Educate patient/family on patient safety including physical limitations  - Instruct patient to call for assistance with activity   - Consult OT/PT to assist with strengthening/mobility   - Keep Call bell within reach  - Keep bed low and locked with side rails adjusted as appropriate  - Keep care items and personal belongings within reach  - Initiate and maintain comfort rounds  - Make Fall Risk Sign visible to staff  - Offer Toileting every 2 Hours, in advance of need  - Initiate/Maintain 2alarm  - Obtain necessary fall risk management equipment: 2  - Apply yellow socks and bracelet for high fall risk patients  - Consider moving patient to room near nurses station  Outcome: Progressing     Problem: Prexisting or High Potential for Compromised Skin Integrity  Goal: Skin integrity is maintained or improved  Description: INTERVENTIONS:  - Identify patients at risk for skin breakdown  - Assess and monitor skin integrity  - Assess and monitor nutrition and hydration status  - Monitor labs   - Assess for incontinence   - Turn and reposition patient  - Assist with mobility/ambulation  - Relieve pressure over bony prominences  - Avoid friction and shearing  - Provide appropriate hygiene as needed including keeping skin clean and dry  - Evaluate need for skin moisturizer/barrier cream  - Collaborate with interdisciplinary team   - Patient/family teaching  - Consider wound care consult   Outcome: Progressing     Problem: PAIN - ADULT  Goal: Verbalizes/displays adequate comfort level or baseline comfort level  Description: Interventions:  - Encourage patient to monitor pain and request assistance  - Assess pain using appropriate pain scale  - Administer analgesics based on type and severity of pain and evaluate response  - Implement non-pharmacological measures as appropriate and evaluate response  - Consider cultural and social influences on pain and pain management  - Notify physician/advanced practitioner if interventions unsuccessful or patient reports new pain  Outcome: Progressing     Problem: INFECTION - ADULT  Goal: Absence or prevention of progression during hospitalization  Description: INTERVENTIONS:  - Assess and monitor for signs and symptoms of infection  - Monitor lab/diagnostic results  - Monitor all insertion sites, i e  indwelling lines, tubes, and drains  - Monitor endotracheal if appropriate and nasal secretions for changes in amount and color  - [place] appropriate cooling/warming therapies per order  - Administer medications as ordered  - Instruct and encourage patient and family to use good hand hygiene technique  - Identify and instruct in appropriate isolation precautions for identified infection/condition  Outcome: Progressing  Goal: Absence of fever/infection during neutropenic period  Description: INTERVENTIONS:  - Monitor WBC    Outcome: Progressing     Problem: SAFETY ADULT  Goal: Patient will remain free of falls  Description: INTERVENTIONS:  - Educate patient/family on patient safety including physical limitations  - Instruct patient to call for assistance with activity   - Consult OT/PT to assist with strengthening/mobility   - Keep Call bell within reach  - Keep bed low and locked with side rails adjusted as appropriate  - Keep care items and personal belongings within reach  - Initiate and maintain comfort rounds  - Make Fall Risk Sign visible to staff  - Offer Toileting every 2 Hours, in advance of need  - Initiate/Maintain 2alarm  - Obtain necessary fall risk management equipment: 2  - Apply yellow socks and bracelet for high fall risk patients  - Consider moving patient to room near nurses station  Outcome: Progressing  Goal: Maintain or return to baseline ADL function  Description: INTERVENTIONS:  -  Assess patient's ability to carry out ADLs; assess patient's baseline for ADL function and identify physical deficits which impact ability to perform ADLs (bathing, care of mouth/teeth, toileting, grooming, dressing, etc )  - Assess/evaluate cause of self-care deficits   - Assess range of motion  - Assess patient's mobility; develop plan if impaired  - Assess patient's need for assistive devices and provide as appropriate  - Encourage maximum independence but intervene and supervise when necessary  - Involve family in performance of ADLs  - Assess for home care needs following discharge   - Consider OT consult to assist with ADL evaluation and planning for discharge  - Provide patient education as appropriate  Outcome: Progressing  Goal: Maintains/Returns to pre admission functional level  Description: INTERVENTIONS:  - Perform BMAT or MOVE assessment daily    - Set and communicate daily mobility goal to care team and patient/family/caregiver  - Collaborate with rehabilitation services on mobility goals if consulted  - Perform Range of Motion 2 times a day  - Reposition patient every 2 hours    - Dangle patient 2 times a day  - Stand patient 2 times a day  - Ambulate patient 2 times a day  - Out of bed to chair 2 times a day   - Out of bed for meals 2 times a day  - Out of bed for toileting  - Record patient progress and toleration of activity level   Outcome: Progressing     Problem: DISCHARGE PLANNING  Goal: Discharge to home or other facility with appropriate resources  Description: INTERVENTIONS:  - Identify barriers to discharge w/patient and caregiver  - Arrange for needed discharge resources and transportation as appropriate  - Identify discharge learning needs (meds, wound care, etc )  - Arrange for interpretive services to assist at discharge as needed  - Refer to Case Management Department for coordinating discharge planning if the patient needs post-hospital services based on physician/advanced practitioner order or complex needs related to functional status, cognitive ability, or social support system  Outcome: Progressing     Problem: Knowledge Deficit  Goal: Patient/family/caregiver demonstrates understanding of disease process, treatment plan, medications, and discharge instructions  Description: Complete learning assessment and assess knowledge base  Interventions:  - Provide teaching at level of understanding  - Provide teaching via preferred learning methods  Outcome: Progressing     Problem: MOBILITY - ADULT  Goal: Maintain or return to baseline ADL function  Description: INTERVENTIONS:  -  Assess patient's ability to carry out ADLs; assess patient's baseline for ADL function and identify physical deficits which impact ability to perform ADLs (bathing, care of mouth/teeth, toileting, grooming, dressing, etc )  - Assess/evaluate cause of self-care deficits   - Assess range of motion  - Assess patient's mobility; develop plan if impaired  - Assess patient's need for assistive devices and provide as appropriate  - Encourage maximum independence but intervene and supervise when necessary  - Involve family in performance of ADLs  - Assess for home care needs following discharge   - Consider OT consult to assist with ADL evaluation and planning for discharge  - Provide patient education as appropriate  Outcome: Progressing  Goal: Maintains/Returns to pre admission functional level  Description: INTERVENTIONS:  - Perform BMAT or MOVE assessment daily    - Set and communicate daily mobility goal to care team and patient/family/caregiver  - Collaborate with rehabilitation services on mobility goals if consulted  - Perform Range of Motion 2 times a day  - Reposition patient every 2 hours    - Dangle patient 2 times a day  - Stand patient 2 times a day  - Ambulate patient 2 times a day  - Out of bed to chair 2 times a day   - Out of bed for meals 2 times a day  - Out of bed for toileting  - Record patient progress and toleration of activity level   Outcome: Progressing     Problem: Nutrition/Hydration-ADULT  Goal: Nutrient/Hydration intake appropriate for improving, restoring or maintaining nutritional needs  Description: Monitor and assess patient's nutrition/hydration status for malnutrition  Collaborate with interdisciplinary team and initiate plan and interventions as ordered  Monitor patient's weight and dietary intake as ordered or per policy  Utilize nutrition screening tool and intervene as necessary  Determine patient's food preferences and provide high-protein, high-caloric foods as appropriate       INTERVENTIONS:  - Monitor oral intake, urinary output, labs, and treatment plans  - Assess nutrition and hydration status and recommend course of action  - Evaluate amount of meals eaten  - Assist patient with eating if necessary   - Allow adequate time for meals  - Recommend/ encourage appropriate diets, oral nutritional supplements, and vitamin/mineral supplements  - Order, calculate, and assess calorie counts as needed  - Recommend, monitor, and adjust tube feedings and TPN/PPN based on assessed needs  - Assess need for intravenous fluids  - Provide specific nutrition/hydration education as appropriate  - Include patient/family/caregiver in decisions related to nutrition  Outcome: Progressing

## 2021-11-02 NOTE — CASE MANAGEMENT
Case Management Discharge Planning Note    Patient name Farhad Terry  Location [address] 209/-[id number] MRN [id number]  : 1955 Date 2021       Current Admission Date: 10/13/2021  Current Admission Diagnosis:Closed fracture of proximal end of right tibia   Patient Active Problem List    Diagnosis Date Noted    Right forearm mass 2021    Anemia 10/26/2021    Kyphosis of cervicothoracic region 10/25/2021    Delayed emergence from anesthesia 10/18/2021    Difficult intubation 10/18/2021    Pseudogout of knee, left 10/17/2021    Pain and swelling of left knee 10/16/2021    History of DVT (deep vein thrombosis) 10/13/2021    ROSE (acute kidney injury) ([facility] ) 10/13/2021    Closed fracture of proximal end of right tibia 10/13/2021    Type 2 diabetes mellitus with hyperglycemia ([facility] ) 10/05/2021    Class 2 obesity in adult 2021    Type 2 diabetes mellitus with diabetic peripheral angiopathy without gangrene ([facility] ) 10/08/2019    Other insomnia 2017    Type 2 diabetes mellitus with diabetic neuropathy, without long-term current use of insulin ([facility] ) 2017    Lumbar degenerative disc disease 10/21/2014    Acute embolism and thrombosis of unspecified deep veins of unspecified lower extremity ([facility] ) 10/20/2014    Essential hypertension 10/20/2014    Mixed hyperlipidemia 10/20/2014    Peripheral vascular disease ([facility] ) 10/20/2014      LOS (days): 20  Geometric Mean LOS (GMLOS) (days):   Days to GMLOS:     OBJECTIVE:  Risk of Unplanned Readmission Score: 20   Bundled Patient Payment:  (no)     Current admission status: Inpatient   Preferred Pharmacy:   [id number] [address], [place] [address]  Phone: [phone] Fax: [address] [address]  Phone: [phone] Fax: [phone]  #[id number] [place] - [address]   Phone: [phone] Fax: [phone]    Primary Care Provider: Antonio Martinez DO    Primary Insurance: BLUE CROSS  Secondary Insurance: [facility]    DISCHARGE DETAILS:    Discharge planning discussed with[de-identified] wife-rebecca and patient  Freedom of Choice: Yes  Comments - Freedom of Choice: patient and wife-rebecca agreeable to [facility] nursing and rehab  CM contacted family/caregiver?: Yes             Contacts  Patient Contacts: wife-Rebecca  Relationship to Patient[de-identified] Family  Contact Method: In Person  Reason/Outcome: Continuity of Care, Emergency Contact, Discharge [address]         Is the patient interested in [address] at discharge?: No    DME Referral Provided  Referral made for DME?: No    Other Referral/Resources/Interventions Provided:  Interventions: Short Term Rehab  Referral Comments: [facility] accepted and auth initiation for auth have been sent to cm discharge support team     Would you like to participate in our [address] [place] [address] service program?  : No - Declined    Treatment Team Recommendation: Short Term Rehab  Discharge Destination Plan[de-identified] Short Term Rehab (awaiting auth for [facility] )  Transport at Discharge : [facility]  Dispatcher Contacted:  No

## 2021-11-03 VITALS
WEIGHT: 295.42 LBS | DIASTOLIC BLOOD PRESSURE: 69 MMHG | TEMPERATURE: 98.6 F | SYSTOLIC BLOOD PRESSURE: 139 MMHG | RESPIRATION RATE: 18 BRPM | BODY MASS INDEX: 39.15 KG/M2 | HEIGHT: 73 IN | HEART RATE: 71 BPM | OXYGEN SATURATION: 95 %

## 2021-11-03 PROBLEM — N17.9 AKI (ACUTE KIDNEY INJURY) (HCC): Status: RESOLVED | Noted: 2021-10-13 | Resolved: 2021-11-03

## 2021-11-03 LAB
ANION GAP SERPL CALCULATED.3IONS-SCNC: 7 MMOL/L (ref 4–13)
BASOPHILS # BLD AUTO: 0.04 THOUSANDS/ΜL (ref 0–0.1)
BASOPHILS NFR BLD AUTO: 1 % (ref 0–1)
BUN SERPL-MCNC: 12 MG/DL (ref 5–25)
CALCIUM SERPL-MCNC: 7.9 MG/DL (ref 8.3–10.1)
CHLORIDE SERPL-SCNC: 109 MMOL/L (ref 100–108)
CO2 SERPL-SCNC: 26 MMOL/L (ref 21–32)
CREAT SERPL-MCNC: 1.35 MG/DL (ref 0.6–1.3)
EOSINOPHIL # BLD AUTO: 0.26 THOUSAND/ΜL (ref 0–0.61)
EOSINOPHIL NFR BLD AUTO: 4 % (ref 0–6)
ERYTHROCYTE [DISTWIDTH] IN BLOOD BY AUTOMATED COUNT: 16 % (ref 11.6–15.1)
FLUAV RNA RESP QL NAA+PROBE: NEGATIVE
FLUBV RNA RESP QL NAA+PROBE: NEGATIVE
GFR SERPL CREATININE-BSD FRML MDRD: 55 ML/MIN/1.73SQ M
GLUCOSE SERPL-MCNC: 125 MG/DL (ref 65–140)
GLUCOSE SERPL-MCNC: 139 MG/DL (ref 65–140)
GLUCOSE SERPL-MCNC: 156 MG/DL (ref 65–140)
GLUCOSE SERPL-MCNC: 167 MG/DL (ref 65–140)
GLUCOSE SERPL-MCNC: 188 MG/DL (ref 65–140)
HCT VFR BLD AUTO: 25.6 % (ref 36.5–49.3)
HGB BLD-MCNC: 7.9 G/DL (ref 12–17)
IMM GRANULOCYTES # BLD AUTO: 0.01 THOUSAND/UL (ref 0–0.2)
IMM GRANULOCYTES NFR BLD AUTO: 0 % (ref 0–2)
INR PPP: 2.72 (ref 0.84–1.19)
LYMPHOCYTES # BLD AUTO: 1.99 THOUSANDS/ΜL (ref 0.6–4.47)
LYMPHOCYTES NFR BLD AUTO: 33 % (ref 14–44)
MCH RBC QN AUTO: 28.5 PG (ref 26.8–34.3)
MCHC RBC AUTO-ENTMCNC: 30.9 G/DL (ref 31.4–37.4)
MCV RBC AUTO: 92 FL (ref 82–98)
MONOCYTES # BLD AUTO: 0.57 THOUSAND/ΜL (ref 0.17–1.22)
MONOCYTES NFR BLD AUTO: 9 % (ref 4–12)
NEUTROPHILS # BLD AUTO: 3.22 THOUSANDS/ΜL (ref 1.85–7.62)
NEUTS SEG NFR BLD AUTO: 53 % (ref 43–75)
NRBC BLD AUTO-RTO: 0 /100 WBCS
PLATELET # BLD AUTO: 397 THOUSANDS/UL (ref 149–390)
PMV BLD AUTO: 9.3 FL (ref 8.9–12.7)
POTASSIUM SERPL-SCNC: 4.1 MMOL/L (ref 3.5–5.3)
PROTHROMBIN TIME: 27.5 SECONDS (ref 11.6–14.5)
RBC # BLD AUTO: 2.77 MILLION/UL (ref 3.88–5.62)
RSV RNA RESP QL NAA+PROBE: NEGATIVE
SARS-COV-2 RNA RESP QL NAA+PROBE: NEGATIVE
SODIUM SERPL-SCNC: 142 MMOL/L (ref 136–145)
WBC # BLD AUTO: 6.09 THOUSAND/UL (ref 4.31–10.16)

## 2021-11-03 PROCEDURE — 0241U HB NFCT DS VIR RESP RNA 4 TRGT: CPT | Performed by: STUDENT IN AN ORGANIZED HEALTH CARE EDUCATION/TRAINING PROGRAM

## 2021-11-03 PROCEDURE — 85610 PROTHROMBIN TIME: CPT | Performed by: STUDENT IN AN ORGANIZED HEALTH CARE EDUCATION/TRAINING PROGRAM

## 2021-11-03 PROCEDURE — 99239 HOSP IP/OBS DSCHRG MGMT >30: CPT | Performed by: STUDENT IN AN ORGANIZED HEALTH CARE EDUCATION/TRAINING PROGRAM

## 2021-11-03 PROCEDURE — 82948 REAGENT STRIP/BLOOD GLUCOSE: CPT

## 2021-11-03 PROCEDURE — 85025 COMPLETE CBC W/AUTO DIFF WBC: CPT | Performed by: INTERNAL MEDICINE

## 2021-11-03 PROCEDURE — 99024 POSTOP FOLLOW-UP VISIT: CPT | Performed by: ORTHOPAEDIC SURGERY

## 2021-11-03 PROCEDURE — 80048 BASIC METABOLIC PNL TOTAL CA: CPT | Performed by: INTERNAL MEDICINE

## 2021-11-03 RX ORDER — OXYCODONE HYDROCHLORIDE 5 MG/1
10 TABLET ORAL EVERY 8 HOURS PRN
Qty: 15 TABLET | Refills: 0 | Status: SHIPPED | OUTPATIENT
Start: 2021-11-03 | End: 2021-11-05

## 2021-11-03 RX ORDER — OXYCODONE HYDROCHLORIDE 10 MG/1
10 TABLET, FILM COATED, EXTENDED RELEASE ORAL 2 TIMES DAILY PRN
Qty: 20 TABLET | Refills: 0 | Status: SHIPPED | OUTPATIENT
Start: 2021-11-03 | End: 2021-11-05 | Stop reason: SDUPTHER

## 2021-11-03 RX ORDER — CEPHALEXIN 500 MG/1
500 CAPSULE ORAL EVERY 6 HOURS SCHEDULED
Qty: 16 CAPSULE | Refills: 0 | Status: SHIPPED | OUTPATIENT
Start: 2021-11-03 | End: 2021-11-07

## 2021-11-03 RX ADMIN — STANDARDIZED SENNA CONCENTRATE 17.2 MG: 8.6 TABLET ORAL at 18:37

## 2021-11-03 RX ADMIN — LISINOPRIL 10 MG: 10 TABLET ORAL at 10:42

## 2021-11-03 RX ADMIN — CEPHALEXIN 500 MG: 500 CAPSULE ORAL at 13:47

## 2021-11-03 RX ADMIN — DICLOFENAC SODIUM 2 G: 10 GEL TOPICAL at 18:29

## 2021-11-03 RX ADMIN — WARFARIN SODIUM 6 MG: 3 TABLET ORAL at 18:36

## 2021-11-03 RX ADMIN — INSULIN GLARGINE 30 UNITS: 100 INJECTION, SOLUTION SUBCUTANEOUS at 10:47

## 2021-11-03 RX ADMIN — CALCIUM CARBONATE (ANTACID) CHEW TAB 500 MG 500 MG: 500 CHEW TAB at 10:41

## 2021-11-03 RX ADMIN — CEPHALEXIN 500 MG: 500 CAPSULE ORAL at 00:30

## 2021-11-03 RX ADMIN — CEPHALEXIN 500 MG: 500 CAPSULE ORAL at 18:36

## 2021-11-03 RX ADMIN — INSULIN LISPRO 2 UNITS: 100 INJECTION, SOLUTION INTRAVENOUS; SUBCUTANEOUS at 13:50

## 2021-11-03 RX ADMIN — PRAVASTATIN SODIUM 40 MG: 40 TABLET ORAL at 18:39

## 2021-11-03 RX ADMIN — OXYCODONE HYDROCHLORIDE 10 MG: 10 TABLET, FILM COATED, EXTENDED RELEASE ORAL at 10:41

## 2021-11-03 RX ADMIN — CEPHALEXIN 500 MG: 500 CAPSULE ORAL at 06:57

## 2021-11-03 NOTE — CASE MANAGEMENT
Case Management Discharge Planning Note    Patient name Roxi Myrna  Location [address] 209/-19 MRN [id number]  : 1955 Date 11/3/2021       Current Admission Date: 10/13/2021  Current Admission Diagnosis:Closed fracture of proximal end of right tibia   Patient Active Problem List    Diagnosis Date Noted    Right forearm mass 2021    Anemia 10/26/2021    Kyphosis of cervicothoracic region 10/25/2021    Delayed emergence from anesthesia 10/18/2021    Difficult intubation 10/18/2021    Pseudogout of knee, left 10/17/2021    Pain and swelling of left knee 10/16/2021    History of DVT (deep vein thrombosis) 10/13/2021    ROSE (acute kidney injury) ([facility] [facility] ) 10/13/2021    Closed fracture of proximal end of right tibia 10/13/2021    Type 2 diabetes mellitus with hyperglycemia ([facility] [facility] ) 10/05/2021    Class 2 obesity in adult 2021    Type 2 diabetes mellitus with diabetic peripheral angiopathy without gangrene ([facility] [facility] ) 10/08/2019    Other insomnia 2017    Type 2 diabetes mellitus with diabetic neuropathy, without long-term current use of insulin ([facility] [address] [facility] ) 2017    Lumbar degenerative disc disease 10/21/2014    Acute embolism and thrombosis of unspecified deep veins of unspecified lower extremity ([facility] ) 10/20/2014    Essential hypertension 10/20/2014    Mixed hyperlipidemia 10/20/2014    Peripheral vascular disease ([facility] [facility] ) 10/20/2014      LOS (days): 21  Geometric Mean LOS (GMLOS) (days):   Days to GMLOS:     OBJECTIVE:  Risk of Unplanned Readmission Score: 20   Bundled Patient Payment:  (no)     Current admission status: Inpatient   Preferred Pharmacy:   [id number], [address] - [address] [place] [address],  [address] [place] [address] [facility] [address]  Phone: [phone] Fax: [address] [address]  Phone: [phone] Fax: [phone]  #[id number] [place] Door, [address] [facility]  [address] [place] [address]  Phone: [phone] Fax: [phone]    Primary Care Provider: Bella Kennedy DO    Primary Insurance: BLUE CROSS  Secondary Insurance: [address] [place] Number: Phone call from [facility] @ [employer]  Authorization [id number] to Stevie Aleman 107 x 7days  Next review date 11/10/21  Reviewer: Livier Mcdowell [phone] ext  [phone]

## 2021-11-03 NOTE — PLAN OF CARE
Problem: Potential for Falls  Goal: Patient will remain free of falls  Description: INTERVENTIONS:  - Educate patient/family on patient safety including physical limitations  - Instruct patient to call for assistance with activity   - Consult OT/PT to assist with strengthening/mobility   - Keep Call bell within reach  - Keep bed low and locked with side rails adjusted as appropriate  - Keep care items and personal belongings within reach  - Initiate and maintain comfort rounds  - Make Fall Risk Sign visible to staff  - Offer Toileting every 2 Hours, in advance of need  - Initiate/Maintain 2alarm  - Obtain necessary fall risk management equipment: 2  - Apply yellow socks and bracelet for high fall risk patients  - Consider moving patient to room near nurses station  11/3/2021 1930 by rAaceli Carreon RN  Outcome: Adequate for Discharge  11/3/2021 1302 by Araceli Carreon RN  Outcome: Progressing     Problem: Prexisting or High Potential for Compromised Skin Integrity  Goal: Skin integrity is maintained or improved  Description: INTERVENTIONS:  - Identify patients at risk for skin breakdown  - Assess and monitor skin integrity  - Assess and monitor nutrition and hydration status  - Monitor labs   - Assess for incontinence   - Turn and reposition patient  - Assist with mobility/ambulation  - Relieve pressure over bony prominences  - Avoid friction and shearing  - Provide appropriate hygiene as needed including keeping skin clean and dry  - Evaluate need for skin moisturizer/barrier cream  - Collaborate with interdisciplinary team   - Patient/family teaching  - Consider wound care consult   11/3/2021 1930 by Araceli Carreon RN  Outcome: Adequate for Discharge  11/3/2021 1302 by Araceli Carreon RN  Outcome: Progressing     Problem: PAIN - ADULT  Goal: Verbalizes/displays adequate comfort level or baseline comfort level  Description: Interventions:  - Encourage patient to monitor pain and request assistance  - Assess pain using appropriate pain scale  - Administer analgesics based on type and severity of pain and evaluate response  - Implement non-pharmacological measures as appropriate and evaluate response  - Consider cultural and social influences on pain and pain management  - Notify physician/advanced practitioner if interventions unsuccessful or patient reports new pain  11/3/2021 1930 by Marysol Valdez RN  Outcome: Adequate for Discharge  11/3/2021 1302 by Marysol Valdez RN  Outcome: Progressing     Problem: INFECTION - ADULT  Goal: Absence or prevention of progression during hospitalization  Description: INTERVENTIONS:  - Assess and monitor for signs and symptoms of infection  - Monitor lab/diagnostic results  - Monitor all insertion sites, i e  indwelling lines, tubes, and drains  - Monitor endotracheal if appropriate and nasal secretions for changes in amount and color  - [place] appropriate cooling/warming therapies per order  - Administer medications as ordered  - Instruct and encourage patient and family to use good hand hygiene technique  - Identify and instruct in appropriate isolation precautions for identified infection/condition  11/3/2021 1930 by Marysol Valdez RN  Outcome: Adequate for Discharge  11/3/2021 1302 by Marysol Valdez RN  Outcome: Progressing  Goal: Absence of fever/infection during neutropenic period  Description: INTERVENTIONS:  - Monitor WBC    11/3/2021 1930 by Marysol Valdez RN  Outcome: Adequate for Discharge  11/3/2021 1302 by Marysol Valdez RN  Outcome: Progressing     Problem: SAFETY ADULT  Goal: Patient will remain free of falls  Description: INTERVENTIONS:  - Educate patient/family on patient safety including physical limitations  - Instruct patient to call for assistance with activity   - Consult OT/PT to assist with strengthening/mobility   - Keep Call bell within reach  - Keep bed low and locked with side rails adjusted as appropriate  - Keep care items and personal belongings within reach  - Initiate and maintain comfort rounds  - Make Fall Risk Sign visible to staff  - Offer Toileting every 2 Hours, in advance of need  - Initiate/Maintain 2alarm  - Obtain necessary fall risk management equipment: 2  - Apply yellow socks and bracelet for high fall risk patients  - Consider moving patient to room near nurses station  11/3/2021 1930 by Amy Cummins RN  Outcome: Adequate for Discharge  11/3/2021 1302 by Amy Cummins RN  Outcome: Progressing  Goal: Maintain or return to baseline ADL function  Description: INTERVENTIONS:  -  Assess patient's ability to carry out ADLs; assess patient's baseline for ADL function and identify physical deficits which impact ability to perform ADLs (bathing, care of mouth/teeth, toileting, grooming, dressing, etc )  - Assess/evaluate cause of self-care deficits   - Assess range of motion  - Assess patient's mobility; develop plan if impaired  - Assess patient's need for assistive devices and provide as appropriate  - Encourage maximum independence but intervene and supervise when necessary  - Involve family in performance of ADLs  - Assess for home care needs following discharge   - Consider OT consult to assist with ADL evaluation and planning for discharge  - Provide patient education as appropriate  11/3/2021 1930 by Amy Cummins RN  Outcome: Adequate for Discharge  11/3/2021 1302 by Amy Cummins RN  Outcome: Progressing  Goal: Maintains/Returns to pre admission functional level  Description: INTERVENTIONS:  - Perform BMAT or MOVE assessment daily    - Set and communicate daily mobility goal to care team and patient/family/caregiver  - Collaborate with rehabilitation services on mobility goals if consulted  - Perform Range of Motion 2 times a day  - Reposition patient every 2 hours    - Dangle patient 2 times a day  - Stand patient 2 times a day  - Ambulate patient 2 times a day  - Out of bed to chair 2 times a day   - Out of bed for meals 2 times a day  - Out of bed for toileting  - Record patient progress and toleration of activity level   11/3/2021 1930 by Yessi Lopez RN  Outcome: Adequate for Discharge  11/3/2021 1302 by Yessi Lopez RN  Outcome: Progressing     Problem: DISCHARGE PLANNING  Goal: Discharge to home or other facility with appropriate resources  Description: INTERVENTIONS:  - Identify barriers to discharge w/patient and caregiver  - Arrange for needed discharge resources and transportation as appropriate  - Identify discharge learning needs (meds, wound care, etc )  - Arrange for interpretive services to assist at discharge as needed  - Refer to Case Management Department for coordinating discharge planning if the patient needs post-hospital services based on physician/advanced practitioner order or complex needs related to functional status, cognitive ability, or social support system  11/3/2021 1930 by Yessi Lopez RN  Outcome: Adequate for Discharge  11/3/2021 1302 by Yessi Lopez RN  Outcome: Progressing     Problem: Knowledge Deficit  Goal: Patient/family/caregiver demonstrates understanding of disease process, treatment plan, medications, and discharge instructions  Description: Complete learning assessment and assess knowledge base    Interventions:  - Provide teaching at level of understanding  - Provide teaching via preferred learning methods  11/3/2021 1930 by Yessi Lopez RN  Outcome: Adequate for Discharge  11/3/2021 1302 by Yessi Lopez RN  Outcome: Progressing     Problem: MOBILITY - ADULT  Goal: Maintain or return to baseline ADL function  Description: INTERVENTIONS:  -  Assess patient's ability to carry out ADLs; assess patient's baseline for ADL function and identify physical deficits which impact ability to perform ADLs (bathing, care of mouth/teeth, toileting, grooming, dressing, etc )  - Assess/evaluate cause of self-care deficits   - Assess range of motion  - Assess patient's mobility; develop plan if impaired  - Assess patient's need for assistive devices and provide as appropriate  - Encourage maximum independence but intervene and supervise when necessary  - Involve family in performance of ADLs  - Assess for home care needs following discharge   - Consider OT consult to assist with ADL evaluation and planning for discharge  - Provide patient education as appropriate  11/3/2021 1930 by Julian Rosas RN  Outcome: Adequate for Discharge  11/3/2021 1302 by Julian Rosas RN  Outcome: Progressing  Goal: Maintains/Returns to pre admission functional level  Description: INTERVENTIONS:  - Perform BMAT or MOVE assessment daily    - Set and communicate daily mobility goal to care team and patient/family/caregiver  - Collaborate with rehabilitation services on mobility goals if consulted  - Perform Range of Motion 2 times a day  - Reposition patient every 2 hours  - Dangle patient 2 times a day  - Stand patient 2 times a day  - Ambulate patient 2 times a day  - Out of bed to chair 2 times a day   - Out of bed for meals 2 times a day  - Out of bed for toileting  - Record patient progress and toleration of activity level   11/3/2021 1930 by Julian Rosas RN  Outcome: Adequate for Discharge  11/3/2021 1302 by Julian Rosas RN  Outcome: Progressing     Problem: Nutrition/Hydration-ADULT  Goal: Nutrient/Hydration intake appropriate for improving, restoring or maintaining nutritional needs  Description: Monitor and assess patient's nutrition/hydration status for malnutrition  Collaborate with interdisciplinary team and initiate plan and interventions as ordered  Monitor patient's weight and dietary intake as ordered or per policy  Utilize nutrition screening tool and intervene as necessary  Determine patient's food preferences and provide high-protein, high-caloric foods as appropriate       INTERVENTIONS:  - Monitor oral intake, urinary output, labs, and treatment plans  - Assess nutrition and hydration status and recommend course of action  - Evaluate amount of meals eaten  - Assist patient with eating if necessary   - Allow adequate time for meals  - Recommend/ encourage appropriate diets, oral nutritional supplements, and vitamin/mineral supplements  - Order, calculate, and assess calorie counts as needed  - Recommend, monitor, and adjust tube feedings and TPN/PPN based on assessed needs  - Assess need for intravenous fluids  - Provide specific nutrition/hydration education as appropriate  - Include patient/family/caregiver in decisions related to nutrition  11/3/2021 1930 by Jorge Rowland RN  Outcome: Adequate for Discharge  11/3/2021 1302 by Jorge Rowland RN  Outcome: Progressing

## 2021-11-03 NOTE — PLAN OF CARE
Problem: Potential for Falls  Goal: Patient will remain free of falls  Description: INTERVENTIONS:  - Educate patient/family on patient safety including physical limitations  - Instruct patient to call for assistance with activity   - Consult OT/PT to assist with strengthening/mobility   - Keep Call bell within reach  - Keep bed low and locked with side rails adjusted as appropriate  - Keep care items and personal belongings within reach  - Initiate and maintain comfort rounds  - Make Fall Risk Sign visible to staff  - Offer Toileting every 2 Hours, in advance of need  - Initiate/Maintain 2alarm  - Obtain necessary fall risk management equipment: 2  - Apply yellow socks and bracelet for high fall risk patients  - Consider moving patient to room near nurses station  Outcome: Progressing     Problem: Prexisting or High Potential for Compromised Skin Integrity  Goal: Skin integrity is maintained or improved  Description: INTERVENTIONS:  - Identify patients at risk for skin breakdown  - Assess and monitor skin integrity  - Assess and monitor nutrition and hydration status  - Monitor labs   - Assess for incontinence   - Turn and reposition patient  - Assist with mobility/ambulation  - Relieve pressure over bony prominences  - Avoid friction and shearing  - Provide appropriate hygiene as needed including keeping skin clean and dry  - Evaluate need for skin moisturizer/barrier cream  - Collaborate with interdisciplinary team   - Patient/family teaching  - Consider wound care consult   Outcome: Progressing     Problem: PAIN - ADULT  Goal: Verbalizes/displays adequate comfort level or baseline comfort level  Description: Interventions:  - Encourage patient to monitor pain and request assistance  - Assess pain using appropriate pain scale  - Administer analgesics based on type and severity of pain and evaluate response  - Implement non-pharmacological measures as appropriate and evaluate response  - Consider cultural and social influences on pain and pain management  - Notify physician/advanced practitioner if interventions unsuccessful or patient reports new pain  Outcome: Progressing     Problem: INFECTION - ADULT  Goal: Absence or prevention of progression during hospitalization  Description: INTERVENTIONS:  - Assess and monitor for signs and symptoms of infection  - Monitor lab/diagnostic results  - Monitor all insertion sites, i e  indwelling lines, tubes, and drains  - Monitor endotracheal if appropriate and nasal secretions for changes in amount and color  - [place] appropriate cooling/warming therapies per order  - Administer medications as ordered  - Instruct and encourage patient and family to use good hand hygiene technique  - Identify and instruct in appropriate isolation precautions for identified infection/condition  Outcome: Progressing  Goal: Absence of fever/infection during neutropenic period  Description: INTERVENTIONS:  - Monitor WBC    Outcome: Progressing     Problem: SAFETY ADULT  Goal: Patient will remain free of falls  Description: INTERVENTIONS:  - Educate patient/family on patient safety including physical limitations  - Instruct patient to call for assistance with activity   - Consult OT/PT to assist with strengthening/mobility   - Keep Call bell within reach  - Keep bed low and locked with side rails adjusted as appropriate  - Keep care items and personal belongings within reach  - Initiate and maintain comfort rounds  - Make Fall Risk Sign visible to staff  - Offer Toileting every 2 Hours, in advance of need  - Initiate/Maintain 2alarm  - Obtain necessary fall risk management equipment: 2  - Apply yellow socks and bracelet for high fall risk patients  - Consider moving patient to room near nurses station  Outcome: Progressing  Goal: Maintain or return to baseline ADL function  Description: INTERVENTIONS:  -  Assess patient's ability to carry out ADLs; assess patient's baseline for ADL function and identify physical deficits which impact ability to perform ADLs (bathing, care of mouth/teeth, toileting, grooming, dressing, etc )  - Assess/evaluate cause of self-care deficits   - Assess range of motion  - Assess patient's mobility; develop plan if impaired  - Assess patient's need for assistive devices and provide as appropriate  - Encourage maximum independence but intervene and supervise when necessary  - Involve family in performance of ADLs  - Assess for home care needs following discharge   - Consider OT consult to assist with ADL evaluation and planning for discharge  - Provide patient education as appropriate  Outcome: Progressing  Goal: Maintains/Returns to pre admission functional level  Description: INTERVENTIONS:  - Perform BMAT or MOVE assessment daily    - Set and communicate daily mobility goal to care team and patient/family/caregiver  - Collaborate with rehabilitation services on mobility goals if consulted  - Perform Range of Motion 2 times a day  - Reposition patient every 2 hours    - Dangle patient 2 times a day  - Stand patient 2 times a day  - Ambulate patient 2 times a day  - Out of bed to chair 2 times a day   - Out of bed for meals 2 times a day  - Out of bed for toileting  - Record patient progress and toleration of activity level   Outcome: Progressing     Problem: DISCHARGE PLANNING  Goal: Discharge to home or other facility with appropriate resources  Description: INTERVENTIONS:  - Identify barriers to discharge w/patient and caregiver  - Arrange for needed discharge resources and transportation as appropriate  - Identify discharge learning needs (meds, wound care, etc )  - Arrange for interpretive services to assist at discharge as needed  - Refer to Case Management Department for coordinating discharge planning if the patient needs post-hospital services based on physician/advanced practitioner order or complex needs related to functional status, cognitive ability, or social support system  Outcome: Progressing     Problem: Knowledge Deficit  Goal: Patient/family/caregiver demonstrates understanding of disease process, treatment plan, medications, and discharge instructions  Description: Complete learning assessment and assess knowledge base  Interventions:  - Provide teaching at level of understanding  - Provide teaching via preferred learning methods  Outcome: Progressing     Problem: MOBILITY - ADULT  Goal: Maintain or return to baseline ADL function  Description: INTERVENTIONS:  -  Assess patient's ability to carry out ADLs; assess patient's baseline for ADL function and identify physical deficits which impact ability to perform ADLs (bathing, care of mouth/teeth, toileting, grooming, dressing, etc )  - Assess/evaluate cause of self-care deficits   - Assess range of motion  - Assess patient's mobility; develop plan if impaired  - Assess patient's need for assistive devices and provide as appropriate  - Encourage maximum independence but intervene and supervise when necessary  - Involve family in performance of ADLs  - Assess for home care needs following discharge   - Consider OT consult to assist with ADL evaluation and planning for discharge  - Provide patient education as appropriate  Outcome: Progressing  Goal: Maintains/Returns to pre admission functional level  Description: INTERVENTIONS:  - Perform BMAT or MOVE assessment daily    - Set and communicate daily mobility goal to care team and patient/family/caregiver  - Collaborate with rehabilitation services on mobility goals if consulted  - Perform Range of Motion 2 times a day  - Reposition patient every 2 hours    - Dangle patient 2 times a day  - Stand patient 2 times a day  - Ambulate patient 2 times a day  - Out of bed to chair 2 times a day   - Out of bed for meals 2 times a day  - Out of bed for toileting  - Record patient progress and toleration of activity level   Outcome: Progressing     Problem: Nutrition/Hydration-ADULT  Goal: Nutrient/Hydration intake appropriate for improving, restoring or maintaining nutritional needs  Description: Monitor and assess patient's nutrition/hydration status for malnutrition  Collaborate with interdisciplinary team and initiate plan and interventions as ordered  Monitor patient's weight and dietary intake as ordered or per policy  Utilize nutrition screening tool and intervene as necessary  Determine patient's food preferences and provide high-protein, high-caloric foods as appropriate       INTERVENTIONS:  - Monitor oral intake, urinary output, labs, and treatment plans  - Assess nutrition and hydration status and recommend course of action  - Evaluate amount of meals eaten  - Assist patient with eating if necessary   - Allow adequate time for meals  - Recommend/ encourage appropriate diets, oral nutritional supplements, and vitamin/mineral supplements  - Order, calculate, and assess calorie counts as needed  - Recommend, monitor, and adjust tube feedings and TPN/PPN based on assessed needs  - Assess need for intravenous fluids  - Provide specific nutrition/hydration education as appropriate  - Include patient/family/caregiver in decisions related to nutrition  Outcome: Progressing

## 2021-11-03 NOTE — PROGRESS NOTES
Progress Note - Orthopedics   Chacha Ridley 61 y o  male MRN: [id number]  Unit/Bed#: MS 209Lina      Subjective:    61 y o male POD#9 status post ORIF right proximal tibia shaft fracture, removal of external fixator  Overall the patient is dong well post-operatively  He denies any overnight events  No complications  The patient states that he has been working with PT as instructed  He denies any numbness or tingling in the lower extremity  He denies any chest pain, shortness of breath, dizziness, fever, or chills  This morning the patient did complain of increased generalized weakness compared to yesterday        Labs:  0   Lab Value Date/Time    HCT 25 6 (L) 11/03/2021 0534    HCT 24 2 (L) 11/02/2021 0800    HCT 25 5 (L) 11/01/2021 0734    HCT 38 4 07/13/2015 1448    HCT 38 6 10/20/2014 1132    HGB 7 9 (L) 11/03/2021 0534    HGB 7 4 (L) 11/02/2021 0800    HGB 7 9 (L) 11/01/2021 0734    HGB 13 1 07/13/2015 1448    HGB 13 1 10/20/2014 1132    INR 2 72 (H) 11/03/2021 0534    INR 2 94 (H) 12/11/2015 1543    WBC 6 09 11/03/2021 0534    WBC 6 27 11/02/2021 0800    WBC 6 20 11/01/2021 0734    WBC 7 7 07/13/2015 1448    WBC 8 30 10/20/2014 1132    ESR 58 (H) 05/20/2016 1207       Meds:    Current Facility-Administered Medications:     acetaminophen (TYLENOL) tablet 650 mg, 650 mg, Oral, Q6H PRN, Shaunna Amcolton PA-C, 650 mg at 11/02/21 1032    calcium carbonate (TUMS) chewable tablet 500 mg, 500 mg, Oral, Daily PRN, Shaunna Amcolton, PA-C, 500 mg at 11/03/21 1041    cephalexin (KEFLEX) capsule 500 mg, 500 mg, Oral, Q6H [address], José Miguel Parrish MD, 500 mg at 11/03/21 1836    Diclofenac Sodium (VOLTAREN) 1 % topical gel 2 g, 2 g, Topical, 4x Daily, Tracy Chavira PA-C, 2 g at 11/03/21 1829    HYDROmorphone (DILAUDID) injection 0 5 mg, 0 5 mg, Intravenous, Q3H PRN, Shaunna Gorman PA-C, 0 5 mg at 10/25/21 1953    insulin glargine (LANTUS) subcutaneous injection 60 Units 0 6 mL, 60 Units, Subcutaneous, Daily, José Miguel Parrish, MD, 30 Units at 11/03/21 1047    insulin lispro (HumaLOG) 100 units/mL subcutaneous injection 18 Units, 18 Units, Subcutaneous, TID With Meals, Romelia Rogers MD, 18 Units at 11/02/21 1655    insulin lispro (HumaLOG) 100 units/mL subcutaneous injection 2-12 Units, 2-12 Units, Subcutaneous, TID AC, 2 Units at 11/03/21 1350 **AND** Fingerstick Glucose (POCT), , , TID AC, Chanell Chavira PA-C    lidocaine (LIDODERM) 5 % patch 1 patch, 1 patch, Topical, Daily, Cornelia Valdez PA-C, 1 patch at 11/01/21 0941    lisinopril (ZESTRIL) tablet 10 mg, 10 mg, Oral, Daily, José Miguel Parrish MD, 10 mg at 11/03/21 1042    ondansetron (ZOFRAN) injection 4 mg, 4 mg, Intravenous, Q6H PRN, Cornelia Valdez PA-C, 4 mg at 10/13/21 2249    oxyCODONE (OxyCONTIN) 12 hr tablet 10 mg, 10 mg, Oral, Q12H [address], Cornelia Valdez PA-C, 10 mg at 11/03/21 1041    polyethylene glycol (MIRALAX) packet 17 g, 17 g, Oral, Daily PRN, Cornelia Valdez PA-C, 17 g at 10/21/21 1620    pravastatin (PRAVACHOL) tablet 40 mg, 40 mg, Oral, Daily With Kelli De La Rosa PA-C, 40 mg at 11/03/21 1839    senna (SENOKOT) tablet 17 2 mg, 2 tablet, Oral, BID, Cornelia Valdez PA-C, 17 2 mg at 11/03/21 1837    warfarin (COUMADIN) tablet 6 mg, 6 mg, Oral, Daily (warfarin), Romelia Rogers MD, 6 mg at 11/03/21 1836    Blood Culture:   No results found for: BLOODCX    Wound Culture:   No results found for: WOUNDCULT    Ins and Outs:  I/O last 24 hours: In: 1140 [P O :1140]  Out: 975 [Urine:975]      Physical:  Vitals:    11/03/21 1548   BP: 139/69   Pulse: 71   Resp: 18   Temp: 98 6 °F (37 °C)   SpO2: 95%     Musculoskeletal: right Lower Extremity  · Patient resting comfrotably in hospital bed in no acute distress  Immobilizer on correctly  · Extremity appears well perfused overall, no signs of erythema, ecchymosis, swelling, drainage, or lesions  · Dressings were removed today for examination and dressing change   Lateral and medial incisions are healing well, sutures intact, without drainage, ecchymosis, erythema, or signs of dehiscence  · TTP : anterior knee   · SILT s/s/sp/dp/t    · +fhl/ehl, +ankle dorsi/plantar flexion  · 2+ DP pulse       Assessment:    66 y o male POD#9 status post ORIF right proximal tibia shaft fracture, removal of external fixator  Dressings were changed today, which the patient tolerated well  Plan:  · Non-weight bearing RLE  · Maintain dressings, keep clean, dry, and intact  · Maintain immobilizer at all times  · HGB 7 9 this AM, stable  Greater than 2 gram drop which qualifies for diagnosis of acute blood loss anemia, will monitor and administer IVF/prbc as indicated   · PT/OT for gait training  · Ice, elevation of RLE for pain/swelling control  · Pain control per primary team  · DVT ppx with coumadin  · Dispo: Ortho will continue to follow throughout the patient's hospital stay  He will follow up with Dr Antony Cortes in office once discharged  ?  Discharge plan to [facility], case management on board      Cornelia Valdez PA-C

## 2021-11-03 NOTE — WOUND OSTOMY CARE
Progress Note - Wound   Kavya Marcelo 61 y o  male MRN: [id number]  Unit/Bed#: -01 Encounter: [id number]      Assessment:   Patient is alert and oriented  This RN went to patient room at 1300 to perform wound assessment, patient was eating lunch and asked if I could come back once he was finished with lunch  This RN went back to patient room at 1430 to perform wound assessment and patient stated "I do not want anything else done to me, I am being discharged to [facility] and they can deal with it there " I stated to patient that it was important to assess the wounds prior to discharge to make sure wounds are improving and to ensure that proper wound care recommendations are placed  Patient continued to refuse wound care and wound assessment  Per nursing patient continues to refuse turning and repositioning and wound care; I went over importance of turning and repositioning in order to aide in wound healing and prevention of new wounds, and how proper wound care is to be performed to aide in wound healing and prevention of infection  Patient states he understands and continues to refuse treatment at this time  Please continue with current wound care recommendations  Please picture wounds if patient allows wound care  Skin care Plan:   1-Cleanse sacro-buttocks with soap and water  Apply Maxorb Alginate over left buttock wound and cover with Allevyn foam dressing  [place]son with T for treatment  Change every other day and PRN  Peel back and check skin daily  2-Turn/reposition q2h or when medically stable for pressure re-distribution on skin   3-Elevate heels to offload pressure   4-Moisturize skin daily with skin nourishing cream   5-Ehob cushion in chair when out of bed  6-Hydraguard to B/l buttocks below allevyn foam dressing, and heels BID and PRN  7-Right buttock- Cleanse wound with NSS, pat dry  Apply Allevyn foam dressing over wound  Change every 3 days and PRN  Peel back and check skin daily     8-Wound care will follow weekly, tiger text with questions or concerns        Call or tigertext with any questions  Wound Care will continue to follow    Mattie Arciniega RN BSN  Wound care

## 2021-11-03 NOTE — PLAN OF CARE
Problem: Potential for Falls  Goal: Patient will remain free of falls  Description: INTERVENTIONS:  - Educate patient/family on patient safety including physical limitations  - Instruct patient to call for assistance with activity   - Consult OT/PT to assist with strengthening/mobility   - Keep Call bell within reach  - Keep bed low and locked with side rails adjusted as appropriate  - Keep care items and personal belongings within reach  - Initiate and maintain comfort rounds  - Make Fall Risk Sign visible to staff  - Offer Toileting every  Hours, in advance of need  - Initiate/Maintain alarm  - Obtain necessary fall risk management equipment:   - Apply yellow socks and bracelet for high fall risk patients  - Consider moving patient to room near nurses station  Outcome: Progressing     Problem: Prexisting or High Potential for Compromised Skin Integrity  Goal: Skin integrity is maintained or improved  Description: INTERVENTIONS:  - Identify patients at risk for skin breakdown  - Assess and monitor skin integrity  - Assess and monitor nutrition and hydration status  - Monitor labs   - Assess for incontinence   - Turn and reposition patient  - Assist with mobility/ambulation  - Relieve pressure over bony prominences  - Avoid friction and shearing  - Provide appropriate hygiene as needed including keeping skin clean and dry  - Evaluate need for skin moisturizer/barrier cream  - Collaborate with interdisciplinary team   - Patient/family teaching  - Consider wound care consult   Outcome: Progressing     Problem: PAIN - ADULT  Goal: Verbalizes/displays adequate comfort level or baseline comfort level  Description: Interventions:  - Encourage patient to monitor pain and request assistance  - Assess pain using appropriate pain scale  - Administer analgesics based on type and severity of pain and evaluate response  - Implement non-pharmacological measures as appropriate and evaluate response  - Consider cultural and social influences on pain and pain management  - Notify physician/advanced practitioner if interventions unsuccessful or patient reports new pain  Outcome: Progressing     Problem: INFECTION - ADULT  Goal: Absence or prevention of progression during hospitalization  Description: INTERVENTIONS:  - Assess and monitor for signs and symptoms of infection  - Monitor lab/diagnostic results  - Monitor all insertion sites, i e  indwelling lines, tubes, and drains  - Monitor endotracheal if appropriate and nasal secretions for changes in amount and color  - [place] appropriate cooling/warming therapies per order  - Administer medications as ordered  - Instruct and encourage patient and family to use good hand hygiene technique  - Identify and instruct in appropriate isolation precautions for identified infection/condition  Outcome: Progressing  Goal: Absence of fever/infection during neutropenic period  Description: INTERVENTIONS:  - Monitor WBC    Outcome: Progressing     Problem: SAFETY ADULT  Goal: Patient will remain free of falls  Description: INTERVENTIONS:  - Educate patient/family on patient safety including physical limitations  - Instruct patient to call for assistance with activity   - Consult OT/PT to assist with strengthening/mobility   - Keep Call bell within reach  - Keep bed low and locked with side rails adjusted as appropriate  - Keep care items and personal belongings within reach  - Initiate and maintain comfort rounds  - Make Fall Risk Sign visible to staff  - Offer Toileting every  Hours, in advance of need  - Initiate/Maintain alarm  - Obtain necessary fall risk management equipment:   - Apply yellow socks and bracelet for high fall risk patients  - Consider moving patient to room near nurses station  Outcome: Progressing  Goal: Maintain or return to baseline ADL function  Description: INTERVENTIONS:  -  Assess patient's ability to carry out ADLs; assess patient's baseline for ADL function and identify physical deficits which impact ability to perform ADLs (bathing, care of mouth/teeth, toileting, grooming, dressing, etc )  - Assess/evaluate cause of self-care deficits   - Assess range of motion  - Assess patient's mobility; develop plan if impaired  - Assess patient's need for assistive devices and provide as appropriate  - Encourage maximum independence but intervene and supervise when necessary  - Involve family in performance of ADLs  - Assess for home care needs following discharge   - Consider OT consult to assist with ADL evaluation and planning for discharge  - Provide patient education as appropriate  Outcome: Progressing  Goal: Maintains/Returns to pre admission functional level  Description: INTERVENTIONS:  - Perform BMAT or MOVE assessment daily    - Set and communicate daily mobility goal to care team and patient/family/caregiver  - Collaborate with rehabilitation services on mobility goals if consulted  - Perform Range of Motion  times a day  - Reposition patient every  hours    - Dangle patient  times a day  - Stand patient  times a day  - Ambulate patient  times a day  - Out of bed to chair  times a day   - Out of bed for meals  times a day  - Out of bed for toileting  - Record patient progress and toleration of activity level   Outcome: Progressing     Problem: DISCHARGE PLANNING  Goal: Discharge to home or other facility with appropriate resources  Description: INTERVENTIONS:  - Identify barriers to discharge w/patient and caregiver  - Arrange for needed discharge resources and transportation as appropriate  - Identify discharge learning needs (meds, wound care, etc )  - Arrange for interpretive services to assist at discharge as needed  - Refer to Case Management Department for coordinating discharge planning if the patient needs post-hospital services based on physician/advanced practitioner order or complex needs related to functional status, cognitive ability, or social support system  Outcome: Progressing     Problem: Knowledge Deficit  Goal: Patient/family/caregiver demonstrates understanding of disease process, treatment plan, medications, and discharge instructions  Description: Complete learning assessment and assess knowledge base  Interventions:  - Provide teaching at level of understanding  - Provide teaching via preferred learning methods  Outcome: Progressing     Problem: MOBILITY - ADULT  Goal: Maintain or return to baseline ADL function  Description: INTERVENTIONS:  -  Assess patient's ability to carry out ADLs; assess patient's baseline for ADL function and identify physical deficits which impact ability to perform ADLs (bathing, care of mouth/teeth, toileting, grooming, dressing, etc )  - Assess/evaluate cause of self-care deficits   - Assess range of motion  - Assess patient's mobility; develop plan if impaired  - Assess patient's need for assistive devices and provide as appropriate  - Encourage maximum independence but intervene and supervise when necessary  - Involve family in performance of ADLs  - Assess for home care needs following discharge   - Consider OT consult to assist with ADL evaluation and planning for discharge  - Provide patient education as appropriate  Outcome: Progressing  Goal: Maintains/Returns to pre admission functional level  Description: INTERVENTIONS:  - Perform BMAT or MOVE assessment daily    - Set and communicate daily mobility goal to care team and patient/family/caregiver  - Collaborate with rehabilitation services on mobility goals if consulted  - Perform Range of Motion  times a day  - Reposition patient every  hours    - Dangle patient  times a day  - Stand patient  times a day  - Ambulate patient  times a day  - Out of bed to chair  times a day   - Out of bed for meals  times a day  - Out of bed for toileting  - Record patient progress and toleration of activity level   Outcome: Progressing     Problem: Nutrition/Hydration-ADULT  Goal: Nutrient/Hydration intake appropriate for improving, restoring or maintaining nutritional needs  Description: Monitor and assess patient's nutrition/hydration status for malnutrition  Collaborate with interdisciplinary team and initiate plan and interventions as ordered  Monitor patient's weight and dietary intake as ordered or per policy  Utilize nutrition screening tool and intervene as necessary  Determine patient's food preferences and provide high-protein, high-caloric foods as appropriate       INTERVENTIONS:  - Monitor oral intake, urinary output, labs, and treatment plans  - Assess nutrition and hydration status and recommend course of action  - Evaluate amount of meals eaten  - Assist patient with eating if necessary   - Allow adequate time for meals  - Recommend/ encourage appropriate diets, oral nutritional supplements, and vitamin/mineral supplements  - Order, calculate, and assess calorie counts as needed  - Recommend, monitor, and adjust tube feedings and TPN/PPN based on assessed needs  - Assess need for intravenous fluids  - Provide specific nutrition/hydration education as appropriate  - Include patient/family/caregiver in decisions related to nutrition  Outcome: Progressing

## 2021-11-03 NOTE — CASE MANAGEMENT
Case Management Discharge Planning Note    Patient name Berta Brito  Location [address] [id number]/-[id number] MRN [id number]  : 1955 Date 11/3/2021       Current Admission Date: 10/13/2021  Current Admission Diagnosis:Closed fracture of proximal end of right tibia   Patient Active Problem List    Diagnosis Date Noted    Right forearm superficial thrombophlebitis 2021    Anemia 10/26/2021    Kyphosis of cervicothoracic region 10/25/2021    Delayed emergence from anesthesia 10/18/2021    Difficult intubation 10/18/2021    Pseudogout of knee, left 10/17/2021    Pain and swelling of left knee 10/16/2021    History of DVT (deep vein thrombosis) 10/13/2021    Closed fracture of proximal end of right tibia 10/13/2021    Type 2 diabetes mellitus with hyperglycemia ([facility] [address] ) 10/05/2021    Class 2 obesity in adult 2021    Type 2 diabetes mellitus with diabetic peripheral angiopathy without gangrene ([facility] [address] ) 10/08/2019    Other insomnia 2017    Type 2 diabetes mellitus with diabetic neuropathy, without long-term current use of insulin ([facility] [address] ) 2017    Lumbar degenerative disc disease 10/21/2014    Acute embolism and thrombosis of unspecified deep veins of unspecified lower extremity ([facility] [address] ) 10/20/2014    Essential hypertension 10/20/2014    Mixed hyperlipidemia 10/20/2014    Peripheral vascular disease ([facility] [address] ) 10/20/2014      LOS (days): 21  Geometric Mean LOS (GMLOS) (days):   Days to GMLOS:     OBJECTIVE:  Risk of Unplanned Readmission Score: 21   Bundled Patient Payment:  (no)     Current admission status: Inpatient   Preferred Pharmacy:   [facility], [address] [address]  Phone: [phone] Fax: [address] [address]  Phone: [phone] Fax: [phone]  #[id number] - [facility], [address]  [address] [facility] [place] [address]  Phone: [phone] Fax: [phone]    Primary Care Provider: Bella Kennedy DO    Primary Insurance: BLUE CROSS  Secondary Insurance: [facility] [employer] REP    DISCHARGE DETAILS:    Discharge planning discussed with[de-identified] wife trudy and patient  Freedom of Choice: Yes  Comments - Freedom of Choice: patient and wife-rebecca agreeable to [facility] nursing and rehab  CM contacted family/caregiver?: Yes  Were Treatment Team discharge recommendations reviewed with patient/caregiver?: Yes  Did patient/caregiver verbalize understanding of patient care needs?: Yes  Were patient/caregiver advised of the risks associated with not following Treatment Team discharge recommendations?: Yes    Contacts  Patient Contacts: wife-Rebecca  Relationship to Patient[de-identified] Family  Contact Method: Phone  Phone Number: see facesheet  Reason/Outcome: Continuity of Care, Emergency Contact, Discharge [address]         Is the patient interested in [address] at discharge?: No    DME Referral Provided  Referral made for DME?: No    Other Referral/Resources/Interventions Provided:  Interventions: Short Term Rehab  Referral Comments: [facility] accepted and Irlanda Nayak is obtained by the cm discharge support team     Would you like to participate in our [facility] service program?  : No - Declined    Treatment Team Recommendation: Short Term Rehab  Discharge Destination Plan[de-identified] Short Term Rehab ([facility])  Transport at Discharge : BLS Ambulance  Dispatcher Contacted: Yes  Number/Name of Dispatcher: allscripts referral  Transported by [employer] and Unit #): [address] 1830  ETA of Transport (Date): 11/03/21  ETA of Transport (Time): Kip Name, [address] : [facility]  Receiving Facility/Agency Phone Number: [phone]  Facility/Agency Fax Number: [phone]

## 2021-11-03 NOTE — DISCHARGE SUMMARY
[address] [facility]  Discharge- Kirk Braga 1955, 61 y o  male MRN: [id number]  Unit/Bed#: -01 Encounter: [id number]  Primary Care Provider: Ruthann Mueller DO   Date and time admitted to hospital: 10/13/2021  5:17 AM    * Closed fracture of proximal end of right tibia  Assessment & Plan  Sustained mechanical fracture to the proximal end of the right tibia  Initially on external fixators   Status post ORIF and removal of external fixators  - POD#9  Intact distal pulsation and neurological function - able to wiggle toes  Continue Coumadin  Will need post acute rehab services  Plan to discharged to [place]    Type 2 diabetes mellitus with diabetic neuropathy, without long-term current use of insulin [facility])  Assessment & Plan  Lab Results   Component Value Date    HGBA1C 8 2 (H) 10/16/2021       Recent Labs     10/31/21  1539 10/31/21  2110 11/01/21  0739 11/01/21  1105   POCGLU 217* 236* 161* 242*       Blood Sugar Average: Last 72 hrs:  (P) 271 [id number]  Known diabetic with diabetic neuropathy  On metformin and lantus at home  Blood sugar on presentation 418  Contiunue sliding scale insulin  Hypoglycemic protocol  Goal blood sugar not at goal of 140-180  · Continue Diabetic diet  · Noted to not have eaten this morning-  Give 30u Lantus toda  · Insulin Humulog 18 unit with meals  · Continue mealtime SSI coverage while in hospital   Hold off bedtime sliding scale coverage  · Continue before meals and bedtime glucose checks  Right forearm superficial thrombophlebitis  Assessment & Plan  Right forearm mass noted in the marleen-elbow region concerning for soft tissue infection  Denies pre-existing trauma however recollect possible IV line in the area  Ultrasound soft tissue showed superficial thrombophlebitis  Patient currently anticoagulated on Coumadin  Advised to continue same    Due to concern for possible soft tissue infection, he was initiated on Keflex 500 mg q 6 - advised to complete for a total duration of 5 days - 11/06/2021        Anemia  Assessment & Plan  Hemoglobin holding steady at 8  Hemoglobin dropped dramatically since admission from 12 units to 7 5 and has been currently stable for few days  Most likely secondary blood loss during  surgery  Concerning for acute blood loss anemia  Patient has persistently refused blood transfusion     - will give IV Venofer 200 mg daily for 2 days  - discussed need for PRBC transfusion with patient,he appears not willing to accept transfusion  Discussed risk and benefit of said therapy and alternatives  Decline to sign consent form  Will touch base with spouse as he requests  -- no consent in the chart  -- will check current panel and replete iron if indicated     Will monitor closely  Difficult intubation  Assessment & Plan  Had difficult intubation  Due to severe kyphosis during ortho procedure on 10/18  Will keep in mind because he is going to have a definitive surgery again next week    Pseudogout of knee, left  Assessment & Plan  Pain and swelling in the left anterior knee, superiorly  Tender to touch  Developed after PT  Xray -unremarkable  Synovial fluid analysis shows calcium pyrophosphate crystals  Started on colchicine 0 6mg daily  History of DVT (deep vein thrombosis)  Assessment & Plan  History 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  Unprovoked  Last episode was 10yrs ago  On AC with coumadin  INR at goal    -- continue Coumadin 6 mg daily  --Daily PT INR    Mixed hyperlipidemia  Assessment & Plan  Continue statins  Essential hypertension  Assessment & Plan  On lisinopril 10mg daily at home  Initially on hold due to ROSE  Present creatinine could be new baseline as he had remained stable at 1 2 - 1 4  Resume home lisinopril  Blood pressure 143/66, pulse 77, temperature 98 2 °F (36 8 °C), resp  rate 18, height 6' 1" (1 854 m), weight 136 kg (299 lb 6 2 oz), SpO2 92 %          ROSE (acute kidney injury) (HCC)-resolved as of 11/3/2021  Assessment & Plan  Resolved  Now at baseline  ROSE on CKD  May be prerenal   Cr trended since admission 1 99->1 4  Baseline 1 21 months ago  Avoid nephrotoxics  Avoid hypotension  Monitor I/O  Results from last 7 days   Lab Units 11/02/21  0800 11/01/21  0734 10/31/21  1253 10/29/21  0444 10/28/21  0512 10/27/21  0308   SODIUM mmol/L 145 143 142 142 142 139   POTASSIUM mmol/L 4 0 3 7 3 9 4 2 4 0 4 5   CHLORIDE mmol/L 110* 108 106 108 107 105   CO2 mmol/L 28 26 28 28 27 29   ANION GAP mmol/L 7 9 8 6 8 5   BUN mg/dL 12 11 13 16 14 21   CREATININE mg/dL 1 35* 1 34* 1 30 1 40* 1 32* 1 53*   CALCIUM mg/dL 8 0* 8 0* 8 3 8 0* 8 0* 7 7*   EGFR ml/min/1 73sq m 55 56 58 53 57 47   GLUCOSE RANDOM mg/dL 124 143* 204* 174* 199* 360*         Discharging Resident Physician: Mamie Jewell MD  Attending: Vita Scales MD  PCP: Mariana Tanner DO  Admission Date: 10/13/2021  Admission Date:   Admission Orders (From admission, onward)     Ordered        10/13/21 1035  Inpatient Admission  Once                   Discharge Date: 11/03/21    Disposition:     Home    Reason for Admission:  Right tibia fracture    Consultations During Hospital Stay:  Ortho  PT  Occupational therapy    Procedures Performed:     No orders of the defined types were placed in this encounter        Diagnosis:    Medical Problems     Resolved Problems  Date Reviewed: 11/3/2021        Resolved    ROSE (acute kidney injury) ([facility] [address] ) 11/3/2021     Resolved by  Mamie Jewell MD    Hyperkalemia 10/15/2021     Resolved by  Alcon Henao MD    Elevated INR 10/26/2021     Resolved by  Mamie Jewell MD                Principal Problem:    Closed fracture of proximal end of right tibia  Active Problems:    Type 2 diabetes mellitus with diabetic neuropathy, without long-term current use of insulin (HCC)    Essential hypertension    Mixed hyperlipidemia    History of DVT (deep vein thrombosis)    Pseudogout of knee, left    Delayed emergence from anesthesia    Difficult intubation    Kyphosis of cervicothoracic region    Anemia    Right forearm superficial thrombophlebitis      Significant Findings / Test Results:     US extremity soft tissue   Final Result by Catarino Poole MD (11/02 1553)      Superficial thrombophlebitis with the occlusion 3 6 cm long segment of the cephalic vein in the forearm, in the region of the palpable abnormality      Cephalic vein proximal and distal to this occluded segment is patent      No fluid collection seen      The study was marked in EPIC for immediate notification  Workstation performed: [id number]         VAS lower limb venous duplex study, unilateral/limited   Final Result by Gustavo Joseph MD (10/26 1859)      XR tibia fibula 2 vw right   Final Result by Bruna Littlejohn MD (10/27 4047)      Fluoroscopic guidance provided during orthopedic procedure  Please refer to the separate procedure notes for additional details  Workstation performed: [id number]         XR tibia fibula 2 vw right   Final Result by Bonita Sabillon MD (10/19 1010)      Fluoroscopic guidance provided for procedure guidance  Please refer to the separate procedure notes for additional details  Workstation performed: [id number]         XR knee 1 or 2 vw left   Final Result by Nelida Robledo MD (10/18 1006)      Left knee joint effusion without an acute osseous abnormality  Workstation performed: [id number]         CT lower extremity wo contrast right   Final Result by Katherine Moura MD (10/13 0710)      Acute comminuted displaced intra-articular fracture of the proximal tibia as described above  No dislocation  This study demonstrates a significant  finding and was documented as such in [facility] for liaison and referring practitioner notification            Workstation performed: [id number]         XR tibia fibula 2 views RIGHT   Final Result by Bhargavi Cerna Mary Rader MD (10/13 1232)      Comminuted fracture of the right proximal tibia described in further detail on right knee x-ray report  No additional fractures further distally            Workstation performed: [id number]         XR knee 1 or 2 vw right   Final Result by Madeline Miranda MD (10/13 1238)      Comminuted intra-articular right knee fracture  Findings concur with the referring clinician's preliminary interpretation already in the patient's electronic health record  Workstation performed: [id number]         XR ankle 3+ views RIGHT   Final Result by Madeline Miranda MD (10/13 1237)      No acute osseous abnormality  Workstation performed: [id number]             Incidental Findings:   · None    Test Results Pending at Discharge (will require follow up): · None     Outpatient Tests Requested:  · None    Complications:  None    HPI  Patient is a 68-year-old male with a past medical history type 2 diabetes complicated by diabetic neuropathy, hypertension, hyperlipidemia, CKD, history of DVT x2 on warfarin who [facility] on 10/13 following fall from a standing height      Patient was in his kitchen and fell after twisting his ankle on turning  while he was carrying food for his son   He landed on outstretched hand and right knee  This was associated with severe right knee pain, deformity, right upper leg swelling and pain,inability to bear weight on the right LE, abrasion in the right knee/ upper R leg  He reports some difficulty with his balance which has been ongoing  Denied any recent falls, dizziness or lightheadedness, weight loss, anorexia, fever, chills, leg swellings, chest pain shortness of breath, palpitation preceding fall      At the ED, vitals showed heart rate 96 bpm, respiratory rate 20 breaths per minute, blood pressure 158/74mmHg, oxygen saturation 100% on room air      Labs showed leukocytosis 13 53 with 80% neutrophilia, electrolytes potassium 5 3, BUN 32, cr 1 99  Glc 416  Agap 14  Imaging findings confirmed Acute comminuted displaced intra-articular fracture of the proximal tibia as described above  No dislocation  Small lipohemarthrosis  Moderate distal femoral and proximal tibiofibular subcutaneous edema/contusion  Diffuse vascular calcification  Meniscal chondrocalcinosis  During my evaluation, Knee immobilizer/splint is present  Patient is complaining of pain at the fracture site  Denied numbness or tingling, he is able to move distal extremities on pulsation intact  Patient will be admitted for further evaluation  Orthopedic saw the patient in the ED      He denied any recent falls  No alcohol or recreational drug use  Nonsmoker    Hospital Course:     Yuan Che is a 61 y o  male patient who originally presented to the hospital on 10/13/2021 due to acute comminuted displaced intra-articular fracture of the proximal tibia  He had external fixators placed  Anticoagulation was transition from Coumadin to heparin  Subsequently had fall reduction internal fixation done on 10/25/2021 removal of external fixators  Transitioned off heparin drip following therapeutic INR  Participated on multiple physical therapy sessions and was recommended patient be discharged to short-term rehab  He was noted to have right forearm thrombophlebitis  Advised to continue Coumadin as recommended  Low suspicions for infection however provided antimicrobial coverage with Keflex 500 mg q6 for 5 days  Patient is advised to remain nonweightbearing on right lower extremity  Advised to participate actively in physical therapy and follow-up with Orthopedics as recommended  The time of discharge, patient appears hemodynamically and clinically stable  Condition at Discharge: stable     Discharge Day Visit / Exam:     Subjective:  Patient seen and examined at bedside  He denies any physical or psychiatric complaints    He denies chest pain, shortness of breath or leg swelling  He is able to wiggle his right toes with intact distal pulsation and sensation  Vitals: Blood Pressure: 139/69 (11/03/21 1548)  Pulse: 71 (11/03/21 1548)  Temperature: 98 6 °F (37 °C) (11/03/21 1548)  Temp Source: Oral (11/01/21 2114)  Respirations: 18 (11/03/21 1548)  Height: 6' 1" (185 4 cm) (10/12/21 2353)  Weight - Scale: 134 kg (295 lb 6 7 oz) (11/03/21 0600)  SpO2: 95 % (11/03/21 1548)  Exam:   Physical Exam  Vitals reviewed  HENT:      Head: Normocephalic and atraumatic  Mouth/Throat:      Mouth: Mucous membranes are moist    Eyes:      Pupils: Pupils are equal, round, and reactive to light  Cardiovascular:      Rate and Rhythm: Normal rate and regular rhythm  Pulses: Normal pulses  Heart sounds: Normal heart sounds  Pulmonary:      Effort: Pulmonary effort is normal  No respiratory distress  Breath sounds: Normal breath sounds  No stridor  Abdominal:      General: Bowel sounds are normal  There is no distension  Palpations: Abdomen is soft  There is no mass  Tenderness: There is no abdominal tenderness  Musculoskeletal:         General: No swelling or tenderness  Normal range of motion  Cervical back: Normal range of motion and neck supple  Skin:     General: Skin is warm  Neurological:      Mental Status: He is alert and oriented to person, place, and time  Psychiatric:         Mood and Affect: Mood normal          Discussion with Family:  Talked to spouse  All questions/concerns were answered  She agrees with the plan  Discharge instructions/Information to patient and family:   See after visit summary for information provided to patient and family  Provisions for Follow-Up Care:  See after visit summary for information related to follow-up care and any pertinent home health orders        Planned Readmission:  No     Discharge Medications:  See after visit summary for reconciled discharge medications provided to patient and family  Discharge Statement :  I spent 25 minutes discharging the patient  This time was spent on the day of discharge  I had direct contact with the patient on the day of discharge  Additional documentation is required if more than 30 minutes were spent on discharge           ** Please Note: This note has been constructed using a voice recognition system **

## 2021-11-04 ENCOUNTER — TELEPHONE (OUTPATIENT)
Dept: OBGYN CLINIC | Facility: HOSPITAL | Age: 66
End: 2021-11-04

## 2021-11-04 ENCOUNTER — NURSING HOME VISIT (OUTPATIENT)
Dept: GERIATRICS | Facility: OTHER | Age: 66
End: 2021-11-04
Payer: COMMERCIAL

## 2021-11-04 VITALS
SYSTOLIC BLOOD PRESSURE: 146 MMHG | OXYGEN SATURATION: 95 % | WEIGHT: 294.9 LBS | TEMPERATURE: 98.2 F | RESPIRATION RATE: 18 BRPM | DIASTOLIC BLOOD PRESSURE: 72 MMHG | HEART RATE: 73 BPM | BODY MASS INDEX: 38.91 KG/M2

## 2021-11-04 DIAGNOSIS — S82.191D OTHER CLOSED FRACTURE OF PROXIMAL END OF RIGHT TIBIA WITH ROUTINE HEALING, SUBSEQUENT ENCOUNTER: Primary | ICD-10-CM

## 2021-11-04 PROCEDURE — 99306 1ST NF CARE HIGH MDM 50: CPT | Performed by: INTERNAL MEDICINE

## 2021-11-04 NOTE — UTILIZATION REVIEW
Notification of Discharge   This is a Notification of Discharge from our facility [address]  Please be advised that this patient has been discharge from our facility  Below you will find the admission and discharge date and time including the patients disposition  UTILIZATION REVIEW CONTACT:  Zaria Damico  Utilization   Network Utilization Review Department  Phone: [phone] x carefully listen to the prompts  All voicemails are confidential   Email: [email or web address]     PHYSICIAN ADVISORY SERVICES:  FOR GEFN-VM-QBMD REVIEW - MEDICAL NECESSITY DENIAL  Phone: [phone]  Fax: [phone]  Email: [email or web address]     PRESENTATION DATE: 10/13/2021  5:17 AM  OBERVATION ADMISSION DATE:   INPATIENT ADMISSION DATE: 10/13/21 10:35 AM   DISCHARGE DATE: 11/3/2021  7:00 PM  DISPOSITION: Non [facility]/S[facility] Non [facility]/S[facility]      IMPORTANT INFORMATION:  Send all requests for admission clinical reviews, approved or denied determinations and any other requests to dedicated fax number below belonging to the campus where the patient is receiving treatment   List of dedicated fax numbers:  [address] [place] [address] DENIALS (Administrative/Medical Necessity) [phone]   [address] (Maternity/NICU/Pediatrics) [phone]   Matt Roberson [phone]   [address] [place] [address] [phone]   [address] [place] [address] [phone]   [facility] [facility] [phone]   [facility]  [phone]   [address] [facility]  [address] [facility] [address] [facility] [phone]

## 2021-11-05 ENCOUNTER — TELEPHONE (OUTPATIENT)
Dept: OBGYN CLINIC | Facility: MEDICAL CENTER | Age: 66
End: 2021-11-05

## 2021-11-05 DIAGNOSIS — S82.109A: ICD-10-CM

## 2021-11-05 RX ORDER — OXYCODONE HYDROCHLORIDE 10 MG/1
10 TABLET, FILM COATED, EXTENDED RELEASE ORAL 2 TIMES DAILY PRN
Qty: 20 TABLET | Refills: 0 | Status: SHIPPED | OUTPATIENT
Start: 2021-11-05 | End: 2021-11-10

## 2021-11-08 ENCOUNTER — NURSING HOME VISIT (OUTPATIENT)
Dept: GERIATRICS | Facility: OTHER | Age: 66
End: 2021-11-08
Payer: COMMERCIAL

## 2021-11-08 VITALS
OXYGEN SATURATION: 95 % | DIASTOLIC BLOOD PRESSURE: 67 MMHG | HEART RATE: 76 BPM | TEMPERATURE: 98.5 F | SYSTOLIC BLOOD PRESSURE: 128 MMHG | RESPIRATION RATE: 18 BRPM

## 2021-11-08 DIAGNOSIS — E11.40 TYPE 2 DIABETES MELLITUS WITH DIABETIC NEUROPATHY, WITHOUT LONG-TERM CURRENT USE OF INSULIN (HCC): ICD-10-CM

## 2021-11-08 DIAGNOSIS — N17.9 AKI (ACUTE KIDNEY INJURY) (HCC): ICD-10-CM

## 2021-11-08 DIAGNOSIS — M11.262 PSEUDOGOUT OF KNEE, LEFT: ICD-10-CM

## 2021-11-08 DIAGNOSIS — D64.9 ANEMIA, UNSPECIFIED TYPE: ICD-10-CM

## 2021-11-08 DIAGNOSIS — Z86.718 HISTORY OF DVT (DEEP VEIN THROMBOSIS): ICD-10-CM

## 2021-11-08 DIAGNOSIS — E78.2 MIXED HYPERLIPIDEMIA: ICD-10-CM

## 2021-11-08 DIAGNOSIS — R26.2 AMBULATORY DYSFUNCTION: ICD-10-CM

## 2021-11-08 DIAGNOSIS — S82.191D OTHER CLOSED FRACTURE OF PROXIMAL END OF RIGHT TIBIA WITH ROUTINE HEALING, SUBSEQUENT ENCOUNTER: Primary | ICD-10-CM

## 2021-11-08 DIAGNOSIS — I10 ESSENTIAL HYPERTENSION: ICD-10-CM

## 2021-11-08 DIAGNOSIS — R22.31 ARM MASS, RIGHT: ICD-10-CM

## 2021-11-08 PROCEDURE — 99309 SBSQ NF CARE MODERATE MDM 30: CPT | Performed by: NURSE PRACTITIONER

## 2021-11-10 ENCOUNTER — NURSING HOME VISIT (OUTPATIENT)
Dept: GERIATRICS | Facility: OTHER | Age: 66
End: 2021-11-10
Payer: COMMERCIAL

## 2021-11-10 VITALS
DIASTOLIC BLOOD PRESSURE: 68 MMHG | SYSTOLIC BLOOD PRESSURE: 121 MMHG | RESPIRATION RATE: 18 BRPM | TEMPERATURE: 98.3 F | OXYGEN SATURATION: 98 % | HEART RATE: 86 BPM

## 2021-11-10 DIAGNOSIS — I10 ESSENTIAL HYPERTENSION: ICD-10-CM

## 2021-11-10 DIAGNOSIS — M11.262 PSEUDOGOUT OF KNEE, LEFT: ICD-10-CM

## 2021-11-10 DIAGNOSIS — E11.40 TYPE 2 DIABETES MELLITUS WITH DIABETIC NEUROPATHY, WITHOUT LONG-TERM CURRENT USE OF INSULIN (HCC): ICD-10-CM

## 2021-11-10 DIAGNOSIS — S82.191D OTHER CLOSED FRACTURE OF PROXIMAL END OF RIGHT TIBIA WITH ROUTINE HEALING, SUBSEQUENT ENCOUNTER: Primary | ICD-10-CM

## 2021-11-10 DIAGNOSIS — R26.2 AMBULATORY DYSFUNCTION: ICD-10-CM

## 2021-11-10 DIAGNOSIS — R22.31 ARM MASS, RIGHT: ICD-10-CM

## 2021-11-10 DIAGNOSIS — Z86.718 HISTORY OF DVT (DEEP VEIN THROMBOSIS): ICD-10-CM

## 2021-11-10 PROCEDURE — 99309 SBSQ NF CARE MODERATE MDM 30: CPT | Performed by: NURSE PRACTITIONER

## 2021-11-10 RX ORDER — INSULIN GLARGINE 100 [IU]/ML
10 INJECTION, SOLUTION SUBCUTANEOUS DAILY
COMMUNITY
End: 2022-04-19 | Stop reason: SDUPTHER

## 2021-11-11 ENCOUNTER — TELEPHONE (OUTPATIENT)
Dept: OBGYN CLINIC | Facility: MEDICAL CENTER | Age: 66
End: 2021-11-11

## 2021-11-14 DIAGNOSIS — S82.191D OTHER CLOSED FRACTURE OF PROXIMAL END OF RIGHT TIBIA WITH ROUTINE HEALING, SUBSEQUENT ENCOUNTER: Primary | ICD-10-CM

## 2021-11-15 ENCOUNTER — TELEPHONE (OUTPATIENT)
Dept: OBGYN CLINIC | Facility: HOSPITAL | Age: 66
End: 2021-11-15

## 2021-11-15 ENCOUNTER — NURSING HOME VISIT (OUTPATIENT)
Dept: GERIATRICS | Facility: OTHER | Age: 66
End: 2021-11-15
Payer: COMMERCIAL

## 2021-11-15 DIAGNOSIS — R26.2 AMBULATORY DYSFUNCTION: ICD-10-CM

## 2021-11-15 DIAGNOSIS — N17.9 AKI (ACUTE KIDNEY INJURY) (HCC): ICD-10-CM

## 2021-11-15 DIAGNOSIS — R22.31 ARM MASS, RIGHT: ICD-10-CM

## 2021-11-15 DIAGNOSIS — Z86.718 HISTORY OF DVT (DEEP VEIN THROMBOSIS): ICD-10-CM

## 2021-11-15 DIAGNOSIS — S82.191D OTHER CLOSED FRACTURE OF PROXIMAL END OF RIGHT TIBIA WITH ROUTINE HEALING, SUBSEQUENT ENCOUNTER: Primary | ICD-10-CM

## 2021-11-15 DIAGNOSIS — E11.40 TYPE 2 DIABETES MELLITUS WITH DIABETIC NEUROPATHY, WITHOUT LONG-TERM CURRENT USE OF INSULIN (HCC): ICD-10-CM

## 2021-11-15 PROCEDURE — 99309 SBSQ NF CARE MODERATE MDM 30: CPT | Performed by: NURSE PRACTITIONER

## 2021-11-16 ENCOUNTER — TELEPHONE (OUTPATIENT)
Dept: OBGYN CLINIC | Facility: HOSPITAL | Age: 66
End: 2021-11-16

## 2021-11-16 VITALS
TEMPERATURE: 97 F | OXYGEN SATURATION: 94 % | HEART RATE: 74 BPM | DIASTOLIC BLOOD PRESSURE: 66 MMHG | SYSTOLIC BLOOD PRESSURE: 130 MMHG | RESPIRATION RATE: 18 BRPM

## 2021-11-17 ENCOUNTER — NURSING HOME VISIT (OUTPATIENT)
Dept: GERIATRICS | Facility: OTHER | Age: 66
End: 2021-11-17
Payer: COMMERCIAL

## 2021-11-17 ENCOUNTER — OFFICE VISIT (OUTPATIENT)
Dept: OBGYN CLINIC | Facility: HOSPITAL | Age: 66
End: 2021-11-17

## 2021-11-17 ENCOUNTER — HOSPITAL ENCOUNTER (OUTPATIENT)
Dept: RADIOLOGY | Facility: HOSPITAL | Age: 66
Discharge: HOME | End: 2021-11-17
Attending: ORTHOPAEDIC SURGERY
Payer: COMMERCIAL

## 2021-11-17 VITALS
WEIGHT: 294 LBS | HEIGHT: 73 IN | SYSTOLIC BLOOD PRESSURE: 102 MMHG | DIASTOLIC BLOOD PRESSURE: 67 MMHG | HEART RATE: 85 BPM | BODY MASS INDEX: 38.97 KG/M2

## 2021-11-17 VITALS
RESPIRATION RATE: 18 BRPM | HEART RATE: 76 BPM | DIASTOLIC BLOOD PRESSURE: 68 MMHG | SYSTOLIC BLOOD PRESSURE: 132 MMHG | TEMPERATURE: 97 F | OXYGEN SATURATION: 94 %

## 2021-11-17 DIAGNOSIS — S82.191D OTHER CLOSED FRACTURE OF PROXIMAL END OF RIGHT TIBIA WITH ROUTINE HEALING, SUBSEQUENT ENCOUNTER: ICD-10-CM

## 2021-11-17 DIAGNOSIS — Z98.890 STATUS POST MUSCULOSKELETAL SYSTEM SURGERY: ICD-10-CM

## 2021-11-17 DIAGNOSIS — E78.2 MIXED HYPERLIPIDEMIA: ICD-10-CM

## 2021-11-17 DIAGNOSIS — I10 ESSENTIAL HYPERTENSION: ICD-10-CM

## 2021-11-17 DIAGNOSIS — E11.40 TYPE 2 DIABETES MELLITUS WITH DIABETIC NEUROPATHY, WITHOUT LONG-TERM CURRENT USE OF INSULIN (HCC): ICD-10-CM

## 2021-11-17 DIAGNOSIS — D64.9 ANEMIA, UNSPECIFIED TYPE: ICD-10-CM

## 2021-11-17 DIAGNOSIS — S82.191D OTHER CLOSED FRACTURE OF PROXIMAL END OF RIGHT TIBIA WITH ROUTINE HEALING, SUBSEQUENT ENCOUNTER: Primary | ICD-10-CM

## 2021-11-17 DIAGNOSIS — R26.2 AMBULATORY DYSFUNCTION: ICD-10-CM

## 2021-11-17 DIAGNOSIS — R22.31 ARM MASS, RIGHT: ICD-10-CM

## 2021-11-17 PROCEDURE — 73590 X-RAY EXAM OF LOWER LEG: CPT

## 2021-11-17 PROCEDURE — 99309 SBSQ NF CARE MODERATE MDM 30: CPT | Performed by: NURSE PRACTITIONER

## 2021-11-17 PROCEDURE — 99024 POSTOP FOLLOW-UP VISIT: CPT | Performed by: ORTHOPAEDIC SURGERY

## 2021-11-22 ENCOUNTER — NURSING HOME VISIT (OUTPATIENT)
Dept: GERIATRICS | Facility: OTHER | Age: 66
End: 2021-11-22
Payer: COMMERCIAL

## 2021-11-22 VITALS
HEART RATE: 77 BPM | SYSTOLIC BLOOD PRESSURE: 130 MMHG | DIASTOLIC BLOOD PRESSURE: 80 MMHG | RESPIRATION RATE: 18 BRPM | OXYGEN SATURATION: 97 % | TEMPERATURE: 97.7 F

## 2021-11-22 DIAGNOSIS — D64.9 ANEMIA, UNSPECIFIED TYPE: ICD-10-CM

## 2021-11-22 DIAGNOSIS — R26.2 AMBULATORY DYSFUNCTION: ICD-10-CM

## 2021-11-22 DIAGNOSIS — E11.40 TYPE 2 DIABETES MELLITUS WITH DIABETIC NEUROPATHY, WITHOUT LONG-TERM CURRENT USE OF INSULIN (HCC): ICD-10-CM

## 2021-11-22 DIAGNOSIS — I10 ESSENTIAL HYPERTENSION: ICD-10-CM

## 2021-11-22 DIAGNOSIS — Z86.718 HISTORY OF DVT (DEEP VEIN THROMBOSIS): ICD-10-CM

## 2021-11-22 DIAGNOSIS — S82.191D OTHER CLOSED FRACTURE OF PROXIMAL END OF RIGHT TIBIA WITH ROUTINE HEALING, SUBSEQUENT ENCOUNTER: Primary | ICD-10-CM

## 2021-11-22 PROCEDURE — 99309 SBSQ NF CARE MODERATE MDM 30: CPT | Performed by: NURSE PRACTITIONER

## 2021-11-24 ENCOUNTER — NURSING HOME VISIT (OUTPATIENT)
Dept: GERIATRICS | Facility: OTHER | Age: 66
End: 2021-11-24
Payer: COMMERCIAL

## 2021-11-24 VITALS
OXYGEN SATURATION: 97 % | HEART RATE: 74 BPM | TEMPERATURE: 97.6 F | SYSTOLIC BLOOD PRESSURE: 121 MMHG | DIASTOLIC BLOOD PRESSURE: 72 MMHG | RESPIRATION RATE: 18 BRPM

## 2021-11-24 DIAGNOSIS — R22.31 ARM MASS, RIGHT: ICD-10-CM

## 2021-11-24 DIAGNOSIS — E11.40 TYPE 2 DIABETES MELLITUS WITH DIABETIC NEUROPATHY, WITHOUT LONG-TERM CURRENT USE OF INSULIN (HCC): ICD-10-CM

## 2021-11-24 DIAGNOSIS — K59.00 CONSTIPATION, UNSPECIFIED CONSTIPATION TYPE: ICD-10-CM

## 2021-11-24 DIAGNOSIS — I10 ESSENTIAL HYPERTENSION: ICD-10-CM

## 2021-11-24 DIAGNOSIS — R26.2 AMBULATORY DYSFUNCTION: ICD-10-CM

## 2021-11-24 DIAGNOSIS — S82.191D OTHER CLOSED FRACTURE OF PROXIMAL END OF RIGHT TIBIA WITH ROUTINE HEALING, SUBSEQUENT ENCOUNTER: Primary | ICD-10-CM

## 2021-11-24 DIAGNOSIS — D64.9 ANEMIA, UNSPECIFIED TYPE: ICD-10-CM

## 2021-11-24 PROCEDURE — 99309 SBSQ NF CARE MODERATE MDM 30: CPT | Performed by: NURSE PRACTITIONER

## 2021-11-26 ENCOUNTER — NURSING HOME VISIT (OUTPATIENT)
Dept: GERIATRICS | Facility: OTHER | Age: 66
End: 2021-11-26
Payer: COMMERCIAL

## 2021-11-26 VITALS
RESPIRATION RATE: 18 BRPM | OXYGEN SATURATION: 96 % | SYSTOLIC BLOOD PRESSURE: 125 MMHG | HEART RATE: 75 BPM | DIASTOLIC BLOOD PRESSURE: 77 MMHG | TEMPERATURE: 97.1 F

## 2021-11-26 DIAGNOSIS — M79.661 PAIN AND SWELLING OF RIGHT LOWER LEG: Primary | ICD-10-CM

## 2021-11-26 DIAGNOSIS — M79.89 PAIN AND SWELLING OF RIGHT LOWER LEG: Primary | ICD-10-CM

## 2021-11-26 PROBLEM — M79.662 PAIN AND SWELLING OF LEFT LOWER LEG: Status: RESOLVED | Noted: 2021-11-26 | Resolved: 2021-11-26

## 2021-11-26 PROBLEM — M79.662 PAIN AND SWELLING OF LEFT LOWER LEG: Status: ACTIVE | Noted: 2021-11-26

## 2021-11-26 PROCEDURE — 99307 SBSQ NF CARE SF MDM 10: CPT | Performed by: NURSE PRACTITIONER

## 2021-11-29 ENCOUNTER — NURSING HOME VISIT (OUTPATIENT)
Dept: GERIATRICS | Facility: OTHER | Age: 66
End: 2021-11-29
Payer: COMMERCIAL

## 2021-11-29 VITALS
TEMPERATURE: 97.5 F | OXYGEN SATURATION: 96 % | DIASTOLIC BLOOD PRESSURE: 68 MMHG | RESPIRATION RATE: 18 BRPM | HEART RATE: 80 BPM | SYSTOLIC BLOOD PRESSURE: 124 MMHG

## 2021-11-29 DIAGNOSIS — M79.661 PAIN AND SWELLING OF RIGHT LOWER LEG: ICD-10-CM

## 2021-11-29 DIAGNOSIS — K59.00 CONSTIPATION, UNSPECIFIED CONSTIPATION TYPE: ICD-10-CM

## 2021-11-29 DIAGNOSIS — Z86.718 HISTORY OF DVT (DEEP VEIN THROMBOSIS): ICD-10-CM

## 2021-11-29 DIAGNOSIS — S82.191D OTHER CLOSED FRACTURE OF PROXIMAL END OF RIGHT TIBIA WITH ROUTINE HEALING, SUBSEQUENT ENCOUNTER: Primary | ICD-10-CM

## 2021-11-29 DIAGNOSIS — E11.40 TYPE 2 DIABETES MELLITUS WITH DIABETIC NEUROPATHY, WITHOUT LONG-TERM CURRENT USE OF INSULIN (HCC): ICD-10-CM

## 2021-11-29 DIAGNOSIS — R26.2 AMBULATORY DYSFUNCTION: ICD-10-CM

## 2021-11-29 DIAGNOSIS — M79.89 PAIN AND SWELLING OF RIGHT LOWER LEG: ICD-10-CM

## 2021-11-29 DIAGNOSIS — I10 ESSENTIAL HYPERTENSION: ICD-10-CM

## 2021-11-29 DIAGNOSIS — N17.9 AKI (ACUTE KIDNEY INJURY) (HCC): ICD-10-CM

## 2021-11-29 PROCEDURE — 99309 SBSQ NF CARE MODERATE MDM 30: CPT | Performed by: NURSE PRACTITIONER

## 2021-12-01 ENCOUNTER — TELEPHONE (OUTPATIENT)
Dept: GERIATRICS | Age: 66
End: 2021-12-01

## 2021-12-01 ENCOUNTER — NURSING HOME VISIT (OUTPATIENT)
Dept: GERIATRICS | Facility: OTHER | Age: 66
End: 2021-12-01
Payer: COMMERCIAL

## 2021-12-01 VITALS
RESPIRATION RATE: 18 BRPM | SYSTOLIC BLOOD PRESSURE: 117 MMHG | HEART RATE: 75 BPM | DIASTOLIC BLOOD PRESSURE: 70 MMHG | OXYGEN SATURATION: 96 % | TEMPERATURE: 97.4 F

## 2021-12-01 DIAGNOSIS — D64.9 ANEMIA, UNSPECIFIED TYPE: ICD-10-CM

## 2021-12-01 DIAGNOSIS — R26.2 AMBULATORY DYSFUNCTION: ICD-10-CM

## 2021-12-01 DIAGNOSIS — I10 ESSENTIAL HYPERTENSION: ICD-10-CM

## 2021-12-01 DIAGNOSIS — R22.31 ARM MASS, RIGHT: ICD-10-CM

## 2021-12-01 DIAGNOSIS — Z86.718 HISTORY OF DVT (DEEP VEIN THROMBOSIS): ICD-10-CM

## 2021-12-01 DIAGNOSIS — E11.40 TYPE 2 DIABETES MELLITUS WITH DIABETIC NEUROPATHY, WITHOUT LONG-TERM CURRENT USE OF INSULIN (HCC): Primary | ICD-10-CM

## 2021-12-01 DIAGNOSIS — I82.403 ACUTE EMBOLISM AND THROMBOSIS OF DEEP VEIN OF BOTH LOWER EXTREMITIES (HCC): ICD-10-CM

## 2021-12-01 DIAGNOSIS — M79.661 PAIN AND SWELLING OF RIGHT LOWER LEG: ICD-10-CM

## 2021-12-01 DIAGNOSIS — M79.89 PAIN AND SWELLING OF RIGHT LOWER LEG: ICD-10-CM

## 2021-12-01 DIAGNOSIS — K59.00 CONSTIPATION, UNSPECIFIED CONSTIPATION TYPE: ICD-10-CM

## 2021-12-01 DIAGNOSIS — E78.2 MIXED HYPERLIPIDEMIA: ICD-10-CM

## 2021-12-01 DIAGNOSIS — S82.191D OTHER CLOSED FRACTURE OF PROXIMAL END OF RIGHT TIBIA WITH ROUTINE HEALING, SUBSEQUENT ENCOUNTER: ICD-10-CM

## 2021-12-01 PROCEDURE — 99309 SBSQ NF CARE MODERATE MDM 30: CPT | Performed by: NURSE PRACTITIONER

## 2021-12-01 RX ORDER — POLYETHYLENE GLYCOL 3350 17 G/17G
17 POWDER, FOR SOLUTION ORAL DAILY PRN
COMMUNITY

## 2021-12-01 RX ORDER — OXYCODONE HCL 10 MG/1
10 TABLET, FILM COATED, EXTENDED RELEASE ORAL EVERY 12 HOURS SCHEDULED
COMMUNITY
End: 2022-03-16

## 2021-12-01 RX ORDER — WARFARIN SODIUM 6 MG/1
6 TABLET ORAL SEE ADMIN INSTRUCTIONS
COMMUNITY
End: 2021-12-22 | Stop reason: SDUPTHER

## 2021-12-01 RX ORDER — FERROUS SULFATE 325(65) MG
325 TABLET ORAL
COMMUNITY

## 2021-12-01 RX ORDER — SENNA AND DOCUSATE SODIUM 50; 8.6 MG/1; MG/1
1 TABLET, FILM COATED ORAL 2 TIMES DAILY
COMMUNITY

## 2021-12-01 RX ORDER — WARFARIN SODIUM 5 MG/1
5 TABLET ORAL SEE ADMIN INSTRUCTIONS
COMMUNITY
End: 2022-02-12 | Stop reason: HOSPADM

## 2021-12-06 ENCOUNTER — TELEMEDICINE (OUTPATIENT)
Dept: FAMILY MEDICINE CLINIC | Facility: CLINIC | Age: 66
End: 2021-12-06
Payer: COMMERCIAL

## 2021-12-06 DIAGNOSIS — Z79.4 TYPE 2 DIABETES MELLITUS WITH DIABETIC PERIPHERAL ANGIOPATHY WITHOUT GANGRENE, WITH LONG-TERM CURRENT USE OF INSULIN (HCC): ICD-10-CM

## 2021-12-06 DIAGNOSIS — E11.51 TYPE 2 DIABETES MELLITUS WITH DIABETIC PERIPHERAL ANGIOPATHY WITHOUT GANGRENE, WITH LONG-TERM CURRENT USE OF INSULIN (HCC): ICD-10-CM

## 2021-12-06 DIAGNOSIS — Z79.4 TYPE 2 DIABETES MELLITUS WITH HYPERGLYCEMIA, WITH LONG-TERM CURRENT USE OF INSULIN (HCC): ICD-10-CM

## 2021-12-06 DIAGNOSIS — S82.141D DISPLACED BICONDYLAR FRACTURE OF RIGHT TIBIA, SUBSEQUENT ENCOUNTER FOR CLOSED FRACTURE WITH ROUTINE HEALING: Primary | ICD-10-CM

## 2021-12-06 DIAGNOSIS — E11.65 TYPE 2 DIABETES MELLITUS WITH HYPERGLYCEMIA, WITH LONG-TERM CURRENT USE OF INSULIN (HCC): ICD-10-CM

## 2021-12-06 DIAGNOSIS — D64.9 ANEMIA, UNSPECIFIED TYPE: ICD-10-CM

## 2021-12-06 DIAGNOSIS — R22.31 ARM MASS, RIGHT: ICD-10-CM

## 2021-12-06 DIAGNOSIS — E11.40 TYPE 2 DIABETES MELLITUS WITH DIABETIC NEUROPATHY, WITHOUT LONG-TERM CURRENT USE OF INSULIN (HCC): ICD-10-CM

## 2021-12-06 PROCEDURE — 99214 OFFICE O/P EST MOD 30 MIN: CPT | Performed by: FAMILY MEDICINE

## 2021-12-06 RX ORDER — FLASH GLUCOSE SCANNING READER
EACH MISCELLANEOUS
Qty: 1 EACH | Refills: 0 | Status: SHIPPED | OUTPATIENT
Start: 2021-12-06 | End: 2021-12-07 | Stop reason: SDUPTHER

## 2021-12-06 RX ORDER — FLASH GLUCOSE SENSOR
KIT MISCELLANEOUS
Qty: 2 EACH | Refills: 5 | Status: SHIPPED | OUTPATIENT
Start: 2021-12-06 | End: 2022-02-14 | Stop reason: SDUPTHER

## 2021-12-07 ENCOUNTER — VBI (OUTPATIENT)
Dept: ADMINISTRATIVE | Facility: OTHER | Age: 66
End: 2021-12-07

## 2021-12-07 ENCOUNTER — TELEPHONE (OUTPATIENT)
Dept: FAMILY MEDICINE CLINIC | Facility: CLINIC | Age: 66
End: 2021-12-07

## 2021-12-07 DIAGNOSIS — E11.65 TYPE 2 DIABETES MELLITUS WITH HYPERGLYCEMIA, WITH LONG-TERM CURRENT USE OF INSULIN (HCC): ICD-10-CM

## 2021-12-07 DIAGNOSIS — Z79.4 TYPE 2 DIABETES MELLITUS WITH HYPERGLYCEMIA, WITH LONG-TERM CURRENT USE OF INSULIN (HCC): ICD-10-CM

## 2021-12-07 RX ORDER — FLASH GLUCOSE SCANNING READER
EACH MISCELLANEOUS
Qty: 1 EACH | Refills: 0 | Status: SHIPPED | OUTPATIENT
Start: 2021-12-07

## 2021-12-08 ENCOUNTER — TELEPHONE (OUTPATIENT)
Dept: LAB | Facility: HOSPITAL | Age: 66
End: 2021-12-08

## 2021-12-08 ENCOUNTER — TELEPHONE (OUTPATIENT)
Dept: FAMILY MEDICINE CLINIC | Facility: CLINIC | Age: 66
End: 2021-12-08

## 2021-12-10 ENCOUNTER — TELEPHONE (OUTPATIENT)
Dept: FAMILY MEDICINE CLINIC | Facility: CLINIC | Age: 66
End: 2021-12-10

## 2021-12-10 DIAGNOSIS — J32.9 RHINOSINUSITIS: Primary | ICD-10-CM

## 2021-12-10 DIAGNOSIS — J31.0 RHINOSINUSITIS: Primary | ICD-10-CM

## 2021-12-10 RX ORDER — AZITHROMYCIN 250 MG/1
TABLET, FILM COATED ORAL
Qty: 6 TABLET | Refills: 0 | Status: SHIPPED | OUTPATIENT
Start: 2021-12-10 | End: 2021-12-16

## 2021-12-13 ENCOUNTER — TELEPHONE (OUTPATIENT)
Dept: FAMILY MEDICINE CLINIC | Facility: CLINIC | Age: 66
End: 2021-12-13

## 2021-12-13 DIAGNOSIS — E11.65 TYPE 2 DIABETES MELLITUS WITH HYPERGLYCEMIA, WITH LONG-TERM CURRENT USE OF INSULIN (HCC): Primary | ICD-10-CM

## 2021-12-13 DIAGNOSIS — I10 ESSENTIAL HYPERTENSION: ICD-10-CM

## 2021-12-13 DIAGNOSIS — Z79.4 TYPE 2 DIABETES MELLITUS WITH HYPERGLYCEMIA, WITH LONG-TERM CURRENT USE OF INSULIN (HCC): Primary | ICD-10-CM

## 2021-12-14 DIAGNOSIS — N20.0 NEPHROLITHIASIS: Primary | ICD-10-CM

## 2021-12-16 ENCOUNTER — APPOINTMENT (OUTPATIENT)
Dept: LAB | Facility: HOSPITAL | Age: 66
End: 2021-12-16
Payer: COMMERCIAL

## 2021-12-16 ENCOUNTER — TELEPHONE (OUTPATIENT)
Dept: FAMILY MEDICINE CLINIC | Facility: CLINIC | Age: 66
End: 2021-12-16

## 2021-12-16 DIAGNOSIS — Z79.4 TYPE 2 DIABETES MELLITUS WITH HYPERGLYCEMIA, WITH LONG-TERM CURRENT USE OF INSULIN (HCC): ICD-10-CM

## 2021-12-16 DIAGNOSIS — E11.65 TYPE 2 DIABETES MELLITUS WITH HYPERGLYCEMIA, WITH LONG-TERM CURRENT USE OF INSULIN (HCC): ICD-10-CM

## 2021-12-16 DIAGNOSIS — R22.31 ARM MASS, RIGHT: ICD-10-CM

## 2021-12-16 DIAGNOSIS — I10 ESSENTIAL HYPERTENSION: ICD-10-CM

## 2021-12-16 LAB
ALBUMIN SERPL BCP-MCNC: 3 G/DL (ref 3.5–5)
ALP SERPL-CCNC: 102 U/L (ref 46–116)
ALT SERPL W P-5'-P-CCNC: 12 U/L (ref 12–78)
ANION GAP SERPL CALCULATED.3IONS-SCNC: 4 MMOL/L (ref 4–13)
AST SERPL W P-5'-P-CCNC: 13 U/L (ref 5–45)
BILIRUB SERPL-MCNC: 0.81 MG/DL (ref 0.2–1)
BUN SERPL-MCNC: 24 MG/DL (ref 5–25)
CALCIUM ALBUM COR SERPL-MCNC: 10.1 MG/DL (ref 8.3–10.1)
CALCIUM SERPL-MCNC: 9.3 MG/DL (ref 8.3–10.1)
CHLORIDE SERPL-SCNC: 110 MMOL/L (ref 100–108)
CO2 SERPL-SCNC: 27 MMOL/L (ref 21–32)
CREAT SERPL-MCNC: 1.3 MG/DL (ref 0.6–1.3)
ERYTHROCYTE [DISTWIDTH] IN BLOOD BY AUTOMATED COUNT: 16.7 % (ref 11.6–15.1)
GFR SERPL CREATININE-BSD FRML MDRD: 57 ML/MIN/1.73SQ M
GLUCOSE P FAST SERPL-MCNC: 150 MG/DL (ref 65–99)
HCT VFR BLD AUTO: 36.5 % (ref 36.5–49.3)
HGB BLD-MCNC: 11.8 G/DL (ref 12–17)
INR PPP: 1.59 (ref 0.84–1.19)
MCH RBC QN AUTO: 29.4 PG (ref 26.8–34.3)
MCHC RBC AUTO-ENTMCNC: 32.3 G/DL (ref 31.4–37.4)
MCV RBC AUTO: 91 FL (ref 82–98)
PLATELET # BLD AUTO: 333 THOUSANDS/UL (ref 149–390)
PMV BLD AUTO: 9.5 FL (ref 8.9–12.7)
POTASSIUM SERPL-SCNC: 4.2 MMOL/L (ref 3.5–5.3)
PROT SERPL-MCNC: 7.2 G/DL (ref 6.4–8.2)
PROTHROMBIN TIME: 18.2 SECONDS (ref 11.6–14.5)
RBC # BLD AUTO: 4.02 MILLION/UL (ref 3.88–5.62)
SODIUM SERPL-SCNC: 141 MMOL/L (ref 136–145)
WBC # BLD AUTO: 8.95 THOUSAND/UL (ref 4.31–10.16)

## 2021-12-16 PROCEDURE — 80053 COMPREHEN METABOLIC PANEL: CPT

## 2021-12-16 PROCEDURE — 36415 COLL VENOUS BLD VENIPUNCTURE: CPT

## 2021-12-16 PROCEDURE — 85610 PROTHROMBIN TIME: CPT

## 2021-12-16 PROCEDURE — 85027 COMPLETE CBC AUTOMATED: CPT

## 2021-12-20 ENCOUNTER — TELEPHONE (OUTPATIENT)
Dept: FAMILY MEDICINE CLINIC | Facility: CLINIC | Age: 66
End: 2021-12-20

## 2021-12-21 DIAGNOSIS — L21.0 PITYRIASIS: Primary | ICD-10-CM

## 2021-12-21 RX ORDER — HYDROXYZINE HYDROCHLORIDE 25 MG/1
25 TABLET, FILM COATED ORAL EVERY 6 HOURS PRN
Qty: 30 TABLET | Refills: 1 | Status: ON HOLD | OUTPATIENT
Start: 2021-12-21 | End: 2022-02-10

## 2021-12-22 DIAGNOSIS — E11.65 TYPE 2 DIABETES MELLITUS WITH HYPERGLYCEMIA, WITH LONG-TERM CURRENT USE OF INSULIN (HCC): Primary | ICD-10-CM

## 2021-12-22 DIAGNOSIS — I82.403 ACUTE EMBOLISM AND THROMBOSIS OF DEEP VEIN OF BOTH LOWER EXTREMITIES (HCC): ICD-10-CM

## 2021-12-22 DIAGNOSIS — Z79.4 TYPE 2 DIABETES MELLITUS WITH HYPERGLYCEMIA, WITH LONG-TERM CURRENT USE OF INSULIN (HCC): Primary | ICD-10-CM

## 2021-12-22 RX ORDER — WARFARIN SODIUM 1 MG/1
1 TABLET ORAL
Qty: 30 TABLET | Refills: 3 | Status: SHIPPED | OUTPATIENT
Start: 2021-12-22 | End: 2022-01-10 | Stop reason: SDUPTHER

## 2021-12-22 RX ORDER — WARFARIN SODIUM 1 MG/1
1 TABLET ORAL
Qty: 30 TABLET | Refills: 3 | Status: SHIPPED | OUTPATIENT
Start: 2021-12-22 | End: 2021-12-22 | Stop reason: SDUPTHER

## 2021-12-26 DIAGNOSIS — S82.191D OTHER CLOSED FRACTURE OF PROXIMAL END OF RIGHT TIBIA WITH ROUTINE HEALING, SUBSEQUENT ENCOUNTER: Primary | ICD-10-CM

## 2021-12-28 ENCOUNTER — OFFICE VISIT (OUTPATIENT)
Dept: OBGYN CLINIC | Facility: CLINIC | Age: 66
End: 2021-12-28

## 2021-12-28 ENCOUNTER — APPOINTMENT (OUTPATIENT)
Dept: RADIOLOGY | Facility: CLINIC | Age: 66
End: 2021-12-28
Payer: COMMERCIAL

## 2021-12-28 VITALS
HEART RATE: 81 BPM | DIASTOLIC BLOOD PRESSURE: 69 MMHG | BODY MASS INDEX: 38.97 KG/M2 | WEIGHT: 294 LBS | SYSTOLIC BLOOD PRESSURE: 111 MMHG | HEIGHT: 73 IN

## 2021-12-28 DIAGNOSIS — Z98.890 STATUS POST MUSCULOSKELETAL SYSTEM SURGERY: Primary | ICD-10-CM

## 2021-12-28 DIAGNOSIS — S82.191D OTHER CLOSED FRACTURE OF PROXIMAL END OF RIGHT TIBIA WITH ROUTINE HEALING, SUBSEQUENT ENCOUNTER: ICD-10-CM

## 2021-12-28 PROCEDURE — 73560 X-RAY EXAM OF KNEE 1 OR 2: CPT

## 2021-12-28 PROCEDURE — 99024 POSTOP FOLLOW-UP VISIT: CPT | Performed by: ORTHOPAEDIC SURGERY

## 2021-12-29 PROBLEM — Z98.890 STATUS POST MUSCULOSKELETAL SYSTEM SURGERY: Status: ACTIVE | Noted: 2021-12-29

## 2022-01-10 ENCOUNTER — TELEPHONE (OUTPATIENT)
Dept: LAB | Facility: HOSPITAL | Age: 67
End: 2022-01-10

## 2022-01-10 ENCOUNTER — TELEPHONE (OUTPATIENT)
Dept: FAMILY MEDICINE CLINIC | Facility: CLINIC | Age: 67
End: 2022-01-10

## 2022-01-10 DIAGNOSIS — I82.403 ACUTE EMBOLISM AND THROMBOSIS OF DEEP VEIN OF BOTH LOWER EXTREMITIES (HCC): ICD-10-CM

## 2022-01-10 RX ORDER — WARFARIN SODIUM 1 MG/1
1 TABLET ORAL
Qty: 30 TABLET | Refills: 3 | Status: SHIPPED | OUTPATIENT
Start: 2022-01-10 | End: 2022-02-12 | Stop reason: HOSPADM

## 2022-01-10 NOTE — TELEPHONE ENCOUNTER
----- Message from Chelsy Valdivia DO sent at 1/10/2022 12:04 PM EST -----  Regarding: FW: Meds & appointments   Please arrange for the mobile phlebotomy services     ----- Message -----  From: Peyton Ibarra MA  Sent: 1/10/2022   7:18 AM EST  To: Chelsy Valdivia DO  Subject: FW: Meds & appointments                          Can you refill his coumadin please and place the order for his INR  I will call to set up the mobile lab for him       ----- Message -----  From: George Barakat  Sent: 1/7/2022   4:47 PM EST  To: , #  Subject: Meds & appointments                              Do you think I should Come in for my appointment on January 24th? Also I will need the mobile lab for my ptinr blood test   And I will need new pills for my warning 1 mg since I only have a few left

## 2022-01-10 NOTE — TELEPHONE ENCOUNTER
Called Mobile lab services and provided pt's information, they will call pt to schedule an appt this week  LM for pt RCB to let him know  MYC message sent to pt as well  Nancy Benz

## 2022-01-12 ENCOUNTER — VBI (OUTPATIENT)
Dept: ADMINISTRATIVE | Facility: OTHER | Age: 67
End: 2022-01-12

## 2022-01-21 ENCOUNTER — TELEPHONE (OUTPATIENT)
Dept: FAMILY MEDICINE CLINIC | Facility: CLINIC | Age: 67
End: 2022-01-21

## 2022-01-21 NOTE — TELEPHONE ENCOUNTER
FYI; [facility] called and states pt is receiving home care for 1 more week and will be discharging him to out patient care

## 2022-01-24 ENCOUNTER — TELEMEDICINE (OUTPATIENT)
Dept: FAMILY MEDICINE CLINIC | Facility: CLINIC | Age: 67
End: 2022-01-24
Payer: COMMERCIAL

## 2022-01-24 DIAGNOSIS — I73.9 PERIPHERAL VASCULAR DISEASE (HCC): ICD-10-CM

## 2022-01-24 DIAGNOSIS — E78.2 MIXED HYPERLIPIDEMIA: ICD-10-CM

## 2022-01-24 DIAGNOSIS — I82.403 ACUTE EMBOLISM AND THROMBOSIS OF DEEP VEIN OF BOTH LOWER EXTREMITIES (HCC): ICD-10-CM

## 2022-01-24 DIAGNOSIS — Z79.4 TYPE 2 DIABETES MELLITUS WITH HYPERGLYCEMIA, WITH LONG-TERM CURRENT USE OF INSULIN (HCC): ICD-10-CM

## 2022-01-24 DIAGNOSIS — N17.9 AKI (ACUTE KIDNEY INJURY) (HCC): ICD-10-CM

## 2022-01-24 DIAGNOSIS — E11.40 TYPE 2 DIABETES MELLITUS WITH DIABETIC NEUROPATHY, WITHOUT LONG-TERM CURRENT USE OF INSULIN (HCC): Primary | ICD-10-CM

## 2022-01-24 DIAGNOSIS — I10 ESSENTIAL HYPERTENSION: ICD-10-CM

## 2022-01-24 DIAGNOSIS — E11.65 TYPE 2 DIABETES MELLITUS WITH HYPERGLYCEMIA, WITH LONG-TERM CURRENT USE OF INSULIN (HCC): ICD-10-CM

## 2022-01-24 PROBLEM — Z86.718 HISTORY OF DVT (DEEP VEIN THROMBOSIS): Status: RESOLVED | Noted: 2021-10-13 | Resolved: 2022-01-24

## 2022-01-24 PROBLEM — K59.00 CONSTIPATION: Status: RESOLVED | Noted: 2019-10-09 | Resolved: 2022-01-24

## 2022-01-24 PROBLEM — T88.59XA DELAYED EMERGENCE FROM ANESTHESIA: Status: RESOLVED | Noted: 2021-10-18 | Resolved: 2022-01-24

## 2022-01-24 PROBLEM — Z98.890 STATUS POST MUSCULOSKELETAL SYSTEM SURGERY: Status: RESOLVED | Noted: 2021-12-29 | Resolved: 2022-01-24

## 2022-01-24 PROBLEM — T88.4XXA DIFFICULT INTUBATION: Status: RESOLVED | Noted: 2021-10-18 | Resolved: 2022-01-24

## 2022-01-24 PROCEDURE — 3066F NEPHROPATHY DOC TX: CPT | Performed by: FAMILY MEDICINE

## 2022-01-24 PROCEDURE — 99214 OFFICE O/P EST MOD 30 MIN: CPT | Performed by: FAMILY MEDICINE

## 2022-01-24 NOTE — ASSESSMENT & PLAN NOTE
Continue his blood pressure and cholesterol control, blood sugar control as above, continue his aspirin and Coumadin

## 2022-01-24 NOTE — PROGRESS NOTES
Virtual Regular Visit    Verification of patient location:    Patient is located in the following state in which I hold an active license [place]      Assessment/Plan:    Problem List Items Addressed This Visit        Endocrine    Type 2 diabetes mellitus with diabetic neuropathy, without long-term current use of insulin ([facility] ) - Primary    Relevant Orders    Hemoglobin A1C    Comprehensive metabolic panel    Type 2 diabetes mellitus with hyperglycemia ([facility] )       Cardiovascular and Mediastinum    Acute embolism and thrombosis of unspecified deep veins of unspecified lower extremity (HCC)    Essential hypertension    Relevant Orders    CBC    Peripheral vascular disease ([facility] )       Genitourinary    ROSE (acute kidney injury) ([facility] )       Other    Mixed hyperlipidemia    Relevant Orders    Lipid panel               Reason for visit is No chief complaint on file  Encounter provider Lisa Rodriguez DO    Provider located at [address] [place] [address] [address] [place] [address]  [address] [address] [phone]      Recent Visits  No visits were found meeting these conditions  Showing recent visits within past 7 days and meeting all other requirements  Future Appointments  No visits were found meeting these conditions  Showing future appointments within next 150 days and meeting all other requirements       The patient was identified by name and date of birth  Vikrammonalisa Navas was informed that this is a telemedicine visit and that the visit is being conducted through [facility] and patient was informed this is a secure, HIPAA-complaint platform  He agrees to proceed     My office door was closed  No one else was in the room  He acknowledged consent and understanding of privacy and security of the video platform  The patient has agreed to participate and understands they can discontinue the visit at any time  Patient is aware this is a billable service  Betsy Cherry is a 61 y o  male   Presenting for video visit   Patient presents for video visit for follow-up on his blood sugars, he states that he has been using his [place] system and his sugars are as low as 80 and as high as 400 if he "cheats"  He states in general though his sugars are running in the low 100s  He is doing home physical therapy, he states that his leg strength is improving         Past Medical History:   Diagnosis Date    Back pain     Diabetes mellitus (HCC)     DVT (deep vein thrombosis) in pregnancy     Hard to intubate 10/18/2021    Glidescope with #3 blade and bougie used to pass 6 0 ETT    Hyperlipidemia     Hypertension        Past Surgical History:   Procedure Laterality Date    CATARACT EXTRACTION      COLONOSCOPY      6/2014    CYST REMOVAL      EXTERNAL FIXATOR APPLICATION Right 22/48/8941    Procedure: Application External Fixation Device right lower extremity;  Surgeon: Anthony Rudd MD;  Location: [facility];  Service: Orthopedics    HAND SURGERY      HEEL SPUR SURGERY      foot surgery    ORIF TIBIA FRACTURE Right 10/25/2021    Procedure: OPEN REDUCTION W/ INTERNAL FIXATION (ORIF) RIGHT PROXIMAL TIBIA SHAFT FRACTURE, REMOVAL OF EXTERNAL FIXATOR;  Surgeon: Anthony Rudd MD;  Location: [facility];  Service: Orthopedics       Current Outpatient Medications   Medication Sig Dispense Refill    aspirin (ECOTRIN) 325 mg EC tablet Take by mouth      Continuous Blood Gluc  (FreeStyle Reese 14 Day [place]) JEROME Check blood sugar TID, DX E11 9 1 each 0    Continuous Blood Gluc Sensor (FreeStyle Reese 14 Day Sensor) MISC Check blood sugar TID, apply new sensor q 14 days 2 each 5    ferrous sulfate 325 (65 Fe) mg tablet Take 325 mg by mouth daily with breakfast      hydrOXYzine HCL (ATARAX) 25 mg tablet Take 1 tablet (25 mg total) by mouth every 6 (six) hours as needed for itching 30 tablet 1    insulin glargine (LANTUS) 100 units/mL subcutaneous injection Inject 25 Units under the skin daily      lisinopril (ZESTRIL) 10 mg tablet TAKE 1 TABLET EVERY DAY 90 tablet 3    metFORMIN (GLUCOPHAGE) 1000 MG tablet Take 1 tablet (1,000 mg total) by mouth 2 (two) times a day with meals 60 tablet 5    oxyCODONE (OxyCONTIN) 10 mg 12 hr tablet Take 10 mg by mouth every 12 (twelve) hours      polyethylene glycol (MIRALAX) 17 g packet Take 17 g by mouth daily as needed       senna-docusate sodium (SENOKOT-S) 8 6-50 mg per tablet Take 1 tablet by mouth 2 (two) times a day      simvastatin (ZOCOR) 20 mg tablet TAKE 1 TABLET EVERY DAY 90 tablet 3    warfarin (COUMADIN) 1 mg tablet Take 1 tablet (1 mg total) by mouth daily 30 tablet 3    warfarin (COUMADIN) 5 mg tablet Take 5 mg by mouth see administration instructions Every Tuesday, Thursday, Saturday, and Sunday       No current facility-administered medications for this visit  Allergies   Allergen Reactions    Byetta 10 Mcg Pen [Exenatide] Swelling    Pollen Extract Myalgia       Review of Systems   Constitutional: Negative for chills, fatigue and fever  HENT: Negative for congestion, ear pain, hearing loss, postnasal drip, rhinorrhea and sore throat  Eyes: Negative for pain and visual disturbance  Respiratory: Negative for chest tightness, shortness of breath and wheezing  Cardiovascular: Negative for chest pain and leg swelling  Gastrointestinal: Negative for abdominal distention, abdominal pain, constipation, diarrhea and vomiting  Endocrine: Negative for cold intolerance and heat intolerance  Genitourinary: Negative for difficulty urinating, frequency and urgency  Musculoskeletal: Negative for arthralgias and gait problem  Skin: Negative for color change  Neurological: Negative for dizziness, tremors, syncope, numbness and headaches  Hematological: Negative for adenopathy  Psychiatric/Behavioral: Negative for agitation, confusion and sleep disturbance   The patient is not nervous/anxious  Video Exam    There were no vitals filed for this visit  Physical Exam  Constitutional:       Appearance: He is well-developed  Pulmonary:      Effort: Pulmonary effort is normal    Neurological:      Mental Status: He is alert and oriented to person, place, and time  Psychiatric:         Behavior: Behavior normal          Thought Content: Thought content normal          Judgment: Judgment normal           I spent 20 minutes directly with the patient during this visit    VIRTUAL VISIT [address] verbally agrees to participate in [employer]  Pt is aware that [employer] could be limited without vital signs or the ability to perform a full hands-on physical Feliz Bonilla understands he or the provider may request at any time to terminate the video visit and request the patient to seek care or treatment in person

## 2022-01-24 NOTE — ASSESSMENT & PLAN NOTE
Lab Results   Component Value Date    HGBA1C 8 2 (H) 10/16/2021    continue his Lantus, metformin, check CMP, hemoglobin A1c

## 2022-01-24 NOTE — ASSESSMENT & PLAN NOTE
Continue to monitor with his [employer] system, continue his insulin and metformin  Lab Results   Component Value Date    HGBA1C 8 2 (H) 10/16/2021

## 2022-01-26 ENCOUNTER — TELEPHONE (OUTPATIENT)
Dept: OBGYN CLINIC | Facility: HOSPITAL | Age: 67
End: 2022-01-26

## 2022-01-26 NOTE — TELEPHONE ENCOUNTER
Patient being discharged today from [address] [facility]  Patient is having more ankle pain and minimal knee pain  Patient will need a referral for outpatient PT      Joshua Blandon from [facility] # [phone]

## 2022-01-31 DIAGNOSIS — M10.09 ACUTE IDIOPATHIC GOUT OF MULTIPLE SITES: Primary | ICD-10-CM

## 2022-01-31 RX ORDER — COLCHICINE 0.6 MG/1
0.6 TABLET ORAL DAILY
Qty: 30 TABLET | Refills: 5 | Status: SHIPPED | OUTPATIENT
Start: 2022-01-31 | End: 2022-07-11 | Stop reason: SDUPTHER

## 2022-02-03 ENCOUNTER — APPOINTMENT (EMERGENCY)
Dept: CT IMAGING | Facility: HOSPITAL | Age: 67
DRG: 464 | End: 2022-02-03
Payer: COMMERCIAL

## 2022-02-03 ENCOUNTER — APPOINTMENT (EMERGENCY)
Dept: RADIOLOGY | Facility: HOSPITAL | Age: 67
DRG: 464 | End: 2022-02-03
Payer: COMMERCIAL

## 2022-02-03 ENCOUNTER — TELEPHONE (OUTPATIENT)
Dept: OBGYN CLINIC | Facility: HOSPITAL | Age: 67
End: 2022-02-03

## 2022-02-03 ENCOUNTER — HOSPITAL ENCOUNTER (INPATIENT)
Facility: HOSPITAL | Age: 67
LOS: 9 days | Discharge: HOME HEALTH | DRG: 464 | End: 2022-02-12
Attending: EMERGENCY MEDICINE | Admitting: INTERNAL MEDICINE
Payer: COMMERCIAL

## 2022-02-03 DIAGNOSIS — R94.31 ST SEGMENT DEPRESSION: ICD-10-CM

## 2022-02-03 DIAGNOSIS — S72.141A CLOSED DISPLACED INTERTROCHANTERIC FRACTURE OF RIGHT FEMUR (HCC): ICD-10-CM

## 2022-02-03 DIAGNOSIS — E11.40 TYPE 2 DIABETES MELLITUS WITH DIABETIC NEUROPATHY, WITHOUT LONG-TERM CURRENT USE OF INSULIN (HCC): ICD-10-CM

## 2022-02-03 DIAGNOSIS — I82.403 ACUTE EMBOLISM AND THROMBOSIS OF DEEP VEIN OF BOTH LOWER EXTREMITIES (HCC): ICD-10-CM

## 2022-02-03 DIAGNOSIS — S72.141A CLOSED DISPLACED INTERTROCHANTERIC FRACTURE OF RIGHT FEMUR, INITIAL ENCOUNTER (HCC): ICD-10-CM

## 2022-02-03 DIAGNOSIS — T14.8XXA HEMATOMA: ICD-10-CM

## 2022-02-03 DIAGNOSIS — N17.9 AKI (ACUTE KIDNEY INJURY) (HCC): ICD-10-CM

## 2022-02-03 DIAGNOSIS — S72.141A CLOSED INTERTROCHANTERIC FRACTURE OF HIP, RIGHT, INITIAL ENCOUNTER (HCC): Primary | ICD-10-CM

## 2022-02-03 PROBLEM — E87.1 HYPONATREMIA: Status: ACTIVE | Noted: 2022-02-03

## 2022-02-03 LAB
ANION GAP SERPL CALCULATED.3IONS-SCNC: 9 MMOL/L (ref 4–13)
BASOPHILS # BLD AUTO: 0.03 THOUSANDS/ΜL (ref 0–0.1)
BASOPHILS NFR BLD AUTO: 0 % (ref 0–1)
BUN SERPL-MCNC: 29 MG/DL (ref 5–25)
CALCIUM SERPL-MCNC: 8.7 MG/DL (ref 8.3–10.1)
CHLORIDE SERPL-SCNC: 99 MMOL/L (ref 100–108)
CO2 SERPL-SCNC: 22 MMOL/L (ref 21–32)
CREAT SERPL-MCNC: 2.64 MG/DL (ref 0.6–1.3)
EOSINOPHIL # BLD AUTO: 0.01 THOUSAND/ΜL (ref 0–0.61)
EOSINOPHIL NFR BLD AUTO: 0 % (ref 0–6)
ERYTHROCYTE [DISTWIDTH] IN BLOOD BY AUTOMATED COUNT: 16.1 % (ref 11.6–15.1)
FLUAV RNA RESP QL NAA+PROBE: NEGATIVE
FLUBV RNA RESP QL NAA+PROBE: NEGATIVE
GFR SERPL CREATININE-BSD FRML MDRD: 24 ML/MIN/1.73SQ M
GLUCOSE SERPL-MCNC: 317 MG/DL (ref 65–140)
GLUCOSE SERPL-MCNC: 335 MG/DL (ref 65–140)
HCT VFR BLD AUTO: 36.9 % (ref 36.5–49.3)
HGB BLD-MCNC: 11.7 G/DL (ref 12–17)
IMM GRANULOCYTES # BLD AUTO: 0.03 THOUSAND/UL (ref 0–0.2)
IMM GRANULOCYTES NFR BLD AUTO: 0 % (ref 0–2)
LYMPHOCYTES # BLD AUTO: 0.63 THOUSANDS/ΜL (ref 0.6–4.47)
LYMPHOCYTES NFR BLD AUTO: 6 % (ref 14–44)
MCH RBC QN AUTO: 28.4 PG (ref 26.8–34.3)
MCHC RBC AUTO-ENTMCNC: 31.7 G/DL (ref 31.4–37.4)
MCV RBC AUTO: 90 FL (ref 82–98)
MONOCYTES # BLD AUTO: 0.76 THOUSAND/ΜL (ref 0.17–1.22)
MONOCYTES NFR BLD AUTO: 7 % (ref 4–12)
NEUTROPHILS # BLD AUTO: 9.1 THOUSANDS/ΜL (ref 1.85–7.62)
NEUTS SEG NFR BLD AUTO: 87 % (ref 43–75)
NRBC BLD AUTO-RTO: 0 /100 WBCS
PLATELET # BLD AUTO: 312 THOUSANDS/UL (ref 149–390)
PMV BLD AUTO: 9.2 FL (ref 8.9–12.7)
POTASSIUM SERPL-SCNC: 4.1 MMOL/L (ref 3.5–5.3)
RBC # BLD AUTO: 4.12 MILLION/UL (ref 3.88–5.62)
RSV RNA RESP QL NAA+PROBE: NEGATIVE
SARS-COV-2 RNA RESP QL NAA+PROBE: NEGATIVE
SODIUM SERPL-SCNC: 130 MMOL/L (ref 136–145)
WBC # BLD AUTO: 10.56 THOUSAND/UL (ref 4.31–10.16)

## 2022-02-03 PROCEDURE — 99223 1ST HOSP IP/OBS HIGH 75: CPT | Performed by: PHYSICIAN ASSISTANT

## 2022-02-03 PROCEDURE — 73590 X-RAY EXAM OF LOWER LEG: CPT

## 2022-02-03 PROCEDURE — 80048 BASIC METABOLIC PNL TOTAL CA: CPT | Performed by: PHYSICIAN ASSISTANT

## 2022-02-03 PROCEDURE — 73552 X-RAY EXAM OF FEMUR 2/>: CPT

## 2022-02-03 PROCEDURE — 99285 EMERGENCY DEPT VISIT HI MDM: CPT | Performed by: PHYSICIAN ASSISTANT

## 2022-02-03 PROCEDURE — 99285 EMERGENCY DEPT VISIT HI MDM: CPT

## 2022-02-03 PROCEDURE — 0241U HB NFCT DS VIR RESP RNA 4 TRGT: CPT | Performed by: PHYSICIAN ASSISTANT

## 2022-02-03 PROCEDURE — 73564 X-RAY EXAM KNEE 4 OR MORE: CPT

## 2022-02-03 PROCEDURE — 85025 COMPLETE CBC W/AUTO DIFF WBC: CPT | Performed by: PHYSICIAN ASSISTANT

## 2022-02-03 PROCEDURE — 93005 ELECTROCARDIOGRAM TRACING: CPT

## 2022-02-03 PROCEDURE — 73502 X-RAY EXAM HIP UNI 2-3 VIEWS: CPT

## 2022-02-03 PROCEDURE — 72192 CT PELVIS W/O DYE: CPT

## 2022-02-03 PROCEDURE — 36415 COLL VENOUS BLD VENIPUNCTURE: CPT | Performed by: PHYSICIAN ASSISTANT

## 2022-02-03 PROCEDURE — 96374 THER/PROPH/DIAG INJ IV PUSH: CPT

## 2022-02-03 PROCEDURE — 82948 REAGENT STRIP/BLOOD GLUCOSE: CPT

## 2022-02-03 RX ORDER — AMOXICILLIN 250 MG
1 CAPSULE ORAL 2 TIMES DAILY
Status: DISCONTINUED | OUTPATIENT
Start: 2022-02-04 | End: 2022-02-12 | Stop reason: HOSPADM

## 2022-02-03 RX ORDER — OXYCODONE HYDROCHLORIDE 5 MG/1
7.5 TABLET ORAL EVERY 4 HOURS PRN
Status: DISCONTINUED | OUTPATIENT
Start: 2022-02-03 | End: 2022-02-10

## 2022-02-03 RX ORDER — INSULIN GLARGINE 100 [IU]/ML
25 INJECTION, SOLUTION SUBCUTANEOUS ONCE
Status: COMPLETED | OUTPATIENT
Start: 2022-02-03 | End: 2022-02-03

## 2022-02-03 RX ORDER — PRAVASTATIN SODIUM 40 MG
40 TABLET ORAL
Status: DISCONTINUED | OUTPATIENT
Start: 2022-02-04 | End: 2022-02-12 | Stop reason: HOSPADM

## 2022-02-03 RX ORDER — POLYETHYLENE GLYCOL 3350 17 G/17G
17 POWDER, FOR SOLUTION ORAL DAILY PRN
Status: DISCONTINUED | OUTPATIENT
Start: 2022-02-03 | End: 2022-02-12 | Stop reason: HOSPADM

## 2022-02-03 RX ORDER — FERROUS SULFATE 325(65) MG
325 TABLET ORAL
Status: DISCONTINUED | OUTPATIENT
Start: 2022-02-04 | End: 2022-02-12 | Stop reason: HOSPADM

## 2022-02-03 RX ORDER — INSULIN GLARGINE 100 [IU]/ML
10 INJECTION, SOLUTION SUBCUTANEOUS
Status: DISCONTINUED | OUTPATIENT
Start: 2022-02-04 | End: 2022-02-05

## 2022-02-03 RX ORDER — OXYCODONE HYDROCHLORIDE 5 MG/1
5 TABLET ORAL EVERY 4 HOURS PRN
Status: DISCONTINUED | OUTPATIENT
Start: 2022-02-03 | End: 2022-02-10

## 2022-02-03 RX ORDER — COLCHICINE 0.6 MG/1
0.6 TABLET ORAL DAILY
Status: DISCONTINUED | OUTPATIENT
Start: 2022-02-04 | End: 2022-02-12 | Stop reason: HOSPADM

## 2022-02-03 RX ORDER — ACETAMINOPHEN 325 MG/1
975 TABLET ORAL EVERY 8 HOURS SCHEDULED
Status: DISCONTINUED | OUTPATIENT
Start: 2022-02-03 | End: 2022-02-12 | Stop reason: HOSPADM

## 2022-02-03 RX ORDER — HYDROMORPHONE HCL/PF 1 MG/ML
0.5 SYRINGE (ML) INJECTION ONCE
Status: COMPLETED | OUTPATIENT
Start: 2022-02-03 | End: 2022-02-03

## 2022-02-03 RX ORDER — OXYCODONE HYDROCHLORIDE 10 MG/1
10 TABLET ORAL ONCE
Status: COMPLETED | OUTPATIENT
Start: 2022-02-03 | End: 2022-02-03

## 2022-02-03 RX ORDER — SODIUM CHLORIDE 9 MG/ML
100 INJECTION, SOLUTION INTRAVENOUS CONTINUOUS
Status: DISCONTINUED | OUTPATIENT
Start: 2022-02-03 | End: 2022-02-04

## 2022-02-03 RX ORDER — HYDROXYZINE HYDROCHLORIDE 25 MG/1
25 TABLET, FILM COATED ORAL EVERY 6 HOURS PRN
Status: DISCONTINUED | OUTPATIENT
Start: 2022-02-03 | End: 2022-02-12 | Stop reason: HOSPADM

## 2022-02-03 RX ORDER — MAGNESIUM HYDROXIDE/ALUMINUM HYDROXICE/SIMETHICONE 120; 1200; 1200 MG/30ML; MG/30ML; MG/30ML
30 SUSPENSION ORAL EVERY 6 HOURS PRN
Status: DISCONTINUED | OUTPATIENT
Start: 2022-02-03 | End: 2022-02-12 | Stop reason: HOSPADM

## 2022-02-03 RX ORDER — HYDROMORPHONE HCL IN WATER/PF 6 MG/30 ML
0.2 PATIENT CONTROLLED ANALGESIA SYRINGE INTRAVENOUS EVERY 4 HOURS PRN
Status: DISCONTINUED | OUTPATIENT
Start: 2022-02-03 | End: 2022-02-12 | Stop reason: HOSPADM

## 2022-02-03 RX ORDER — LIDOCAINE 50 MG/G
1 PATCH TOPICAL DAILY PRN
Status: DISCONTINUED | OUTPATIENT
Start: 2022-02-03 | End: 2022-02-12 | Stop reason: HOSPADM

## 2022-02-03 RX ADMIN — OXYCODONE HYDROCHLORIDE 10 MG: 10 TABLET ORAL at 17:54

## 2022-02-03 RX ADMIN — HYDROMORPHONE HYDROCHLORIDE 0.5 MG: 1 INJECTION, SOLUTION INTRAMUSCULAR; INTRAVENOUS; SUBCUTANEOUS at 20:28

## 2022-02-03 RX ADMIN — SODIUM CHLORIDE 50 ML/HR: 0.9 INJECTION, SOLUTION INTRAVENOUS at 22:17

## 2022-02-03 RX ADMIN — INSULIN GLARGINE 25 UNITS: 100 INJECTION, SOLUTION SUBCUTANEOUS at 22:16

## 2022-02-03 RX ADMIN — SODIUM CHLORIDE 1000 ML: 9 INJECTION, SOLUTION INTRAVENOUS at 20:55

## 2022-02-03 NOTE — ED PROVIDER NOTES
History  Chief Complaint   Patient presents with    Fall     pt slipped on ice & fell, laceration to R leg, on coumadin  Denies head trauma, c/o back pain & leg pain  R leg repair 2021     58yo male with a history of insulin-dependent diabetes, hypertension, hyperlipidemia, and previous DVT on Coumadin presenting via EMS for evaluation after a fall about one hour prior to arrival  He states he slipped on a wet floor and fell on his right hip  There was no head strike or LOC  Patient was unable to get off the floor and EMS was activated  He reports significant pain to his right hip and he is refusing to move his right leg  Patient reports chronic neck pain that is no worse after the fall  He also reports a laceration to his right lower leg in the site of his previous tibia surgery in October 2021  History provided by:  Patient, medical records and spouse   used: No    Fall  Mechanism of injury: fall    Injury location:  Leg  Leg injury location:  R hip  Incident location:  Home  Time since incident:  1 hour  Arrived directly from scene: yes    Fall:     Fall occurred:  Walking    Height of fall:  Ground level    Impact surface:  Hard floor    Point of impact: R hip  Suspicion of alcohol use: no    Suspicion of drug use: no    Tetanus status:  Up to date  Prior to arrival data:     Patient ambulatory at scene: no      Loss of consciousness: no      Amnesic to event: no    Associated symptoms: neck pain (chronic)    Associated symptoms: no abdominal pain, no back pain, no chest pain, no difficulty breathing, no headaches, no loss of consciousness, no seizures and no vomiting    Risk factors: anticoagulation therapy        Prior to Admission Medications   Prescriptions Last Dose Informant Patient Reported? Taking?    Continuous Blood Gluc  (FreeStyle Reese 14 Day Gaylord) JEROME   No No   Sig: Check blood sugar TID, DX E11 9   Continuous Blood Gluc Sensor (FreeStyle Reese 14 Day Sensor) MISC   No No   Sig: Check blood sugar TID, apply new sensor q 14 days   aspirin (ECOTRIN) 325 mg EC tablet  Self Yes No   Sig: Take by mouth   colchicine (COLCRYS) 0 6 mg tablet   No No   Sig: Take 1 tablet (0 6 mg total) by mouth daily   ferrous sulfate 325 (65 Fe) mg tablet   Yes No   Sig: Take 325 mg by mouth daily with breakfast   hydrOXYzine HCL (ATARAX) 25 mg tablet   No No   Sig: Take 1 tablet (25 mg total) by mouth every 6 (six) hours as needed for itching   insulin glargine (LANTUS) 100 units/mL subcutaneous injection   Yes No   Sig: Inject 25 Units under the skin daily   lisinopril (ZESTRIL) 10 mg tablet   No No   Sig: TAKE 1 TABLET EVERY DAY   metFORMIN (GLUCOPHAGE) 1000 MG tablet   No No   Sig: Take 1 tablet (1,000 mg total) by mouth 2 (two) times a day with meals   oxyCODONE (OxyCONTIN) 10 mg 12 hr tablet   Yes No   Sig: Take 10 mg by mouth every 12 (twelve) hours   polyethylene glycol (MIRALAX) 17 g packet   Yes No   Sig: Take 17 g by mouth daily as needed    senna-docusate sodium (SENOKOT-S) 8 6-50 mg per tablet   Yes No   Sig: Take 1 tablet by mouth 2 (two) times a day   simvastatin (ZOCOR) 20 mg tablet   No No   Sig: TAKE 1 TABLET EVERY DAY   warfarin (COUMADIN) 1 mg tablet   No No   Sig: Take 1 tablet (1 mg total) by mouth daily   warfarin (COUMADIN) 5 mg tablet   Yes No   Sig: Take 5 mg by mouth see administration instructions Every Tuesday, Thursday, Saturday, and Sunday      Facility-Administered Medications: None       Past Medical History:   Diagnosis Date    Back pain     Diabetes mellitus ([facility] [address] )     DVT (deep vein thrombosis) in pregnancy     Hard to intubate 10/18/2021    Glidescope with #3 blade and bougie used to pass 6 0 ETT    Hyperlipidemia     Hypertension        Past Surgical History:   Procedure Laterality Date    CATARACT EXTRACTION      COLONOSCOPY      6/2014    CYST REMOVAL      EXTERNAL FIXATOR APPLICATION Right 48/71/1744    Procedure: Application External Fixation Device right lower extremity;  Surgeon: Melquiades Jewell MD;  Location: [facility];  Service: Orthopedics    HAND SURGERY      HEEL SPUR SURGERY      foot surgery    ORIF TIBIA FRACTURE Right 10/25/2021    Procedure: OPEN REDUCTION W/ INTERNAL FIXATION (ORIF) RIGHT PROXIMAL TIBIA SHAFT FRACTURE, REMOVAL OF EXTERNAL FIXATOR;  Surgeon: Melquiades Jewell MD;  Location: [facility];  Service: Orthopedics       Family History   Problem Relation Age of Onset    Dementia Mother     Melanoma Mother      I have reviewed and agree with the history as documented  E-Cigarette/Vaping    E-Cigarette Use Never User      E-Cigarette/Vaping Substances    Nicotine No     THC No     CBD No     Flavoring No     Other No     Unknown No      Social History     Tobacco Use    Smoking status: Light Tobacco Smoker    Smokeless tobacco: Never Used    Tobacco comment: Per allscripts, current smoker   Vaping Use    Vaping Use: Never used   Substance Use Topics    Alcohol use: Never     Comment: Per allscripts, occasional use    Drug use: Not Currently       Review of Systems   Constitutional: Negative for chills and fever  HENT: Negative for drooling and voice change  Eyes: Negative for discharge and redness  Respiratory: Negative for shortness of breath and stridor  Cardiovascular: Negative for chest pain and leg swelling  Gastrointestinal: Negative for abdominal pain and vomiting  Musculoskeletal: Positive for arthralgias and neck pain (chronic)  Negative for back pain and neck stiffness  Skin: Negative for color change and rash  Neurological: Negative for seizures, loss of consciousness, syncope and headaches  Psychiatric/Behavioral: Negative for confusion  The patient is not nervous/anxious  All other systems reviewed and are negative  Physical Exam  Physical Exam  Vitals and nursing note reviewed  Constitutional:       General: He is not in acute distress       Appearance: He is well-developed  He is not diaphoretic  HENT:      Head: Normocephalic and atraumatic  Comments: No external signs of trauma     Right Ear: External ear normal       Left Ear: External ear normal    Eyes:      General: No scleral icterus  Right eye: No discharge  Left eye: No discharge  Conjunctiva/sclera: Conjunctivae normal    Neck:      Comments: No cervical spine tenderness  Cardiovascular:      Rate and Rhythm: Normal rate and regular rhythm  Heart sounds: Normal heart sounds  No murmur heard  Pulmonary:      Effort: Pulmonary effort is normal  No respiratory distress  Breath sounds: Normal breath sounds  No stridor  No wheezing or rales  Abdominal:      General: Bowel sounds are normal  There is no distension  Palpations: Abdomen is soft  Tenderness: There is no abdominal tenderness  There is no guarding  Musculoskeletal:         General: No deformity  Normal range of motion  Cervical back: Normal range of motion and neck supple  Comments: Patient refusing to move right hip and knee due to pain  Small laceration to the anterior lower leg that does not require repair  No C/T/L spine tenderness  Skin:     General: Skin is warm and dry  Neurological:      General: No focal deficit present  Mental Status: He is alert  He is not disoriented  GCS: GCS eye subscore is 4  GCS verbal subscore is 5  GCS motor subscore is 6     Psychiatric:         Mood and Affect: Mood normal          Behavior: Behavior normal          Vital Signs  ED Triage Vitals   Temp Pulse Respirations Blood Pressure SpO2   -- 02/03/22 1724 02/03/22 1724 02/03/22 1724 02/03/22 1724    85 22 163/70 100 %      Temp src Heart Rate Source Patient Position - Orthostatic VS BP Location FiO2 (%)   -- 02/03/22 1724 02/03/22 1724 02/03/22 1724 --    Monitor Lying Right arm       Pain Score       02/03/22 1754       8           Vitals:    02/03/22 1724   BP: 163/70   Pulse: 85 Patient Position - Orthostatic VS: Lying         Visual Acuity      ED Medications  Medications   sodium chloride 0 9 % bolus 1,000 mL (has no administration in time range)   oxyCODONE (ROXICODONE) immediate release tablet 10 mg (10 mg Oral Given 2/3/22 1754)   HYDROmorphone (DILAUDID) injection 0 5 mg (0 5 mg Intravenous Given 2/3/22 2028)       Diagnostic Studies  Results Reviewed     Procedure Component Value Units Date/Time    Basic metabolic panel [[id number]]  (Abnormal) Collected: 02/03/22 2015    Lab Status: Final result Specimen: Blood from Line, Venous Updated: 02/03/22 2028     Sodium 130 mmol/L      Potassium 4 1 mmol/L      Chloride 99 mmol/L      CO2 22 mmol/L      ANION GAP 9 mmol/L      BUN 29 mg/dL      Creatinine 2 64 mg/dL      Glucose 317 mg/dL      Calcium 8 7 mg/dL      eGFR 24 ml/min/1 73sq m     Narrative:      National Kidney Disease Foundation guidelines for Chronic Kidney Disease (CKD):     Stage 1 with normal or high GFR (GFR > 90 mL/min/1 73 square meters)    Stage 2 Mild CKD (GFR = 60-89 mL/min/1 73 square meters)    Stage 3A Moderate CKD (GFR = 45-59 mL/min/1 73 square meters)    Stage 3B Moderate CKD (GFR = 30-44 mL/min/1 73 square meters)    Stage 4 Severe CKD (GFR = 15-29 mL/min/1 73 square meters)    Stage 5 End Stage CKD (GFR <15 mL/min/1 73 square meters)  Note: GFR calculation is accurate only with a steady state creatinine    CBC and differential [[id number]]  (Abnormal) Collected: 02/03/22 2015    Lab Status: Final result Specimen: Blood from Line, Venous Updated: 02/03/22 2020     WBC 10 56 Thousand/uL      RBC 4 12 Million/uL      Hemoglobin 11 7 g/dL      Hematocrit 36 9 %      MCV 90 fL      MCH 28 4 pg      MCHC 31 7 g/dL      RDW 16 1 %      MPV 9 2 fL      Platelets 097 Thousands/uL      nRBC 0 /100 WBCs      Neutrophils Relative 87 %      Immat GRANS % 0 %      Lymphocytes Relative 6 %      Monocytes Relative 7 %      Eosinophils Relative 0 %      Basophils Relative 0 %      Neutrophils Absolute 9 10 Thousands/µL      Immature Grans Absolute 0 03 Thousand/uL      Lymphocytes Absolute 0 63 Thousands/µL      Monocytes Absolute 0 76 Thousand/µL      Eosinophils Absolute 0 01 Thousand/µL      Basophils Absolute 0 03 Thousands/µL                  CT pelvis wo contrast   Final Result by Terrance Cabrera MD (02/03 2002)      Comminuted right femoral intertrochanteric fracture with a small adjacent anterior deep soft tissue hematoma measuring 2 4 x 2 1 x 3 4 cm  The study was marked in [facility] for immediate notification  Workstation performed: [id number]         XR hip/pelv 2-3 vws right   Final Result by Terrance Cabrera MD (02/03 2004)      Mildly displaced and varus angulated comminuted right femoral intertrochanteric fracture  Workstation performed: [id number]         XR femur 2 views RIGHT   Final Result by Terrance Cabrera MD (02/03 2004)      Mildly displaced and varus angulated comminuted right femoral intertrochanteric fracture  Workstation performed: [id number]         XR tibia fibula 2 views RIGHT    (Results Pending)   XR knee 4+ views Right injury    (Results Pending)              Procedures  Procedures         ED Course  ED Course as of 02/03/22 2046   Thu Feb 03, 2022   1748 Last Tdap in 2021 2035 Creatinine(!): 2 64  Baseline creatinine 1 3  MDM  Number of Diagnoses or Management Options  Closed intertrochanteric fracture of hip, right, initial encounter [facility]): new and requires workup  Diagnosis management comments: 58yo male presenting after mechanical fall 1 hour ago  C/o right leg pain  Hx of right tibia fracture requiring surgery in October  Refusing to move due to pain so exam is limited  RLE is neurovascularly intact  Vitals are stable  No other injuries seen on secondary survey  Initial ED plan: Check x-rays of right hip, femur, knee, and tib/fib   Oxycodone for pain     Final assessment: Imaging reveals evidence of an intertrochanteric fracture  IV and labs ordered  Will admit for further management  Amount and/or Complexity of Data Reviewed  Clinical lab tests: ordered and reviewed  Tests in the radiology section of CPT®: ordered and reviewed  Review and summarize past medical records: yes  Discuss the patient with other providers: yes  Independent visualization of images, tracings, or specimens: yes    Risk of Complications, Morbidity, and/or Mortality  Presenting problems: moderate  Diagnostic procedures: moderate  Management options: moderate    Patient Progress  Patient progress: stable      Disposition  Final diagnoses:   Closed intertrochanteric fracture of hip, right, initial encounter [facility])     Time reflects when diagnosis was documented in both MDM as applicable and the Disposition within this note     Time User Action Codes Description Comment    2/3/2022  8:45 PM [address] [place] [address] BrandynLili esparza Add [O26 141A] Closed intertrochanteric fracture of hip, right, initial encounter [facility])       ED Disposition     ED Disposition Condition Date/Time Comment    Admit Stable Thu Feb 3, 2022  8:45 PM Case was discussed with Corey Méndez PA-C and the patient's admission status was agreed to be Admission Status: inpatient status to the service of Dr Naveen Huertas   Follow-up Information    None         Patient's Medications   Discharge Prescriptions    No medications on file       No discharge procedures on file      PDMP Review       Value Time User    PDMP Reviewed  Yes 11/5/2021 12:59 PM LUIS ANGEL Jaime          ED Provider  Electronically Signed by           Madie Chu PA-C  02/04/22 0004

## 2022-02-03 NOTE — TELEPHONE ENCOUNTER
Dr Tay Danielle    Patient wife called, patient being seen in ED, he fell and the incision on his knee opened        # [phone]

## 2022-02-04 ENCOUNTER — APPOINTMENT (INPATIENT)
Dept: RADIOLOGY | Facility: HOSPITAL | Age: 67
DRG: 464 | End: 2022-02-04
Payer: COMMERCIAL

## 2022-02-04 PROBLEM — Z01.818 PRE-OPERATIVE CLEARANCE: Status: ACTIVE | Noted: 2022-02-04

## 2022-02-04 LAB
ABO GROUP BLD: NORMAL
ANION GAP SERPL CALCULATED.3IONS-SCNC: 8 MMOL/L (ref 4–13)
APTT PPP: 72 SECONDS (ref 23–37)
BLD GP AB SCN SERPL QL: NEGATIVE
BUN SERPL-MCNC: 28 MG/DL (ref 5–25)
CALCIUM SERPL-MCNC: 8.4 MG/DL (ref 8.3–10.1)
CARDIAC TROPONIN I PNL SERPL HS: 30 NG/L (ref 8–18)
CHLORIDE SERPL-SCNC: 102 MMOL/L (ref 100–108)
CO2 SERPL-SCNC: 22 MMOL/L (ref 21–32)
CREAT SERPL-MCNC: 2.45 MG/DL (ref 0.6–1.3)
ERYTHROCYTE [DISTWIDTH] IN BLOOD BY AUTOMATED COUNT: 16.1 % (ref 11.6–15.1)
GFR SERPL CREATININE-BSD FRML MDRD: 26 ML/MIN/1.73SQ M
GLUCOSE SERPL-MCNC: 115 MG/DL (ref 65–140)
GLUCOSE SERPL-MCNC: 165 MG/DL (ref 65–140)
GLUCOSE SERPL-MCNC: 227 MG/DL (ref 65–140)
GLUCOSE SERPL-MCNC: 227 MG/DL (ref 65–140)
GLUCOSE SERPL-MCNC: 240 MG/DL (ref 65–140)
GLUCOSE SERPL-MCNC: 309 MG/DL (ref 65–140)
HCT VFR BLD AUTO: 32.3 % (ref 36.5–49.3)
HGB BLD-MCNC: 10.6 G/DL (ref 12–17)
INR PPP: 3.97 (ref 0.84–1.19)
MCH RBC QN AUTO: 28.7 PG (ref 26.8–34.3)
MCHC RBC AUTO-ENTMCNC: 32.8 G/DL (ref 31.4–37.4)
MCV RBC AUTO: 88 FL (ref 82–98)
PLATELET # BLD AUTO: 290 THOUSANDS/UL (ref 149–390)
PMV BLD AUTO: 9.4 FL (ref 8.9–12.7)
POTASSIUM SERPL-SCNC: 3.7 MMOL/L (ref 3.5–5.3)
PROTHROMBIN TIME: 36.7 SECONDS (ref 11.6–14.5)
RBC # BLD AUTO: 3.69 MILLION/UL (ref 3.88–5.62)
RH BLD: POSITIVE
SODIUM SERPL-SCNC: 132 MMOL/L (ref 136–145)
SPECIMEN EXPIRATION DATE: NORMAL
WBC # BLD AUTO: 8.63 THOUSAND/UL (ref 4.31–10.16)

## 2022-02-04 PROCEDURE — 99255 IP/OBS CONSLTJ NEW/EST HI 80: CPT | Performed by: ORTHOPAEDIC SURGERY

## 2022-02-04 PROCEDURE — 86901 BLOOD TYPING SEROLOGIC RH(D): CPT | Performed by: NURSE ANESTHETIST, CERTIFIED REGISTERED

## 2022-02-04 PROCEDURE — 86900 BLOOD TYPING SEROLOGIC ABO: CPT | Performed by: NURSE ANESTHETIST, CERTIFIED REGISTERED

## 2022-02-04 PROCEDURE — 84484 ASSAY OF TROPONIN QUANT: CPT | Performed by: PHYSICIAN ASSISTANT

## 2022-02-04 PROCEDURE — 82948 REAGENT STRIP/BLOOD GLUCOSE: CPT

## 2022-02-04 PROCEDURE — 99233 SBSQ HOSP IP/OBS HIGH 50: CPT | Performed by: INTERNAL MEDICINE

## 2022-02-04 PROCEDURE — 85027 COMPLETE CBC AUTOMATED: CPT | Performed by: PHYSICIAN ASSISTANT

## 2022-02-04 PROCEDURE — 80048 BASIC METABOLIC PNL TOTAL CA: CPT | Performed by: PHYSICIAN ASSISTANT

## 2022-02-04 PROCEDURE — 85610 PROTHROMBIN TIME: CPT | Performed by: INTERNAL MEDICINE

## 2022-02-04 PROCEDURE — 71045 X-RAY EXAM CHEST 1 VIEW: CPT

## 2022-02-04 PROCEDURE — 86850 RBC ANTIBODY SCREEN: CPT | Performed by: NURSE ANESTHETIST, CERTIFIED REGISTERED

## 2022-02-04 PROCEDURE — 85730 THROMBOPLASTIN TIME PARTIAL: CPT | Performed by: INTERNAL MEDICINE

## 2022-02-04 RX ORDER — SODIUM CHLORIDE, SODIUM LACTATE, POTASSIUM CHLORIDE, CALCIUM CHLORIDE 600; 310; 30; 20 MG/100ML; MG/100ML; MG/100ML; MG/100ML
100 INJECTION, SOLUTION INTRAVENOUS CONTINUOUS
Status: DISCONTINUED | OUTPATIENT
Start: 2022-02-04 | End: 2022-02-06

## 2022-02-04 RX ADMIN — ACETAMINOPHEN 975 MG: 325 TABLET, FILM COATED ORAL at 06:05

## 2022-02-04 RX ADMIN — HYDROMORPHONE HYDROCHLORIDE 0.2 MG: 0.2 INJECTION, SOLUTION INTRAMUSCULAR; INTRAVENOUS; SUBCUTANEOUS at 22:50

## 2022-02-04 RX ADMIN — INSULIN LISPRO 2 UNITS: 100 INJECTION, SOLUTION INTRAVENOUS; SUBCUTANEOUS at 18:11

## 2022-02-04 RX ADMIN — OXYCODONE HYDROCHLORIDE 5 MG: 5 TABLET ORAL at 00:06

## 2022-02-04 RX ADMIN — OXYCODONE HYDROCHLORIDE 7.5 MG: 5 TABLET ORAL at 20:22

## 2022-02-04 RX ADMIN — INSULIN LISPRO 6 UNITS: 100 INJECTION, SOLUTION INTRAVENOUS; SUBCUTANEOUS at 06:40

## 2022-02-04 RX ADMIN — COLCHICINE 0.6 MG: 0.6 TABLET, FILM COATED ORAL at 10:54

## 2022-02-04 RX ADMIN — LIDOCAINE 1 PATCH: 50 PATCH CUTANEOUS at 12:04

## 2022-02-04 RX ADMIN — PRAVASTATIN SODIUM 40 MG: 40 TABLET ORAL at 18:11

## 2022-02-04 RX ADMIN — OXYCODONE HYDROCHLORIDE 5 MG: 5 TABLET ORAL at 06:14

## 2022-02-04 RX ADMIN — FERROUS SULFATE TAB 325 MG (65 MG ELEMENTAL FE) 325 MG: 325 (65 FE) TAB at 10:54

## 2022-02-04 RX ADMIN — HYDROMORPHONE HYDROCHLORIDE 0.2 MG: 0.2 INJECTION, SOLUTION INTRAMUSCULAR; INTRAVENOUS; SUBCUTANEOUS at 11:01

## 2022-02-04 RX ADMIN — SENNOSIDES AND DOCUSATE SODIUM 1 TABLET: 50; 8.6 TABLET ORAL at 10:54

## 2022-02-04 RX ADMIN — ACETAMINOPHEN 975 MG: 325 TABLET, FILM COATED ORAL at 14:16

## 2022-02-04 RX ADMIN — OXYCODONE HYDROCHLORIDE 7.5 MG: 5 TABLET ORAL at 13:31

## 2022-02-04 RX ADMIN — SODIUM CHLORIDE, SODIUM LACTATE, POTASSIUM CHLORIDE, AND CALCIUM CHLORIDE 100 ML/HR: .6; .31; .03; .02 INJECTION, SOLUTION INTRAVENOUS at 11:00

## 2022-02-04 RX ADMIN — SENNOSIDES AND DOCUSATE SODIUM 1 TABLET: 50; 8.6 TABLET ORAL at 20:22

## 2022-02-04 RX ADMIN — INSULIN LISPRO 3 UNITS: 100 INJECTION, SOLUTION INTRAVENOUS; SUBCUTANEOUS at 22:50

## 2022-02-04 RX ADMIN — INSULIN LISPRO 8 UNITS: 100 INJECTION, SOLUTION INTRAVENOUS; SUBCUTANEOUS at 02:54

## 2022-02-04 RX ADMIN — ACETAMINOPHEN 975 MG: 325 TABLET, FILM COATED ORAL at 22:49

## 2022-02-04 RX ADMIN — INSULIN GLARGINE 10 UNITS: 100 INJECTION, SOLUTION SUBCUTANEOUS at 22:50

## 2022-02-04 NOTE — PLAN OF CARE
Problem: MOBILITY - ADULT  Goal: Maintain or return to baseline ADL function  Description: INTERVENTIONS:  -  Assess patient's ability to carry out ADLs; assess patient's baseline for ADL function and identify physical deficits which impact ability to perform ADLs (bathing, care of mouth/teeth, toileting, grooming, dressing, etc )  - Assess/evaluate cause of self-care deficits   - Assess range of motion  - Assess patient's mobility; develop plan if impaired  - Assess patient's need for assistive devices and provide as appropriate  - Encourage maximum independence but intervene and supervise when necessary  - Involve family in performance of ADLs  - Assess for home care needs following discharge   - Consider OT consult to assist with ADL evaluation and planning for discharge  - Provide patient education as appropriate  Outcome: Progressing  Goal: Maintains/Returns to pre admission functional level  Description: INTERVENTIONS:  - Perform BMAT or MOVE assessment daily    - Set and communicate daily mobility goal to care team and patient/family/caregiver  - Collaborate with rehabilitation services on mobility goals if consulted  - Perform Range of Motion  times a day  - Reposition patient every 2 hours    - Dangle patient 3 times a day  - Stand patient 4 times a day  - Ambulate patient 4 times a day  - Out of bed to chair 3 times a day   - Out of bed for meals 3 times a day  - Out of bed for toileting  - Record patient progress and toleration of activity level   Outcome: Progressing

## 2022-02-04 NOTE — ASSESSMENT & PLAN NOTE
· Status post fall in living room tonight  · See plan under closed displaced intertrochanteric fracture of right femur

## 2022-02-04 NOTE — ASSESSMENT & PLAN NOTE
· Sodium decreased at 130  · Will provide with gentle IV fluid hydration tonight  · Recheck BMP in a m

## 2022-02-04 NOTE — ASSESSMENT & PLAN NOTE
· Status post fall, CT pelvis revealing comminuted right femoral intertrochanteric fracture  · X-rays revealing hardware intact from previous surgical fixation of tibia  · Possible intervention pending creatinine  · Orthopedic surgery following  · Continue to hold warfarin  · Will give vitamin K 5 oral today  · Recheck INR tomorrow  · Pain regimen in place

## 2022-02-04 NOTE — UTILIZATION REVIEW
Inpatient Admission Authorization Request   NOTIFICATION OF INPATIENT ADMISSION/INPATIENT AUTHORIZATION REQUEST   SERVICING FACILITY:   [address] [place] [address]  Tax ID: [id number]  NPI: [id number]  Place of Service: [facility]patient [facility]  [address]  Place of Service Code: 24     ATTENDING PROVIDER:  Attending Name and NPI#: Suzanne Michel [[id number]]  Address: [address] [place] [address]  Phone: [phone]     UTILIZATION REVIEW CONTACT:  Sally Castaneda, Utilization   Network Utilization Review Department  Phone: [phone]  Fax [phone]  Email: [email or web address]     PHYSICIAN ADVISORY SERVICES:  FOR [id number] REVIEW - MEDICAL NECESSITY DENIAL  Phone: [phone]  Fax: [phone]  Email: [email or web address]     TYPE OF REQUEST:  Inpatient Status     ADMISSION INFORMATION:  ADMISSION DATE/TIME: 2/3/22  8:46 PM  PATIENT DIAGNOSIS CODE/DESCRIPTION:  Hematoma [T14  8XXA]  Leg laceration [S81 819A]  Closed intertrochanteric fracture of hip, right, initial encounter ([facility] [address] ) [S72 141A]  DISCHARGE DATE/TIME: No discharge date for patient encounter  DISCHARGE DISPOSITION (IF DISCHARGED): Non [facility] SNF/TCU/SNU     IMPORTANT INFORMATION:  Please contact the Sally Castaneda directly with any questions or concerns regarding this request  Department voicemails are confidential     Send requests for admission clinical reviews, concurrent reviews, approvals, and administrative denials due to lack of clinical to fax [phone]

## 2022-02-04 NOTE — ASSESSMENT & PLAN NOTE
· CT pelvis revealing small adjacent anterior deep soft tissue hematoma 2 4 cm x 3 4 cm near right femoral fracture  · No signs of ecchymosis currently, vital signs stable  · Monitor CBC in a m    · For now will hold home warfarin

## 2022-02-04 NOTE — ASSESSMENT & PLAN NOTE
· Status post fall, CT pelvis revealing comminuted right femoral intertrochanteric fracture  · X-rays revealing hardware intact from previous surgical fixation of tibia  · Plan for possible intervention tomorrow pending creatinine  · Orthopedic surgery following  · Will make NPO at midnight  · Continue to hold warfarin  · Pain regimen in place

## 2022-02-04 NOTE — PROGRESS NOTES
[facility]  Progress Note Danny Palomares 1955, 61 y o  male MRN: [id number]  Unit/Bed#: -02 Encounter: [id number]  Primary Care Provider: Lisa Rodriguez DO   Date and time admitted to hospital: 2/3/2022  5:11 PM    * Closed displaced intertrochanteric fracture of right femur [facility])  Assessment & Plan  · Status post fall, CT pelvis revealing comminuted right femoral intertrochanteric fracture  · X-rays revealing hardware intact from previous surgical fixation of tibia  · Plan for possible intervention tomorrow pending creatinine  · Orthopedic surgery following  · Will make NPO at midnight  · Continue to hold warfarin  · Pain regimen in place    ROSE (acute kidney injury) ([facility] [address] )  Assessment & Plan  · Creatinine slowly improving, baseline around 1 3-1 5  · Will continue with IV fluids 100 mL/hours  · Continue monitor  · Hold home lisinopril for now    Type 2 diabetes mellitus with hyperglycemia [facility])  Assessment & Plan  Lab Results   Component Value Date    HGBA1C 8 2 (H) 10/16/2021       Recent Labs     02/03/22  2207 02/04/22  0227 02/04/22  0624   POCGLU 335* 309* 227*       Blood Sugar Average: Last 72 hrs:  · (P) 290 [id number]  · Insulin decreased to 10 units q h s  For tonight given patient will be made NPO at midnight  · Correctional scale insulin  · Hypoglycemia protocol    Pre-operative clearance  Assessment & Plan  · EKG-normal sinus rhythm  · Chest x-ray on my read no acute cardiopulmonary disease  · METS >4  · RCRI class 3 risk; 10 1% 30 day risk of death, mi or cardiac arrest  · Patient is intermediate risk for low risk procedure  · Will reassess preoperative clearance tomorrow pending repeat BMP    Hematoma  Assessment & Plan  · CT pelvis revealing small adjacent anterior deep soft tissue hematoma 2 4 cm x 3 4 cm near right femoral fracture  · No signs of ecchymosis currently, vital signs stable  · Monitor CBC in a m    · For now will hold home warfarin    Hyponatremia  Assessment & Plan  · Improving with hydration  · Will provide with gentle IV fluid hydration tonight  · Continue to monitor    Ambulatory dysfunction  Assessment & Plan  · Status post fall in living room tonight  · See plan under closed displaced intertrochanteric fracture of right femur      Anemia  Assessment & Plan  · Hemoglobin stable, baseline around 7-8  · No active bleeding currently  · Continue to monitor        VTE Pharmacologic Prophylaxis: VTE Score: 11 High Risk (Score >/= 5) - Pharmacological DVT Prophylaxis Contraindicated  Sequential Compression Devices Ordered  Patient Centered Rounds: I performed bedside rounds with nursing staff today  Discussions with Specialists or Other Care Team Provider:  Orthopedic surgery, case management    Education and Discussions with Family / Patient: Updated  (wife) at bedside  Time Spent for Care: 30 minutes  More than 50% of total time spent on counseling and coordination of care as described above  Current Length of Stay: 1 day(s)  Current Patient Status: Inpatient   Certification Statement: The patient will continue to require additional inpatient hospital stay due to Leg fracture  Discharge Plan: Anticipate discharge in 48-72 hrs to discharge location to be determined pending rehab evaluations  Code Status: Level 1 - Full Code    Subjective:   Patient resting comfortably on examination  Patient was grumpy stating he wanted to eat this morning  Did explain to patient that I need to talk with orthopedic surgery prior to this  Diet has been placed at this time  Objective:     Vitals:   Temp (24hrs), Av 2 °F (37 3 °C), Min:98 2 °F (36 8 °C), Max:100 1 °F (37 8 °C)    Temp:  [98 2 °F (36 8 °C)-100 1 °F (37 8 °C)] 100 1 °F (37 8 °C)  HR:  [85-89] 85  Resp:  [22-24] 24  BP: (163-193)/(70-91) 184/88  SpO2:  [96 %-100 %] 97 %  There is no height or weight on file to calculate BMI       Input and Output Summary (last 24 hours): Intake/Output Summary (Last 24 hours) at 2/4/2022 1013  Last data filed at 2/4/2022 0801  Gross per 24 hour   Intake --   Output 600 ml   Net -600 ml       Physical Exam:   Physical Exam  Vitals and nursing note reviewed  Constitutional:       General: He is not in acute distress  Appearance: He is well-developed  HENT:      Head: Normocephalic and atraumatic  Eyes:      General: No scleral icterus  Conjunctiva/sclera: Conjunctivae normal    Cardiovascular:      Rate and Rhythm: Normal rate and regular rhythm  Heart sounds: Normal heart sounds  No murmur heard  No friction rub  No gallop  Pulmonary:      Effort: Pulmonary effort is normal  No respiratory distress  Breath sounds: Normal breath sounds  No wheezing or rales  Abdominal:      General: Bowel sounds are normal  There is no distension  Palpations: Abdomen is soft  Tenderness: There is no abdominal tenderness  Musculoskeletal:         General: Normal range of motion  Skin:     General: Skin is warm  Findings: No rash  Neurological:      Mental Status: He is alert and oriented to person, place, and time  Additional Data:     Labs:  Results from last 7 days   Lab Units 02/04/22  0648 02/03/22 2015 02/03/22 2015   WBC Thousand/uL 8 63   < > 10 56*   HEMOGLOBIN g/dL 10 6*   < > 11 7*   HEMATOCRIT % 32 3*   < > 36 9   PLATELETS Thousands/uL 290   < > 312   NEUTROS PCT %  --   --  87*   LYMPHS PCT %  --   --  6*   MONOS PCT %  --   --  7   EOS PCT %  --   --  0    < > = values in this interval not displayed       Results from last 7 days   Lab Units 02/04/22  0648   SODIUM mmol/L 132*   POTASSIUM mmol/L 3 7   CHLORIDE mmol/L 102   CO2 mmol/L 22   BUN mg/dL 28*   CREATININE mg/dL 2 45*   ANION GAP mmol/L 8   CALCIUM mg/dL 8 4   GLUCOSE RANDOM mg/dL 227*         Results from last 7 days   Lab Units 02/04/22  0624 02/04/22 0227 02/03/22 2207   POC GLUCOSE mg/dl 227* 309* 335* Lines/Drains:  Invasive Devices  Report    Peripheral Intravenous Line            Peripheral IV 02/03/22 Right Antecubital <1 day                      Imaging: No pertinent imaging reviewed  Recent Cultures (last 7 days):         Last 24 Hours Medication List:   Current Facility-Administered Medications   Medication Dose Route Frequency Provider Last Rate    acetaminophen  975 mg Oral Q8H [address] Isaura Temple PA-C      aluminum-magnesium hydroxide-simethicone  30 mL Oral Q6H PRN Randy Slider, PACierraC      colchicine  0 6 mg Oral Daily Randy Slider, PACierraC      ferrous sulfate  325 mg Oral Daily With Breakfast Randy Slider, PACierraC      HYDROmorphone  0 2 mg Intravenous Q4H PRN Randy Slider, PA-C      hydrOXYzine HCL  25 mg Oral Q6H PRN Randy Slider, PA-C      insulin glargine  10 Units Subcutaneous HS Randy Slider, PA-C      insulin lispro  1-6 Units Subcutaneous HS Khoa Thompson,       insulin lispro  2-12 Units Subcutaneous TID AC Khoa Thompson,       lactated ringers  100 mL/hr Intravenous Continuous Khoa Thompson,       lidocaine  1 patch Topical Daily PRN Randy Slider, PACierraC      naloxone  0 04 mg Intravenous Q1MIN PRN Randy Slider, PACierraC      oxyCODONE  5 mg Oral Q4H PRN Randy Slider, PASHAYY      Or    oxyCODONE  7 5 mg Oral Q4H PRN Randy Slider, PA-C      polyethylene glycol  17 g Oral Daily PRN Randy Slider, PACierraC      pravastatin  40 mg Oral Daily With Dinner Randy Slider, PASHAYY      senna-docusate sodium  1 tablet Oral BID Randy Slider, PASHAYY          Today, Patient Was Seen By: Khoa Thompson DO    **Please Note: This note may have been constructed using a voice recognition system  **

## 2022-02-04 NOTE — CASE MANAGEMENT
Case Management Assessment & Discharge Planning Note    Patient name Eloisa [facility]  Location [address] [id number] MRN [id number]  : 1955 Date 2022       Current Admission Date: 2/3/2022  Current Admission Diagnosis:Closed displaced intertrochanteric fracture of right femur [facility])   Patient Active Problem List    Diagnosis Date Noted    Closed displaced intertrochanteric fracture of right femur ([facility] [address] ) 2022    Hyponatremia 2022    Hematoma 2022    Acute idiopathic gout of multiple sites 2022    Pain and swelling of right lower leg 2021    Ambulatory dysfunction 2021    Displaced bicondylar fracture of right tibia, subsequent encounter for closed fracture with routine healing 2021    Right forearm superficial thrombophlebitis 2021    Anemia 10/26/2021    Kyphosis of cervicothoracic region 10/25/2021    Pseudogout of knee, left 10/17/2021    Pain and swelling of left knee 10/16/2021    ROSE (acute kidney injury) ([facility] [address] ) 10/13/2021    Closed fracture of proximal end of right tibia 10/13/2021    Type 2 diabetes mellitus with hyperglycemia ([facility] ) 10/05/2021    Class 2 obesity in adult 2021    Type 2 diabetes mellitus with diabetic peripheral angiopathy without gangrene ([facility] [address] ) 10/08/2019    Other insomnia 2017    Type 2 diabetes mellitus with diabetic neuropathy, without long-term current use of insulin ([facility] [address] ) 2017    Lumbar degenerative disc disease 10/21/2014    Acute embolism and thrombosis of unspecified deep veins of unspecified lower extremity ([facility] [address] ) 10/20/2014    Essential hypertension 10/20/2014    Mixed hyperlipidemia 10/20/2014    Peripheral vascular disease ([facility] [address] ) 10/20/2014      LOS (days): 1  Geometric Mean LOS (GMLOS) (days):   Days to GMLOS:     OBJECTIVE:    Risk of Unplanned Readmission Score: 20         Current admission status: Inpatient       Preferred Pharmacy:   [facility]/pharmacy #[id number] Kenney, [address] [place] [address] [address] [facility] [address]  Phone: [phone] Fax: [phone]    [facility] [facility] - [address] [address]  Phone: [phone] Fax: [phone]  [address] Nina [place], [place] - [address]   [address] [facility] [address]  Phone: [phone] Fax: [phone]    [address], [address]  [address]  Phone: [phone] Fax: [phone]    Primary Care Provider: Arielle Martínez DO    Primary Insurance: BLUE CROSS  Secondary Insurance: Jamee Shi [facility] REP    ASSESSMENT:  31 [address] LouisVannessa - Spouse   Primary Phone: [phone] (Mobile)  Home Phone: [phone]                         Readmission Root Cause  30 Day Readmission: No    Patient Information  Admitted from[de-identified] Home  Mental Status: Alert  During Assessment patient was accompanied by: Not accompanied during assessment  Assessment information provided by[de-identified] Patient  Primary Caregiver: Self  Support Systems: Spouse/significant other,Son  [place] of Residence: [place] [address] do you live in?: [facility] entry access options   Select all that apply : Stairs  Number of steps to enter home : 7  Do the steps have railings?: Yes  Type of Current Residence: [address] [place] [address] home  Upon entering residence, is there a bedroom on the main floor (no further steps)?: Yes  Upon entering residence, is there a bathroom on the main floor (no further steps)?: Yes  In the last 12 months, was there a time when you were not able to pay the mortgage or rent on time?: No  In the last 12 months, how many places have you lived?: 1  In the last 12 months, was there a time when you did not have a steady place to sleep or slept in a shelter (including now)?: No  Homeless/housing insecurity resource given?: N/A  Living Arrangements: Lives w/ Spouse/significant other  Is patient a ?: No    Activities of Daily Living Prior to Admission  Functional Status: Independent  Completes ADLs independently?: Yes  Ambulates independently?: Yes  Does patient use assisted devices?: No  Does patient currently own DME?: No  Does patient have a history of Outpatient Therapy (PT/OT)?: No  Does the patient have a history of Short-Term Rehab?: Yes (Pending sale to [facility])  Does patient have a history of HHC?: No  Does patient currently have [place] [address]?: No         Patient Information Continued  Income Source: Employed  Does patient have prescription coverage?: Yes  Within the past 12 months, you worried that your food would run out before you got the money to buy more : Never true  Within the past 12 months, the food you bought just didnt last and you didnt have money to get more : Never true  Food insecurity resource given?: N/A  Does patient receive dialysis treatments?: No  Does patient have a history of substance abuse?: No  Does patient have a history of Mental Health Diagnosis?: No         Means of Transportation  Means of Transport to Appts[de-identified] Drives Self  In the past 12 months, has lack of transportation kept you from medical appointments or from getting medications?: No  In the past 12 months, has lack of transportation kept you from meetings, work, or from getting things needed for daily living?: No  Was application for public transport provided?: N/A        DISCHARGE DETAILS:    Discharge planning discussed with[de-identified] PATIENT  Freedom of Choice: Yes  Comments - Freedom of Choice: WANTS TO SEE PT OT RECOMMENDATIONS  WAS AT Pending sale to [facility] IN THE PAST    CM contacted family/caregiver?: Yes  Were Treatment Team discharge recommendations reviewed with patient/caregiver?: Yes  Did patient/caregiver verbalize understanding of patient care needs?: Yes  Were patient/caregiver advised of the risks associated with not following Treatment Team discharge recommendations?: Yes    Contacts  Patient Contacts: wife-Rebecca  Relationship to Patient[de-identified] Family  Contact Method: Phone  Phone Number: see facesheet  Reason/Outcome: Continuity of [address]         Is the patient interested in [facility] at discharge?: No    DME Referral Provided  Referral made for DME?: No              Treatment Team Recommendation: Short Term Rehab  Discharge Destination Plan[de-identified] Short Term Rehab  Transport at Discharge : BLS Ambulance

## 2022-02-04 NOTE — UTILIZATION REVIEW
Initial Clinical Review    Admission: Date/Time/Statement:   Admission Orders (From admission, onward)     Ordered        02/03/22 2046  Inpatient Admission  Once                      Orders Placed This Encounter   Procedures    Inpatient Admission     Standing Status:   Standing     Number of Occurrences:   1     Order Specific Question:   Level of Care     Answer:   Med Surg [16]     Order Specific Question:   Estimated length of stay     Answer:   More than 2 Midnights     Order Specific Question:   Certification     Answer:   I certify that inpatient services are medically necessary for this patient for a duration of greater than two midnights  See H&P and MD Progress Notes for additional information about the patient's course of treatment  ED Arrival Information     Expected Arrival Acuity    - 2/3/2022 17:10 Urgent         Means of arrival Escorted by Service Admission type    Ambulance [facility] Urgent         Arrival complaint            Chief Complaint   Patient presents with    Fall     pt slipped on ice & fell, laceration to R leg, on coumadin  Denies head trauma, c/o back pain & leg pain  R leg repair 2021       Initial Presentation: 60 yo male to ED from home w/ amb dysfunction , R hip s/p fall in living room this evening   Walking and slipped and fell R side w/ immediate R hip pain   CT pelvis revealing comminuted right femoral intertrochanteric fracture   Admitted IP status for femur fx , plan NWB , ortho consult , NPO after MN , hold coumadin , pain control   + hematoma , monitor cbc and hold coumadin  Na 130 given gentle IVF and recheck BMP in am   ROSE CR 2 64, baseline 1 3-1 5 given gentle IVF and recheck in am , hold home lisinopril   DM SSI and monitor   Date: 2/4  Day 2: ortho consulted , plan for OR 2/5 after medical clearance   EKG NSR , METS>4, RCRI class 3 risk   intermediate risk   Repeat BMP in am   NPO after MN , pain regimen   Cont IVF for ROSE Cr slowly improving      2/4 Ortho Consult   right comminuted femoral intertrochanteric fracture, NWB , pain control , preop labs , NPO after MN 2/4, plan for surgical intervention pending medical clearance   ED Triage Vitals   Temperature Pulse Respirations Blood Pressure SpO2   02/03/22 2144 02/03/22 1724 02/03/22 1724 02/03/22 1724 02/03/22 1724   98 2 °F (36 8 °C) 85 22 163/70 100 %      Temp Source Heart Rate Source Patient Position - Orthostatic VS BP Location FiO2 (%)   02/03/22 2144 02/03/22 1724 02/03/22 1724 02/03/22 1724 --   Oral Monitor Lying Right arm       Pain Score       02/03/22 1754       8          Wt Readings from Last 1 Encounters:   12/28/21 133 kg (294 lb)     Additional Vital Signs:   02/03/22 23:51:04 100 1 °F (37 8 °C) 85 24 Abnormal  184/88 Abnormal  120 97 % -- --   02/03/22 2144 98 2 °F (36 8 °C) 89 22 193/91 Abnormal  -- 96 % None (Room air) Lying       Pertinent Labs/Diagnostic Test Results:   2/3 R femur xray Mildly displaced and varus angulated comminuted right femoral intertrochanteric fracture    2/3R hip xray   Mildly displaced and varus angulated comminuted right femoral intertrochanteric fracture  2/3 R kneeStable alignment of the proximal tibia post-ORIF  2/3 R tib/fib Healed proximal tibial fracture status post ORIF  No acute osseous abnormality    2/3 CT pelvis Comminuted right femoral intertrochanteric fracture with a small adjacent anterior deep soft tissue hematoma measuring 2 4 x 2 1 x 3 4 cm    Results from last 7 days   Lab Units 02/03/22 2113   SARS-COV-2  Negative     Results from last 7 days   Lab Units 02/04/22  0648 02/03/22 2015   WBC Thousand/uL 8 63 10 56*   HEMOGLOBIN g/dL 10 6* 11 7*   HEMATOCRIT % 32 3* 36 9   PLATELETS Thousands/uL 290 312   NEUTROS ABS Thousands/µL  --  9 10*     Results from last 7 days   Lab Units 02/04/22  0648 02/03/22 2015   SODIUM mmol/L 132* 130*   POTASSIUM mmol/L 3 7 4 1   CHLORIDE mmol/L 102 99*   CO2 mmol/L 22 22   ANION GAP mmol/L 8 9   BUN mg/dL 28* 29*   CREATININE mg/dL 2 45* 2 64*   EGFR ml/min/1 73sq m 26 24   CALCIUM mg/dL 8 4 8 7     Results from last 7 days   Lab Units 02/04/22  0624 02/04/22 0227 02/03/22 2207   POC GLUCOSE mg/dl 227* 309* 335*     Results from last 7 days   Lab Units 02/04/22  0648 02/03/22 2015   GLUCOSE RANDOM mg/dL 227* 317*       Results from last 7 days   Lab Units 02/03/22  2113   INFLUENZA A PCR  Negative   INFLUENZA B PCR  Negative   RSV PCR  Negative     ED Treatment:   Medication Administration from 02/03/2022 1710 to 02/03/2022 2317       Date/Time Order Dose Route Action     02/03/2022 1754 oxyCODONE (ROXICODONE) immediate release tablet 10 mg 10 mg Oral Given     02/03/2022 2028 HYDROmorphone (DILAUDID) injection 0 5 mg 0 5 mg Intravenous Given     02/03/2022 2055 sodium chloride 0 9 % bolus 1,000 mL 1,000 mL Intravenous New Bag     02/03/2022 2216 insulin glargine (LANTUS) subcutaneous injection 25 Units 0 25 mL 25 Units Subcutaneous Given     02/03/2022 2217 sodium chloride 0 9 % infusion 50 mL/hr Intravenous New Bag        Past Medical History:   Diagnosis Date    Back pain     [facility]     DVT (deep vein thrombosis) in pregnancy     Hard to intubate 10/18/2021    Glidescope with #3 blade and bougie used to pass 6 0 ETT    Hyperlipidemia     Hypertension      Present on Admission:   Closed displaced intertrochanteric fracture of right femur ([facility] [address] )   Ambulatory dysfunction   Anemia   Hyponatremia   ROSE (acute kidney injury) ([facility] )   Type 2 diabetes mellitus with hyperglycemia ([facility] [address] )   Hematoma      Admitting Diagnosis: Hematoma [T14  8XXA]  Leg laceration [S81 819A]  Closed intertrochanteric fracture of hip, right, initial encounter ([facility] ) [S72 141A]  Age/Sex: 61 y o  male  Admission Orders:  Scheduled Medications:  acetaminophen, 975 mg, Oral, Q8H [address]  colchicine, 0 6 mg, Oral, Daily  ferrous sulfate, 325 mg, Oral, Daily With Breakfast  insulin glargine, 10 Units, Subcutaneous, HS  insulin lispro, 2-12 Units, Subcutaneous, Q6H SHASHI  pravastatin, 40 mg, Oral, Daily With Dinner  senna-docusate sodium, 1 tablet, Oral, BID      Continuous IV Infusions:  sodium chloride, 50 mL/hr, Intravenous, Continuous      PRN Meds:  aluminum-magnesium hydroxide-simethicone, 30 mL, Oral, Q6H PRN  HYDROmorphone, 0 2 mg, Intravenous, Q4H PRN  2/4 x1  hydrOXYzine HCL, 25 mg, Oral, Q6H PRN  lidocaine, 1 patch, Topical, Daily PRN  naloxone, 0 04 mg, Intravenous, Q1MIN PRN  oxyCODONE, 5 mg, Oral, Q4H PRN   Or  oxyCODONE, 7 5 mg, Oral, Q4H PRN  polyethylene glycol, 17 g, Oral, Daily PRN    Fingerstick q6hr   NPO   Bedrest   NWB    IP CONSULT TO ORTHOPEDIC SURGERY    Network Utilization Review Department  ATTENTION: Please call with any questions or concerns to [phone] and carefully listen to the prompts so that you are directed to the right person  All voicemails are confidential   Francesca Omlstead all requests for admission clinical reviews, approved or denied determinations and any other requests to dedicated fax number below belonging to the campus where the patient is receiving treatment   List of dedicated fax numbers for the Facilities:  [address] DENIALS (Administrative/Medical Necessity) [phone]   [address] (Maternity/NICU/Pediatrics) [phone]   [address] [place] [address]  [phone]   [address] [place] [address] [facility] [place] [address] [address] [place] [address] [address] [facility] [place] [address] [phone]

## 2022-02-04 NOTE — ASSESSMENT & PLAN NOTE
Lab Results   Component Value Date    HGBA1C 8 2 (H) 10/16/2021       Recent Labs     02/03/22  2207 02/04/22  0227 02/04/22  0624 02/04/22  1211   POCGLU 335* 309* 227* 115       Blood Sugar Average: Last 72 hrs:  (P) 246 5  · Will increase insulin back to home dose of 25 units q h s    · Correctional scale insulin  · Hypoglycemia protocol

## 2022-02-04 NOTE — CONSULTS
Orthopedics   Neville Mace 61 y o  male MRN: [id number]  Unit/Bed#: -02      Chief Complaint:   right hip pain    HPI:   61 y o male with a past medical history of insulin-dependent diabetes, hypertension, hyperlipidemia, and previous DVT on Coumadin presents to the ED yesterday complaining of right hip pain and inability to bear weight  Patient states he was walking in his house yesterday when he slipped on a wet floor and fell onto his right hip  Pain is localized to right hip without radiation into distal right lower extremity  Patient denies any numbness and tingling into right lower extremity  Patient has a history of right proximal tibial plateau/shaft ORIF performed on 10/25/2021 by Dr Cheko Mendoza       Review Of Systems:   · Skin: Normal  · Neuro: See HPI  · Musculoskeletal: See HPI  · 14 point review of systems negative except as stated above     Past Medical History:   Past Medical History:   Diagnosis Date    Back pain     Diabetes mellitus ([address] )     DVT (deep vein thrombosis) in pregnancy     Hard to intubate 10/18/2021    Glidescope with #3 blade and bougie used to pass 6 0 ETT    Hyperlipidemia     Hypertension        Past Surgical History:   Past Surgical History:   Procedure Laterality Date    CATARACT EXTRACTION      COLONOSCOPY      6/2014    CYST REMOVAL      EXTERNAL FIXATOR APPLICATION Right 07/44/1078    Procedure: Application External Fixation Device right lower extremity;  Surgeon: Pao Cam MD;  [facility];  Service: Orthopedics    HAND SURGERY      HEEL SPUR SURGERY      foot surgery    ORIF TIBIA FRACTURE Right 10/25/2021    Procedure: OPEN REDUCTION W/ INTERNAL FIXATION (ORIF) RIGHT PROXIMAL TIBIA SHAFT FRACTURE, REMOVAL OF EXTERNAL FIXATOR;  Surgeon: Pao Cam MD;  [facility];  Service: Orthopedics       Family History:  Family history reviewed and non-contributory  Family History   Problem Relation Age of Onset    Dementia Mother    Ernesto Perez Melanoma Mother        Social History:  Social History     Socioeconomic History    Marital status: /Civil Union     Spouse name: None    Number of children: None    Years of education: None    Highest education level: None   Occupational History    None   Tobacco Use    Smoking status: Light Tobacco Smoker     Packs/day: 0 25    Smokeless tobacco: Never Used    Tobacco comment: Per allscripts, current smoker   Vaping Use    Vaping Use: Never used   Substance and Sexual Activity    Alcohol use: Never     Comment: Per allscripts, occasional use    Drug use: Not Currently    Sexual activity: None   Other Topics Concern    None   Social History Narrative    None     Social Determinants of Health     Financial Resource Strain: Not on file   Food Insecurity: Not on file   Transportation Needs: No Transportation Needs    Lack of Transportation (Medical): No    Lack of Transportation (Non-Medical): No   Physical Activity: Not on file   Stress: Not on file   Social Connections: Not on file   Intimate Partner Violence: Not on file   Housing Stability: Not on file       Allergies:    Allergies   Allergen Reactions    Byetta 10 Mcg Pen [Exenatide] Swelling    Pollen Extract Myalgia           Labs:  0   Lab Value Date/Time    HCT 32 3 (L) 02/04/2022 0648    HCT 36 9 02/03/2022 2015    HCT 36 5 12/16/2021 0749    HCT 38 4 07/13/2015 1448    HCT 38 6 10/20/2014 1132    HGB 10 6 (L) 02/04/2022 0648    HGB 11 7 (L) 02/03/2022 2015    HGB 11 8 (L) 12/16/2021 0749    HGB 13 1 07/13/2015 1448    HGB 13 1 10/20/2014 1132    INR 1 59 (H) 12/16/2021 0749    INR 2 94 (H) 12/11/2015 1543    WBC 8 63 02/04/2022 0648    WBC 10 56 (H) 02/03/2022 2015    WBC 8 95 12/16/2021 0749    WBC 7 7 07/13/2015 1448    WBC 8 30 10/20/2014 1132    ESR 58 (H) 05/20/2016 1207       Meds:    Current Facility-Administered Medications:     acetaminophen (TYLENOL) tablet 975 mg, 975 mg, Oral, Q8H [facility], 975 mg at 02/04/22 0605    aluminum-magnesium hydroxide-simethicone (MYLANTA) oral suspension 30 mL, 30 mL, Oral, Q6H PRN, Laila Cordova PA-C    colchicine (COLCRYS) tablet 0 6 mg, 0 6 mg, Oral, Daily, Laila Cordova PA-C    ferrous sulfate tablet 325 mg, 325 mg, Oral, Daily With Breakfast, Laila Cordova PA-C    HYDROmorphone HCl (DILAUDID) injection 0 2 mg, 0 2 mg, Intravenous, Q4H PRN, Laila Cordova PA-C    hydrOXYzine HCL (ATARAX) tablet 25 mg, 25 mg, Oral, Q6H PRN, Laila Cordova PA-C    insulin glargine (LANTUS) subcutaneous injection 10 Units 0 1 mL, 10 Units, Subcutaneous, HS, Isaura Temple PA-C    insulin lispro (HumaLOG) 100 units/mL subcutaneous injection 2-12 Units, 2-12 Units, Subcutaneous, Q6H SHASHI, 6 Units at 02/04/22 0640 **AND** Fingerstick Glucose (POCT), , , Q6H, Isaura Temple PA-C    lidocaine (LIDODERM) 5 % patch 1 patch, 1 patch, Topical, Daily PRN, Laila Cordova PA-C    naloxone (NARCAN) 0 04 mg/mL syringe 0 04 mg, 0 04 mg, Intravenous, Q1MIN PRN, Laila Cordova PA-C    oxyCODONE (ROXICODONE) IR tablet 5 mg, 5 mg, Oral, Q4H PRN, 5 mg at 02/04/22 9141 **OR** oxyCODONE (ROXICODONE) IR tablet 7 5 mg, 7 5 mg, Oral, Q4H PRN, Laila Cordova PA-C    polyethylene glycol (MIRALAX) packet 17 g, 17 g, Oral, Daily PRN, Laila Cordova PA-C    pravastatin (PRAVACHOL) tablet 40 mg, 40 mg, Oral, Daily With Dinner, Laila Cordova PA-C    senna-docusate sodium (SENOKOT S) 8 6-50 mg per tablet 1 tablet, 1 tablet, Oral, BID, Isaura Temple PA-C    sodium chloride 0 9 % infusion, 50 mL/hr, Intravenous, Continuous, Isaura Temple PA-C, Last Rate: 50 mL/hr at 02/03/22 2217, 50 mL/hr at 02/03/22 2217    Blood Culture:   No results found for: BLOODCX    Wound Culture:   No results found for: WOUNDCULT    Ins and Outs:  No intake/output data recorded            Physical Exam:   BP (!) 184/88   Pulse 85   Temp 100 1 °F (37 8 °C)   Resp (!) 24   SpO2 97%   Gen: Alert and oriented to person, place, time  HEENT: EOMI, eyes clear, moist mucus membranes, hearing intact  Respiratory: Bilateral chest rise  No audible wheezing found  Cardiovascular: Regular Rate and Rhythm  Abdomen: soft nontender/nondistended  Musculoskeletal: right lower extremity  · Skin intact without erythema   · Tender to palpation over hip  · Pain with int/external rotation  · Sensation intact L3-S1  · Positive ankle dorsi/plantar flexion, EHL/FHL  · 2+ DP pulse  · Calf is supple and nontender    Radiology:   I personally reviewed the films  X-rays right hip shows mildly displaced and varus angulated comminuted right femoral intertrochanteric fracture  CT of pelvis shows comminuted right femoral intertrochanteric fracture    _*_*_*_*_*_*_*_*_*_*_*_*_*_*_*_*_*_*_*_*_*_*_*_*_*_*_*_*_*_*_*_*_*_*_*_*_*_*_*_*_*    Assessment:  66 y o male with right comminuted femoral intertrochanteric fracture    Plan:   · Non weight bearing right lower extremity  · Analgesics per primary   · Pre op labs in ED  · NPO at midnight tonight  · Primary team for all medical management and preoperative risk stratification  · There is no height or weight on file to calculate BMI  moderately obese  Recommend behavior modifications  · Dispo: Ortho will follow  Discussed surgical intervention with the patient  Awaiting INR results prior to surgical intervention  We will proceed with surgical intervention tomorrow pending medical clearance  Surgical consent will be obtained at that time       Danella Goltz, PA-C

## 2022-02-04 NOTE — ASSESSMENT & PLAN NOTE
Lab Results   Component Value Date    HGBA1C 8 2 (H) 10/16/2021       Recent Labs     02/03/22 2207 02/04/22 0227 02/04/22  0624   POCGLU 335* 309* 227*       Blood Sugar Average: Last 72 hrs:  · (P) 514 [id number]  · Insulin decreased to 10 units q h s   For tonight given patient will be made NPO at midnight  · Correctional scale insulin  · Hypoglycemia protocol

## 2022-02-04 NOTE — ED NOTES
Pt received in [facility], report taken,  Pt has a bed and is now being transported to room 304  Stable       Angeles Pope RN  02/03/22 5870

## 2022-02-04 NOTE — ASSESSMENT & PLAN NOTE
· Creatinine currently 2 64, baseline around 1 3-1 5  · Will provide with gentle IV fluid hydration at 50 mL/hr tonight  · Recheck BMP in a m   · Hold home lisinopril for now

## 2022-02-04 NOTE — ASSESSMENT & PLAN NOTE
· Improving with hydration  · Will provide with gentle IV fluid hydration tonight  · Continue to monitor

## 2022-02-04 NOTE — ASSESSMENT & PLAN NOTE
· Status post fall, CT pelvis revealing comminuted right femoral intertrochanteric fracture  · X-rays revealing hardware intact from previous surgical fixation of tibia  · Tonight will keep bed rest, nonweightbearing of right lower extremity  · Appreciate Orthopedic consult, will keep NPO at midnight  · Will hold home warfarin and aspirin for now pending orthopedic consult, probable surgery  · Start schedule 905 mg q 8 hours Tylenol, p r n  Lidocaine patch, oxycodone for moderate to severe pain, IV Dilaudid p r n   For breakthrough pain

## 2022-02-04 NOTE — ASSESSMENT & PLAN NOTE
· Creatinine slowly improving, baseline around 1 3-1 5  · Will continue with IV fluids 100 mL/hours  · Continue monitor  · Hold home lisinopril for now

## 2022-02-04 NOTE — H&P
[address]  H&P- Matthew Healy 1955, 61 y o  male MRN: [id number]  Unit/Bed#: [facility] Encounter: [id number]  Primary Care Provider: Etta Mejia DO   Date and time admitted to hospital: 2/3/2022  5:11 PM    * Closed displaced intertrochanteric fracture of right femur [facility])  Assessment & Plan  · Status post fall, CT pelvis revealing comminuted right femoral intertrochanteric fracture  · X-rays revealing hardware intact from previous surgical fixation of tibia  · Tonight will keep bed rest, nonweightbearing of right lower extremity  · Appreciate Orthopedic consult, will keep NPO at midnight  · Will hold home warfarin and aspirin for now pending orthopedic consult, probable surgery  · Start schedule 905 mg q 8 hours Tylenol, p r n  Lidocaine patch, oxycodone for moderate to severe pain, IV Dilaudid p r n  For breakthrough pain    Hematoma  Assessment & Plan  · CT pelvis revealing small adjacent anterior deep soft tissue hematoma 2 4 cm x 3 4 cm near right femoral fracture  · No signs of ecchymosis currently, vital signs stable  · Monitor CBC in a m  · For now will hold home warfarin    Hyponatremia  Assessment & Plan  · Sodium decreased at 130  · Will provide with gentle IV fluid hydration tonight  · Recheck BMP in a m  Ambulatory dysfunction  Assessment & Plan  · Status post fall in living room tonight  · See plan under closed displaced intertrochanteric fracture of right femur      Anemia  Assessment & Plan  · Hemoglobin currently 11 7, baseline around 7-8  · No active bleeding currently  · Monitor CBC in a m      ROSE (acute kidney injury) ([facility] [address] )  Assessment & Plan  · Creatinine currently 2 64, baseline around 1 3-1 5  · Will provide with gentle IV fluid hydration at 50 mL/hr tonight  · Recheck BMP in a m   · Hold home lisinopril for now    Type 2 diabetes mellitus with hyperglycemia [facility])  Assessment & Plan  Lab Results   Component Value Date    HGBA1C 8 2 (H) 10/16/2021       No results for input(s): POCGLU in the last 72 hours  Blood Sugar Average: Last 72 hrs:  ·  blood glucose checks Q 6 hours while NPO, hypo glycemia protocol  · Will give home dose Lantus 25 units tonight, will start Lantus 10 units q h s  Tomorrow night while patient NPO, can switch back to 25 units once patient has a diet again  · Sliding scale insulin q 6 hours      VTE Pharmacologic Prophylaxis: VTE Score: 11 High Risk (Score >/= 5) - Pharmacological DVT Prophylaxis Contraindicated  Sequential Compression Devices Ordered  Code Status: Level 1 - Full Code per pt  Anticipated Length of Stay: Patient will be admitted on an inpatient basis with an anticipated length of stay of greater than 2 midnights secondary to see above  Total Time for Visit, including Counseling / Coordination of Care: 60 minutes Greater than 50% of this total time spent on direct patient counseling and coordination of care  Chief Complaint:    Chief Complaint   Patient presents with    Fall     pt slipped on ice & fell, laceration to R leg, on coumadin  Denies head trauma, c/o back pain & leg pain  R leg repair 2021       History of Present Illness:  Brooke Avina is a 61 y o  male with a PMH of diabetes mellitus type 2, PVD, hypertension, hyperlipidemia, history of DVT on warfarin who presents with complaint of ambulatory dysfunction, right hip pain status post fall in living room this evening  Reports he was walking and then slipped and fell on his right side and immediately had right hip pain  Denies head injury at all or blacking out  Denies radiation of hip pain  Reports it is intermittent aching pain  Denies chest pain, abdominal pain, shortness of breath  Review of Systems:  Review of Systems   Constitutional: Negative for activity change  Respiratory: Negative for shortness of breath  Cardiovascular: Negative for chest pain  Gastrointestinal: Negative for abdominal pain     Musculoskeletal: Positive for arthralgias and gait problem  Neurological: Negative for dizziness, syncope and headaches  Past Medical and Surgical History:   Past Medical History:   Diagnosis Date    Back pain     Diabetes mellitus ([address] )     DVT (deep vein thrombosis) in pregnancy     Hard to intubate 10/18/2021    Glidescope with #3 blade and bougie used to pass 6 0 ETT    Hyperlipidemia     Hypertension        Past Surgical History:   Procedure Laterality Date    CATARACT EXTRACTION      COLONOSCOPY      6/2014    CYST REMOVAL      EXTERNAL FIXATOR APPLICATION Right 51/68/3721    Procedure: Application External Fixation Device right lower extremity;  Surgeon: Kim Iraheta MD;  [facility];  Service: Orthopedics    HAND SURGERY      HEEL SPUR SURGERY      foot surgery    ORIF TIBIA FRACTURE Right 10/25/2021    Procedure: OPEN REDUCTION W/ INTERNAL FIXATION (ORIF) RIGHT PROXIMAL TIBIA SHAFT FRACTURE, REMOVAL OF EXTERNAL FIXATOR;  Surgeon: Kim Iraheta MD;  [facility]: [facility];  Service: Orthopedics       Meds/Allergies:  Prior to Admission medications    Medication Sig Start Date End Date Taking?  Authorizing Provider   aspirin (ECOTRIN) 325 mg EC tablet Take by mouth    Historical Provider, MD   colchicine (COLCRYS) 0 6 mg tablet Take 1 tablet (0 6 mg total) by mouth daily 1/31/22   Alexandra Bonilla DO   Continuous Blood Gluc  [facility] 14 Day [place]) JEROME Check blood sugar TID, DX E11 9 12/7/21   Alexandra Bonilla DO   Continuous Blood Gluc Sensor ([employer] 14 Day Sensor) MISC Check blood sugar TID, apply new sensor q 14 days 12/6/21   Alexandra Bonilla DO   ferrous sulfate 325 (65 Fe) mg tablet Take 325 mg by mouth daily with breakfast    Historical Provider, MD   hydrOXYzine HCL (ATARAX) 25 mg tablet Take 1 tablet (25 mg total) by mouth every 6 (six) hours as needed for itching 12/21/21   Alexandra Bonilla DO   insulin glargine (LANTUS) 100 units/mL subcutaneous injection Inject 25 Units under the skin daily Historical Provider, MD   lisinopril (ZESTRIL) 10 mg tablet TAKE 1 TABLET EVERY DAY 6/2/21   Iris Genao DO   metFORMIN (GLUCOPHAGE) 1000 MG tablet Take 1 tablet (1,000 mg total) by mouth 2 (two) times a day with meals 12/22/21   Iris Genao DO   oxyCODONE (OxyCONTIN) 10 mg 12 hr tablet Take 10 mg by mouth every 12 (twelve) hours    Historical Provider, MD   polyethylene glycol (MIRALAX) 17 g packet Take 17 g by mouth daily as needed     Historical Provider, MD   senna-docusate sodium (SENOKOT-S) 8 6-50 mg per tablet Take 1 tablet by mouth 2 (two) times a day    Historical Provider, MD   simvastatin (ZOCOR) 20 mg tablet TAKE 1 TABLET EVERY DAY 6/2/21   Iris Genao DO   warfarin (COUMADIN) 1 mg tablet Take 1 tablet (1 mg total) by mouth daily 1/10/22   Iris Genao DO   warfarin (COUMADIN) 5 mg tablet Take 5 mg by mouth see administration instructions Every Tuesday, Thursday, Saturday, and Sunday    Historical Provider, MD     I have reviewed home medications per review chart and PDMP  Allergies:    Allergies   Allergen Reactions    Byetta 10 Mcg Pen [Exenatide] Swelling    Pollen Extract Myalgia       Social History:  Marital Status: /Civil Union     Patient Pre-hospital Living Situation: Home  Patient Pre-hospital Level of Mobility: walks  Patient Pre-hospital Diet Restrictions:  Diabetic  Substance Use History:   Social History     Substance and Sexual Activity   Alcohol Use Never    Comment: Per allscripts, occasional use     Social History     Tobacco Use   Smoking Status Light Tobacco Smoker   Smokeless Tobacco Never Used   Tobacco Comment    Per allscripts, current smoker     Social History     Substance and Sexual Activity   Drug Use Not Currently       Family History:  Family History   Problem Relation Age of Onset    Dementia Mother     Melanoma Mother        Physical Exam:     Vitals:   Blood Pressure: (!) 193/91 (02/03/22 2144)  Pulse: 89 (02/03/22 2144)  Temperature: 98 2 °F (36 8 °C) (02/03/22 2144)  Temp Source: Oral (02/03/22 2144)  Respirations: 22 (02/03/22 2144)  SpO2: 96 % (02/03/22 2144)    Physical Exam  Vitals and nursing note reviewed  Constitutional:       General: He is not in acute distress  Appearance: Normal appearance  He is not diaphoretic  HENT:      Head: Normocephalic  Cardiovascular:      Rate and Rhythm: Normal rate and regular rhythm  Heart sounds: Normal heart sounds  Pulmonary:      Effort: Pulmonary effort is normal  No respiratory distress  Breath sounds: Normal breath sounds  Abdominal:      General: Abdomen is flat  Bowel sounds are normal  There is no distension  Palpations: Abdomen is soft  Musculoskeletal:      Right lower leg: No edema  Left lower leg: No edema  Comments: Tenderness to palpation over lateral portion of right hip  Unable to determine range of motion of right hip due to acute fracture   Skin:     General: Skin is warm and dry  Findings: No bruising  Neurological:      General: No focal deficit present  Mental Status: He is alert and oriented to person, place, and time  Psychiatric:         Mood and Affect: Mood normal          Behavior: Behavior normal          Thought Content:  Thought content normal          Judgment: Judgment normal           Additional Data:     Lab Results:  Results from last 7 days   Lab Units 02/03/22 2015   WBC Thousand/uL 10 56*   HEMOGLOBIN g/dL 11 7*   HEMATOCRIT % 36 9   PLATELETS Thousands/uL 312   NEUTROS PCT % 87*   LYMPHS PCT % 6*   MONOS PCT % 7   EOS PCT % 0     Results from last 7 days   Lab Units 02/03/22 2015   SODIUM mmol/L 130*   POTASSIUM mmol/L 4 1   CHLORIDE mmol/L 99*   CO2 mmol/L 22   BUN mg/dL 29*   CREATININE mg/dL 2 64*   ANION GAP mmol/L 9   CALCIUM mg/dL 8 7   GLUCOSE RANDOM mg/dL 317*         Results from last 7 days   Lab Units 02/03/22 2207   POC GLUCOSE mg/dl 335*               Imaging: Reviewed radiology reports from this admission including: CT pelvis and Personally reviewed the following imaging: xray(s)  CT pelvis wo contrast   Final Result by Becky Vasquez MD (02/03 2002)      Comminuted right femoral intertrochanteric fracture with a small adjacent anterior deep soft tissue hematoma measuring 2 4 x 2 1 x 3 4 cm  The study was marked in [facility] for immediate notification  Workstation performed: [id number]         XR hip/pelv 2-3 vws right   Final Result by Becky Vasquez MD (02/03 2004)      Mildly displaced and varus angulated comminuted right femoral intertrochanteric fracture  Workstation performed: [id number]         XR femur 2 views RIGHT   Final Result by Becky Vasquez MD (02/03 2004)      Mildly displaced and varus angulated comminuted right femoral intertrochanteric fracture  Workstation performed: [id number]         XR tibia fibula 2 views RIGHT    (Results Pending)   XR knee 4+ views Right injury    (Results Pending)       EKG and Other Studies Reviewed on Admission:   · EKG: No EKG obtained  ** Please Note: This note has been constructed using a voice recognition system   **

## 2022-02-04 NOTE — ASSESSMENT & PLAN NOTE
· Creatinine slowly improving, baseline around 1 3-1 5  · Will continue with IV fluids 100 mL/hours  · Will continue to monitor until Monday and hold off on procedure till then  · Continue monitor  · Hold home lisinopril for now

## 2022-02-04 NOTE — ASSESSMENT & PLAN NOTE
· EKG-normal sinus rhythm  · Chest x-ray on my read no acute cardiopulmonary disease  · METS >4  · RCRI class 3 risk; 10 1% 30 day risk of death, mi or cardiac arrest  · Patient is intermediate risk for low risk procedure

## 2022-02-04 NOTE — ASSESSMENT & PLAN NOTE
· EKG-normal sinus rhythm  · Chest x-ray on my read no acute cardiopulmonary disease  · METS >4  · RCRI class 3 risk; 10 1% 30 day risk of death, mi or cardiac arrest  · Patient is intermediate risk for low risk procedure  · Will reassess preoperative clearance tomorrow pending repeat BMP

## 2022-02-04 NOTE — ASSESSMENT & PLAN NOTE
· Hemoglobin currently 11 7, baseline around 7-8  · No active bleeding currently  · Monitor CBC in a m

## 2022-02-04 NOTE — ASSESSMENT & PLAN NOTE
Lab Results   Component Value Date    HGBA1C 8 2 (H) 10/16/2021       No results for input(s): POCGLU in the last 72 hours  Blood Sugar Average: Last 72 hrs:  ·  blood glucose checks Q 6 hours while NPO, hypo glycemia protocol  · Will give home dose Lantus 25 units tonight, will start Lantus 10 units q h s   Tomorrow night while patient NPO, can switch back to 25 units once patient has a diet again  · Sliding scale insulin q 6 hours

## 2022-02-05 LAB
ANION GAP SERPL CALCULATED.3IONS-SCNC: 11 MMOL/L (ref 4–13)
ATRIAL RATE: 82 BPM
BACTERIA UR QL AUTO: ABNORMAL /HPF
BASOPHILS # BLD AUTO: 0.05 THOUSANDS/ΜL (ref 0–0.1)
BASOPHILS NFR BLD AUTO: 1 % (ref 0–1)
BILIRUB UR QL STRIP: NEGATIVE
BUN SERPL-MCNC: 24 MG/DL (ref 5–25)
CALCIUM SERPL-MCNC: 8.3 MG/DL (ref 8.3–10.1)
CHLORIDE SERPL-SCNC: 102 MMOL/L (ref 100–108)
CLARITY UR: ABNORMAL
CO2 SERPL-SCNC: 21 MMOL/L (ref 21–32)
COLOR UR: YELLOW
CREAT SERPL-MCNC: 2.4 MG/DL (ref 0.6–1.3)
EOSINOPHIL # BLD AUTO: 0.18 THOUSAND/ΜL (ref 0–0.61)
EOSINOPHIL NFR BLD AUTO: 2 % (ref 0–6)
ERYTHROCYTE [DISTWIDTH] IN BLOOD BY AUTOMATED COUNT: 16.2 % (ref 11.6–15.1)
GFR SERPL CREATININE-BSD FRML MDRD: 27 ML/MIN/1.73SQ M
GLUCOSE SERPL-MCNC: 239 MG/DL (ref 65–140)
GLUCOSE SERPL-MCNC: 251 MG/DL (ref 65–140)
GLUCOSE SERPL-MCNC: 261 MG/DL (ref 65–140)
GLUCOSE SERPL-MCNC: 266 MG/DL (ref 65–140)
GLUCOSE SERPL-MCNC: 271 MG/DL (ref 65–140)
GLUCOSE SERPL-MCNC: 274 MG/DL (ref 65–140)
GLUCOSE SERPL-MCNC: 280 MG/DL (ref 65–140)
GLUCOSE UR STRIP-MCNC: ABNORMAL MG/DL
HCT VFR BLD AUTO: 32.3 % (ref 36.5–49.3)
HGB BLD-MCNC: 10.3 G/DL (ref 12–17)
HGB UR QL STRIP.AUTO: ABNORMAL
IMM GRANULOCYTES # BLD AUTO: 0.04 THOUSAND/UL (ref 0–0.2)
IMM GRANULOCYTES NFR BLD AUTO: 0 % (ref 0–2)
INR PPP: 3.86 (ref 0.84–1.19)
KETONES UR STRIP-MCNC: NEGATIVE MG/DL
LEUKOCYTE ESTERASE UR QL STRIP: NEGATIVE
LYMPHOCYTES # BLD AUTO: 1.43 THOUSANDS/ΜL (ref 0.6–4.47)
LYMPHOCYTES NFR BLD AUTO: 14 % (ref 14–44)
MCH RBC QN AUTO: 28.1 PG (ref 26.8–34.3)
MCHC RBC AUTO-ENTMCNC: 31.9 G/DL (ref 31.4–37.4)
MCV RBC AUTO: 88 FL (ref 82–98)
MONOCYTES # BLD AUTO: 0.98 THOUSAND/ΜL (ref 0.17–1.22)
MONOCYTES NFR BLD AUTO: 9 % (ref 4–12)
NEUTROPHILS # BLD AUTO: 7.73 THOUSANDS/ΜL (ref 1.85–7.62)
NEUTS SEG NFR BLD AUTO: 74 % (ref 43–75)
NITRITE UR QL STRIP: NEGATIVE
NON-SQ EPI CELLS URNS QL MICRO: ABNORMAL /HPF
NRBC BLD AUTO-RTO: 0 /100 WBCS
P AXIS: 48 DEGREES
PH UR STRIP.AUTO: 6 [PH]
PLATELET # BLD AUTO: 287 THOUSANDS/UL (ref 149–390)
PMV BLD AUTO: 9.5 FL (ref 8.9–12.7)
POTASSIUM SERPL-SCNC: 4.1 MMOL/L (ref 3.5–5.3)
PR INTERVAL: 136 MS
PROT UR STRIP-MCNC: ABNORMAL MG/DL
PROTHROMBIN TIME: 35.9 SECONDS (ref 11.6–14.5)
QRS AXIS: 13 DEGREES
QRSD INTERVAL: 96 MS
QT INTERVAL: 380 MS
QTC INTERVAL: 443 MS
RBC # BLD AUTO: 3.66 MILLION/UL (ref 3.88–5.62)
RBC #/AREA URNS AUTO: ABNORMAL /HPF
SODIUM SERPL-SCNC: 134 MMOL/L (ref 136–145)
SP GR UR STRIP.AUTO: 1.02 (ref 1–1.03)
T WAVE AXIS: 20 DEGREES
UROBILINOGEN UR QL STRIP.AUTO: 0.2 E.U./DL
VENTRICULAR RATE: 82 BPM
WBC # BLD AUTO: 10.41 THOUSAND/UL (ref 4.31–10.16)
WBC #/AREA URNS AUTO: ABNORMAL /HPF

## 2022-02-05 PROCEDURE — 85610 PROTHROMBIN TIME: CPT | Performed by: INTERNAL MEDICINE

## 2022-02-05 PROCEDURE — 80048 BASIC METABOLIC PNL TOTAL CA: CPT | Performed by: INTERNAL MEDICINE

## 2022-02-05 PROCEDURE — 99233 SBSQ HOSP IP/OBS HIGH 50: CPT | Performed by: INTERNAL MEDICINE

## 2022-02-05 PROCEDURE — 85025 COMPLETE CBC W/AUTO DIFF WBC: CPT | Performed by: INTERNAL MEDICINE

## 2022-02-05 PROCEDURE — 81001 URINALYSIS AUTO W/SCOPE: CPT | Performed by: INTERNAL MEDICINE

## 2022-02-05 PROCEDURE — 93010 ELECTROCARDIOGRAM REPORT: CPT | Performed by: INTERNAL MEDICINE

## 2022-02-05 PROCEDURE — NC001 PR NO CHARGE

## 2022-02-05 PROCEDURE — 82570 ASSAY OF URINE CREATININE: CPT | Performed by: INTERNAL MEDICINE

## 2022-02-05 PROCEDURE — 82652 VIT D 1 25-DIHYDROXY: CPT | Performed by: INTERNAL MEDICINE

## 2022-02-05 PROCEDURE — 99255 IP/OBS CONSLTJ NEW/EST HI 80: CPT | Performed by: INTERNAL MEDICINE

## 2022-02-05 PROCEDURE — 82948 REAGENT STRIP/BLOOD GLUCOSE: CPT

## 2022-02-05 PROCEDURE — 84300 ASSAY OF URINE SODIUM: CPT | Performed by: INTERNAL MEDICINE

## 2022-02-05 PROCEDURE — 82043 UR ALBUMIN QUANTITATIVE: CPT | Performed by: INTERNAL MEDICINE

## 2022-02-05 RX ORDER — INSULIN GLARGINE 100 [IU]/ML
25 INJECTION, SOLUTION SUBCUTANEOUS
Status: DISCONTINUED | OUTPATIENT
Start: 2022-02-05 | End: 2022-02-06

## 2022-02-05 RX ORDER — PHYTONADIONE 5 MG/1
5 TABLET ORAL ONCE
Status: COMPLETED | OUTPATIENT
Start: 2022-02-05 | End: 2022-02-05

## 2022-02-05 RX ADMIN — INSULIN LISPRO 6 UNITS: 100 INJECTION, SOLUTION INTRAVENOUS; SUBCUTANEOUS at 12:27

## 2022-02-05 RX ADMIN — SODIUM CHLORIDE, SODIUM LACTATE, POTASSIUM CHLORIDE, AND CALCIUM CHLORIDE 100 ML/HR: .6; .31; .03; .02 INJECTION, SOLUTION INTRAVENOUS at 16:16

## 2022-02-05 RX ADMIN — SENNOSIDES AND DOCUSATE SODIUM 1 TABLET: 50; 8.6 TABLET ORAL at 22:21

## 2022-02-05 RX ADMIN — ACETAMINOPHEN 975 MG: 325 TABLET, FILM COATED ORAL at 22:22

## 2022-02-05 RX ADMIN — INSULIN LISPRO 3 UNITS: 100 INJECTION, SOLUTION INTRAVENOUS; SUBCUTANEOUS at 22:33

## 2022-02-05 RX ADMIN — SODIUM CHLORIDE, SODIUM LACTATE, POTASSIUM CHLORIDE, AND CALCIUM CHLORIDE 100 ML/HR: .6; .31; .03; .02 INJECTION, SOLUTION INTRAVENOUS at 06:25

## 2022-02-05 RX ADMIN — COLCHICINE 0.6 MG: 0.6 TABLET, FILM COATED ORAL at 12:21

## 2022-02-05 RX ADMIN — OXYCODONE HYDROCHLORIDE 7.5 MG: 5 TABLET ORAL at 18:58

## 2022-02-05 RX ADMIN — OXYCODONE HYDROCHLORIDE 7.5 MG: 5 TABLET ORAL at 12:21

## 2022-02-05 RX ADMIN — INSULIN GLARGINE 25 UNITS: 100 INJECTION, SOLUTION SUBCUTANEOUS at 22:22

## 2022-02-05 RX ADMIN — ACETAMINOPHEN 975 MG: 325 TABLET, FILM COATED ORAL at 16:06

## 2022-02-05 RX ADMIN — PRAVASTATIN SODIUM 40 MG: 40 TABLET ORAL at 16:07

## 2022-02-05 RX ADMIN — INSULIN LISPRO 6 UNITS: 100 INJECTION, SOLUTION INTRAVENOUS; SUBCUTANEOUS at 17:00

## 2022-02-05 RX ADMIN — ACETAMINOPHEN 975 MG: 325 TABLET, FILM COATED ORAL at 06:23

## 2022-02-05 RX ADMIN — PHYTONADIONE 5 MG: 5 TABLET ORAL at 16:06

## 2022-02-05 RX ADMIN — HYDROMORPHONE HYDROCHLORIDE 0.2 MG: 0.2 INJECTION, SOLUTION INTRAMUSCULAR; INTRAVENOUS; SUBCUTANEOUS at 16:15

## 2022-02-05 NOTE — PLAN OF CARE
Problem: MOBILITY - ADULT  Goal: Maintain or return to baseline ADL function  Description: INTERVENTIONS:  -  Assess patient's ability to carry out ADLs; assess patient's baseline for ADL function and identify physical deficits which impact ability to perform ADLs (bathing, care of mouth/teeth, toileting, grooming, dressing, etc )  - Assess/evaluate cause of self-care deficits   - Assess range of motion  - Assess patient's mobility; develop plan if impaired  - Assess patient's need for assistive devices and provide as appropriate  - Encourage maximum independence but intervene and supervise when necessary  - Involve family in performance of ADLs  - Assess for home care needs following discharge   - Consider OT consult to assist with ADL evaluation and planning for discharge  - Provide patient education as appropriate  Outcome: Progressing  Goal: Maintains/Returns to pre admission functional level  Description: INTERVENTIONS:  - Perform BMAT or MOVE assessment daily    - Set and communicate daily mobility goal to care team and patient/family/caregiver  - Collaborate with rehabilitation services on mobility goals if consulted  - Perform Range of Motion 4 times a day  - Reposition patient every 2 hours    - Dangle patient 4 times a day  - Stand patient 4 times a day  - Ambulate patient 3 times a day  - Out of bed to chair 3 times a day   - Out of bed for meals 3 times a day  - Out of bed for toileting  - Record patient progress and toleration of activity level   Outcome: Progressing     Problem: Potential for Falls  Goal: Patient will remain free of falls  Description: INTERVENTIONS:  - Educate patient/family on patient safety including physical limitations  - Instruct patient to call for assistance with activity   - Consult OT/PT to assist with strengthening/mobility   - Keep Call bell within reach  - Keep bed low and locked with side rails adjusted as appropriate  - Keep care items and personal belongings within reach  - Initiate and maintain comfort rounds  - Make Fall Risk Sign visible to staff  - Offer Toileting every 2 Hours, in advance of need  - Initiate/Maintain Bed alarm  - Obtain necessary fall risk management equipment: Bed Alarm  - Apply yellow socks and bracelet for high fall risk patients  - Consider moving patient to room near nurses station  Outcome: Progressing     Problem: Prexisting or High Potential for Compromised Skin Integrity  Goal: Skin integrity is maintained or improved  Description: INTERVENTIONS:  - Identify patients at risk for skin breakdown  - Assess and monitor skin integrity  - Assess and monitor nutrition and hydration status  - Monitor labs   - Assess for incontinence   - Turn and reposition patient  - Assist with mobility/ambulation  - Relieve pressure over bony prominences  - Avoid friction and shearing  - Provide appropriate hygiene as needed including keeping skin clean and dry  - Evaluate need for skin moisturizer/barrier cream  - Collaborate with interdisciplinary team   - Patient/family teaching  - Consider wound care consult   Outcome: Progressing

## 2022-02-05 NOTE — ASSESSMENT & PLAN NOTE
· Status post fall in living room   · See plan under closed displaced intertrochanteric fracture of right femur

## 2022-02-05 NOTE — ASSESSMENT & PLAN NOTE
· Status post fall, CT pelvis revealing comminuted right femoral intertrochanteric fracture  · X-rays revealing hardware intact from previous surgical fixation of tibia  · Orthopedic surgery following  · Continue to hold warfarin  · Will give vitamin K 2 5 mg  · Recheck INR tomorrow  · Will place on heparin subcu after procedure  · Pain regimen in place

## 2022-02-05 NOTE — PROGRESS NOTES
Progress Note - Orthopedics   Cassidy Nelson 61 y o  male MRN: [id number]  Unit/Bed#: -02      Subjective:    61 y o male with right comminuted right femoral intertrochanteric fracture  Patient states he is unable to say if he has pain in his right hip because he is not moving the hip  Patient states he wishes to proceed with surgery on his right hip  Patient denies any numbness or tingling into right lower extremity  Patient denies any shortness of breath, chest pain, lightheadedness, dizziness, nausea, and vomiting  Patient offers no other complaints at this time         Labs:  0   Lab Value Date/Time    HCT 32 3 (L) 02/05/2022 0524    HCT 32 3 (L) 02/04/2022 0648    HCT 36 9 02/03/2022 2015    HCT 38 4 07/13/2015 1448    HCT 38 6 10/20/2014 1132    HGB 10 3 (L) 02/05/2022 0524    HGB 10 6 (L) 02/04/2022 0648    HGB 11 7 (L) 02/03/2022 2015    HGB 13 1 07/13/2015 1448    HGB 13 1 10/20/2014 1132    INR 3 86 (H) 02/05/2022 0524    INR 2 94 (H) 12/11/2015 1543    WBC 10 41 (H) 02/05/2022 0524    WBC 8 63 02/04/2022 0648    WBC 10 56 (H) 02/03/2022 2015    WBC 7 7 07/13/2015 1448    WBC 8 30 10/20/2014 1132    ESR 58 (H) 05/20/2016 1207       Meds:    Current Facility-Administered Medications:     acetaminophen (TYLENOL) tablet 975 mg, 975 mg, Oral, Q8H [facility], Isaura Temple PA-C, 975 mg at 02/05/22 4954    aluminum-magnesium hydroxide-simethicone (MYLANTA) oral suspension 30 mL, 30 mL, Oral, Q6H PRN, Yumiko Neely PA-C    colchicine (COLCRYS) tablet 0 6 mg, 0 6 mg, Oral, Daily, Isaura Temple PA-C, 0 6 mg at 02/04/22 1054    ferrous sulfate tablet 325 mg, 325 mg, Oral, Daily With Breakfast, Yumiko Neely PA-C, 325 mg at 02/04/22 1054    HYDROmorphone HCl (DILAUDID) injection 0 2 mg, 0 2 mg, Intravenous, Q4H PRN, Yumiko Neely PA-C, 0 2 mg at 02/04/22 2250    hydrOXYzine HCL (ATARAX) tablet 25 mg, 25 mg, Oral, Q6H PRN, Yumiko Neely PA-C    insulin glargine (LANTUS) subcutaneous injection 10 Units 0 1 mL, 10 Units, Subcutaneous, HS, Isaura Temple PA-C, 10 Units at 02/04/22 2250    insulin lispro (HumaLOG) 100 units/mL subcutaneous injection 1-6 Units, 1-6 Units, Subcutaneous, HS, Meredeth Pal, DO, 3 Units at 02/04/22 2250    insulin lispro (HumaLOG) 100 units/mL subcutaneous injection 2-12 Units, 2-12 Units, Subcutaneous, TID AC, 2 Units at 02/04/22 1811 **AND** Fingerstick Glucose (POCT), , , TID AC, Meredeth Pal, DO    lactated ringers infusion, 100 mL/hr, Intravenous, Continuous, Meredeth Pal, DO, Last Rate: 100 mL/hr at 02/05/22 0625, 100 mL/hr at 02/05/22 0625    lidocaine (LIDODERM) 5 % patch 1 patch, 1 patch, Topical, Daily PRN, Pat Broderick PA-C, 1 patch at 02/04/22 1204    naloxone (NARCAN) 0 04 mg/mL syringe 0 04 mg, 0 04 mg, Intravenous, Q1MIN PRN, Pat Broderick PA-C    oxyCODONE (ROXICODONE) IR tablet 5 mg, 5 mg, Oral, Q4H PRN, 5 mg at 02/04/22 9465 **OR** oxyCODONE (ROXICODONE) IR tablet 7 5 mg, 7 5 mg, Oral, Q4H PRN, Pat Broderick PA-C, 7 5 mg at 02/04/22 2022    polyethylene glycol (MIRALAX) packet 17 g, 17 g, Oral, Daily PRN, Pat Broderick PA-C    pravastatin (PRAVACHOL) tablet 40 mg, 40 mg, Oral, Daily With Dinner, Pat Broderick PA-C, 40 mg at 02/04/22 1811    senna-docusate sodium (SENOKOT S) 8 6-50 mg per tablet 1 tablet, 1 tablet, Oral, BID, Pat Broderick PA-C, 1 tablet at 02/04/22 2022    Blood Culture:   No results found for: BLOODCX    Wound Culture:   No results found for: WOUNDCULT    Ins and Outs:  I/O last 24 hours: In: 0139 [P O :240; I V :950]  Out: 1200 [Urine:1200]          Physical:  Vitals:    02/04/22 1649   BP: 129/69   Pulse: 95   Resp:    Temp: 99 2 °F (37 3 °C)   SpO2: 95%     Musculoskeletal: right Lower Extremity  · Skin intact with no erythema  Small laceration noted over anterior lower leg  · TTP over lateral aspect of hip  · SILT s/s/sp/dp/t  +fhl/ehl, +ankle dorsi/plantar flexion    2+ DP pulse  · Calf is supple and nontender  Assessment:    61 y o male with right comminuted femoral intertrochanteric fracture    Plan:  · Nonweightbearing right lower extremity  · Pain control per primary  · DVT ppx per primary  · Dispo: Ortho will follow  Trochanteric femoral nail insertion canceled today due to elevated INR and creatinine  Awaiting medical clearance prior to surgical intervention  Will continue to monitor INR and proceed with surgical intervention when stable  Surgical consent was obtained on 2/4/2022       Rick Okeefe PA-C

## 2022-02-05 NOTE — CONSULTS
Consultation - Nephrology   Brooke Avina 61 y o  male MRN: [id number]  Unit/Bed#: -02 Encounter: [id number]    Referring PHYSICIAN: Ashlee Nichols    REASON FOR THE CONSULTATION:  Acute kidney injury    DATE OF CONSULTATION:  February 5, 2022    ADMISSION DIAGNOSIS: Closed displaced intertrochanteric fracture of right femur ([facility] [address] )     CHIEF COMPLAINT     Patient with history of diabetes came to the hospital ER after fall and right femur fracture and I am being consulted for acute kidney injury    HPI     Patient with no known significant kidney problem but does have diabetes in the past along with high blood pressure    Apparently he fell at home and have hard time ambulating so came to the hospital where workup revealed right femoral fracture and was admitted    He is overall feeling well still has a pain  Denies taking nonsteroidal pain killer at home    Denies ever had a kidney problem the past    Denies any chest pain or palpitation    Does have long history of diabetes and hypertension    PAST MEDICAL HISTORY     Past Medical History:   Diagnosis Date    Back pain     Diabetes mellitus ([facility] [address] )     DVT (deep vein thrombosis) in pregnancy     Hard to intubate 10/18/2021    Glidescope with #3 blade and bougie used to pass 6 0 ETT    Hyperlipidemia     Hypertension        PAST SURGICAL HISTORY     Past Surgical History:   Procedure Laterality Date    CATARACT EXTRACTION      COLONOSCOPY      6/2014    CYST REMOVAL      EXTERNAL FIXATOR APPLICATION Right 36/55/7289    Procedure: Application External Fixation Device right lower extremity;  Surgeon: Mirna Jackson MD;  Location: [facility];  Service: Orthopedics    HAND SURGERY      HEEL SPUR SURGERY      foot surgery    ORIF TIBIA FRACTURE Right 10/25/2021    Procedure: OPEN REDUCTION W/ INTERNAL FIXATION (ORIF) RIGHT PROXIMAL TIBIA SHAFT FRACTURE, REMOVAL OF EXTERNAL FIXATOR;  Surgeon: Mirna Jackson MD;  Location: [facility];  Service: Orthopedics       ALLERGIES     Allergies   Allergen Reactions    Byetta 10 Mcg Pen [Exenatide] Swelling    Pollen Extract Myalgia       SOCIAL HISTORY     Social History     Substance and Sexual Activity   Alcohol Use Never    Comment: Per allscripts, occasional use     Social History     Substance and Sexual Activity   Drug Use Not Currently     Social History     Tobacco Use   Smoking Status Light Tobacco Smoker    Packs/day: 0 25   Smokeless Tobacco Never Used   Tobacco Comment    Per allscripts, current smoker       FAMILY HISTORY     Family History   Problem Relation Age of Onset    Dementia Mother     Melanoma Mother        CURRENT MEDICATIONS       Current Facility-Administered Medications:     acetaminophen (TYLENOL) tablet 975 mg, 975 mg, Oral, Q8H [address], Barbara So PA-C, 975 mg at 02/05/22 8432    aluminum-magnesium hydroxide-simethicone (MYLANTA) oral suspension 30 mL, 30 mL, Oral, Q6H PRN, Barbara So PA-C    colchicine (COLCRYS) tablet 0 6 mg, 0 6 mg, Oral, Daily, Barbara So PA-C, 0 6 mg at 02/05/22 1221    ferrous sulfate tablet 325 mg, 325 mg, Oral, Daily With Breakfast, Barbara So PA-C, 325 mg at 02/04/22 1054    HYDROmorphone HCl (DILAUDID) injection 0 2 mg, 0 2 mg, Intravenous, Q4H PRN, Barbara So PA-C, 0 2 mg at 02/04/22 2250    hydrOXYzine HCL (ATARAX) tablet 25 mg, 25 mg, Oral, Q6H PRN, Barbara So PA-C    insulin glargine (LANTUS) subcutaneous injection 10 Units 0 1 mL, 10 Units, Subcutaneous, HS, Isaura Temple PA-C, 10 Units at 02/04/22 2250    insulin lispro (HumaLOG) 100 units/mL subcutaneous injection 1-6 Units, 1-6 Units, Subcutaneous, HS, Jose Jewell DO, 3 Units at 02/04/22 2250    insulin lispro (HumaLOG) 100 units/mL subcutaneous injection 2-12 Units, 2-12 Units, Subcutaneous, TID AC, 6 Units at 02/05/22 1227 **AND** Fingerstick Glucose (POCT), , , TID AC, Jose Jewell DO    lactated ringers infusion, 100 mL/hr, Intravenous, Continuous, Yanely Kurt, DO, Last Rate: 100 mL/hr at 02/05/22 0625, 100 mL/hr at 02/05/22 0625    lidocaine (LIDODERM) 5 % patch 1 patch, 1 patch, Topical, Daily PRN, Porsche Rudy, PA-C, 1 patch at 02/04/22 1204    naloxone (NARCAN) 0 04 mg/mL syringe 0 04 mg, 0 04 mg, Intravenous, Q1MIN PRN, Porsche Rudy, PA-C    oxyCODONE (ROXICODONE) IR tablet 5 mg, 5 mg, Oral, Q4H PRN, 5 mg at 02/04/22 8613 **OR** oxyCODONE (ROXICODONE) IR tablet 7 5 mg, 7 5 mg, Oral, Q4H PRN, Porsche Rudy, PA-C, 7 5 mg at 02/05/22 1221    phytonadione (MEPHYTON) tablet 5 mg, 5 mg, Oral, Once, Yanely Kurt, DO    polyethylene glycol (MIRALAX) packet 17 g, 17 g, Oral, Daily PRN, Porsche Rudy, PA-C    pravastatin (PRAVACHOL) tablet 40 mg, 40 mg, Oral, Daily With Dinner, Porsche Rudy, PA-C, 40 mg at 02/04/22 1811    senna-docusate sodium (SENOKOT S) 8 6-50 mg per tablet 1 tablet, 1 tablet, Oral, BID, Porsche Rudy, PA-C, 1 tablet at 02/04/22 2022    REVIEW OF SYSTEMS     Review of Systems   Constitutional: Negative for activity change and fatigue  HENT: Negative for congestion and ear discharge  Eyes: Negative for photophobia and pain  Respiratory: Negative for apnea and choking  Cardiovascular: Negative for chest pain and palpitations  Gastrointestinal: Negative for abdominal distention and blood in stool  Endocrine: Negative for heat intolerance and polyphagia  Genitourinary: Negative for flank pain and urgency  Musculoskeletal: Negative for neck pain and neck stiffness  Skin: Negative for color change and wound  Allergic/Immunologic: Negative for food allergies and immunocompromised state  Neurological: Negative for seizures and facial asymmetry  Hematological: Negative for adenopathy  Does not bruise/bleed easily  Psychiatric/Behavioral: Negative for self-injury and suicidal ideas         LAB RESULTS        Results from last 7 days   Lab Units 02/05/22  0524 02/04/22  4008 02/03/22  2015 WBC Thousand/uL 10 41* 8 63 10 56*   HEMOGLOBIN g/dL 10 3* 10 6* 11 7*   HEMATOCRIT % 32 3* 32 3* 36 9   PLATELETS Thousands/uL 287 290 312   POTASSIUM mmol/L 4 1 3 7 4 1   CHLORIDE mmol/L 102 102 99*   CO2 mmol/L 21 22 22   BUN mg/dL 24 28* 29*   CREATININE mg/dL 2 40* 2 45* 2 64*   EGFR ml/min/1 73sq m 27 26 24   CALCIUM mg/dL 8 3 8 4 8 7       I have personally reviewed the old medical records and patient's previously known baseline creatinine level is ~ 1 3    RADIOLOGY RESULTS     Results for orders placed during the hospital encounter of 02/03/22    XR chest portable    Narrative  CHEST    INDICATION:   pre op clearance  COMPARISON:  4/4/2017    EXAM PERFORMED/VIEWS:  XR CHEST PORTABLE      FINDINGS:    Cardiomediastinal silhouette appears unremarkable  Platelike atelectasis versus scar is noted at the left lung base  There is mild hypoventilation  No infiltrate is seen  No pneumothorax or pleural effusion  Osseous structures appear within normal limits for patient age  Impression  No acute cardiopulmonary disease  Workstation performed: [id number]    Results for orders placed during the hospital encounter of 04/04/17    XR chest pa & lateral    Narrative  CHEST - DUAL ENERGY    INDICATION:  Cough    COMPARISON:  None    VIEWS:  PA (including soft tissue/bone algorithms) and lateral projections    IMAGES:  4    FINDINGS:    Cardiomediastinal silhouette appears unremarkable  The lungs are clear  No pneumothorax or pleural effusion  Visualized osseous structures appear within normal limits for the patient's age  Impression  No active pulmonary disease  Workstation performed: [id number]    No results found for this or any previous visit  No results found for this or any previous visit      Results for orders placed during the hospital encounter of 01/15/21    CT abdomen pelvis wo contrast    Narrative  CT ABDOMEN AND PELVIS WITHOUT IV CONTRAST    INDICATION:   R10 9: Unspecified abdominal pain  COMPARISON:  None  TECHNIQUE:  CT examination of the abdomen and pelvis was performed without intravenous contrast   Axial, sagittal, and coronal 2D reformatted images were created from the source data and submitted for interpretation  Radiation dose length product (DLP) for this visit:  2252 mGy-cm   This examination, like all CT scans performed in the [facility], was performed utilizing techniques to minimize radiation dose exposure, including the use of iterative  reconstruction and automated exposure control  Enteric contrast was not administered  FINDINGS:    ABDOMEN    LOWER CHEST:  No clinically significant abnormality identified in the visualized lower chest     LIVER/BILIARY TREE:  Unremarkable  GALLBLADDER:  No calcified gallstones  No pericholecystic inflammatory change  SPLEEN:  Unremarkable  PANCREAS:  Unremarkable  ADRENAL GLANDS:  Unremarkable  KIDNEYS/URETERS:  There is a 7 mm calculus located within the proximal left ureter, 2 cm distal to the ureteropelvic junction causing mild hydronephrosis and perinephric stranding  There are multiple left-sided nonobstructing intrarenal calculi  measuring up to 7 mm  There are a few punctate nonobstructing right-sided intrarenal calculi  STOMACH AND BOWEL:  Unremarkable  APPENDIX:  No findings to suggest appendicitis  ABDOMINOPELVIC CAVITY:  No ascites  No pneumoperitoneum  No lymphadenopathy  VESSELS:  Unremarkable for patient's age  PELVIS    REPRODUCTIVE ORGANS:  Unremarkable for patient's age  URINARY BLADDER:  Unremarkable  ABDOMINAL WALL/INGUINAL REGIONS:  There are small bilateral fat-containing inguinal hernias  OSSEOUS STRUCTURES:  No acute fracture or destructive osseous lesion  There is diffuse bone demineralization  There is a moderate sized Schmorl's node at the superior endplate of L1      Impression  Mild left hydronephrosis and perinephric stranding, the cause of which appears to be a 7 mm calculus within the proximal ureter  Bilateral subcentimeter nonobstructing intrarenal calculi  Workstation performed: [id number]    No results found for this or any previous visit  OBJECTIVE     Current Weight:    Vitals:    02/05/22 0900   BP: 120/55   Pulse: 95   Resp: 22   Temp: 98 7 °F (37 1 °C)   SpO2: 98%       Intake/Output Summary (Last 24 hours) at 2/5/2022 1228  Last data filed at 2/5/2022 2937  Gross per 24 hour   Intake 1190 ml   Output 600 ml   Net 590 ml       PHYSICAL EXAMINATION     Physical Exam  Constitutional:       General: He is not in acute distress  Appearance: Normal appearance  He is well-developed  HENT:      Head: Normocephalic  Eyes:      General: No scleral icterus  Conjunctiva/sclera: Conjunctivae normal    Neck:      Vascular: No JVD  Cardiovascular:      Rate and Rhythm: Normal rate and regular rhythm  Heart sounds: Normal heart sounds  Pulmonary:      Effort: Pulmonary effort is normal       Breath sounds: Normal breath sounds  No wheezing  Abdominal:      General: Bowel sounds are normal       Palpations: Abdomen is soft  Tenderness: There is no abdominal tenderness  Musculoskeletal:         General: No swelling  Normal range of motion  Cervical back: Neck supple  Skin:     General: Skin is warm  Findings: No rash  Neurological:      General: No focal deficit present  Mental Status: He is alert and oriented to person, place, and time  Psychiatric:         Behavior: Behavior normal           PLAN / RECOMMENDATIONS      Acute kidney injury:  Etiology unclear  Patient does not appear volume depleted  I will continue IV fluid at this point check urine electrolytes the part of workup  Will also get kidney ultrasound to rule out any obstructive uropathy    Fracture femoral neck:  After fall    Will be high risk for the surgery until kidney function is stabilized to get better    Diabetes type 2:  Glucose is fluctuating  Will continue to monitor    Hypertension:  Very well control at this point    I discussed that with slim and will monitor the patient with you    Thank you for the consultation to participate in patient's care  I have personally discussed my plan with the referring physician  Alen Deleon MD  Nephrology  2/5/2022        Portions of the record may have been created with voice recognition software  Occasional wrong word or "sound a like" substitutions may have occurred due to the inherent limitations of voice recognition software  Read the chart carefully and recognize, using context, where substitutions have occurred

## 2022-02-05 NOTE — ASSESSMENT & PLAN NOTE
· EKG-normal sinus rhythm  · Chest x-ray on my read no acute cardiopulmonary disease  · METS >4  · RCRI class 3 risk; 10 1% 30 day risk of death, mi or cardiac arrest  · Patient is high risk for low risk procedure

## 2022-02-05 NOTE — ASSESSMENT & PLAN NOTE
Lab Results   Component Value Date    HGBA1C 8 2 (H) 10/16/2021       Recent Labs     02/05/22  2041 02/06/22  0421 02/06/22  0734 02/06/22  1106   POCGLU 239* 234* 225* 260*       Blood Sugar Average: Last 72 hrs:  (P) 245 4  · Will increase insulin back to home dose of 25 units q h s    · Correctional scale insulin  · Hypoglycemia protocol

## 2022-02-05 NOTE — ASSESSMENT & PLAN NOTE
· Creatinine slowly improving, baseline around 1 3-1 5  · Creatinine slightly worse  · Renal ultrasound ordered  · Nephrology following  · Continue IV fluids  · Continue monitor  · Hold home lisinopril for now

## 2022-02-05 NOTE — PROGRESS NOTES
[address]  Progress Note Winnie Milling 1955, 61 y o  male MRN: [id number]  Unit/Bed#: -02 Encounter: [id number]  Primary Care Provider: Jose Nair DO   Date and time admitted to hospital: 2/3/2022  5:11 PM    * Closed displaced intertrochanteric fracture of right femur [facility])  Assessment & Plan  · Status post fall, CT pelvis revealing comminuted right femoral intertrochanteric fracture  · X-rays revealing hardware intact from previous surgical fixation of tibia  · Possible intervention pending creatinine  · Orthopedic surgery following  · Continue to hold warfarin  · Will give vitamin K 5 oral today  · Recheck INR tomorrow  · Pain regimen in place    ROSE (acute kidney injury) ([facility] [address] )  Assessment & Plan  · Creatinine slowly improving, baseline around 1 3-1 5  · Will continue with IV fluids 100 mL/hours  · Will continue to monitor until Monday and hold off on procedure till then  · Continue monitor  · Hold home lisinopril for now    Type 2 diabetes mellitus with hyperglycemia [facility])  Assessment & Plan  Lab Results   Component Value Date    HGBA1C 8 2 (H) 10/16/2021       Recent Labs     02/03/22  2207 02/04/22  0227 02/04/22  0624 02/04/22  1211   POCGLU 335* 309* 227* 115       Blood Sugar Average: Last 72 hrs:  (P) 246 5  · Will increase insulin back to home dose of 25 units q h s  · Correctional scale insulin  · Hypoglycemia protocol    Pre-operative clearance  Assessment & Plan  · EKG-normal sinus rhythm  · Chest x-ray on my read no acute cardiopulmonary disease  · METS >4  · RCRI class 3 risk; 10 1% 30 day risk of death, mi or cardiac arrest  · Patient is intermediate risk for low risk procedure    Hematoma  Assessment & Plan  · CT pelvis revealing small adjacent anterior deep soft tissue hematoma 2 4 cm x 3 4 cm near right femoral fracture  · No signs of ecchymosis currently, vital signs stable  · Monitor CBC in a m    · For now will hold home warfarin    Hyponatremia  Assessment & Plan  · Continuing to improve  · Continue to monitor    Ambulatory dysfunction  Assessment & Plan  · Status post fall in living room tonight  · See plan under closed displaced intertrochanteric fracture of right femur      Anemia  Assessment & Plan  · Hemoglobin stable, baseline around 7-8  · No active bleeding currently  · Continue to monitor        VTE Pharmacologic Prophylaxis: VTE Score: 11 High Risk (Score >/= 5) - Pharmacological DVT Prophylaxis Contraindicated  Sequential Compression Devices Ordered  Patient Centered Rounds: I performed bedside rounds with nursing staff today  Discussions with Specialists or Other Care Team Provider:  Orthopedic surgery, case management, Nephrology    Education and Discussions with Family / Patient: Updated  (wife) via phone  Time Spent for Care: 30 minutes  More than 50% of total time spent on counseling and coordination of care as described above  Current Length of Stay: 2 day(s)  Current Patient Status: Inpatient   Certification Statement: The patient will continue to require additional inpatient hospital stay due to Femur fracture and ROSE  Discharge Plan: Anticipate discharge in 48-72 hrs to discharge location to be determined pending rehab evaluations  Code Status: Level 1 - Full Code    Subjective:   Patient resting comfortably on examination  Again patient would not talk with me on examination and left blanket over his head during exam   Patient had no questions and would also not answer any questions  Objective:     Vitals:   Temp (24hrs), Av °F (37 2 °C), Min:98 7 °F (37 1 °C), Max:99 2 °F (37 3 °C)    Temp:  [98 7 °F (37 1 °C)-99 2 °F (37 3 °C)] 98 7 °F (37 1 °C)  HR:  [95] 95  Resp:  [22] 22  BP: (120-129)/(55-69) 120/55  SpO2:  [95 %-98 %] 98 %  There is no height or weight on file to calculate BMI  Input and Output Summary (last 24 hours):      Intake/Output Summary (Last 24 hours) at 2/5/2022 1233  Last data filed at 2/5/2022 4036  Gross per 24 hour   Intake 1190 ml   Output 600 ml   Net 590 ml       Physical Exam:   Physical Exam  Vitals and nursing note reviewed  Constitutional:       General: He is not in acute distress  Appearance: He is well-developed  HENT:      Head: Normocephalic and atraumatic  Eyes:      General: No scleral icterus  Conjunctiva/sclera: Conjunctivae normal    Cardiovascular:      Rate and Rhythm: Normal rate and regular rhythm  Heart sounds: Normal heart sounds  No murmur heard  No friction rub  No gallop  Pulmonary:      Effort: Pulmonary effort is normal  No respiratory distress  Breath sounds: Normal breath sounds  No wheezing or rales  Abdominal:      General: Bowel sounds are normal  There is no distension  Palpations: Abdomen is soft  Tenderness: There is no abdominal tenderness  Musculoskeletal:         General: Normal range of motion  Skin:     General: Skin is warm  Findings: No rash  Neurological:      Mental Status: He is alert and oriented to person, place, and time          Additional Data:     Labs:  Results from last 7 days   Lab Units 02/05/22  0524   WBC Thousand/uL 10 41*   HEMOGLOBIN g/dL 10 3*   HEMATOCRIT % 32 3*   PLATELETS Thousands/uL 287   NEUTROS PCT % 74   LYMPHS PCT % 14   MONOS PCT % 9   EOS PCT % 2     Results from last 7 days   Lab Units 02/05/22  0524   SODIUM mmol/L 134*   POTASSIUM mmol/L 4 1   CHLORIDE mmol/L 102   CO2 mmol/L 21   BUN mg/dL 24   CREATININE mg/dL 2 40*   ANION GAP mmol/L 11   CALCIUM mg/dL 8 3   GLUCOSE RANDOM mg/dL 271*     Results from last 7 days   Lab Units 02/05/22  0524   INR  3 86*     Results from last 7 days   Lab Units 02/05/22  1137 02/05/22  0751 02/05/22  0529 02/05/22  0409 02/04/22  2050 02/04/22  1604 02/04/22  1211 02/04/22  0624 02/04/22  0227 02/03/22  2207   POC GLUCOSE mg/dl 274* 266* 261* 251* 240* 165* 115 227* 309* 335* Lines/Drains:  Invasive Devices  Report    Peripheral Intravenous Line            Peripheral IV 02/03/22 Right Antecubital 1 day          Drain            External Urinary Catheter Small <1 day                      Imaging: No pertinent imaging reviewed  Recent Cultures (last 7 days):         Last 24 Hours Medication List:   Current Facility-Administered Medications   Medication Dose Route Frequency Provider Last Rate    acetaminophen  975 mg Oral Q8H [address] Isaura Temple PA-C      aluminum-magnesium hydroxide-simethicone  30 mL Oral Q6H PRN Leldon Balzarine, PA-C      colchicine  0 6 mg Oral Daily Leldon Balzarine, PA-C      ferrous sulfate  325 mg Oral Daily With Breakfast Leldon Balzarine, PA-YOMI      HYDROmorphone  0 2 mg Intravenous Q4H PRN Leldon Balzarine, PA-C      hydrOXYzine HCL  25 mg Oral Q6H PRN Leldon Balzarine, PA-C      insulin glargine  25 Units Subcutaneous HS Ludy Shahid DO      insulin lispro  1-6 Units Subcutaneous HS Ludy Shahid DO      insulin lispro  2-12 Units Subcutaneous TID AC Ludy Shahid DO      lactated ringers  100 mL/hr Intravenous Continuous Ludy Shahid  mL/hr (02/05/22 8279)    lidocaine  1 patch Topical Daily PRN Leldon Balzarine, PA-C      naloxone  0 04 mg Intravenous Q1MIN PRN Leldon Balzarine, PA-C      oxyCODONE  5 mg Oral Q4H PRN Leldon Balzarine, PA-C      Or    oxyCODONE  7 5 mg Oral Q4H PRN Leldon Balzarine, PA-C      phytonadione  5 mg Oral Once Ludy Shahid DO      polyethylene glycol  17 g Oral Daily PRN Leldon Balzarine, PA-C      pravastatin  40 mg Oral Daily With Dinner Leldon Balzarine, PA-C      senna-docusate sodium  1 tablet Oral BID Lemadhavi Bellerine, PA-C          Today, Patient Was Seen By: Ludy Shahid DO    **Please Note: This note may have been constructed using a voice recognition system  **

## 2022-02-06 ENCOUNTER — APPOINTMENT (INPATIENT)
Dept: ULTRASOUND IMAGING | Facility: HOSPITAL | Age: 67
DRG: 464 | End: 2022-02-06
Payer: COMMERCIAL

## 2022-02-06 LAB
25(OH)D3 SERPL-MCNC: 14.3 NG/ML (ref 30–100)
ANION GAP SERPL CALCULATED.3IONS-SCNC: 9 MMOL/L (ref 4–13)
BASOPHILS # BLD AUTO: 0.04 THOUSANDS/ΜL (ref 0–0.1)
BASOPHILS NFR BLD AUTO: 0 % (ref 0–1)
BUN SERPL-MCNC: 25 MG/DL (ref 5–25)
CALCIUM SERPL-MCNC: 8.3 MG/DL (ref 8.3–10.1)
CHLORIDE SERPL-SCNC: 100 MMOL/L (ref 100–108)
CO2 SERPL-SCNC: 23 MMOL/L (ref 21–32)
CREAT SERPL-MCNC: 2.55 MG/DL (ref 0.6–1.3)
CREAT UR-MCNC: 134 MG/DL
CREAT UR-MCNC: 136 MG/DL
EOSINOPHIL # BLD AUTO: 0.13 THOUSAND/ΜL (ref 0–0.61)
EOSINOPHIL NFR BLD AUTO: 1 % (ref 0–6)
ERYTHROCYTE [DISTWIDTH] IN BLOOD BY AUTOMATED COUNT: 16.2 % (ref 11.6–15.1)
GFR SERPL CREATININE-BSD FRML MDRD: 25 ML/MIN/1.73SQ M
GLUCOSE SERPL-MCNC: 193 MG/DL (ref 65–140)
GLUCOSE SERPL-MCNC: 224 MG/DL (ref 65–140)
GLUCOSE SERPL-MCNC: 225 MG/DL (ref 65–140)
GLUCOSE SERPL-MCNC: 234 MG/DL (ref 65–140)
GLUCOSE SERPL-MCNC: 235 MG/DL (ref 65–140)
GLUCOSE SERPL-MCNC: 260 MG/DL (ref 65–140)
HCT VFR BLD AUTO: 34.4 % (ref 36.5–49.3)
HGB BLD-MCNC: 10.6 G/DL (ref 12–17)
IMM GRANULOCYTES # BLD AUTO: 0.04 THOUSAND/UL (ref 0–0.2)
IMM GRANULOCYTES NFR BLD AUTO: 0 % (ref 0–2)
INR PPP: 1.93 (ref 0.84–1.19)
LYMPHOCYTES # BLD AUTO: 1.23 THOUSANDS/ΜL (ref 0.6–4.47)
LYMPHOCYTES NFR BLD AUTO: 14 % (ref 14–44)
MCH RBC QN AUTO: 28.3 PG (ref 26.8–34.3)
MCHC RBC AUTO-ENTMCNC: 30.8 G/DL (ref 31.4–37.4)
MCV RBC AUTO: 92 FL (ref 82–98)
MICROALBUMIN UR-MCNC: 67 MG/L (ref 0–20)
MICROALBUMIN/CREAT 24H UR: 49 MG/G CREATININE (ref 0–30)
MONOCYTES # BLD AUTO: 0.83 THOUSAND/ΜL (ref 0.17–1.22)
MONOCYTES NFR BLD AUTO: 9 % (ref 4–12)
NEUTROPHILS # BLD AUTO: 6.86 THOUSANDS/ΜL (ref 1.85–7.62)
NEUTS SEG NFR BLD AUTO: 76 % (ref 43–75)
NRBC BLD AUTO-RTO: 0 /100 WBCS
PHOSPHATE SERPL-MCNC: 2.5 MG/DL (ref 2.3–4.1)
PLATELET # BLD AUTO: 287 THOUSANDS/UL (ref 149–390)
PMV BLD AUTO: 9.5 FL (ref 8.9–12.7)
POTASSIUM SERPL-SCNC: 4.3 MMOL/L (ref 3.5–5.3)
PROTHROMBIN TIME: 21.2 SECONDS (ref 11.6–14.5)
PTH-INTACT SERPL-MCNC: 57.2 PG/ML (ref 18.4–80.1)
RBC # BLD AUTO: 3.74 MILLION/UL (ref 3.88–5.62)
SODIUM 24H UR-SCNC: 75 MOL/L
SODIUM SERPL-SCNC: 132 MMOL/L (ref 136–145)
URATE SERPL-MCNC: 6.9 MG/DL (ref 4.2–8)
WBC # BLD AUTO: 9.13 THOUSAND/UL (ref 4.31–10.16)

## 2022-02-06 PROCEDURE — NC001 PR NO CHARGE

## 2022-02-06 PROCEDURE — 80048 BASIC METABOLIC PNL TOTAL CA: CPT | Performed by: INTERNAL MEDICINE

## 2022-02-06 PROCEDURE — 99232 SBSQ HOSP IP/OBS MODERATE 35: CPT | Performed by: INTERNAL MEDICINE

## 2022-02-06 PROCEDURE — 84100 ASSAY OF PHOSPHORUS: CPT | Performed by: INTERNAL MEDICINE

## 2022-02-06 PROCEDURE — 99233 SBSQ HOSP IP/OBS HIGH 50: CPT | Performed by: INTERNAL MEDICINE

## 2022-02-06 PROCEDURE — 85025 COMPLETE CBC W/AUTO DIFF WBC: CPT | Performed by: INTERNAL MEDICINE

## 2022-02-06 PROCEDURE — 85610 PROTHROMBIN TIME: CPT | Performed by: INTERNAL MEDICINE

## 2022-02-06 PROCEDURE — 82306 VITAMIN D 25 HYDROXY: CPT | Performed by: INTERNAL MEDICINE

## 2022-02-06 PROCEDURE — 84550 ASSAY OF BLOOD/URIC ACID: CPT | Performed by: INTERNAL MEDICINE

## 2022-02-06 PROCEDURE — 3060F POS MICROALBUMINURIA REV: CPT | Performed by: FAMILY MEDICINE

## 2022-02-06 PROCEDURE — 76770 US EXAM ABDO BACK WALL COMP: CPT

## 2022-02-06 PROCEDURE — 82948 REAGENT STRIP/BLOOD GLUCOSE: CPT

## 2022-02-06 PROCEDURE — 83970 ASSAY OF PARATHORMONE: CPT | Performed by: INTERNAL MEDICINE

## 2022-02-06 RX ORDER — INSULIN GLARGINE 100 [IU]/ML
10 INJECTION, SOLUTION SUBCUTANEOUS
Status: DISCONTINUED | OUTPATIENT
Start: 2022-02-06 | End: 2022-02-07

## 2022-02-06 RX ORDER — PHYTONADIONE 5 MG/1
2.5 TABLET ORAL ONCE
Status: COMPLETED | OUTPATIENT
Start: 2022-02-06 | End: 2022-02-06

## 2022-02-06 RX ORDER — SODIUM CHLORIDE 9 MG/ML
100 INJECTION, SOLUTION INTRAVENOUS CONTINUOUS
Status: DISCONTINUED | OUTPATIENT
Start: 2022-02-06 | End: 2022-02-10

## 2022-02-06 RX ADMIN — PRAVASTATIN SODIUM 40 MG: 40 TABLET ORAL at 17:30

## 2022-02-06 RX ADMIN — SODIUM CHLORIDE, SODIUM LACTATE, POTASSIUM CHLORIDE, AND CALCIUM CHLORIDE 100 ML/HR: .6; .31; .03; .02 INJECTION, SOLUTION INTRAVENOUS at 04:23

## 2022-02-06 RX ADMIN — SENNOSIDES AND DOCUSATE SODIUM 1 TABLET: 50; 8.6 TABLET ORAL at 11:03

## 2022-02-06 RX ADMIN — ACETAMINOPHEN 975 MG: 325 TABLET, FILM COATED ORAL at 14:57

## 2022-02-06 RX ADMIN — INSULIN LISPRO 4 UNITS: 100 INJECTION, SOLUTION INTRAVENOUS; SUBCUTANEOUS at 07:00

## 2022-02-06 RX ADMIN — ACETAMINOPHEN 975 MG: 325 TABLET, FILM COATED ORAL at 21:53

## 2022-02-06 RX ADMIN — POLYETHYLENE GLYCOL 3350 17 G: 17 POWDER, FOR SOLUTION ORAL at 11:08

## 2022-02-06 RX ADMIN — HYDROMORPHONE HYDROCHLORIDE 0.2 MG: 0.2 INJECTION, SOLUTION INTRAMUSCULAR; INTRAVENOUS; SUBCUTANEOUS at 15:07

## 2022-02-06 RX ADMIN — COLCHICINE 0.6 MG: 0.6 TABLET, FILM COATED ORAL at 11:03

## 2022-02-06 RX ADMIN — OXYCODONE HYDROCHLORIDE 5 MG: 5 TABLET ORAL at 17:47

## 2022-02-06 RX ADMIN — OXYCODONE HYDROCHLORIDE 7.5 MG: 5 TABLET ORAL at 11:04

## 2022-02-06 RX ADMIN — INSULIN GLARGINE 10 UNITS: 100 INJECTION, SOLUTION SUBCUTANEOUS at 21:53

## 2022-02-06 RX ADMIN — FERROUS SULFATE TAB 325 MG (65 MG ELEMENTAL FE) 325 MG: 325 (65 FE) TAB at 11:04

## 2022-02-06 RX ADMIN — ACETAMINOPHEN 975 MG: 325 TABLET, FILM COATED ORAL at 05:13

## 2022-02-06 RX ADMIN — INSULIN LISPRO 2 UNITS: 100 INJECTION, SOLUTION INTRAVENOUS; SUBCUTANEOUS at 21:53

## 2022-02-06 RX ADMIN — PHYTONADIONE 2.5 MG: 5 TABLET ORAL at 11:18

## 2022-02-06 RX ADMIN — INSULIN LISPRO 6 UNITS: 100 INJECTION, SOLUTION INTRAVENOUS; SUBCUTANEOUS at 11:18

## 2022-02-06 RX ADMIN — INSULIN LISPRO 2 UNITS: 100 INJECTION, SOLUTION INTRAVENOUS; SUBCUTANEOUS at 17:40

## 2022-02-06 RX ADMIN — SODIUM CHLORIDE 100 ML/HR: 9 INJECTION, SOLUTION INTRAVENOUS at 15:00

## 2022-02-06 NOTE — ASSESSMENT & PLAN NOTE
Lab Results   Component Value Date    HGBA1C 8 2 (H) 10/16/2021       Recent Labs     02/06/22  0734 02/06/22  1106 02/06/22  1610 02/06/22 2054   POCGLU 225* 260* 193* 224*       Blood Sugar Average: Last 72 hrs:  (P) 235 1875  · Will increase insulin back to home dose of 25 units q h s    · Correctional scale insulin  · Hypoglycemia protocol

## 2022-02-06 NOTE — PROGRESS NOTES
Progress Note - Orthopedics   Amina Medley 61 y o  male MRN: [id number]  Unit/Bed#: -02      Subjective:    61 y o male with right comminuted right femoral intertrochanteric fracture  Patient is doing well this morning  Patient denies any pain in right hip while at rest  Pain occurs with movement of right hip  Patient denies any numbness or tingling in right lower extremity  Patient denies any shortness of breath, chest pain, lightheadedness, dizziness, nausea, and vomiting  Patient offers no other complaints at this time         Labs:  0   Lab Value Date/Time    HCT 34 4 (L) 02/06/2022 0618    HCT 32 3 (L) 02/05/2022 0524    HCT 32 3 (L) 02/04/2022 0648    HCT 38 4 07/13/2015 1448    HCT 38 6 10/20/2014 1132    HGB 10 6 (L) 02/06/2022 0618    HGB 10 3 (L) 02/05/2022 0524    HGB 10 6 (L) 02/04/2022 0648    HGB 13 1 07/13/2015 1448    HGB 13 1 10/20/2014 1132    INR 1 93 (H) 02/06/2022 0618    INR 2 94 (H) 12/11/2015 1543    WBC 9 13 02/06/2022 0618    WBC 10 41 (H) 02/05/2022 0524    WBC 8 63 02/04/2022 0648    WBC 7 7 07/13/2015 1448    WBC 8 30 10/20/2014 1132    ESR 58 (H) 05/20/2016 1207       Meds:    Current Facility-Administered Medications:     acetaminophen (TYLENOL) tablet 975 mg, 975 mg, Oral, Q8H [facility], Isaura Temple PA-C, 975 mg at 02/06/22 9363    aluminum-magnesium hydroxide-simethicone (MYLANTA) oral suspension 30 mL, 30 mL, Oral, Q6H PRN, Catherine Blank PA-C    colchicine (COLCRYS) tablet 0 6 mg, 0 6 mg, Oral, Daily, Catherine Flax, PA-C, 0 6 mg at 02/05/22 1221    ferrous sulfate tablet 325 mg, 325 mg, Oral, Daily With Breakfast, Florin Parson, PA-C, 325 mg at 02/04/22 1054    HYDROmorphone HCl (DILAUDID) injection 0 2 mg, 0 2 mg, Intravenous, Q4H PRN, Florin Parson, PA-C, 0 2 mg at 02/05/22 1615    hydrOXYzine HCL (ATARAX) tablet 25 mg, 25 mg, Oral, Q6H PRN, Florin Parson PA-C    insulin glargine (LANTUS) subcutaneous injection 25 Units 0 25 mL, 25 Units, Subcutaneous, , Cristy Alva Shannono, DO, 25 Units at 02/05/22 2222    insulin lispro (HumaLOG) 100 units/mL subcutaneous injection 1-6 Units, 1-6 Units, Subcutaneous, HS, Gay Montez, DO, 3 Units at 02/05/22 2233    insulin lispro (HumaLOG) 100 units/mL subcutaneous injection 2-12 Units, 2-12 Units, Subcutaneous, TID AC, 6 Units at 02/05/22 1700 **AND** Fingerstick Glucose (POCT), , , TID AC, Olam Elsier, DO    lidocaine (LIDODERM) 5 % patch 1 patch, 1 patch, Topical, Daily PRN, PRITI Cunningham-C, 1 patch at 02/04/22 1204    naloxone (NARCAN) 0 04 mg/mL syringe 0 04 mg, 0 04 mg, Intravenous, Q1MIN PRN, Carrington Chou PA-C    oxyCODONE (ROXICODONE) IR tablet 5 mg, 5 mg, Oral, Q4H PRN, 5 mg at 02/04/22 3713 **OR** oxyCODONE (ROXICODONE) IR tablet 7 5 mg, 7 5 mg, Oral, Q4H PRN, PRITI Cunningham-C, 7 5 mg at 02/05/22 1858    polyethylene glycol (MIRALAX) packet 17 g, 17 g, Oral, Daily PRN, Carrington Chou PA-C    pravastatin (PRAVACHOL) tablet 40 mg, 40 mg, Oral, Daily With Dinner, PRITI Cunningham-C, 40 mg at 02/05/22 1607    senna-docusate sodium (SENOKOT S) 8 6-50 mg per tablet 1 tablet, 1 tablet, Oral, BID, PRITI Cunningham-C, 1 tablet at 02/05/22 2221    Blood Culture:   No results found for: BLOODCX    Wound Culture:   No results found for: WOUNDCULT    Ins and Outs:  I/O last 24 hours: In: 240 [P O :240]  Out: 500 [Urine:500]          Physical:  Vitals:    02/06/22 0143   BP: 129/85   Pulse: 102   Resp: 20   Temp: 99 4 °F (37 4 °C)   SpO2: 94%     Musculoskeletal: right Lower Extremity  · Skin intact with no erythema  Small laceration noted over anterior lower leg  · TTP over lateral aspect of hip  · SILT s/s/sp/dp/t  +fhl/ehl, +ankle dorsi/plantar flexion  2+ DP pulse  · Calf is supple and nontender      Assessment:    61 y o male with right comminuted femoral intertrochanteric fracture    Plan:  · Nonweightbearing right lower extremity  · Pain control per primary  · DVT ppx per primary  · NPO at midnight · Dispo: Ortho will follow  Pending medical clearance will proceed with right trochanteric femoral nail insertion tomorrow with Dr June Shah  Surgical intervention discussed in detail including risks and benefits  New surgical consent obtained today       Claudean Seta, PA-C

## 2022-02-06 NOTE — ASSESSMENT & PLAN NOTE
· CT pelvis revealing small adjacent anterior deep soft tissue hematoma 2 4 cm x 3 4 cm near right femoral fracture  · No signs of ecchymosis currently, vital signs stable  · For now will hold home warfarin  · Hemoglobin stable

## 2022-02-06 NOTE — PROGRESS NOTES
NEPHROLOGY PROGRESS NOTE    Patient: eDlia Sotomayor               Sex: male          DOA: 2/3/2022  5:11 PM   YOB: 1955        Age:  61 y o         LOS:  LOS: 3 days       HPI     Patient admitted with fractured right femur and has acute kidney injury    SUBJECTIVE     Overall feeling well  Want to get surgery done    Denies any urinary complaint    Does have Peñaloza catheter which is draining well    No chest pain no palpitation    No Shortness of breath    CURRENT MEDICATIONS       Current Facility-Administered Medications:     acetaminophen (TYLENOL) tablet 975 mg, 975 mg, Oral, Q8H [address], Isaura Temple, PA-C, 975 mg at 02/06/22 8405    aluminum-magnesium hydroxide-simethicone (MYLANTA) oral suspension 30 mL, 30 mL, Oral, Q6H PRN, Mariamldon Balzarine, PA-C    colchicine (COLCRYS) tablet 0 6 mg, 0 6 mg, Oral, Daily, Leldon Balzarine, PA-C, 0 6 mg at 02/06/22 1103    ferrous sulfate tablet 325 mg, 325 mg, Oral, Daily With Breakfast, Leldon Balzarine, PA-C, 325 mg at 02/06/22 1104    HYDROmorphone HCl (DILAUDID) injection 0 2 mg, 0 2 mg, Intravenous, Q4H PRN, Leldon Balzarine, PA-C, 0 2 mg at 02/05/22 1615    hydrOXYzine HCL (ATARAX) tablet 25 mg, 25 mg, Oral, Q6H PRN, Leldon Balzarine, PA-C    insulin glargine (LANTUS) subcutaneous injection 10 Units 0 1 mL, 10 Units, Subcutaneous, HS, Ludy Shahid DO    insulin lispro (HumaLOG) 100 units/mL subcutaneous injection 1-6 Units, 1-6 Units, Subcutaneous, HS, Ludy Shahid DO, 3 Units at 02/05/22 2233    insulin lispro (HumaLOG) 100 units/mL subcutaneous injection 2-12 Units, 2-12 Units, Subcutaneous, TID AC, 6 Units at 02/06/22 1118 **AND** Fingerstick Glucose (POCT), , , TID AC, Ludy Shahid DO    lidocaine (LIDODERM) 5 % patch 1 patch, 1 patch, Topical, Daily PRN, Leldon Balzarine, PA-C, 1 patch at 02/04/22 1204    naloxone (NARCAN) 0 04 mg/mL syringe 0 04 mg, 0 04 mg, Intravenous, Q1MIN PRN, Ray Baltazarzarine, PA-C    oxyCODONE (ROXICODONE) IR tablet 5 mg, 5 mg, Oral, Q4H PRN, 5 mg at 02/04/22 8685 **OR** oxyCODONE (ROXICODONE) IR tablet 7 5 mg, 7 5 mg, Oral, Q4H PRN, PRITI Nathan-YOMI, 7 5 mg at 02/06/22 1104    polyethylene glycol (MIRALAX) packet 17 g, 17 g, Oral, Daily PRN, Sanna Haines PA-C, 17 g at 02/06/22 1108    pravastatin (PRAVACHOL) tablet 40 mg, 40 mg, Oral, Daily With Dinner, Sanna Haines PA-C, 40 mg at 02/05/22 1607    senna-docusate sodium (SENOKOT S) 8 6-50 mg per tablet 1 tablet, 1 tablet, Oral, BID, Sanna Haines PA-C, 1 tablet at 02/06/22 1103    sodium chloride 0 9 % infusion, 100 mL/hr, Intravenous, Continuous, [facility] [address], DO    OBJECTIVE     Current Weight:    Vitals:    02/06/22 0143   BP: 129/85   Pulse: 102   Resp: 20   Temp: 99 4 °F (37 4 °C)   SpO2: 94%       Intake/Output Summary (Last 24 hours) at 2/6/2022 1333  Last data filed at 2/6/2022 1306  Gross per 24 hour   Intake 871 67 ml   Output --   Net 871 67 ml       PHYSICAL EXAMINATION     Physical Exam  Constitutional:       General: He is not in acute distress  Appearance: He is well-developed  HENT:      Head: Normocephalic  Eyes:      General: No scleral icterus  Conjunctiva/sclera: Conjunctivae normal    Neck:      Vascular: No JVD  Cardiovascular:      Rate and Rhythm: Normal rate  Heart sounds: Normal heart sounds  Pulmonary:      Effort: Pulmonary effort is normal       Breath sounds: No wheezing  Abdominal:      Palpations: Abdomen is soft  Tenderness: There is no abdominal tenderness  Musculoskeletal:         General: Normal range of motion  Cervical back: Neck supple  Skin:     General: Skin is warm  Findings: No rash  Neurological:      Mental Status: He is alert and oriented to person, place, and time     Psychiatric:         Behavior: Behavior normal           LAB RESULTS     Results from last 7 days   Lab Units 02/06/22  0618 02/05/22  0524 02/04/22  0648 02/03/22 2015   WBC Thousand/uL 9 13 10 41* 8 63 10 56*   HEMOGLOBIN g/dL 10 6* 10 3* 10 6* 11 7*   HEMATOCRIT % 34 4* 32 3* 32 3* 36 9   PLATELETS Thousands/uL 287 287 290 312   POTASSIUM mmol/L 4 3 4 1 3 7 4 1   CHLORIDE mmol/L 100 102 102 99*   CO2 mmol/L 23 21 22 22   BUN mg/dL 25 24 28* 29*   CREATININE mg/dL 2 55* 2 40* 2 45* 2 64*   EGFR ml/min/1 73sq m 25 27 26 24   CALCIUM mg/dL 8 3 8 3 8 4 8 7   PHOSPHORUS mg/dL 2 5  --   --   --        RADIOLOGY RESULTS      Results for orders placed during the hospital encounter of 02/03/22    XR chest portable    Narrative  CHEST    INDICATION:   pre op clearance  COMPARISON:  4/4/2017    EXAM PERFORMED/VIEWS:  XR CHEST PORTABLE      FINDINGS:    Cardiomediastinal silhouette appears unremarkable  Platelike atelectasis versus scar is noted at the left lung base  There is mild hypoventilation  No infiltrate is seen  No pneumothorax or pleural effusion  Osseous structures appear within normal limits for patient age  Impression  No acute cardiopulmonary disease  Workstation performed: [id number]    Results for orders placed during the hospital encounter of 04/04/17    XR chest pa & lateral    Narrative  CHEST - DUAL ENERGY    INDICATION:  Cough    COMPARISON:  None    VIEWS:  PA (including soft tissue/bone algorithms) and lateral projections    IMAGES:  4    FINDINGS:    Cardiomediastinal silhouette appears unremarkable  The lungs are clear  No pneumothorax or pleural effusion  Visualized osseous structures appear within normal limits for the patient's age  Impression  No active pulmonary disease  Workstation performed: [id number]    No results found for this or any previous visit  No results found for this or any previous visit  Results for orders placed during the hospital encounter of 01/15/21    CT abdomen pelvis wo contrast    Narrative  CT ABDOMEN AND PELVIS WITHOUT IV CONTRAST    INDICATION:   R10 9: Unspecified abdominal pain      COMPARISON: None     TECHNIQUE:  CT examination of the abdomen and pelvis was performed without intravenous contrast   Axial, sagittal, and coronal 2D reformatted images were created from the source data and submitted for interpretation  Radiation dose length product (DLP) for this visit:  2252 mGy-cm   This examination, like all CT scans performed in the [facility], was performed utilizing techniques to minimize radiation dose exposure, including the use of iterative  reconstruction and automated exposure control  Enteric contrast was not administered  FINDINGS:    ABDOMEN    LOWER CHEST:  No clinically significant abnormality identified in the visualized lower chest     LIVER/BILIARY TREE:  Unremarkable  GALLBLADDER:  No calcified gallstones  No pericholecystic inflammatory change  SPLEEN:  Unremarkable  PANCREAS:  Unremarkable  ADRENAL GLANDS:  Unremarkable  KIDNEYS/URETERS:  There is a 7 mm calculus located within the proximal left ureter, 2 cm distal to the ureteropelvic junction causing mild hydronephrosis and perinephric stranding  There are multiple left-sided nonobstructing intrarenal calculi  measuring up to 7 mm  There are a few punctate nonobstructing right-sided intrarenal calculi  STOMACH AND BOWEL:  Unremarkable  APPENDIX:  No findings to suggest appendicitis  ABDOMINOPELVIC CAVITY:  No ascites  No pneumoperitoneum  No lymphadenopathy  VESSELS:  Unremarkable for patient's age  PELVIS    REPRODUCTIVE ORGANS:  Unremarkable for patient's age  URINARY BLADDER:  Unremarkable  ABDOMINAL WALL/INGUINAL REGIONS:  There are small bilateral fat-containing inguinal hernias  OSSEOUS STRUCTURES:  No acute fracture or destructive osseous lesion  There is diffuse bone demineralization  There is a moderate sized Schmorl's node at the superior endplate of L1      Impression  Mild left hydronephrosis and perinephric stranding, the cause of which appears to be a 7 mm calculus within the proximal ureter  Bilateral subcentimeter nonobstructing intrarenal calculi  Workstation performed: [id number]    No results found for this or any previous visit  PLAN / RECOMMENDATIONS      Acute kidney injury:  Possible ATN based on high urinary sodium  Will continue hydration at this point and monitor    Fracture femur: Will need surgery  Will be high risk involved acute kidney injury will continue hydration at this point and will continue to monitor after surgery    CKD stage 3:  I think patient does have stage III CKD because of diabetes    Bone and mineral disorder:  Phosphorus is normal PTH and vitamin-D pending    Will continue to monitor    Jorge Shaffer MD  Nephrology  2/6/2022        Portions of the record may have been created with voice recognition software  Occasional wrong word or "sound a like" substitutions may have occurred due to the inherent limitations of voice recognition software  Read the chart carefully and recognize, using context, where substitutions have occurred

## 2022-02-06 NOTE — ASSESSMENT & PLAN NOTE
· Status post fall, CT pelvis revealing comminuted right femoral intertrochanteric fracture  · X-rays revealing hardware intact from previous surgical fixation of tibia  · Orthopedic surgery following  · Continue to hold warfarin; will discuss with Orthopedic surgery when to restart  · INR 1 46  · Plan for possible procedure today with orthopedic surgery  · Will place on heparin subcu after procedure  · Pain regimen in place

## 2022-02-06 NOTE — ASSESSMENT & PLAN NOTE
· Creatinine slowly improving, baseline around 1 3-1 5  · Creatinine staying around 2 3-2 4  · Renal ultrasound unremarkable  · Nephrology following  · Continue IV fluids  · Continue to monitor  · Hold home lisinopril for now

## 2022-02-06 NOTE — PROGRESS NOTES
[id number]  Progress Note Winnie Milling 1955, 61 y o  male MRN: [id number]  Unit/Bed#: -02 Encounter: [id number]  Primary Care Provider: Jose Nair DO   Date and time admitted to hospital: 2/3/2022  5:11 PM    * Closed displaced intertrochanteric fracture of right femur [facility])  Assessment & Plan  · Status post fall, CT pelvis revealing comminuted right femoral intertrochanteric fracture  · X-rays revealing hardware intact from previous surgical fixation of tibia  · Orthopedic surgery following  · Continue to hold warfarin  · Will give vitamin K 2 5 mg  · Recheck INR tomorrow  · Will place on heparin subcu after procedure  · Pain regimen in place    ROSE (acute kidney injury) ([facility] [address] )  Assessment & Plan  · Creatinine slowly improving, baseline around 1 3-1 5  · Creatinine slightly worse  · Renal ultrasound ordered  · Nephrology following  · Continue IV fluids  · Continue monitor  · Hold home lisinopril for now    Type 2 diabetes mellitus with hyperglycemia [facility])  Assessment & Plan  Lab Results   Component Value Date    HGBA1C 8 2 (H) 10/16/2021       Recent Labs     02/05/22  2041 02/06/22  0421 02/06/22  0734 02/06/22  1106   POCGLU 239* 234* 225* 260*       Blood Sugar Average: Last 72 hrs:  (P) 245 4  · Will increase insulin back to home dose of 25 units q h s  · Correctional scale insulin  · Hypoglycemia protocol    Pre-operative clearance  Assessment & Plan  · EKG-normal sinus rhythm  · Chest x-ray on my read no acute cardiopulmonary disease  · METS >4  · RCRI class 3 risk; 10 1% 30 day risk of death, mi or cardiac arrest  · Patient is high risk for low risk procedure    Hematoma  Assessment & Plan  · CT pelvis revealing small adjacent anterior deep soft tissue hematoma 2 4 cm x 3 4 cm near right femoral fracture  · No signs of ecchymosis currently, vital signs stable  · Monitor CBC in a m    · For now will hold home warfarin    Hyponatremia  Assessment & Plan  · Continuing to improve  · Continue to monitor    Ambulatory dysfunction  Assessment & Plan  · Status post fall in living room   · See plan under closed displaced intertrochanteric fracture of right femur      Anemia  Assessment & Plan  · Hemoglobin stable, baseline around 7-8  · No active bleeding currently  · Continue to monitor        VTE Pharmacologic Prophylaxis: VTE Score: 11 High Risk (Score >/= 5) - Pharmacological DVT Prophylaxis Contraindicated  Sequential Compression Devices Ordered  Patient Centered Rounds: I performed bedside rounds with nursing staff today  Discussions with Specialists or Other Care Team Provider:  Orthopedic surgery, Nephrology    Education and Discussions with Family / Patient: Updated  (wife) via phone  Time Spent for Care: 30 minutes  More than 50% of total time spent on counseling and coordination of care as described above  Current Length of Stay: 3 day(s)  Current Patient Status: Inpatient   Certification Statement: The patient will continue to require additional inpatient hospital stay due to Femur fracture  Discharge Plan: Anticipate discharge in 48-72 hrs to discharge location to be determined pending rehab evaluations  Code Status: Level 1 - Full Code    Subjective:   Patient resting comfortably on examination  Patient still difficult on exam, but did uncover his eyes to talk with me this morning  Patient denied any complaints on exam besides stating I am cold  Objective:     Vitals:   Temp (24hrs), Av 1 °F (37 3 °C), Min:98 7 °F (37 1 °C), Max:99 4 °F (37 4 °C)    Temp:  [98 7 °F (37 1 °C)-99 4 °F (37 4 °C)] 99 4 °F (37 4 °C)  HR:  [] 102  Resp:  [20] 20  BP: (129-139)/(71-85) 129/85  SpO2:  [94 %] 94 %  There is no height or weight on file to calculate BMI  Input and Output Summary (last 24 hours):      Intake/Output Summary (Last 24 hours) at 2022 1230  Last data filed at 2022 1325  Gross per 24 hour   Intake 240 ml   Output 500 ml Net -260 ml       Physical Exam:   Physical Exam  Vitals and nursing note reviewed  Constitutional:       General: He is not in acute distress  Appearance: He is well-developed  HENT:      Head: Normocephalic and atraumatic  Eyes:      General: No scleral icterus  Conjunctiva/sclera: Conjunctivae normal    Cardiovascular:      Rate and Rhythm: Normal rate and regular rhythm  Heart sounds: Normal heart sounds  No murmur heard  No friction rub  No gallop  Pulmonary:      Effort: Pulmonary effort is normal  No respiratory distress  Breath sounds: Normal breath sounds  No wheezing or rales  Abdominal:      General: Bowel sounds are normal  There is no distension  Palpations: Abdomen is soft  Tenderness: There is no abdominal tenderness  Musculoskeletal:         General: Normal range of motion  Skin:     General: Skin is warm  Findings: No rash  Neurological:      Mental Status: He is alert and oriented to person, place, and time            Additional Data:     Labs:  Results from last 7 days   Lab Units 02/06/22  0618   WBC Thousand/uL 9 13   HEMOGLOBIN g/dL 10 6*   HEMATOCRIT % 34 4*   PLATELETS Thousands/uL 287   NEUTROS PCT % 76*   LYMPHS PCT % 14   MONOS PCT % 9   EOS PCT % 1     Results from last 7 days   Lab Units 02/06/22  0618   SODIUM mmol/L 132*   POTASSIUM mmol/L 4 3   CHLORIDE mmol/L 100   CO2 mmol/L 23   BUN mg/dL 25   CREATININE mg/dL 2 55*   ANION GAP mmol/L 9   CALCIUM mg/dL 8 3   GLUCOSE RANDOM mg/dL 235*     Results from last 7 days   Lab Units 02/06/22  0618   INR  1 93*     Results from last 7 days   Lab Units 02/06/22  1106 02/06/22  0734 02/06/22  0421 02/05/22  2041 02/05/22  1642 02/05/22  1137 02/05/22  0751 02/05/22  0529 02/05/22  0409 02/04/22  2050 02/04/22  1604 02/04/22  1211   POC GLUCOSE mg/dl 260* 225* 234* 239* 280* 274* 266* 261* 251* 240* 165* 115               Lines/Drains:  Invasive Devices  Report    Peripheral Intravenous Line Peripheral IV 02/03/22 Right Antecubital 2 days          Drain            External Urinary Catheter Small 1 day                      Imaging: No pertinent imaging reviewed  Recent Cultures (last 7 days):         Last 24 Hours Medication List:   Current Facility-Administered Medications   Medication Dose Route Frequency Provider Last Rate    acetaminophen  975 mg Oral Q8H [facility] Isaura Temple PA-C      aluminum-magnesium hydroxide-simethicone  30 mL Oral Q6H PRN Rafia Vickey, JOSEF      colchicine  0 6 mg Oral Daily Rafia Vickey, JOSEF      ferrous sulfate  325 mg Oral Daily With Breakfast Rafia Vickey, JOSEF      HYDROmorphone  0 2 mg Intravenous Q4H PRN Rafia Vickey, JOSEF      hydrOXYzine HCL  25 mg Oral Q6H PRN Rafia Vickey, JOSEF      insulin glargine  25 Units Subcutaneous HS Bisi Krueger DO      insulin lispro  1-6 Units Subcutaneous HS Bisi Krueger DO      insulin lispro  2-12 Units Subcutaneous TID [facility] Bisi Krueger DO      lidocaine  1 patch Topical Daily PRN Rafia Vickey, JOSEF      naloxone  0 04 mg Intravenous Q1MIN PRN Rafia Vickey, PA-YOMI      oxyCODONE  5 mg Oral Q4H PRN Rafia Vickey, JOSEF      Or    oxyCODONE  7 5 mg Oral Q4H PRN Rafia Vickey, PA-YOMI      polyethylene glycol  17 g Oral Daily PRN Rafia Vickey, PA-YOMI      pravastatin  40 mg Oral Daily With Dinner Rafia Vickey, JOSEF      senna-docusate sodium  1 tablet Oral BID Rafia Vickey, JOSEF      sodium chloride  100 mL/hr Intravenous Continuous Bisi Krueger DO          Today, Patient Was Seen By: Bisi Krueger DO    **Please Note: This note may have been constructed using a voice recognition system  **

## 2022-02-07 ENCOUNTER — APPOINTMENT (INPATIENT)
Dept: NON INVASIVE DIAGNOSTICS | Facility: HOSPITAL | Age: 67
DRG: 464 | End: 2022-02-07
Payer: COMMERCIAL

## 2022-02-07 ENCOUNTER — ANESTHESIA (INPATIENT)
Dept: PERIOP | Facility: HOSPITAL | Age: 67
DRG: 464 | End: 2022-02-07
Payer: COMMERCIAL

## 2022-02-07 ENCOUNTER — APPOINTMENT (INPATIENT)
Dept: RADIOLOGY | Facility: HOSPITAL | Age: 67
DRG: 464 | End: 2022-02-07
Payer: COMMERCIAL

## 2022-02-07 ENCOUNTER — ANESTHESIA EVENT (INPATIENT)
Dept: PERIOP | Facility: HOSPITAL | Age: 67
DRG: 464 | End: 2022-02-07
Payer: COMMERCIAL

## 2022-02-07 LAB
1,25(OH)2D3 SERPL-MCNC: 21 PG/ML (ref 19.9–79.3)
2HR DELTA HS TROPONIN: 16 NG/L
4HR DELTA HS TROPONIN: -1 NG/L
ANION GAP SERPL CALCULATED.3IONS-SCNC: 9 MMOL/L (ref 4–13)
AORTIC ROOT: 4.3 CM
APICAL FOUR CHAMBER EJECTION FRACTION: 61 %
ASCENDING AORTA: 3.5 CM (ref 2.17–3.26)
ATRIAL RATE: 113 BPM
BASOPHILS # BLD AUTO: 0.02 THOUSANDS/ΜL (ref 0–0.1)
BASOPHILS NFR BLD AUTO: 0 % (ref 0–1)
BUN SERPL-MCNC: 25 MG/DL (ref 5–25)
CALCIUM SERPL-MCNC: 8.7 MG/DL (ref 8.3–10.1)
CARDIAC TROPONIN I PNL SERPL HS: 103 NG/L
CARDIAC TROPONIN I PNL SERPL HS: 104 NG/L
CARDIAC TROPONIN I PNL SERPL HS: 120 NG/L
CHLORIDE SERPL-SCNC: 103 MMOL/L (ref 100–108)
CO2 SERPL-SCNC: 24 MMOL/L (ref 21–32)
CREAT SERPL-MCNC: 2.46 MG/DL (ref 0.6–1.3)
E WAVE DECELERATION TIME: 250 MS
EOSINOPHIL # BLD AUTO: 0.27 THOUSAND/ΜL (ref 0–0.61)
EOSINOPHIL NFR BLD AUTO: 4 % (ref 0–6)
ERYTHROCYTE [DISTWIDTH] IN BLOOD BY AUTOMATED COUNT: 15.9 % (ref 11.6–15.1)
FRACTIONAL SHORTENING: 36 % (ref 28–44)
GFR SERPL CREATININE-BSD FRML MDRD: 26 ML/MIN/1.73SQ M
GLUCOSE SERPL-MCNC: 214 MG/DL (ref 65–140)
GLUCOSE SERPL-MCNC: 230 MG/DL (ref 65–140)
GLUCOSE SERPL-MCNC: 236 MG/DL (ref 65–140)
GLUCOSE SERPL-MCNC: 296 MG/DL (ref 65–140)
GLUCOSE SERPL-MCNC: 318 MG/DL (ref 65–140)
GLUCOSE SERPL-MCNC: 433 MG/DL (ref 65–140)
HCT VFR BLD AUTO: 32.7 % (ref 36.5–49.3)
HGB BLD-MCNC: 10.5 G/DL (ref 12–17)
IMM GRANULOCYTES # BLD AUTO: 0.03 THOUSAND/UL (ref 0–0.2)
IMM GRANULOCYTES NFR BLD AUTO: 0 % (ref 0–2)
INR PPP: 1.46 (ref 0.84–1.19)
INTERVENTRICULAR SEPTUM IN DIASTOLE (PARASTERNAL SHORT AXIS VIEW): 1.4 CM (ref 0.56–1.06)
LA/AORTA RATIO 2D: 0.93
LEFT ATRIUM SIZE: 4 CM
LEFT INTERNAL DIMENSION IN SYSTOLE: 3.6 CM (ref 2.1–4)
LEFT VENTRICULAR INTERNAL DIMENSION IN DIASTOLE: 5.6 CM (ref 7.3–10.88)
LEFT VENTRICULAR POSTERIOR WALL IN END DIASTOLE: 1.3 CM (ref 0.55–1.04)
LEFT VENTRICULAR STROKE VOLUME: 96 ML
LYMPHOCYTES # BLD AUTO: 1.3 THOUSANDS/ΜL (ref 0.6–4.47)
LYMPHOCYTES NFR BLD AUTO: 17 % (ref 14–44)
MCH RBC QN AUTO: 28.6 PG (ref 26.8–34.3)
MCHC RBC AUTO-ENTMCNC: 32.1 G/DL (ref 31.4–37.4)
MCV RBC AUTO: 89 FL (ref 82–98)
MONOCYTES # BLD AUTO: 0.7 THOUSAND/ΜL (ref 0.17–1.22)
MONOCYTES NFR BLD AUTO: 9 % (ref 4–12)
MV E'TISSUE VEL-SEP: 6 CM/S
MV PEAK A VEL: 0.83 M/S
MV PEAK E VEL: 58 CM/S
MV STENOSIS PRESSURE HALF TIME: 0 MS
NEUTROPHILS # BLD AUTO: 5.29 THOUSANDS/ΜL (ref 1.85–7.62)
NEUTS SEG NFR BLD AUTO: 70 % (ref 43–75)
NRBC BLD AUTO-RTO: 0 /100 WBCS
P AXIS: 50 DEGREES
PLATELET # BLD AUTO: 261 THOUSANDS/UL (ref 149–390)
PMV BLD AUTO: 9.5 FL (ref 8.9–12.7)
POTASSIUM SERPL-SCNC: 4.5 MMOL/L (ref 3.5–5.3)
PR INTERVAL: 130 MS
PROTHROMBIN TIME: 17 SECONDS (ref 11.6–14.5)
QRS AXIS: 19 DEGREES
QRSD INTERVAL: 92 MS
QT INTERVAL: 324 MS
QTC INTERVAL: 444 MS
RBC # BLD AUTO: 3.67 MILLION/UL (ref 3.88–5.62)
SL CV LV EF: 60
SL CV PED ECHO LEFT VENTRICLE DIASTOLIC VOLUME (MOD BIPLANE) 2D: 151 ML
SL CV PED ECHO LEFT VENTRICLE SYSTOLIC VOLUME (MOD BIPLANE) 2D: 55 ML
SODIUM SERPL-SCNC: 136 MMOL/L (ref 136–145)
T WAVE AXIS: -86 DEGREES
TRICUSPID ANNULAR PLANE SYSTOLIC EXCURSION: 2.6 CM
VENTRICULAR RATE: 113 BPM
WBC # BLD AUTO: 7.61 THOUSAND/UL (ref 4.31–10.16)
Z-SCORE OF ASCENDING AORTA: 2.88
Z-SCORE OF INTERVENTRICULAR SEPTUM IN END DIASTOLE: 4.7
Z-SCORE OF LEFT VENTRICULAR DIMENSION IN END SYSTOLE: -4.65
Z-SCORE OF LEFT VENTRICULAR POSTERIOR WALL IN END DIASTOLE: 4.02

## 2022-02-07 PROCEDURE — 12020 TX SUPFC WND DEHSN SMPL CLSR: CPT | Performed by: PHYSICIAN ASSISTANT

## 2022-02-07 PROCEDURE — 84484 ASSAY OF TROPONIN QUANT: CPT | Performed by: INTERNAL MEDICINE

## 2022-02-07 PROCEDURE — 80048 BASIC METABOLIC PNL TOTAL CA: CPT | Performed by: INTERNAL MEDICINE

## 2022-02-07 PROCEDURE — C1713 ANCHOR/SCREW BN/BN,TIS/BN: HCPCS | Performed by: ORTHOPAEDIC SURGERY

## 2022-02-07 PROCEDURE — 27245 TREAT THIGH FRACTURE: CPT | Performed by: ORTHOPAEDIC SURGERY

## 2022-02-07 PROCEDURE — 99254 IP/OBS CNSLTJ NEW/EST MOD 60: CPT | Performed by: INTERNAL MEDICINE

## 2022-02-07 PROCEDURE — 0QS606Z REPOSITION RIGHT UPPER FEMUR WITH INTRAMEDULLARY INTERNAL FIXATION DEVICE, OPEN APPROACH: ICD-10-PCS | Performed by: ORTHOPAEDIC SURGERY

## 2022-02-07 PROCEDURE — 93005 ELECTROCARDIOGRAM TRACING: CPT

## 2022-02-07 PROCEDURE — C1769 GUIDE WIRE: HCPCS | Performed by: ORTHOPAEDIC SURGERY

## 2022-02-07 PROCEDURE — 0JBN0ZZ EXCISION OF RIGHT LOWER LEG SUBCUTANEOUS TISSUE AND FASCIA, OPEN APPROACH: ICD-10-PCS | Performed by: ORTHOPAEDIC SURGERY

## 2022-02-07 PROCEDURE — 93306 TTE W/DOPPLER COMPLETE: CPT

## 2022-02-07 PROCEDURE — 83036 HEMOGLOBIN GLYCOSYLATED A1C: CPT | Performed by: ORTHOPAEDIC SURGERY

## 2022-02-07 PROCEDURE — 99233 SBSQ HOSP IP/OBS HIGH 50: CPT | Performed by: INTERNAL MEDICINE

## 2022-02-07 PROCEDURE — 93306 TTE W/DOPPLER COMPLETE: CPT | Performed by: INTERNAL MEDICINE

## 2022-02-07 PROCEDURE — 12020 TX SUPFC WND DEHSN SMPL CLSR: CPT | Performed by: ORTHOPAEDIC SURGERY

## 2022-02-07 PROCEDURE — 85610 PROTHROMBIN TIME: CPT | Performed by: INTERNAL MEDICINE

## 2022-02-07 PROCEDURE — 27245 TREAT THIGH FRACTURE: CPT | Performed by: PHYSICIAN ASSISTANT

## 2022-02-07 PROCEDURE — 93010 ELECTROCARDIOGRAM REPORT: CPT | Performed by: INTERNAL MEDICINE

## 2022-02-07 PROCEDURE — 82948 REAGENT STRIP/BLOOD GLUCOSE: CPT

## 2022-02-07 PROCEDURE — 73552 X-RAY EXAM OF FEMUR 2/>: CPT

## 2022-02-07 PROCEDURE — 85025 COMPLETE CBC W/AUTO DIFF WBC: CPT | Performed by: INTERNAL MEDICINE

## 2022-02-07 DEVICE — 12MM/130 DEG TI CANN TFNA 420MM/RIGHT-STERILE
Type: IMPLANTABLE DEVICE | Site: LEG | Status: FUNCTIONAL
Brand: TFN-ADVANCE

## 2022-02-07 DEVICE — 5.0MM TI LOCKING SCREW W/T25 STARDRIVE 52MM F/IM NAIL-STER: Type: IMPLANTABLE DEVICE | Site: LEG | Status: FUNCTIONAL

## 2022-02-07 DEVICE — TFNA FENESTRATED HELICAL BLADE 105MM - STERILE
Type: IMPLANTABLE DEVICE | Site: LEG | Status: FUNCTIONAL
Brand: TFN-ADVANCE

## 2022-02-07 DEVICE — 5.0MM TI LOCKING SCREW W/T25 STARDRIVE 50MM F/IM NAIL-STER: Type: IMPLANTABLE DEVICE | Site: LEG | Status: FUNCTIONAL

## 2022-02-07 RX ORDER — MIDAZOLAM HYDROCHLORIDE 2 MG/2ML
INJECTION, SOLUTION INTRAMUSCULAR; INTRAVENOUS AS NEEDED
Status: DISCONTINUED | OUTPATIENT
Start: 2022-02-07 | End: 2022-02-07

## 2022-02-07 RX ORDER — FENTANYL CITRATE 50 UG/ML
INJECTION, SOLUTION INTRAMUSCULAR; INTRAVENOUS AS NEEDED
Status: DISCONTINUED | OUTPATIENT
Start: 2022-02-07 | End: 2022-02-07

## 2022-02-07 RX ORDER — HYDROMORPHONE HCL 110MG/55ML
PATIENT CONTROLLED ANALGESIA SYRINGE INTRAVENOUS AS NEEDED
Status: DISCONTINUED | OUTPATIENT
Start: 2022-02-07 | End: 2022-02-07

## 2022-02-07 RX ORDER — SODIUM CHLORIDE, SODIUM LACTATE, POTASSIUM CHLORIDE, CALCIUM CHLORIDE 600; 310; 30; 20 MG/100ML; MG/100ML; MG/100ML; MG/100ML
INJECTION, SOLUTION INTRAVENOUS CONTINUOUS PRN
Status: DISCONTINUED | OUTPATIENT
Start: 2022-02-07 | End: 2022-02-07

## 2022-02-07 RX ORDER — CEFAZOLIN SODIUM 1 G/50ML
1000 SOLUTION INTRAVENOUS EVERY 8 HOURS
Status: DISCONTINUED | OUTPATIENT
Start: 2022-02-07 | End: 2022-02-08

## 2022-02-07 RX ORDER — METOPROLOL TARTRATE 5 MG/5ML
2 INJECTION INTRAVENOUS
Status: DISCONTINUED | OUTPATIENT
Start: 2022-02-07 | End: 2022-02-07 | Stop reason: HOSPADM

## 2022-02-07 RX ORDER — ROCURONIUM BROMIDE 10 MG/ML
INJECTION, SOLUTION INTRAVENOUS AS NEEDED
Status: DISCONTINUED | OUTPATIENT
Start: 2022-02-07 | End: 2022-02-07

## 2022-02-07 RX ORDER — CEFAZOLIN SODIUM 1 G/50ML
1000 SOLUTION INTRAVENOUS ONCE
Status: COMPLETED | OUTPATIENT
Start: 2022-02-07 | End: 2022-02-07

## 2022-02-07 RX ORDER — SUCCINYLCHOLINE/SOD CL,ISO/PF 100 MG/5ML
SYRINGE (ML) INTRAVENOUS AS NEEDED
Status: DISCONTINUED | OUTPATIENT
Start: 2022-02-07 | End: 2022-02-07

## 2022-02-07 RX ORDER — PROPOFOL 10 MG/ML
INJECTION, EMULSION INTRAVENOUS AS NEEDED
Status: DISCONTINUED | OUTPATIENT
Start: 2022-02-07 | End: 2022-02-07

## 2022-02-07 RX ORDER — INSULIN GLARGINE 100 [IU]/ML
25 INJECTION, SOLUTION SUBCUTANEOUS
Status: DISCONTINUED | OUTPATIENT
Start: 2022-02-07 | End: 2022-02-08

## 2022-02-07 RX ORDER — DEXAMETHASONE SODIUM PHOSPHATE 10 MG/ML
INJECTION, SOLUTION INTRAMUSCULAR; INTRAVENOUS AS NEEDED
Status: DISCONTINUED | OUTPATIENT
Start: 2022-02-07 | End: 2022-02-07

## 2022-02-07 RX ORDER — DEXMEDETOMIDINE HYDROCHLORIDE 100 UG/ML
INJECTION, SOLUTION INTRAVENOUS AS NEEDED
Status: DISCONTINUED | OUTPATIENT
Start: 2022-02-07 | End: 2022-02-07

## 2022-02-07 RX ORDER — ONDANSETRON 2 MG/ML
INJECTION INTRAMUSCULAR; INTRAVENOUS AS NEEDED
Status: DISCONTINUED | OUTPATIENT
Start: 2022-02-07 | End: 2022-02-07

## 2022-02-07 RX ORDER — FENTANYL CITRATE/PF 50 MCG/ML
25 SYRINGE (ML) INJECTION
Status: DISCONTINUED | OUTPATIENT
Start: 2022-02-07 | End: 2022-02-07 | Stop reason: HOSPADM

## 2022-02-07 RX ORDER — HYDROMORPHONE HCL/PF 1 MG/ML
0.2 SYRINGE (ML) INJECTION
Status: DISCONTINUED | OUTPATIENT
Start: 2022-02-07 | End: 2022-02-07 | Stop reason: HOSPADM

## 2022-02-07 RX ORDER — METOPROLOL TARTRATE 5 MG/5ML
5 INJECTION INTRAVENOUS
Status: DISCONTINUED | OUTPATIENT
Start: 2022-02-07 | End: 2022-02-07

## 2022-02-07 RX ORDER — DIPHENHYDRAMINE HYDROCHLORIDE 50 MG/ML
12.5 INJECTION INTRAMUSCULAR; INTRAVENOUS ONCE AS NEEDED
Status: DISCONTINUED | OUTPATIENT
Start: 2022-02-07 | End: 2022-02-07 | Stop reason: HOSPADM

## 2022-02-07 RX ORDER — LIDOCAINE HYDROCHLORIDE 20 MG/ML
INJECTION, SOLUTION EPIDURAL; INFILTRATION; INTRACAUDAL; PERINEURAL AS NEEDED
Status: DISCONTINUED | OUTPATIENT
Start: 2022-02-07 | End: 2022-02-07

## 2022-02-07 RX ADMIN — SODIUM CHLORIDE 100 ML/HR: 9 INJECTION, SOLUTION INTRAVENOUS at 00:58

## 2022-02-07 RX ADMIN — HYDROMORPHONE HYDROCHLORIDE 0.2 MG: 2 INJECTION, SOLUTION INTRAMUSCULAR; INTRAVENOUS; SUBCUTANEOUS at 12:05

## 2022-02-07 RX ADMIN — PHENYLEPHRINE HYDROCHLORIDE 20 MCG/MIN: 10 INJECTION INTRAVENOUS at 11:20

## 2022-02-07 RX ADMIN — SENNOSIDES AND DOCUSATE SODIUM 1 TABLET: 50; 8.6 TABLET ORAL at 21:18

## 2022-02-07 RX ADMIN — METOROPROLOL TARTRATE 2 MG: 5 INJECTION, SOLUTION INTRAVENOUS at 14:33

## 2022-02-07 RX ADMIN — SUGAMMADEX 200 MG: 100 INJECTION, SOLUTION INTRAVENOUS at 12:58

## 2022-02-07 RX ADMIN — HYDROMORPHONE HYDROCHLORIDE 0.4 MG: 2 INJECTION, SOLUTION INTRAMUSCULAR; INTRAVENOUS; SUBCUTANEOUS at 11:15

## 2022-02-07 RX ADMIN — DEXMEDETOMIDINE HCL 4 MCG: 100 INJECTION INTRAVENOUS at 10:50

## 2022-02-07 RX ADMIN — HYDROMORPHONE HYDROCHLORIDE 0.2 MG: 2 INJECTION, SOLUTION INTRAMUSCULAR; INTRAVENOUS; SUBCUTANEOUS at 12:19

## 2022-02-07 RX ADMIN — CEFAZOLIN SODIUM 1000 MG: 1 SOLUTION INTRAVENOUS at 10:33

## 2022-02-07 RX ADMIN — SODIUM CHLORIDE 100 ML/HR: 9 INJECTION, SOLUTION INTRAVENOUS at 17:43

## 2022-02-07 RX ADMIN — HYDROMORPHONE HYDROCHLORIDE 0.2 MG: 2 INJECTION, SOLUTION INTRAMUSCULAR; INTRAVENOUS; SUBCUTANEOUS at 13:17

## 2022-02-07 RX ADMIN — DEXMEDETOMIDINE HCL 8 MCG: 100 INJECTION INTRAVENOUS at 10:40

## 2022-02-07 RX ADMIN — ONDANSETRON 4 MG: 2 INJECTION INTRAMUSCULAR; INTRAVENOUS at 12:14

## 2022-02-07 RX ADMIN — DEXAMETHASONE SODIUM PHOSPHATE 8 MG: 10 INJECTION, SOLUTION INTRAMUSCULAR; INTRAVENOUS at 11:13

## 2022-02-07 RX ADMIN — HYDROMORPHONE HYDROCHLORIDE 0.2 MG: 2 INJECTION, SOLUTION INTRAMUSCULAR; INTRAVENOUS; SUBCUTANEOUS at 12:48

## 2022-02-07 RX ADMIN — ACETAMINOPHEN 975 MG: 325 TABLET, FILM COATED ORAL at 21:17

## 2022-02-07 RX ADMIN — FENTANYL CITRATE 50 MCG: 50 INJECTION, SOLUTION INTRAMUSCULAR; INTRAVENOUS at 10:44

## 2022-02-07 RX ADMIN — FENTANYL CITRATE 50 MCG: 50 INJECTION, SOLUTION INTRAMUSCULAR; INTRAVENOUS at 11:35

## 2022-02-07 RX ADMIN — SODIUM CHLORIDE, SODIUM LACTATE, POTASSIUM CHLORIDE, CALCIUM CHLORIDE: 600; 310; 30; 20 INJECTION, SOLUTION INTRAVENOUS at 10:55

## 2022-02-07 RX ADMIN — PROPOFOL 50 MG: 10 INJECTION, EMULSION INTRAVENOUS at 10:46

## 2022-02-07 RX ADMIN — PROPOFOL 50 MG: 10 INJECTION, EMULSION INTRAVENOUS at 10:47

## 2022-02-07 RX ADMIN — SODIUM CHLORIDE, SODIUM LACTATE, POTASSIUM CHLORIDE, AND CALCIUM CHLORIDE: .6; .31; .03; .02 INJECTION, SOLUTION INTRAVENOUS at 10:30

## 2022-02-07 RX ADMIN — CEFAZOLIN SODIUM 1000 MG: 1 SOLUTION INTRAVENOUS at 17:43

## 2022-02-07 RX ADMIN — MIDAZOLAM HYDROCHLORIDE 1 MG: 1 INJECTION, SOLUTION INTRAMUSCULAR; INTRAVENOUS at 10:44

## 2022-02-07 RX ADMIN — PROPOFOL 100 MG: 10 INJECTION, EMULSION INTRAVENOUS at 10:45

## 2022-02-07 RX ADMIN — INSULIN LISPRO 6 UNITS: 100 INJECTION, SOLUTION INTRAVENOUS; SUBCUTANEOUS at 21:18

## 2022-02-07 RX ADMIN — DEXMEDETOMIDINE HCL 8 MCG: 100 INJECTION INTRAVENOUS at 10:45

## 2022-02-07 RX ADMIN — METOROPROLOL TARTRATE 2 MG: 5 INJECTION, SOLUTION INTRAVENOUS at 14:46

## 2022-02-07 RX ADMIN — HYDROMORPHONE HYDROCHLORIDE 0.2 MG: 2 INJECTION, SOLUTION INTRAMUSCULAR; INTRAVENOUS; SUBCUTANEOUS at 13:00

## 2022-02-07 RX ADMIN — INSULIN GLARGINE 25 UNITS: 100 INJECTION, SOLUTION SUBCUTANEOUS at 21:18

## 2022-02-07 RX ADMIN — LIDOCAINE HYDROCHLORIDE 100 MG: 20 INJECTION, SOLUTION EPIDURAL; INFILTRATION; INTRACAUDAL; PERINEURAL at 10:44

## 2022-02-07 RX ADMIN — PRAVASTATIN SODIUM 40 MG: 40 TABLET ORAL at 17:44

## 2022-02-07 RX ADMIN — HYDROMORPHONE HYDROCHLORIDE 0.2 MG: 2 INJECTION, SOLUTION INTRAMUSCULAR; INTRAVENOUS; SUBCUTANEOUS at 13:14

## 2022-02-07 RX ADMIN — HYDROMORPHONE HYDROCHLORIDE 0.2 MG: 2 INJECTION, SOLUTION INTRAMUSCULAR; INTRAVENOUS; SUBCUTANEOUS at 12:41

## 2022-02-07 RX ADMIN — ROCURONIUM BROMIDE 50 MG: 10 INJECTION, SOLUTION INTRAVENOUS at 10:50

## 2022-02-07 RX ADMIN — HYDROMORPHONE HYDROCHLORIDE 0.2 MG: 2 INJECTION, SOLUTION INTRAMUSCULAR; INTRAVENOUS; SUBCUTANEOUS at 12:21

## 2022-02-07 RX ADMIN — Medication 100 MG: at 10:45

## 2022-02-07 RX ADMIN — MIDAZOLAM HYDROCHLORIDE 1 MG: 1 INJECTION, SOLUTION INTRAMUSCULAR; INTRAVENOUS at 10:41

## 2022-02-07 NOTE — CONSULTS
Consultation - Cardiology   Amina Medley 61 y o  male MRN: [id number]  Unit/Bed#: OR POOL Encounter: [id number]  02/07/22  4:44 PM    Assessment/ Plan:    1- Post-Surgical Hypotension:    2- Elevated Troponin: 0hr HS troponin of 104 post-procedure  3- ROSE on CKD3: baseline creatinine appears to be between 1 34 to 1 63  2 64 on presentation  4- History of DVT: on warfarin as OP  5- T2DM: on insulin glargine, metformin, aspirin and statin as OP  6- Hypertension: on lisinopril as OP  7- Closed displaced intertrochanteric fracture of right femur: s/p ORIF  Plan  Post-surgical hypotension possibly secondary to procedural events, including anesthesia and volume shifts  This may have been a precipitating factor for elevated troponin and ST depression in inferior leads  Reduced renal clearance may also be contributing to elevated HS troponin  Will continue to trend troponin, follow-up 2D echocardiogram and maintain telemetry  Will consider further ischemic work-up with nuclear stress testing once troponin stable  Continue to hold pre-admission lisinopril and monitor renal function  Restart warfarin once cleared by orthopedic surgery  Glycemic management per primary team       History of Present Illness   Physician Requesting Consult: Eloisa Angelucci, DO    Reason for Consult / Principal Problem: post-operative hypotension  Elevated troponin with ST depression in inferior leads  HPI: Amina Medley is a 61y o  year old male who presents with right intertrochanteric fracture who developed post-operative hypotension, and noticed for ST depressions in inferior leads with initial HS troponin elevated at 106  Patient also noted for ROSE on CKD3  Patient has a medical history significant for DVT on chronic anticoagulation with warfarin, T2DM and hypertension, and without any significant coronary/ cardiovascular history aside from the aforementioned   Cardiology services was consulted for evaluation of post-operative hypertension, and elevated troponin with EKG changes  Consults    EKG:       Review of Systems   Respiratory: Negative for shortness of breath  Cardiovascular: Negative for chest pain, palpitations and leg swelling  Neurological: Negative for light-headedness and headaches         Historical Information   Past Medical History:   Diagnosis Date    Back pain     Diabetes mellitus ([address] )     [facility] venous thrombosis[facility] ([facility])     Hard to intubate 10/18/2021    Glidescope with #3 blade and bougie used to pass 6 0 ETT    Hyperlipidemia     Hypertension      Past Surgical History:   Procedure Laterality Date    CATARACT EXTRACTION      COLONOSCOPY      6/2014    CYST REMOVAL      EXTERNAL FIXATOR APPLICATION Right 20/09/0741    Procedure: Application External Fixation Device right lower extremity;  Surgeon: Debby Jay MD;  Location: [facility];  Service: Orthopedics    HAND SURGERY      HEEL SPUR SURGERY      foot surgery    ORIF TIBIA FRACTURE Right 10/25/2021    Procedure: OPEN REDUCTION W/ INTERNAL FIXATION (ORIF) RIGHT PROXIMAL TIBIA SHAFT FRACTURE, REMOVAL OF EXTERNAL FIXATOR;  Surgeon: Debby Jay MD;  Location: [facility];  Service: Orthopedics     Social History     Substance and Sexual Activity   Alcohol Use Never    Comment: Per allscripts, occasional use     Social History     Substance and Sexual Activity   Drug Use Not Currently     Social History     Tobacco Use   Smoking Status Light Tobacco Smoker    Packs/day: 0 25   Smokeless Tobacco Never Used   Tobacco Comment    Per allscripts, current smoker       Family History:   Family History   Problem Relation Age of Onset    Dementia Mother     Melanoma Mother        Meds/Allergies   current meds:   Current Facility-Administered Medications   Medication Dose Route Frequency    acetaminophen (TYLENOL) tablet 975 mg  975 mg Oral Q8H [address]    aluminum-magnesium hydroxide-simethicone (MYLANTA) oral suspension 30 mL  30 mL Oral Q6H PRN    ceFAZolin (ANCEF) IVPB (premix in dextrose) 1,000 mg 50 mL  1,000 mg Intravenous Q8H    colchicine (COLCRYS) tablet 0 6 mg  0 6 mg Oral Daily    ferrous sulfate tablet 325 mg  325 mg Oral Daily With Breakfast    HYDROmorphone HCl (DILAUDID) injection 0 2 mg  0 2 mg Intravenous Q4H PRN    hydrOXYzine HCL (ATARAX) tablet 25 mg  25 mg Oral Q6H PRN    insulin glargine (LANTUS) subcutaneous injection 25 Units 0 25 mL  25 Units Subcutaneous HS    insulin lispro (HumaLOG) 100 units/mL subcutaneous injection 1-6 Units  1-6 Units Subcutaneous HS    insulin lispro (HumaLOG) 100 units/mL subcutaneous injection 2-12 Units  2-12 Units Subcutaneous TID AC    lidocaine (LIDODERM) 5 % patch 1 patch  1 patch Topical Daily PRN    naloxone (NARCAN) 0 04 mg/mL syringe 0 04 mg  0 04 mg Intravenous Q1MIN PRN    oxyCODONE (ROXICODONE) IR tablet 5 mg  5 mg Oral Q4H PRN    Or    oxyCODONE (ROXICODONE) IR tablet 7 5 mg  7 5 mg Oral Q4H PRN    polyethylene glycol (MIRALAX) packet 17 g  17 g Oral Daily PRN    pravastatin (PRAVACHOL) tablet 40 mg  40 mg Oral Daily With Dinner    senna-docusate sodium (SENOKOT S) 8 6-50 mg per tablet 1 tablet  1 tablet Oral BID    sodium chloride 0 9 % infusion  100 mL/hr Intravenous Continuous     Allergies   Allergen Reactions    Byetta 10 Mcg Pen [Exenatide] Swelling    Pollen Extract Myalgia       Objective   Vitals: Blood pressure 153/69, pulse 85, temperature 98 2 °F (36 8 °C), temperature source Oral, resp  rate 17, height 6' 1" (1 854 m), weight 101 kg (223 lb), SpO2 98 %  , Body mass index is 29 42 kg/m² ,   Orthostatic Blood Pressures      Most Recent Value   Blood Pressure 153/69 filed at 02/07/2022 1545   Patient Position - Orthostatic VS Lying filed at 02/07/2022 3021          Systolic (61DJN), TDY:941 , Min:144 , EIU:184     Diastolic (70NXA), FHU:17, Min:63, Max:98        Intake/Output Summary (Last 24 hours) at 2/7/2022 1644  Last data filed at 2/7/2022 1501  Gross per 24 hour   Intake 810 ml   Output 250 ml   Net 560 ml       Invasive Devices  Report    Peripheral Intravenous Line            Peripheral IV 02/03/22 Right Antecubital 3 days    Peripheral IV 02/07/22 Left Forearm <1 day          Drain            External Urinary Catheter Small 2 days                    Physical Exam:  GEN: Alert and oriented x 3, in no acute distress  Well appearing and well nourished  HEENT: Sclera anicteric, conjunctivae pink, mucous membranes moist  Oropharynx clear  NECK: Supple, no carotid bruits, no significant JVD  Trachea midline, no thyromegaly  HEART: Regular rhythm, normal S1 and S2, no murmurs, clicks, gallops or rubs  PMI nondisplaced, no thrills  LUNGS: Clear to auscultation bilaterally; no wheezes, rales, or rhonchi  No increased work of breathing or signs of respiratory distress  ABDOMEN: Soft, nontender, nondistended, normoactive bowel sounds  EXTREMITIES: Skin warm and well perfused, no clubbing, cyanosis, or edema  NEURO: No focal findings  Normal speech  Mood and affect normal    SKIN: Normal without suspicious lesions on exposed skin        Lab Results    Troponins:       CBC with diff:   Results from last 7 days   Lab Units 02/07/22  0542 02/06/22  0618 02/05/22 0524 02/04/22 0648 02/03/22 2015   WBC Thousand/uL 7 61 9 13 10 41* 8 63 10 56*   HEMOGLOBIN g/dL 10 5* 10 6* 10 3* 10 6* 11 7*   HEMATOCRIT % 32 7* 34 4* 32 3* 32 3* 36 9   MCV fL 89 92 88 88 90   PLATELETS Thousands/uL 261 287 287 290 312   MCH pg 28 6 28 3 28 1 28 7 28 4   MCHC g/dL 32 1 30 8* 31 9 32 8 31 7   RDW % 15 9* 16 2* 16 2* 16 1* 16 1*   MPV fL 9 5 9 5 9 5 9 4 9 2   NRBC AUTO /100 WBCs 0 0 0  --  0         CMP:   Results from last 7 days   Lab Units 02/07/22  0542 02/06/22  0618 02/05/22  0524 02/04/22 0648 02/03/22 2015   POTASSIUM mmol/L 4 5 4 3 4 1 3 7 4 1   CHLORIDE mmol/L 103 100 102 102 99*   CO2 mmol/L 24 23 21 22 22   BUN mg/dL 25 25 24 28* 29*   CREATININE mg/dL 2 46* 2 55* 2 40* 2 45* 2 64*   CALCIUM mg/dL 8 7 8 3 8 3 8 4 8 7   EGFR ml/min/1 73sq m 26 25 27 26 24

## 2022-02-07 NOTE — OP NOTE
PERATIVE REPORT  PATIENT NAME: Milady Iniguez    :  1955  MRN: [id number]  Pt Location: [facility] OR ROOM 03    SURGERY DATE: 2022    Surgeon(s) and Role:     * Tal Garrido MD - Primary     * Olivia Alvarez PA-C - Assisting    Preop Diagnosis:  Right femur displaced intertrochanteric fracture  Right proximal tibia wound dehiscence    Post-Op Diagnosis Codes:  Same    Procedure(s) (LRB):  INSERTION NAIL IM FEMUR ANTEGRADE (TROCHANTERIC) (Right)  INCISION AND DRAINAGE (I&D) EXTREMITY (Right)    Procedures:  IM nail fixation R femur IT fracture (CPT 23486)  Debridement R proximal tibia wound - skin, subcu tissue, fascia - 2 cm length, 1 cm width, 1 cm depth    Specimen(s):  * No specimens in log *    Estimated Blood Loss:   100 mL    Drains:  External Urinary Catheter Small (Active)   Number of days: 2       Anesthesia Type:   General    Operative Indications:  Displaced right femur intertrochanteric fracture in ambulatory patient, wound dehiscence with exposed metal at leg/proximal tibia    Operative Findings:  See below    Complications:   None    Implants: Synthes 12x420 mm long TFNA, 966 mm helical blade, distal bolts x2    Procedure and Technique:  The patient is known to me and is a 62-year-old male who previously underwent operative fixation of right leg bicondylar tibial plateau fracture and sustained a repeat fall resulting in a right femur displaced intertrochanteric fracture as well as a dehiscence of the right leg proximal lateral wound with exposed tibial plate present in wound  The patient is an uncontrolled diabetic and patient's femur fracture has pattern that involves largely basicervical femoral neck component however it does extend to the subcapital region for a portion of fracture    Advised patient that he is at a high likelihood for failure of intramedullary nail fixation of femur fracture but this is the ideal method of fixation for current fracture type given basicervical character and comminution, I also advised that given the wound dehiscence at his leg involves exposed metal, he is at high risk for infection, colonization of hardware, difficulty healing wounds given diabetic status  The patient's operative site, laterality, procedure, consent were verified in the preoperative area and the patient was transitioned to the operating room  General anesthesia was provided by the anesthesia team   The patient was moved to the table in the usual fashion  Patient's right hip and lower extremity was prepped and draped in the usual sterile fashion  Reduction was verified under direct fluoroscopic imaging  The proximal hip incision was made and using sharp dissection, the fascia was opened  The guide pin was inserted at the greater trochanter in the usual manner and was advanced and confirmed on the AP and lateral views  The opening Reamer was advanced to the level of the lesser trochanter  The ball-tip guidewire was advanced to the level of the physeal scar at the knee and verified on both the AP and lateral planes, no anterior cortical perforation was appreciated  Length was measured and sequential reaming took place up to the 13 5 mm Reamer  The 12 mm x 420 mm long Synthes trochanteric fixation nail was then introduced into the femoral canal and advanced to the appropriate positioning under direct fluoroscopic imaging  A lateral based incision was made at the thigh and the guide pin for the helical blade was advanced in the appropriate position  Two additional incisions were made for antirotational wires which were advanced into the femoral head  The lateral cortex was opened with the opening Reamer and drilling and tapping took place for a 277 mm helical blade, which was then inserted  The set screw was then advanced and locked and turned off one quarter turn  Two distal static interlock bolts were then placed using the perfect [place] technique and two small lateral based incisions   Final images were obtained all wounds were copiously irrigated with saline, the proximal deep layer was closed with 0 Vicryl sutures and remaining wounds were all closed with 2-O Vicryl sutures for the subcutaneous layer and nylon sutures for skin closure  Sterile dressings consisting of Mepilex dressings  Attention was then turned to the right proximal tibia  Initial drapes were removed and leg was re-prepped with ChloraPrep and draped in usual sterile fashion  After a repeat timeout, scalpel was utilized to excise nonviable appearing skin in an excisional manner  Utilizing blunt dissection, the plate was easily visible in the wound, rongeur was used to debride nonviable appearing subcutaneous tissue and fascia  There was no purulence noted at the incision and remaining tissue appeared healthy and bleeding  Wound was then irrigated with 1 L sterile saline  Closure consisted of nylon sutures in interrupted fashion  Sterile dressing consisted of Mepilex dressing  All final counts, including but not limited to instrument, sponge, needle counts were correct  I was present for the entire procedure  The patient was transferred off the table to bed and transported extubated to the PACU in stable condition  There were no immediate complications  There was no qualified resident available for the procedure  Critical assistance from Darin Smith PA-C was required for all components of the procedure including but not limited to positioning, passage of instrumentation, soft tissue management  The patient will be weight-bearing as tolerated on the right lower extremity  He will require deep vein thrombosis prophylaxis in the form of resumption of his Coumadin, we will consider suture removal in 2 weeks pending skin healing        Patient Disposition:  PACU       SIGNATURE: Galina Duval MD  DATE: February 7, 2022  TIME: 2:32 PM

## 2022-02-07 NOTE — CASE MANAGEMENT
Case Management Discharge Planning Note    Patient name oRxi Norris  [address]  MRN [id number]  : 1955 Date 2022       Current Admission Date: 2/3/2022  Current Admission Diagnosis:Closed displaced intertrochanteric fracture of right femur [facility])   Patient Active Problem List    Diagnosis Date Noted    Pre-operative clearance 2022    Closed displaced intertrochanteric fracture of right femur ([facility] [address] ) 2022    Hyponatremia 2022    Hematoma 2022    Acute idiopathic gout of multiple sites 2022    Pain and swelling of right lower leg 2021    Ambulatory dysfunction 2021    Displaced bicondylar fracture of right tibia, subsequent encounter for closed fracture with routine healing 2021    Right forearm superficial thrombophlebitis 2021    Anemia 10/26/2021    Kyphosis of cervicothoracic region 10/25/2021    Pseudogout of knee, left 10/17/2021    Pain and swelling of left knee 10/16/2021    ROSE (acute kidney injury) ([facility] [address] ) 10/13/2021    Closed fracture of proximal end of right tibia 10/13/2021    Type 2 diabetes mellitus with hyperglycemia ([address] ) 10/05/2021    Class 2 obesity in adult 2021    Type 2 diabetes mellitus with diabetic peripheral angiopathy without gangrene ([facility] [address] ) 10/08/2019    Other insomnia 2017    Type 2 diabetes mellitus with diabetic neuropathy, without long-term current use of insulin ([address] ) 2017    Lumbar degenerative disc disease 10/21/2014    Acute embolism and thrombosis of unspecified deep veins of unspecified lower extremity ([address] ) 10/20/2014    Essential hypertension 10/20/2014    Mixed hyperlipidemia 10/20/2014    Peripheral vascular disease ([address] ) 10/20/2014      LOS (days): 4  Geometric Mean LOS (GMLOS) (days):   Days to GMLOS:     OBJECTIVE:  Risk of Unplanned Readmission Score: 27         Current admission status: Inpatient   Preferred Pharmacy:   [address] [place] [address] [address] [address]  Phone: [phone] Fax: [phone]    [address] [facility] - [address] [address]  Phone: [phone] Fax: [phone] [place], [place] - [address] [place] [address] [address]  [address] [facility] [address]  Phone: [phone] Fax: [phone]    [address] [place] [address], [address]  [address]  [address] [facility]  Phone: [phone] Fax: [phone]    Primary Care Provider: Adry Houser DO    Primary Insurance: BLUE CROSS  Secondary Insurance: [facility] REP    DISCHARGE DETAILS:    Additional Comments: Patient reviewed during care coordination rounds with Dr Becca Diane who informed that pt is to get surgery today and will need therapy evals post-op  Expecting discharge in 24-48 hours pending kidney function and therapy recs  CM spoke with pt's wife who is in agreement with STR referrals to prepare for possible STR recommendation  Referrals placed via [facility]  CM department to follow

## 2022-02-07 NOTE — CONSULTS
At the end of the case , Patient had some ST depressions in the inferior leads , following extubation and transport the patient to the PACU, hospitalist  Dr Dinesh Dacosta was contacted to formulate a plan, patient was given a B Blocker to slow the heart rate , also a cardiac enzymes were  drawn, cardiology was also consulted and a stat ECHO was done , patient was moved to a higher level for further management

## 2022-02-07 NOTE — ANESTHESIA PREPROCEDURE EVALUATION
Procedure:  INSERTION NAIL IM FEMUR ANTEGRADE (TROCHANTERIC) (Right Leg Upper)    Relevant Problems   CARDIO   (+) Essential hypertension   (+) Mixed hyperlipidemia   (+) Type 2 diabetes mellitus with diabetic peripheral angiopathy without gangrene (HCC)      ENDO   (+) Type 2 diabetes mellitus with diabetic neuropathy, without long-term current use of insulin (HCC)   (+) Type 2 diabetes mellitus with diabetic peripheral angiopathy without gangrene (HCC)   (+) Type 2 diabetes mellitus with hyperglycemia (HCC)      /RENAL   (+) ROSE (acute kidney injury) (HCC)      HEMATOLOGY   (+) Anemia      MUSCULOSKELETAL   (+) Acute idiopathic gout of multiple sites   (+) Lumbar degenerative disc disease   (+) Pseudogout of knee, left   History 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  Unprovoked  Last episode was 10yrs ago  On AC with coumadin  Currently held  Known diabetic with diabetic neuropathy  On metformin and lantus at home      * Closed displaced intertrochanteric fracture of right femur ([address] )  Assessment & Plan  · Status post fall, CT pelvis revealing comminuted right femoral intertrochanteric fracture  · X-rays revealing hardware intact from previous surgical fixation of tibia  · Orthopedic surgery following  · Continue to hold warfarin  · Will give vitamin K 2 5 mg  · Recheck INR tomorrow  · Will place on heparin subcu after procedure  · Pain regimen in place     ROSE (acute kidney injury) ([facility] [address] )  Assessment & Plan  · Creatinine slowly improving, baseline around 1 3-1 5  · Creatinine slightly worse  · Renal ultrasound ordered  · Nephrology following  · Continue IV fluids  · Continue monitor  · Hold home lisinopril for now     Type 2 diabetes mellitus with hyperglycemia [facility])  Assessment & Plan        Lab Results   Component Value Date     HGBA1C 8 2 (H) 10/16/2021                Recent Labs     02/05/22  2041 02/06/22  0421 02/06/22  0734 02/06/22  1106   POCGLU 239* 234* 225* 260*         Blood Sugar Average: Last 72 hrs:  · (P) 245 4  · Will increase insulin back to home dose of 25 units q h s  · Correctional scale insulin  · Hypoglycemia protocol     Pre-operative clearance  Assessment & Plan  · EKG-normal sinus rhythm  · Chest x-ray on my read no acute cardiopulmonary disease  · METS >4  · RCRI class 3 risk; 10 1% 30 day risk of death, mi or cardiac arrest  · Patient is high risk for low risk procedure     Hematoma  Assessment & Plan  · CT pelvis revealing small adjacent anterior deep soft tissue hematoma 2 4 cm x 3 4 cm near right femoral fracture  · No signs of ecchymosis currently, vital signs stable  · Monitor CBC in a m  · For now will hold home warfarin     Hyponatremia  Assessment & Plan  · Continuing to improve  · Continue to monitor     Ambulatory dysfunction  Assessment & Plan  · Status post fall in living room   · See plan under closed displaced intertrochanteric fracture of right femur        Anemia  Assessment & Plan  · Hemoglobin stable, baseline around 7-8  · No active bleeding currently  · Continue to monitor                Physical Exam    Airway    Mallampati score: IV  TM Distance: >3 FB  Neck ROM: limited     Dental   Comment: Denies loose teeth,     Cardiovascular  Cardiovascular exam normal    Pulmonary  Pulmonary exam normal     Other Findings        Anesthesia Plan  ASA Score- 3     Anesthesia Type- general with ASA Monitors  Additional Monitors:   Airway Plan: ETT  Comment: Known difficult intubation  Plan for glidescope, bougie, fiberoptic bronchoscopy as needed, with #6 0 ETT  Will maintain spontaneous ventilation prior to intubation  Pt not agreeable to awake intubation  Known difficulty with positioning  Will maintain low semi-sitting position with adequate head support          Plan Factors-Exercise tolerance (METS): <4 METS  Chart reviewed  EKG reviewed  Existing labs reviewed  Patient summary reviewed  Patient is not a current smoker  Induction- intravenous      Postoperative Plan- Plan for postoperative opioid use  Informed Consent- Anesthetic plan and risks discussed with patient and spouse  I personally reviewed this patient with the CRNA  Discussed and agreed on the Anesthesia Plan with the XOCHILT Mcqueen

## 2022-02-07 NOTE — PLAN OF CARE
Problem: MOBILITY - ADULT  Goal: Maintain or return to baseline ADL function  Description: INTERVENTIONS:  -  Assess patient's ability to carry out ADLs; assess patient's baseline for ADL function and identify physical deficits which impact ability to perform ADLs (bathing, care of mouth/teeth, toileting, grooming, dressing, etc )  - Assess/evaluate cause of self-care deficits   - Assess range of motion  - Assess patient's mobility; develop plan if impaired  - Assess patient's need for assistive devices and provide as appropriate  - Encourage maximum independence but intervene and supervise when necessary  - Involve family in performance of ADLs  - Assess for home care needs following discharge   - Consider OT consult to assist with ADL evaluation and planning for discharge  - Provide patient education as appropriate  Outcome: Progressing  Problem: Prexisting or High Potential for Compromised Skin Integrity  Goal: Skin integrity is maintained or improved  Description: INTERVENTIONS:  - Identify patients at risk for skin breakdown  - Assess and monitor skin integrity  - Assess and monitor nutrition and hydration status  - Monitor labs   - Assess for incontinence   - Turn and reposition patient  - Assist with mobility/ambulation  - Relieve pressure over bony prominences  - Avoid friction and shearing  - Provide appropriate hygiene as needed including keeping skin clean and dry  - Evaluate need for skin moisturizer/barrier cream  - Collaborate with interdisciplinary team   - Patient/family teaching  - Consider wound care consult   Outcome: Progressing     Problem: Potential for Falls  Goal: Patient will remain free of falls  Description: INTERVENTIONS:  - Educate patient/family on patient safety including physical limitations  - Instruct patient to call for assistance with activity   - Consult OT/PT to assist with strengthening/mobility   - Keep Call bell within reach  - Keep bed low and locked with side rails adjusted as appropriate  - Keep care items and personal belongings within reach  -Outcome: Progressing

## 2022-02-07 NOTE — PROGRESS NOTES
[facility]  Progress Note Roney Ryder 1955, 61 y o  male MRN: [id number]  Unit/Bed#: -02 Encounter: [id number]  Primary Care Provider: Hilda Serna DO   Date and time admitted to hospital: 2/3/2022  5:11 PM    * Closed displaced intertrochanteric fracture of right femur [facility])  Assessment & Plan  · Status post fall, CT pelvis revealing comminuted right femoral intertrochanteric fracture  · X-rays revealing hardware intact from previous surgical fixation of tibia  · Orthopedic surgery following  · Continue to hold warfarin; will discuss with Orthopedic surgery when to restart  · INR 1 46  · Plan for possible procedure today with orthopedic surgery  · Will place on heparin subcu after procedure  · Pain regimen in place    ROSE (acute kidney injury) ([facility] [address] )  Assessment & Plan  · Creatinine slowly improving, baseline around 1 3-1 5  · Creatinine staying around 2 3-2 4  · Renal ultrasound unremarkable  · Nephrology following  · Continue IV fluids  · Continue to monitor  · Hold home lisinopril for now    Type 2 diabetes mellitus with hyperglycemia [facility])  Assessment & Plan  Lab Results   Component Value Date    HGBA1C 8 2 (H) 10/16/2021       Recent Labs     02/06/22  0734 02/06/22  1106 02/06/22  1610 02/06/22 2054   POCGLU 225* 260* 193* 224*       Blood Sugar Average: Last 72 hrs:  (P) 235 1875  · Will increase insulin back to home dose of 25 units q h s    · Correctional scale insulin  · Hypoglycemia protocol    Pre-operative clearance  Assessment & Plan  · EKG-normal sinus rhythm  · Chest x-ray on my read no acute cardiopulmonary disease  · METS >4  · RCRI class 3 risk; 10 1% 30 day risk of death, mi or cardiac arrest  · Patient is high risk for low risk procedure    Hematoma  Assessment & Plan  · CT pelvis revealing small adjacent anterior deep soft tissue hematoma 2 4 cm x 3 4 cm near right femoral fracture  · No signs of ecchymosis currently, vital signs stable  · For now will hold home warfarin  · Hemoglobin stable    Hyponatremia  Assessment & Plan  · Continuing to improve  · Continue to monitor    Ambulatory dysfunction  Assessment & Plan  · Status post fall in living room   · See plan under closed displaced intertrochanteric fracture of right femur      Anemia  Assessment & Plan  · Hemoglobin stable, baseline around 7-8  · No active bleeding currently  · Continue to monitor        VTE Pharmacologic Prophylaxis: VTE Score: 11 High Risk (Score >/= 5) - Pharmacological DVT Prophylaxis Contraindicated  Sequential Compression Devices Ordered  Patient Centered Rounds: I performed bedside rounds with nursing staff today  Discussions with Specialists or Other Care Team Provider: orthopedic surgery, case management     Education and Discussions with Family / Patient: Updated  (wife) via phone  Time Spent for Care: 30 minutes  More than 50% of total time spent on counseling and coordination of care as described above  Current Length of Stay: 4 day(s)  Current Patient Status: Inpatient   Certification Statement: The patient will continue to require additional inpatient hospital stay due to femur fracture  Discharge Plan: Anticipate discharge in 48-72 hrs to discharge location to be determined pending rehab evaluations  Code Status: Level 1 - Full Code    Subjective:   Patient resting comfortably on examination  Patient has no g be this morning stating Jada Adorno can I have my procedure time couple days ago  Did explain to patient that this is not the case and we needed to monitor his kidney function given worsening from his baseline  Patient still did not agree with this  Objective:     Vitals:   Temp (24hrs), Av °F (37 2 °C), Min:99 °F (37 2 °C), Max:99 °F (37 2 °C)    Temp:  [99 °F (37 2 °C)] 99 °F (37 2 °C)  HR:  [72] 72  Resp:  [18] 18  BP: (177)/(79) 177/79  SpO2:  [93 %] 93 %  There is no height or weight on file to calculate BMI       Input and Output Summary (last 24 hours): Intake/Output Summary (Last 24 hours) at 2/7/2022 0806  Last data filed at 2/6/2022 1445  Gross per 24 hour   Intake 871 67 ml   Output 400 ml   Net 471 67 ml       Physical Exam:   Physical Exam  Vitals and nursing note reviewed  Constitutional:       General: He is not in acute distress  Appearance: He is well-developed  HENT:      Head: Normocephalic and atraumatic  Eyes:      General: No scleral icterus  Conjunctiva/sclera: Conjunctivae normal    Cardiovascular:      Rate and Rhythm: Normal rate and regular rhythm  Heart sounds: Normal heart sounds  No murmur heard  No friction rub  No gallop  Pulmonary:      Effort: Pulmonary effort is normal  No respiratory distress  Breath sounds: Normal breath sounds  No wheezing or rales  Abdominal:      General: Bowel sounds are normal  There is no distension  Palpations: Abdomen is soft  Tenderness: There is no abdominal tenderness  Musculoskeletal:         General: Normal range of motion  Skin:     General: Skin is warm  Findings: No rash  Neurological:      Mental Status: He is alert and oriented to person, place, and time            Additional Data:     Labs:  Results from last 7 days   Lab Units 02/07/22  0542   WBC Thousand/uL 7 61   HEMOGLOBIN g/dL 10 5*   HEMATOCRIT % 32 7*   PLATELETS Thousands/uL 261   NEUTROS PCT % 70   LYMPHS PCT % 17   MONOS PCT % 9   EOS PCT % 4     Results from last 7 days   Lab Units 02/07/22  0542   SODIUM mmol/L 136   POTASSIUM mmol/L 4 5   CHLORIDE mmol/L 103   CO2 mmol/L 24   BUN mg/dL 25   CREATININE mg/dL 2 46*   ANION GAP mmol/L 9   CALCIUM mg/dL 8 7   GLUCOSE RANDOM mg/dL 236*     Results from last 7 days   Lab Units 02/07/22  0543   INR  1 46*     Results from last 7 days   Lab Units 02/06/22  2054 02/06/22  1610 02/06/22  1106 02/06/22  0734 02/06/22  0421 02/05/22  2041 02/05/22  1642 02/05/22  1137 02/05/22  0751 02/05/22  0529 02/05/22  0409 02/04/22 2050   POC GLUCOSE mg/dl 224* 193* 260* 225* 234* 239* 280* 274* 266* 261* 251* 240*               Lines/Drains:  Invasive Devices  Report    Peripheral Intravenous Line            Peripheral IV 02/03/22 Right Antecubital 3 days          Drain            External Urinary Catheter Small 2 days                      Imaging: No pertinent imaging reviewed  Recent Cultures (last 7 days):         Last 24 Hours Medication List:   Current Facility-Administered Medications   Medication Dose Route Frequency Provider Last Rate    acetaminophen  975 mg Oral Q8H [address] Isaura Temple PA-C      aluminum-magnesium hydroxide-simethicone  30 mL Oral Q6H PRN Porsche Rudy, PA-C      colchicine  0 6 mg Oral Daily Porsche Rudy, PA-C      ferrous sulfate  325 mg Oral Daily With Breakfast Porsche Rudy, PA-C      HYDROmorphone  0 2 mg Intravenous Q4H PRN Porsche Rudy, PA-C      hydrOXYzine HCL  25 mg Oral Q6H PRN Porsche Rudy, PA-C      insulin glargine  25 Units Subcutaneous HS Yanely Hicks DO      insulin lispro  1-6 Units Subcutaneous HS Yanely Hicks DO      insulin lispro  2-12 Units Subcutaneous TID [facility] Yanely Hicks DO      lidocaine  1 patch Topical Daily PRN Porsche Rudy, PA-C      naloxone  0 04 mg Intravenous Q1MIN PRN Porsche Rudy, PA-C      oxyCODONE  5 mg Oral Q4H PRN Porsche Rudy, PA-C      Or    oxyCODONE  7 5 mg Oral Q4H PRN Porsche Rudy, PA-C      polyethylene glycol  17 g Oral Daily PRN Porsche Rudy, PA-C      pravastatin  40 mg Oral Daily With Dinner Porsche Rudy, PA-C      senna-docusate sodium  1 tablet Oral BID Porsche Rudy, PA-C      sodium chloride  100 mL/hr Intravenous Continuous Yanely Hicks  mL/hr (02/07/22 0058)        Today, Patient Was Seen By: Yanely Hicks DO    **Please Note: This note may have been constructed using a voice recognition system  **

## 2022-02-07 NOTE — ANESTHESIA POSTPROCEDURE EVALUATION
Post-Op Assessment Note    CV Status:  Stable  Pain Score: 1    Pain management: adequate     Mental Status:  Arousable and sleepy   Hydration Status:  Euvolemic   PONV Controlled:  Controlled   Airway Patency:  Patent  Airway: intubated      Post Op Vitals Reviewed: Yes      Staff: CRNA         No complications documented      BP  195/88   Temp 98 4   Pulse 112   Resp 18   SpO2 98% 4L FM

## 2022-02-08 PROBLEM — E87.1 HYPONATREMIA: Status: RESOLVED | Noted: 2022-02-03 | Resolved: 2022-02-08

## 2022-02-08 LAB
ANION GAP SERPL CALCULATED.3IONS-SCNC: 10 MMOL/L (ref 4–13)
ATRIAL RATE: 76 BPM
BASOPHILS # BLD AUTO: 0.01 THOUSANDS/ΜL (ref 0–0.1)
BASOPHILS NFR BLD AUTO: 0 % (ref 0–1)
BUN SERPL-MCNC: 37 MG/DL (ref 5–25)
CALCIUM SERPL-MCNC: 8.5 MG/DL (ref 8.3–10.1)
CHLORIDE SERPL-SCNC: 100 MMOL/L (ref 100–108)
CO2 SERPL-SCNC: 22 MMOL/L (ref 21–32)
CREAT SERPL-MCNC: 2.82 MG/DL (ref 0.6–1.3)
EOSINOPHIL # BLD AUTO: 0 THOUSAND/ΜL (ref 0–0.61)
EOSINOPHIL NFR BLD AUTO: 0 % (ref 0–6)
ERYTHROCYTE [DISTWIDTH] IN BLOOD BY AUTOMATED COUNT: 16 % (ref 11.6–15.1)
GFR SERPL CREATININE-BSD FRML MDRD: 22 ML/MIN/1.73SQ M
GLUCOSE SERPL-MCNC: 127 MG/DL (ref 65–140)
GLUCOSE SERPL-MCNC: 180 MG/DL (ref 65–140)
GLUCOSE SERPL-MCNC: 197 MG/DL (ref 65–140)
GLUCOSE SERPL-MCNC: 300 MG/DL (ref 65–140)
GLUCOSE SERPL-MCNC: 310 MG/DL (ref 65–140)
GLUCOSE SERPL-MCNC: 367 MG/DL (ref 65–140)
GLUCOSE SERPL-MCNC: 410 MG/DL (ref 65–140)
GLUCOSE SERPL-MCNC: 415 MG/DL (ref 65–140)
GLUCOSE SERPL-MCNC: 425 MG/DL (ref 65–140)
GLUCOSE SERPL-MCNC: 427 MG/DL (ref 65–140)
GLUCOSE SERPL-MCNC: 449 MG/DL (ref 65–140)
GLUCOSE SERPL-MCNC: 491 MG/DL (ref 65–140)
HCT VFR BLD AUTO: 28.3 % (ref 36.5–49.3)
HGB BLD-MCNC: 9 G/DL (ref 12–17)
IMM GRANULOCYTES # BLD AUTO: 0.04 THOUSAND/UL (ref 0–0.2)
IMM GRANULOCYTES NFR BLD AUTO: 1 % (ref 0–2)
INR PPP: 1.32 (ref 0.84–1.19)
LYMPHOCYTES # BLD AUTO: 0.75 THOUSANDS/ΜL (ref 0.6–4.47)
LYMPHOCYTES NFR BLD AUTO: 9 % (ref 14–44)
MCH RBC QN AUTO: 28.3 PG (ref 26.8–34.3)
MCHC RBC AUTO-ENTMCNC: 31.8 G/DL (ref 31.4–37.4)
MCV RBC AUTO: 89 FL (ref 82–98)
MONOCYTES # BLD AUTO: 0.59 THOUSAND/ΜL (ref 0.17–1.22)
MONOCYTES NFR BLD AUTO: 7 % (ref 4–12)
NEUTROPHILS # BLD AUTO: 7.13 THOUSANDS/ΜL (ref 1.85–7.62)
NEUTS SEG NFR BLD AUTO: 83 % (ref 43–75)
NRBC BLD AUTO-RTO: 0 /100 WBCS
P AXIS: 9 DEGREES
PLATELET # BLD AUTO: 322 THOUSANDS/UL (ref 149–390)
PMV BLD AUTO: 10.3 FL (ref 8.9–12.7)
POTASSIUM SERPL-SCNC: 5.1 MMOL/L (ref 3.5–5.3)
PR INTERVAL: 144 MS
PROTHROMBIN TIME: 15.8 SECONDS (ref 11.6–14.5)
QRS AXIS: 1 DEGREES
QRSD INTERVAL: 98 MS
QT INTERVAL: 416 MS
QTC INTERVAL: 468 MS
RBC # BLD AUTO: 3.18 MILLION/UL (ref 3.88–5.62)
SODIUM SERPL-SCNC: 132 MMOL/L (ref 136–145)
T WAVE AXIS: 17 DEGREES
VENTRICULAR RATE: 76 BPM
WBC # BLD AUTO: 8.52 THOUSAND/UL (ref 4.31–10.16)

## 2022-02-08 PROCEDURE — 99233 SBSQ HOSP IP/OBS HIGH 50: CPT | Performed by: FAMILY MEDICINE

## 2022-02-08 PROCEDURE — 99232 SBSQ HOSP IP/OBS MODERATE 35: CPT | Performed by: INTERNAL MEDICINE

## 2022-02-08 PROCEDURE — 80048 BASIC METABOLIC PNL TOTAL CA: CPT | Performed by: PHYSICIAN ASSISTANT

## 2022-02-08 PROCEDURE — 93010 ELECTROCARDIOGRAM REPORT: CPT | Performed by: INTERNAL MEDICINE

## 2022-02-08 PROCEDURE — 97167 OT EVAL HIGH COMPLEX 60 MIN: CPT

## 2022-02-08 PROCEDURE — 99255 IP/OBS CONSLTJ NEW/EST HI 80: CPT | Performed by: INTERNAL MEDICINE

## 2022-02-08 PROCEDURE — 85025 COMPLETE CBC W/AUTO DIFF WBC: CPT | Performed by: PHYSICIAN ASSISTANT

## 2022-02-08 PROCEDURE — 97163 PT EVAL HIGH COMPLEX 45 MIN: CPT

## 2022-02-08 PROCEDURE — 93005 ELECTROCARDIOGRAM TRACING: CPT

## 2022-02-08 PROCEDURE — 85610 PROTHROMBIN TIME: CPT | Performed by: PHYSICIAN ASSISTANT

## 2022-02-08 PROCEDURE — 99024 POSTOP FOLLOW-UP VISIT: CPT | Performed by: PHYSICIAN ASSISTANT

## 2022-02-08 PROCEDURE — 82948 REAGENT STRIP/BLOOD GLUCOSE: CPT

## 2022-02-08 RX ORDER — CEFAZOLIN SODIUM 2 G/50ML
2000 SOLUTION INTRAVENOUS EVERY 12 HOURS
Status: DISCONTINUED | OUTPATIENT
Start: 2022-02-08 | End: 2022-02-09

## 2022-02-08 RX ORDER — WARFARIN SODIUM 2 MG/1
2 TABLET ORAL
Status: DISCONTINUED | OUTPATIENT
Start: 2022-02-08 | End: 2022-02-09

## 2022-02-08 RX ADMIN — SODIUM CHLORIDE 3 UNITS/HR: 9 INJECTION, SOLUTION INTRAVENOUS at 22:35

## 2022-02-08 RX ADMIN — CEFAZOLIN SODIUM 2000 MG: 2 SOLUTION INTRAVENOUS at 18:36

## 2022-02-08 RX ADMIN — OXYCODONE HYDROCHLORIDE 5 MG: 5 TABLET ORAL at 17:15

## 2022-02-08 RX ADMIN — INSULIN HUMAN 10 UNITS: 100 INJECTION, SOLUTION PARENTERAL at 03:01

## 2022-02-08 RX ADMIN — INSULIN LISPRO 12 UNITS: 100 INJECTION, SOLUTION INTRAVENOUS; SUBCUTANEOUS at 06:21

## 2022-02-08 RX ADMIN — CEFAZOLIN SODIUM 1000 MG: 1 SOLUTION INTRAVENOUS at 11:50

## 2022-02-08 RX ADMIN — FERROUS SULFATE TAB 325 MG (65 MG ELEMENTAL FE) 325 MG: 325 (65 FE) TAB at 09:30

## 2022-02-08 RX ADMIN — ACETAMINOPHEN 975 MG: 325 TABLET, FILM COATED ORAL at 13:58

## 2022-02-08 RX ADMIN — INSULIN HUMAN 8 UNITS: 100 INJECTION, SOLUTION PARENTERAL at 00:57

## 2022-02-08 RX ADMIN — CEFAZOLIN SODIUM 1000 MG: 1 SOLUTION INTRAVENOUS at 01:47

## 2022-02-08 RX ADMIN — ACETAMINOPHEN 975 MG: 325 TABLET, FILM COATED ORAL at 23:08

## 2022-02-08 RX ADMIN — ACETAMINOPHEN 975 MG: 325 TABLET, FILM COATED ORAL at 06:17

## 2022-02-08 RX ADMIN — PRAVASTATIN SODIUM 40 MG: 40 TABLET ORAL at 17:10

## 2022-02-08 RX ADMIN — SENNOSIDES AND DOCUSATE SODIUM 1 TABLET: 50; 8.6 TABLET ORAL at 09:30

## 2022-02-08 RX ADMIN — WARFARIN SODIUM 2 MG: 2 TABLET ORAL at 17:10

## 2022-02-08 RX ADMIN — COLCHICINE 0.6 MG: 0.6 TABLET, FILM COATED ORAL at 09:29

## 2022-02-08 RX ADMIN — SODIUM CHLORIDE 11 UNITS/HR: 9 INJECTION, SOLUTION INTRAVENOUS at 13:59

## 2022-02-08 RX ADMIN — SODIUM CHLORIDE 3 UNITS/HR: 9 INJECTION, SOLUTION INTRAVENOUS at 23:36

## 2022-02-08 RX ADMIN — SODIUM CHLORIDE 10 UNITS/HR: 9 INJECTION, SOLUTION INTRAVENOUS at 09:09

## 2022-02-08 RX ADMIN — OXYCODONE HYDROCHLORIDE 5 MG: 5 TABLET ORAL at 09:35

## 2022-02-08 NOTE — NURSING NOTE
Patient noted with blood glucose level >415 over last night  SLIM made aware  Insulin coverage administered for each glucose check as ordered  Upcoming RN Tj Conley made aware

## 2022-02-08 NOTE — PROGRESS NOTES
Cardiology Progress Note - Neville Mace 61 y o  male MRN: [id number]    Unit/Bed#: -02 Encounter: [id number]      Assessment/Plan:    1- Post-Surgical Hypotension:     2- Elevated Troponin: 0hr HS troponin of 104 post-procedure      3- ROSE on CKD3: baseline creatinine appears to be between 1 34 to 1 63  2 64 on presentation  Increased to 2 82 today      4- History of DVT: on warfarin as OP      5- T2DM: on insulin glargine, metformin, aspirin and statin as OP  Hyperglycemic on today's assessment, requiring insulin gtt      6- Hypertension: on lisinopril as OP      7- Closed displaced intertrochanteric fracture of right femur: s/p ORIF  Plan  Given patient's elevated HS troponin, and ST depressions in inferior leads after undergoing surgical procedure yesterday, further ischemic workup was planned for today; patient was scheduled to undergo nuclear stress testing today, however he is currently declining this intervention at the time of today's encounter  Risks and benefits were discussed  Repeat EKG reviewed this morning and unremarkable  Hyperglycemic on today's assessment necessitating insulin gtt; continual management as per primary service  ROSE on CKD 3 noted for increasing creatinine; currently receiving normal saline 100 cc/hour  Continue to monitor  Subjective:   Patient seen and examined  No significant events overnight  Patient resting comfortably bedtime today's encounter  Patient reports no acute issues; denies any chest pain, palpitations, shortness of breath, nausea, vomiting or diaphoresis  On discussing plans for nuclear stress testing today, patient states that he would like to decline at this time  Objective:     Vitals: Blood pressure 161/74, pulse 73, temperature 97 5 °F (36 4 °C), temperature source Oral, resp  rate 18, height 6' 1" (1 854 m), weight 101 kg (223 lb), SpO2 99 %  , Body mass index is 29 42 kg/m² ,   Orthostatic Blood Pressures      Most Recent Value   Blood Pressure 161/74 filed at 02/08/2022 0703   Patient Position - Orthostatic VS Lying filed at 02/08/2022 0703            Intake/Output Summary (Last 24 hours) at 2/8/2022 1144  Last data filed at 2/8/2022 0836  Gross per 24 hour   Intake 1050 ml   Output 570 ml   Net 480 ml         Physical Exam:    GEN: Delia Sotomayor appears well, alert and oriented x 3, pleasant and cooperative   HEENT: pupils equal, round, and reactive to light; extraocular muscles intact  NECK: supple, no carotid bruits   HEART: regular rhythm, normal S1 and S2, no murmurs, clicks, gallops or rubs   LUNGS: clear to auscultation bilaterally; no wheezes, rales, or rhonchi   ABDOMEN: normal bowel sounds, soft, no tenderness, no distention  EXTREMITIES: peripheral pulses normal; no clubbing, cyanosis, or edema      Medications:      Current Facility-Administered Medications:     acetaminophen (TYLENOL) tablet 975 mg, 975 mg, Oral, Q8H [address], Morgan Garcia PA-C, 975 mg at 02/08/22 0617    aluminum-magnesium hydroxide-simethicone (MYLANTA) oral suspension 30 mL, 30 mL, Oral, Q6H PRN, Mandy Crabtree PA-C    ceFAZolin (ANCEF) IVPB (premix in dextrose) 1,000 mg 50 mL, 1,000 mg, Intravenous, Q8H, Morgan Garcia PA-C, Last Rate: 100 mL/hr at 02/08/22 0147, 1,000 mg at 02/08/22 0147    colchicine (COLCRYS) tablet 0 6 mg, 0 6 mg, Oral, Daily, Mandy Crabtree PA-C, 0 6 mg at 02/08/22 9894    ferrous sulfate tablet 325 mg, 325 mg, Oral, Daily With Breakfast, Morgan Garcia PA-C, 325 mg at 02/08/22 0930    HYDROmorphone HCl (DILAUDID) injection 0 2 mg, 0 2 mg, Intravenous, Q4H PRN, Mandy Crabtree PA-C, 0 2 mg at 02/06/22 1507    hydrOXYzine HCL (ATARAX) tablet 25 mg, 25 mg, Oral, Q6H PRN, Mandy Crabtree PA-C    insulin regular (HumuLIN R,NovoLIN R) 1 Units/mL in sodium chloride 0 9 % 100 mL infusion, 0 3-21 Units/hr, Intravenous, Titrated, Silas Shah MD, Last Rate: 10 mL/hr at 02/08/22 0909, 10 Units/hr at 02/08/22 0909    lidocaine (LIDODERM) 5 % patch 1 patch, 1 patch, Topical, Daily PRN, Sukh Pierre PA-C, 1 patch at 02/04/22 1204    naloxone (NARCAN) 0 04 mg/mL syringe 0 04 mg, 0 04 mg, Intravenous, Q1MIN PRN, Sukh Pierre PA-C    oxyCODONE (ROXICODONE) IR tablet 5 mg, 5 mg, Oral, Q4H PRN, 5 mg at 02/08/22 0935 **OR** oxyCODONE (ROXICODONE) IR tablet 7 5 mg, 7 5 mg, Oral, Q4H PRN, Sukh Pierre PA-C, 7 5 mg at 02/06/22 1104    polyethylene glycol (MIRALAX) packet 17 g, 17 g, Oral, Daily PRN, Sukh Pierre PA-C, 17 g at 02/06/22 1108    pravastatin (PRAVACHOL) tablet 40 mg, 40 mg, Oral, Daily With Luis JOSEF Carbone, 40 mg at 02/07/22 1744    senna-docusate sodium (SENOKOT S) 8 6-50 mg per tablet 1 tablet, 1 tablet, Oral, BID, Sukh Pierre PA-C, 1 tablet at 02/08/22 0930    sodium chloride 0 9 % infusion, 100 mL/hr, Intravenous, Continuous, Sukh Pierre PA-C, Last Rate: 100 mL/hr at 02/07/22 1743, 100 mL/hr at 02/07/22 1743     Labs & Results:        Results from last 7 days   Lab Units 02/08/22  0442 02/07/22  0542 02/06/22  0618   WBC Thousand/uL 8 52 7 61 9 13   HEMOGLOBIN g/dL 9 0* 10 5* 10 6*   HEMATOCRIT % 28 3* 32 7* 34 4*   PLATELETS Thousands/uL 322 261 287         Results from last 7 days   Lab Units 02/08/22  0442 02/07/22  0542 02/06/22  0618   POTASSIUM mmol/L 5 1 4 5 4 3   CHLORIDE mmol/L 100 103 100   CO2 mmol/L 22 24 23   BUN mg/dL 37* 25 25   CREATININE mg/dL 2 82* 2 46* 2 55*   CALCIUM mg/dL 8 5 8 7 8 3     Results from last 7 days   Lab Units 02/08/22  0442 02/07/22  0543 02/06/22  0618 02/05/22  0524 02/04/22  1221   INR  1 32* 1 46* 1 93*   < > 3 97*   PTT seconds  --   --   --   --  72*    < > = values in this interval not displayed

## 2022-02-08 NOTE — PLAN OF CARE
Problem: PHYSICAL THERAPY ADULT  Goal: Performs mobility at highest level of function for planned discharge setting  See evaluation for individualized goals  Description: Treatment/Interventions: Functional transfer training,LE strengthening/ROM,Therapeutic exercise,Endurance training,Patient/family training,Bed mobility,Spoke to nursing,OT,Continued evaluation          See flowsheet documentation for full assessment, interventions and recommendations  Note: Prognosis: Good  Problem List: Decreased strength,Decreased endurance,Impaired balance,Decreased mobility,Orthopedic restrictions,Pain  Assessment: Pt is a 61year old male who presents with close displaced intertrochanteric fracture of right femur on 2/3/22 at [facility]  PT orders received for evaluation and treat with a WBAT order for the R LE  Pt has a PMH of DM 2, ROSE, anemia, ambulatory dysfunction, hematoma, and preoperative lisa  Prior to IE the pt was independent with functional mobility  The pt used a rolling walker to ambulate household distances  The pt required assistance with ADLs and IADLs from his spouse  The pt lives in a 2-story home with MICHAEL with a handrail  At IE the patient presents with the following impairments including; increased pain, decreased LE strength, decreased endurance, decreased mobility, impaired balance, orthopedic restrictions, gait deviations, decreased activity tolerance, and increased fall risk  The pt presents with increased difficulty when performing bed mobility and sit to stand transfers  The pt requires verbal cues and mod A x2 for bed mobility and sit to stand transfers in order to ensure patient safety  The pt scores a 6 on the AM-PAC and a 45/100 on the Barthel index  The patient presents as an unstable-unpredictable complexity case due to above impairments and need for continued medical management  PT recommendation at time of d/c is STR pending progress to help pt return to PLOF  Barriers to Discharge: Inaccessible home environment,Decreased caregiver support        PT Discharge Recommendation: Post acute rehabilitation services          See flowsheet documentation for full assessment

## 2022-02-08 NOTE — ASSESSMENT & PLAN NOTE
Given patient's elevated HS troponin, and ST depressions in inferior leads after undergoing surgical procedure yesterday, further ischemic workup was planned for today; patient was scheduled to undergo nuclear stress testing today, however he is currently declining this intervention at the time of today's encounter  Repeat EKG reviewed this morning and unremarkable  DW cardiology, no further intervention planned as patient refusing nuclear stress

## 2022-02-08 NOTE — PROGRESS NOTES
NEPHROLOGY PROGRESS NOTE    Patient: Eloisa Donnelly               Sex: male          DOA: 2/3/2022  5:11 PM   YOB: 1955        Age:  61 y o         LOS:  LOS: 5 days       HPI     Patient is CKD admitted with fracture right femur and also had acute kidney injury    SUBJECTIVE     Patient seen status post surgery     Overall feeling well    Does have fluctuating kidney function     Denies any chest pain no palpitation    CURRENT MEDICATIONS       Current Facility-Administered Medications:     acetaminophen (TYLENOL) tablet 975 mg, 975 mg, Oral, Q8H [address], Morgan Garcia PA-C, 975 mg at 02/08/22 1358    aluminum-magnesium hydroxide-simethicone (MYLANTA) oral suspension 30 mL, 30 mL, Oral, Q6H PRN, Sukh Pierre PA-C    ceFAZolin (ANCEF) IVPB (premix in dextrose) 2,000 mg 50 mL, 2,000 mg, Intravenous, Q12H, Ananya Keith MD    colchicine (COLCRYS) tablet 0 6 mg, 0 6 mg, Oral, Daily, Sukh Pierre PA-C, 0 6 mg at 02/08/22 0259    ferrous sulfate tablet 325 mg, 325 mg, Oral, Daily With Breakfast, Morgan Garcia PA-C, 325 mg at 02/08/22 0930    HYDROmorphone HCl (DILAUDID) injection 0 2 mg, 0 2 mg, Intravenous, Q4H PRN, Sukh Pierre PA-C, 0 2 mg at 02/06/22 1507    hydrOXYzine HCL (ATARAX) tablet 25 mg, 25 mg, Oral, Q6H PRN, Sukh Pierre PA-C    insulin regular (HumuLIN R,NovoLIN R) 1 Units/mL in sodium chloride 0 9 % 100 mL infusion, 0 3-21 Units/hr, Intravenous, Titrated, Brooklyn Vargas MD, Last Rate: 11 mL/hr at 02/08/22 1359, 11 Units/hr at 02/08/22 1359    lidocaine (LIDODERM) 5 % patch 1 patch, 1 patch, Topical, Daily PRN, Sukh Pierre PA-C, 1 patch at 02/04/22 1204    naloxone (NARCAN) 0 04 mg/mL syringe 0 04 mg, 0 04 mg, Intravenous, Q1MIN PRN, Sukh Pierre PA-C    oxyCODONE (ROXICODONE) IR tablet 5 mg, 5 mg, Oral, Q4H PRN, 5 mg at 02/08/22 0935 **OR** oxyCODONE (ROXICODONE) IR tablet 7 5 mg, 7 5 mg, Oral, Q4H PRN, Sukh Pierre PA-C, 7 5 mg at 02/06/22 1104    polyethylene glycol (MIRALAX) packet 17 g, 17 g, Oral, Daily PRN, Michelle Patterson PA-C, 17 g at 02/06/22 1108    pravastatin (PRAVACHOL) tablet 40 mg, 40 mg, Oral, Daily With Bhargav Sherman PA-C, 40 mg at 02/07/22 1744    senna-docusate sodium (SENOKOT S) 8 6-50 mg per tablet 1 tablet, 1 tablet, Oral, BID, Michelle Patterson PA-C, 1 tablet at 02/08/22 0930    sodium chloride 0 9 % infusion, 100 mL/hr, Intravenous, Continuous, Michelle Patterson PA-C, Last Rate: 100 mL/hr at 02/07/22 1743, 100 mL/hr at 02/07/22 1743    warfarin (COUMADIN) tablet 2 mg, 2 mg, Oral, Daily (warfarin), Karyn Mcgovern MD    OBJECTIVE     Current Weight: Weight - Scale: 101 kg (223 lb)  Vitals:    02/08/22 1420   BP: 138/65   Pulse: 80   Resp: 18   Temp:    SpO2: 97%       Intake/Output Summary (Last 24 hours) at 2/8/2022 1636  Last data filed at 2/8/2022 1300  Gross per 24 hour   Intake 600 ml   Output 320 ml   Net 280 ml       PHYSICAL EXAMINATION     Physical Exam  Constitutional:       General: He is not in acute distress  Appearance: He is well-developed  HENT:      Head: Normocephalic  Eyes:      General: No scleral icterus  Conjunctiva/sclera: Conjunctivae normal    Neck:      Vascular: No JVD  Cardiovascular:      Rate and Rhythm: Normal rate  Heart sounds: Normal heart sounds  Pulmonary:      Effort: Pulmonary effort is normal       Breath sounds: No wheezing  Abdominal:      Palpations: Abdomen is soft  Tenderness: There is no abdominal tenderness  Musculoskeletal:         General: Normal range of motion  Cervical back: Neck supple  Skin:     General: Skin is warm  Findings: No rash  Neurological:      Mental Status: He is alert and oriented to person, place, and time     Psychiatric:         Behavior: Behavior normal           LAB RESULTS     Results from last 7 days   Lab Units 02/08/22  0442 02/07/22  0542 02/06/22  2443 02/05/22  7935 02/04/22 0648 02/03/22 2015   WBC Thousand/uL 8 52 7 61 9 13 10 41* 8 63 10 56*   HEMOGLOBIN g/dL 9 0* 10 5* 10 6* 10 3* 10 6* 11 7*   HEMATOCRIT % 28 3* 32 7* 34 4* 32 3* 32 3* 36 9   PLATELETS Thousands/uL 322 261 287 287 290 312   POTASSIUM mmol/L 5 1 4 5 4 3 4 1 3 7 4 1   CHLORIDE mmol/L 100 103 100 102 102 99*   CO2 mmol/L 22 24 23 21 22 22   BUN mg/dL 37* 25 25 24 28* 29*   CREATININE mg/dL 2 82* 2 46* 2 55* 2 40* 2 45* 2 64*   EGFR ml/min/1 73sq m 22 26 25 27 26 24   CALCIUM mg/dL 8 5 8 7 8 3 8 3 8 4 8 7   PHOSPHORUS mg/dL  --   --  2 5  --   --   --        RADIOLOGY RESULTS      Results for orders placed during the hospital encounter of 02/03/22    XR chest portable    Narrative  CHEST    INDICATION:   pre op clearance  COMPARISON:  4/4/2017    EXAM PERFORMED/VIEWS:  XR CHEST PORTABLE      FINDINGS:    Cardiomediastinal silhouette appears unremarkable  Platelike atelectasis versus scar is noted at the left lung base  There is mild hypoventilation  No infiltrate is seen  No pneumothorax or pleural effusion  Osseous structures appear within normal limits for patient age  Impression  No acute cardiopulmonary disease  Workstation performed: [id number]    Results for orders placed during the hospital encounter of 04/04/17    XR chest pa & lateral    Narrative  CHEST - DUAL ENERGY    INDICATION:  Cough    COMPARISON:  None    VIEWS:  PA (including soft tissue/bone algorithms) and lateral projections    IMAGES:  4    FINDINGS:    Cardiomediastinal silhouette appears unremarkable  The lungs are clear  No pneumothorax or pleural effusion  Visualized osseous structures appear within normal limits for the patient's age  Impression  No active pulmonary disease  Workstation performed: [id number]    No results found for this or any previous visit  No results found for this or any previous visit      Results for orders placed during the hospital encounter of 01/15/21    CT abdomen pelvis wo contrast    Narrative  CT ABDOMEN AND PELVIS WITHOUT IV CONTRAST    INDICATION:   R10 9: Unspecified abdominal pain  COMPARISON:  None  TECHNIQUE:  CT examination of the abdomen and pelvis was performed without intravenous contrast   Axial, sagittal, and coronal 2D reformatted images were created from the source data and submitted for interpretation  Radiation dose length product (DLP) for this visit:  2252 mGy-cm   This examination, like all CT scans performed in the [facility], was performed utilizing techniques to minimize radiation dose exposure, including the use of iterative  reconstruction and automated exposure control  Enteric contrast was not administered  FINDINGS:    ABDOMEN    LOWER CHEST:  No clinically significant abnormality identified in the visualized lower chest     LIVER/BILIARY TREE:  Unremarkable  GALLBLADDER:  No calcified gallstones  No pericholecystic inflammatory change  SPLEEN:  Unremarkable  PANCREAS:  Unremarkable  ADRENAL GLANDS:  Unremarkable  KIDNEYS/URETERS:  There is a 7 mm calculus located within the proximal left ureter, 2 cm distal to the ureteropelvic junction causing mild hydronephrosis and perinephric stranding  There are multiple left-sided nonobstructing intrarenal calculi  measuring up to 7 mm  There are a few punctate nonobstructing right-sided intrarenal calculi  STOMACH AND BOWEL:  Unremarkable  APPENDIX:  No findings to suggest appendicitis  ABDOMINOPELVIC CAVITY:  No ascites  No pneumoperitoneum  No lymphadenopathy  VESSELS:  Unremarkable for patient's age  PELVIS    REPRODUCTIVE ORGANS:  Unremarkable for patient's age  URINARY BLADDER:  Unremarkable  ABDOMINAL WALL/INGUINAL REGIONS:  There are small bilateral fat-containing inguinal hernias  OSSEOUS STRUCTURES:  No acute fracture or destructive osseous lesion  There is diffuse bone demineralization    There is a moderate sized Schmorl's node at the superior endplate of L1  Impression  Mild left hydronephrosis and perinephric stranding, the cause of which appears to be a 7 mm calculus within the proximal ureter  Bilateral subcentimeter nonobstructing intrarenal calculi  Workstation performed: [id number]    No results found for this or any previous visit  PLAN / RECOMMENDATIONS      Acute kidney injury: May be related to surgery  Will continue with some gentle IV hydration and monitor    CKD stage 3: Likely because of diabetic nephropathy  Glucose is still fluctuating     Diabetes type 2: Glucose is fluctuating which may be contributing to kidney failure  At present on insulin     High troponin:  Patient manage stress testing and being considered by cardiologist the patient is refusing further testing    Everything discussed at length with the patient and his wife  At present will continue hydration and monitor        Nanci Tejeda MD  Nephrology  2/8/2022        Portions of the record may have been created with voice recognition software  Occasional wrong word or "sound a like" substitutions may have occurred due to the inherent limitations of voice recognition software  Read the chart carefully and recognize, using context, where substitutions have occurred

## 2022-02-08 NOTE — OCCUPATIONAL THERAPY NOTE
Occupational Therapy Evaluation Note        Patient Name: Sol Garrido  IHRPX'L Date: 2/8/2022 02/08/22 1307   OT Last Visit   OT Visit Date 02/08/22   Note Type   Note type Evaluation   Restrictions/Precautions   Weight Bearing Precautions Per Order Yes   RLE Weight Bearing Per Order WBAT  (per orthopedics)   Braces or Orthoses Other (Comment)  (none per pt)   Other Precautions Bed Alarm; Chair Alarm;WBS;Fall Risk;Pain;Multiple lines;Telemetry   Pain Assessment   Pain Assessment Tool 0-10   Pain Score 5  (5/10 pain at rest, increased to 6/10 pain w/ activity)   Pain Location/Orientation Orientation: Right;Location: Leg   Pain Onset/Description Onset: Ongoing   Hospital Pain Intervention(s) Repositioned; Ambulation/increased activity; Environmental changes   Home Living   Type of [address] [place] [address] Two level; Able to live on main level with bedroom/bathroom;Stairs to enter with rails  (7 MICHAEL from front door; 3 MICHAEL from garage)   Bathroom Shower/Tub Tub/shower unit   [address] [facility] Walker;Cane;Wheelchair-manual   Additional Comments Patient primarily ambulatory with a walker at baseline   Prior Function   Level of [place] Needs assistance with IADLs; Needs assistance with ADLs and functional mobility   Lives With Spouse   Receives Help From Family   ADL Assistance Needs assistance   IADLs Needs assistance   Falls in the last 6 months 1 to 4   Vocational Retired   [address] [facility] setup obtained via chart review from previous OT evaluation on 10/14/21 and pt report at time of IE   Lifestyle   Autonomy Per patient report and chart review, patient lives with his spouse in a two-story home with 3 MICHAEL from the garage/7 MICHAEL from the front door and a first-floor setup available  Prior to admission, patient was receiving assistance with both ADLs (including LB bathing) and IADLs   Patient reports that he was primarily ambulatory using a walker PTA  Reciprocal Relationships Spouse   Service to Others Retired   ADL   Eating Assistance 6  Modified independent   Grooming Assistance 5  Supervision/Setup   [address] 4  Minimal Assistance   LB [address] 2  Maximal Assistance   [address] 5  Supervision/Setup    [facility] 2  Maximal [address]  3  Moderate Assistance   Functional Assistance 3  Moderate Assistance   Additional Comments ADL assist levels based on pt's functional performance during OT evaluation   Bed Mobility   Supine to Sit 3  Moderate assistance   Additional items Assist x 2;HOB elevated; Bedrails; Increased time required;Verbal cues;LE management   Sit to Supine 3  Moderate assistance   Additional items Assist x 2;HOB elevated; Bedrails; Increased time required;Verbal cues;LE management   Additional Comments Patient received lying supine in bed upon OT arrival; at end of session: pt returned lying supine in bed w/ HOB elevated, all needs within reach, and bed alarm activated  Patient able to perform lateral scooting at EOB with supervision/CGA assist  Patient initially reported dizziness with positional changes  Transfers   Sit to Stand 3  Moderate assistance  (x2 trials)   Additional items Assist x 2; Increased time required;Verbal cues   Stand to Sit 3  Moderate assistance   Additional items Assist x 2; Increased time required;Verbal cues   Additional Comments Performed functional transfers using RW  Patient able to clear buttocks off of bed on first trial, able to reach full standing position on 2nd trial  Able to tolerate static standing for about 15-20 seconds w/ BUE support on RW while bed linen change was performed     Balance   Static Sitting Fair   Dynamic Sitting Fair -   Static Standing Poor   Activity Tolerance   Activity Tolerance Patient limited by pain   Medical Staff Made Aware PT Alexey Guardado PT student Gabe   Nurse Made Aware Discussed case with MARY Littlejohn   RUE Assessment   RUE Assessment WFL  (BUEs grossly WFL based on pt's functional performance)   LUE Assessment   LUE Assessment WFL  (BUEs grossly WFL based on pt's functional performance)   Hand Function   Gross Motor Coordination   (Further assessment required)   Fine Motor Coordination   (Further assessment required)   Sensation   Additional Comments Patient denies diminished sensation throughout [place] Wears glasses only for reading   Cognition   Overall Cognitive Status [facility]   Arousal/Participation Alert; Responsive   Attention Within functional limits   Orientation Level Oriented X4   Memory Within functional limits   Following Commands Follows all commands and directions without difficulty   Comments Patient agreeable to OT evaluation   Assessment   Limitation Decreased ADL status; Decreased endurance;Decreased self-care trans;Decreased high-level ADLs   Prognosis Good   Assessment Patient is a 61 y o  male seen for OT evaluation s/p admit to [address] [facility] on 2/3/2022 w/Closed displaced intertrochanteric fracture of right femur ([address] )  Commorbidities affecting patient's functional performance at time of assessment include: ROSE, type 2 DM with hyperglycemia, pre-operative clearance, hematoma, hyponatremia, ambulatory dysfunction, and anemia  Patient  has a past medical history of Back pain, Diabetes mellitus ([address] ), DVT (deep venous thrombosis) ([address] ), Hard to intubate (10/18/2021), Hyperlipidemia, and Hypertension  Orders placed for OT evaluation and treatment  Performed at least two patient identifiers during session including name and wristband  Prior to admission, patient was living with his spouse in a two-story home with 3 MICHAEL from the garage and a first-floor setup  Prior to admission, patient was receiving assistance with both ADLs and IADLs and was primarily ambulatory with a walker   Personal factors affecting patient at time of initial evaluation include: steps to enter, difficulty performing ADLs and difficulty performing IADLs  Upon evaluation, patient requires supervision, set up and minimal assist for UB ADLs, maximal assist for LB ADLs, transfers with moderate assist x2, with the use of Rolling Walker  Patient is alert and oriented x 4  Occupational performance is affected by the following deficits: decreased activity tolerance, increased pain and postural control and postural alignment deficit, requiring external assistance to complete transitional movements, weakness, dynamic sit/stand balance deficit, and decreased standing tolerance time for mobility and self-care, decreased physical endurance and stamina  Patient to benefit from continued Occupational Therapy treatment while in the hospital to address deficits as defined above and maximize level of functional independence with ADLs and functional mobility  Occupational Performance areas to address include: grooming , bathing/ shower, dressing, toilet hygiene, transfer to all surfaces, functional mobility, emergency response, health maintenance, IADLs: safety procedures and Leisure Participation  From OT standpoint, recommendation at time of d/c would be post-acute rehabilitation services  Plan   Treatment Interventions ADL retraining;Functional transfer training;UE strengthening/ROM; Endurance training;Patient/family training;Equipment evaluation/education; Compensatory technique education;Continued evaluation; Energy conservation; Activityengagement   Goal Expiration Date 02/22/22   OT Treatment Day 0   OT Frequency 3-5x/wk   Recommendation   OT Discharge Recommendation Post acute rehabilitation services   Additional Comments  The patient's raw score on the AM-PAC Daily Activity inpatient short form is 16, standardized score is 35 96, less than 39 4  Patients at this level are likely to benefit from discharge to post-acute rehabilitation services   Please refer to the recommendation of the Occupational Therapist for safe discharge planning  AM-PAC Daily Activity Inpatient   Lower Body Dressing 2   Bathing 2   Toileting 2   Upper Body Dressing 3   Grooming 3   Eating 4   Daily Activity Raw Score 16   Daily Activity Standardized Score (Calc for Raw Score >=11) 35 96   AM-PAC Applied Cognition Inpatient   Following a Speech/Presentation 4   Understanding Ordinary Conversation 4   Taking Medications 4   Remembering Where Things Are Placed or Put Away 4   Remembering List of 4-5 Errands 4   Taking Care of Complicated Tasks 4   Applied Cognition Raw Score 24   Applied Cognition Standardized Score 62 21   Barthel Index   Feeding 10   Bathing 0   Grooming Score 5   Dressing Score 5   Bladder Score 10   Bowels Score 10   Toilet Use Score 5   Transfers (Bed/Chair) Score 5   Mobility (Level Surface) Score 0   Stairs Score 0   Barthel Index Score 50   Modified Sunil Scale   Modified Camby Scale 4     Occupational Therapy Goals to be completed in 7-14 Days:    1 - Patient will verbalize and demonstrate use of energy conservation/ deep breathing technique and work simplification skills during functional activity with no verbal cues  2 - Patient will verbalize and demonstrate good body mechanics and joint protection techniques during  ADLs/ IADLs with no verbal cues  3 - Patient will increase OOB/ sitting tolerance to 2-4 hours per day for increased participation in self care and leisure tasks with no s/s of exertion  4 - Pt will improve functional activity tolerance while seated EOB to 30+ minutes in order to increase safety and independence during functional transfers and ADL tasks  5 - Patient will increase sitting tolerance at edge of bed to 30 minutes to complete UB ADLs @ set up assist level  6 - Patient will transfer bed to Chair / toilet at Set up assist level with least restrictive AD       7 - Patient will complete UB ADLs with set up assist      8 - Patient will complete LB ADLs with min assist with the use of adaptive equipment  9 - Patient will complete toileting hygiene with set up assist/ supervision for thoroughness  8 - Patient/ Family will demonstrate competency with UE Home Exercise Program       11- Patient will verbalize 3 safety awareness/ body mechanics principles to  prevent falls in the home setting  12- Pt will improve dynamic sitting balance to good to increase safety and independence during functional transfers and ADL and decrease risk for falls      Kassy Faye, OTR/L

## 2022-02-08 NOTE — ASSESSMENT & PLAN NOTE
Lab Results   Component Value Date    HGBA1C 8 2 (H) 10/16/2021       Recent Labs     02/08/22  0615 02/08/22  0727 02/08/22  1128 02/08/22  1357   POCGLU 425* 449* 367* 310*       Blood Sugar Average: Last 72 hrs:  (P) 917 [id number]  · Elevated blood sugars in the 400s  · Patient started on insulin drip this morning    · Accu-Cheks q 2 hours

## 2022-02-08 NOTE — ASSESSMENT & PLAN NOTE
· Creatinine at baseline around 1 3-1 5  · 2 82 today  · Renal ultrasound neg for hydronephrosis  · Nephrology following  · On IV fluids  · Lisinopril on hold  · Continue to monitor BMP

## 2022-02-08 NOTE — PROGRESS NOTES
Progress Note - Orthopedics   Inder Villasenor 61 y o  male MRN: [id number]  Unit/Bed#: -02      Subjective:    61 y o male POD#1 right TFNA, I&D left lateral tibial plateau  Patient states that his legs doing well overall  Patient states he has minimal pain in his his any currently  Patient states he has not been ambulatory since the surgery  Immediately postoperatively patient experience hypotension and EKG changes  Cardiology consult id which resulted in an echocardiogram which was read as normal   Patient denies any fevers any chills  Patient denies any shortness of breath chest tightness chest pain  Patient denies any dizziness lightheadedness  Patient denies any numbness or tingling in his leg  Patient offers no other complaints at this time      Labs:  0   Lab Value Date/Time    HCT 28 3 (L) 02/08/2022 0442    HCT 32 7 (L) 02/07/2022 0542    HCT 34 4 (L) 02/06/2022 0618    HCT 38 4 07/13/2015 1448    HCT 38 6 10/20/2014 1132    HGB 9 0 (L) 02/08/2022 0442    HGB 10 5 (L) 02/07/2022 0542    HGB 10 6 (L) 02/06/2022 0618    HGB 13 1 07/13/2015 1448    HGB 13 1 10/20/2014 1132    INR 1 32 (H) 02/08/2022 0442    INR 2 94 (H) 12/11/2015 1543    WBC 8 52 02/08/2022 0442    WBC 7 61 02/07/2022 0542    WBC 9 13 02/06/2022 0618    WBC 7 7 07/13/2015 1448    WBC 8 30 10/20/2014 1132    ESR 58 (H) 05/20/2016 1207       Meds:    Current Facility-Administered Medications:     acetaminophen (TYLENOL) tablet 975 mg, 975 mg, Oral, Q8H [facility], Morgan Garcia PA-C, 975 mg at 02/08/22 0617    aluminum-magnesium hydroxide-simethicone (MYLANTA) oral suspension 30 mL, 30 mL, Oral, Q6H PRN, Nora Schneider PA-C    ceFAZolin (ANCEF) IVPB (premix in dextrose) 1,000 mg 50 mL, 1,000 mg, Intravenous, Q8H, Morgan Garcia PA-C, Last Rate: 100 mL/hr at 02/08/22 0147, 1,000 mg at 02/08/22 0147    colchicine (COLCRYS) tablet 0 6 mg, 0 6 mg, Oral, Daily, Nora Schneider PA-C, 0 6 mg at 02/06/22 1103    ferrous sulfate tablet 325 mg, 325 mg, Oral, Daily With Breakfast, Morgan Garcia PA-C, 325 mg at 02/06/22 1104    HYDROmorphone HCl (DILAUDID) injection 0 2 mg, 0 2 mg, Intravenous, Q4H PRN, Michelle Patterson PA-C, 0 2 mg at 02/06/22 1507    hydrOXYzine HCL (ATARAX) tablet 25 mg, 25 mg, Oral, Q6H PRN, Michelle Pattersno PA-C    insulin regular (HumuLIN R,NovoLIN R) 1 Units/mL in sodium chloride 0 9 % 100 mL infusion, 0 3-21 Units/hr, Intravenous, Titrated, Karyn Mcgovern MD    lidocaine (LIDODERM) 5 % patch 1 patch, 1 patch, Topical, Daily PRN, Michelle Patterson PA-C, 1 patch at 02/04/22 1204    naloxone (NARCAN) 0 04 mg/mL syringe 0 04 mg, 0 04 mg, Intravenous, Q1MIN PRN, Michelle Patterson PA-C    oxyCODONE (ROXICODONE) IR tablet 5 mg, 5 mg, Oral, Q4H PRN, 5 mg at 02/06/22 1747 **OR** oxyCODONE (ROXICODONE) IR tablet 7 5 mg, 7 5 mg, Oral, Q4H PRN, Michelle Patterson PA-C, 7 5 mg at 02/06/22 1104    polyethylene glycol (MIRALAX) packet 17 g, 17 g, Oral, Daily PRN, Michelle Patterson PA-C, 17 g at 02/06/22 1108    pravastatin (PRAVACHOL) tablet 40 mg, 40 mg, Oral, Daily With Dinner, Paloma Garcia PA-C, 40 mg at 02/07/22 1744    senna-docusate sodium (SENOKOT S) 8 6-50 mg per tablet 1 tablet, 1 tablet, Oral, BID, Michelle Patterson PA-C, 1 tablet at 02/07/22 2118    sodium chloride 0 9 % infusion, 100 mL/hr, Intravenous, Continuous, Morgan Garcia PA-C, Last Rate: 100 mL/hr at 02/07/22 1743, 100 mL/hr at 02/07/22 1743    Blood Culture:   No results found for: BLOODCX    Wound Culture:   No results found for: WOUNDCULT    Ins and Outs:  I/O last 24 hours: In: 810 [I V :810]  Out: 570 [Urine:470; Blood:100]          Physical:  Vitals:    02/08/22 0703   BP: 161/74   Pulse: 73   Resp:    Temp: 97 5 °F (36 4 °C)   SpO2: 99%     Musculoskeletal: right Lower Extremity  · Patient resting comfortably in hospital bed in no acute distress    · Skin  :  Extremity appears well-perfused overall  · Dressing  :  Dressing is clean dry and intact with no visible soilage present   · TTP  mild tenderness palpation noted over incision sites  No other bony or soft tissue tenderness palpation noted at this time  · SILT s/s/sp/dp/t  +fhl/ehl, +ankle dorsi/plantar flexion  2+ DP pulse      Assessment:    66 y o male POD#1 right TFNA, I&D left lateral tibial plateau  Plan:  · WBAT right lower extremity    · PT/OT for ambulation assistance, gait training  · Pain control per primary team  · DVT ppx per primary team  · Dispo: Ortho will follow  Continue weight-bearing as tolerated right lower extremity  PT for ambulation symptoms, gait training  Patient will likely need long-term antibiotics for open wound right lower extremity  Jazmine Baez PA-C                Portions of the record may have been created with voice recognition software  Occasional wrong word or "sound a like" substitutions may have occurred due to the inherent limitations of voice recognition software  Read the chart carefully and recognize, using context, where substitutions have occurred

## 2022-02-08 NOTE — PLAN OF CARE
Problem: OCCUPATIONAL THERAPY ADULT  Goal: Performs self-care activities at highest level of function for planned discharge setting  See evaluation for individualized goals  Description: Treatment Interventions: ADL retraining,Functional transfer training,UE strengthening/ROM,Endurance training,Patient/family training,Equipment evaluation/education,Compensatory technique education,Continued evaluation,Energy conservation,Activityengagement          See flowsheet documentation for full assessment, interventions and recommendations  2/8/2022 1649 by Apurva An OT  Note: Limitation: Decreased ADL status,Decreased endurance,Decreased self-care trans,Decreased high-level ADLs  Prognosis: Good  Assessment: Patient is a 61 y o  male seen for OT evaluation s/p admit to [address] [facility] on 2/3/2022 w/Closed displaced intertrochanteric fracture of right femur ([address] )  Commorbidities affecting patient's functional performance at time of assessment include: ROSE, type 2 DM with hyperglycemia, pre-operative clearance, hematoma, hyponatremia, ambulatory dysfunction, and anemia  Patient  has a past medical history of Back pain, Diabetes mellitus ([address] ), DVT (deep venous thrombosis) ([address] ), Hard to intubate (10/18/2021), Hyperlipidemia, and Hypertension  Orders placed for OT evaluation and treatment  Performed at least two patient identifiers during session including name and wristband  Prior to admission, patient was living with his spouse in a two-story home with 3 MICHAEL from the garage and a first-floor setup  Prior to admission, patient was receiving assistance with both ADLs and IADLs and was primarily ambulatory with a walker  Personal factors affecting patient at time of initial evaluation include: steps to enter, difficulty performing ADLs and difficulty performing IADLs   Upon evaluation, patient requires supervision, set up and minimal assist for UB ADLs, maximal assist for LB ADLs, transfers with moderate assist x2, with the use of Rolling Walker  Patient is alert and oriented x 4  Occupational performance is affected by the following deficits: decreased activity tolerance, increased pain and postural control and postural alignment deficit, requiring external assistance to complete transitional movements, weakness, dynamic sit/stand balance deficit, and decreased standing tolerance time for mobility and self-care, decreased physical endurance and stamina  Patient to benefit from continued Occupational Therapy treatment while in the hospital to address deficits as defined above and maximize level of functional independence with ADLs and functional mobility  Occupational Performance areas to address include: grooming , bathing/ shower, dressing, toilet hygiene, transfer to all surfaces, functional mobility, emergency response, health maintenance, IADLs: safety procedures and Leisure Participation  From OT standpoint, recommendation at time of d/c would be post-acute rehabilitation services  OT Discharge Recommendation: Post acute rehabilitation services       2/8/2022 1649 by Ramesh Marx OT  Note: Limitation: Decreased ADL status,Decreased endurance,Decreased self-care trans,Decreased high-level ADLs  Prognosis: Good  Assessment: Patient is a 61 y o  male seen for OT evaluation s/p admit to [address] [facility] on 2/3/2022 w/Closed displaced intertrochanteric fracture of right femur ([address] )  Commorbidities affecting patient's functional performance at time of assessment include: ROSE, type 2 DM with hyperglycemia, pre-operative clearance, hematoma, hyponatremia, ambulatory dysfunction, and anemia  Patient  has a past medical history of Back pain, Diabetes mellitus ([address] ), DVT (deep venous thrombosis) ([facility] ), Hard to intubate (10/18/2021), Hyperlipidemia, and Hypertension  Orders placed for OT evaluation and treatment  Performed at least two patient identifiers during session including name and wristband   Prior to admission, patient was living with his spouse in a two-story home with 3 MICHAEL from the garage and a first-floor setup  Prior to admission, patient was receiving assistance with both ADLs and IADLs and was primarily ambulatory with a walker  Personal factors affecting patient at time of initial evaluation include: steps to enter, difficulty performing ADLs and difficulty performing IADLs  Upon evaluation, patient requires supervision, set up and minimal assist for UB ADLs, maximal assist for LB ADLs, transfers with moderate assist x2, with the use of Rolling Walker  Patient is alert and oriented x 4  Occupational performance is affected by the following deficits: decreased activity tolerance, increased pain and postural control and postural alignment deficit, requiring external assistance to complete transitional movements, weakness, dynamic sit/stand balance deficit, and decreased standing tolerance time for mobility and self-care, decreased physical endurance and stamina  Patient to benefit from continued Occupational Therapy treatment while in the hospital to address deficits as defined above and maximize level of functional independence with ADLs and functional mobility  Occupational Performance areas to address include: grooming , bathing/ shower, dressing, toilet hygiene, transfer to all surfaces, functional mobility, emergency response, health maintenance, IADLs: safety procedures and Leisure Participation  From OT standpoint, recommendation at time of d/c would be post-acute rehabilitation services       OT Discharge Recommendation: Post acute rehabilitation services

## 2022-02-08 NOTE — QUICK NOTE
Patient's blood glucose found to be >400  Has already received home lantus and SSI  SQ regular insulin ordered  Glucose recheck 1-2h later reveals increased blood glucose  Will order 10U IV regular insulin at this time and recheck glucose in one hour  Addendum: Repeat POCT blood glucose 427  Will hold on further insulin administration with repeat AM BMP pending to avoid hypokalemia  Will recheck blood glucose in 1 hour to ensure stability of glucose, and await BMP results      Manny Reyes PA-C

## 2022-02-08 NOTE — PHYSICAL THERAPY NOTE
Physical Therapy Evaluation     Patient's Name: Matthew Healy    Admitting Diagnosis  Hematoma [T14  8XXA]  Leg laceration [S81 559A]  Closed intertrochanteric fracture of hip, right, initial encounter ([facility] ) [S72 141A]    Problem List  Patient Active Problem List   Diagnosis    Acute embolism and thrombosis of unspecified deep veins of unspecified lower extremity ([facility] )    Essential hypertension    Lumbar degenerative disc disease    Mixed hyperlipidemia    Other insomnia    Peripheral vascular disease [facility]    Type 2 diabetes mellitus with diabetic neuropathy, without long-term current use of insulin ([facility])    Type 2 diabetes mellitus with diabetic peripheral angiopathy without gangrene ([facility] )    Class 2 obesity in adult    Type 2 diabetes mellitus with hyperglycemia ([facility] )    ROSE (acute kidney injury) ([facility] )    Closed fracture of proximal end of right tibia    Pain and swelling of left knee    Pseudogout of knee, left    Kyphosis of cervicothoracic region    Anemia    Right forearm superficial thrombophlebitis    Ambulatory dysfunction    Pain and swelling of right lower leg    Displaced bicondylar fracture of right tibia, subsequent encounter for closed fracture with routine healing    Acute idiopathic gout of multiple sites    Closed displaced intertrochanteric fracture of right femur ([facility] )    Hematoma    Pre-operative clearance     Past Medical History  Past Medical History:   Diagnosis Date    Back pain     Diabetes mellitus ([facility]     DVT (deep venous thrombosis) ([facility] )     Hard to intubate 10/18/2021    Glidescope with #3 blade and bougie used to pass 6 0 ETT    Hyperlipidemia     Hypertension      Past Surgical History  Past Surgical History:   Procedure Laterality Date    CATARACT EXTRACTION      COLONOSCOPY      6/2014    CYST REMOVAL      EXTERNAL FIXATOR APPLICATION Right 43/61/5945    Procedure: Application External Fixation Device right lower extremity;  Surgeon: Carey Elias Nolon Bamberger, MD;  [facility]: [facility];  Service: Orthopedics    HAND SURGERY      HEEL SPUR SURGERY      foot surgery    INCISION AND DRAINAGE OF WOUND Right 2/7/2022    Procedure: INCISION AND DRAINAGE (I&D) EXTREMITY;  Surgeon: Alireza Garibay MD;  [facility];  Service: Orthopedics    ORIF TIBIA FRACTURE Right 10/25/2021    Procedure: OPEN REDUCTION W/ INTERNAL FIXATION (ORIF) RIGHT PROXIMAL TIBIA SHAFT FRACTURE, REMOVAL OF EXTERNAL FIXATOR;  Surgeon: Alireza Garibay MD;  [facility];  Service: Orthopedics    MD OPEN RX FEMUR FX+INTRAMED JASMYNE Right 2/7/2022    Procedure: INSERTION NAIL IM FEMUR ANTEGRADE (TROCHANTERIC); Surgeon: Alierza Garibay MD;  [facility];  Service: Orthopedics        02/08/22 1347   PT Last Visit   PT Visit Date 02/08/22   Note Type   Note type Evaluation   Pain Assessment   Pain Assessment Tool 0-10   Pain Score 5   Pain Location/Orientation Orientation: Right;Location: Leg   Pain Onset/Description Onset: Ongoing   Hospital Pain Intervention(s) Repositioned; Ambulation/increased activity; Environmental changes   Restrictions/Precautions   Weight Bearing Precautions Per Order Yes   RLE Weight Bearing Per Order WBAT  (Per ortho)   Braces or Orthoses Other (Comment)  (None per pt)   Other Precautions Bed Alarm;WBS;Fall Risk;Pain;Multiple lines   Home Living   Type of [address] [place] [address] Two level; Able to live on main level with bedroom/bathroom;Stairs to enter with rails  (7 MICHAEL from front door; 3 MICHAEL from garage)   Bathroom Shower/Tub Tub/shower unit   [address]; Wheelchair-manual;Walker   Additional Comments Pt primarily ambulates with a walker     Prior Function   Level of [place] Independent with ADLs and functional mobility   Lives With Spouse   Receives Help From Family   ADL Assistance Needs assistance   IADLs Needs assistance [place] in the last 6 months 1 to 4   Vocational Retired   General   Family/Caregiver Present Yes   Cognition   Overall Cognitive Status WFL   Arousal/Participation Alert   Orientation Level Oriented X4   Memory Within functional limits   Following Commands Follows all commands and directions without difficulty   Comments Pt is agreeable to PT evaluation   Subjective   Subjective "I'll get up my way "   RUE Assessment   RUE Assessment   (Defer to OT assessment )   LUE Assessment   LUE Assessment   (Defer to OT assessment )   RLE Assessment   RLE Assessment X   Strength RLE   RLE Overall Strength 3/5  (grossly assessed with functional mobility)   LLE Assessment   LLE Assessment X   Strength LLE   LLE Overall Strength 3+/5  (grossly assessed with functional mobility)   Light Touch   RLE Light Touch Grossly intact   LLE Light Touch Grossly intact   Bed Mobility   Supine to Sit 3  Moderate assistance   Additional items Assist x 2; Increased time required;LE management;Verbal cues;HOB elevated; Bedrails   Sit to Supine 3  Moderate assistance   Additional items Assist x 2; Increased time required;Verbal cues;LE management;HOB elevated; Bedrails   Transfers   Sit to Stand 3  Moderate assistance   Additional items Assist x 2; Increased time required;Verbal cues   Stand to Sit 3  Moderate assistance   Additional items Assist x 2; Increased time required;Verbal cues   Ambulation/Elevation   Gait pattern Not tested   Balance   Static Sitting Fair   Dynamic Sitting Fair -   Static Standing Poor -   Endurance Deficit   Endurance Deficit Yes   Endurance Deficit Description Decreased activity tolerance    Activity Tolerance   Activity Tolerance Patient tolerated treatment well   Medical Staff Made Aware PT DOMI Yung    Nurse Made Aware MARY Gamble made aware of session, Post session the pt was returned BTB and was in NAD, The pt's belongings were within reach and bed alarm was activated    Assessment   Prognosis Good   Problem List Decreased strength;Decreased endurance; Impaired balance;Decreased mobility;Orthopedic restrictions;Pain   Assessment Pt is a 61year old male who presents with close displaced intertrochanteric fracture of right femur on 2/3/22 at [facility]  PT orders received for evaluation and treat with a WBAT order for the R LE  Pt has a PMH of DM 2, ROSE, anemia, ambulatory dysfunction, hematoma, and preoperative lisa  Prior to IE the pt was independent with functional mobility  The pt used a rolling walker to ambulate household distances  The pt required assistance with ADLs and IADLs from his spouse  The pt lives in a 2-story home with MICHAEL with a handrail  At IE the patient presents with the following impairments including; increased pain, decreased LE strength, decreased endurance, decreased mobility, impaired balance, orthopedic restrictions, gait deviations, decreased activity tolerance, and increased fall risk  The pt presents with increased difficulty when performing bed mobility and sit to stand transfers  The pt requires verbal cues and mod A x2 for bed mobility and sit to stand transfers in order to ensure patient safety  The pt scores a 6 on the AM-PAC and a 45/100 on the Barthel index  The patient presents as an unstable-unpredictable complexity case due to above impairments and need for continued medical management  PT recommendation at time of d/c is STR pending progress to help pt return to PLOF  Barriers to Discharge Inaccessible home environment;Decreased caregiver support   Goals   STG Expiration Date 02/18/22   Short Term Goal #1 In 7 to 10 days: Pt will increase strength by 1/2 a grade in order to increase ease with transfers, Pt will be able to perform bed mobility with a min A x1 in order to decrease caregiver burden, Pt will be able to perform transfers with a min A x1 to improve mobility   Pt will be able to ambulate >25 feet with a RW and min A x1 in order to increase independence navigating household distances, pt will improve gross balance by 1/2 a grade to decrease risk of falls  Continued PT evaluation to assess elevation goals when appropriate  Plan   Treatment/Interventions Functional transfer training;LE strengthening/ROM; Therapeutic exercise; Endurance training;Patient/family training;Bed mobility;Spoke to nursing;OT;Continued evaluation   PT Frequency 3-5x/wk   Recommendation   PT Discharge Recommendation Post acute rehabilitation services   AM-PAC Basic Mobility Inpatient   Turning in Bed Without Bedrails 1   Lying on Back to Sitting on Edge of Flat Bed 1   Moving Bed to Chair 1   Standing Up From Chair 1   Walk in Room 1   Climb 3-5 Stairs 1   Basic Mobility Inpatient Raw Score 6   Turning Head Towards Sound 4   Follow Simple Instructions 4   Low Function Basic Mobility Raw Score 14   Low Function Basic Mobility Standardized Score 22 01   Highest Level Of Mobility   -HL Goal 2: Bed activities/Dependent transfer   -[facility] Highest Level of Mobility 5: Stand (1 or more minutes)   -HL Goal Achieved Yes   Modified Canyon Scale   Modified Canyon Scale 4   Barthel Index   Feeding 10   Bathing 0   Grooming Score 0   Dressing Score 5   Bladder Score 10   Bowels Score 10   Toilet Use Score 5   Transfers (Bed/Chair) Score 5   Mobility (Level Surface) Score 0   Stairs Score 0   Barthel Index Score 45         PT Evaluation Time: 9966-4747    Meghan Bell, SPT

## 2022-02-08 NOTE — CASE MANAGEMENT
Case Management Discharge Planning Note    Patient name Eloisa [facility]  Location [address] [id number] MRN [id number]  : 1955 Date 2022       Current Admission Date: 2/3/2022  Current Admission Diagnosis:Closed displaced intertrochanteric fracture of right femur [facility])   Patient Active Problem List    Diagnosis Date Noted    Pre-operative clearance 2022    Closed displaced intertrochanteric fracture of right femur ([facility] ) 2022    Hyponatremia 2022    Hematoma 2022    Acute idiopathic gout of multiple sites 2022    Pain and swelling of right lower leg 2021    Ambulatory dysfunction 2021    Displaced bicondylar fracture of right tibia, subsequent encounter for closed fracture with routine healing 2021    Right forearm superficial thrombophlebitis 2021    Anemia 10/26/2021    Kyphosis of cervicothoracic region 10/25/2021    Pseudogout of knee, left 10/17/2021    Pain and swelling of left knee 10/16/2021    ROSE (acute kidney injury) ([facility] ) 10/13/2021    Closed fracture of proximal end of right tibia 10/13/2021    Type 2 diabetes mellitus with hyperglycemia ([address] ) 10/05/2021    Class 2 obesity in adult 2021    Type 2 diabetes mellitus with diabetic peripheral angiopathy without gangrene ([address] ) 10/08/2019    Other insomnia 2017    Type 2 diabetes mellitus with diabetic neuropathy, without long-term current use of insulin ([address] ) 2017    Lumbar degenerative disc disease 10/21/2014    Acute embolism and thrombosis of unspecified deep veins of unspecified lower extremity ([address] ) 10/20/2014    Essential hypertension 10/20/2014    Mixed hyperlipidemia 10/20/2014    Peripheral vascular disease ([address] ) 10/20/2014      LOS (days): 5  Geometric Mean LOS (GMLOS) (days):   Days to GMLOS:     OBJECTIVE:  Risk of Unplanned Readmission Score: 26         Current admission status: Inpatient   Preferred Pharmacy:   [address] [place] [address] [address] [address]  Phone: [phone] Fax: [address] [facility] - [place], [facility] [address] [place] [address]  Phone: [phone] Fax: [address] [address]  Phone: [phone] Fax: [phone]    [address] [facility] [facility] [address]  [address]  Phone: [phone] Fax: [phone]    Primary Care Provider: Maninder Sandoval DO    Primary Insurance: BLUE CROSS  Secondary Insurance: [employer] [facility] REP    DISCHARGE DETAILS:    Additional Comments: CM spoke with pt at bedside who reports that he would like to speak with his wife about his discharge options prior to providing a determination  Continuing to await PT/OT recommendations  STR refs and VNA refs sent to prepare for either recommendation  CM department to follow

## 2022-02-08 NOTE — PLAN OF CARE
Problem: MUSCULOSKELETAL - ADULT  Goal: Maintain or return mobility to safest level of function  Description: INTERVENTIONS:  - Assess patient's ability to carry out ADLs; assess patient's baseline for ADL function and identify physical deficits which impact ability to perform ADLs (bathing, care of mouth/teeth, toileting, grooming, dressing, etc )  - Assess/evaluate cause of self-care deficits   - Assess range of motion  - Assess patient's mobility  - Assess patient's need for assistive devices and provide as appropriate  - Encourage maximum independence but intervene and supervise when necessary  - Involve family in performance of ADLs  - Assess for home care needs following discharge   - Consider OT consult to assist with ADL evaluation and planning for discharge  - Provide patient education as appropriate  Outcome: Progressing  Goal: Maintain proper alignment of affected body part  Description: INTERVENTIONS:  - Support, maintain and protect limb and body alignment  - Provide patient/ family with appropriate education  Outcome: Progressing     Problem: PAIN - ADULT  Goal: Verbalizes/displays adequate comfort level or baseline comfort level  Description: Interventions:  - Encourage patient to monitor pain and request assistance  - Assess pain using appropriate pain scale  - Administer analgesics based on type and severity of pain and evaluate response  - Implement non-pharmacological measures as appropriate and evaluate response  - Consider cultural and social influences on pain and pain management  - Notify physician/advanced practitioner if interventions unsuccessful or patient reports new pain  Outcome: Progressing     Problem: INFECTION - ADULT  Goal: Absence or prevention of progression during hospitalization  Description: INTERVENTIONS:  - Assess and monitor for signs and symptoms of infection  - Monitor lab/diagnostic results  - Monitor all insertion sites, i e  indwelling lines, tubes, and drains  - Monitor endotracheal if appropriate and nasal secretions for changes in amount and color  - [place] appropriate cooling/warming therapies per order  - Administer medications as ordered  - Instruct and encourage patient and family to use good hand hygiene technique  - Identify and instruct in appropriate isolation precautions for identified infection/condition  Outcome: Progressing  Goal: Absence of fever/infection during neutropenic period  Description: INTERVENTIONS:  - Monitor WBC    Outcome: Progressing     Problem: SAFETY ADULT  Goal: Maintain or return to baseline ADL function  Description: INTERVENTIONS:  -  Assess patient's ability to carry out ADLs; assess patient's baseline for ADL function and identify physical deficits which impact ability to perform ADLs (bathing, care of mouth/teeth, toileting, grooming, dressing, etc )  - Assess/evaluate cause of self-care deficits   - Assess range of motion  - Assess patient's mobility; develop plan if impaired  - Assess patient's need for assistive devices and provide as appropriate  - Encourage maximum independence but intervene and supervise when necessary  - Involve family in performance of ADLs  - Assess for home care needs following discharge   - Consider OT consult to assist with ADL evaluation and planning for discharge  - Provide patient education as appropriate  Outcome: Progressing  Goal: Maintains/Returns to pre admission functional level  Description: INTERVENTIONS:  - Perform BMAT or MOVE assessment daily    - Set and communicate daily mobility goal to care team and patient/family/caregiver  - Collaborate with rehabilitation services on mobility goals if consulted  - Perform Range of Motion  times a day  - Reposition patient every 2 hours    - Out of bed for toileting  - Record patient progress and toleration of activity level   Outcome: Progressing  Goal: Patient will remain free of falls  Description: INTERVENTIONS:  - Educate patient/family on patient safety including physical limitations  - Instruct patient to call for assistance with activity   - Consult OT/PT to assist with strengthening/mobility   - Keep Call bell within reach  - Keep bed low and locked with side rails adjusted as appropriate  - Keep care items and personal belongings within reach  - Initiate and maintain comfort rounds  - Make Fall Risk Sign visible to staff  - Offer Toileting every  2 Hours, in advance of need  - Initiate/Maintain bed alarm  - Obtain necessary fall risk management equipment:   - Apply yellow socks and bracelet for high fall risk patients  - Consider moving patient to room near nurses station  Outcome: Progressing     Problem: SKIN/TISSUE INTEGRITY - ADULT  Goal: Skin Integrity remains intact(Skin Breakdown Prevention)  Description: Assess:  -Perform Shine assessment   -Clean and moisturize skin   -Inspect skin when repositioning, toileting, and assisting with ADLS  -Assess extremities for adequate circulation and sensation     Bed Management:  -Have minimal linens on bed & keep smooth, unwrinkled  -Change linens as needed when moist or perspiring  -Avoid sitting or lying in one position for more    Toileting:  -Offer bedside commode  -Assess for incontinence   -Use incontinent care products after each incontinent episode     Activity:    -Encourage activity and walks on unit  -Encourage or provide ROM exercises   -Use appropriate equipment to lift or move patient in bed  -Consider limitation of chair time    Skin Care:  -Avoid use of baby powder, tape, friction and shearing, hot water or constrictive clothing  -Relieve pressure over bony prominences   -Do not massage red bony areas    Next Steps:  -Teach patient strategies to minimize risks   -Consider consults to  interdisciplinary teams   Outcome: Progressing  Goal: Incision(s), wounds(s) or drain site(s) healing without S/S of infection  Description: INTERVENTIONS  - Assess and document dressing, incision, wound bed, drain sites and surrounding tissue  - Provide patient and family education  - Perform skin care/dressing changes   Outcome: Progressing  Goal: Pressure injury heals and does not worsen  Description: Interventions:  - Implement low air loss mattress or specialty surface (Criteria met)  - Use special pressure reducing interventions   - Apply fecal or urinary incontinence containment device   - Turn and reposition patient & offload bony prominences   - Utilize friction reducing device or surface for transfers   - Consider consults to  interdisciplinary teams   - Use incontinent care products after each incontinent episode  - Consider nutrition services referral as needed  Outcome: Progressing     Problem: MOBILITY - ADULT  Goal: Maintain or return to baseline ADL function  Description: INTERVENTIONS:  -  Assess patient's ability to carry out ADLs; assess patient's baseline for ADL function and identify physical deficits which impact ability to perform ADLs (bathing, care of mouth/teeth, toileting, grooming, dressing, etc )  - Assess/evaluate cause of self-care deficits   - Assess range of motion  - Assess patient's mobility; develop plan if impaired  - Assess patient's need for assistive devices and provide as appropriate  - Encourage maximum independence but intervene and supervise when necessary  - Involve family in performance of ADLs  - Assess for home care needs following discharge   - Consider OT consult to assist with ADL evaluation and planning for discharge  - Provide patient education as appropriate  Outcome: Progressing  Goal: Maintains/Returns to pre admission functional level  Description: INTERVENTIONS:  - Perform BMAT or MOVE assessment daily    - Set and communicate daily mobility goal to care team and patient/family/caregiver  - Collaborate with rehabilitation services on mobility goals if consulted  - Reposition patient every 2 hours    - Out of bed for toileting  - Record patient progress and toleration of activity level   Outcome: Progressing

## 2022-02-08 NOTE — CONSULTS
Consultation - Infectious Disease   Mario Membreno 61 y o  male MRN: [id number]  Unit/Bed#: -02 Encounter: [id number]      IMPRESSION & RECOMMENDATIONS:   1  Lower leg wound with exposed/compromised hardware  Patient presented initially after a fall and was found to have right femur fracture  He was also noted to have a lower leg wound which on debridement in the OR had exposed hardware present from a previous fracture repair  No gross purulence in the OR  No cultures collected  It is likely that the hardware is now compromised by skin silver  No drug allergies noted  Kidney dysfunction noted  Will continue on Ancef, dose adjusted to 2 g every 12 hours  Continue to trend fever curve/vitals  Repeat labs tomorrow  Will discuss with Orthopedics, if any hardware removal potential in the future  Family/patient requesting further discussion with Orthopedics, discussed with primary  Patient cleared from ID standpoint for PICC line  Tentative plan for 6 weeks of IV antibiotic therapy  Will plan to transition thereafter to oral suppressive therapy  Discussed risks associated with suppressive therapy  We discussed side effects/risk associated with this specific antibiotic  Discussed potential for relapse in detail  Will arrange follow-up closer to discharge  Additional care as per primary    2  Closed right femur fracture after fall  Patient is status post intramedullary nail of an intratrochanteric fracture after a fall  Course complicated by the above  Continue antibiotics as above in ongoing follow-up by Orthopedics  3  Acute kidney injury on chronic kidney disease  Acute elevation in creatinine noted from prior  This does impact antibiotic dosing  Ancef dosing as above  Will further dose adjust antibiotics as needed  Repeat chemistry tomorrow  Ongoing follow-up by Nephrology  Avoid nephrotoxic agent    4  Poorly controlled diabetes  Hemoglobin A1c noted to be 8 2    I suspect that this is contributing to the above as well as to overall poor wound healing  Ongoing glucose management as per primary  Above plan discussed in detail with patient and his wife  Above plan discussed in detail with primary service attending  ID consult service will continue to follow  HISTORY OF PRESENT ILLNESS:  Reason for Consult:  Exposed hardware    HPI: Harper Bhatia is a 61y o  year old male with diabetes, hyperlipidemia, hypertension, history of DVT on warfarin  Patient presented after a fall  Reportedly he was walking and then slipped and fell on his right side and developed right hip pain afterwards  He was found to have a closed displaced femur fracture on the right  There is an adjacent soft tissue hematoma  He was also noted with acute kidney injury  Operative report reviewed from yesterday  Patient underwent intramedullary nail fixation of the right femur intratrochanteric fracture  He also underwent debridement of a right proximal tibial wound which was noted to have hardware exposure of his previous fracture/fixation hardware  There was documented discussion with the patient about the risk and high likelihood of infection and poor wound healing  There is no purulence noted in the OR to send for cultures  The area was debrided and ultimately closed  Re-evaluations by Orthopedics noted with recommendation for possible prolonged antibiotic given exposed hardware  No other acute events noted overnight  Patient is currently afebrile and without any leukocytosis  Ancef was started  Again no cultures  Imaging personally reviewed of the patient's hardware currently in place as well as the site of exposed hardware  Patient's other vitals are stable  Creatinine again remains elevated from baseline  CBC largely unremarkable  Patient has not been seen by our service previously  No other significant culture data on chart review  Recent discharge summaries reviewed    Patient currently denies having any nausea, vomiting, chest pain or shortness of breath  He states that he is very frustrated by the lack of urgency used for his lower leg wound in attempting to reduce the risk of infection  The patient states to me that no one discussed with him the need for prolonged antibiotics and suppressive therapy in the risk of infection despite documentation indicating otherwise  His wife is present at bedside and again is also equally surprising by my visit  Ultimately discussed with both of them about the hardware essentially being compromised by being exposed  Again no overt signs of infection were noted in the OR and so no cultures were collected  We discussed potential skin silver colonizing the plate and the recommendation for IV antibiotics anywhere from 2-6 weeks followed by oral suppressive therapy lifelong  I let them know that I would touch base with primary service and with Orthopedics  Ultimately I do not know if this hardware can ever be removed  Patient reports to me that he is very pessimistic  He ultimately wanted to know the worse case scenario  I did let him know that it is a range from suppressive therapy to potential failure of suppressive therapy and the need for further surgery or very rarely loss of limb  His wife and he reports that they were told that his hardware can be removed in the future  I let them know again that I would reach out to Orthopedics to discuss  Additional questions were answered  We briefly also discussed PICC line and the need for follow-up  We are consulted at this time for further assistance and management  REVIEW OF SYSTEMS:  A complete 12 point system-based review of systems is negative other than that noted in the HPI      PAST MEDICAL HISTORY:  Past Medical History:   Diagnosis Date    Back pain     Diabetes mellitus ([facility] [address] )     DVT (deep venous thrombosis) ([facility] [address] )     Hard to intubate 10/18/2021    Glidescope with #3 blade and bougie used to pass 6 0 ETT  Hyperlipidemia     Hypertension      Past Surgical History:   Procedure Laterality Date    CATARACT EXTRACTION      COLONOSCOPY      2014    CYST REMOVAL      EXTERNAL FIXATOR APPLICATION Right     Procedure: Application External Fixation Device right lower extremity;  Surgeon: Alex Vargas MD;  Location: [facility];  Service: Orthopedics    HAND SURGERY      HEEL SPUR SURGERY      foot surgery    INCISION AND DRAINAGE OF WOUND Right 2022    Procedure: INCISION AND DRAINAGE (I&D) EXTREMITY;  Surgeon: Alex Vargas MD;  Location: [facility];  Service: Orthopedics    ORIF TIBIA FRACTURE Right 10/25/2021    Procedure: OPEN REDUCTION W/ INTERNAL FIXATION (ORIF) RIGHT PROXIMAL TIBIA SHAFT FRACTURE, REMOVAL OF EXTERNAL FIXATOR;  Surgeon: Alex Vargas MD;  Location: [facility];  Service: Orthopedics    MS OPEN RX FEMUR FX+INTRAMED JASMYNE Right 2022    Procedure: INSERTION NAIL IM FEMUR ANTEGRADE (TROCHANTERIC); Surgeon: Alex Vargas MD;  Location: [facility];  Service: Orthopedics       FAMILY HISTORY:  Non-contributory    SOCIAL HISTORY:  Social History   Social History     Substance and Sexual Activity   Alcohol Use Never    Comment: Per allscripts, occasional use     Social History     Substance and Sexual Activity   Drug Use Not Currently     Social History     Tobacco Use   Smoking Status Light Tobacco Smoker    Packs/day: 0 25   Smokeless Tobacco Never Used   Tobacco Comment    Per allscripts, current smoker       ALLERGIES:  Allergies   Allergen Reactions    Byetta 10 Mcg Pen [Exenatide] Swelling    Pollen Extract Myalgia       MEDICATIONS:  All current active medications have been reviewed      PHYSICAL EXAM:  Temp:  [97 5 °F (36 4 °C)-98 2 °F (36 8 °C)] 97 5 °F (36 4 °C)  HR:  [73-92] 80  Resp:  [17-18] 18  BP: (134-165)/(65-81) 138/65  SpO2:  [97 %-99 %] 97 %  Temp (24hrs), Av 9 °F (36 6 °C), Min:97 5 °F (36 4 °C), Max:98 2 °F (36 8 °C)  Current: Temperature: 97 5 °F (36 4 °C)    Intake/Output Summary (Last 24 hours) at 2/8/2022 1554  Last data filed at 2/8/2022 1300  Gross per 24 hour   Intake 600 ml   Output 320 ml   Net 280 ml       General Appearance:  Chronically ill-appearing, nontoxic, appears older than stated, patient has a very odd affect and will often sit with his eyes closed on evaluation  He also asks very eccentric/odd questions  Patient's wife seems unaware prior conversations with the patient  Head:  Normocephalic, without obvious abnormality, atraumatic   Eyes:  Conjunctiva pink and sclera anicteric, both eyes   Nose: Nares normal, mucosa normal, no drainage   Throat: Oropharynx moist without lesions   Neck: Supple, symmetrical, no adenopathy, no tenderness/mass/nodules   Back:   Unable to turn patient at this time to fully examine his back  , no reported wound   Lungs:   Clear to auscultation bilaterally, respirations unlabored on room air   Chest Wall:  No tenderness or deformity   Heart:  RRR; no murmur, rub or gallop appreciated   Abdomen:   Soft, non-tender, non-distended, positive bowel sounds    Extremities: No cyanosis, clubbing; nonpitting edema lower legs bilaterally   Skin: Surgical sites are currently dressed at this time  Bandages left intact  No spreading erythema outside the site  Lymph nodes: Cervical, supraclavicular nodes normal   Neurologic: Alert and oriented times 3, patient is able to wiggle his toes but has pain and so unable to ambulate or move his lower leg significantly  LABS, IMAGING, & OTHER STUDIES:  Lab Results:  I have personally reviewed pertinent labs    Results from last 7 days   Lab Units 02/08/22  0442 02/07/22  0542 02/06/22  0618   WBC Thousand/uL 8 52 7 61 9 13   HEMOGLOBIN g/dL 9 0* 10 5* 10 6*   PLATELETS Thousands/uL 322 261 287     Results from last 7 days   Lab Units 02/08/22 0442   POTASSIUM mmol/L 5 1   CHLORIDE mmol/L 100   CO2 mmol/L 22   BUN mg/dL 37*   CREATININE mg/dL 2 82* EGFR ml/min/1 73sq m 22   CALCIUM mg/dL 8 5           Imaging Studies:   I have personally reviewed pertinent imaging study reports and images in PACS  Other Studies:   I have personally reviewed pertinent reports

## 2022-02-08 NOTE — PROGRESS NOTES
[facility]  Progress Note Alma Parker 1955, 61 y o  male MRN: [id number]  Unit/Bed#: -02 Encounter: [id number]  Primary Care Provider: Maninder Sandoval DO   Date and time admitted to hospital: 2/3/2022  5:11 PM    * Closed displaced intertrochanteric fracture of right femur [facility])  Assessment & Plan  · Status post fall with CT pelvis revealing comminuted right femoral intertrochanteric fracture  · S/p ORIF of rt hip on 2/7, POD#1  · Orthopedic surgery following  · Continue to hold warfarin; will discuss with Orthopedic surgery when to restart  · INR 1 32  · PT/OT    ROSE (acute kidney injury) ([facility] [address] )  Assessment & Plan  · Creatinine at baseline around 1 3-1 5  · 2 82 today  · Renal ultrasound neg for hydronephrosis  · Nephrology following  · On IV fluids  · Lisinopril on hold  · Continue to monitor BMP  Type 2 diabetes mellitus with hyperglycemia [facility])  Assessment & Plan  Lab Results   Component Value Date    HGBA1C 8 2 (H) 10/16/2021       Recent Labs     02/08/22  0615 02/08/22  0727 02/08/22  1128 02/08/22  1357   POCGLU 425* 449* 367* 310*       Blood Sugar Average: Last 72 hrs:  (P) 088 [id number]  · Elevated blood sugars in the 400s  · Patient started on insulin drip this morning    · Accu-Cheks q 2 hours    Anemia  Assessment & Plan  · Hemoglobin stable, baseline around 7-8  · No active bleeding currently  · Continue to monitor    Hematoma  Assessment & Plan  · CT pelvis revealing small adjacent anterior deep soft tissue hematoma 2 4 cm x 3 4 cm near right femoral fracture  · No signs of ecchymosis currently, vital signs stable  · For now will hold home warfarin  · Hemoglobin stable    Pre-operative clearance  Assessment & Plan  Given patient's elevated HS troponin, and ST depressions in inferior leads after undergoing surgical procedure yesterday, further ischemic workup was planned for today; patient was scheduled to undergo nuclear stress testing today, however he is currently declining this intervention at the time of today's encounter  Repeat EKG reviewed this morning and unremarkable  DW cardiology, no further intervention planned as patient refusing nuclear stress  Ambulatory dysfunction  Assessment & Plan  · Status post fall in living room   · See plan under closed displaced intertrochanteric fracture of right femur        VTE Pharmacologic Prophylaxis:   Pharmacologic: holding off on warfarin for now due to hematoma  have reached out to ortho if it can be resumed  Mechanical VTE Prophylaxis in Place: No    Patient Centered Rounds: I have performed bedside rounds with nursing staff today  Discussions with Specialists or Other Care Team Provider: cardio/nephro    Education and Discussions with Family / Patient: dw patient    Time Spent for Care: 30 minutes  More than 50% of total time spent on counseling and coordination of care as described above  Current Length of Stay: 5 day(s)    Current Patient Status: Inpatient   Certification Statement: The patient will continue to require additional inpatient hospital stay due to on abx/insulin GTT    Discharge Plan: undetermined    Code Status: Level 1 - Full Code      Subjective:   Blood sugars in the 400s  Patient afebrile overnight  Pain adequately controlled  Has not ambulated since procedure yesterday  Refusing nuclear stress  Objective:     Vitals:   Temp (24hrs), Av °F (36 7 °C), Min:97 5 °F (36 4 °C), Max:98 2 °F (36 8 °C)    Temp:  [97 5 °F (36 4 °C)-98 2 °F (36 8 °C)] 97 5 °F (36 4 °C)  HR:  [73-92] 80  Resp:  [16-18] 18  BP: (134-165)/(65-91) 138/65  SpO2:  [97 %-99 %] 97 %  Body mass index is 29 42 kg/m²  Input and Output Summary (last 24 hours):        Intake/Output Summary (Last 24 hours) at 2022 1521  Last data filed at 2022 0836  Gross per 24 hour   Intake 240 ml   Output 320 ml   Net -80 ml       Physical Exam:     Physical Exam  Constitutional:       General: He is not in acute distress  Appearance: He is normal weight  He is not toxic-appearing  HENT:      Mouth/Throat:      Mouth: Mucous membranes are moist       Pharynx: Oropharynx is clear  Cardiovascular:      Rate and Rhythm: Normal rate and regular rhythm  Pulses: Normal pulses  Pulmonary:      Effort: Pulmonary effort is normal  No respiratory distress  Breath sounds: Normal breath sounds  No wheezing  Abdominal:      General: Abdomen is flat  Bowel sounds are normal    Neurological:      General: No focal deficit present  Mental Status: He is alert and oriented to person, place, and time  Additional Data:     Labs:    Results from last 7 days   Lab Units 02/08/22  0442   WBC Thousand/uL 8 52   HEMOGLOBIN g/dL 9 0*   HEMATOCRIT % 28 3*   PLATELETS Thousands/uL 322   NEUTROS PCT % 83*   LYMPHS PCT % 9*   MONOS PCT % 7   EOS PCT % 0     Results from last 7 days   Lab Units 02/08/22  0442   SODIUM mmol/L 132*   POTASSIUM mmol/L 5 1   CHLORIDE mmol/L 100   CO2 mmol/L 22   BUN mg/dL 37*   CREATININE mg/dL 2 82*   ANION GAP mmol/L 10   CALCIUM mg/dL 8 5   GLUCOSE RANDOM mg/dL 410*     Results from last 7 days   Lab Units 02/08/22  0442   INR  1 32*     Results from last 7 days   Lab Units 02/08/22  1357 02/08/22  1128 02/08/22  0727 02/08/22  0615 02/08/22  0435 02/08/22  0245 02/08/22  0009 02/07/22  2059 02/07/22  1720 02/07/22  1342 02/07/22  1001 02/07/22  0754   POC GLUCOSE mg/dl 310* 367* 449* 425* 427* 491* 415* 433* 318* 296* 214* 230*               * I Have Reviewed All Lab Data Listed Above  * Additional Pertinent Lab Tests Reviewed:  All Labs Within Last 24 Hours Reviewed    Recent Cultures (last 7 days):           Last 24 Hours Medication List:   Current Facility-Administered Medications   Medication Dose Route Frequency Provider Last Rate    acetaminophen  975 mg Oral Q8H [facility] Morgan Garcia PA-C      aluminum-magnesium hydroxide-simethicone  30 mL Oral Q6H PRN Bloom Big, PA-C      cefazolin  1,000 mg Intravenous Q8H Morgan Garcia, PA-C 1,000 mg (02/08/22 1150)    colchicine  0 6 mg Oral Daily Margarito Muck, PA-C      ferrous sulfate  325 mg Oral Daily With Breakfast Margarito Muck, PA-C      HYDROmorphone  0 2 mg Intravenous Q4H PRN Margarito Muck, PA-C      hydrOXYzine HCL  25 mg Oral Q6H PRN Margarito Muck, PA-C      insulin regular (HumuLIN R,NovoLIN R) infusion  0 3-21 Units/hr Intravenous Titrated Karol Nathan MD 11 Units/hr (02/08/22 1359)    lidocaine  1 patch Topical Daily PRN Margarito Muck, PA-C      naloxone  0 04 mg Intravenous Q1MIN PRN Margarito Muck, PA-C      oxyCODONE  5 mg Oral Q4H PRN Margarito Muck, PA-C      Or    oxyCODONE  7 5 mg Oral Q4H PRN Margarito Muck, PA-C      polyethylene glycol  17 g Oral Daily PRN Margarito Muck, PA-C      pravastatin  40 mg Oral Daily With Yee Care, PA-C      senna-docusate sodium  1 tablet Oral BID Margarito Muck, PA-C      sodium chloride  100 mL/hr Intravenous Continuous Margarito Muck, PA-C 100 mL/hr (02/07/22 9868)        Today, Patient Was Seen By: RACQUEL Montiel      ** Please Note: Dictation voice to text software may have been used in the creation of this document   **

## 2022-02-08 NOTE — ASSESSMENT & PLAN NOTE
· Status post fall with CT pelvis revealing comminuted right femoral intertrochanteric fracture  · S/p ORIF of rt hip on 2/7, POD#1  · Orthopedic surgery following  · Continue to hold warfarin; will discuss with Orthopedic surgery when to restart  · INR 1 32  · PT/OT

## 2022-02-09 PROBLEM — S81.801A LEG WOUND, RIGHT: Status: ACTIVE | Noted: 2022-02-09

## 2022-02-09 LAB
ANION GAP SERPL CALCULATED.3IONS-SCNC: 9 MMOL/L (ref 4–13)
BASOPHILS # BLD AUTO: 0.01 THOUSANDS/ΜL (ref 0–0.1)
BASOPHILS NFR BLD AUTO: 0 % (ref 0–1)
BUN SERPL-MCNC: 46 MG/DL (ref 5–25)
CALCIUM SERPL-MCNC: 8.1 MG/DL (ref 8.3–10.1)
CHLORIDE SERPL-SCNC: 105 MMOL/L (ref 100–108)
CO2 SERPL-SCNC: 23 MMOL/L (ref 21–32)
CREAT SERPL-MCNC: 2.43 MG/DL (ref 0.6–1.3)
EOSINOPHIL # BLD AUTO: 0.04 THOUSAND/ΜL (ref 0–0.61)
EOSINOPHIL NFR BLD AUTO: 0 % (ref 0–6)
ERYTHROCYTE [DISTWIDTH] IN BLOOD BY AUTOMATED COUNT: 16.1 % (ref 11.6–15.1)
EST. AVERAGE GLUCOSE BLD GHB EST-MCNC: 177 MG/DL
GFR SERPL CREATININE-BSD FRML MDRD: 27 ML/MIN/1.73SQ M
GLUCOSE SERPL-MCNC: 117 MG/DL (ref 65–140)
GLUCOSE SERPL-MCNC: 137 MG/DL (ref 65–140)
GLUCOSE SERPL-MCNC: 160 MG/DL (ref 65–140)
GLUCOSE SERPL-MCNC: 236 MG/DL (ref 65–140)
GLUCOSE SERPL-MCNC: 277 MG/DL (ref 65–140)
GLUCOSE SERPL-MCNC: 285 MG/DL (ref 65–140)
GLUCOSE SERPL-MCNC: 83 MG/DL (ref 65–140)
GLUCOSE SERPL-MCNC: 95 MG/DL (ref 65–140)
HBA1C MFR BLD: 7.8 %
HCT VFR BLD AUTO: 27.8 % (ref 36.5–49.3)
HGB BLD-MCNC: 8.6 G/DL (ref 12–17)
IMM GRANULOCYTES # BLD AUTO: 0.04 THOUSAND/UL (ref 0–0.2)
IMM GRANULOCYTES NFR BLD AUTO: 0 % (ref 0–2)
INR PPP: 1.29 (ref 0.84–1.19)
LYMPHOCYTES # BLD AUTO: 1.4 THOUSANDS/ΜL (ref 0.6–4.47)
LYMPHOCYTES NFR BLD AUTO: 13 % (ref 14–44)
MCH RBC QN AUTO: 27.7 PG (ref 26.8–34.3)
MCHC RBC AUTO-ENTMCNC: 30.9 G/DL (ref 31.4–37.4)
MCV RBC AUTO: 90 FL (ref 82–98)
MONOCYTES # BLD AUTO: 0.72 THOUSAND/ΜL (ref 0.17–1.22)
MONOCYTES NFR BLD AUTO: 7 % (ref 4–12)
NEUTROPHILS # BLD AUTO: 8.36 THOUSANDS/ΜL (ref 1.85–7.62)
NEUTS SEG NFR BLD AUTO: 80 % (ref 43–75)
NRBC BLD AUTO-RTO: 0 /100 WBCS
PLATELET # BLD AUTO: 343 THOUSANDS/UL (ref 149–390)
PMV BLD AUTO: 10 FL (ref 8.9–12.7)
POTASSIUM SERPL-SCNC: 3.8 MMOL/L (ref 3.5–5.3)
PROTHROMBIN TIME: 15.6 SECONDS (ref 11.6–14.5)
RBC # BLD AUTO: 3.1 MILLION/UL (ref 3.88–5.62)
SODIUM SERPL-SCNC: 137 MMOL/L (ref 136–145)
WBC # BLD AUTO: 10.57 THOUSAND/UL (ref 4.31–10.16)

## 2022-02-09 PROCEDURE — 02HV33Z INSERTION OF INFUSION DEVICE INTO SUPERIOR VENA CAVA, PERCUTANEOUS APPROACH: ICD-10-PCS | Performed by: FAMILY MEDICINE

## 2022-02-09 PROCEDURE — 82948 REAGENT STRIP/BLOOD GLUCOSE: CPT

## 2022-02-09 PROCEDURE — 99232 SBSQ HOSP IP/OBS MODERATE 35: CPT | Performed by: INTERNAL MEDICINE

## 2022-02-09 PROCEDURE — 99024 POSTOP FOLLOW-UP VISIT: CPT | Performed by: PHYSICIAN ASSISTANT

## 2022-02-09 PROCEDURE — 85025 COMPLETE CBC W/AUTO DIFF WBC: CPT | Performed by: FAMILY MEDICINE

## 2022-02-09 PROCEDURE — 36569 INSJ PICC 5 YR+ W/O IMAGING: CPT

## 2022-02-09 PROCEDURE — C1751 CATH, INF, PER/CENT/MIDLINE: HCPCS

## 2022-02-09 PROCEDURE — 85610 PROTHROMBIN TIME: CPT | Performed by: FAMILY MEDICINE

## 2022-02-09 PROCEDURE — 99233 SBSQ HOSP IP/OBS HIGH 50: CPT | Performed by: FAMILY MEDICINE

## 2022-02-09 PROCEDURE — 80048 BASIC METABOLIC PNL TOTAL CA: CPT | Performed by: FAMILY MEDICINE

## 2022-02-09 PROCEDURE — 3051F HG A1C>EQUAL 7.0%<8.0%: CPT | Performed by: FAMILY MEDICINE

## 2022-02-09 RX ORDER — INSULIN GLARGINE 100 [IU]/ML
25 INJECTION, SOLUTION SUBCUTANEOUS EVERY MORNING
Status: DISCONTINUED | OUTPATIENT
Start: 2022-02-09 | End: 2022-02-09

## 2022-02-09 RX ORDER — CEFAZOLIN SODIUM 2 G/50ML
2000 SOLUTION INTRAVENOUS EVERY 8 HOURS
Status: DISCONTINUED | OUTPATIENT
Start: 2022-02-09 | End: 2022-02-12 | Stop reason: HOSPADM

## 2022-02-09 RX ORDER — INSULIN GLARGINE 100 [IU]/ML
25 INJECTION, SOLUTION SUBCUTANEOUS
Status: DISCONTINUED | OUTPATIENT
Start: 2022-02-09 | End: 2022-02-10

## 2022-02-09 RX ORDER — WARFARIN SODIUM 3 MG/1
3 TABLET ORAL
Status: DISCONTINUED | OUTPATIENT
Start: 2022-02-09 | End: 2022-02-12 | Stop reason: HOSPADM

## 2022-02-09 RX ADMIN — ACETAMINOPHEN 975 MG: 325 TABLET, FILM COATED ORAL at 05:54

## 2022-02-09 RX ADMIN — OXYCODONE HYDROCHLORIDE 5 MG: 5 TABLET ORAL at 18:36

## 2022-02-09 RX ADMIN — WARFARIN SODIUM 3 MG: 3 TABLET ORAL at 18:34

## 2022-02-09 RX ADMIN — OXYCODONE HYDROCHLORIDE 7.5 MG: 5 TABLET ORAL at 22:46

## 2022-02-09 RX ADMIN — SODIUM CHLORIDE 100 ML/HR: 9 INJECTION, SOLUTION INTRAVENOUS at 06:02

## 2022-02-09 RX ADMIN — CEFAZOLIN SODIUM 2000 MG: 2 SOLUTION INTRAVENOUS at 21:18

## 2022-02-09 RX ADMIN — OXYCODONE HYDROCHLORIDE 5 MG: 5 TABLET ORAL at 10:07

## 2022-02-09 RX ADMIN — CEFAZOLIN SODIUM 2000 MG: 2 SOLUTION INTRAVENOUS at 06:01

## 2022-02-09 RX ADMIN — INSULIN LISPRO 3 UNITS: 100 INJECTION, SOLUTION INTRAVENOUS; SUBCUTANEOUS at 16:49

## 2022-02-09 RX ADMIN — CEFAZOLIN SODIUM 2000 MG: 2 SOLUTION INTRAVENOUS at 13:56

## 2022-02-09 RX ADMIN — ACETAMINOPHEN 975 MG: 325 TABLET, FILM COATED ORAL at 21:05

## 2022-02-09 RX ADMIN — ACETAMINOPHEN 975 MG: 325 TABLET, FILM COATED ORAL at 13:40

## 2022-02-09 RX ADMIN — FERROUS SULFATE TAB 325 MG (65 MG ELEMENTAL FE) 325 MG: 325 (65 FE) TAB at 10:06

## 2022-02-09 RX ADMIN — COLCHICINE 0.6 MG: 0.6 TABLET, FILM COATED ORAL at 10:06

## 2022-02-09 RX ADMIN — INSULIN GLARGINE 25 UNITS: 100 INJECTION, SOLUTION SUBCUTANEOUS at 21:05

## 2022-02-09 RX ADMIN — INSULIN LISPRO 2 UNITS: 100 INJECTION, SOLUTION INTRAVENOUS; SUBCUTANEOUS at 11:47

## 2022-02-09 RX ADMIN — PRAVASTATIN SODIUM 40 MG: 40 TABLET ORAL at 16:49

## 2022-02-09 NOTE — ASSESSMENT & PLAN NOTE
· Status post fall with CT pelvis revealing comminuted right femoral intertrochanteric fracture  · S/p ORIF of rt hip on 2/7, POD#2  · Orthopedic surgery following  · Resumed warfarin after discussion with ortho  · INR 1 29 today  · PT/OT recommend rehab however patient adamantly refuses   Plan for home with [facility]

## 2022-02-09 NOTE — PLAN OF CARE
Problem: MUSCULOSKELETAL - ADULT  Goal: Maintain or return mobility to safest level of function  Description: INTERVENTIONS:  - Assess patient's ability to carry out ADLs; assess patient's baseline for ADL function and identify physical deficits which impact ability to perform ADLs (bathing, care of mouth/teeth, toileting, grooming, dressing, etc )  - Assess/evaluate cause of self-care deficits   - Assess range of motion  - Assess patient's mobility  - Assess patient's need for assistive devices and provide as appropriate  - Encourage maximum independence but intervene and supervise when necessary  - Involve family in performance of ADLs  - Assess for home care needs following discharge   - Consider OT consult to assist with ADL evaluation and planning for discharge  - Provide patient education as appropriate  Outcome: Progressing  Goal: Maintain proper alignment of affected body part  Description: INTERVENTIONS:  - Support, maintain and protect limb and body alignment  - Provide patient/ family with appropriate education  Outcome: Progressing     Problem: INFECTION - ADULT  Goal: Absence or prevention of progression during hospitalization  Description: INTERVENTIONS:  - Assess and monitor for signs and symptoms of infection  - Monitor lab/diagnostic results  - Monitor all insertion sites, i e  indwelling lines, tubes, and drains  - Monitor endotracheal if appropriate and nasal secretions for changes in amount and color  - [place] appropriate cooling/warming therapies per order  - Administer medications as ordered  - Instruct and encourage patient and family to use good hand hygiene technique  - Identify and instruct in appropriate isolation precautions for identified infection/condition  Outcome: Progressing  Goal: Absence of fever/infection during neutropenic period  Description: INTERVENTIONS:  - Monitor WBC    Outcome: Progressing     Problem: PAIN - ADULT  Goal: Verbalizes/displays adequate comfort level or baseline comfort level  Description: Interventions:  - Encourage patient to monitor pain and request assistance  - Assess pain using appropriate pain scale  - Administer analgesics based on type and severity of pain and evaluate response  - Implement non-pharmacological measures as appropriate and evaluate response  - Consider cultural and social influences on pain and pain management  - Notify physician/advanced practitioner if interventions unsuccessful or patient reports new pain  Outcome: Progressing     Problem: SAFETY ADULT  Goal: Maintain or return to baseline ADL function  Description: INTERVENTIONS:  -  Assess patient's ability to carry out ADLs; assess patient's baseline for ADL function and identify physical deficits which impact ability to perform ADLs (bathing, care of mouth/teeth, toileting, grooming, dressing, etc )  - Assess/evaluate cause of self-care deficits   - Assess range of motion  - Assess patient's mobility; develop plan if impaired  - Assess patient's need for assistive devices and provide as appropriate  - Encourage maximum independence but intervene and supervise when necessary  - Involve family in performance of ADLs  - Assess for home care needs following discharge   - Consider OT consult to assist with ADL evaluation and planning for discharge  - Provide patient education as appropriate  Outcome: Progressing  Goal: Maintains/Returns to pre admission functional level  Description: INTERVENTIONS:  - Perform BMAT or MOVE assessment daily    - Set and communicate daily mobility goal to care team and patient/family/caregiver     - Collaborate with rehabilitation services on mobility goals if consulted  - Out of bed for toileting  - Record patient progress and toleration of activity level   Outcome: Progressing  Goal: Patient will remain free of falls  Description: INTERVENTIONS:  - Educate patient/family on patient safety including physical limitations  - Instruct patient to call for assistance with activity   - Consult OT/PT to assist with strengthening/mobility   - Keep Call bell within reach  - Keep bed low and locked with side rails adjusted as appropriate  - Keep care items and personal belongings within reach  - Initiate and maintain comfort rounds  - Make Fall Risk Sign visible to staff  - Obtain necessary fall risk management equipment:   - Apply yellow socks and bracelet for high fall risk patients  - Consider moving patient to room near nurses station  Outcome: Progressing     Problem: DISCHARGE PLANNING  Goal: Discharge to home or other facility with appropriate resources  Description: INTERVENTIONS:  - Identify barriers to discharge w/patient and caregiver  - Arrange for needed discharge resources and transportation as appropriate  - Identify discharge learning needs (meds, wound care, etc )  - Arrange for interpretive services to assist at discharge as needed  - Refer to Case Management Department for coordinating discharge planning if the patient needs post-hospital services based on physician/advanced practitioner order or complex needs related to functional status, cognitive ability, or social support system  Outcome: Progressing     Problem: Knowledge Deficit  Goal: Patient/family/caregiver demonstrates understanding of disease process, treatment plan, medications, and discharge instructions  Description: Complete learning assessment and assess knowledge base    Interventions:  - Provide teaching at level of understanding  - Provide teaching via preferred learning methods  Outcome: Progressing     Problem: MOBILITY - ADULT  Goal: Maintain or return to baseline ADL function  Description: INTERVENTIONS:  -  Assess patient's ability to carry out ADLs; assess patient's baseline for ADL function and identify physical deficits which impact ability to perform ADLs (bathing, care of mouth/teeth, toileting, grooming, dressing, etc )  - Assess/evaluate cause of self-care deficits   - Assess range of motion  - Assess patient's mobility; develop plan if impaired  - Assess patient's need for assistive devices and provide as appropriate  - Encourage maximum independence but intervene and supervise when necessary  - Involve family in performance of ADLs  - Assess for home care needs following discharge   - Consider OT consult to assist with ADL evaluation and planning for discharge  - Provide patient education as appropriate  Outcome: Progressing  Goal: Maintains/Returns to pre admission functional level  Description: INTERVENTIONS:  - Perform BMAT or MOVE assessment daily    - Set and communicate daily mobility goal to care team and patient/family/caregiver  - Collaborate with rehabilitation services on mobility goals if consulted  - Reposition patient every 2 hours    - Out of bed for toileting  - Record patient progress and toleration of activity level   Outcome: Progressing

## 2022-02-09 NOTE — ASSESSMENT & PLAN NOTE
· Right tibial wound with exposed/compromised hardware  · S/p fall, noted to have a lower leg wound which on debridement in the OR had exposed hardware present from a previous fracture repair  · ID on board  · continue on Ancef, dose adjusted to 2 g every 12 hours ( Tentative plan for 6 weeks of IV antibiotic therapy)  · PICC line placement today   Consent obtained

## 2022-02-09 NOTE — CASE MANAGEMENT
Case Management Discharge Planning Note    Patient name Eloisa [facility]  Location [address] 304/-05 MRN [id number]  : 1955 Date 2022       Current Admission Date: 2/3/2022  Current Admission Diagnosis:Closed displaced intertrochanteric fracture of right femur [facility])   Patient Active Problem List    Diagnosis Date Noted    Pre-operative clearance 2022    Closed displaced intertrochanteric fracture of right femur ([facility] [address] ) 2022    Hematoma 2022    Acute idiopathic gout of multiple sites 2022    Pain and swelling of right lower leg 2021    Ambulatory dysfunction 2021    Displaced bicondylar fracture of right tibia, subsequent encounter for closed fracture with routine healing 2021    Right forearm superficial thrombophlebitis 2021    Anemia 10/26/2021    Kyphosis of cervicothoracic region 10/25/2021    Pseudogout of knee, left 10/17/2021    Pain and swelling of left knee 10/16/2021    ROSE (acute kidney injury) ([facility] [address] ) 10/13/2021    Closed fracture of proximal end of right tibia 10/13/2021    Type 2 diabetes mellitus with hyperglycemia ([address] ) 10/05/2021    Class 2 obesity in adult 2021    Type 2 diabetes mellitus with diabetic peripheral angiopathy without gangrene ([facility] [address] ) 10/08/2019    Other insomnia 2017    Type 2 diabetes mellitus with diabetic neuropathy, without long-term current use of insulin ([facility] [address] ) 2017    Lumbar degenerative disc disease 10/21/2014    Acute embolism and thrombosis of unspecified deep veins of unspecified lower extremity ([address] ) 10/20/2014    Essential hypertension 10/20/2014    Mixed hyperlipidemia 10/20/2014    Peripheral vascular disease ([facility] [address] ) 10/20/2014      LOS (days): 6  Geometric Mean LOS (GMLOS) (days):   Days to GMLOS:     OBJECTIVE:  Risk of Unplanned Readmission Score: 26         Current admission status: Inpatient   Preferred Pharmacy:   [facility]/pharmacy #[id number]- [place], [address] [place] [address] [address]  Phone: [phone] Fax: [phone]    [address] [facility] - [place], [facility] [address] [place] [address]  Phone: [phone] Fax: [phone]   [address] [place], [place] - [address] [place] [address]   [address] [facility] [address]  Phone: [phone] Fax: [phone]    [address], [address]  [address]  [address] [facility] [address]  Phone: [phone] Fax: [phone]    Primary Care Provider: Adry Houser DO    Primary Insurance: BLUE CROSS  Secondary Insurance: [facility] [employer] REP    DISCHARGE DETAILS:    Additional Comments: Patient reviewed with Dr Lu Castro who informed that pt will need 6 weeks of IV ABX, awaiting final script from ID  Pt currrently on IV Anced 2g Q12 hours at 7:00 AM and 7:00 PM  CM informed [facility] of same via [facility] and inquired about availability within the next 24-48 hours  CM sent referral to [facility] as well  Will upload script to referrals once available  CM department to follow

## 2022-02-09 NOTE — CASE MANAGEMENT
Case Management Discharge Planning Note    Patient name Kristopher Navas  Location [address] 304/-09 MRN [id number]  : 1955 Date 2022       Current Admission Date: 2/3/2022  Current Admission Diagnosis:Closed displaced intertrochanteric fracture of right femur [facility])   Patient Active Problem List    Diagnosis Date Noted    Pre-operative clearance 2022    Closed displaced intertrochanteric fracture of right femur ([address] ) 2022    Hematoma 2022    Acute idiopathic gout of multiple sites 2022    Pain and swelling of right lower leg 2021    Ambulatory dysfunction 2021    Displaced bicondylar fracture of right tibia, subsequent encounter for closed fracture with routine healing 2021    Right forearm superficial thrombophlebitis 2021    Anemia 10/26/2021    Kyphosis of cervicothoracic region 10/25/2021    Pseudogout of knee, left 10/17/2021    Pain and swelling of left knee 10/16/2021    ROSE (acute kidney injury) ([facility] [address] ) 10/13/2021    Closed fracture of proximal end of right tibia 10/13/2021    Type 2 diabetes mellitus with hyperglycemia ([address] ) 10/05/2021    Class 2 obesity in adult 2021    Type 2 diabetes mellitus with diabetic peripheral angiopathy without gangrene ([facility] [address] ) 10/08/2019    Other insomnia 2017    Type 2 diabetes mellitus with diabetic neuropathy, without long-term current use of insulin ([address] ) 2017    Lumbar degenerative disc disease 10/21/2014    Acute embolism and thrombosis of unspecified deep veins of unspecified lower extremity ([address] ) 10/20/2014    Essential hypertension 10/20/2014    Mixed hyperlipidemia 10/20/2014    Peripheral vascular disease ([address] ) 10/20/2014      LOS (days): 6  Geometric Mean LOS (GMLOS) (days):   Days to GMLOS:     OBJECTIVE:  Risk of Unplanned Readmission Score: 26         Current admission status: Inpatient   Preferred Pharmacy:   [facility]/pharmacy #[id number]- [place], [address] [place] [address] [address] [address]  Phone: [phone] Fax: [phone] Mail Delivery - [place], [facility] [address] [address]  Phone: [phone] Fax: [phone]  [address] [place], [place] - [address] [place] [address]   [address] [facility] [address]  Phone: [phone] Fax: [phone] [facility], [address] [facility] [address]  [address]  [address] [facility] [address]  Phone: [phone] Fax: [phone]    Primary Care Provider: Margoth De La Cruz DO    Primary Insurance: BLUE CROSS  Secondary Insurance: [facility] [employer] REP    DISCHARGE DETAILS:    Additional Comments: CM met with pt at bedside to discuss STR recommendation  Pt adamantly declining, reporting that he looked up reviews and feels local facilities are unsafe  CM offered to expand STR search however pt declined, reports that he is not willing to go further for STR  Plan is for discharge home with [facility] once medically stable  Pt reports having a RW/BSC/WC and reports no need for additional equipment

## 2022-02-09 NOTE — PROGRESS NOTES
Progress Note - Orthopedics   Lysle Furnace 61 y o  male MRN: [id number]  Unit/Bed#: -02      Subjective:    66 y o male POD#2 right TFNA, I&D left lateral tibial plateau  Patient states that his legs doing ok this morning  Patient states he has minimal pain in his his any currently  Patient denies any fevers any chills  Patient denies any shortness of breath chest tightness chest pain  Patient denies any dizziness lightheadedness  Patient denies any numbness or tingling in his leg  Patient offers no other complaints at this time      Labs:  0   Lab Value Date/Time    HCT 27 8 (L) 02/09/2022 0513    HCT 28 3 (L) 02/08/2022 0442    HCT 32 7 (L) 02/07/2022 0542    HCT 38 4 07/13/2015 1448    HCT 38 6 10/20/2014 1132    HGB 8 6 (L) 02/09/2022 0513    HGB 9 0 (L) 02/08/2022 0442    HGB 10 5 (L) 02/07/2022 0542    HGB 13 1 07/13/2015 1448    HGB 13 1 10/20/2014 1132    INR 1 29 (H) 02/09/2022 0513    INR 2 94 (H) 12/11/2015 1543    WBC 10 57 (H) 02/09/2022 0513    WBC 8 52 02/08/2022 0442    WBC 7 61 02/07/2022 0542    WBC 7 7 07/13/2015 1448    WBC 8 30 10/20/2014 1132    ESR 58 (H) 05/20/2016 1207       Meds:    Current Facility-Administered Medications:     acetaminophen (TYLENOL) tablet 975 mg, 975 mg, Oral, Q8H [facility], Morgan Garcia PA-C, 975 mg at 02/09/22 0554    aluminum-magnesium hydroxide-simethicone (MYLANTA) oral suspension 30 mL, 30 mL, Oral, Q6H PRN, Derrick Hoffman PA-C    ceFAZolin (ANCEF) IVPB (premix in dextrose) 2,000 mg 50 mL, 2,000 mg, Intravenous, Q12H, Coni Cervantes MD, Last Rate: 100 mL/hr at 02/09/22 0601, 2,000 mg at 02/09/22 0601    colchicine (COLCRYS) tablet 0 6 mg, 0 6 mg, Oral, Daily, Derrick Hoffman PA-C, 0 6 mg at 02/09/22 1006    ferrous sulfate tablet 325 mg, 325 mg, Oral, Daily With Breakfast, Morgan Garcia PA-C, 325 mg at 02/09/22 1006    HYDROmorphone HCl (DILAUDID) injection 0 2 mg, 0 2 mg, Intravenous, Q4H PRN, Morgan Garcia PA-C, 0 2 mg at 02/06/22 1507    hydrOXYzine HCL (ATARAX) tablet 25 mg, 25 mg, Oral, Q6H PRN, Rogelio Velarde PA-C    insulin glargine (LANTUS) subcutaneous injection 25 Units 0 25 mL, 25 Units, Subcutaneous, HS, Soraya Hayward MD    insulin lispro (HumaLOG) 100 units/mL subcutaneous injection 1-5 Units, 1-5 Units, Subcutaneous, TID AC, 2 Units at 02/09/22 1147 **AND** Fingerstick Glucose (POCT), , , TID AC, Soraya Hayward MD    lidocaine (LIDODERM) 5 % patch 1 patch, 1 patch, Topical, Daily PRN, Rogelio Velarde PA-C, 1 patch at 02/04/22 1204    naloxone (NARCAN) 0 04 mg/mL syringe 0 04 mg, 0 04 mg, Intravenous, Q1MIN PRN, Rogelio Velarde PA-C    oxyCODONE (ROXICODONE) IR tablet 5 mg, 5 mg, Oral, Q4H PRN, 5 mg at 02/09/22 1007 **OR** oxyCODONE (ROXICODONE) IR tablet 7 5 mg, 7 5 mg, Oral, Q4H PRN, Rogelio Velarde PA-C, 7 5 mg at 02/06/22 1104    polyethylene glycol (MIRALAX) packet 17 g, 17 g, Oral, Daily PRN, PRITI Trent-C, 17 g at 02/06/22 1108    pravastatin (PRAVACHOL) tablet 40 mg, 40 mg, Oral, Daily With Greg Willis PA-C, 40 mg at 02/08/22 1710    senna-docusate sodium (SENOKOT S) 8 6-50 mg per tablet 1 tablet, 1 tablet, Oral, BID, Rogelio Velarde PA-C, 1 tablet at 02/08/22 0930    sodium chloride 0 9 % infusion, 100 mL/hr, Intravenous, Continuous, Rogelio Velarde PA-C, Last Rate: 100 mL/hr at 02/09/22 0602, 100 mL/hr at 02/09/22 0602    warfarin (COUMADIN) tablet 3 mg, 3 mg, Oral, Daily (warfarin), Soraya Hayward MD    Blood Culture:   No results found for: BLOODCX    Wound Culture:   No results found for: WOUNDCULT    Ins and Outs:  I/O last 24 hours: In: 7743 [P O :600; I V :950]  Out: 1390 [Urine:1390]          Physical:  Vitals:    02/09/22 1100   BP: 149/70   Pulse: 80   Resp:    Temp: 97 8 °F (36 6 °C)   SpO2: 97%     Musculoskeletal: right Lower Extremity  · Patient resting comfortably in hospital bed in no acute distress    · Skin  :  Extremity appears well-perfused overall  · Dressing  :  Dressing is clean dry and intact with no visible soilage present   · TTP  mild tenderness palpation noted over incision sites  No other bony or soft tissue tenderness palpation noted at this time  · SILT s/s/sp/dp/t  +fhl/ehl, +ankle dorsi/plantar flexion  2+ DP pulse      Assessment:    66 y o male POD#2 right TFNA, I&D left lateral tibial plateau  Plan:  · WBAT right lower extremity    · PT/OT for ambulation assistance, gait training  · Pain control per primary team  · DVT ppx per primary team  · Dispo: Ortho will follow  Continue weight-bearing as tolerated right lower extremity  PT for ambulation symptoms, gait training  Patient will likely need long-term antibiotics for open wound right lower extremity  Neris Escobar PA-C                Portions of the record may have been created with voice recognition software  Occasional wrong word or "sound a like" substitutions may have occurred due to the inherent limitations of voice recognition software  Read the chart carefully and recognize, using context, where substitutions have occurred

## 2022-02-09 NOTE — ASSESSMENT & PLAN NOTE
· Given patient's elevated troponin, and ST depressions in inferior leads after undergoing surgical procedure, further ischemic workup (nuclear stress testing) was planned for 2/8 however he then declined this intervention  · Repeat EKG reviewed on 2/8 and unremarkable  · DW cardiology, no further intervention planned as patient refusing nuclear stress

## 2022-02-09 NOTE — PROGRESS NOTES
[facility]  Progress Note Preston Sigala 1955, 61 y o  male MRN: [id number]  Unit/Bed#: -02 Encounter: [id number]  Primary Care Provider: Corey Burciaga DO   Date and time admitted to hospital: 2/3/2022  5:11 PM    * Closed displaced intertrochanteric fracture of right femur [facility])  Assessment & Plan  · Status post fall with CT pelvis revealing comminuted right femoral intertrochanteric fracture  · S/p ORIF of rt hip on 2/7, POD#2  · Orthopedic surgery following  · Resumed warfarin after discussion with ortho  · INR 1 29 today  · PT/OT recommend rehab however patient adamantly refuses  Plan for home with [facility]NA    Leg wound, right  Assessment & Plan  · Right tibial wound with exposed/compromised hardware  · S/p fall, noted to have a lower leg wound which on debridement in the OR had exposed hardware present from a previous fracture repair  · ID on board  · continue on Ancef, dose adjusted to 2 g every 12 hours ( Tentative plan for 6 weeks of IV antibiotic therapy)  · PICC line placement today  Consent obtained    ROSE (acute kidney injury) ([facility] [address] )  Assessment & Plan  · Creatinine at baseline around 1 3-1 5  · 2 43 today  · Renal ultrasound neg for hydronephrosis  · Nephrology following  · On IV fluids  · Lisinopril on hold  · Continue to monitor BMP  Type 2 diabetes mellitus with hyperglycemia [facility])  Assessment & Plan  Lab Results   Component Value Date    HGBA1C 7 8 (H) 02/07/2022       Recent Labs     02/09/22  0005 02/09/22  0246 02/09/22  0512 02/09/22  0742   POCGLU 137 160* 117 83       Blood Sugar Average: Last 72 hrs:  (P) 272 48     · Blood sugars much better controlled this a m  [facility] · Transition from insulin drip to Lantus 25 units q h s  · Continue with sliding scale coverage with a c  HS checks  · Hold metformin during hospitalization  · Consistent carb diet        Anemia  Assessment & Plan  · Hemoglobin stable at 8 6 this a m   · On ferrous sulfate 325 mg daily  · Monitor H&H closely  · No active bleeding noted  Hematoma  Assessment & Plan  · CT pelvis revealing small adjacent anterior deep soft tissue hematoma 2 4 cm x 3 4 cm near right femoral fracture  · No signs of ecchymosis currently, vital signs stable  · Resumed warfarin  · Monitor H&H  Pre-operative clearance  Assessment & Plan  · Given patient's elevated troponin, and ST depressions in inferior leads after undergoing surgical procedure, further ischemic workup (nuclear stress testing) was planned for  however he then declined this intervention  · Repeat EKG reviewed on  and unremarkable  · DW cardiology, no further intervention planned as patient refusing nuclear stress  VTE Pharmacologic Prophylaxis:   Pharmacologic: Warfarin (Coumadin)  Mechanical VTE Prophylaxis in Place: No    Patient Centered Rounds: I have performed bedside rounds with nursing staff today  Discussions with Specialists or Other Care Team Provider: karlo    Education and Discussions with Family / Patient: dw patient    Time Spent for Care: 30 minutes  More than 50% of total time spent on counseling and coordination of care as described above  Current Length of Stay: 6 day(s)    Current Patient Status: Inpatient   Certification Statement: The patient will continue to require additional inpatient hospital stay due to needs PICC/iv abx    Discharge Plan: 24-48hrs    Code Status: Level 1 - Full Code      Subjective: This am BS better controlled  range  Reports feeling "cold"  Afebrile/ no chills  He denies any c/o chest pain/ sob/nausea/vomiting  Refusing rehab    Objective:     Vitals:   Temp (24hrs), Av 8 °F (36 6 °C), Min:97 6 °F (36 4 °C), Max:97 9 °F (36 6 °C)    Temp:  [97 6 °F (36 4 °C)-97 9 °F (36 6 °C)] 97 6 °F (36 4 °C)  HR:  [70-82] 70  Resp:  [18-19] 18  BP: (134-147)/(65-78) 143/67  SpO2:  [96 %-97 %] 96 %  Body mass index is 29 42 kg/m²       Input and Output Summary (last 24 hours): Intake/Output Summary (Last 24 hours) at 2/9/2022 1111  Last data filed at 2/9/2022 0654  Gross per 24 hour   Intake 1310 ml   Output 1070 ml   Net 240 ml       Physical Exam:     Physical Exam  Constitutional:       General: He is not in acute distress  Appearance: He is obese  He is not toxic-appearing  HENT:      Mouth/Throat:      Mouth: Mucous membranes are moist       Pharynx: Oropharynx is clear  Cardiovascular:      Rate and Rhythm: Normal rate and regular rhythm  Pulses: Normal pulses  Pulmonary:      Effort: Pulmonary effort is normal  No respiratory distress  Breath sounds: Normal breath sounds  No wheezing  Abdominal:      General: Abdomen is flat  Bowel sounds are normal       Palpations: Abdomen is soft  Neurological:      General: No focal deficit present  Mental Status: He is alert and oriented to person, place, and time  Additional Data:     Labs:    Results from last 7 days   Lab Units 02/09/22  0513   WBC Thousand/uL 10 57*   HEMOGLOBIN g/dL 8 6*   HEMATOCRIT % 27 8*   PLATELETS Thousands/uL 343   NEUTROS PCT % 80*   LYMPHS PCT % 13*   MONOS PCT % 7   EOS PCT % 0     Results from last 7 days   Lab Units 02/09/22  0513   SODIUM mmol/L 137   POTASSIUM mmol/L 3 8   CHLORIDE mmol/L 105   CO2 mmol/L 23   BUN mg/dL 46*   CREATININE mg/dL 2 43*   ANION GAP mmol/L 9   CALCIUM mg/dL 8 1*   GLUCOSE RANDOM mg/dL 95     Results from last 7 days   Lab Units 02/09/22  0513   INR  1 29*     Results from last 7 days   Lab Units 02/09/22  0742 02/09/22  0512 02/09/22  0246 02/09/22  0005 02/08/22  2231 02/08/22  2036 02/08/22  1825 02/08/22  1540 02/08/22  1357 02/08/22  1128 02/08/22  0727 02/08/22  0615   POC GLUCOSE mg/dl 83 117 160* 137 127 180* 197* 300* 310* 367* 449* 425*     Results from last 7 days   Lab Units 02/07/22  0542   HEMOGLOBIN A1C % 7 8*           * I Have Reviewed All Lab Data Listed Above  * Additional Pertinent Lab Tests Reviewed:  All Labs Within Last 24 Hours Reviewed    Recent Cultures (last 7 days):           Last 24 Hours Medication List:   Current Facility-Administered Medications   Medication Dose Route Frequency Provider Last Rate    acetaminophen  975 mg Oral Q8H Florentino Favre, PA-C      aluminum-magnesium hydroxide-simethicone  30 mL Oral Q6H PRN Rondal Kanner, PA-C      cefazolin  2,000 mg Intravenous Q12H Deepti Stone MD 2,000 mg (02/09/22 0601)    colchicine  0 6 mg Oral Daily Rondal Kanner, PA-C      ferrous sulfate  325 mg Oral Daily With Breakfast Rondal Kanner, PA-C      HYDROmorphone  0 2 mg Intravenous Q4H PRN Rondal Kanner, PA-C      hydrOXYzine HCL  25 mg Oral Q6H PRN Rondal Kanner, PA-C      insulin glargine  25 Units Subcutaneous HS Vee Alcala MD      insulin lispro  1-5 Units Subcutaneous TID AC Vee Alcala MD      lidocaine  1 patch Topical Daily PRN Rondal Kanner, PA-C      naloxone  0 04 mg Intravenous Q1MIN PRN Rondal Kanner, PA-C      oxyCODONE  5 mg Oral Q4H PRN Rondal Kanner, PA-C      Or    oxyCODONE  7 5 mg Oral Q4H PRN Rondal Kanner, PA-C      polyethylene glycol  17 g Oral Daily PRN Rondal Kanner, PA-C      pravastatin  40 mg Oral Daily With Short FuzeJOSEF      senna-docusate sodium  1 tablet Oral BID Rondal Kanner, PA-C      sodium chloride  100 mL/hr Intravenous Continuous Rondal Kanner, PA-C 100 mL/hr (02/09/22 0602)    warfarin  3 mg Oral Daily (warfarin) Vee Alcala MD          Today, Patient Was Seen By: RACQUEL Payne      ** Please Note: Dictation voice to text software may have been used in the creation of this document   **

## 2022-02-09 NOTE — PROGRESS NOTES
Progress Note - Infectious Disease   Inder Villasenor 61 y o  male MRN: [id number]  Unit/Bed#: -02 Encounter: [id number]      Impression/Plan:  1  Lower leg wound with exposed/compromised hardware  Patient presented initially after a fall and was found to have right femur fracture  He was also noted to have a lower leg wound which on debridement in the OR had exposed hardware present from a previous fracture repair  No gross purulence in the OR  No cultures collected  It is likely that the hardware is now compromised by skin silver  No drug allergies noted  Kidney dysfunction noted, improved today  Continue Ancef, dose increased to 2 g every 8 hours today  Plan for total 6 weeks of therapy through 3/20  Weekly labs with CBCD and creatinine  Maintain current PICC line  Plan for follow-up in the ID office 2 weeks after discharge  Will transition to oral suppressive therapy with Keflex thereafter  No plans for hardware removal per Orthopedics  ID office notified of follow-up needs  Script provided to Case Management today  Continue to trend fever curve/WBC while admitted  Discussed risks associated with suppressive therapy yesterday  Discussed side effects/risk associated with this specific antibiotic yesterday  Discussed potential for relapse in detail yesterday  Additional care/discharge as per primary     2  Closed right femur fracture after fall  Patient is status post intramedullary nail of an intratrochanteric fracture after a fall  Course complicated by the above  Continue antibiotics as above in ongoing follow-up by Orthopedics      3  Acute kidney injury on chronic kidney disease  Acute elevation in creatinine noted from prior  This does impact antibiotic dosing  Downtrended today  Ancef dosing adjusted today as above  Will further dose adjust antibiotics as needed  Repeat chemistry tomorrow  Ongoing follow-up by Nephrology  Avoid nephrotoxic agent     4  Poorly controlled diabetes    Hemoglobin A1c noted to be 8 2  I suspect that this is contributing to the above as well as to overall poor wound healing  Ongoing glucose management as per primary  Above plan discussed in detail with patient and with primary service and with orthopedic advanced practitioner and case management  ID consult service will continue to follow  Antibiotics:  Ancef 3     Subjective:  Patient currently denies having any nausea, vomiting, chest pain or shortness of breath  He feels his legs are swollen  I reviewed with him the current plan for antibiotics and that the dose was increased due to improving kidney dysfunction  Patient sarcastically stated that he did not understand why we did not expect his kidney function to improve  He stated that he is waiting to be seen by Orthopedics though he was seen earlier today  Reached out to orthopedic advanced practitioner and currently there are no plans for hardware removal   Patient will be followed by Orthopedics as an outpatient  I relayed the message that the patient would like to see his surgeon  Patient to have PICC line placement after my visit  Objective:  Vitals:  Temp:  [97 6 °F (36 4 °C)-98 1 °F (36 7 °C)] 98 1 °F (36 7 °C)  HR:  [70-82] 80  Resp:  [18-19] 18  BP: (134-159)/(67-91) 159/91  SpO2:  [96 %-98 %] 97 %  Temp (24hrs), Av 9 °F (36 6 °C), Min:97 6 °F (36 4 °C), Max:98 1 °F (36 7 °C)  Current: Temperature: 98 1 °F (36 7 °C)    Physical Exam:   General Appearance:  Alert, interactive, nontoxic, no acute distress  The patient has a very sarcastic and odd affect  Throat: Oropharynx moist without lesions  Lungs:   Clear to auscultation bilaterally; no wheezes, rhonchi or rales; respirations unlabored   Heart:  RRR; no murmur, rub or gallop appreciated   Abdomen:   Soft, non-tender, non-distended, positive bowel sounds  Extremities: No clubbing, cyanosis; nonpitting edema in the lower legs bilaterally   Skin: No new rashes or lesions   No new draining wounds noted  Surgical sites remain covered         Labs, Imaging, & Other studies:   All pertinent labs and imaging studies were personally reviewed  Results from last 7 days   Lab Units 02/09/22  0513 02/08/22  0442 02/07/22  0542   WBC Thousand/uL 10 57* 8 52 7 61   HEMOGLOBIN g/dL 8 6* 9 0* 10 5*   PLATELETS Thousands/uL 343 322 261     Results from last 7 days   Lab Units 02/09/22  0513   POTASSIUM mmol/L 3 8   CHLORIDE mmol/L 105   CO2 mmol/L 23   BUN mg/dL 46*   CREATININE mg/dL 2 43*   EGFR ml/min/1 73sq m 27   CALCIUM mg/dL 8 1*

## 2022-02-09 NOTE — ASSESSMENT & PLAN NOTE
· CT pelvis revealing small adjacent anterior deep soft tissue hematoma 2 4 cm x 3 4 cm near right femoral fracture  · No signs of ecchymosis currently, vital signs stable  · Resumed warfarin  · Monitor H&H

## 2022-02-09 NOTE — PROGRESS NOTES
NEPHROLOGY PROGRESS NOTE    Patient: Bautista Piña               Sex: male          DOA: 2/3/2022  5:11 PM   YOB: 1955        Age:  61 y o         LOS:  LOS: 6 days       HPI     Patient with CKD and acute kidney injury    SUBJECTIVE     Patient has a fractured femur worse and status post surgery    Overall doing well    Denies any acute complaint    Wants to go home    CURRENT MEDICATIONS       Current Facility-Administered Medications:     acetaminophen (TYLENOL) tablet 975 mg, 975 mg, Oral, Q8H [address], Morgan Garcia PA-C, 975 mg at 02/09/22 0554    aluminum-magnesium hydroxide-simethicone (MYLANTA) oral suspension 30 mL, 30 mL, Oral, Q6H PRN, Maral Maldonado PA-C    ceFAZolin (ANCEF) IVPB (premix in dextrose) 2,000 mg 50 mL, 2,000 mg, Intravenous, Q8H, Jaja Adamson MD    colchicine (COLCRYS) tablet 0 6 mg, 0 6 mg, Oral, Daily, Maral Maldonado PA-C, 0 6 mg at 02/09/22 1006    ferrous sulfate tablet 325 mg, 325 mg, Oral, Daily With Breakfast, Morgan Garcia PA-C, 325 mg at 02/09/22 1006    HYDROmorphone HCl (DILAUDID) injection 0 2 mg, 0 2 mg, Intravenous, Q4H PRN, Maral Maldonado PA-C, 0 2 mg at 02/06/22 1507    hydrOXYzine HCL (ATARAX) tablet 25 mg, 25 mg, Oral, Q6H PRN, Maral Maldonado PA-C    insulin glargine (LANTUS) subcutaneous injection 25 Units 0 25 mL, 25 Units, Subcutaneous, HS, Robyn Marquis MD    insulin lispro (HumaLOG) 100 units/mL subcutaneous injection 1-5 Units, 1-5 Units, Subcutaneous, TID AC, 2 Units at 02/09/22 1147 **AND** Fingerstick Glucose (POCT), , , TID AC, Robyn Marquis MD    lidocaine (LIDODERM) 5 % patch 1 patch, 1 patch, Topical, Daily PRN, Maral Maldonado PA-C, 1 patch at 02/04/22 1204    naloxone (NARCAN) 0 04 mg/mL syringe 0 04 mg, 0 04 mg, Intravenous, Q1MIN PRN, Maral Maldonado PA-C    oxyCODONE (ROXICODONE) IR tablet 5 mg, 5 mg, Oral, Q4H PRN, 5 mg at 02/09/22 1007 **OR** oxyCODONE (ROXICODONE) IR tablet 7 5 mg, 7 5 mg, Oral, Q4H PRN, Olivia Alvarez PA-C, 7 5 mg at 02/06/22 1104    polyethylene glycol (MIRALAX) packet 17 g, 17 g, Oral, Daily PRN, Olivia Alvarez, PA-C, 17 g at 02/06/22 1108    pravastatin (PRAVACHOL) tablet 40 mg, 40 mg, Oral, Daily With PRITI Caal-C, 40 mg at 02/08/22 1710    senna-docusate sodium (SENOKOT S) 8 6-50 mg per tablet 1 tablet, 1 tablet, Oral, BID, Olivia Alvarez PA-C, 1 tablet at 02/08/22 0930    sodium chloride 0 9 % infusion, 100 mL/hr, Intravenous, Continuous, Olivia Alvarez PA-C, Last Rate: 100 mL/hr at 02/09/22 0602, 100 mL/hr at 02/09/22 0602    warfarin (COUMADIN) tablet 3 mg, 3 mg, Oral, Daily (warfarin), Maral Sandoval MD    OBJECTIVE     Current Weight: Weight - Scale: 101 kg (223 lb)  Vitals:    02/09/22 1100   BP: 149/70   Pulse: 80   Resp:    Temp: 97 8 °F (36 6 °C)   SpO2: 97%       Intake/Output Summary (Last 24 hours) at 2/9/2022 1334  Last data filed at 2/9/2022 0654  Gross per 24 hour   Intake 950 ml   Output 1070 ml   Net -120 ml       PHYSICAL EXAMINATION     Physical Exam  Constitutional:       General: He is not in acute distress  Appearance: He is well-developed  HENT:      Head: Normocephalic  Eyes:      General: No scleral icterus  Conjunctiva/sclera: Conjunctivae normal    Neck:      Vascular: No JVD  Cardiovascular:      Rate and Rhythm: Normal rate  Heart sounds: Normal heart sounds  Pulmonary:      Effort: Pulmonary effort is normal       Breath sounds: No wheezing  Abdominal:      Palpations: Abdomen is soft  Tenderness: There is no abdominal tenderness  Musculoskeletal:         General: Normal range of motion  Cervical back: Neck supple  Skin:     General: Skin is warm  Findings: No rash  Neurological:      Mental Status: He is alert and oriented to person, place, and time     Psychiatric:         Behavior: Behavior normal           LAB RESULTS     Results from last 7 days   Lab Units 02/09/22  0513 02/08/22  0442 02/07/22  0542 02/06/22  0618 02/05/22  0524 02/04/22  0648 02/03/22 2015   WBC Thousand/uL 10 57* 8 52 7 61 9 13 10 41* 8 63 10 56*   HEMOGLOBIN g/dL 8 6* 9 0* 10 5* 10 6* 10 3* 10 6* 11 7*   HEMATOCRIT % 27 8* 28 3* 32 7* 34 4* 32 3* 32 3* 36 9   PLATELETS Thousands/uL 343 322 261 287 287 290 312   POTASSIUM mmol/L 3 8 5 1 4 5 4 3 4 1 3 7 4 1   CHLORIDE mmol/L 105 100 103 100 102 102 99*   CO2 mmol/L 23 22 24 23 21 22 22   BUN mg/dL 46* 37* 25 25 24 28* 29*   CREATININE mg/dL 2 43* 2 82* 2 46* 2 55* 2 40* 2 45* 2 64*   EGFR ml/min/1 73sq m 27 22 26 25 27 26 24   CALCIUM mg/dL 8 1* 8 5 8 7 8 3 8 3 8 4 8 7   PHOSPHORUS mg/dL  --   --   --  2 5  --   --   --        RADIOLOGY RESULTS      Results for orders placed during the hospital encounter of 02/03/22    XR chest portable    Narrative  CHEST    INDICATION:   pre op clearance  COMPARISON:  4/4/2017    EXAM PERFORMED/VIEWS:  XR CHEST PORTABLE      FINDINGS:    Cardiomediastinal silhouette appears unremarkable  Platelike atelectasis versus scar is noted at the left lung base  There is mild hypoventilation  No infiltrate is seen  No pneumothorax or pleural effusion  Osseous structures appear within normal limits for patient age  Impression  No acute cardiopulmonary disease  Workstation performed: [id number]    Results for orders placed during the hospital encounter of 04/04/17    XR chest pa & lateral    Narrative  CHEST - DUAL ENERGY    INDICATION:  Cough    COMPARISON:  None    VIEWS:  PA (including soft tissue/bone algorithms) and lateral projections    IMAGES:  4    FINDINGS:    Cardiomediastinal silhouette appears unremarkable  The lungs are clear  No pneumothorax or pleural effusion  Visualized osseous structures appear within normal limits for the patient's age  Impression  No active pulmonary disease        Workstation performed: [id number]    No results found for this or any previous visit  No results found for this or any previous visit  Results for orders placed during the hospital encounter of 01/15/21    CT abdomen pelvis wo contrast    Narrative  CT ABDOMEN AND PELVIS WITHOUT IV CONTRAST    INDICATION:   R10 9: Unspecified abdominal pain  COMPARISON:  None  TECHNIQUE:  CT examination of the abdomen and pelvis was performed without intravenous contrast   Axial, sagittal, and coronal 2D reformatted images were created from the source data and submitted for interpretation  Radiation dose length product (DLP) for this visit:  2252 mGy-cm   This examination, like all CT scans performed in the [facility], was performed utilizing techniques to minimize radiation dose exposure, including the use of iterative  reconstruction and automated exposure control  Enteric contrast was not administered  FINDINGS:    ABDOMEN    LOWER CHEST:  No clinically significant abnormality identified in the visualized lower chest     LIVER/BILIARY TREE:  Unremarkable  GALLBLADDER:  No calcified gallstones  No pericholecystic inflammatory change  SPLEEN:  Unremarkable  PANCREAS:  Unremarkable  ADRENAL GLANDS:  Unremarkable  KIDNEYS/URETERS:  There is a 7 mm calculus located within the proximal left ureter, 2 cm distal to the ureteropelvic junction causing mild hydronephrosis and perinephric stranding  There are multiple left-sided nonobstructing intrarenal calculi  measuring up to 7 mm  There are a few punctate nonobstructing right-sided intrarenal calculi  STOMACH AND BOWEL:  Unremarkable  APPENDIX:  No findings to suggest appendicitis  ABDOMINOPELVIC CAVITY:  No ascites  No pneumoperitoneum  No lymphadenopathy  VESSELS:  Unremarkable for patient's age  PELVIS    REPRODUCTIVE ORGANS:  Unremarkable for patient's age  URINARY BLADDER:  Unremarkable      ABDOMINAL WALL/INGUINAL REGIONS:  There are small bilateral fat-containing inguinal hernias  OSSEOUS STRUCTURES:  No acute fracture or destructive osseous lesion  There is diffuse bone demineralization  There is a moderate sized Schmorl's node at the superior endplate of L1  Impression  Mild left hydronephrosis and perinephric stranding, the cause of which appears to be a 7 mm calculus within the proximal ureter  Bilateral subcentimeter nonobstructing intrarenal calculi  Workstation performed: [id number]    No results found for this or any previous visit  PLAN / RECOMMENDATIONS      Acute kidney injury:  Slowly improving  Likely because of ATN with surgery  CKD stage 3 to 4:  Baseline creatinine is about 2  Possibly diabetic nephropathy    Diabetes type 2:  Not very well control  Discussed with the patient    Will continue to monitor    Leonor Womack MD  Nephrology  2/9/2022        Portions of the record may have been created with voice recognition software  Occasional wrong word or "sound a like" substitutions may have occurred due to the inherent limitations of voice recognition software  Read the chart carefully and recognize, using context, where substitutions have occurred

## 2022-02-09 NOTE — ASSESSMENT & PLAN NOTE
· Status post fall with CT pelvis revealing comminuted right femoral intertrochanteric fracture  · S/p ORIF of rt hip on 2/7, POD#3  · Orthopedic surgery following  · Resumed warfarin after discussion with ortho (H/O DVT)  · INR 1 41 today  · PT/OT recommend rehab however patient adamantly refuses   Plan for home with [facility] tomorrow

## 2022-02-09 NOTE — CASE MANAGEMENT
Case Management Discharge Planning Note    Patient name Christopher Chao  Location [address] 304/-74 MRN [id number]  : 1955 Date 2022       Current Admission Date: 2/3/2022  Current Admission Diagnosis:Closed displaced intertrochanteric fracture of right femur [facility])   Patient Active Problem List    Diagnosis Date Noted    Leg wound, right 2022    Pre-operative clearance 2022    Closed displaced intertrochanteric fracture of right femur ([address] ) 2022    Hematoma 2022    Acute idiopathic gout of multiple sites 2022    Pain and swelling of right lower leg 2021    Ambulatory dysfunction 2021    Displaced bicondylar fracture of right tibia, subsequent encounter for closed fracture with routine healing 2021    Right forearm superficial thrombophlebitis 2021    Anemia 10/26/2021    Kyphosis of cervicothoracic region 10/25/2021    Pseudogout of knee, left 10/17/2021    Pain and swelling of left knee 10/16/2021    ROSE (acute kidney injury) ([facility] ) 10/13/2021    Closed fracture of proximal end of right tibia 10/13/2021    Type 2 diabetes mellitus with hyperglycemia ([address] ) 10/05/2021    Class 2 obesity in adult 2021    Type 2 diabetes mellitus with diabetic peripheral angiopathy without gangrene ([address] ) 10/08/2019    Other insomnia 2017    Type 2 diabetes mellitus with diabetic neuropathy, without long-term current use of insulin ([address] ) 2017    Lumbar degenerative disc disease 10/21/2014    Acute embolism and thrombosis of unspecified deep veins of unspecified lower extremity ([address] ) 10/20/2014    Essential hypertension 10/20/2014    Mixed hyperlipidemia 10/20/2014    Peripheral vascular disease ([address] ) 10/20/2014      LOS (days): 6  Geometric Mean LOS (GMLOS) (days):   Days to GMLOS:     OBJECTIVE:  Risk of Unplanned Readmission Score: 26         Current admission status: Inpatient   Preferred Pharmacy:   [facility]/pharmacy #[id number] - [place], [address] [facility] [address]  Phone: [phone] Fax: [address] Mail Delivery - [place], [facility] [address] [place] [address]  Phone: [phone] Fax: [address]  [address] [facility] [address]  Phone: [phone] Fax: [phone] [facility], [address]  Gloria RUIZ [address] [place] [address]  Phone: [phone] Fax: [phone]    Primary Care Provider: Ed Jacques DO    Primary Insurance: BLUE CROSS  Secondary Insurance: [facility] [employer] REP    DISCHARGE DETAILS:    Other Referral/Resources/Interventions Provided:  Interventions: Home Infusion,HHC (IV ATBX SCRIPT [address])

## 2022-02-09 NOTE — PLAN OF CARE
Problem: METABOLIC, FLUID AND ELECTROLYTES - ADULT  Goal: Glucose maintained within target range  Description: INTERVENTIONS:  - Monitor Blood Glucose as ordered  - Assess for signs and symptoms of hyperglycemia and hypoglycemia  - Administer ordered medications to maintain glucose within target range  - Assess nutritional intake and initiate nutrition service referral as needed  Outcome: Progressing     Problem: PAIN - ADULT  Goal: Verbalizes/displays adequate comfort level or baseline comfort level  Description: Interventions:  - Encourage patient to monitor pain and request assistance  - Assess pain using appropriate pain scale  - Administer analgesics based on type and severity of pain and evaluate response  - Implement non-pharmacological measures as appropriate and evaluate response  - Consider cultural and social influences on pain and pain management  - Notify physician/advanced practitioner if interventions unsuccessful or patient reports new pain  Outcome: Progressing     Problem: INFECTION - ADULT  Goal: Absence or prevention of progression during hospitalization  Description: INTERVENTIONS:  - Assess and monitor for signs and symptoms of infection  - Monitor lab/diagnostic results  - Monitor all insertion sites, i e  indwelling lines, tubes, and drains  - Monitor endotracheal if appropriate and nasal secretions for changes in amount and color  - [place] appropriate cooling/warming therapies per order  - Administer medications as ordered  - Instruct and encourage patient and family to use good hand hygiene technique  - Identify and instruct in appropriate isolation precautions for identified infection/condition  Outcome: Progressing

## 2022-02-09 NOTE — CASE MANAGEMENT
Case Management Discharge Planning Note    Patient name Christopher Chao  Location [address] 304/-01 MRN [id number]  : 1955 Date 2022       Current Admission Date: 2/3/2022  Current Admission Diagnosis:Closed displaced intertrochanteric fracture of right femur [facility])   Patient Active Problem List    Diagnosis Date Noted    Leg wound, right 2022    Pre-operative clearance 2022    Closed displaced intertrochanteric fracture of right femur ([address] ) 2022    Hematoma 2022    Acute idiopathic gout of multiple sites 2022    Pain and swelling of right lower leg 2021    Ambulatory dysfunction 2021    Displaced bicondylar fracture of right tibia, subsequent encounter for closed fracture with routine healing 2021    Right forearm superficial thrombophlebitis 2021    Anemia 10/26/2021    Kyphosis of cervicothoracic region 10/25/2021    Pseudogout of knee, left 10/17/2021    Pain and swelling of left knee 10/16/2021    ROSE (acute kidney injury) ([facility] ) 10/13/2021    Closed fracture of proximal end of right tibia 10/13/2021    Type 2 diabetes mellitus with hyperglycemia ([address] ) 10/05/2021    Class 2 obesity in adult 2021    Type 2 diabetes mellitus with diabetic peripheral angiopathy without gangrene ([address] ) 10/08/2019    Other insomnia 2017    Type 2 diabetes mellitus with diabetic neuropathy, without long-term current use of insulin ([address] ) 2017    Lumbar degenerative disc disease 10/21/2014    Acute embolism and thrombosis of unspecified deep veins of unspecified lower extremity ([address] ) 10/20/2014    Essential hypertension 10/20/2014    Mixed hyperlipidemia 10/20/2014    Peripheral vascular disease ([address] ) 10/20/2014      LOS (days): 6  Geometric Mean LOS (GMLOS) (days):   Days to GMLOS:     OBJECTIVE:  Risk of Unplanned Readmission Score: 26         Current admission status: Inpatient   Preferred Pharmacy:   [facility]/pharmacy #[id number] - [place], [address] [facility] [address]  Phone: [phone] Fax: [address] Mail Delivery - [place], [address]  [address] [address]  Phone: [phone] Fax: [phone]  [address] [place], [place] - [address]   [address] [facility] [address]  Phone: [phone] Fax: [phone]    [address], [address]  [address]  [address] [facility] [address]  Phone: [phone] Fax: [phone]    Primary Care Provider: Ki Shanks DO    Primary Insurance: BLUE CROSS  Secondary Insurance: [facility] [employer] REP    DISCHARGE DETAILS:    Additional Comments: Per [facility], pt would be responsible for $367 76 for deductible and then would be covered at 100% once deductible is met  CM informed pt of same who is aware and agreeable to out of pocket cost associated  Per [employer], [facility] able to visit with pt on Saturday morning at 8:00 AM, which would require pt to remain inpatient until after evening dose of IV ABX on Friday  Additional [facility]NA referrals placed to assess availabiilty for sooner [facility], pt and Dr Elizabeth Fischer aware of same  Continuing to await finalized script from ID  CM department to follow

## 2022-02-09 NOTE — PROCEDURES
Insert PICC line    Date/Time: 2/9/2022 3:31 PM  Performed by: Enmanuel Millard RN  Authorized by: Rosemary Palmer MD     Patient location:  Bedside  Other Assisting Provider: Yes (comment)    Consent:     Consent obtained:  Written    Consent given by:  Patient  Universal protocol:     Procedure explained and questions answered to patient or proxy's satisfaction: yes      Relevant documents present and verified: yes      Test results available and properly labeled: yes      Required blood products, implants, devices, and special equipment available: yes      Site/side marked: yes      Immediately prior to procedure, a time out was called: yes      Patient identity confirmed:  Verbally with patient, arm band and hospital-assigned identification number  Pre-procedure details:     Hand hygiene: Hand hygiene performed prior to insertion      Sterile barrier technique: All elements of maximal sterile technique followed      Skin preparation:  ChloraPrep    Skin preparation agent: Skin preparation agent completely dried prior to procedure    Indications:     PICC line indications: long term antibiotics    Anesthesia (see MAR for exact dosages):      Anesthesia method:  Local infiltration    Local anesthetic:  Lidocaine 1% w/o epi  Procedure details:     Location:  Brachial    Vessel type: vein      Laterality:  Right    Approach: percutaneous technique used      Patient position:  Flat    Procedural supplies:  Single lumen    Catheter size:  4 Fr    Landmarks identified: yes      Ultrasound guidance: yes      Ultrasound image availability:  Not saved    Sterile ultrasound techniques: Sterile gel and sterile probe covers were used      Number of attempts:  1    Successful placement: yes      Vessel of catheter tip end:  Sherlock 3CG confirmed    Total catheter length (cm):  43    Catheter out on skin (cm):  0    Max flow rate:  5 ml/sec    Arm circumference:  34  Post-procedure details:     Post-procedure:  Dressing applied and securement device placed    Assessment:  Blood return through all ports    Post-procedure complications: none      Patient tolerance of procedure:   Tolerated well, no immediate complications    Observer: Yes      Observer name:  Placed by Shady Rodriguez RN

## 2022-02-09 NOTE — ASSESSMENT & PLAN NOTE
· Creatinine at baseline around 1 3-1 5  · 2 43 today  · Renal ultrasound neg for hydronephrosis  · Nephrology following  · On IV fluids  · Lisinopril on hold  · Continue to monitor BMP

## 2022-02-09 NOTE — ASSESSMENT & PLAN NOTE
Lab Results   Component Value Date    HGBA1C 7 8 (H) 02/07/2022       Recent Labs     02/09/22  0005 02/09/22  0246 02/09/22  0512 02/09/22  0742   POCGLU 137 160* 117 83       Blood Sugar Average: Last 72 hrs:  (P) 272 48     · Blood sugars much better controlled this shawn Martin · Transition from insulin drip to Lantus 25 units q h s  · Continue with sliding scale coverage with a c  HS checks  · Hold metformin during hospitalization  · Consistent carb diet

## 2022-02-09 NOTE — ASSESSMENT & PLAN NOTE
· Hemoglobin stable at 8 6 this a m   · On ferrous sulfate 325 mg daily  · Monitor H&H closely  · No active bleeding noted

## 2022-02-10 DIAGNOSIS — L21.0 PITYRIASIS: ICD-10-CM

## 2022-02-10 LAB
ANION GAP SERPL CALCULATED.3IONS-SCNC: 7 MMOL/L (ref 4–13)
BASOPHILS # BLD AUTO: 0.01 THOUSANDS/ΜL (ref 0–0.1)
BASOPHILS NFR BLD AUTO: 0 % (ref 0–1)
BUN SERPL-MCNC: 41 MG/DL (ref 5–25)
CALCIUM SERPL-MCNC: 7.9 MG/DL (ref 8.3–10.1)
CHLORIDE SERPL-SCNC: 105 MMOL/L (ref 100–108)
CO2 SERPL-SCNC: 25 MMOL/L (ref 21–32)
CREAT SERPL-MCNC: 2.18 MG/DL (ref 0.6–1.3)
EOSINOPHIL # BLD AUTO: 0.22 THOUSAND/ΜL (ref 0–0.61)
EOSINOPHIL NFR BLD AUTO: 3 % (ref 0–6)
ERYTHROCYTE [DISTWIDTH] IN BLOOD BY AUTOMATED COUNT: 16 % (ref 11.6–15.1)
GFR SERPL CREATININE-BSD FRML MDRD: 30 ML/MIN/1.73SQ M
GLUCOSE SERPL-MCNC: 222 MG/DL (ref 65–140)
GLUCOSE SERPL-MCNC: 254 MG/DL (ref 65–140)
GLUCOSE SERPL-MCNC: 262 MG/DL (ref 65–140)
GLUCOSE SERPL-MCNC: 294 MG/DL (ref 65–140)
GLUCOSE SERPL-MCNC: 301 MG/DL (ref 65–140)
HCT VFR BLD AUTO: 28.7 % (ref 36.5–49.3)
HGB BLD-MCNC: 9.2 G/DL (ref 12–17)
IMM GRANULOCYTES # BLD AUTO: 0.03 THOUSAND/UL (ref 0–0.2)
IMM GRANULOCYTES NFR BLD AUTO: 0 % (ref 0–2)
INR PPP: 1.41 (ref 0.84–1.19)
LYMPHOCYTES # BLD AUTO: 1.35 THOUSANDS/ΜL (ref 0.6–4.47)
LYMPHOCYTES NFR BLD AUTO: 20 % (ref 14–44)
MCH RBC QN AUTO: 28.3 PG (ref 26.8–34.3)
MCHC RBC AUTO-ENTMCNC: 32.1 G/DL (ref 31.4–37.4)
MCV RBC AUTO: 88 FL (ref 82–98)
MONOCYTES # BLD AUTO: 0.54 THOUSAND/ΜL (ref 0.17–1.22)
MONOCYTES NFR BLD AUTO: 8 % (ref 4–12)
NEUTROPHILS # BLD AUTO: 4.72 THOUSANDS/ΜL (ref 1.85–7.62)
NEUTS SEG NFR BLD AUTO: 69 % (ref 43–75)
NRBC BLD AUTO-RTO: 0 /100 WBCS
PLATELET # BLD AUTO: 318 THOUSANDS/UL (ref 149–390)
PMV BLD AUTO: 9.9 FL (ref 8.9–12.7)
POTASSIUM SERPL-SCNC: 3.9 MMOL/L (ref 3.5–5.3)
PROTHROMBIN TIME: 16.6 SECONDS (ref 11.6–14.5)
RBC # BLD AUTO: 3.25 MILLION/UL (ref 3.88–5.62)
SODIUM SERPL-SCNC: 137 MMOL/L (ref 136–145)
WBC # BLD AUTO: 6.87 THOUSAND/UL (ref 4.31–10.16)

## 2022-02-10 PROCEDURE — 85610 PROTHROMBIN TIME: CPT | Performed by: FAMILY MEDICINE

## 2022-02-10 PROCEDURE — 80048 BASIC METABOLIC PNL TOTAL CA: CPT | Performed by: FAMILY MEDICINE

## 2022-02-10 PROCEDURE — 99232 SBSQ HOSP IP/OBS MODERATE 35: CPT | Performed by: FAMILY MEDICINE

## 2022-02-10 PROCEDURE — 99232 SBSQ HOSP IP/OBS MODERATE 35: CPT | Performed by: INTERNAL MEDICINE

## 2022-02-10 PROCEDURE — 85025 COMPLETE CBC W/AUTO DIFF WBC: CPT | Performed by: FAMILY MEDICINE

## 2022-02-10 PROCEDURE — 82948 REAGENT STRIP/BLOOD GLUCOSE: CPT

## 2022-02-10 PROCEDURE — 99024 POSTOP FOLLOW-UP VISIT: CPT | Performed by: PHYSICIAN ASSISTANT

## 2022-02-10 RX ORDER — INSULIN GLARGINE 100 [IU]/ML
30 INJECTION, SOLUTION SUBCUTANEOUS
Status: DISCONTINUED | OUTPATIENT
Start: 2022-02-10 | End: 2022-02-12 | Stop reason: HOSPADM

## 2022-02-10 RX ORDER — OXYCODONE HCL 10 MG/1
10 TABLET, FILM COATED, EXTENDED RELEASE ORAL EVERY 12 HOURS SCHEDULED
Status: DISCONTINUED | OUTPATIENT
Start: 2022-02-10 | End: 2022-02-12 | Stop reason: HOSPADM

## 2022-02-10 RX ORDER — HYDROXYZINE HYDROCHLORIDE 25 MG/1
TABLET, FILM COATED ORAL
Qty: 30 TABLET | Refills: 1 | Status: SHIPPED | OUTPATIENT
Start: 2022-02-10 | End: 2022-02-12 | Stop reason: HOSPADM

## 2022-02-10 RX ADMIN — ACETAMINOPHEN 975 MG: 325 TABLET, FILM COATED ORAL at 22:27

## 2022-02-10 RX ADMIN — CEFAZOLIN SODIUM 2000 MG: 2 SOLUTION INTRAVENOUS at 22:27

## 2022-02-10 RX ADMIN — INSULIN LISPRO 2 UNITS: 100 INJECTION, SOLUTION INTRAVENOUS; SUBCUTANEOUS at 08:00

## 2022-02-10 RX ADMIN — FERROUS SULFATE TAB 325 MG (65 MG ELEMENTAL FE) 325 MG: 325 (65 FE) TAB at 09:42

## 2022-02-10 RX ADMIN — COLCHICINE 0.6 MG: 0.6 TABLET, FILM COATED ORAL at 09:42

## 2022-02-10 RX ADMIN — SODIUM CHLORIDE 100 ML/HR: 9 INJECTION, SOLUTION INTRAVENOUS at 07:45

## 2022-02-10 RX ADMIN — OXYCODONE HYDROCHLORIDE 10 MG: 10 TABLET, FILM COATED, EXTENDED RELEASE ORAL at 09:43

## 2022-02-10 RX ADMIN — PRAVASTATIN SODIUM 40 MG: 40 TABLET ORAL at 17:37

## 2022-02-10 RX ADMIN — SENNOSIDES AND DOCUSATE SODIUM 1 TABLET: 50; 8.6 TABLET ORAL at 22:42

## 2022-02-10 RX ADMIN — INSULIN LISPRO 2 UNITS: 100 INJECTION, SOLUTION INTRAVENOUS; SUBCUTANEOUS at 16:45

## 2022-02-10 RX ADMIN — INSULIN LISPRO 3 UNITS: 100 INJECTION, SOLUTION INTRAVENOUS; SUBCUTANEOUS at 12:23

## 2022-02-10 RX ADMIN — WARFARIN SODIUM 3 MG: 3 TABLET ORAL at 17:37

## 2022-02-10 RX ADMIN — CEFAZOLIN SODIUM 2000 MG: 2 SOLUTION INTRAVENOUS at 13:47

## 2022-02-10 RX ADMIN — INSULIN GLARGINE 30 UNITS: 100 INJECTION, SOLUTION SUBCUTANEOUS at 22:26

## 2022-02-10 RX ADMIN — CEFAZOLIN SODIUM 2000 MG: 2 SOLUTION INTRAVENOUS at 05:52

## 2022-02-10 RX ADMIN — ACETAMINOPHEN 975 MG: 325 TABLET, FILM COATED ORAL at 13:46

## 2022-02-10 RX ADMIN — OXYCODONE HYDROCHLORIDE 10 MG: 10 TABLET, FILM COATED, EXTENDED RELEASE ORAL at 22:42

## 2022-02-10 NOTE — ASSESSMENT & PLAN NOTE
· Creatinine at baseline around 1 3-1 5  · 2 18 today  · Renal ultrasound neg for hydronephrosis  · Nephrology following  · DC IV fluids  · Lisinopril on hold  · Continue to monitor BMP

## 2022-02-10 NOTE — ASSESSMENT & PLAN NOTE
· Hemoglobin stable at 9 2 this a m   · On ferrous sulfate 325 mg daily  · Monitor H&H closely  · No active bleeding noted

## 2022-02-10 NOTE — ASSESSMENT & PLAN NOTE
Lab Results   Component Value Date    HGBA1C 7 8 (H) 02/07/2022       Recent Labs     02/09/22  1608 02/09/22  2046 02/10/22  0724 02/10/22  1128   POCGLU 285* 277* 254* 294*       Blood Sugar Average: Last 72 hrs:  (P) 280 88     · Blood sugars much better controlled this a m Gerhardt Sep · Transitioned from insulin drip to Lantus 25 units q h s  · Continue with sliding scale coverage with a c  HS checks  · Hold metformin during hospitalization  · Consistent carb diet

## 2022-02-10 NOTE — UTILIZATION REVIEW
Continued Stay Review    Date: 2/10                         Current Patient Class: IP  Current Level of Care: MS    HPI:66 y o  male initially admitted on  2/3 w/ closed fx R femur     Assessment/Plan:  Cont stay required for IV abx , needs VNA arranged for home IV abx   PICC line placed 2/9  Will need 6 weeks of IV abx therapy   Vital Signs:   02/10/22 0700 98 5 °F (36 9 °C) 85 18 150/77 112 96 % -- Lying         Pertinent Labs/Diagnostic Results:   Results from last 7 days   Lab Units 02/03/22 2113   SARS-COV-2  Negative     Results from last 7 days   Lab Units 02/10/22  0546 02/09/22  0513 02/08/22  0442 02/07/22  0542 02/07/22  0542 02/06/22  0618 02/06/22  0618   WBC Thousand/uL 6 87 10 57* 8 52   < > 7 61   < > 9 13   HEMOGLOBIN g/dL 9 2* 8 6* 9 0*  --  10 5*  --  10 6*   HEMATOCRIT % 28 7* 27 8* 28 3*  --  32 7*  --  34 4*   PLATELETS Thousands/uL 318 343 322   < > 261   < > 287   NEUTROS ABS Thousands/µL 4 72 8 36* 7 13   < > 5 29   < > 6 86    < > = values in this interval not displayed       Results from last 7 days   Lab Units 02/10/22  0546 02/09/22  0513 02/08/22 0442 02/07/22  0542 02/06/22  0618   SODIUM mmol/L 137 137 132* 136 132*   POTASSIUM mmol/L 3 9 3 8 5 1 4 5 4 3   CHLORIDE mmol/L 105 105 100 103 100   CO2 mmol/L 25 23 22 24 23   ANION GAP mmol/L 7 9 10 9 9   BUN mg/dL 41* 46* 37* 25 25   CREATININE mg/dL 2 18* 2 43* 2 82* 2 46* 2 55*   EGFR ml/min/1 73sq m 30 27 22 26 25   CALCIUM mg/dL 7 9* 8 1* 8 5 8 7 8 3   PHOSPHORUS mg/dL  --   --   --   --  2 5     Results from last 7 days   Lab Units 02/10/22  0724 02/09/22  2046 02/09/22  1608 02/09/22  1115 02/09/22  0742 02/09/22  0512 02/09/22  0246 02/09/22  0005 02/08/22  2231 02/08/22  2036 02/08/22  1825 02/08/22  1540   POC GLUCOSE mg/dl 254* 277* 285* 236* 83 117 160* 137 127 180* 197* 300*     Results from last 7 days   Lab Units 02/10/22  0546 02/09/22  2691 02/08/22  1880 02/07/22  0542 02/06/22  2311 02/05/22  2746 02/04/22  5456 02/03/22 2015   GLUCOSE RANDOM mg/dL 262* 95 410* 236* 235* 271* 227* 317*     Results from last 7 days   Lab Units 02/07/22  0542   HEMOGLOBIN A1C % 7 8*   EAG mg/dl 177     Results from last 7 days   Lab Units 02/07/22  1845 02/07/22  1730 02/07/22  1445   HS TNI 0HR ng/L  --   --  104*   HS TNI 2HR ng/L  --  120*  --    HSTNI D2 ng/L  --  16  --    HS TNI 4HR ng/L 103*  --   --    HSTNI D4 ng/L -1  --   --      Results from last 7 days   Lab Units 02/10/22  0546 02/09/22  0513 02/08/22  0442 02/05/22  0524 02/04/22  1221   PROTIME seconds 16 6* 15 6* 15 8*   < > 36 7*   INR  1 41* 1 29* 1 32*   < > 3 97*   PTT seconds  --   --   --   --  72*    < > = values in this interval not displayed       Results from last 7 days   Lab Units 02/05/22  1320 02/05/22  1319   CLARITY UA   --  Slightly Cloudy   COLOR UA   --  Yellow   SPEC GRAV UA   --  1 020   PH UA   --  6 0   GLUCOSE UA mg/dl  --  250 (1/4%)*   KETONES UA mg/dl  --  Negative   BLOOD UA   --  Moderate*   PROTEIN UA mg/dl  --  Trace*   NITRITE UA   --  Negative   BILIRUBIN UA   --  Negative   UROBILINOGEN UA E U /dl  --  0 2   LEUKOCYTES UA   --  Negative   WBC UA /hpf  --  0-1*   RBC UA /hpf  --  4-10*   BACTERIA UA /hpf  --  Innumerable*   EPITHELIAL CELLS WET PREP /hpf  --  None Seen   SODIUM UR   --  75   CREATININE UR mg/dL 136 0 134 0     Results from last 7 days   Lab Units 02/03/22  2113   INFLUENZA A PCR  Negative   INFLUENZA B PCR  Negative   RSV PCR  Negative       Medications:   Scheduled Medications:  acetaminophen, 975 mg, Oral, Q8H SHASHI  cefazolin, 2,000 mg, Intravenous, Q8H  colchicine, 0 6 mg, Oral, Daily  ferrous sulfate, 325 mg, Oral, Daily With Breakfast  insulin glargine, 25 Units, Subcutaneous, HS  insulin lispro, 1-5 Units, Subcutaneous, TID AC  pravastatin, 40 mg, Oral, Daily With Dinner  senna-docusate sodium, 1 tablet, Oral, BID  warfarin, 3 mg, Oral, Daily (warfarin)      Continuous IV Infusions:  sodium chloride, 100 mL/hr, Intravenous, Continuous      PRN Meds:  aluminum-magnesium hydroxide-simethicone, 30 mL, Oral, Q6H PRN  HYDROmorphone, 0 2 mg, Intravenous, Q4H PRN  hydrOXYzine HCL, 25 mg, Oral, Q6H PRN  lidocaine, 1 patch, Topical, Daily PRN  naloxone, 0 04 mg, Intravenous, Q1MIN PRN  oxyCODONE, 5 mg, Oral, Q4H PRN   Or  oxyCODONE, 7 5 mg, Oral, Q4H PRN  polyethylene glycol, 17 g, Oral, Daily PRN        Discharge Plan: [facility]     Network Utilization Review Department  ATTENTION: Please call with any questions or concerns to [phone] and carefully listen to the prompts so that you are directed to the right person  All voicemails are confidential   Ty Limb all requests for admission clinical reviews, approved or denied determinations and any other requests to dedicated fax number below belonging to the campus where the patient is receiving treatment   List of dedicated fax numbers for the Facilities:  [address] DENIALS (Administrative/Medical Necessity) [phone]   [address] (Maternity/NICU/Pediatrics) [phone]   [address] [place] [address] [address] [place] [address] [address] [place] [address] [address] [place] [address] [address] [facility] [place] [address] [phone]

## 2022-02-10 NOTE — QUICK NOTE
Progress Note - Infectious Disease   Bautista Piña 61 y o  male MRN: [id number]  Unit/Bed#: -01 Encounter: [id number]      Impression/Plan:  1  Lower leg wound with exposed/compromised hardware   Patient presented initially after a fall and was found to have right femur fracture   He was also noted to have a lower leg wound which on debridement in the OR had exposed hardware present from a previous fracture repair   No gross purulence in the OR   No cultures collected   It is likely that the hardware is now compromised by skin silver   No drug allergies noted  [facility] dysfunction noted, improving  Continue Ancef 2 g every 8 hours  Plan for total 6 weeks of therapy through 3/20  Weekly labs with CBCD and creatinine  Maintain current PICC line  Plan for follow-up in the ID office 2 weeks after discharge  Will transition to oral suppressive therapy with Keflex thereafter  No plans for hardware removal per Orthopedics  ID office notified of follow-up needs  Script provided to Case Management yesterday  Continue to trend fever curve/WBC while admitted  Discussed risks associated with suppressive therapy  Discussed side effects/risk associated with this specific antibiotic  Discussed potential for relapse in detail  Additional care/discharge as per primary     2  Closed right femur fracture after fall   Patient is status post intramedullary nail of an intratrochanteric fracture after a fall   Course complicated by the above   Continue antibiotics as above in ongoing follow-up by Orthopedics      3  Acute kidney injury on chronic kidney disease   Acute elevation in creatinine noted from prior   This does impact antibiotic dosing  Downtrended today    Ancef dosing as above  Will further dose adjust antibiotics as needed  Repeat chemistry tomorrow  Ongoing follow-up by Nephrology  Avoid nephrotoxic agents     4  Poorly controlled diabetes   Hemoglobin A1c noted to be 8 2   I suspect that this is contributing to the above as well as to overall poor wound healing   Ongoing glucose management as per primary       Above plan discussed in detail with the patient's wife over the phone  ID consult service will formally re-evaluate this patient again tomorrow  Please contact ID attending on call if questions in the interim      Antibiotics:  Ancef 4    Subjective: Attempted to see patient and his eyes were closed with a towel over his head  He stated that he felt very tired and wished to be left alone  I contacted the patient's wife prior to my evaluation and provided her updates with the current dosing of antibiotics, duration of therapy, the fact that I have given scripts over to Case Management to arrange visiting nurse services  We lastly discussed follow-up  Additional questions were answered  I also updated the patient's wife on my conversations with Orthopedics yesterday in terms of maintaining hardware currently  In terms of future removal I referred her to discuss with Orthopedics further  Objective:  Vitals:  Temp:  [97 5 °F (36 4 °C)-98 5 °F (36 9 °C)] 98 °F (36 7 °C)  HR:  [82-85] 82  Resp:  [18-20] 20  BP: (135-176)/(77-91) 176/78  SpO2:  [96 %] 96 %  Temp (24hrs), Av °F (36 7 °C), Min:97 5 °F (36 4 °C), Max:98 5 °F (36 9 °C)  Current: Temperature: 98 °F (36 7 °C)    Physical Exam:   Patient did not wish to be evaluated at this time  Stated he was feeling very tired      Labs, Imaging, & Other studies:   All pertinent labs and imaging studies were personally reviewed  Results from last 7 days   Lab Units 02/10/22  0546 22  0513 22  0442   WBC Thousand/uL 6 87 10 57* 8 52   HEMOGLOBIN g/dL 9 2* 8 6* 9 0*   PLATELETS Thousands/uL 318 343 322     Results from last 7 days   Lab Units 02/10/22  0546   POTASSIUM mmol/L 3 9   CHLORIDE mmol/L 105   CO2 mmol/L 25   BUN mg/dL 41*   CREATININE mg/dL 2 18*   EGFR ml/min/1 73sq m 30   CALCIUM mg/dL 7 9*

## 2022-02-10 NOTE — PROGRESS NOTES
NEPHROLOGY PROGRESS NOTE    Patient: Bautista Piña               Sex: male          DOA: 2/3/2022  5:11 PM   YOB: 1955        Age:  61 y o         LOS:  LOS: 7 days       HPI     Patient with CKD likely because of diabetic nephropathy admitted with fall and fractured femur requiring surgery    SUBJECTIVE     Overall feeling well denies any acute complaint    No chest pain no palpitation or shortness of breath    No nausea no vomiting    CURRENT MEDICATIONS       Current Facility-Administered Medications:     acetaminophen (TYLENOL) tablet 975 mg, 975 mg, Oral, Q8H [address], Morgan Garcia PA-C, 975 mg at 02/09/22 2105    aluminum-magnesium hydroxide-simethicone (MYLANTA) oral suspension 30 mL, 30 mL, Oral, Q6H PRN, Maral Maldonado PA-C    ceFAZolin (ANCEF) IVPB (premix in dextrose) 2,000 mg 50 mL, 2,000 mg, Intravenous, Q8H, Jaja Adamson MD, Last Rate: 100 mL/hr at 02/10/22 0552, 2,000 mg at 02/10/22 0552    colchicine (COLCRYS) tablet 0 6 mg, 0 6 mg, Oral, Daily, Maral Maldonado PA-C, 0 6 mg at 02/10/22 9822    ferrous sulfate tablet 325 mg, 325 mg, Oral, Daily With Breakfast, Morgan Garcia PA-C, 325 mg at 02/10/22 3909    HYDROmorphone HCl (DILAUDID) injection 0 2 mg, 0 2 mg, Intravenous, Q4H PRN, Maral Maldonado PA-C, 0 2 mg at 02/06/22 1507    hydrOXYzine HCL (ATARAX) tablet 25 mg, 25 mg, Oral, Q6H PRN, Maral Maldonado PA-C    insulin glargine (LANTUS) subcutaneous injection 30 Units 0 3 mL, 30 Units, Subcutaneous, HS, Robyn Marquis MD    insulin lispro (HumaLOG) 100 units/mL subcutaneous injection 1-5 Units, 1-5 Units, Subcutaneous, TID AC, 3 Units at 02/10/22 1223 **AND** Fingerstick Glucose (POCT), , , TID AC, Robyn Marquis MD    lidocaine (LIDODERM) 5 % patch 1 patch, 1 patch, Topical, Daily PRN, Maral Maldonado PA-C, 1 patch at 02/04/22 1204    naloxone (NARCAN) 0 04 mg/mL syringe 0 04 mg, 0 04 mg, Intravenous, Q1MIN PRN, JOSEF Newman oxyCODONE (OxyCONTIN) 12 hr tablet 10 mg, 10 mg, Oral, Q12H [address], Tari Shanks MD, 10 mg at 02/10/22 0943    polyethylene glycol (MIRALAX) packet 17 g, 17 g, Oral, Daily PRN, Guido Pinzon PA-C, 17 g at 02/06/22 1108    pravastatin (PRAVACHOL) tablet 40 mg, 40 mg, Oral, Daily With Digna Castillo PA-C, 40 mg at 02/09/22 1649    senna-docusate sodium (SENOKOT S) 8 6-50 mg per tablet 1 tablet, 1 tablet, Oral, BID, Guido Pinzon PA-C, 1 tablet at 02/08/22 0930    warfarin (COUMADIN) tablet 3 mg, 3 mg, Oral, Daily (warfarin), Tari Shanks MD, 3 mg at 02/09/22 1834    OBJECTIVE     Current Weight: Weight - Scale: 101 kg (223 lb)  Vitals:    02/10/22 1146   BP: (!) 176/78   Pulse: 82   Resp: 20   Temp: 98 °F (36 7 °C)   SpO2: 96%       Intake/Output Summary (Last 24 hours) at 2/10/2022 1339  Last data filed at 2/10/2022 1148  Gross per 24 hour   Intake 1380 ml   Output 1400 ml   Net -20 ml       PHYSICAL EXAMINATION     Physical Exam  Constitutional:       General: He is not in acute distress  Appearance: He is well-developed  HENT:      Head: Normocephalic  Eyes:      General: No scleral icterus  Conjunctiva/sclera: Conjunctivae normal    Neck:      Vascular: No JVD  Cardiovascular:      Rate and Rhythm: Normal rate  Heart sounds: Normal heart sounds  Pulmonary:      Effort: Pulmonary effort is normal       Breath sounds: No wheezing  Abdominal:      Palpations: Abdomen is soft  Tenderness: There is no abdominal tenderness  Musculoskeletal:         General: Normal range of motion  Cervical back: Neck supple  Skin:     General: Skin is warm  Findings: No rash  Neurological:      Mental Status: He is alert and oriented to person, place, and time     Psychiatric:         Behavior: Behavior normal           LAB RESULTS     Results from last 7 days   Lab Units 02/10/22  0546 02/09/22  4229 02/08/22  0887 02/07/22  0542 02/06/22  7517 02/05/22  5755 02/04/22  0648   WBC Thousand/uL 6 87 10 57* 8 52 7 61 9 13 10 41* 8 63   HEMOGLOBIN g/dL 9 2* 8 6* 9 0* 10 5* 10 6* 10 3* 10 6*   HEMATOCRIT % 28 7* 27 8* 28 3* 32 7* 34 4* 32 3* 32 3*   PLATELETS Thousands/uL 318 343 322 261 287 287 290   POTASSIUM mmol/L 3 9 3 8 5 1 4 5 4 3 4 1 3 7   CHLORIDE mmol/L 105 105 100 103 100 102 102   CO2 mmol/L 25 23 22 24 23 21 22   BUN mg/dL 41* 46* 37* 25 25 24 28*   CREATININE mg/dL 2 18* 2 43* 2 82* 2 46* 2 55* 2 40* 2 45*   EGFR ml/min/1 73sq m 30 27 22 26 25 27 26   CALCIUM mg/dL 7 9* 8 1* 8 5 8 7 8 3 8 3 8 4   PHOSPHORUS mg/dL  --   --   --   --  2 5  --   --        RADIOLOGY RESULTS      Results for orders placed during the hospital encounter of 02/03/22    XR chest portable    Narrative  CHEST    INDICATION:   pre op clearance  COMPARISON:  4/4/2017    EXAM PERFORMED/VIEWS:  XR CHEST PORTABLE      FINDINGS:    Cardiomediastinal silhouette appears unremarkable  Platelike atelectasis versus scar is noted at the left lung base  There is mild hypoventilation  No infiltrate is seen  No pneumothorax or pleural effusion  Osseous structures appear within normal limits for patient age  Impression  No acute cardiopulmonary disease  Workstation performed: [id number]    Results for orders placed during the hospital encounter of 04/04/17    XR chest pa & lateral    Narrative  CHEST - DUAL ENERGY    INDICATION:  Cough    COMPARISON:  None    VIEWS:  PA (including soft tissue/bone algorithms) and lateral projections    IMAGES:  4    FINDINGS:    Cardiomediastinal silhouette appears unremarkable  The lungs are clear  No pneumothorax or pleural effusion  Visualized osseous structures appear within normal limits for the patient's age  Impression  No active pulmonary disease  Workstation performed: [id number]    No results found for this or any previous visit  No results found for this or any previous visit      Results for orders placed during the hospital encounter of 01/15/21    CT abdomen pelvis wo contrast    Narrative  CT ABDOMEN AND PELVIS WITHOUT IV CONTRAST    INDICATION:   R10 9: Unspecified abdominal pain  COMPARISON:  None  TECHNIQUE:  CT examination of the abdomen and pelvis was performed without intravenous contrast   Axial, sagittal, and coronal 2D reformatted images were created from the source data and submitted for interpretation  Radiation dose length product (DLP) for this visit:  2252 mGy-cm   This examination, like all CT scans performed in the [facility], was performed utilizing techniques to minimize radiation dose exposure, including the use of iterative  reconstruction and automated exposure control  Enteric contrast was not administered  FINDINGS:    ABDOMEN    LOWER CHEST:  No clinically significant abnormality identified in the visualized lower chest     LIVER/BILIARY TREE:  Unremarkable  GALLBLADDER:  No calcified gallstones  No pericholecystic inflammatory change  SPLEEN:  Unremarkable  PANCREAS:  Unremarkable  ADRENAL GLANDS:  Unremarkable  KIDNEYS/URETERS:  There is a 7 mm calculus located within the proximal left ureter, 2 cm distal to the ureteropelvic junction causing mild hydronephrosis and perinephric stranding  There are multiple left-sided nonobstructing intrarenal calculi  measuring up to 7 mm  There are a few punctate nonobstructing right-sided intrarenal calculi  STOMACH AND BOWEL:  Unremarkable  APPENDIX:  No findings to suggest appendicitis  ABDOMINOPELVIC CAVITY:  No ascites  No pneumoperitoneum  No lymphadenopathy  VESSELS:  Unremarkable for patient's age  PELVIS    REPRODUCTIVE ORGANS:  Unremarkable for patient's age  URINARY BLADDER:  Unremarkable  ABDOMINAL WALL/INGUINAL REGIONS:  There are small bilateral fat-containing inguinal hernias  OSSEOUS STRUCTURES:  No acute fracture or destructive osseous lesion    There is diffuse bone demineralization  There is a moderate sized Schmorl's node at the superior endplate of L1  Impression  Mild left hydronephrosis and perinephric stranding, the cause of which appears to be a 7 mm calculus within the proximal ureter  Bilateral subcentimeter nonobstructing intrarenal calculi  Workstation performed: [id number]    No results found for this or any previous visit  PLAN / RECOMMENDATIONS      CKD stage 3:  Overall seems to be stable  Likely because of diabetes    Femoral fracture:  Status post surgery    Leg wound: Will require IV antibiotic for 6 weeks  Patient had a PICC line at this point    Will sign off and call back if needed    Leeanne Silva MD  Nephrology  2/10/2022        Portions of the record may have been created with voice recognition software  Occasional wrong word or "sound a like" substitutions may have occurred due to the inherent limitations of voice recognition software  Read the chart carefully and recognize, using context, where substitutions have occurred

## 2022-02-10 NOTE — PROGRESS NOTES
[address] [facility]  Progress Note Pratima Level 1955, 61 y o  male MRN: [id number]  Unit/Bed#: -Jeffery Encounter: [id number]  Primary Care Provider: Adry Houser DO   Date and time admitted to hospital: 2/3/2022  5:11 PM    * Closed intertrochanteric fracture of hip, right, initial encounter [facility])  Assessment & Plan  · Status post fall with CT pelvis revealing comminuted right femoral intertrochanteric fracture  · S/p ORIF of rt hip on 2/7, POD#3  · Orthopedic surgery following  · Resumed warfarin after discussion with ortho (H/O DVT)  · INR 1 41 today  · PT/OT recommend rehab however patient adamantly refuses  Plan for home with VNA tomorrow    Leg wound, right  Assessment & Plan  · Right tibial wound with exposed/compromised hardware  · S/p fall, noted to have a lower leg wound which on debridement in the OR had exposed hardware present from a previous fracture repair  · ID on board  · continue on Ancef, dose adjusted to 2 g every 12 hours ( Tentative plan for 6 weeks of IV antibiotic therapy)until 3/20/22 following which he will need PO suppressive therapy with keflex  · PICC line placed 2/9    ROSE (acute kidney injury) ([facility] [address] )  Assessment & Plan  · Creatinine at baseline around 1 3-1 5  · 2 18 today  · Renal ultrasound neg for hydronephrosis  · Nephrology following  · DC IV fluids  · Lisinopril on hold  · Continue to monitor BMP  Type 2 diabetes mellitus with hyperglycemia [facility])  Assessment & Plan  Lab Results   Component Value Date    HGBA1C 7 8 (H) 02/07/2022       Recent Labs     02/09/22  1608 02/09/22  2046 02/10/22  0724 02/10/22  1128   POCGLU 285* 277* 254* 294*       Blood Sugar Average: Last 72 hrs:  (P) 280 88     · Blood sugars much better controlled this a m  [place] Side · Transitioned from insulin drip to Lantus 25 units q h s  · Continue with sliding scale coverage with a c  HS checks  · Hold metformin during hospitalization  · Consistent carb diet        Anemia  Assessment & Plan  · Hemoglobin stable at 9 2 this a m   · On ferrous sulfate 325 mg daily  · Monitor H&H closely  · No active bleeding noted  Hematoma  Assessment & Plan  · CT pelvis revealing small adjacent anterior deep soft tissue hematoma 2 4 cm x 3 4 cm near right femoral fracture  · No signs of ecchymosis currently, vital signs stable  · Resumed warfarin  · Monitor H&H  Pre-operative clearance  Assessment & Plan  · Given patient's elevated troponin, and ST depressions in inferior leads after undergoing surgical procedure, further ischemic workup (nuclear stress testing) was planned for  however he then declined this intervention  · Repeat EKG reviewed on  and unremarkable  · DW cardiology, no further intervention planned as patient refusing nuclear stress  VTE Pharmacologic Prophylaxis:   Pharmacologic: Warfarin (Coumadin)  Mechanical VTE Prophylaxis in Place: No    Patient Centered Rounds: I have performed bedside rounds with nursing staff today  Discussions with Specialists or Other Care Team Provider: nephro/cm    Education and Discussions with Family / Patient: dw patient    Time Spent for Care: 30 minutes  More than 50% of total time spent on counseling and coordination of care as described above  Current Length of Stay: 7 day(s)    Current Patient Status: Inpatient   Certification Statement: The patient will continue to require additional inpatient hospital stay due to needs VNA arranged for home iv abx    Discharge Plan: likely home tomorrow    Code Status: Level 1 - Full Code      Subjective:   No new concerns  Patient does understand he needs to stay in the hospital for his antibiotics until tomorrow so that VNA can resume care on Saturday morning  He is agreeable to the same  Denies fever chills  No chest pain shortness of breath  Pain adequately controlled      Objective:     Vitals:   Temp (24hrs), Av °F (36 7 °C), Min:97 5 °F (36 4 °C), Max:98 5 °F (36 9 °C)    Temp:  [97 5 °F (36 4 °C)-98 5 °F (36 9 °C)] 98 °F (36 7 °C)  HR:  [82-85] 82  Resp:  [18-20] 20  BP: (135-176)/(77-91) 176/78  SpO2:  [96 %] 96 %  Body mass index is 29 42 kg/m²  Input and Output Summary (last 24 hours): Intake/Output Summary (Last 24 hours) at 2/10/2022 1157  Last data filed at 2/10/2022 1148  Gross per 24 hour   Intake 1380 ml   Output 1400 ml   Net -20 ml       Physical Exam:     Physical Exam  Constitutional:       General: He is not in acute distress  Appearance: He is obese  He is not toxic-appearing  HENT:      Mouth/Throat:      Mouth: Mucous membranes are moist       Pharynx: Oropharynx is clear  Cardiovascular:      Rate and Rhythm: Normal rate and regular rhythm  Pulses: Normal pulses  Pulmonary:      Effort: Pulmonary effort is normal  No respiratory distress  Breath sounds: Normal breath sounds  No wheezing  Abdominal:      General: There is distension  Tenderness: There is no abdominal tenderness  There is no guarding or rebound  Musculoskeletal:      Right lower leg: No edema  Left lower leg: No edema  Neurological:      General: No focal deficit present  Mental Status: He is alert and oriented to person, place, and time           Additional Data:     Labs:    Results from last 7 days   Lab Units 02/10/22  0546   WBC Thousand/uL 6 87   HEMOGLOBIN g/dL 9 2*   HEMATOCRIT % 28 7*   PLATELETS Thousands/uL 318   NEUTROS PCT % 69   LYMPHS PCT % 20   MONOS PCT % 8   EOS PCT % 3     Results from last 7 days   Lab Units 02/10/22  0546   SODIUM mmol/L 137   POTASSIUM mmol/L 3 9   CHLORIDE mmol/L 105   CO2 mmol/L 25   BUN mg/dL 41*   CREATININE mg/dL 2 18*   ANION GAP mmol/L 7   CALCIUM mg/dL 7 9*   GLUCOSE RANDOM mg/dL 262*     Results from last 7 days   Lab Units 02/10/22  0546   INR  1 41*     Results from last 7 days   Lab Units 02/10/22  1128 02/10/22  0724 02/09/22  2046 02/09/22  1608 02/09/22  1115 02/09/22  0742 02/09/22  0512 02/09/22  0246 02/09/22  0005 02/08/22  2231 02/08/22  2036 02/08/22  1825   POC GLUCOSE mg/dl 294* 254* 277* 285* 236* 83 117 160* 137 127 180* 197*     Results from last 7 days   Lab Units 02/07/22  0542   HEMOGLOBIN A1C % 7 8*           * I Have Reviewed All Lab Data Listed Above  * Additional Pertinent Lab Tests Reviewed: All Labs Within Last 24 Hours Reviewed    Recent Cultures (last 7 days):           Last 24 Hours Medication List:   Current Facility-Administered Medications   Medication Dose Route Frequency Provider Last Rate    acetaminophen  975 mg Oral Q8H Florentino Favre, PA-C      aluminum-magnesium hydroxide-simethicone  30 mL Oral Q6H PRN Rondal Kanner, PA-C      cefazolin  2,000 mg Intravenous Q8H Deepti Stone MD 2,000 mg (02/10/22 0552)    colchicine  0 6 mg Oral Daily Rondal Kanner, PA-C      ferrous sulfate  325 mg Oral Daily With Breakfast Rondal Kanner, PA-C      HYDROmorphone  0 2 mg Intravenous Q4H PRN Rondal Kanner, PA-C      hydrOXYzine HCL  25 mg Oral Q6H PRN Rondal Kanner, PA-C      insulin glargine  25 Units Subcutaneous HS Vee Alcala MD      insulin lispro  1-5 Units Subcutaneous TID AC Robyn Manzano MD      lidocaine  1 patch Topical Daily PRN Rondal Kanner, PA-C      naloxone  0 04 mg Intravenous Q1MIN PRN Rondal Kanner, PA-C      oxyCODONE  10 mg Oral Q12H [address] Vee Alcala MD      polyethylene glycol  17 g Oral Daily PRN Rondal Kanner, PA-C      pravastatin  40 mg Oral Daily With [employer]      senna-docusate sodium  1 tablet Oral BID Rondal Kanner, PA-C      warfarin  3 mg Oral Daily (warfarin) Vee Alcala MD          Today, Patient Was Seen By: RACQUEL Payne      ** Please Note: Dictation voice to text software may have been used in the creation of this document   **

## 2022-02-10 NOTE — ASSESSMENT & PLAN NOTE
· Right tibial wound with exposed/compromised hardware  · S/p fall, noted to have a lower leg wound which on debridement in the OR had exposed hardware present from a previous fracture repair  · ID on board  · continue on Ancef, dose adjusted to 2 g every 12 hours ( Tentative plan for 6 weeks of IV antibiotic therapy)until 3/20/22 following which he will need PO suppressive therapy with keflex    · PICC line placed 2/9

## 2022-02-11 LAB
ANION GAP SERPL CALCULATED.3IONS-SCNC: 7 MMOL/L (ref 4–13)
BASOPHILS # BLD AUTO: 0.02 THOUSANDS/ΜL (ref 0–0.1)
BASOPHILS NFR BLD AUTO: 0 % (ref 0–1)
BUN SERPL-MCNC: 36 MG/DL (ref 5–25)
CALCIUM SERPL-MCNC: 8.3 MG/DL (ref 8.3–10.1)
CHLORIDE SERPL-SCNC: 103 MMOL/L (ref 100–108)
CO2 SERPL-SCNC: 26 MMOL/L (ref 21–32)
CREAT SERPL-MCNC: 2.18 MG/DL (ref 0.6–1.3)
EOSINOPHIL # BLD AUTO: 0.33 THOUSAND/ΜL (ref 0–0.61)
EOSINOPHIL NFR BLD AUTO: 4 % (ref 0–6)
ERYTHROCYTE [DISTWIDTH] IN BLOOD BY AUTOMATED COUNT: 16 % (ref 11.6–15.1)
GFR SERPL CREATININE-BSD FRML MDRD: 30 ML/MIN/1.73SQ M
GLUCOSE SERPL-MCNC: 276 MG/DL (ref 65–140)
GLUCOSE SERPL-MCNC: 293 MG/DL (ref 65–140)
GLUCOSE SERPL-MCNC: 321 MG/DL (ref 65–140)
GLUCOSE SERPL-MCNC: 346 MG/DL (ref 65–140)
GLUCOSE SERPL-MCNC: 367 MG/DL (ref 65–140)
HCT VFR BLD AUTO: 28.5 % (ref 36.5–49.3)
HGB BLD-MCNC: 9.2 G/DL (ref 12–17)
IMM GRANULOCYTES # BLD AUTO: 0.02 THOUSAND/UL (ref 0–0.2)
IMM GRANULOCYTES NFR BLD AUTO: 0 % (ref 0–2)
INR PPP: 1.52 (ref 0.84–1.19)
LYMPHOCYTES # BLD AUTO: 1.5 THOUSANDS/ΜL (ref 0.6–4.47)
LYMPHOCYTES NFR BLD AUTO: 20 % (ref 14–44)
MCH RBC QN AUTO: 28.5 PG (ref 26.8–34.3)
MCHC RBC AUTO-ENTMCNC: 32.3 G/DL (ref 31.4–37.4)
MCV RBC AUTO: 88 FL (ref 82–98)
MONOCYTES # BLD AUTO: 0.67 THOUSAND/ΜL (ref 0.17–1.22)
MONOCYTES NFR BLD AUTO: 9 % (ref 4–12)
NEUTROPHILS # BLD AUTO: 4.96 THOUSANDS/ΜL (ref 1.85–7.62)
NEUTS SEG NFR BLD AUTO: 67 % (ref 43–75)
NRBC BLD AUTO-RTO: 0 /100 WBCS
PLATELET # BLD AUTO: 312 THOUSANDS/UL (ref 149–390)
PMV BLD AUTO: 9.6 FL (ref 8.9–12.7)
POTASSIUM SERPL-SCNC: 4.1 MMOL/L (ref 3.5–5.3)
PROTHROMBIN TIME: 17.6 SECONDS (ref 11.6–14.5)
RBC # BLD AUTO: 3.23 MILLION/UL (ref 3.88–5.62)
SODIUM SERPL-SCNC: 136 MMOL/L (ref 136–145)
WBC # BLD AUTO: 7.5 THOUSAND/UL (ref 4.31–10.16)

## 2022-02-11 PROCEDURE — 99232 SBSQ HOSP IP/OBS MODERATE 35: CPT | Performed by: FAMILY MEDICINE

## 2022-02-11 PROCEDURE — 99024 POSTOP FOLLOW-UP VISIT: CPT

## 2022-02-11 PROCEDURE — 85610 PROTHROMBIN TIME: CPT | Performed by: FAMILY MEDICINE

## 2022-02-11 PROCEDURE — 80048 BASIC METABOLIC PNL TOTAL CA: CPT | Performed by: FAMILY MEDICINE

## 2022-02-11 PROCEDURE — 82948 REAGENT STRIP/BLOOD GLUCOSE: CPT

## 2022-02-11 PROCEDURE — 99232 SBSQ HOSP IP/OBS MODERATE 35: CPT | Performed by: INTERNAL MEDICINE

## 2022-02-11 PROCEDURE — 85025 COMPLETE CBC W/AUTO DIFF WBC: CPT | Performed by: FAMILY MEDICINE

## 2022-02-11 RX ADMIN — ACETAMINOPHEN 975 MG: 325 TABLET, FILM COATED ORAL at 21:12

## 2022-02-11 RX ADMIN — OXYCODONE HYDROCHLORIDE 10 MG: 10 TABLET, FILM COATED, EXTENDED RELEASE ORAL at 21:12

## 2022-02-11 RX ADMIN — CEFAZOLIN SODIUM 2000 MG: 2 SOLUTION INTRAVENOUS at 06:45

## 2022-02-11 RX ADMIN — SENNOSIDES AND DOCUSATE SODIUM 1 TABLET: 50; 8.6 TABLET ORAL at 08:38

## 2022-02-11 RX ADMIN — ACETAMINOPHEN 975 MG: 325 TABLET, FILM COATED ORAL at 13:19

## 2022-02-11 RX ADMIN — CEFAZOLIN SODIUM 2000 MG: 2 SOLUTION INTRAVENOUS at 21:15

## 2022-02-11 RX ADMIN — OXYCODONE HYDROCHLORIDE 10 MG: 10 TABLET, FILM COATED, EXTENDED RELEASE ORAL at 08:38

## 2022-02-11 RX ADMIN — ACETAMINOPHEN 975 MG: 325 TABLET, FILM COATED ORAL at 06:45

## 2022-02-11 RX ADMIN — WARFARIN SODIUM 3 MG: 3 TABLET ORAL at 17:16

## 2022-02-11 RX ADMIN — FERROUS SULFATE TAB 325 MG (65 MG ELEMENTAL FE) 325 MG: 325 (65 FE) TAB at 08:38

## 2022-02-11 RX ADMIN — INSULIN LISPRO 3 UNITS: 100 INJECTION, SOLUTION INTRAVENOUS; SUBCUTANEOUS at 08:39

## 2022-02-11 RX ADMIN — INSULIN LISPRO 4 UNITS: 100 INJECTION, SOLUTION INTRAVENOUS; SUBCUTANEOUS at 17:16

## 2022-02-11 RX ADMIN — COLCHICINE 0.6 MG: 0.6 TABLET, FILM COATED ORAL at 08:40

## 2022-02-11 RX ADMIN — SENNOSIDES AND DOCUSATE SODIUM 1 TABLET: 50; 8.6 TABLET ORAL at 21:12

## 2022-02-11 RX ADMIN — INSULIN LISPRO 3 UNITS: 100 INJECTION, SOLUTION INTRAVENOUS; SUBCUTANEOUS at 11:42

## 2022-02-11 RX ADMIN — PRAVASTATIN SODIUM 40 MG: 40 TABLET ORAL at 17:16

## 2022-02-11 RX ADMIN — CEFAZOLIN SODIUM 2000 MG: 2 SOLUTION INTRAVENOUS at 13:19

## 2022-02-11 RX ADMIN — INSULIN GLARGINE 30 UNITS: 100 INJECTION, SOLUTION SUBCUTANEOUS at 21:14

## 2022-02-11 NOTE — ASSESSMENT & PLAN NOTE
· Right tibial wound with exposed/compromised hardware  · S/p fall, noted to have a lower leg wound which on debridement in the OR had exposed hardware present from a previous fracture repair  · ID on board  · continue on Ancef, dose adjusted to 2 g every 8 hours ( Tentative plan for 6 weeks of IV antibiotic therapy)until 3/20/22 following which he will need PO suppressive therapy with keflex    · PICC line placed 2/9

## 2022-02-11 NOTE — PROGRESS NOTES
[address] [facility]  Progress Note Prema Minus 1955, 61 y o  male MRN: [id number]  Unit/Bed#: -Jeffery Encounter: [id number]  Primary Care Provider: Jason Lo DO   Date and time admitted to hospital: 2/3/2022  5:11 PM    * Closed intertrochanteric fracture of hip, right, initial encounter [facility])  Assessment & Plan  · Status post fall with CT pelvis revealing comminuted right femoral intertrochanteric fracture  · S/p ORIF of rt hip on 2/7, POD#4  · Orthopedic surgery following  · Resumed warfarin after discussion with ortho (H/O DVT)  · INR 1 52 today  · PT/OT recommend rehab however patient adamantly refuses  Plan for home with VNA tomorrow    Leg wound, right  Assessment & Plan  · Right tibial wound with exposed/compromised hardware  · S/p fall, noted to have a lower leg wound which on debridement in the OR had exposed hardware present from a previous fracture repair  · ID on board  · continue on Ancef, dose adjusted to 2 g every 8 hours ( Tentative plan for 6 weeks of IV antibiotic therapy)until 3/20/22 following which he will need PO suppressive therapy with keflex  · PICC line placed 2/9    ROSE (acute kidney injury) ([facility] [address] )  Assessment & Plan  · Creatinine at baseline around 1 3-1 5  · 2 18 today  · Renal ultrasound neg for hydronephrosis  · Nephrology signed off  · IV fluids DC'd  · Lisinopril on hold  · Continue to monitor BMP  Type 2 diabetes mellitus with hyperglycemia [facility])  Assessment & Plan  Lab Results   Component Value Date    HGBA1C 7 8 (H) 02/07/2022       Recent Labs     02/10/22  1504 02/10/22  2115 02/11/22  0728 02/11/22  1105   POCGLU 222* 301* 293* 321*       Blood Sugar Average: Last 72 hrs:  (P) 662 [id number]     · Blood sugars much better controlled this a m  Serenity Skipper · Transitioned from insulin drip to Lantus 30 units q h s  · Continue with sliding scale coverage with a c  HS checks  · Hold metformin during hospitalization    · Consistent carb diet       Anemia  Assessment & Plan  · Hemoglobin stable at 9 2 this a m   · On ferrous sulfate 325 mg daily  · Monitor H&H closely  · No active bleeding noted  VTE Pharmacologic Prophylaxis:   Pharmacologic: Warfarin (Coumadin)  Mechanical VTE Prophylaxis in Place: No    Patient Centered Rounds: I have performed bedside rounds with nursing staff today  Discussions with Specialists or Other Care Team Provider: karlo    Education and Discussions with Family / Patient: dw patient    Time Spent for Care: 30 minutes  More than 50% of total time spent on counseling and coordination of care as described above  Current Length of Stay: 8 day(s)    Current Patient Status: Inpatient   Certification Statement: The patient will continue to require additional inpatient hospital stay due to needs iv abx/ vna arrangement    Discharge Plan: likely tomorrow    Code Status: Level 1 - Full Code      Subjective:   No new concerns  Last dose of antibiotic scheduled at 10:00 p m  Tonight  Patient reports he will not have transportation following that hence will stay until tomorrow morning and then DC home with VNA  Objective:     Vitals:   Temp (24hrs), Av °F (36 7 °C), Min:97 8 °F (36 6 °C), Max:98 1 °F (36 7 °C)    Temp:  [97 8 °F (36 6 °C)-98 1 °F (36 7 °C)] 97 8 °F (36 6 °C)  HR:  [75-94] 75  Resp:  [16-19] 16  BP: (154-167)/(74-77) 167/77  SpO2:  [97 %] 97 %  Body mass index is 29 42 kg/m²  Input and Output Summary (last 24 hours): Intake/Output Summary (Last 24 hours) at 2022 1318  Last data filed at 2022 1256  Gross per 24 hour   Intake 4136 67 ml   Output 1835 ml   Net 2301 67 ml       Physical Exam:     Physical Exam  Constitutional:       General: He is not in acute distress  Appearance: He is obese  He is not toxic-appearing  HENT:      Mouth/Throat:      Mouth: Mucous membranes are moist       Pharynx: Oropharynx is clear     Cardiovascular:      Rate and Rhythm: Normal rate and regular rhythm  Pulses: Normal pulses  Pulmonary:      Effort: Pulmonary effort is normal  No respiratory distress  Breath sounds: Normal breath sounds  No wheezing  Abdominal:      General: Abdomen is flat  Bowel sounds are normal       Palpations: Abdomen is soft  Neurological:      General: No focal deficit present  Mental Status: He is alert and oriented to person, place, and time  Additional Data:     Labs:    Results from last 7 days   Lab Units 02/11/22  0647   WBC Thousand/uL 7 50   HEMOGLOBIN g/dL 9 2*   HEMATOCRIT % 28 5*   PLATELETS Thousands/uL 312   NEUTROS PCT % 67   LYMPHS PCT % 20   MONOS PCT % 9   EOS PCT % 4     Results from last 7 days   Lab Units 02/11/22  0647   SODIUM mmol/L 136   POTASSIUM mmol/L 4 1   CHLORIDE mmol/L 103   CO2 mmol/L 26   BUN mg/dL 36*   CREATININE mg/dL 2 18*   ANION GAP mmol/L 7   CALCIUM mg/dL 8 3   GLUCOSE RANDOM mg/dL 276*     Results from last 7 days   Lab Units 02/11/22  0647   INR  1 52*     Results from last 7 days   Lab Units 02/11/22  1105 02/11/22  0728 02/10/22  2115 02/10/22  1504 02/10/22  1128 02/10/22  0724 02/09/22  2046 02/09/22  1608 02/09/22  1115 02/09/22  0742 02/09/22  0512 02/09/22  0246   POC GLUCOSE mg/dl 321* 293* 301* 222* 294* 254* 277* 285* 236* 83 117 160*     Results from last 7 days   Lab Units 02/07/22  0542   HEMOGLOBIN A1C % 7 8*           * I Have Reviewed All Lab Data Listed Above  * Additional Pertinent Lab Tests Reviewed:  All Labs Within Last 24 Hours Reviewed    Recent Cultures (last 7 days):           Last 24 Hours Medication List:   Current Facility-Administered Medications   Medication Dose Route Frequency Provider Last Rate    acetaminophen  975 mg Oral Q8H [address] Morgan Garcia PA-C      aluminum-magnesium hydroxide-simethicone  30 mL Oral Q6H PRN Elijah Cheng PA-C      cefazolin  2,000 mg Intravenous Q8H Ron Fajardo MD 2,000 mg (02/11/22 0645)    colchicine  0 6 mg Oral Daily Karthik Kevin, PA-C      ferrous sulfate  325 mg Oral Daily With Breakfast Karthik Coe, PA-C      HYDROmorphone  0 2 mg Intravenous Q4H PRN Karthik Kevin, PA-C      hydrOXYzine HCL  25 mg Oral Q6H PRN Karthik Kevin, PA-C      insulin glargine  30 Units Subcutaneous HS Stephen Muro MD      insulin lispro  1-5 Units Subcutaneous TID AC Robyn Gama MD      lidocaine  1 patch Topical Daily PRN Karthik Kevin, PA-C      naloxone  0 04 mg Intravenous Q1MIN PRN Karthik Kevin, PA-C      oxyCODONE  10 mg Oral Q12H [address] Stephen Muro MD      polyethylene glycol  17 g Oral Daily PRN Karthik Kevin, PA-C      pravastatin  40 mg Oral Daily With [employer], PA-C      senna-docusate sodium  1 tablet Oral BID Karthik Kevin, PA-C      warfarin  3 mg Oral Daily (warfarin) Stephen Muro MD          Today, Patient Was Seen By: Everlean Angelucci, M D      ** Please Note: Dictation voice to text software may have been used in the creation of this document   **

## 2022-02-11 NOTE — PROGRESS NOTES
Progress Note - Orthopedics   Frida Crimes 61 y o  male MRN: [id number]  Unit/Bed#: -01      Subjective:    61 y o male POD#3 right TFNA, I&D left lateral tibial plateau  He states that his leg is doing well this morning  Patient states he has minimal pain in his his any currently  Patient denies any fevers any chills  Patient denies any shortness of breath chest tightness chest pain  Patient denies any dizziness lightheadedness  Patient denies any numbness or tingling in his leg  Patient offers no other complaints at this time      Labs:  0   Lab Value Date/Time    HCT 28 7 (L) 02/10/2022 0546    HCT 27 8 (L) 02/09/2022 0513    HCT 28 3 (L) 02/08/2022 0442    HCT 38 4 07/13/2015 1448    HCT 38 6 10/20/2014 1132    HGB 9 2 (L) 02/10/2022 0546    HGB 8 6 (L) 02/09/2022 0513    HGB 9 0 (L) 02/08/2022 0442    HGB 13 1 07/13/2015 1448    HGB 13 1 10/20/2014 1132    INR 1 41 (H) 02/10/2022 0546    INR 2 94 (H) 12/11/2015 1543    WBC 6 87 02/10/2022 0546    WBC 10 57 (H) 02/09/2022 0513    WBC 8 52 02/08/2022 0442    WBC 7 7 07/13/2015 1448    WBC 8 30 10/20/2014 1132    ESR 58 (H) 05/20/2016 1207       Meds:    Current Facility-Administered Medications:     acetaminophen (TYLENOL) tablet 975 mg, 975 mg, Oral, Q8H [address], Morgan Garcia PA-C, 975 mg at 02/10/22 1346    aluminum-magnesium hydroxide-simethicone (MYLANTA) oral suspension 30 mL, 30 mL, Oral, Q6H PRN, Victor Manuel Mason PA-C    ceFAZolin (ANCEF) IVPB (premix in dextrose) 2,000 mg 50 mL, 2,000 mg, Intravenous, Q8H, Rocky Stewart MD, Last Rate: 100 mL/hr at 02/10/22 1347, 2,000 mg at 02/10/22 1347    colchicine (COLCRYS) tablet 0 6 mg, 0 6 mg, Oral, Daily, Victor Manuel Mason PA-C, 0 6 mg at 02/10/22 5778    ferrous sulfate tablet 325 mg, 325 mg, Oral, Daily With Breakfast, Morgan Garcia PA-C, 325 mg at 02/10/22 0942    HYDROmorphone HCl (DILAUDID) injection 0 2 mg, 0 2 mg, Intravenous, Q4H PRN, Morgan Garcia PA-C, 0 2 mg at 02/06/22 1507    hydrOXYzine HCL (ATARAX) tablet 25 mg, 25 mg, Oral, Q6H PRN, Eliza Boggs PA-C    insulin glargine (LANTUS) subcutaneous injection 30 Units 0 3 mL, 30 Units, Subcutaneous, HS, Leslee Tobias MD    insulin lispro (HumaLOG) 100 units/mL subcutaneous injection 1-5 Units, 1-5 Units, Subcutaneous, TID AC, 2 Units at 02/10/22 1645 **AND** Fingerstick Glucose (POCT), , , TID AC, Robyn Marquis MD    lidocaine (LIDODERM) 5 % patch 1 patch, 1 patch, Topical, Daily PRN, Eliza Boggs PA-C, 1 patch at 02/04/22 1204    naloxone (NARCAN) 0 04 mg/mL syringe 0 04 mg, 0 04 mg, Intravenous, Q1MIN PRN, Eliza Boggs PA-C    oxyCODONE (OxyCONTIN) 12 hr tablet 10 mg, 10 mg, Oral, Q12H [address], Leslee Tobias MD, 10 mg at 02/10/22 0943    polyethylene glycol (MIRALAX) packet 17 g, 17 g, Oral, Daily PRN, Eliza Boggs PA-C, 17 g at 02/06/22 1108    pravastatin (PRAVACHOL) tablet 40 mg, 40 mg, Oral, Daily With Raymondo Friday, JOSEF, 40 mg at 02/10/22 1737    senna-docusate sodium (SENOKOT S) 8 6-50 mg per tablet 1 tablet, 1 tablet, Oral, BID, Eliza Boggs PA-C, 1 tablet at 02/08/22 0930    warfarin (COUMADIN) tablet 3 mg, 3 mg, Oral, Daily (warfarin), Leslee Tobias MD, 3 mg at 02/10/22 1737    Blood Culture:   No results found for: BLOODCX    Wound Culture:   No results found for: WOUNDCULT    Ins and Outs:  I/O last 24 hours: In: 4376 7 [P O :1200; I V :3176 7]  Out: 1360 [Urine:1360]          Physical:  Vitals:    02/10/22 1500   BP: 154/76   Pulse: 84   Resp: 19   Temp: 98 1 °F (36 7 °C)   SpO2: 97%     Musculoskeletal: right Lower Extremity  · Patient resting comfortably in hospital bed in no acute distress  · Skin  :  Extremity appears well-perfused overall  · Dressing  :  Dressing is clean dry and intact with no visible soilage present   · TTP  mild tenderness palpation noted over incision sites    No other bony or soft tissue tenderness palpation noted at this time   · SILT s/s/sp/dp/t  +fhl/ehl, +ankle dorsi/plantar flexion  2+ DP pulse      Assessment:    66 y o male POD#3 right TFNA, I&D left lateral tibial plateau  Plan:  · WBAT right lower extremity    · PT/OT for ambulation assistance, gait training  · Pain control per primary team  · DVT ppx per primary team  · Dispo: Ortho will follow  Continue weight-bearing as tolerated right lower extremity  PT for ambulation symptoms, gait training  Patient will likely need long-term antibiotics for open wound right lower extremity  Patient has PICC line  Per Infectious Disease he will be on a course of antibiotics for 6 weeks  His plan is to be discharged on Saturday as that is when the VNA is able to come to his house to administer antibiotics  He can follow up with Dr Magdaleno Nolan in 1 week for re-evaluation and x-rays of the right hip and knee  Suture removal can also be performed at that time  No further orthopedic intervention is needed at this time  If there are any questions, please reach out  Kimberley Randhawa PA-C                Portions of the record may have been created with voice recognition software  Occasional wrong word or "sound a like" substitutions may have occurred due to the inherent limitations of voice recognition software  Read the chart carefully and recognize, using context, where substitutions have occurred

## 2022-02-11 NOTE — PROGRESS NOTES
Progress Note - Orthopedics   Adrienne Traylor 61 y o  male MRN: [id number]  Unit/Bed#: -01      Subjective:    66 y o male POD#4 right TFNA, I&D right  lateral tibial plateau  Patient mentions pain in his right lower extremity today that requires use of pain medication  Patient states he is unable to lift his right lower extremity or move it at all since surgery  Patient worked with physical therapy on POD#1  Patient was able to be up and bed yesterday and use bedside commode  Patient denies any numbness or tingling in right lower extremity  Patient denies any shortness of breath, chest pain, lightheadedness, dizziness, nausea, and vomiting       Labs:  0   Lab Value Date/Time    HCT 28 5 (L) 02/11/2022 0647    HCT 28 7 (L) 02/10/2022 0546    HCT 27 8 (L) 02/09/2022 0513    HCT 38 4 07/13/2015 1448    HCT 38 6 10/20/2014 1132    HGB 9 2 (L) 02/11/2022 0647    HGB 9 2 (L) 02/10/2022 0546    HGB 8 6 (L) 02/09/2022 0513    HGB 13 1 07/13/2015 1448    HGB 13 1 10/20/2014 1132    INR 1 41 (H) 02/10/2022 0546    INR 2 94 (H) 12/11/2015 1543    WBC 7 50 02/11/2022 0647    WBC 6 87 02/10/2022 0546    WBC 10 57 (H) 02/09/2022 0513    WBC 7 7 07/13/2015 1448    WBC 8 30 10/20/2014 1132    ESR 58 (H) 05/20/2016 1207       Meds:    Current Facility-Administered Medications:     acetaminophen (TYLENOL) tablet 975 mg, 975 mg, Oral, Q8H [address], Morgan Garcia PA-C, 975 mg at 02/11/22 0645    aluminum-magnesium hydroxide-simethicone (MYLANTA) oral suspension 30 mL, 30 mL, Oral, Q6H PRN, Jenise Mcclellan PA-C    ceFAZolin (ANCEF) IVPB (premix in dextrose) 2,000 mg 50 mL, 2,000 mg, Intravenous, Q8H, Manjeet Bojorquez MD, Last Rate: 100 mL/hr at 02/11/22 0645, 2,000 mg at 02/11/22 0645    colchicine (COLCRYS) tablet 0 6 mg, 0 6 mg, Oral, Daily, Jenise Mcclellan PA-C, 0 6 mg at 02/10/22 1261    ferrous sulfate tablet 325 mg, 325 mg, Oral, Daily With Breakfast, Morgan Garcia PA-C, 325 mg at 02/10/22 3686   HYDROmorphone HCl (DILAUDID) injection 0 2 mg, 0 2 mg, Intravenous, Q4H PRN, Maral Maldonado PA-C, 0 2 mg at 02/06/22 1507    hydrOXYzine HCL (ATARAX) tablet 25 mg, 25 mg, Oral, Q6H PRN, Maral Maldonado PA-C    insulin glargine (LANTUS) subcutaneous injection 30 Units 0 3 mL, 30 Units, Subcutaneous, HS, Nusrat Roth MD, 30 Units at 02/10/22 2226    insulin lispro (HumaLOG) 100 units/mL subcutaneous injection 1-5 Units, 1-5 Units, Subcutaneous, TID AC, 2 Units at 02/10/22 1645 **AND** Fingerstick Glucose (POCT), , , TID AC, Ashwaty Karilyn Boeck, MD    lidocaine (LIDODERM) 5 % patch 1 patch, 1 patch, Topical, Daily PRN, Maral Maldonado PA-C, 1 patch at 02/04/22 1204    naloxone (NARCAN) 0 04 mg/mL syringe 0 04 mg, 0 04 mg, Intravenous, Q1MIN PRN, Maral Maldonado PA-C    oxyCODONE (OxyCONTIN) 12 hr tablet 10 mg, 10 mg, Oral, Q12H [facility], Nusrat Roth MD, 10 mg at 02/10/22 2242    polyethylene glycol (MIRALAX) packet 17 g, 17 g, Oral, Daily PRN, Maral Maldonado PA-C, 17 g at 02/06/22 1108    pravastatin (PRAVACHOL) tablet 40 mg, 40 mg, Oral, Daily With Destiny Tipton PA-C, 40 mg at 02/10/22 1737    senna-docusate sodium (SENOKOT S) 8 6-50 mg per tablet 1 tablet, 1 tablet, Oral, BID, Maral Maldonado PA-C, 1 tablet at 02/10/22 2242    warfarin (COUMADIN) tablet 3 mg, 3 mg, Oral, Daily (warfarin), Nusrat Roth MD, 3 mg at 02/10/22 1737    Blood Culture:   No results found for: BLOODCX    Wound Culture:   No results found for: WOUNDCULT    Ins and Outs:  I/O last 24 hours: In: 3896 7 [P O :720; I V :3176 7]  Out: 1770 [Urine:1770]          Physical:  Vitals:    02/10/22 2300   BP: 167/74   Pulse: 94   Resp: 19   Temp: 98 1 °F (36 7 °C)   SpO2:      Musculoskeletal: right Lower Extremity  · Patient resting comfortably in hospital bed in no acute distress  · Skin  :  Extremity appears well-perfused overall    · Dressing  : Mepilex dressings are clean dry and intact with no visible soilage present   · TTP  mild tenderness palpation noted over incision sites  No other bony or soft tissue tenderness palpation noted at this time  · SILT s/s/sp/dp/t  +fhl/ehl, +ankle dorsi/plantar flexion  2+ DP pulse  · Calf is supple and nontender       Assessment:    66 y o male POD#4 right TFNA, I&D right lateral tibial plateau  Plan:  · WBAT right lower extremity    · PT/OT for ambulation assistance, gait training  · Pain control per primary team  · DVT ppx per primary team  · Dispo: Ortho will follow  Continue weight-bearing as tolerated right lower extremity  PT for ambulation symptoms, gait training  Patient will likely need long-term antibiotics for open wound right lower extremity  Patient has PICC line  Per Infectious Disease he will be on a course of antibiotics for 6 weeks  His plan is to be discharged on Saturday as that is when the [facility] is able to come to his house to administer antibiotics  He can follow up with Dr Joe Villalpando in 1 week for re-evaluation and x-rays of the right hip and knee  Suture removal can also be performed at that time  No further orthopedic intervention is needed at this time  If there are any questions, please reach out  Fernanda Kelley PA-C                Portions of the record may have been created with voice recognition software  Occasional wrong word or "sound a like" substitutions may have occurred due to the inherent limitations of voice recognition software  Read the chart carefully and recognize, using context, where substitutions have occurred

## 2022-02-11 NOTE — ASSESSMENT & PLAN NOTE
· Status post fall with CT pelvis revealing comminuted right femoral intertrochanteric fracture  · S/p ORIF of rt hip on 2/7, POD#4  · Orthopedic surgery following  · Resumed warfarin after discussion with ortho (H/O DVT)  · INR 1 52 today  · PT/OT recommend rehab however patient adamantly refuses   Plan for home with [facility] tomorrow

## 2022-02-11 NOTE — ASSESSMENT & PLAN NOTE
Lab Results   Component Value Date    HGBA1C 7 8 (H) 02/07/2022       Recent Labs     02/10/22  1504 02/10/22  2115 02/11/22  0728 02/11/22  1105   POCGLU 222* 301* 293* 321*       Blood Sugar Average: Last 72 hrs:  (P) 180 [id number]     · Blood sugars much better controlled this shawn m  Carrington Colin · Transitioned from insulin drip to Lantus 30 units q h s  · Continue with sliding scale coverage with a c  HS checks  · Hold metformin during hospitalization  · Consistent carb diet

## 2022-02-11 NOTE — ASSESSMENT & PLAN NOTE
· Creatinine at baseline around 1 3-1 5  · 2 18 today  · Renal ultrasound neg for hydronephrosis  · Nephrology signed off  · IV fluids DC'd  · Lisinopril on hold  · Continue to monitor BMP

## 2022-02-11 NOTE — PROGRESS NOTES
Progress Note - Infectious Disease   Christopher Chao 61 y o  male MRN: [id number]  Unit/Bed#: -01 Encounter: [id number]      Impression/Plan:  1  Lower leg wound with exposed/compromised hardware   Patient presented initially after a fall and was found to have right femur fracture on 2/3   He was also noted to have a lower leg wound which on debridement in the OR on 2/7 had exposed hardware present from a previous fracture repair   No gross purulence in the OR   No cultures collected  Recardo  is likely that the hardware is compromised by skin silver   No drug allergies noted  Allyn Spatz dysfunction noted, improving  Continue Ancef 2 g every 8 hours  Plan for total 6 weeks of therapy through 3/20  Weekly labs with CBCD and creatinine  Maintain current PICC line  Plan for follow-up in the ID office 2 weeks after discharge  Will transition to oral suppressive therapy with Duricef thereafter  No plans currently for hardware removal per Orthopedics  ID office notified of follow-up needs  Script provided to Case Management  previously  Continue to trend fever curve/WBC while admitted  Additional care/discharge as per primary     2  Closed right femur fracture after fall   Patient is status post intramedullary nail of an intratrochanteric fracture after a fall   Course complicated by the above   Continue antibiotics as above and ongoing follow-up by Orthopedics      3  Acute kidney injury on chronic kidney disease   Acute elevation in creatinine noted from prior   This does impact antibiotic dosing   Stable today    Ancef dosing as above  Will further dose adjust antibiotics as needed  Repeat chemistry tomorrow  Ongoing follow-up by Nephrology  Avoid nephrotoxic agents  Plans for outpatient lab monitoring as above     4  Poorly controlled diabetes   Hemoglobin A1c noted to be 8 2   I suspect that this is contributing to the above as well as to overall poor wound healing   Ongoing glucose management as per primary       Above plan discussed in detail with the patient at bedside today  Above plan discussed in great detail with the patient's wife over the phone yesterday    ID consult service will formally re-evaluate this patient again on Monday, if he is still admitted  Please contact ID attending on call if questions      Antibiotics:  Ancef 5    Subjective:  Patient has no fever, chills, sweats; no nausea, vomiting, diarrhea; no cough, shortness of breath  He reports limitations in his mobility particularly in the right leg  Denies any issues with his PICC line site  Denies any itching or rash on antibiotics  Updated the patient that I spoke to his wife yesterday  Objective:  Vitals:  Temp:  [97 8 °F (36 6 °C)-98 1 °F (36 7 °C)] 97 8 °F (36 6 °C)  HR:  [75-94] 75  Resp:  [16-19] 16  BP: (154-167)/(74-77) 167/77  SpO2:  [97 %] 97 %  Temp (24hrs), Av °F (36 7 °C), Min:97 8 °F (36 6 °C), Max:98 1 °F (36 7 °C)  Current: Temperature: 97 8 °F (36 6 °C)    Physical Exam:   General Appearance:  Alert, interactive, nontoxic, no acute distress  Patient again has a very odd and sarcastic affect  He overall appears frustrated with speaking to providers and answering questions  Throat: Oropharynx moist without lesions  Severely poor dentition noted  Lungs:   Clear to auscultation bilaterally; no wheezes, rhonchi or rales; respirations unlabored on room air   Heart:  RRR; no murmur, rub or gallop appreciated   Abdomen:   Soft, non-tender, non-distended, positive bowel sounds  Extremities: No clubbing, cyanosis; nonpitting edema lower legs bilaterally   Skin: No new rashes or lesions on exposed skin  No new draining wounds noted on exposed skin  PICC line insertion site unremarkable  Surgical sites remain under dressing         Labs, Imaging, & Other studies:   All pertinent labs and imaging studies were personally reviewed  Results from last 7 days   Lab Units 22  0647 02/10/22  0546 22  0513   WBC Thousand/uL 7 50 6 87 10 57*   HEMOGLOBIN g/dL 9 2* 9 2* 8 6*   PLATELETS Thousands/uL 312 318 343     Results from last 7 days   Lab Units 02/11/22  0647   POTASSIUM mmol/L 4 1   CHLORIDE mmol/L 103   CO2 mmol/L 26   BUN mg/dL 36*   CREATININE mg/dL 2 18*   EGFR ml/min/1 73sq m 30   CALCIUM mg/dL 8 3

## 2022-02-11 NOTE — PLAN OF CARE
Problem: Potential for Falls  Goal: Patient will remain free of falls  Description: INTERVENTIONS:  -  Assess patient's ability to carry out ADLs; assess patient's baseline for ADL function and identify physical deficits which impact ability to perform ADLs (bathing, care of mouth/teeth, toileting, grooming, dressing, etc )  - Assess/evaluate cause of self-care deficits   - Assess range of motion  - Assess patient's mobility; develop plan if impaired  - Assess patient's need for assistive devices and provide as appropriate  - Encourage maximum independence but intervene and supervise when necessary  - Involve family in performance of ADLs  - Assess for home care needs following discharge   - Consider OT consult to assist with ADL evaluation and planning for discharge  - Provide patient education as appropriate  Outcome: Progressing     Problem: Prexisting or High Potential for Compromised Skin Integrity  Goal: Skin integrity is maintained or improved  Description: INTERVENTIONS:  - Identify patients at risk for skin breakdown  - Assess and monitor skin integrity  - Assess and monitor nutrition and hydration status  - Monitor labs   - Assess for incontinence   - Turn and reposition patient  - Assist with mobility/ambulation  - Relieve pressure over bony prominences  - Avoid friction and shearing  - Provide appropriate hygiene as needed including keeping skin clean and dry  - Evaluate need for skin moisturizer/barrier cream  - Collaborate with interdisciplinary team   - Patient/family teaching  - Consider wound care consult   Outcome: Progressing     Problem: METABOLIC, FLUID AND ELECTROLYTES - ADULT  Goal: Glucose maintained within target range  Description: INTERVENTIONS:  - Monitor Blood Glucose as ordered  - Assess for signs and symptoms of hyperglycemia and hypoglycemia  - Administer ordered medications to maintain glucose within target range  - Assess nutritional intake and initiate nutrition service referral as needed  Outcome: Progressing     Problem: SKIN/TISSUE INTEGRITY - ADULT  Goal: Incision(s), wounds(s) or drain site(s) healing without S/S of infection  Description: INTERVENTIONS  - Assess and document dressing, incision, wound bed, drain sites and surrounding tissue  - Provide patient and family education  - Perform skin care/dressing changes every shift  Outcome: Progressing     Problem: MUSCULOSKELETAL - ADULT  Goal: Maintain proper alignment of affected body part  Description: INTERVENTIONS:  - Support, maintain and protect limb and body alignment  - Provide patient/ family with appropriate education  Outcome: Progressing     Problem: PAIN - ADULT  Goal: Verbalizes/displays adequate comfort level or baseline comfort level  Description: Interventions:  - Encourage patient to monitor pain and request assistance  - Assess pain using appropriate pain scale  - Administer analgesics based on type and severity of pain and evaluate response  - Implement non-pharmacological measures as appropriate and evaluate response  - Consider cultural and social influences on pain and pain management  - Notify physician/advanced practitioner if interventions unsuccessful or patient reports new pain  Outcome: Progressing     Problem: INFECTION - ADULT  Goal: Absence or prevention of progression during hospitalization  Description: INTERVENTIONS:  - Assess and monitor for signs and symptoms of infection  - Monitor lab/diagnostic results  - Monitor all insertion sites, i e  indwelling lines, tubes, and drains  - Monitor endotracheal if appropriate and nasal secretions for changes in amount and color  - [place] appropriate cooling/warming therapies per order  - Administer medications as ordered  - Instruct and encourage patient and family to use good hand hygiene technique  - Identify and instruct in appropriate isolation precautions for identified infection/condition  Outcome: Progressing     Problem: SAFETY ADULT  Goal: Maintains/Returns to pre admission functional level  Description: INTERVENTIONS:  - Perform BMAT or MOVE assessment daily    - Set and communicate daily mobility goal to care team and patient/family/caregiver  - Collaborate with rehabilitation services on mobility goals if consulted  - Reposition patient every 2 hours  - Out of bed for toileting  - Record patient progress and toleration of activity level   Outcome: Progressing     Problem: Knowledge Deficit  Goal: Patient/family/caregiver demonstrates understanding of disease process, treatment plan, medications, and discharge instructions  Description: Complete learning assessment and assess knowledge base    Interventions:  - Provide teaching at level of understanding  - Provide teaching via preferred learning methods  Outcome: Progressing

## 2022-02-12 ENCOUNTER — DOCUMENTATION (OUTPATIENT)
Dept: MEDSURG UNIT | Facility: HOSPITAL | Age: 67
End: 2022-02-12

## 2022-02-12 ENCOUNTER — TELEPHONE (OUTPATIENT)
Dept: OTHER | Facility: OTHER | Age: 67
End: 2022-02-12

## 2022-02-12 VITALS
OXYGEN SATURATION: 95 % | HEIGHT: 73 IN | TEMPERATURE: 97.9 F | DIASTOLIC BLOOD PRESSURE: 79 MMHG | HEART RATE: 77 BPM | WEIGHT: 223 LBS | SYSTOLIC BLOOD PRESSURE: 164 MMHG | RESPIRATION RATE: 18 BRPM | BODY MASS INDEX: 29.55 KG/M2

## 2022-02-12 LAB
GLUCOSE SERPL-MCNC: 205 MG/DL (ref 65–140)
INR PPP: 1.7 (ref 0.84–1.19)
PROTHROMBIN TIME: 19.2 SECONDS (ref 11.6–14.5)

## 2022-02-12 PROCEDURE — 99239 HOSP IP/OBS DSCHRG MGMT >30: CPT | Performed by: FAMILY MEDICINE

## 2022-02-12 PROCEDURE — 85610 PROTHROMBIN TIME: CPT | Performed by: FAMILY MEDICINE

## 2022-02-12 PROCEDURE — 99024 POSTOP FOLLOW-UP VISIT: CPT | Performed by: PHYSICIAN ASSISTANT

## 2022-02-12 PROCEDURE — 82948 REAGENT STRIP/BLOOD GLUCOSE: CPT

## 2022-02-12 RX ORDER — CEFAZOLIN SODIUM 2 G/50ML
2000 SOLUTION INTRAVENOUS EVERY 8 HOURS
Qty: 5400 ML | Refills: 0
Start: 2022-02-12 | End: 2022-03-20

## 2022-02-12 RX ORDER — HYDRALAZINE HYDROCHLORIDE 20 MG/ML
5 INJECTION INTRAMUSCULAR; INTRAVENOUS EVERY 6 HOURS PRN
Status: DISCONTINUED | OUTPATIENT
Start: 2022-02-12 | End: 2022-02-12 | Stop reason: HOSPADM

## 2022-02-12 RX ORDER — LIDOCAINE 50 MG/G
1 PATCH TOPICAL DAILY PRN
Qty: 30 PATCH | Refills: 0 | Status: SHIPPED | OUTPATIENT
Start: 2022-02-12

## 2022-02-12 RX ORDER — WARFARIN SODIUM 2.5 MG/1
2.5 TABLET ORAL
Qty: 30 TABLET | Refills: 0 | Status: SHIPPED | OUTPATIENT
Start: 2022-02-12 | End: 2022-07-11 | Stop reason: SDUPTHER

## 2022-02-12 RX ADMIN — CEFAZOLIN SODIUM 2000 MG: 2 SOLUTION INTRAVENOUS at 05:37

## 2022-02-12 RX ADMIN — ACETAMINOPHEN 975 MG: 325 TABLET, FILM COATED ORAL at 05:37

## 2022-02-12 RX ADMIN — SENNOSIDES AND DOCUSATE SODIUM 1 TABLET: 50; 8.6 TABLET ORAL at 08:35

## 2022-02-12 RX ADMIN — FERROUS SULFATE TAB 325 MG (65 MG ELEMENTAL FE) 325 MG: 325 (65 FE) TAB at 08:35

## 2022-02-12 RX ADMIN — INSULIN LISPRO 1 UNITS: 100 INJECTION, SOLUTION INTRAVENOUS; SUBCUTANEOUS at 08:36

## 2022-02-12 RX ADMIN — COLCHICINE 0.6 MG: 0.6 TABLET, FILM COATED ORAL at 08:36

## 2022-02-12 RX ADMIN — OXYCODONE HYDROCHLORIDE 10 MG: 10 TABLET, FILM COATED, EXTENDED RELEASE ORAL at 08:35

## 2022-02-12 NOTE — ASSESSMENT & PLAN NOTE
· Status post fall with CT pelvis revealing comminuted right femoral intertrochanteric fracture  · S/p ORIF of rt hip on 2/7, POD#5  · Orthopedic surgery following  · Resumed warfarin after discussion with ortho (H/O DVT)  · INR 1 7 today, recheck INR on 2/14  · PT/OT recommend rehab however patient adamantly refuses   Plan for home with [facility] today  · outpt fu with ortho

## 2022-02-12 NOTE — PROGRESS NOTES
Progress Note - Orthopedics   Christopher Chao 61 y o  male MRN: [id number]  Unit/Bed#: -01      Subjective:    61 y o male POD#4 right TFNA, incision and drainage right knee  Patient states that his leg is doing well overall  Patient states he has minimal pain over the lateral aspect of his hip  Patient states he has been full weight-bearing of the right lower extremity no complaints  Patient denies any fevers any chills  Patient denies any shortness of breath chest tightness chest pain  Patient denies any nausea vomiting diarrhea  Patient offers no other complaints at this time       Labs:  0   Lab Value Date/Time    HCT 28 5 (L) 02/11/2022 0647    HCT 28 7 (L) 02/10/2022 0546    HCT 27 8 (L) 02/09/2022 0513    HCT 38 4 07/13/2015 1448    HCT 38 6 10/20/2014 1132    HGB 9 2 (L) 02/11/2022 0647    HGB 9 2 (L) 02/10/2022 0546    HGB 8 6 (L) 02/09/2022 0513    HGB 13 1 07/13/2015 1448    HGB 13 1 10/20/2014 1132    INR 1 70 (H) 02/12/2022 0542    INR 2 94 (H) 12/11/2015 1543    WBC 7 50 02/11/2022 0647    WBC 6 87 02/10/2022 0546    WBC 10 57 (H) 02/09/2022 0513    WBC 7 7 07/13/2015 1448    WBC 8 30 10/20/2014 1132    ESR 58 (H) 05/20/2016 1207       Meds:    Current Facility-Administered Medications:     acetaminophen (TYLENOL) tablet 975 mg, 975 mg, Oral, Q8H [facility], Morgan Garcia PA-C, 975 mg at 02/12/22 0537    aluminum-magnesium hydroxide-simethicone (MYLANTA) oral suspension 30 mL, 30 mL, Oral, Q6H PRN, Robin Bejarano PA-C    ceFAZolin (ANCEF) IVPB (premix in dextrose) 2,000 mg 50 mL, 2,000 mg, Intravenous, Q8H, Sang Dubois MD, Last Rate: 100 mL/hr at 02/12/22 0537, 2,000 mg at 02/12/22 0537    colchicine (COLCRYS) tablet 0 6 mg, 0 6 mg, Oral, Daily, Robin Bejarano PA-C, 0 6 mg at 02/12/22 0299    ferrous sulfate tablet 325 mg, 325 mg, Oral, Daily With Breakfast, Morgan Garcia PA-C, 325 mg at 02/12/22 0835    hydrALAZINE (APRESOLINE) injection 5 mg, 5 mg, Intravenous, Q6H PRN, Ana Laura Paulino PA-C    HYDROmorphone HCl (DILAUDID) injection 0 2 mg, 0 2 mg, Intravenous, Q4H PRN, Leo Menon PA-C, 0 2 mg at 02/06/22 1507    hydrOXYzine HCL (ATARAX) tablet 25 mg, 25 mg, Oral, Q6H PRN, Leo Menon PA-C    insulin glargine (LANTUS) subcutaneous injection 30 Units 0 3 mL, 30 Units, Subcutaneous, HS, Froylan Alvarez MD, 30 Units at 02/11/22 2114    insulin lispro (HumaLOG) 100 units/mL subcutaneous injection 1-5 Units, 1-5 Units, Subcutaneous, TID AC, 1 Units at 02/12/22 0836 **AND** Fingerstick Glucose (POCT), , , TID AC, Ashwaty Sanjeev Zelaya MD    lidocaine (LIDODERM) 5 % patch 1 patch, 1 patch, Topical, Daily PRN, Leo Menon PA-C, 1 patch at 02/04/22 1204    naloxone (NARCAN) 0 04 mg/mL syringe 0 04 mg, 0 04 mg, Intravenous, Q1MIN PRN, Leo Menon PA-C    oxyCODONE (OxyCONTIN) 12 hr tablet 10 mg, 10 mg, Oral, Q12H [address], Froylan Alvarez MD, 10 mg at 02/12/22 0835    polyethylene glycol (MIRALAX) packet 17 g, 17 g, Oral, Daily PRN, Leo Menon PA-C, 17 g at 02/06/22 1108    pravastatin (PRAVACHOL) tablet 40 mg, 40 mg, Oral, Daily With Marthenia Riedel, PA-C, 40 mg at 02/11/22 1716    senna-docusate sodium (SENOKOT S) 8 6-50 mg per tablet 1 tablet, 1 tablet, Oral, BID, Leo Menon PA-C, 1 tablet at 02/12/22 2209    warfarin (COUMADIN) tablet 3 mg, 3 mg, Oral, Daily (warfarin), Froylan Alvarez MD, 3 mg at 02/11/22 1716    Blood Culture:   No results found for: BLOODCX    Wound Culture:   No results found for: WOUNDCULT    Ins and Outs:  I/O last 24 hours: In: 1440 [P O :1440]  Out: 800 [Urine:800]      Physical:  Vitals:    02/12/22 0736   BP: 164/79   Pulse: 77   Resp: 18   Temp: 97 9 °F (36 6 °C)   SpO2: 95%     Musculoskeletal: right Lower Extremity  · Patient resting comfortably in hospital bed in no acute distress  · Skin  :  No acute visual abnormalities present to the right lower extremity    Extremity appears well-perfused overall  · Dressing  :   Dressing is clean dry and intact with no visible Soilage present   · TTP  :  Mild tenderness palpation noted over the incision sites  No other bony or soft tissue tenderness palpation noted at this time  · SILT s/s/sp/dp/t  +fhl/ehl, +ankle dorsi/plantar flexion  2+ DP pulse    Assessment:    66 y o male POD#4 right TFNA, incision and drainage right knee  Plan:  · Weight-bearing as tolerated right lower extremity  · Continue IV antibiotics per primary team   · PT/OT for ambulation assistance, gait training  · Pain control per primary team  · DVT ppx per primary team (recommend at least 4 weeks of anticoagulation)  · Dispo:  Ortho stable at this time  Patient may be weight-bearing as tolerated right lower extremity  Can remove proximal dressings in 2-3 days time  Would maintain distal dressings until re-evaluation in office  Okay to take showers after proximal dressings are removed  Please do not scrub incisions  vigorously or soak wounds  Continue IV antibiotics per primary team   Patient may follow up in the outpatient setting in approximately 1 week with Dr Yanet Goins  If there are any questions or concerns in regards to the orthopedic care of this patient, please feel free to reach out orthopedics  Heidi Kwok PA-C               Portions of the record may have been created with voice recognition software  Occasional wrong word or "sound a like" substitutions may have occurred due to the inherent limitations of voice recognition software  Read the chart carefully and recognize, using context, where substitutions have occurred

## 2022-02-12 NOTE — PLAN OF CARE
Problem: MOBILITY - ADULT  Goal: Maintain or return to baseline ADL function  Description: INTERVENTIONS:  -  Assess patient's ability to carry out ADLs; assess patient's baseline for ADL function and identify physical deficits which impact ability to perform ADLs (bathing, care of mouth/teeth, toileting, grooming, dressing, etc )  - Assess/evaluate cause of self-care deficits   - Assess range of motion  - Assess patient's mobility; develop plan if impaired  - Assess patient's need for assistive devices and provide as appropriate  - Encourage maximum independence but intervene and supervise when necessary  - Involve family in performance of ADLs  - Assess for home care needs following discharge   - Consider OT consult to assist with ADL evaluation and planning for discharge  - Provide patient education as appropriate  Outcome: Adequate for Discharge  Goal: Maintains/Returns to pre admission functional level  Description: INTERVENTIONS:  - Perform BMAT or MOVE assessment daily    - Set and communicate daily mobility goal to care team and patient/family/caregiver  - Collaborate with rehabilitation services on mobility goals if consulted  - Perform Range of Motion 4   times a day  - Reposition patient every 2 hours    - Dangle patient 4 times a day  - Stand patient 3 times a day  - Ambulate patient 4 times a day  - Out of bed to chair 2 times a day   - Out of bed for meals 3 times a day  - Out of bed for toileting  - Record patient progress and toleration of activity level   Outcome: Adequate for Discharge     Problem: Potential for Falls  Goal: Patient will remain free of falls  Description: INTERVENTIONS:  - Educate patient/family on patient safety including physical limitations  - Instruct patient to call for assistance with activity   - Consult OT/PT to assist with strengthening/mobility   - Keep Call bell within reach  - Keep bed low and locked with side rails adjusted as appropriate  - Keep care items and personal belongings within reach  - Initiate and maintain comfort rounds  - Make Fall Risk Sign visible to staff  - Offer Toileting every 1 Hours, in advance of need  - Initiate/Maintain bed alarm  - Obtain necessary fall risk management equipment:   - Apply yellow socks and bracelet for high fall risk patients  - Consider moving patient to room near nurses station  Outcome: Adequate for Discharge     Problem: Prexisting or High Potential for Compromised Skin Integrity  Goal: Skin integrity is maintained or improved  Description: INTERVENTIONS:  - Identify patients at risk for skin breakdown  - Assess and monitor skin integrity  - Assess and monitor nutrition and hydration status  - Monitor labs   - Assess for incontinence   - Turn and reposition patient  - Assist with mobility/ambulation  - Relieve pressure over bony prominences  - Avoid friction and shearing  - Provide appropriate hygiene as needed including keeping skin clean and dry  - Evaluate need for skin moisturizer/barrier cream  - Collaborate with interdisciplinary team   - Patient/family teaching  - Consider wound care consult   Outcome: Adequate for Discharge     Problem: METABOLIC, FLUID AND ELECTROLYTES - ADULT  Goal: Electrolytes maintained within normal limits  Description: INTERVENTIONS:  - Monitor labs and assess patient for signs and symptoms of electrolyte imbalances  - Administer electrolyte replacement as ordered  - Monitor response to electrolyte replacements, including repeat lab results as appropriate  - Instruct patient on fluid and nutrition as appropriate  Outcome: Adequate for Discharge  Goal: Fluid balance maintained  Description: INTERVENTIONS:  - Monitor labs   - Monitor I/O and WT  - Instruct patient on fluid and nutrition as appropriate  - Assess for signs & symptoms of volume excess or deficit  Outcome: Adequate for Discharge  Goal: Glucose maintained within target range  Description: INTERVENTIONS:  - Monitor Blood Glucose as ordered  - Assess for signs and symptoms of hyperglycemia and hypoglycemia  - Administer ordered medications to maintain glucose within target range  - Assess nutritional intake and initiate nutrition service referral as needed  Outcome: Adequate for Discharge     Problem: SKIN/TISSUE INTEGRITY - ADULT  Goal: Skin Integrity remains intact(Skin Breakdown Prevention)  Description: Assess:  -Perform Shine assessment every 4  -Clean and moisturize skin every 4  -Inspect skin when repositioning, toileting, and assisting with ADLS  -Assess under medical devices   -Assess extremities for adequate circulation and sensation     Bed Management:  -Have minimal linens on bed & keep smooth, unwrinkled  -Change linens as needed when moist or perspiring  -Avoid sitting or lying in one position for more than 2 hours while in bed  -Keep HOB at 45degrees     Toileting:  -Offer bedside commode  -Assess for incontinence every 1 hour    -Use incontinent care products after each incontinent     Activity:  -Mobilize patient 3   times a day  -Encourage activity and walks on unit  -Encourage or provide ROM exercises   -Turn and reposition patient every 2 Hours  -Use appropriate equipment to lift or move patient in bed  -Instruct/ Assist with weight shifting every 2 when out of bed in chair  -Consider limitation of chair time 2 hour intervals    Skin Care:  -Avoid use of baby powder, tape, friction and shearing, hot water or constrictive clothing  -Relieve pressure over bony prominences using foam    -Do not massage red bony areas    Next Steps:  -Teach patient strategies to minimize risks   -Consider consults to  interdisciplinary teams  Outcome: Adequate for Discharge  Goal: Incision(s), wounds(s) or drain site(s) healing without S/S of infection  Description: INTERVENTIONS  - Assess and document dressing, incision, wound bed, drain sites and surrounding tissue  - Provide patient and family education  - Perform skin care/dressing changes  Outcome: Adequate for Discharge  Goal: Pressure injury heals and does not worsen  Description: Interventions:  - Implement low air loss mattress or specialty surface (Criteria met)  - Apply silicone foam dressing  - Instruct/assist with weight shifting every 30   minutes when in chair   - Limit chair time to  hour intervals  - Use special pressure reducing interventions such as  when in chair   - Apply fecal or urinary incontinence containment device   - Perform passive or active ROM every   - Turn and reposition patient & offload bony prominences every  hours   - Utilize friction reducing device or surface for transfers   - Consider consults to  interdisciplinary teams such as   - Use incontinent care products after each incontinent episode such as   - Consider nutrition services referral as needed  Outcome: Adequate for Discharge     Problem: MUSCULOSKELETAL - ADULT  Goal: Maintain or return mobility to safest level of function  Description: INTERVENTIONS:  - Assess patient's ability to carry out ADLs; assess patient's baseline for ADL function and identify physical deficits which impact ability to perform ADLs (bathing, care of mouth/teeth, toileting, grooming, dressing, etc )  - Assess/evaluate cause of self-care deficits   - Assess range of motion  - Assess patient's mobility  - Assess patient's need for assistive devices and provide as appropriate  - Encourage maximum independence but intervene and supervise when necessary  - Involve family in performance of ADLs  - Assess for home care needs following discharge   - Consider OT consult to assist with ADL evaluation and planning for discharge  - Provide patient education as appropriate  Outcome: Adequate for Discharge  Goal: Maintain proper alignment of affected body part  Description: INTERVENTIONS:  - Support, maintain and protect limb and body alignment  - Provide patient/ family with appropriate education  Outcome: Adequate for Discharge Problem: PAIN - ADULT  Goal: Verbalizes/displays adequate comfort level or baseline comfort level  Description: Interventions:  - Encourage patient to monitor pain and request assistance  - Assess pain using appropriate pain scale  - Administer analgesics based on type and severity of pain and evaluate response  - Implement non-pharmacological measures as appropriate and evaluate response  - Consider cultural and social influences on pain and pain management  - Notify physician/advanced practitioner if interventions unsuccessful or patient reports new pain  Outcome: Adequate for Discharge     Problem: INFECTION - ADULT  Goal: Absence or prevention of progression during hospitalization  Description: INTERVENTIONS:  - Assess and monitor for signs and symptoms of infection  - Monitor lab/diagnostic results  - Monitor all insertion sites, i e  indwelling lines, tubes, and drains  - Monitor endotracheal if appropriate and nasal secretions for changes in amount and color  - [place] appropriate cooling/warming therapies per order  - Administer medications as ordered  - Instruct and encourage patient and family to use good hand hygiene technique  - Identify and instruct in appropriate isolation precautions for identified infection/condition  Outcome: Adequate for Discharge  Goal: Absence of fever/infection during neutropenic period  Description: INTERVENTIONS:  - Monitor WBC    Outcome: Adequate for Discharge     Problem: SAFETY ADULT  Goal: Maintain or return to baseline ADL function  Description: INTERVENTIONS:  -  Assess patient's ability to carry out ADLs; assess patient's baseline for ADL function and identify physical deficits which impact ability to perform ADLs (bathing, care of mouth/teeth, toileting, grooming, dressing, etc )  - Assess/evaluate cause of self-care deficits   - Assess range of motion  - Assess patient's mobility; develop plan if impaired  - Assess patient's need for assistive devices and provide as appropriate  - Encourage maximum independence but intervene and supervise when necessary  - Involve family in performance of ADLs  - Assess for home care needs following discharge   - Consider OT consult to assist with ADL evaluation and planning for discharge  - Provide patient education as appropriate  Outcome: Adequate for Discharge  Goal: Maintains/Returns to pre admission functional level  Description: INTERVENTIONS:  - Perform BMAT or MOVE assessment daily    - Set and communicate daily mobility goal to care team and patient/family/caregiver  - Collaborate with rehabilitation services on mobility goals if consulted  - Perform Range of Motion  times a day  - Reposition patient every  hours    - Dangle patient  times a day  - Stand patient  times a day  - Ambulate patient  times a day  - Out of bed to chair  times a day   - Out of bed for meals  times a day  - Out of bed for toileting  - Record patient progress and toleration of activity level   Outcome: Adequate for Discharge  Goal: Patient will remain free of falls  Description: INTERVENTIONS:  - Educate patient/family on patient safety including physical limitations  - Instruct patient to call for assistance with activity   - Consult OT/PT to assist with strengthening/mobility   - Keep Call bell within reach  - Keep bed low and locked with side rails adjusted as appropriate  - Keep care items and personal belongings within reach  - Initiate and maintain comfort rounds  - Make Fall Risk Sign visible to staff  - Offer Toileting every  Hours, in advance of need  - Initiate/Maintain alarm  - Obtain necessary fall risk management equipment:  - Apply yellow socks and bracelet for high fall risk patients  - Consider moving patient to room near nurses station  Outcome: Adequate for Discharge     Problem: DISCHARGE PLANNING  Goal: Discharge to home or other facility with appropriate resources  Description: INTERVENTIONS:  - Identify barriers to discharge w/patient and caregiver  - Arrange for needed discharge resources and transportation as appropriate  - Identify discharge learning needs (meds, wound care, etc )  - Arrange for interpretive services to assist at discharge as needed  - Refer to Case Management Department for coordinating discharge planning if the patient needs post-hospital services based on physician/advanced practitioner order or complex needs related to functional status, cognitive ability, or social support system  Outcome: Adequate for Discharge     Problem: Knowledge Deficit  Goal: Patient/family/caregiver demonstrates understanding of disease process, treatment plan, medications, and discharge instructions  Description: Complete learning assessment and assess knowledge base    Interventions:  - Provide teaching at level of understanding  - Provide teaching via preferred learning methods  Outcome: Adequate for Discharge

## 2022-02-12 NOTE — ASSESSMENT & PLAN NOTE
Lab Results   Component Value Date    HGBA1C 7 8 (H) 02/07/2022       Recent Labs     02/11/22  1105 02/11/22  1549 02/11/22 2054 02/12/22  0737   POCGLU 321* 346* 367* 205*       Blood Sugar Average: Last 72 hrs:  (P) 243 625   · Transitioned from insulin drip to Lantus 30 units q h s  · Continue with sliding scale coverage with a c  HS checks  · Resume metformin at DC  · Consistent carb diet

## 2022-02-12 NOTE — CASE MANAGEMENT
Case Management Discharge Planning Note    Patient name Yuan Oxford  Location [address] [id number] MRN [id number]  : 1955 Date 2022       Current Admission Date: 2/3/2022  Current Admission Diagnosis:Closed intertrochanteric fracture of hip, right, initial encounter [facility])   Patient Active Problem List    Diagnosis Date Noted    Leg wound, right 2022    Pre-operative clearance 2022    Closed intertrochanteric fracture of hip, right, initial encounter ([facility] ) 2022    Hematoma 2022    Acute idiopathic gout of multiple sites 2022    Pain and swelling of right lower leg 2021    Ambulatory dysfunction 2021    Displaced bicondylar fracture of right tibia, subsequent encounter for closed fracture with routine healing 2021    Right forearm superficial thrombophlebitis 2021    Anemia 10/26/2021    Kyphosis of cervicothoracic region 10/25/2021    Pseudogout of knee, left 10/17/2021    Pain and swelling of left knee 10/16/2021    ROSE (acute kidney injury) ([facility] ) 10/13/2021    Closed fracture of proximal end of right tibia 10/13/2021    Type 2 diabetes mellitus with hyperglycemia ([address] ) 10/05/2021    Class 2 obesity in adult 2021    Type 2 diabetes mellitus with diabetic peripheral angiopathy without gangrene ([facility] ) 10/08/2019    Other insomnia 2017    Type 2 diabetes mellitus with diabetic neuropathy, without long-term current use of insulin ([facility] ) 2017    Lumbar degenerative disc disease 10/21/2014    Acute embolism and thrombosis of unspecified deep veins of unspecified lower extremity ([facility] ) 10/20/2014    Essential hypertension 10/20/2014    Mixed hyperlipidemia 10/20/2014    Peripheral vascular disease ([facility] ) 10/20/2014      LOS (days): 9  Geometric Mean LOS (GMLOS) (days):   Days to GMLOS:     OBJECTIVE:  Risk of Unplanned Readmission Score: 23         Current admission status: Inpatient   Preferred Pharmacy:   [facility] [address], [address] [place] [address]  Phone: [phone] Fax: [address] [facility] - [place], [address]  [address] [address]  Phone: [phone] Fax: [phone]  [address] [place], [place] - [address] [place] [address]   [address] [facility] [address]  Phone: [phone] Fax: [phone]    [phone] [facility] [address]  Phone: [phone] Fax: [phone]    Primary Care Provider: Adry Houser DO    Primary Insurance: BLUE CROSS  Secondary Insurance: [employer] [facility] REP    DISCHARGE DETAILS:    Discharge planning discussed with[de-identified] patient  Freedom of Choice: Yes  Comments - Freedom of Choice: JAMESON accepted and will be to his home by 12:00  Wife will transport home    Abx and supplies have already been delivered to the hospital  CM contacted family/caregiver?: Yes  Were Treatment Team discharge recommendations reviewed with patient/caregiver?: Yes  Did patient/caregiver verbalize understanding of patient care needs?: Yes  Were patient/caregiver advised of the risks associated with not following Treatment Team discharge recommendations?: Yes    Contacts  Patient Contacts: Dior Javed  Relationship to Patient[de-identified] Family  Contact Method: Phone  Reason/Outcome: Discharge [address]         Is the patient interested in [facility] at discharge?: Yes  [address] requested[de-identified] Ben Conklin Name[de-identified] [facility] Provider[de-identified] PCP  Home Health Services Needed[de-identified] Evaluate Functional Status and Safety,Gait/ADL Training,Strengthening/Theraputic Exercises to Improve Function  Homebound Criteria Met[de-identified] Requires the Assistance of Another Person for Safe Ambulation or to Leave the Home,Uses an Assist Device (i e  cane, walker, etc)  Supporting Clincal Findings[de-identified] Fatigues Easliy in Short Distances,Limited Endurance    DME Referral Provided  Referral made for DME?: No    Other Referral/Resources/Interventions Provided:  Interventions: HHC,Home Infusion  Referral Comments: JAMESON accepted and will be to his home at 12:00-wound care orders will be on AVS/[facility] has already delivered the supplies and abx to the hospital/wife Rianna Henry will transport home    Would you like to participate in our [address] service program?  : Yes    Treatment Team Recommendation: Home with [facility]  Discharge Destination Plan[de-identified] Home with Roxanne at Discharge : Family

## 2022-02-12 NOTE — DISCHARGE SUMMARY
[address] [facility]  Discharge- Chacha Finely 1955, 61 y o  male MRN: [id number]  Unit/Bed#: -Jeffery Encounter: [id number]  Primary Care Provider: Meka Khoury DO   Date and time admitted to hospital: 2/3/2022  5:11 PM    * Closed intertrochanteric fracture of hip, right, initial encounter [facility])  Assessment & Plan  · Status post fall with CT pelvis revealing comminuted right femoral intertrochanteric fracture  · S/p ORIF of rt hip on 2/7, POD#5  · Orthopedic surgery following  · Resumed warfarin after discussion with ortho (H/O DVT)  · INR 1 7 today, recheck INR on 2/14  · PT/OT recommend rehab however patient adamantly refuses  Plan for home with [facility] today  · outpt fu with ortho    Leg wound, right  Assessment & Plan  · Right tibial wound with exposed/compromised hardware  · S/p fall, noted to have a lower leg wound which on debridement in the OR had exposed hardware present from a previous fracture repair  · ID on board  · continue on Ancef, dose adjusted to 2 g every 8 hours ( Tentative plan for 6 weeks of IV antibiotic therapy)until 3/20/22 following which he will need PO suppressive therapy with keflex  · PICC line placed 2/9    ROSE (acute kidney injury) ([facility] [address] )  Assessment & Plan  · Creatinine at baseline around 1 3-1 5  · 2 18 at DC  · Renal ultrasound neg for hydronephrosis  · Nephrology signed off  · IV fluids DC'd  · Recheck BMP next week and fu with pcp    Type 2 diabetes mellitus with hyperglycemia [facility])  Assessment & Plan  Lab Results   Component Value Date    HGBA1C 7 8 (H) 02/07/2022       Recent Labs     02/11/22  1105 02/11/22  1549 02/11/22  2054 02/12/22  0737   POCGLU 321* 346* 367* 205*       Blood Sugar Average: Last 72 hrs:  (P) 243 625   · Transitioned from insulin drip to Lantus 30 units q h s  · Continue with sliding scale coverage with a c  HS checks  · Resume metformin at DC  · Consistent carb diet        Anemia  Assessment & Plan  · Hemoglobin stable at 9 2  · On ferrous sulfate 325 mg daily  · No active bleeding noted  Pre-operative clearance  Assessment & Plan  · Given patient's elevated troponin, and ST depressions in inferior leads after undergoing surgical procedure, further ischemic workup (nuclear stress testing) was planned for 2/8 however he then declined this intervention  · Repeat EKG reviewed on 2/8 and unremarkable  · DW cardiology, no further intervention planned as patient refusing nuclear stress  Discharging Physician / Practitioner: Stephen Muro MD  PCP: Corey Burciaga DO  Admission Date:   Admission Orders (From admission, onward)     Ordered        02/03/22 2046  Inpatient Admission  Once                      Discharge Date: 02/12/22    Disposition:      · Home with [facility]NA    Reason for Admission: fall    Discharge Diagnoses:     Please see assessment and plan section above for further details regarding discharge diagnoses  Medical Problems             Resolved Problems  Date Reviewed: 2/3/2022    None                Consultations During Hospital Stay:  · Orthopedic surgery  · ID  · Nephrology  · cardiology    Procedures Performed:   · S/p ORIF rt hip on 2/7/22    Medication Adjustments and Discharge Medications:  · Summary of Medication Adjustments made as a result of this hospitalization: see med rec  · Medication Dosing Tapers - Please refer to Discharge Medication List for details on any medication dosing tapers (if applicable to patient)  · Medications being temporarily held (include recommended restart time): see med rec  · Discharge Medication List: See after visit summary for reconciled discharge medications  Wound Care Recommendations:  When applicable, please see wound care section of After Visit Summary      Diet Recommendations at Discharge:  Diet -        Diet Orders   (From admission, onward)             Start     Ordered    02/08/22 1145  Diet All/CHO Controlled; Consistent Carbohydrate Diet Level 2 (5 carb servings/75 grams CHO/meal)  Diet effective now        References:    Nutrtion Support Algorithm Enteral vs  Parenteral   Question Answer Comment   Diet Type All/CHO Controlled    All/CHO Controlled Consistent Carbohydrate Diet Level 2 (5 carb servings/75 grams CHO/meal)    RD to adjust diet per protocol? Yes        02/08/22 1144                Instructions for any Catheters / Lines Present at Discharge (including removal date, if applicable): PICC line in place    Hospital Course:     Neville Mace is a 61 y o  male patient who originally presented to the hospital on 2/3/2022 with past medical history of type 2 diabetes mellitus/DVT on warfarin/hypertension/hyperlipidemia/peripheral vascular disease who presented to the ED following complaints of ambulatory dysfunction/right hip pain status post fall at home  · Status post fall with CT pelvis revealing a right femoral intertrochanteric fracture  Patient status post open reduction internal fixation of right hip on 02/07/2022  Postoperative day 5  Today  Patient has been resumed on warfarin after discussion with Orthopedic surgery  INR 1 7 today  Recheck INR on 02/14/2022  PT OT recommending rehab however patient adamantly refuses  Plan for home with VNA today  Outpatient follow-up with Orthopedic surgery  · Patient has right tibial wound with exposed/compromised hardware  He underwent debridement in the OR for the exposed hardware present from previous fracture repair  Id on board  Patient on cefazolin  Dose adjusted to 2 g every 8 hours with tentative plan for 6 weeks of IV antibiotic therapy until 03/20/2022  Following that patient may end up requiring oral suppressive therapy with Keflex  Outpatient follow-up with ID  PICC line in place since 02/09/2022  · Type 2 diabetes mellitus with hyperglycemia briefly requiring insulin drip which has been transitioned to Lantus 30 units q h s     Patient's metformin has also been resumed at discharge  Continue with consistent carb diet at home  · Patient noted to be in acute renal failure on presentation with creatinine 2 18 at discharge  Renal ultrasound negative for hydronephrosis  Patient did receive IV fluids which was discontinued  Outpatient follow-up with BMP/PCP  · Patient did have elevated troponin with ST depression in inferior leads after undergoing surgical procedure with further ischemic workup nuclear stress testing plan for 2/8  He declined this intervention  RepeaT EKG reviewed on 02/08 unremarkable  Discussed with Cardiology no further intervention planned as patient refusing nuclear stress  · Patient awake alert oriented x3  Not in any acute distress or discomfort  No further questions  Being discharged home with VNA  verbally stated understanding of the plan and is in agreement with it  Condition at Discharge: fair     Discharge Day Visit / Exam:     Vitals: Blood Pressure: 164/79 (02/12/22 0736)  Pulse: 77 (02/12/22 0736)  Temperature: 97 9 °F (36 6 °C) (02/12/22 0736)  Temp Source: Oral (02/12/22 0736)  Respirations: 18 (02/12/22 0736)  Height: 6' 1" (185 4 cm) (02/07/22 1445)  Weight - Scale: 101 kg (223 lb) (02/07/22 1445)  SpO2: 95 % (02/12/22 0736)  Exam:   Physical Exam  Constitutional:       General: He is not in acute distress  Appearance: He is obese  He is not toxic-appearing  HENT:      Mouth/Throat:      Mouth: Mucous membranes are moist       Pharynx: Oropharynx is clear  Cardiovascular:      Rate and Rhythm: Normal rate and regular rhythm  Pulses: Normal pulses  Pulmonary:      Effort: Pulmonary effort is normal       Breath sounds: Normal breath sounds  Abdominal:      General: Abdomen is flat  Bowel sounds are normal       Palpations: Abdomen is soft  Neurological:      General: No focal deficit present  Mental Status: He is alert and oriented to person, place, and time           Goals of Care Discussions:  · Code Status at Discharge: Level 1 - Full Code    Discharge instructions/Information to patient and family:   See after visit summary section titled Discharge Instructions for information provided to patient and family  Discharge Statement:  I spent 45 minutes discharging the patient  This time was spent on the day of discharge  I had direct contact with the patient on the day of discharge  Greater than 50% of the total time was spent examining patient, answering all patient questions, arranging and discussing plan of care with patient as well as directly providing post-discharge instructions  Additional time then spent on discharge activities      ** Please Note: This note has been constructed using a voice recognition system **

## 2022-02-12 NOTE — PLAN OF CARE
Problem: MOBILITY - ADULT  Goal: Maintain or return to baseline ADL function  Description: INTERVENTIONS:  -  Assess patient's ability to carry out ADLs; assess patient's baseline for ADL function and identify physical deficits which impact ability to perform ADLs (bathing, care of mouth/teeth, toileting, grooming, dressing, etc )  - Assess/evaluate cause of self-care deficits   - Assess range of motion  - Assess patient's mobility; develop plan if impaired  - Assess patient's need for assistive devices and provide as appropriate  - Encourage maximum independence but intervene and supervise when necessary  - Involve family in performance of ADLs  - Assess for home care needs following discharge   - Consider OT consult to assist with ADL evaluation and planning for discharge  - Provide patient education as appropriate  Outcome: Progressing     Problem: PAIN - ADULT  Goal: Verbalizes/displays adequate comfort level or baseline comfort level  Description: Interventions:  - Encourage patient to monitor pain and request assistance  - Assess pain using appropriate pain scale  - Administer analgesics based on type and severity of pain and evaluate response  - Implement non-pharmacological measures as appropriate and evaluate response  - Consider cultural and social influences on pain and pain management  - Notify physician/advanced practitioner if interventions unsuccessful or patient reports new pain  Outcome: Progressing

## 2022-02-12 NOTE — TELEPHONE ENCOUNTER
Physical therapist with [facility] saw the patient today for initial evaluation   They will be doing home care 2 times a  week for 4 weeks starting monday

## 2022-02-12 NOTE — ASSESSMENT & PLAN NOTE
· Creatinine at baseline around 1 3-1 5  · 2 18 at DC  · Renal ultrasound neg for hydronephrosis  · Nephrology signed off  · IV fluids DC'd  · Recheck BMP next week and fu with pcp

## 2022-02-14 ENCOUNTER — TRANSITIONAL CARE MANAGEMENT (OUTPATIENT)
Dept: FAMILY MEDICINE CLINIC | Facility: CLINIC | Age: 67
End: 2022-02-14

## 2022-02-14 DIAGNOSIS — R78.81 BACTEREMIA: Primary | ICD-10-CM

## 2022-02-14 DIAGNOSIS — E11.65 TYPE 2 DIABETES MELLITUS WITH HYPERGLYCEMIA, WITH LONG-TERM CURRENT USE OF INSULIN (HCC): ICD-10-CM

## 2022-02-14 DIAGNOSIS — Z79.4 TYPE 2 DIABETES MELLITUS WITH HYPERGLYCEMIA, WITH LONG-TERM CURRENT USE OF INSULIN (HCC): ICD-10-CM

## 2022-02-14 RX ORDER — FLASH GLUCOSE SENSOR
KIT MISCELLANEOUS
Qty: 2 EACH | Refills: 5 | Status: SHIPPED | OUTPATIENT
Start: 2022-02-14 | End: 2022-04-27 | Stop reason: SDUPTHER

## 2022-02-14 NOTE — PROGRESS NOTES
Orders placed and faxed to [facility] will call to confirm in the AM that they have the orders  Called and spoke pt and wife about reminder for appt on 2/23/2022 they state things are going well with infusion at the moment and having no issues  Pt wife stated that [facility] hasn't come out yet to visit, informed her that will call them in the AM to make sure that everything is set up

## 2022-02-15 ENCOUNTER — TELEPHONE (OUTPATIENT)
Dept: OBGYN CLINIC | Facility: HOSPITAL | Age: 67
End: 2022-02-15

## 2022-02-15 ENCOUNTER — TELEPHONE (OUTPATIENT)
Dept: INFECTIOUS DISEASES | Facility: CLINIC | Age: 67
End: 2022-02-15

## 2022-02-15 ENCOUNTER — APPOINTMENT (OUTPATIENT)
Dept: LAB | Facility: CLINIC | Age: 67
End: 2022-02-15
Payer: COMMERCIAL

## 2022-02-15 DIAGNOSIS — S72.141A CLOSED INTERTROCHANTERIC FRACTURE OF HIP, RIGHT, INITIAL ENCOUNTER (HCC): ICD-10-CM

## 2022-02-15 LAB
ANION GAP SERPL CALCULATED.3IONS-SCNC: 10 MMOL/L (ref 4–13)
BUN SERPL-MCNC: 27 MG/DL (ref 5–25)
CALCIUM SERPL-MCNC: 8.4 MG/DL (ref 8.3–10.1)
CHLORIDE SERPL-SCNC: 108 MMOL/L (ref 100–108)
CO2 SERPL-SCNC: 23 MMOL/L (ref 21–32)
CREAT SERPL-MCNC: 1.6 MG/DL (ref 0.6–1.3)
GFR SERPL CREATININE-BSD FRML MDRD: 44 ML/MIN/1.73SQ M
GLUCOSE P FAST SERPL-MCNC: 70 MG/DL (ref 65–99)
POTASSIUM SERPL-SCNC: 4.1 MMOL/L (ref 3.5–5.3)
SODIUM SERPL-SCNC: 141 MMOL/L (ref 136–145)

## 2022-02-15 PROCEDURE — 80048 BASIC METABOLIC PNL TOTAL CA: CPT

## 2022-02-15 NOTE — TELEPHONE ENCOUNTER
KEENAA called to report that patient is concerned about flutters after infusions  She states his vital signs were wnl  She was not there while this had been experienced

## 2022-02-15 NOTE — TELEPHONE ENCOUNTER
PO appt scheduled for 2/22 with Dr Joe Ellis [facility] for patient to call to confirm appt

## 2022-02-16 ENCOUNTER — PATIENT MESSAGE (OUTPATIENT)
Dept: FAMILY MEDICINE CLINIC | Facility: CLINIC | Age: 67
End: 2022-02-16

## 2022-02-16 DIAGNOSIS — J31.0 RHINOSINUSITIS: Primary | ICD-10-CM

## 2022-02-16 DIAGNOSIS — J32.9 RHINOSINUSITIS: Primary | ICD-10-CM

## 2022-02-16 RX ORDER — AZITHROMYCIN 250 MG/1
TABLET, FILM COATED ORAL
Qty: 6 TABLET | Refills: 0 | Status: SHIPPED | OUTPATIENT
Start: 2022-02-16 | End: 2022-02-22

## 2022-02-16 NOTE — TELEPHONE ENCOUNTER
Pt sent another [employer] message, not attached to this chain - "Doc  I have this big lump under my chin and my ears"    Reports he can not get in to office because he has a broken hip

## 2022-02-17 ENCOUNTER — TELEPHONE (OUTPATIENT)
Dept: INFECTIOUS DISEASES | Facility: CLINIC | Age: 67
End: 2022-02-17

## 2022-02-17 ENCOUNTER — ANTICOAG VISIT (OUTPATIENT)
Dept: FAMILY MEDICINE CLINIC | Facility: CLINIC | Age: 67
End: 2022-02-17

## 2022-02-17 ENCOUNTER — TELEPHONE (OUTPATIENT)
Dept: FAMILY MEDICINE CLINIC | Facility: CLINIC | Age: 67
End: 2022-02-17

## 2022-02-17 DIAGNOSIS — Z79.4 TYPE 2 DIABETES MELLITUS WITH HYPERGLYCEMIA, WITH LONG-TERM CURRENT USE OF INSULIN (HCC): Primary | ICD-10-CM

## 2022-02-17 DIAGNOSIS — S82.141D DISPLACED BICONDYLAR FRACTURE OF RIGHT TIBIA, SUBSEQUENT ENCOUNTER FOR CLOSED FRACTURE WITH ROUTINE HEALING: ICD-10-CM

## 2022-02-17 DIAGNOSIS — I73.9 PERIPHERAL VASCULAR DISEASE (HCC): ICD-10-CM

## 2022-02-17 DIAGNOSIS — E11.65 TYPE 2 DIABETES MELLITUS WITH HYPERGLYCEMIA, WITH LONG-TERM CURRENT USE OF INSULIN (HCC): Primary | ICD-10-CM

## 2022-02-17 NOTE — TELEPHONE ENCOUNTER
Called and spent extensive time with the pt wife on the phone  She was stating that she is concerned with him experiencing palpitations during the infusions, doesn't know how long it lasts for, has poor appetite, did discuss with wife as well that he hasn't been feeling well and that PCP sent a script to the pharmacy regarding   Discussed with Dr Trent Dhaliwal doesn't believe it's from the [facility], should continue to taking and follow up in the office next week  Pt wife was very concerned overall regarding her  and health  Informed her that if she is concerned the best thing would be to follow up sooner with PCP or to go to ED  Pt wife then had multiple questions regarding IV flushes and informed her that would have to be discussed by the pharmacy  She stated that she will call them regarding the flushes

## 2022-02-17 NOTE — TELEPHONE ENCOUNTER
Patient's wife calls to let you know that patient is very sick  He has palpitations during and after treatment       CB: [phone]

## 2022-02-17 NOTE — TELEPHONE ENCOUNTER
Neil Rosa from [facility] called asking if you could place a social service order for the patient  She stated the patient is looking to connect more with the community  Neil Rosa is requesting that you either fax this script or call her with the information        Fax: [phone]  Call back #: [phone]

## 2022-02-18 ENCOUNTER — TELEMEDICINE (OUTPATIENT)
Dept: FAMILY MEDICINE CLINIC | Facility: CLINIC | Age: 67
End: 2022-02-18
Payer: COMMERCIAL

## 2022-02-18 ENCOUNTER — TELEPHONE (OUTPATIENT)
Dept: OBGYN CLINIC | Facility: HOSPITAL | Age: 67
End: 2022-02-18

## 2022-02-18 VITALS — HEIGHT: 73 IN | BODY MASS INDEX: 29.55 KG/M2 | WEIGHT: 223 LBS

## 2022-02-18 DIAGNOSIS — N17.9 AKI (ACUTE KIDNEY INJURY) (HCC): ICD-10-CM

## 2022-02-18 DIAGNOSIS — I82.403 ACUTE EMBOLISM AND THROMBOSIS OF DEEP VEIN OF BOTH LOWER EXTREMITIES (HCC): ICD-10-CM

## 2022-02-18 DIAGNOSIS — E11.51 TYPE 2 DIABETES MELLITUS WITH DIABETIC PERIPHERAL ANGIOPATHY WITHOUT GANGRENE, WITH LONG-TERM CURRENT USE OF INSULIN (HCC): ICD-10-CM

## 2022-02-18 DIAGNOSIS — S72.141A CLOSED INTERTROCHANTERIC FRACTURE OF HIP, RIGHT, INITIAL ENCOUNTER (HCC): Primary | ICD-10-CM

## 2022-02-18 DIAGNOSIS — Z79.4 TYPE 2 DIABETES MELLITUS WITH DIABETIC PERIPHERAL ANGIOPATHY WITHOUT GANGRENE, WITH LONG-TERM CURRENT USE OF INSULIN (HCC): ICD-10-CM

## 2022-02-18 PROCEDURE — 1111F DSCHRG MED/CURRENT MED MERGE: CPT | Performed by: FAMILY MEDICINE

## 2022-02-18 PROCEDURE — 99495 TRANSJ CARE MGMT MOD F2F 14D: CPT | Performed by: FAMILY MEDICINE

## 2022-02-18 PROCEDURE — 3008F BODY MASS INDEX DOCD: CPT | Performed by: FAMILY MEDICINE

## 2022-02-18 NOTE — ASSESSMENT & PLAN NOTE
Improved, he is having his renal function monitored with his routine blood work for his Ancef infusions will continue follow this Jordan Pries him to drink plenty of fluids

## 2022-02-18 NOTE — UTILIZATION REVIEW
Notification of Discharge   This is a Notification of Discharge from our facility [address]  Please be advised that this patient has been discharge from our facility  Below you will find the admission and discharge date and time including the patients disposition  UTILIZATION REVIEW CONTACT:  Skylar Damico  Utilization   Network Utilization Review Department  Phone: [phone] x carefully listen to the prompts  All voicemails are confidential   Email: [email or web address]     PHYSICIAN ADVISORY SERVICES:  FOR [id number] REVIEW - MEDICAL NECESSITY DENIAL  Phone: [phone]  Fax: [phone]  Email: [email or web address]     PRESENTATION DATE: 2/3/2022  5:11 PM  OBERVATION ADMISSION DATE:   INPATIENT ADMISSION DATE: 2/3/22  8:46 PM   DISCHARGE DATE: 2/12/2022 12:39 PM  DISPOSITION: Home with [place] with [address] INFORMATION:  Send all requests for admission clinical reviews, approved or denied determinations and any other requests to dedicated fax number below belonging to the campus where the patient is receiving treatment   List of dedicated fax numbers:  [address] [place] [address] DENIALS (Administrative/Medical Necessity) [phone]   [address]  (Maternity/NICU/Pediatrics) [phone]   [facility] [phone]   [address] [facility] [phone]   [address] [place] [address] [phone]   [address] [place] [address],4Th Floor [address] [place] [address] [phone]   [facility]  [phone]   [address] [facility], [facility]  [address] [facility] [address] [facility] [phone]

## 2022-02-18 NOTE — PROGRESS NOTES
Virtual TCM Visit:    Verification of patient location:    Patient is located in the following state in which I hold an active license [place]        Assessment/Plan:        Problem List Items Addressed This Visit        Endocrine    Type 2 diabetes mellitus with diabetic peripheral angiopathy without gangrene ([facility] )     Continue to monitor his blood sugar 3 times daily, continue with insulin and metformin, he is to call if his blood sugars go below 70 or above 300  Lab Results   Component Value Date    HGBA1C 7 8 (H) 02/07/2022               Cardiovascular and Mediastinum    Acute embolism and thrombosis of unspecified deep veins of unspecified lower extremity ([facility] )     Continue his Coumadin, he has increased to 6 5 mg and is due to recheck Monday  Musculoskeletal and Integument    Closed intertrochanteric fracture of hip, right, initial encounter ([facility] ) - Primary     Continue home physical therapy, continue his IV Ancef infusions I asked his wife to slow down the rate of the saline flush see if this helps with the palpitations            Genitourinary    ROSE (acute kidney injury) ([facility] )     Improved, he is having his renal function monitored with his routine blood work for his Ancef infusions will continue follow this Bernadine Dienes him to drink plenty of fluids                    Reason for visit is virtual transition of care visit    Encounter provider Roger Espinosa DO       Provider located at [facility] [address] [facility]  [address] [address] [phone]      Recent Visits  Date Type Provider Dept   02/17/22 Telephone [address]   Showing recent visits within past 7 days and meeting all other requirements  Today's Visits  Date Type Provider Dept   02/18/22 [facility] DO Lj Hickman [address] [facility]   Showing today's visits and meeting all other requirements  Future Appointments  No visits were found meeting these conditions  Showing future appointments within next 150 days and meeting all other requirements       After connecting through [email or web address], the patient was identified by name and date of birth  Harper Bhatia was informed that this is a telemedicine visit and that the visit is being conducted through [address] [employer] and patient was informed that this is a secure, HIPAA-compliant platform  He agrees to proceed     My office door was closed  No one else was in the room  He acknowledged consent and understanding of privacy and security of the video platform  The patient has agreed to participate and understands they can discontinue the visit at any time  Patient is aware this is a billable service  Subjective:     Patient ID: Harper Bhatia is a 61 y o  male  TCM Call (since 1/18/2022)     Date and time call was made  2/14/2022  3:40 PM    Hospital care reviewed  Records reviewed        Patient was hospitialized at  [facility]        Date of Admission  02/03/22    Date of discharge  02/12/22    Diagnosis  Closed intertrochanteric fracture of hip, right, initial   encounter     Disposition  Home; Home health services      TCM Call (since 1/18/2022)     Post hospital issues  Reduced activity    Should patient be enrolled in anticoag monitoring? Yes    Scheduled for follow up?   Yes    Did you obtain your prescribed medications  Yes    Do you need help managing your prescriptions or medications  No    Is transportation to your appointment needed  No    I have advised the patient to call PCP with any new or worsening symptoms    Yesenia Blake/ MA    Living Arrangements  Spouse or Significiant other    Support System  Spouse    The type of support provided  Emotional; Physical    Do you have social support  Yes, as much as I need    Are you recieving home care services  Yes    Types of home care services  Nurse visit    Are you using any community resources  No    Current waiver services  No    Have you fallen in the last 12 months  Yes    How many times  1    Interperter language line needed  No    Counseling  Patient    Counseling topics  Activities of daily living; Importance of RX   compliance; patient and family education      Patient presents for virtual transitional care visit, he is admitted to [address] [place] [address] February 3rd 2022 discharged on the 12th 2022 with discharge diagnosis right hip fracture  He did require surgical repair  Since his discharge he has been taking the Ancef every 8 hours  He does state he gets palpitations when the infusion starts  He has been doing home physical therapy and progressing slowly, he states that it is very difficult for him to ambulate through his house  He does have home physical therapy and home nursing monitoring this  His PT/INR is being readjusted since he has been on the antibiotics  His next test is due for Monday  He has been checking his blood sugars, initially they were ranging in the low 200s, they have been gradually improving as he is healing  Review of Systems      Objective:    Vitals:    02/18/22 0823   Weight: 101 kg (223 lb)   Height: 6' 1" (1 854 m)       Physical Exam        Transitional Care Management Review:  Brooke Avina is a 61 y o  male here for TCM follow up  During the TCM phone call patient stated:    TCM Call (since 1/18/2022)     Date and time call was made  2/14/2022  3:40 PM    Hospital care reviewed  Records reviewed        Patient was hospitialized at  [facility]        Date of Admission  02/03/22    Date of discharge  02/12/22    Diagnosis  Closed intertrochanteric fracture of hip, right, initial encounter     Disposition  Home; Home health services      TCM Call (since 1/18/2022)     Post hospital issues  Reduced activity    Should patient be enrolled in anticoag monitoring? Yes    Scheduled for follow up?   Yes    Did you obtain your prescribed medications  Yes    Do you need help managing your prescriptions or medications  No    Is transportation to your appointment needed  No    I have advised the patient to call PCP with any new or worsening symptoms  Yesenia Blake/ MA    Living Arrangements  Spouse or Significiant other    Support System  Spouse    The type of support provided  Emotional; Physical    Do you have social support  Yes, as much as I need    Are you recieving home care services  Yes    Types of home care services  Nurse visit    Are you using any community resources  No    Current waiver services  No    Have you fallen in the last 12 months  Yes    How many times  1    Interperter language line needed  No    Counseling  Patient    Counseling topics  Activities of daily living; Importance of RX compliance; patient and family education          I spent 30 minutes with the patient during this visit  Iirs Genao,       VIRTUAL VISIT DISCLAIMER    Marisela Woodard verbally agrees to participate in [employer]  Pt is aware that [employer] could be limited without vital signs or the ability to perform a full hands-on physical Enrique Dominguez understands he or the provider may request at any time to terminate the video visit and request the patient to seek care or treatment in person

## 2022-02-18 NOTE — TELEPHONE ENCOUNTER
[facility] has been made aware that the patient is WBAT per last progress note with Ortho  Did try to confirm appointment for next week and patient is not able to come into the office and does not want to get a transport vehicle to take him  [facility] stated one of their nurses could remove sutures at the home if Dr Karis Leon is agreeable and patient could follow up when more mobile  Please advise

## 2022-02-18 NOTE — TELEPHONE ENCOUNTER
Patient sees Dr Roque Trivedi is calling in from [facility] ([facility] PT wanting to know the weight barring status relating his right leg  She is asking for a call back relating this        Call back# [phone]

## 2022-02-18 NOTE — ASSESSMENT & PLAN NOTE
Continue home physical therapy, continue his IV Ancef infusions I asked his wife to slow down the rate of the saline flush see if this helps with the palpitations

## 2022-02-18 NOTE — TELEPHONE ENCOUNTER
Referral faxed to # as instructed (Attention : Karla Lloyd)  Confirmation fax received  Called Karla Lloyd to notify  Karla Lloyd will take a look into it  No further action needed

## 2022-02-18 NOTE — ASSESSMENT & PLAN NOTE
Continue to monitor his blood sugar 3 times daily, continue with insulin and metformin, he is to call if his blood sugars go below 70 or above 300     Lab Results   Component Value Date    HGBA1C 7 8 (H) 02/07/2022

## 2022-02-21 ENCOUNTER — TELEPHONE (OUTPATIENT)
Dept: FAMILY MEDICINE CLINIC | Facility: CLINIC | Age: 67
End: 2022-02-21

## 2022-02-21 ENCOUNTER — PATIENT OUTREACH (OUTPATIENT)
Dept: FAMILY MEDICINE CLINIC | Facility: CLINIC | Age: 67
End: 2022-02-21

## 2022-02-21 ENCOUNTER — LAB REQUISITION (OUTPATIENT)
Dept: LAB | Facility: HOSPITAL | Age: 67
End: 2022-02-21
Payer: COMMERCIAL

## 2022-02-21 DIAGNOSIS — T81.32XA DISRUPTION OF INTERNAL OPERATION (SURGICAL) WOUND, NOT ELSEWHERE CLASSIFIED, INITIAL ENCOUNTER: ICD-10-CM

## 2022-02-21 LAB
BASOPHILS # BLD AUTO: 0.03 THOUSANDS/ΜL (ref 0–0.1)
BASOPHILS NFR BLD AUTO: 0 % (ref 0–1)
CREAT SERPL-MCNC: 2.3 MG/DL (ref 0.6–1.3)
EOSINOPHIL # BLD AUTO: 0.16 THOUSAND/ΜL (ref 0–0.61)
EOSINOPHIL NFR BLD AUTO: 2 % (ref 0–6)
ERYTHROCYTE [DISTWIDTH] IN BLOOD BY AUTOMATED COUNT: 16.4 % (ref 11.6–15.1)
GFR SERPL CREATININE-BSD FRML MDRD: 28 ML/MIN/1.73SQ M
HCT VFR BLD AUTO: 26 % (ref 36.5–49.3)
HGB BLD-MCNC: 7.8 G/DL (ref 12–17)
IMM GRANULOCYTES # BLD AUTO: 0.03 THOUSAND/UL (ref 0–0.2)
IMM GRANULOCYTES NFR BLD AUTO: 0 % (ref 0–2)
LYMPHOCYTES # BLD AUTO: 1.16 THOUSANDS/ΜL (ref 0.6–4.47)
LYMPHOCYTES NFR BLD AUTO: 12 % (ref 14–44)
MCH RBC QN AUTO: 27 PG (ref 26.8–34.3)
MCHC RBC AUTO-ENTMCNC: 30 G/DL (ref 31.4–37.4)
MCV RBC AUTO: 90 FL (ref 82–98)
MONOCYTES # BLD AUTO: 0.92 THOUSAND/ΜL (ref 0.17–1.22)
MONOCYTES NFR BLD AUTO: 10 % (ref 4–12)
NEUTROPHILS # BLD AUTO: 7.05 THOUSANDS/ΜL (ref 1.85–7.62)
NEUTS SEG NFR BLD AUTO: 76 % (ref 43–75)
NRBC BLD AUTO-RTO: 0 /100 WBCS
PLATELET # BLD AUTO: 451 THOUSANDS/UL (ref 149–390)
PMV BLD AUTO: 10.3 FL (ref 8.9–12.7)
RBC # BLD AUTO: 2.89 MILLION/UL (ref 3.88–5.62)
WBC # BLD AUTO: 9.35 THOUSAND/UL (ref 4.31–10.16)

## 2022-02-21 PROCEDURE — 85025 COMPLETE CBC W/AUTO DIFF WBC: CPT | Performed by: INTERNAL MEDICINE

## 2022-02-21 PROCEDURE — 82565 ASSAY OF CREATININE: CPT | Performed by: INTERNAL MEDICINE

## 2022-02-21 NOTE — TELEPHONE ENCOUNTER
T/c from [address] [phone]- PT is 19 4, INR is 1 6, current dose 6 5 mg coumadin  Keep the same or up the coumadin?

## 2022-02-21 NOTE — TELEPHONE ENCOUNTER
Radhames Rodriguez returned call back, notified of change of coumadin and recheck in 1 week  Expressed understanding, she is going to notify patient to increase to 7mg daily as well

## 2022-02-21 NOTE — TELEPHONE ENCOUNTER
Spoke to MARY BATRES and she will be [employer] a picture of patient's incision  She states she will be able to remove sutures next Monday 2/28 which would be week 3

## 2022-02-21 NOTE — PROGRESS NOTES
OPCM  is responding to consult from PCP  Patient was hospitalized at [facility] from 2/3-2/12 S/P fall and sustaining a right hip fracture  He refused STR and was discharged to home with  GISEL  Telephone call placed to patient and I spoke to Mrs Brian Hackett  I introduced myself and explained my role  Mrs Brian Hakcett informed me that patient has an appointment tomorrow with Orthopedic Surgery and he does not have transportation  She reports that he is unable to ambulate and has seven steps to enter his home  Mrs Brian Hackett also reports that it would take two persons to carry his down the steps  I discussed possible ambulance transportation and informed her that this would be difficult to arrange with such a short notice  Mrs Brian Hackett is aware that transportation to medical appointments is not covered under insurance  I also discussed and recommended that patient go to STR but she reports that he refuses to consider and had a bad experience in the past     Telephone call also received from patient's Nurse Luis Felipe Aragon from [facility] and she discussed her concerns  Multiple phone calls placed to ambulance companies, including [facility] [place], [address], [address] [place] [address], and [facility]  They do not have any availability at this time  Wyarno informed me that S ambulance would run around $800 each way plus $15 each mile but they are fully booked  Telephone call placed again to Mrs Brian Hackett but she did not answer the phone and I left a message on her VM informing her that I was unable to secure transport for tomorrow  Telephone call placed to [facility] from [facility] and I informed her of above  Luis Felipe Aragon informed me that patient requested that she remove his stitches at home  Luis Felipe Aragon reports that she called patient's Surgeon and she sent him a picture of the surgical site  The Surgeon will review the pictures and provide orders to remove the stitches if he feels it is appropriate       KEENAA  will also be visiting with patient in the home on Wednesday 2/23      I will continue to follow and consult with the KEENAA, SW

## 2022-02-21 NOTE — TELEPHONE ENCOUNTER
Left Nicola wSeeney the above detailed msg via voicemail  Asked her to call back with any further questions

## 2022-02-22 NOTE — TELEPHONE ENCOUNTER
Chary BRINKA wanted to point to the bottom wound  There is a bump there that concerned her  Patient is not ambulatory  Barely pivoting to use bedside commode  Very self limiting  Won't follow directions or suggestions  PT is trying to work with him but he is resistant with the slightest pain or discomfort    DEDRICK

## 2022-02-23 ENCOUNTER — TELEMEDICINE (OUTPATIENT)
Dept: INFECTIOUS DISEASES | Facility: CLINIC | Age: 67
End: 2022-02-23
Payer: COMMERCIAL

## 2022-02-23 DIAGNOSIS — E11.65 POORLY CONTROLLED DIABETES MELLITUS (HCC): ICD-10-CM

## 2022-02-23 DIAGNOSIS — S81.801D WOUND OF RIGHT LOWER EXTREMITY, SUBSEQUENT ENCOUNTER: Primary | ICD-10-CM

## 2022-02-23 DIAGNOSIS — N18.30 STAGE 3 CHRONIC KIDNEY DISEASE, UNSPECIFIED WHETHER STAGE 3A OR 3B CKD (HCC): ICD-10-CM

## 2022-02-23 PROCEDURE — 99215 OFFICE O/P EST HI 40 MIN: CPT | Performed by: INTERNAL MEDICINE

## 2022-02-23 PROCEDURE — 1111F DSCHRG MED/CURRENT MED MERGE: CPT | Performed by: INTERNAL MEDICINE

## 2022-02-23 NOTE — PATIENT INSTRUCTIONS
Continue your medications as currently ordered  Continue weekly labs and PICC care per visiting nurse  Virtual follow up in two weeks  Call us with questions or concerns in the interim

## 2022-02-23 NOTE — PROGRESS NOTES
Virtual Regular Visit    Verification of patient location:    Patient is located in the following state in which I hold an active license [place]    Assessment/Plan:  1  Lower leg wound with exposed/compromised hardware   Patient presented initially after a fall and was found to have right femur fracture on 2/3   He was also noted to have a lower leg wound which on debridement in the OR on 2/7 had exposed hardware present from a previous fracture repair   No gross purulence in the OR   No cultures collected  Dietra Areli is likely that the hardware is compromised by skin silvre   No drug allergies noted  Noted with chronic kidney disease  Currently on Ancef induction therapy with plans for suppression  Potential for relapse discussed in detail  Patient willing to continue IV therapy for now  Continue Ancef 2 g every 8 hours  Recommended extending infusion for 15 minute  Recommended extending saline flushes over 1-2 minute  ID office staff to update visiting nurse [address] [place] [address] for total 6 weeks of therapy through 3/20  Weekly labs with CBCD and creatinine  Maintain current PICC line  Will transition to oral suppressive therapy with Duricef thereafter  No plans currently for hardware removal per Orthopedics  Recommended patient follow-up closely with PCP/orthopedics otherwise  RTO in 2 weeks (Can be virtual with me in [place])  If patient is still having issues with IV therapy can transition to oral therapy sooner  Additional questions answered     2  Closed right femur fracture after fall   Patient is status post intramedullary nail of an intratrochanteric fracture after a fall  Continue to follow closely with Orthopedics      3  Chronic kidney disease stage 3  Patient noted to have acute kidney injury and later diagnosed with chronic kidney disease on last admission  This does impact antibiotic dosing  Current creatinine will support q 8 hours dosing for now    Ancef dosing as above  Will further dose adjust antibiotics as needed  Weekly labs as above  Would recommend outpatient evaluation by Nephrology  Will forward office visit to patient's primary care provider     4  Poorly controlled diabetes   Hemoglobin A1c noted to be 8 2   I suspect that this is contributing to the above as well as to overall poor wound healing  Glucose management per PCP  Above plan discussed in great detail with the patient and his wife  Will forward office visit to PCP    RTO in 2 weeks  Problem List Items Addressed This Visit        Other    Leg wound, right - Primary      Other Visit Diagnoses     Stage 3 chronic kidney disease, unspecified whether stage 3a or 3b CKD ([facility] )        Poorly controlled diabetes mellitus ([facility] )                 Reason for visit is   Chief Complaint   Patient presents with    Virtual Regular Visit        Encounter provider Vahe Haq MD    Provider located at [address] [place] [address] INFECTIOUS DISEASE [facility]  [address]  [phone]      Recent Visits  Date Type Provider Dept   02/21/22 Telephone Mariana Poster, DO [facility] [address] [place] [address]   02/18/22 [facility], DO [facility] [place] [address] [place] [address]   Showing recent visits within past 7 days and meeting all other requirements  Today's Visits  Date Type Provider Dept   02/23/22 Telemedicine Vahe Haq MD Pg Infectious Disease [facility]   Showing today's visits and meeting all other requirements  Future Appointments  No visits were found meeting these conditions  Showing future appointments within next 150 days and meeting all other requirements       The patient was identified by name and date of birth  Luli Dennis was informed that this is a telemedicine visit and that the visit is being conducted through [facility] and patient was informed this is a secure, HIPAA-complaint platform  He agrees to proceed     My office door was closed  No one else was in the room  He acknowledged consent and understanding of privacy and security of the video platform  The patient has agreed to participate and understands they can discontinue the visit at any time  Patient is aware this is a billable service  Subjective  Mario Membreno is a 61 y o  male who is known to me from his most recent hospitalization  Patient presents at this time for follow-up on IV antibiotics  He currently denies having any nausea, vomiting, chest pain or shortness of breath  He reports having what he feels are palpitations when receiving normal saline and Ancef  I reviewed in detail with the patient and his wife that there infusion can be slow down which will likely decrease his symptoms but again these side effects are not typically associated with the antibiotic itself  We discussed that the antibiotic again is not a guarantee and that there is a potential for infection developing in the future  I recommended that he discuss mobility issues along with hardware maintenance/removal with his orthopedic surgeon  Patient remains very frustrated that he is not mobile at home despite being recommended at discharge for rehab placement  The PICC line site appears to be functioning well and is stable otherwise  We discussed again that antibiotics could be maintained for 2-6 weeks and it is truly up to the patient if he would like to switch to oral therapy now  Ultimately again I cannot guarantee that there will not be an infection that develops in the future  At this time the patient and his wife would like to try to slow the infusions and would like to continue on IV and potentially transition to oral therapy at the next visit  Additional questions answered        HPI     Past Medical History:   Diagnosis Date    Back pain     Diabetes mellitus ([facility] )     DVT (deep venous thrombosis) ([facility] )     Hard to intubate 10/18/2021    Glidescope with #3 blade and bougie used to pass 6 0 ETT    Hyperlipidemia     Hypertension        Past Surgical History:   Procedure Laterality Date    CATARACT EXTRACTION      COLONOSCOPY      6/2014    CYST REMOVAL      EXTERNAL FIXATOR APPLICATION Right 23/67/5875    Procedure: Application External Fixation Device right lower extremity;  Surgeon: Miguel Ángel Porter MD;  [facility]: [facility];  Service: Orthopedics    HAND SURGERY      HEEL SPUR SURGERY      foot surgery    INCISION AND DRAINAGE OF WOUND Right 2/7/2022    Procedure: INCISION AND DRAINAGE (I&D) EXTREMITY;  Surgeon: Miguel Ángel Porter MD;  [facility];  Service: Orthopedics    ORIF TIBIA FRACTURE Right 10/25/2021    Procedure: OPEN REDUCTION W/ INTERNAL FIXATION (ORIF) RIGHT PROXIMAL TIBIA SHAFT FRACTURE, REMOVAL OF EXTERNAL FIXATOR;  Surgeon: Miguel Ángel Porter MD;  [facility];  Service: Orthopedics    ID OPEN RX FEMUR FX+INTRAMED JASMYNE Right 2/7/2022    Procedure: INSERTION NAIL IM FEMUR ANTEGRADE (TROCHANTERIC);   Surgeon: Mgiuel Ángel Porter MD;  [facility];  Service: Orthopedics       Current Outpatient Medications   Medication Sig Dispense Refill    ceFAZolin (ANCEF) 2000 mg IVPB Infuse 2,000 mg into a venous catheter every 8 (eight) hours 5400 mL 0    colchicine (COLCRYS) 0 6 mg tablet Take 1 tablet (0 6 mg total) by mouth daily 30 tablet 5    Continuous Blood Gluc  (FreeStyle Reese 14 Day Arbuckle) JEROME Check blood sugar TID, DX E11 9 1 each 0    Continuous Blood Gluc Sensor (FreeStyle Reese 14 Day Sensor) MISC Check blood sugar TID, apply new sensor q 14 days 2 each 5    ferrous sulfate 325 (65 Fe) mg tablet Take 325 mg by mouth daily with breakfast      insulin glargine (LANTUS) 100 units/mL subcutaneous injection Inject 25 Units under the skin daily      lidocaine (LIDODERM) 5 % Apply 1 patch topically daily as needed (hip pain) Remove & Discard patch within 12 hours or as directed by MD 30 patch 0    lisinopril (ZESTRIL) 10 mg tablet TAKE 1 TABLET EVERY DAY 90 tablet 3    metFORMIN (GLUCOPHAGE) 1000 MG tablet Take 1 tablet (1,000 mg total) by mouth 2 (two) times a day with meals 60 tablet 5    oxyCODONE (OxyCONTIN) 10 mg 12 hr tablet Take 10 mg by mouth every 12 (twelve) hours      polyethylene glycol (MIRALAX) 17 g packet Take 17 g by mouth daily as needed       senna-docusate sodium (SENOKOT-S) 8 6-50 mg per tablet Take 1 tablet by mouth 2 (two) times a day      simvastatin (ZOCOR) 20 mg tablet TAKE 1 TABLET EVERY DAY 90 tablet 3    warfarin (COUMADIN) 2 5 mg tablet Take 1 tablet (2 5 mg total) by mouth daily ashwaty zeina 30 tablet 0     No current facility-administered medications for this visit  Allergies   Allergen Reactions    Byetta 10 Mcg Pen [Exenatide] Swelling    Pollen Extract Myalgia       Review of Systems   All other systems reviewed and are negative  Video Exam    There were no vitals filed for this visit  Physical Exam  Constitutional:       General: He is not in acute distress  Appearance: Normal appearance  HENT:      Head: Normocephalic and atraumatic  Right Ear: External ear normal       Left Ear: External ear normal       Nose: Nose normal       Mouth/Throat:      Mouth: Mucous membranes are moist       Comments: Severely poor dentition noted  Eyes:      General: No scleral icterus  Right eye: No discharge  Left eye: No discharge  Pulmonary:      Effort: Pulmonary effort is normal  No respiratory distress  Skin:     Coloration: Skin is not jaundiced  Findings: No rash  Comments: PICC line insertion site examined and there is no erythema or drainage appreciated at the insertion site  Neurological:      General: No focal deficit present  Mental Status: He is alert and oriented to person, place, and time  Psychiatric:      Comments: Patient remains with a very depressed affect    He seems to be largely angry with the course of events  Again attempted to try to be straightforward with the patient in terms of outlook and recommendations  Wife was present for exam as well as questioning  I spent 30 minutes with patient today in which greater than 50% of the time was spent in counseling/coordination of care regarding Adjusting antibiotic infusion, discussion of side effect, potential for relapse, the reason for lab monitoring, and continue distress recommendations to follow-up with orthopedics  VIRTUAL VISIT DISCLAIMER      Sol Garrido verbally agrees to participate in [facility]  Pt is aware that [facility] could be limited without vital signs or the ability to perform a full hands-on physical Kip Cheema understands he or the provider may request at any time to terminate the video visit and request the patient to seek care or treatment in person

## 2022-02-23 NOTE — LETTER
February 23, 2022     Kenney Boxer, DO  [address] [employer] [address] [place] [address]    Patient: Cassidy Nelson   YOB: 1955   Date of Visit: 2/23/2022       Dear Dr Gillian Otero: Thank you for referring Cassidy Nelson to me for evaluation  Below are my notes for this consultation  If you have questions, please do not hesitate to call me  I look forward to following your patient along with you  Sincerely,        Carter Edge MD        CC: No Recipients  Carter Edge MD  2/23/2022  1:02 PM  Sign when Signing Visit    Virtual Regular Visit    Verification of patient location:    Patient is located in the following state in which I hold an active license [place]    Assessment/Plan:  1  Lower leg wound with exposed/compromised hardware   Patient presented initially after a fall and was found to have right femur fracture on 2/3   He was also noted to have a lower leg wound which on debridement in the OR on 2/7 had exposed hardware present from a previous fracture repair   No gross purulence in the OR   No cultures collected  Alejo Rivera is likely that the hardware is compromised by skin silver   No drug allergies noted  Noted with chronic kidney disease  Currently on Ancef induction therapy with plans for suppression  Potential for relapse discussed in detail  Patient willing to continue IV therapy for now    Continue Ancef 2 g every 8 hours  Recommended extending infusion for 15 minute  Recommended extending saline flushes over 1-2 minute  ID office staff to update visiting nurse [address] [place] [address] for total 6 weeks of therapy through 3/20  Weekly labs with CBCD and creatinine  Maintain current PICC line  Will transition to oral suppressive therapy with Duricef thereafter  No plans currently for hardware removal per Orthopedics  Recommended patient follow-up closely with PCP/orthopedics otherwise  RTO in 2 weeks (Can be virtual with me in [place])  If patient is still having issues with IV therapy can transition to oral therapy sooner  Additional questions answered     2  Closed right femur fracture after fall   Patient is status post intramedullary nail of an intratrochanteric fracture after a fall  Continue to follow closely with Orthopedics      3  Chronic kidney disease stage 3  Patient noted to have acute kidney injury and later diagnosed with chronic kidney disease on last admission  This does impact antibiotic dosing  Current creatinine will support q 8 hours dosing for now  Ancef dosing as above  Will further dose adjust antibiotics as needed  Weekly labs as above  Would recommend outpatient evaluation by Nephrology  Will forward office visit to patient's primary care provider     4  Poorly controlled diabetes   Hemoglobin A1c noted to be 8 2   I suspect that this is contributing to the above as well as to overall poor wound healing  Glucose management per PCP  Above plan discussed in great detail with the patient and his wife  Will forward office visit to PCP    RTO in 2 weeks         Problem List Items Addressed This Visit        Other    Leg wound, right - Primary      Other Visit Diagnoses     Stage 3 chronic kidney disease, unspecified whether stage 3a or 3b CKD ([facility] )        Poorly controlled diabetes mellitus ([facility] )                 Reason for visit is   Chief Complaint   Patient presents with    Virtual Regular Visit        Encounter provider Skylar Rojo MD    Provider located at [address] [place] [id number] [phone]      Recent Visits  Date Type Provider Dept   02/21/22 Telephone DO [facility] [address] [place] [address]   02/18/22 5851 [facility], DO Lj Garcia [address] [place] [address]   Showing recent visits within past 7 days and meeting all other requirements  Today's Visits  Date Type Provider Dept   02/23/22 Telemedicine Olesya Grace MD  Infectious Disease [facility]   Showing today's visits and meeting all other requirements  Future Appointments  No visits were found meeting these conditions  Showing future appointments within next 150 days and meeting all other requirements       The patient was identified by name and date of birth  Chacha Ridley was informed that this is a telemedicine visit and that the visit is being conducted through [facility] and patient was informed this is a secure, HIPAA-complaint platform  He agrees to proceed     My office door was closed  No one else was in the room  He acknowledged consent and understanding of privacy and security of the video platform  The patient has agreed to participate and understands they can discontinue the visit at any time  Patient is aware this is a billable service  Subjective  Chacha Ridley is a 61 y o  male who is known to me from his most recent hospitalization  Patient presents at this time for follow-up on IV antibiotics  He currently denies having any nausea, vomiting, chest pain or shortness of breath  He reports having what he feels are palpitations when receiving normal saline and Ancef  I reviewed in detail with the patient and his wife that there infusion can be slow down which will likely decrease his symptoms but again these side effects are not typically associated with the antibiotic itself  We discussed that the antibiotic again is not a guarantee and that there is a potential for infection developing in the future  I recommended that he discuss mobility issues along with hardware maintenance/removal with his orthopedic surgeon  Patient remains very frustrated that he is not mobile at home despite being recommended at discharge for rehab placement  The PICC line site appears to be functioning well and is stable otherwise    We discussed again that antibiotics could be maintained for 2-6 weeks and it is truly up to the patient if he would like to switch to oral therapy now  Ultimately again I cannot guarantee that there will not be an infection that develops in the future  At this time the patient and his wife would like to try to slow the infusions and would like to continue on IV and potentially transition to oral therapy at the next visit  Additional questions answered  HPI     Past Medical History:   Diagnosis Date    Back pain     Diabetes mellitus ([address] )     DVT (deep venous thrombosis) ([facility] )     Hard to intubate 10/18/2021    Glidescope with #3 blade and bougie used to pass 6 0 ETT    Hyperlipidemia     Hypertension        Past Surgical History:   Procedure Laterality Date    CATARACT EXTRACTION      COLONOSCOPY      6/2014    CYST REMOVAL      EXTERNAL FIXATOR APPLICATION Right 32/71/1593    Procedure: Application External Fixation Device right lower extremity;  Surgeon: Linda Ortiz MD;  [facility];  Service: Orthopedics    HAND SURGERY      HEEL SPUR SURGERY      foot surgery    INCISION AND DRAINAGE OF WOUND Right 2/7/2022    Procedure: INCISION AND DRAINAGE (I&D) EXTREMITY;  Surgeon: Linda Ortiz MD;  [facility];  Service: Orthopedics    ORIF TIBIA FRACTURE Right 10/25/2021    Procedure: OPEN REDUCTION W/ INTERNAL FIXATION (ORIF) RIGHT PROXIMAL TIBIA SHAFT FRACTURE, REMOVAL OF EXTERNAL FIXATOR;  Surgeon: Linda Ortiz MD;  [facility];  Service: Orthopedics    WI OPEN RX FEMUR FX+INTRAMED JASMYNE Right 2/7/2022    Procedure: INSERTION NAIL IM FEMUR ANTEGRADE (TROCHANTERIC);   Surgeon: Linda Ortiz MD;  [facility];  Service: Orthopedics       Current Outpatient Medications   Medication Sig Dispense Refill    ceFAZolin (ANCEF) 2000 mg IVPB Infuse 2,000 mg into a venous catheter every 8 (eight) hours 5400 mL 0    colchicine (COLCRYS) 0 6 mg tablet Take 1 tablet (0 6 mg total) by mouth daily 30 tablet 5    Continuous Blood Gluc  (FreeStyle Peres 14 Day Poth) JEROME Check blood sugar TID, DX E11 9 1 each 0    Continuous Blood Gluc Sensor (FreeStyle Reese 14 Day Sensor) MISC Check blood sugar TID, apply new sensor q 14 days 2 each 5    ferrous sulfate 325 (65 Fe) mg tablet Take 325 mg by mouth daily with breakfast      insulin glargine (LANTUS) 100 units/mL subcutaneous injection Inject 25 Units under the skin daily      lidocaine (LIDODERM) 5 % Apply 1 patch topically daily as needed (hip pain) Remove & Discard patch within 12 hours or as directed by MD 30 patch 0    lisinopril (ZESTRIL) 10 mg tablet TAKE 1 TABLET EVERY DAY 90 tablet 3    metFORMIN (GLUCOPHAGE) 1000 MG tablet Take 1 tablet (1,000 mg total) by mouth 2 (two) times a day with meals 60 tablet 5    oxyCODONE (OxyCONTIN) 10 mg 12 hr tablet Take 10 mg by mouth every 12 (twelve) hours      polyethylene glycol (MIRALAX) 17 g packet Take 17 g by mouth daily as needed       senna-docusate sodium (SENOKOT-S) 8 6-50 mg per tablet Take 1 tablet by mouth 2 (two) times a day      simvastatin (ZOCOR) 20 mg tablet TAKE 1 TABLET EVERY DAY 90 tablet 3    warfarin (COUMADIN) 2 5 mg tablet Take 1 tablet (2 5 mg total) by mouth daily ashwaty zeina 30 tablet 0     No current facility-administered medications for this visit  Allergies   Allergen Reactions    Byetta 10 Mcg Pen [Exenatide] Swelling    Pollen Extract Myalgia       Review of Systems   All other systems reviewed and are negative  Video Exam    There were no vitals filed for this visit  Physical Exam  Constitutional:       General: He is not in acute distress  Appearance: Normal appearance  HENT:      Head: Normocephalic and atraumatic  Right Ear: External ear normal       Left Ear: External ear normal       Nose: Nose normal       Mouth/Throat:      Mouth: Mucous membranes are moist       Comments: Severely poor dentition noted  Eyes:      General: No scleral icterus  Right eye: No discharge           Left eye: No discharge  Pulmonary:      Effort: Pulmonary effort is normal  No respiratory distress  Skin:     Coloration: Skin is not jaundiced  Findings: No rash  Comments: PICC line insertion site examined and there is no erythema or drainage appreciated at the insertion site  Neurological:      General: No focal deficit present  Mental Status: He is alert and oriented to person, place, and time  Psychiatric:      Comments: Patient remains with a very depressed affect  He seems to be largely angry with the course of events  Again attempted to try to be straightforward with the patient in terms of outlook and recommendations  Wife was present for exam as well as questioning  I spent 30 minutes with patient today in which greater than 50% of the time was spent in counseling/coordination of care regarding Adjusting antibiotic infusion, discussion of side effect, potential for relapse, the reason for lab monitoring, and continue distress recommendations to follow-up with orthopedics  VIRTUAL VISIT DISCLAIMER      Brooke Avina verbally agrees to participate in [employer]  Pt is aware that [employer] could be limited without vital signs or the ability to perform a full hands-on physical [place]  understands he or the provider may request at any time to terminate the video visit and request the patient to seek care or treatment in person

## 2022-02-25 ENCOUNTER — TELEPHONE (OUTPATIENT)
Dept: OBGYN CLINIC | Facility: HOSPITAL | Age: 67
End: 2022-02-25

## 2022-02-25 NOTE — TELEPHONE ENCOUNTER
[facility] physical therapy wanted to let Dr Marilee Davison know that patient missed his physical therapy session today due to the weather  She also wanted to recommend a mobile x-ray because this patient is unable to get out of his home and he is truly homebound  She also reports that the patient is experiencing a lot of pain  She believes that he has a limited amount of his Oxycodone left and he will be running out soon  Radha's cb # [phone]  Please call patient in regards to follow-up with these questions  Lastly, Pilar Agustin said that the patient is very upset that he was not able to make it to his appointment this week

## 2022-02-25 NOTE — TELEPHONE ENCOUNTER
Spoke to Alicia Simms  She stated the patient is complaining of pain  Not necessarily in his hip but I his back mostly  Stated that she has been working with him to try and get him to ambulate and do exercises  She stated the patient is very apprehensive to progress with walker ambulation  She stated he is unable to get out of the house, social work has been consulted and saw the patient in the home and offered services but many of the services were declined  She asked if mobile xray would be an option if we can place that order  She believes patient is almost out of oxycodone  Please advise on mobile xray  Did leave message on VM for patient to return call to discuss

## 2022-02-28 ENCOUNTER — TELEPHONE (OUTPATIENT)
Dept: FAMILY MEDICINE CLINIC | Facility: CLINIC | Age: 67
End: 2022-02-28

## 2022-02-28 ENCOUNTER — ANTICOAG VISIT (OUTPATIENT)
Dept: FAMILY MEDICINE CLINIC | Facility: CLINIC | Age: 67
End: 2022-02-28

## 2022-02-28 ENCOUNTER — LAB REQUISITION (OUTPATIENT)
Dept: LAB | Facility: HOSPITAL | Age: 67
End: 2022-02-28
Payer: COMMERCIAL

## 2022-02-28 DIAGNOSIS — Z98.890 STATUS POST MUSCULOSKELETAL SYSTEM SURGERY: Primary | ICD-10-CM

## 2022-02-28 DIAGNOSIS — T81.32XA DISRUPTION OF INTERNAL OPERATION (SURGICAL) WOUND, NOT ELSEWHERE CLASSIFIED, INITIAL ENCOUNTER: ICD-10-CM

## 2022-02-28 LAB
BASOPHILS # BLD AUTO: 0.03 THOUSANDS/ΜL (ref 0–0.1)
BASOPHILS NFR BLD AUTO: 0 % (ref 0–1)
CREAT SERPL-MCNC: 2.39 MG/DL (ref 0.6–1.3)
EOSINOPHIL # BLD AUTO: 0.27 THOUSAND/ΜL (ref 0–0.61)
EOSINOPHIL NFR BLD AUTO: 4 % (ref 0–6)
ERYTHROCYTE [DISTWIDTH] IN BLOOD BY AUTOMATED COUNT: 16.3 % (ref 11.6–15.1)
GFR SERPL CREATININE-BSD FRML MDRD: 27 ML/MIN/1.73SQ M
HCT VFR BLD AUTO: 27.5 % (ref 36.5–49.3)
HGB BLD-MCNC: 8.4 G/DL (ref 12–17)
IMM GRANULOCYTES # BLD AUTO: 0.02 THOUSAND/UL (ref 0–0.2)
IMM GRANULOCYTES NFR BLD AUTO: 0 % (ref 0–2)
LYMPHOCYTES # BLD AUTO: 1.42 THOUSANDS/ΜL (ref 0.6–4.47)
LYMPHOCYTES NFR BLD AUTO: 19 % (ref 14–44)
MCH RBC QN AUTO: 27.5 PG (ref 26.8–34.3)
MCHC RBC AUTO-ENTMCNC: 30.5 G/DL (ref 31.4–37.4)
MCV RBC AUTO: 90 FL (ref 82–98)
MONOCYTES # BLD AUTO: 0.77 THOUSAND/ΜL (ref 0.17–1.22)
MONOCYTES NFR BLD AUTO: 10 % (ref 4–12)
NEUTROPHILS # BLD AUTO: 5.17 THOUSANDS/ΜL (ref 1.85–7.62)
NEUTS SEG NFR BLD AUTO: 67 % (ref 43–75)
NRBC BLD AUTO-RTO: 0 /100 WBCS
PLATELET # BLD AUTO: 392 THOUSANDS/UL (ref 149–390)
PMV BLD AUTO: 10.4 FL (ref 8.9–12.7)
RBC # BLD AUTO: 3.05 MILLION/UL (ref 3.88–5.62)
WBC # BLD AUTO: 7.68 THOUSAND/UL (ref 4.31–10.16)

## 2022-02-28 PROCEDURE — 85025 COMPLETE CBC W/AUTO DIFF WBC: CPT | Performed by: INTERNAL MEDICINE

## 2022-02-28 PROCEDURE — 82565 ASSAY OF CREATININE: CPT | Performed by: INTERNAL MEDICINE

## 2022-02-28 NOTE — TELEPHONE ENCOUNTER
T/c from Orin Bosworth [address] [place], [place] [address]A [phone]- PT is 19 6, INR is 1 6, current dose 7 mg coumadin      Keep the same or up the coumadin? Please call Orin Bosworth # [phone]       Please advsie

## 2022-02-28 NOTE — TELEPHONE ENCOUNTER
Spoke to Alejandro Chávez  She is unaware of who this is supposed to go to  Is this information available at the office to fax? Please advise

## 2022-02-28 NOTE — TELEPHONE ENCOUNTER
Radha from [facility] is calling back with the name and phone number of the mobile [phone] (she did not have the fax number)

## 2022-03-01 ENCOUNTER — TELEPHONE (OUTPATIENT)
Dept: OBGYN CLINIC | Facility: HOSPITAL | Age: 67
End: 2022-03-01

## 2022-03-01 DIAGNOSIS — I82.403 ACUTE EMBOLISM AND THROMBOSIS OF DEEP VEIN OF BOTH LOWER EXTREMITIES (HCC): Primary | ICD-10-CM

## 2022-03-01 RX ORDER — WARFARIN SODIUM 7.5 MG/1
7.5 TABLET ORAL
Qty: 30 TABLET | Refills: 0 | Status: SHIPPED | OUTPATIENT
Start: 2022-03-01 | End: 2022-07-11 | Stop reason: SDUPTHER

## 2022-03-02 NOTE — TELEPHONE ENCOUNTER
Called [facility] Mobile and CARROL on  for company to call us back to explain process to get services for patient

## 2022-03-03 NOTE — TELEPHONE ENCOUNTER
[facility] wripl is calling in stating they have a technician on the road now and they are looking to schedule patient for x-rays  However, they cannot get a hold of patient  Provided them with home phone number as they did not have that number on file for the patient  They will try to reach patient at that number       cb # [phone] Jennifer Espinal)

## 2022-03-03 NOTE — TELEPHONE ENCOUNTER
Radha from [facility] is calling to see if we reached out to mobile xray  Advised yes and provided the details below

## 2022-03-04 ENCOUNTER — PATIENT OUTREACH (OUTPATIENT)
Dept: FAMILY MEDICINE CLINIC | Facility: CLINIC | Age: 67
End: 2022-03-04

## 2022-03-04 NOTE — PROGRESS NOTES
Telephone call placed to Mrs Hayley Guardado to provide follow up communication  She informed me that patient is doing a little better and is able to transfer from the bed to the commode  Mrs Hayley Guardado informed me that she is managing with patients care at home  Mrs Hayley Guardado reports that patient is still being followed by the Visiting Nurses and a  met with them this week  She informed me that she is aware of available services but is not sure that it is needed  Mrs Hayley Guardado asked me if services are covered under insurance and I explained that some services are income based  She denies having any needs that I can assist her with at this time  I am closing this referral but advised Mrs Hayley Guardado to reach out to me if she needs any additional assistance or support at a later time  She agrees

## 2022-03-07 ENCOUNTER — LAB REQUISITION (OUTPATIENT)
Dept: LAB | Facility: HOSPITAL | Age: 67
End: 2022-03-07
Payer: COMMERCIAL

## 2022-03-07 DIAGNOSIS — Z45.2 ENCOUNTER FOR ADJUSTMENT AND MANAGEMENT OF VASCULAR ACCESS DEVICE: ICD-10-CM

## 2022-03-07 DIAGNOSIS — T81.32XA DISRUPTION OF INTERNAL OPERATION (SURGICAL) WOUND, NOT ELSEWHERE CLASSIFIED, INITIAL ENCOUNTER: ICD-10-CM

## 2022-03-07 DIAGNOSIS — S72.141D DISPLACED INTERTROCHANTERIC FRACTURE OF RIGHT FEMUR, SUBSEQUENT ENCOUNTER FOR CLOSED FRACTURE WITH ROUTINE HEALING: ICD-10-CM

## 2022-03-07 DIAGNOSIS — I10 ESSENTIAL (PRIMARY) HYPERTENSION: ICD-10-CM

## 2022-03-07 LAB
BASOPHILS # BLD AUTO: 0.03 THOUSANDS/ΜL (ref 0–0.1)
BASOPHILS NFR BLD AUTO: 0 % (ref 0–1)
CREAT SERPL-MCNC: 2.36 MG/DL (ref 0.6–1.3)
EOSINOPHIL # BLD AUTO: 0.13 THOUSAND/ΜL (ref 0–0.61)
EOSINOPHIL NFR BLD AUTO: 2 % (ref 0–6)
ERYTHROCYTE [DISTWIDTH] IN BLOOD BY AUTOMATED COUNT: 16.5 % (ref 11.6–15.1)
GFR SERPL CREATININE-BSD FRML MDRD: 28 ML/MIN/1.73SQ M
HCT VFR BLD AUTO: 29.1 % (ref 36.5–49.3)
HGB BLD-MCNC: 9 G/DL (ref 12–17)
IMM GRANULOCYTES # BLD AUTO: 0.02 THOUSAND/UL (ref 0–0.2)
IMM GRANULOCYTES NFR BLD AUTO: 0 % (ref 0–2)
INR PPP: 3.91 (ref 0.84–1.19)
LYMPHOCYTES # BLD AUTO: 1.54 THOUSANDS/ΜL (ref 0.6–4.47)
LYMPHOCYTES NFR BLD AUTO: 21 % (ref 14–44)
MCH RBC QN AUTO: 28 PG (ref 26.8–34.3)
MCHC RBC AUTO-ENTMCNC: 30.9 G/DL (ref 31.4–37.4)
MCV RBC AUTO: 91 FL (ref 82–98)
MONOCYTES # BLD AUTO: 0.62 THOUSAND/ΜL (ref 0.17–1.22)
MONOCYTES NFR BLD AUTO: 9 % (ref 4–12)
NEUTROPHILS # BLD AUTO: 4.92 THOUSANDS/ΜL (ref 1.85–7.62)
NEUTS SEG NFR BLD AUTO: 68 % (ref 43–75)
NRBC BLD AUTO-RTO: 0 /100 WBCS
PLATELET # BLD AUTO: 383 THOUSANDS/UL (ref 149–390)
PMV BLD AUTO: 9.8 FL (ref 8.9–12.7)
PROTHROMBIN TIME: 36.3 SECONDS (ref 11.6–14.5)
RBC # BLD AUTO: 3.21 MILLION/UL (ref 3.88–5.62)
WBC # BLD AUTO: 7.26 THOUSAND/UL (ref 4.31–10.16)

## 2022-03-07 PROCEDURE — 85025 COMPLETE CBC W/AUTO DIFF WBC: CPT | Performed by: INTERNAL MEDICINE

## 2022-03-07 PROCEDURE — 85610 PROTHROMBIN TIME: CPT | Performed by: INTERNAL MEDICINE

## 2022-03-07 PROCEDURE — 82565 ASSAY OF CREATININE: CPT | Performed by: INTERNAL MEDICINE

## 2022-03-08 ENCOUNTER — ANTICOAG VISIT (OUTPATIENT)
Dept: FAMILY MEDICINE CLINIC | Facility: CLINIC | Age: 67
End: 2022-03-08

## 2022-03-08 ENCOUNTER — TELEPHONE (OUTPATIENT)
Dept: INFECTIOUS DISEASES | Facility: CLINIC | Age: 67
End: 2022-03-08

## 2022-03-08 NOTE — TELEPHONE ENCOUNTER
Dr Adorno Blood reviewed pt labs and per Dr Adorno Blood pt to continue on Ancef 2g Iv every 8h  Called and notified [facility] no changes to current dose

## 2022-03-10 ENCOUNTER — TELEPHONE (OUTPATIENT)
Dept: INFECTIOUS DISEASES | Facility: CLINIC | Age: 67
End: 2022-03-10

## 2022-03-10 NOTE — TELEPHONE ENCOUNTER
Contacted patient's numbers on file (home, mobile) Unable to reach patient to get ready for virtual  LM for pt to cb  No Show Letter #1 Sent via [employer]    OK to schedule virtual w/ Sushma next Thursday when pt calls back

## 2022-03-11 ENCOUNTER — TELEPHONE (OUTPATIENT)
Dept: FAMILY MEDICINE CLINIC | Facility: CLINIC | Age: 67
End: 2022-03-11

## 2022-03-11 NOTE — TELEPHONE ENCOUNTER
Pt's spouse called -     She reports that the "lump under pts chin" is still there - recommendations did not helped - the warm wet compress is not working and the lump is the size of the ping pong ball  Pt is not eating well, he can't walk and is asking for some meds to be sent in an/or to  schedule a virtual visit with Dr Jernigan to check it out  Please advise     Pts spouse Keenan Shah # [phone]

## 2022-03-11 NOTE — TELEPHONE ENCOUNTER
Have them take a picture of it and send it via my chart  Unfortunately this may require drainage which will require him to be seen    Let me see it via picture 1st

## 2022-03-14 ENCOUNTER — TELEPHONE (OUTPATIENT)
Dept: FAMILY MEDICINE CLINIC | Facility: CLINIC | Age: 67
End: 2022-03-14

## 2022-03-14 ENCOUNTER — LAB REQUISITION (OUTPATIENT)
Dept: LAB | Facility: HOSPITAL | Age: 67
End: 2022-03-14
Payer: COMMERCIAL

## 2022-03-14 ENCOUNTER — ANTICOAG VISIT (OUTPATIENT)
Dept: FAMILY MEDICINE CLINIC | Facility: CLINIC | Age: 67
End: 2022-03-14

## 2022-03-14 ENCOUNTER — TELEPHONE (OUTPATIENT)
Dept: OBGYN CLINIC | Facility: HOSPITAL | Age: 67
End: 2022-03-14

## 2022-03-14 DIAGNOSIS — R78.81 BACTEREMIA: Primary | ICD-10-CM

## 2022-03-14 DIAGNOSIS — S72.141D DISPLACED INTERTROCHANTERIC FRACTURE OF RIGHT FEMUR, SUBSEQUENT ENCOUNTER FOR CLOSED FRACTURE WITH ROUTINE HEALING: ICD-10-CM

## 2022-03-14 LAB
BASOPHILS # BLD AUTO: 0.02 THOUSANDS/ΜL (ref 0–0.1)
BASOPHILS NFR BLD AUTO: 0 % (ref 0–1)
CREAT SERPL-MCNC: 2.6 MG/DL (ref 0.6–1.3)
EOSINOPHIL # BLD AUTO: 0.27 THOUSAND/ΜL (ref 0–0.61)
EOSINOPHIL NFR BLD AUTO: 4 % (ref 0–6)
ERYTHROCYTE [DISTWIDTH] IN BLOOD BY AUTOMATED COUNT: 16.6 % (ref 11.6–15.1)
GFR SERPL CREATININE-BSD FRML MDRD: 24 ML/MIN/1.73SQ M
HCT VFR BLD AUTO: 27.7 % (ref 36.5–49.3)
HGB BLD-MCNC: 8.4 G/DL (ref 12–17)
IMM GRANULOCYTES # BLD AUTO: 0.02 THOUSAND/UL (ref 0–0.2)
IMM GRANULOCYTES NFR BLD AUTO: 0 % (ref 0–2)
LYMPHOCYTES # BLD AUTO: 1.81 THOUSANDS/ΜL (ref 0.6–4.47)
LYMPHOCYTES NFR BLD AUTO: 27 % (ref 14–44)
MCH RBC QN AUTO: 27.5 PG (ref 26.8–34.3)
MCHC RBC AUTO-ENTMCNC: 30.3 G/DL (ref 31.4–37.4)
MCV RBC AUTO: 91 FL (ref 82–98)
MONOCYTES # BLD AUTO: 0.67 THOUSAND/ΜL (ref 0.17–1.22)
MONOCYTES NFR BLD AUTO: 10 % (ref 4–12)
NEUTROPHILS # BLD AUTO: 3.9 THOUSANDS/ΜL (ref 1.85–7.62)
NEUTS SEG NFR BLD AUTO: 59 % (ref 43–75)
NRBC BLD AUTO-RTO: 0 /100 WBCS
PLATELET # BLD AUTO: 312 THOUSANDS/UL (ref 149–390)
PMV BLD AUTO: 9.9 FL (ref 8.9–12.7)
RBC # BLD AUTO: 3.06 MILLION/UL (ref 3.88–5.62)
WBC # BLD AUTO: 6.69 THOUSAND/UL (ref 4.31–10.16)

## 2022-03-14 PROCEDURE — 82565 ASSAY OF CREATININE: CPT | Performed by: INTERNAL MEDICINE

## 2022-03-14 PROCEDURE — 85025 COMPLETE CBC W/AUTO DIFF WBC: CPT | Performed by: INTERNAL MEDICINE

## 2022-03-14 NOTE — TELEPHONE ENCOUNTER
T/c from Ruddy Lewis with [facility]:    Called to report pts PT/INR results:    PT: 50 0  INR:4 2    Is currently on 6 5mg of Coumadin daily    Also wants to talk to Dr Janice Denny about a lump under the pts chin, which he has had since he got out of the hospital   Reports pt has been using warm compresses as directed, but it is actually getting bigger      Ruddy Lewis would like call back to discuss above issues with Dr Janice Denny: [phone]

## 2022-03-14 NOTE — PROGRESS NOTES
Spoke with Jonathan Melendez ([facility]) and notified decrease Coumadin to 6mg daily and recheck in 1 week  Jonathan Melendez will contact patient and notify

## 2022-03-14 NOTE — TELEPHONE ENCOUNTER
[employer] from [facility] needing call back verbal authorization to pull [facility] line for antibiotics on 3/21 [phone] ok to leave message

## 2022-03-14 NOTE — TELEPHONE ENCOUNTER
Spoke with Ruddy Lewis and notified advisements  She states that she will call the patient and let him know  Also states that it may be difficult to get him to go to ENT, she states that he has been reluctant to do much and is not going to see any of his doctors

## 2022-03-15 DIAGNOSIS — R22.1 NECK MASS: Primary | ICD-10-CM

## 2022-03-16 DIAGNOSIS — E11.40 TYPE 2 DIABETES MELLITUS WITH DIABETIC NEUROPATHY, WITHOUT LONG-TERM CURRENT USE OF INSULIN (HCC): Primary | ICD-10-CM

## 2022-03-16 RX ORDER — OXYCODONE HYDROCHLORIDE 10 MG/1
10 TABLET ORAL EVERY 4 HOURS PRN
Qty: 30 TABLET | Refills: 0 | Status: SHIPPED | OUTPATIENT
Start: 2022-03-16 | End: 2022-08-08

## 2022-03-17 ENCOUNTER — TELEMEDICINE (OUTPATIENT)
Dept: INFECTIOUS DISEASES | Facility: CLINIC | Age: 67
End: 2022-03-17
Payer: COMMERCIAL

## 2022-03-17 DIAGNOSIS — S81.801D WOUND OF RIGHT LOWER EXTREMITY, SUBSEQUENT ENCOUNTER: Primary | ICD-10-CM

## 2022-03-17 DIAGNOSIS — E11.65 POORLY CONTROLLED DIABETES MELLITUS (HCC): ICD-10-CM

## 2022-03-17 DIAGNOSIS — N18.9 CHRONIC KIDNEY DISEASE, UNSPECIFIED CKD STAGE: ICD-10-CM

## 2022-03-17 PROCEDURE — 3066F NEPHROPATHY DOC TX: CPT | Performed by: INTERNAL MEDICINE

## 2022-03-17 PROCEDURE — 99215 OFFICE O/P EST HI 40 MIN: CPT | Performed by: INTERNAL MEDICINE

## 2022-03-17 RX ORDER — CEFADROXIL 500 MG/1
500 CAPSULE ORAL EVERY 12 HOURS SCHEDULED
Qty: 60 CAPSULE | Refills: 1 | Status: SHIPPED | OUTPATIENT
Start: 2022-03-22 | End: 2022-03-28 | Stop reason: ALTCHOICE

## 2022-03-17 NOTE — PATIENT INSTRUCTIONS
Continue IV antibiotic as planned through 03/20/2022  We will arrange visiting nurse to remove PICC line on 03/21/2022  Plan to start oral antibiotic Duricef 500 mg every 12 hours on 03/22/2022, continue this medication as suppressive therapy  Please call our office if any refills are needed  Once on oral medication please plan for labs every other week starting the week of 3/28, we will space labs out further at next visit  [facility] office will try to assist in arranging mobile labs  Plan to follow-up in our office in 4 weeks, can be virtual visit--our office will call to arrange  Recommend you follow up closely with your primary care provider  We recommend you also follow-up with orthopedics discuss hardware removal options if any  Please call if any additional questions

## 2022-03-17 NOTE — PROGRESS NOTES
Virtual Regular Visit    Verification of patient location:    Patient is located in the following state in which I hold an active license [place]     It was my intent to perform this visit via video technology but the patient was not able to do a video connection so the visit was completed via audio telephone only  Assessment/Plan:  1  Lower leg wound with exposed/compromised hardware   Patient presented initially after a fall and was found to have right femur fracture on 2/3   He was also noted to have a lower leg wound which on debridement in the [facility] 2/7 had exposed hardware present from a previous fracture repair   No gross purulence in the OR   No cultures collected  Raheem Cantu is likely that the hardware is compromised by skin silver   No drug allergies noted  Noted with chronic kidney disease  Currently on Ancef induction therapy with plans for suppression  Potential for relapse discussed in detail  Patient to complete IV therapy and willing to start [facility] suppression  Continue Ancef 2 g every 8 hours  Plan for total 6 weeks of therapy through 3/20  Weekly labs with CBCD and creatinine  Remove PICC line on 03/21/2022  Transition to Duricef 500 mg q 12 hours on 3/22, continue for suppression  Plan for labs with CBCD and Cre every 2 weeks x 2 while on PO to start  Will transition to monthly surveillance labs at next appointment  Plan to follow-up in the ID office in 4 weeks (requested virtual)  Continue follow-up with PCP  Follow-up with orthopedics, to discuss hardware removal options, if any  Recommended patient follow-up closely with PCP/orthopedics otherwise  Additional questions answered      2  Closed right femur fracture after fall   Patient is status post intramedullary nail of an intratrochanteric fracture after a fall    Continue to follow closely with Orthopedics      3  Chronic kidney disease stage 3  Patient noted to have acute kidney injury and later diagnosed with chronic kidney disease on last admission  This does impact antibiotic dosing  Current creatinine will support q 8 hours dosing for now  Ancef dosing as above  Oral antibiotic suppressive dosing as above  Lab monitoring as above  Would recommend outpatient evaluation by Nephrology  Will forward office visit to patient's primary care provider     4  Poorly controlled diabetes   Hemoglobin A1c noted to be 8 2   I suspect that this is contributing to the above as well as to overall poor wound healing  Glucose management per PCP      Above plan discussed in detail with the patient over the phone  Will forward office visit to PCP     RTO in 4 weeks  Problem List Items Addressed This Visit        Other    Leg wound, right - Primary      Other Visit Diagnoses     Chronic kidney disease, unspecified CKD stage        Poorly controlled diabetes mellitus ([facility] [address] )               Reason for visit is   Chief Complaint   Patient presents with    Follow-up    Virtual Regular Visit        Encounter provider Kennedy Kimbrough MD    Provider located at [address] [id number] [phone]      Recent Visits  Date Type Provider Dept   03/14/22 Telephone Alpine Mealing, DO [facility] [address] [facility]   03/11/22 Telephone Corey Mealing, DO [facility] [address] [facility] recent visits within past 7 days and meeting all other requirements  Today's Visits  Date Type Provider Dept   03/17/22 Telemedicine [facility]   Showing today's visits and meeting all other requirements  Future Appointments  No visits were found meeting these conditions  Showing future appointments within next 150 days and meeting all other requirements       The patient was identified by name and date of birth   Paigenelda Romero was informed that this is a telemedicine visit and that the visit is being conducted through [employer] and patient was informed this is a secure, HIPAA-complaint platform  He agrees to proceed     My office door was closed  No one else was in the room  He acknowledged consent and understanding of privacy and security of the video platform  The patient has agreed to participate and understands they can discontinue the visit at any time  Patient is aware this is a billable service  Subjective  Nakita Cherry is a 61 y o  male  unfortunately was unable to connect to the patient via video platform  We spoke over the phone and the patient currently denies having any nausea, vomiting, chest pain shortness of breath  Overall he feels very fatigued and run down and he feels it is related to the antibiotics  He is agreeable to completing course of therapy through Monday  Afterwards he is agreeable to transition to oral antibiotic with [facility] which he can take twice a day  Patient at this time remains essentially homebound and is requesting mobile lab if we need continued labs surveillance  He is aware of the recommendation to follow-up with orthopedics and to eventually discuss hardware removal   We discussed starting a probiotic  Going forward he continues to prefer virtual visits until his mobility improves  Additional questions were answered      HPI     Past Medical History:   Diagnosis Date    Back pain     Diabetes mellitus ([address] )     DVT [facility]deep venous thrombosis) ([facility])     Hard to intubate 10/18/2021    Glidescope with #3 blade and bougie used to pass 6 0 ETT    Hyperlipidemia     Hypertension        Past Surgical History:   Procedure Laterality Date    CATARACT EXTRACTION      COLONOSCOPY      6/2014    CYST REMOVAL      EXTERNAL FIXATOR APPLICATION Right 44/50/8639    Procedure: Application External Fixation Device right lower extremity;  Surgeon: Anthony Rudd MD;  [facility];  Service: Orthopedics    HAND SURGERY      HEEL SPUR SURGERY      foot surgery    INCISION AND DRAINAGE OF WOUND Right 2/7/2022    Procedure: INCISION AND DRAINAGE (I&D) EXTREMITY;  Surgeon: Yoana Rizvi MD;  [facility];  Service: Orthopedics    ORIF TIBIA FRACTURE Right 10/25/2021    Procedure: OPEN REDUCTION W/ INTERNAL FIXATION (ORIF) RIGHT PROXIMAL TIBIA SHAFT FRACTURE, REMOVAL OF EXTERNAL FIXATOR;  Surgeon: Yoana Rizvi MD;  [facility];  Service: Orthopedics    [place] OPEN RX FEMUR FX+INTRAMED JASMYNE Right 2/7/2022    Procedure: INSERTION NAIL IM FEMUR ANTEGRADE (TROCHANTERIC);   Surgeon: Yoana Rizvi MD;  [facility];  Service: Orthopedics       Current Outpatient Medications   Medication Sig Dispense Refill    ceFAZolin (ANCEF) 2000 mg IVPB Infuse 2,000 mg into a venous catheter every 8 (eight) hours 5400 mL 0    colchicine (COLCRYS) 0 6 mg tablet Take 1 tablet (0 6 mg total) by mouth daily 30 tablet 5    Continuous Blood Gluc  (FreeStyle Reese 14 Day [place]) JEROME Check blood sugar TID, DX E11 9 1 each 0    Continuous Blood Gluc Sensor (FreeStyle Reese 14 Day Sensor) MISC Check blood sugar TID, apply new sensor q 14 days 2 each 5    ferrous sulfate 325 (65 Fe) mg tablet Take 325 mg by mouth daily with breakfast      insulin glargine (LANTUS) 100 units/mL subcutaneous injection Inject 25 Units under the skin daily      lidocaine (LIDODERM) 5 % Apply 1 patch topically daily as needed (hip pain) Remove & Discard patch within 12 hours or as directed by MD 30 patch 0    lisinopril (ZESTRIL) 10 mg tablet TAKE 1 TABLET EVERY DAY 90 tablet 3    metFORMIN (GLUCOPHAGE) 1000 MG tablet Take 1 tablet (1,000 mg total) by mouth 2 (two) times a day with meals 60 tablet 5    oxyCODONE (ROXICODONE) 10 MG TABS Take 1 tablet (10 mg total) by mouth every 4 (four) hours as needed for moderate pain Max Daily Amount: 60 mg 30 tablet 0    polyethylene glycol (MIRALAX) 17 g packet Take 17 g by mouth daily as needed       senna-docusate sodium (SENOKOT-S) 8 6-50 mg per tablet Take 1 tablet by mouth 2 (two) times a day      simvastatin (ZOCOR) 20 mg tablet TAKE 1 TABLET EVERY DAY 90 tablet 3    warfarin (COUMADIN) 2 5 mg tablet Take 1 tablet (2 5 mg total) by mouth daily ashwaty zeina 30 tablet 0    warfarin (Jantoven) 7 5 mg tablet Take 1 tablet (7 5 mg total) by mouth daily 30 tablet 0     No current facility-administered medications for this visit  Allergies   Allergen Reactions    Byetta 10 Mcg Pen [Exenatide] Swelling    Pollen Extract Myalgia       Review of Systems   All other systems reviewed and are negative  Video Exam    There were no vitals filed for this visit  Physical Exam   Unable to perform physical exam via virtual visit is the patient was unable to connect with video platform  VIRTUAL VISIT DISCLAIMER      Adrienne Traylor verbally agrees to participate in [employer]  Pt is aware that [employer] could be limited without vital signs or the ability to perform a full hands-on physical Refugia Armenta understands he or the provider may request at any time to terminate the video visit and request the patient to seek care or treatment in person

## 2022-03-18 ENCOUNTER — TELEPHONE (OUTPATIENT)
Dept: LAB | Facility: HOSPITAL | Age: 67
End: 2022-03-18

## 2022-03-28 ENCOUNTER — TELEPHONE (OUTPATIENT)
Dept: INFECTIOUS DISEASES | Facility: CLINIC | Age: 67
End: 2022-03-28

## 2022-03-28 ENCOUNTER — TELEPHONE (OUTPATIENT)
Dept: FAMILY MEDICINE CLINIC | Facility: CLINIC | Age: 67
End: 2022-03-28

## 2022-03-28 ENCOUNTER — TELEPHONE (OUTPATIENT)
Dept: LAB | Facility: HOSPITAL | Age: 67
End: 2022-03-28

## 2022-03-28 DIAGNOSIS — R78.81 BACTEREMIA: Primary | ICD-10-CM

## 2022-03-28 RX ORDER — CEFUROXIME AXETIL 500 MG/1
500 TABLET ORAL EVERY 12 HOURS SCHEDULED
Qty: 180 TABLET | Refills: 0 | Status: SHIPPED | OUTPATIENT
Start: 2022-03-28 | End: 2022-04-11 | Stop reason: SDUPTHER

## 2022-03-28 NOTE — TELEPHONE ENCOUNTER
T/c from pt -- is requesting a letter from Dr Rolanda Dunlap [facility] [facility] [place] due to his medical conditions  Scheduled for jury duty on 4/22/22 and needs the letter by 4/15/22  Juror #: [id number]    Pt requesting a formal electronic signature be placed on letter  Please send pt letter thru his mychart acct when completed

## 2022-03-28 NOTE — TELEPHONE ENCOUNTER
Pt called to say the Cefadroxil pt states that he starting seeing blood clots and having some pain in his urine and darkness because he doesn't want to have kidney problems  It was hurting when his voiding so he decided to stop taking the medication on Sunday night between 4-930  Once he stopped then it returned to normal, clear, yellow  He states he wants to make sure you are aware of the side effects  97.3

## 2022-03-28 NOTE — TELEPHONE ENCOUNTER
Called and discussed with pt that after reviewing with Dr Trent Dhaliwal and pt concern with current abx regimen, pt can be switched to Ceftin 500mg by mouth every 12 hours which has limited cross reactivity  Pt stated that he is willing to try the Ceftin but also wanted to make it clear that if he has any symptoms like the clotting he experienced that he will stop the abx  He confirmed pharmacy and is aware that PCP also wants him to get repeat INR

## 2022-03-28 NOTE — TELEPHONE ENCOUNTER
Please contact the patient to let him know that it is unlikely his antibiotic is causing this symptom  His INR was elevated to 3 91 when last checked and this may be the cause  Please ask him to resume his medication and please forward this information to his PCP so that his INR can be rechecked and anticoagulation can be adjusted if needed

## 2022-03-28 NOTE — TELEPHONE ENCOUNTER
Spoke with informed him of message before however pt states that he is still not going to take the medication at all because symptoms only occurred when he started his medication  He said we can also forward to his PCP but that won't change anything  He stated out of all the abx that is in the world there is nothing else we can give him  Tried to explain that different abx are meant for different infections, but he kept repeating won't take anything or wants something different

## 2022-03-28 NOTE — TELEPHONE ENCOUNTER
Call him, he was supposed to get his INR done last week, I do not see a recent one  If he is not he needs to get this done as this could be the reason he is developing these clots in his urine

## 2022-03-28 NOTE — TELEPHONE ENCOUNTER
Patient states mobile lab never called him, he states he has other outstanding labs that need to be done as well  Let patient know I will call mobile lab to get him set up   Expressed understanding

## 2022-03-31 ENCOUNTER — TELEPHONE (OUTPATIENT)
Dept: LAB | Facility: HOSPITAL | Age: 67
End: 2022-03-31

## 2022-03-31 NOTE — TELEPHONE ENCOUNTER
3/31 - spoke to pt - told him we could not accomodate on Monday - we are in a totally different area - he already has an appt scheduled for next Thursday- he forgot he made it - pt is keeping appt for next Thursday the 7th

## 2022-04-04 ENCOUNTER — TELEPHONE (OUTPATIENT)
Dept: INFECTIOUS DISEASES | Facility: CLINIC | Age: 67
End: 2022-04-04

## 2022-04-04 NOTE — TELEPHONE ENCOUNTER
Pt called stating he never got his prescription for the Ceftin 500mg  He said the pharmacy never got the script  Called over to the pharmacy spoke with pharmacy technician who reported that pt didn't  the Ceftin script that there was a $47 copay and picked up the Keflex instead  Called and spoke with pt regarding the abx  He said that his ENT prescribed him the Keflex and that they didn't mention the Ceftin to him at all  He is now taking Keflex and wants to know if he can be on both the Keflex and Ceftin, or if he should be on one or the other  He is asking for advice, pt was to be on Ceftin since 3/22

## 2022-04-05 ENCOUNTER — TELEPHONE (OUTPATIENT)
Dept: INFECTIOUS DISEASES | Facility: CLINIC | Age: 67
End: 2022-04-05

## 2022-04-05 ENCOUNTER — TELEPHONE (OUTPATIENT)
Dept: OBGYN CLINIC | Facility: HOSPITAL | Age: 67
End: 2022-04-05

## 2022-04-05 NOTE — TELEPHONE ENCOUNTER
[facility] is calling from [facility] in regards to the patient's frequency to one time a week and he will be discharged next week  He is also able to now get in an out of the house and car  Any questions she can be reached at [phone]   Thank you

## 2022-04-05 NOTE — TELEPHONE ENCOUNTER
Called spoke with pt regarding medication lmom yesterday for him with regards to what Dr Kendrick Norton wanted him to do  Per Dr Kendrick Norton pt to take the Keflex given by the ENT for duration, then start taking the Ceftin  Pt understood and will also call us once he picks it up  He said he should be finishing this week or early next  He felt like all his questions and concerns were answered

## 2022-04-07 ENCOUNTER — TELEPHONE (OUTPATIENT)
Dept: FAMILY MEDICINE CLINIC | Facility: CLINIC | Age: 67
End: 2022-04-07

## 2022-04-07 ENCOUNTER — ANTICOAG VISIT (OUTPATIENT)
Dept: FAMILY MEDICINE CLINIC | Facility: CLINIC | Age: 67
End: 2022-04-07

## 2022-04-07 ENCOUNTER — APPOINTMENT (OUTPATIENT)
Dept: LAB | Facility: HOSPITAL | Age: 67
End: 2022-04-07
Attending: INTERNAL MEDICINE
Payer: COMMERCIAL

## 2022-04-07 LAB
BASOPHILS # BLD AUTO: 0.04 THOUSANDS/ΜL (ref 0–0.1)
BASOPHILS NFR BLD AUTO: 1 % (ref 0–1)
EOSINOPHIL # BLD AUTO: 0.15 THOUSAND/ΜL (ref 0–0.61)
EOSINOPHIL NFR BLD AUTO: 3 % (ref 0–6)
ERYTHROCYTE [DISTWIDTH] IN BLOOD BY AUTOMATED COUNT: 17.8 % (ref 11.6–15.1)
HCT VFR BLD AUTO: 32.2 % (ref 36.5–49.3)
HGB BLD-MCNC: 10 G/DL (ref 12–17)
IMM GRANULOCYTES # BLD AUTO: 0.01 THOUSAND/UL (ref 0–0.2)
IMM GRANULOCYTES NFR BLD AUTO: 0 % (ref 0–2)
INR PPP: 2.18 (ref 0.84–1.19)
LYMPHOCYTES # BLD AUTO: 2.01 THOUSANDS/ΜL (ref 0.6–4.47)
LYMPHOCYTES NFR BLD AUTO: 40 % (ref 14–44)
MCH RBC QN AUTO: 28.4 PG (ref 26.8–34.3)
MCHC RBC AUTO-ENTMCNC: 31.1 G/DL (ref 31.4–37.4)
MCV RBC AUTO: 92 FL (ref 82–98)
MONOCYTES # BLD AUTO: 0.43 THOUSAND/ΜL (ref 0.17–1.22)
MONOCYTES NFR BLD AUTO: 9 % (ref 4–12)
NEUTROPHILS # BLD AUTO: 2.4 THOUSANDS/ΜL (ref 1.85–7.62)
NEUTS SEG NFR BLD AUTO: 47 % (ref 43–75)
NRBC BLD AUTO-RTO: 0 /100 WBCS
PLATELET # BLD AUTO: 291 THOUSANDS/UL (ref 149–390)
PMV BLD AUTO: 9.6 FL (ref 8.9–12.7)
PROTHROMBIN TIME: 23.2 SECONDS (ref 11.6–14.5)
RBC # BLD AUTO: 3.52 MILLION/UL (ref 3.88–5.62)
WBC # BLD AUTO: 5.04 THOUSAND/UL (ref 4.31–10.16)

## 2022-04-07 PROCEDURE — 36415 COLL VENOUS BLD VENIPUNCTURE: CPT | Performed by: FAMILY MEDICINE

## 2022-04-07 PROCEDURE — 85025 COMPLETE CBC W/AUTO DIFF WBC: CPT

## 2022-04-07 PROCEDURE — 85610 PROTHROMBIN TIME: CPT | Performed by: FAMILY MEDICINE

## 2022-04-07 NOTE — TELEPHONE ENCOUNTER
T/c from pt - pt responding to VM,  gave pt Dr Emelina Montalvo direction - same dose, re-check one month - pt understood

## 2022-04-11 DIAGNOSIS — R78.81 BACTEREMIA: ICD-10-CM

## 2022-04-11 RX ORDER — CEFUROXIME AXETIL 500 MG/1
500 TABLET ORAL EVERY 12 HOURS SCHEDULED
Qty: 180 TABLET | Refills: 0 | Status: SHIPPED | OUTPATIENT
Start: 2022-04-11 | End: 2022-05-11 | Stop reason: SDUPTHER

## 2022-04-12 ENCOUNTER — TELEPHONE (OUTPATIENT)
Dept: OBGYN CLINIC | Facility: CLINIC | Age: 67
End: 2022-04-12

## 2022-04-12 NOTE — TELEPHONE ENCOUNTER
Hello,  Please advise if the following patient can be forced onto the schedule:    Patient: Marin Howard    : 1955    MRN: [id number]    Call back #: [phone] or [phone]     Insurance: [employer] [address] 150    Reason for appointment: post op right femur (surgery 22)    Requested doctor/location: Dr Regan **JENNI ONLY**     Has appt on  but wants sooner , has not been seen post op in person since procedure   Thank you      Email sent to practice admin

## 2022-04-13 ENCOUNTER — TELEMEDICINE (OUTPATIENT)
Dept: INFECTIOUS DISEASES | Facility: CLINIC | Age: 67
End: 2022-04-13
Payer: COMMERCIAL

## 2022-04-13 ENCOUNTER — TELEPHONE (OUTPATIENT)
Dept: LAB | Facility: HOSPITAL | Age: 67
End: 2022-04-13

## 2022-04-13 VITALS
HEIGHT: 73 IN | BODY MASS INDEX: 29.55 KG/M2 | SYSTOLIC BLOOD PRESSURE: 120 MMHG | DIASTOLIC BLOOD PRESSURE: 70 MMHG | WEIGHT: 223 LBS

## 2022-04-13 DIAGNOSIS — T84.7XXA HARDWARE COMPLICATING WOUND INFECTION (HCC): Primary | ICD-10-CM

## 2022-04-13 DIAGNOSIS — N18.30 STAGE 3 CHRONIC KIDNEY DISEASE, UNSPECIFIED WHETHER STAGE 3A OR 3B CKD (HCC): ICD-10-CM

## 2022-04-13 DIAGNOSIS — Z79.4 TYPE 2 DIABETES MELLITUS WITH HYPERGLYCEMIA, WITH LONG-TERM CURRENT USE OF INSULIN (HCC): ICD-10-CM

## 2022-04-13 DIAGNOSIS — R78.81 BACTEREMIA: Primary | ICD-10-CM

## 2022-04-13 DIAGNOSIS — N18.9 CKD (CHRONIC KIDNEY DISEASE): ICD-10-CM

## 2022-04-13 DIAGNOSIS — E11.65 TYPE 2 DIABETES MELLITUS WITH HYPERGLYCEMIA, WITH LONG-TERM CURRENT USE OF INSULIN (HCC): ICD-10-CM

## 2022-04-13 PROCEDURE — 99213 OFFICE O/P EST LOW 20 MIN: CPT | Performed by: PHYSICIAN ASSISTANT

## 2022-04-13 PROCEDURE — 3066F NEPHROPATHY DOC TX: CPT | Performed by: PHYSICIAN ASSISTANT

## 2022-04-13 NOTE — PATIENT INSTRUCTIONS
Continue ceftin as ordered indefinitely  Labs every two weeks  We will contact mobile lab services  Follow up with orthopedics  Follow up virtually as scheduled in 4 weeks with Dr Roman Medley     Call us in the interim with questions or concerns

## 2022-04-13 NOTE — PROGRESS NOTES
Virtual Regular Visit    Verification of patient location:    Patient is located in the following state in which I hold an active license [place]      Assessment/Plan:    Problem List Items Addressed This Visit        Endocrine    Type 2 [facility]       Lab Results   Component Value Date    HGBA1C 7 8 (H) 02/07/2022               Genitourinary    CKD (chronic kidney disease)     Continue to monitor Cr            Other    Hardware complicating wound infection ([facility] [address] ) - Primary     Lower leg wound with exposed/compromised hardware   Patient presented initially after a fall and was found to have right femur fracture on 2/3  Kelly Kwong was also noted to have a lower leg wound which on debridement in the [address] 2/7 had exposed hardware present from a previous fracture repair   No gross purulence in the OR   No cultures collected  Aida Chavez is likely that the hardware is compromised by skin silver  Patient completed 6 weeks of IV cefazolin on 3/20 followed by duricef for suppression as hardware remains in place  He had "side effects" including blood clots in urine and painful urination by his account on the durice  He is now on po ceftin and doing better  Labs stable but slight bump in Cr  Will continue to monitor  Plan  - continue ceftin 500 mg po bid as ordered  - continue CBC with diff, Cr every other week for now (slight bump in Cr and patient request)  If remains stable then hopefully transition to monthly blood work at his next appt  - follow up with ortho scheduled for 4/19   ? Plan for hardware  Can it come out?  - RTO in 4 weeks                       Reason for visit is   Chief Complaint   Patient presents with    Follow-up        Encounter provider Chandler Whitlock PA-C    Provider located at [address] [place] [id number] [phone]      Recent Visits  Date Type Provider Dept   04/07/22 Telephone Lidya Baez DO [facility] [address] recent visits within past 7 days and meeting all other requirements  Today's Visits  Date Type Provider Dept   04/13/22 Telemedicine Rio Scales PA-C [facility]   Showing today's visits and meeting all other requirements  Future Appointments  No visits were found meeting these conditions  Showing future appointments within next 150 days and meeting all other requirements       The patient was identified by name and date of birth  Anand Martin was informed that this is a telemedicine visit and that the visit is being conducted through [address] [facility] and patient was informed that this is a secure, HIPAA-compliant platform  He agrees to proceed     My office door was closed  No one else was in the room  He acknowledged consent and understanding of privacy and security of the video platform  The patient has agreed to participate and understands they can discontinue the visit at any time  Patient is aware this is a billable service  Subjective  Anand Martin is a 61 y o  male presenting for virtual follow up today regarding RLE hardware infection  He completed 6 weeks of cefazolin on 3/20 and was then placed on duricef as long as hardware in place  Patient states that the duricef cause blood clots in his urine and painful urination  He is now on ceftin and states it is much better  He has no complaints today          HPI     Past Medical History:   Diagnosis Date    Back pain     Diabetes mellitus ([address] )     DVT (deep venous thrombosis) ([address] )     Hard to intubate 10/18/2021    Glidescope with #3 blade and bougie used to pass 6 0 ETT    Hyperlipidemia     Hypertension        Past Surgical History:   Procedure Laterality Date    CATARACT EXTRACTION      COLONOSCOPY      6/2014    CYST REMOVAL      EXTERNAL FIXATOR APPLICATION Right 45/85/2080    Procedure: Application External Fixation Device right lower extremity;  Surgeon: Kerry Kemp Maru Rojo MD;  [facility];  Service: Orthopedics    HAND SURGERY      HEEL SPUR SURGERY      foot surgery    INCISION AND DRAINAGE OF WOUND Right 2/7/2022    Procedure: INCISION AND DRAINAGE (I&D) EXTREMITY;  Surgeon: Kayla Medina MD;  [facility];  Service: Orthopedics    ORIF TIBIA FRACTURE Right 10/25/2021    Procedure: OPEN REDUCTION W/ INTERNAL FIXATION (ORIF) RIGHT PROXIMAL TIBIA SHAFT FRACTURE, REMOVAL OF EXTERNAL FIXATOR;  Surgeon: Kayla Medina MD;  [facility];  Service: Orthopedics    [place] OPEN RX FEMUR FX+INTRAMED JASMYNE Right 2/7/2022    Procedure: INSERTION NAIL IM FEMUR ANTEGRADE (TROCHANTERIC);   Surgeon: Kayla Medina MD;  [facility];  Service: Orthopedics       Current Outpatient Medications   Medication Sig Dispense Refill    cefuroxime (CEFTIN) 500 mg tablet Take 1 tablet (500 mg total) by mouth every 12 (twelve) hours 180 tablet 0    colchicine (COLCRYS) 0 6 mg tablet Take 1 tablet (0 6 mg total) by mouth daily 30 tablet 5    Continuous Blood Gluc  (FreeStyle Reese 14 Day [place]) JEROME Check blood sugar TID, DX E11 9 1 each 0    Continuous Blood Gluc Sensor (FreeStyle Reese 14 Day Sensor) MISC Check blood sugar TID, apply new sensor q 14 days 2 each 5    ferrous sulfate 325 (65 Fe) mg tablet Take 325 mg by mouth daily with breakfast      insulin glargine (LANTUS) 100 units/mL subcutaneous injection Inject 10 Units under the skin daily       lidocaine (LIDODERM) 5 % Apply 1 patch topically daily as needed (hip pain) Remove & Discard patch within 12 hours or as directed by MD 30 patch 0    lisinopril (ZESTRIL) 10 mg tablet TAKE 1 TABLET EVERY DAY 90 tablet 3    metFORMIN (GLUCOPHAGE) 1000 MG tablet Take 1 tablet (1,000 mg total) by mouth 2 (two) times a day with meals 60 tablet 5    oxyCODONE (ROXICODONE) 10 MG TABS Take 1 tablet (10 mg total) by mouth every 4 (four) hours as needed for moderate pain Max Daily Amount: 60 mg 30 tablet 0    polyethylene glycol (MIRALAX) 17 g packet Take 17 g by mouth daily as needed       senna-docusate sodium (SENOKOT-S) 8 6-50 mg per tablet Take 1 tablet by mouth 2 (two) times a day      simvastatin (ZOCOR) 20 mg tablet TAKE 1 TABLET EVERY DAY 90 tablet 3    warfarin (COUMADIN) 2 5 mg tablet Take 1 tablet (2 5 mg total) by mouth daily ashwaty zeina 30 tablet 0    warfarin (Jantoven) 7 5 mg tablet Take 1 tablet (7 5 mg total) by mouth daily 30 tablet 0     No current facility-administered medications for this visit  Allergies   Allergen Reactions    Byetta 10 Mcg Pen [Exenatide] Swelling    Pollen Extract Myalgia       Review of Systems   Constitutional: Negative for chills and fever  Respiratory: Negative for cough and shortness of breath  Gastrointestinal: Negative for abdominal pain, diarrhea, nausea and vomiting  Skin: Negative for rash  Psychiatric/Behavioral: Negative for behavioral problems and confusion  Video Exam    Vitals:    04/13/22 1335   BP: 120/70   Weight: 101 kg (223 lb)   Height: 6' 1" (1 854 m)       Physical Exam  Vitals reviewed  Constitutional:       General: He is not in acute distress  Appearance: Normal appearance  He is not ill-appearing, toxic-appearing or diaphoretic  HENT:      Head: Normocephalic and atraumatic  Eyes:      General: No scleral icterus  Right eye: No discharge  Left eye: No discharge  Conjunctiva/sclera: Conjunctivae normal    Pulmonary:      Effort: Pulmonary effort is normal  No respiratory distress  Musculoskeletal:      Comments: Small wound noted on RLE  Skin:     Coloration: Skin is not jaundiced or pale  Findings: No erythema or rash  Neurological:      Mental Status: He is alert and oriented to person, place, and time     Psychiatric:         Mood and Affect: Mood normal          Behavior: Behavior normal         Labs:   4/7/2022  Wbc: 5 04  Hgb: 10 0  Plt: 291  Cr: 2 64    30 minutes spent on today's visit including face to face time with the patient as well as chart/lab review and documentation  VIRTUAL VISIT DISCLAIMER      Milenakinjal Pike verbally agrees to participate in [employer]  Pt is aware that [employer] could be limited without vital signs or the ability to perform a full hands-on physical Dennie Ground understands he or the provider may request at any time to terminate the video visit and request the patient to seek care or treatment in person

## 2022-04-13 NOTE — ASSESSMENT & PLAN NOTE
Lower leg wound with exposed/compromised hardware   Patient presented initially after a fall and was found to have right femur fracture on 2/3   He was also noted to have a lower leg wound which on debridement in the [facility] 2/7 had exposed hardware present from a previous fracture repair   No gross purulence in the OR   No cultures collected  Natalya Day is likely that the hardware is compromised by skin silver  Patient completed 6 weeks of IV cefazolin on 3/20 followed by duricef for suppression as hardware remains in place  He had "side effects" including blood clots in urine and painful urination by his account on the duricef  He is now on po ceftin and doing better  Labs stable but slight bump in Cr  Will continue to monitor  Plan  - continue ceftin 500 mg po bid as ordered  - continue CBC with diff, Cr every other week for now (slight bump in Cr and patient request)  If remains stable then hopefully transition to monthly blood work at his next appt  - follow up with ortho scheduled for 4/19   ? Plan for hardware  Can it come out?  - RTO in 4 weeks

## 2022-04-17 DIAGNOSIS — Z98.890 STATUS POST MUSCULOSKELETAL SYSTEM SURGERY: Primary | ICD-10-CM

## 2022-04-19 ENCOUNTER — TELEPHONE (OUTPATIENT)
Dept: OBGYN CLINIC | Facility: CLINIC | Age: 67
End: 2022-04-19

## 2022-04-19 ENCOUNTER — APPOINTMENT (OUTPATIENT)
Dept: RADIOLOGY | Facility: CLINIC | Age: 67
End: 2022-04-19
Payer: COMMERCIAL

## 2022-04-19 ENCOUNTER — OFFICE VISIT (OUTPATIENT)
Dept: OBGYN CLINIC | Facility: CLINIC | Age: 67
End: 2022-04-19

## 2022-04-19 VITALS
HEART RATE: 87 BPM | WEIGHT: 223 LBS | DIASTOLIC BLOOD PRESSURE: 73 MMHG | BODY MASS INDEX: 29.55 KG/M2 | HEIGHT: 73 IN | SYSTOLIC BLOOD PRESSURE: 121 MMHG

## 2022-04-19 DIAGNOSIS — Z98.890 STATUS POST MUSCULOSKELETAL SYSTEM SURGERY: ICD-10-CM

## 2022-04-19 DIAGNOSIS — Z98.890 STATUS POST MUSCULOSKELETAL SYSTEM SURGERY: Primary | ICD-10-CM

## 2022-04-19 DIAGNOSIS — E11.40 TYPE 2 DIABETES MELLITUS WITH DIABETIC NEUROPATHY, WITHOUT LONG-TERM CURRENT USE OF INSULIN (HCC): Primary | ICD-10-CM

## 2022-04-19 PROCEDURE — 99024 POSTOP FOLLOW-UP VISIT: CPT | Performed by: ORTHOPAEDIC SURGERY

## 2022-04-19 PROCEDURE — 73502 X-RAY EXAM HIP UNI 2-3 VIEWS: CPT

## 2022-04-19 PROCEDURE — 82360 CALCULUS ASSAY QUANT: CPT | Performed by: FAMILY MEDICINE

## 2022-04-19 PROCEDURE — 3008F BODY MASS INDEX DOCD: CPT | Performed by: PHYSICIAN ASSISTANT

## 2022-04-19 PROCEDURE — 73560 X-RAY EXAM OF KNEE 1 OR 2: CPT

## 2022-04-19 PROCEDURE — 73552 X-RAY EXAM OF FEMUR 2/>: CPT

## 2022-04-19 NOTE — TELEPHONE ENCOUNTER
Med refill on insulin -- send to [facility]   Also wanted to let you know that he went to [facility], they took x-rays, said everything looks good, should be in chart      Also wanted to let you know that he stopped taking lisnopril and he is still itchy

## 2022-04-19 NOTE — PROGRESS NOTES
Orthopaedics Office Visit - Post-op Patient Visit    ASSESSMENT/PLAN:    Assessment:   10 weeks s/p Right tibial incision and drainage ,   ORIF right hip with IM nail   DOS  2/7/22   Quadriceps atrophy       Plan:   - weight-bearing as tolerated right lower extremity pain  - offered patient from physical therapy  Patient would prefer to continue at-home therapy at this time  Stressed importance of strengthening and quad muscle at length  - Over the counter analgesics as needed / directed   - Ice / heat as directed   - follow-up 3 months with repeat x-ray      To Do Next Visit:  X-ray right hip  /  Knee /  tib + fib     _____________________________________________________  CHIEF COMPLAINT:  Chief Complaint   Patient presents with    Right Leg - Post-op         SUBJECTIVE:  Nohelia Gruber is a 61 y o  male who presents  10 weeks s/p Right tibial incision and drainage ,   ORIF right hip with IM nail   DOS  2/7/22  Patient states that his in finger doing well overall  Patient states that he has minimal pain in his hip currently  Patient states he has been weight-bearing with use of a walker  Patient states his main complaint is weakness in the right leg  Patient denies any numbness or tingling in his leg currently  Patient offers no other complaints at this time  Patient has been receiving home health nursing and physical therapy      SOCIAL HISTORY:  Social History     Tobacco Use    Smoking status: Light Tobacco Smoker     Packs/day: 0 25    Smokeless tobacco: Never Used    Tobacco comment: Per allscripts, current smoker   Vaping Use    Vaping Use: Never used   Substance Use Topics    Alcohol use: Never     Comment: Per allscripts, occasional use    Drug use: Not Currently       MEDICATIONS:    Current Outpatient Medications:     cefuroxime (CEFTIN) 500 mg tablet, Take 1 tablet (500 mg total) by mouth every 12 (twelve) hours, Disp: 180 tablet, Rfl: 0    colchicine (COLCRYS) 0 6 mg tablet, Take 1 tablet (0 6 mg total) by mouth daily, Disp: 30 tablet, Rfl: 5    Continuous Blood Gluc  (FreeStyle Reese 14 Day Mobile) JEROME, Check blood sugar TID, DX E11 9, Disp: 1 each, Rfl: 0    Continuous Blood Gluc Sensor (FreeStyle Reese 14 Day Sensor) MISC, Check blood sugar TID, apply new sensor q 14 days, Disp: 2 each, Rfl: 5    ferrous sulfate 325 (65 Fe) mg tablet, Take 325 mg by mouth daily with breakfast, Disp: , Rfl:     insulin glargine (LANTUS) 100 units/mL subcutaneous injection, Inject 10 Units under the skin daily , Disp: , Rfl:     lidocaine (LIDODERM) 5 %, Apply 1 patch topically daily as needed (hip pain) Remove & Discard patch within 12 hours or as directed by MD, Disp: 30 patch, Rfl: 0    lisinopril (ZESTRIL) 10 mg tablet, TAKE 1 TABLET EVERY DAY, Disp: 90 tablet, Rfl: 3    metFORMIN (GLUCOPHAGE) 1000 MG tablet, Take 1 tablet (1,000 mg total) by mouth 2 (two) times a day with meals, Disp: 60 tablet, Rfl: 5    oxyCODONE (ROXICODONE) 10 MG TABS, Take 1 tablet (10 mg total) by mouth every 4 (four) hours as needed for moderate pain Max Daily Amount: 60 mg, Disp: 30 tablet, Rfl: 0    polyethylene glycol (MIRALAX) 17 g packet, Take 17 g by mouth daily as needed , Disp: , Rfl:     senna-docusate sodium (SENOKOT-S) 8 6-50 mg per tablet, Take 1 tablet by mouth 2 (two) times a day, Disp: , Rfl:     simvastatin (ZOCOR) 20 mg tablet, TAKE 1 TABLET EVERY DAY, Disp: 90 tablet, Rfl: 3    warfarin (Jantoven) 7 5 mg tablet, Take 1 tablet (7 5 mg total) by mouth daily, Disp: 30 tablet, Rfl: 0    warfarin (COUMADIN) 2 5 mg tablet, Take 1 tablet (2 5 mg total) by mouth daily ashwaty zeina, Disp: 30 tablet, Rfl: 0    REVIEW OF SYSTEMS:  MSK:  Right leg pain  Neuro: WNL   Pertinent items are otherwise noted in HPI    A comprehensive review of systems was otherwise negative     _____________________________________________________  PHYSICAL EXAMINATION:  Vital signs: /73   Pulse 87   Ht 6' 1" (1 854 m) Wt 101 kg (223 lb)   BMI 29 42 kg/m²   General: No acute distress, awake and alert  Psychiatric: Mood and affect appear appropriate  HEENT: Trachea Midline, No torticollis, no apparent facial trauma  Cardiovascular: No audible murmurs; Extremities appear perfused  Pulmonary: No audible wheezing or stridor  Skin: No open lesions; see further details (if any) below      MUSCULOSKELETAL EXAMINATION:  Right lower extremity examination:  - Patient sitting comfortably in the office in no acute distress   - healed incision sites noted over the lateral aspect of the hip and lateral aspect of the tibial plateau  No acute visual abnormalities present  - no bony or soft tissue tenderness palpation noted at this time  - no pain with range of motion of the knee and hip noted at this time  - NV intact    _____________________________________________________  STUDIES REVIEWED:  I personally reviewed the images and interpretation is as follows:  Right hip x-ray two views:  Healed intertrochanteric hip fracture with hardware intact    Right knee x-ray two views:  Healed tibial plateau in acceptable anatomic alignment with hardware intact      PROCEDURES PERFORMED:  No procedures were performed at this time  Mehul Lazar PA-C - assisting    Rasta Benton MD            Portions of the record may have been created with voice recognition software  Occasional wrong word or "sound a like" substitutions may have occurred due to the inherent limitations of voice recognition software  Read the chart carefully and recognize, using context, where substitutions have occurred

## 2022-04-19 NOTE — TELEPHONE ENCOUNTER
Hi Dr Jorge Patel,  Pt wants to know if you can give him an order for home P T    Thanks  Roel Siemens  [phone]

## 2022-04-20 ENCOUNTER — TELEPHONE (OUTPATIENT)
Dept: LAB | Facility: HOSPITAL | Age: 67
End: 2022-04-20

## 2022-04-20 ENCOUNTER — TELEPHONE (OUTPATIENT)
Dept: INFECTIOUS DISEASES | Facility: CLINIC | Age: 67
End: 2022-04-20

## 2022-04-20 RX ORDER — INSULIN GLARGINE 100 [IU]/ML
10 INJECTION, SOLUTION SUBCUTANEOUS DAILY
Qty: 10 ML | Refills: 3 | Status: SHIPPED | OUTPATIENT
Start: 2022-04-20 | End: 2022-07-11 | Stop reason: SDUPTHER

## 2022-04-20 NOTE — TELEPHONE ENCOUNTER
Pt called wanting to let our office know that he had Xray done yesterday and that Dr Grady Patient stated that everything was looking good, the knee seems to be well  There only concern is loose skin around the knee as the pt has lost a lot of weight recently

## 2022-04-21 ENCOUNTER — APPOINTMENT (OUTPATIENT)
Dept: LAB | Facility: HOSPITAL | Age: 67
End: 2022-04-21
Payer: COMMERCIAL

## 2022-04-21 DIAGNOSIS — E78.2 MIXED HYPERLIPIDEMIA: ICD-10-CM

## 2022-04-21 DIAGNOSIS — I10 ESSENTIAL HYPERTENSION: ICD-10-CM

## 2022-04-21 DIAGNOSIS — E11.40 TYPE 2 DIABETES MELLITUS WITH DIABETIC NEUROPATHY, WITHOUT LONG-TERM CURRENT USE OF INSULIN (HCC): ICD-10-CM

## 2022-04-21 DIAGNOSIS — I82.403 ACUTE EMBOLISM AND THROMBOSIS OF DEEP VEIN OF BOTH LOWER EXTREMITIES (HCC): ICD-10-CM

## 2022-04-21 LAB
ALBUMIN SERPL BCP-MCNC: 3 G/DL (ref 3.5–5)
ALP SERPL-CCNC: 86 U/L (ref 46–116)
ALT SERPL W P-5'-P-CCNC: 15 U/L (ref 12–78)
ANION GAP SERPL CALCULATED.3IONS-SCNC: 1 MMOL/L (ref 4–13)
AST SERPL W P-5'-P-CCNC: 13 U/L (ref 5–45)
BASOPHILS # BLD AUTO: 0.04 THOUSANDS/ΜL (ref 0–0.1)
BASOPHILS NFR BLD AUTO: 1 % (ref 0–1)
BILIRUB SERPL-MCNC: 0.64 MG/DL (ref 0.2–1)
BUN SERPL-MCNC: 37 MG/DL (ref 5–25)
CALCIUM ALBUM COR SERPL-MCNC: 10.6 MG/DL (ref 8.3–10.1)
CALCIUM SERPL-MCNC: 9.8 MG/DL (ref 8.3–10.1)
CHLORIDE SERPL-SCNC: 108 MMOL/L (ref 100–108)
CHOLEST SERPL-MCNC: 146 MG/DL
CO2 SERPL-SCNC: 30 MMOL/L (ref 21–32)
CREAT SERPL-MCNC: 1.75 MG/DL (ref 0.6–1.3)
EOSINOPHIL # BLD AUTO: 0.28 THOUSAND/ΜL (ref 0–0.61)
EOSINOPHIL NFR BLD AUTO: 6 % (ref 0–6)
ERYTHROCYTE [DISTWIDTH] IN BLOOD BY AUTOMATED COUNT: 18.2 % (ref 11.6–15.1)
EST. AVERAGE GLUCOSE BLD GHB EST-MCNC: 166 MG/DL
GFR SERPL CREATININE-BSD FRML MDRD: 40 ML/MIN/1.73SQ M
GLUCOSE P FAST SERPL-MCNC: 146 MG/DL (ref 65–99)
HBA1C MFR BLD: 7.4 %
HCT VFR BLD AUTO: 33.9 % (ref 36.5–49.3)
HDLC SERPL-MCNC: 37 MG/DL
HGB BLD-MCNC: 10.7 G/DL (ref 12–17)
IMM GRANULOCYTES # BLD AUTO: 0.01 THOUSAND/UL (ref 0–0.2)
IMM GRANULOCYTES NFR BLD AUTO: 0 % (ref 0–2)
INR PPP: 1.4 (ref 0.84–1.19)
LDLC SERPL CALC-MCNC: 91 MG/DL (ref 0–100)
LYMPHOCYTES # BLD AUTO: 2.85 THOUSANDS/ΜL (ref 0.6–4.47)
LYMPHOCYTES NFR BLD AUTO: 55 % (ref 14–44)
MCH RBC QN AUTO: 29 PG (ref 26.8–34.3)
MCHC RBC AUTO-ENTMCNC: 31.6 G/DL (ref 31.4–37.4)
MCV RBC AUTO: 92 FL (ref 82–98)
MONOCYTES # BLD AUTO: 0.42 THOUSAND/ΜL (ref 0.17–1.22)
MONOCYTES NFR BLD AUTO: 8 % (ref 4–12)
NEUTROPHILS # BLD AUTO: 1.51 THOUSANDS/ΜL (ref 1.85–7.62)
NEUTS SEG NFR BLD AUTO: 30 % (ref 43–75)
NONHDLC SERPL-MCNC: 109 MG/DL
NRBC BLD AUTO-RTO: 0 /100 WBCS
PLATELET # BLD AUTO: 271 THOUSANDS/UL (ref 149–390)
PMV BLD AUTO: 10.1 FL (ref 8.9–12.7)
POTASSIUM SERPL-SCNC: 4.5 MMOL/L (ref 3.5–5.3)
PROT SERPL-MCNC: 7 G/DL (ref 6.4–8.2)
PROTHROMBIN TIME: 16.5 SECONDS (ref 11.6–14.5)
RBC # BLD AUTO: 3.69 MILLION/UL (ref 3.88–5.62)
SODIUM SERPL-SCNC: 139 MMOL/L (ref 136–145)
TRIGL SERPL-MCNC: 89 MG/DL
WBC # BLD AUTO: 5.11 THOUSAND/UL (ref 4.31–10.16)

## 2022-04-21 PROCEDURE — 80053 COMPREHEN METABOLIC PANEL: CPT

## 2022-04-21 PROCEDURE — 80061 LIPID PANEL: CPT

## 2022-04-21 PROCEDURE — 85025 COMPLETE CBC W/AUTO DIFF WBC: CPT

## 2022-04-21 PROCEDURE — 36415 COLL VENOUS BLD VENIPUNCTURE: CPT

## 2022-04-21 PROCEDURE — 3051F HG A1C>EQUAL 7.0%<8.0%: CPT | Performed by: PHYSICIAN ASSISTANT

## 2022-04-21 PROCEDURE — 85610 PROTHROMBIN TIME: CPT

## 2022-04-21 PROCEDURE — 83036 HEMOGLOBIN GLYCOSYLATED A1C: CPT

## 2022-04-22 ENCOUNTER — TELEPHONE (OUTPATIENT)
Dept: FAMILY MEDICINE CLINIC | Facility: CLINIC | Age: 67
End: 2022-04-22

## 2022-04-22 LAB
COLOR STONE: NORMAL
COMMENT-STONE3: NORMAL
COMPOSITION: NORMAL
LABORATORY COMMENT REPORT: NORMAL
PHOTO: NORMAL
SIZE STONE: NORMAL MM
SPEC SOURCE SUBJ: NORMAL
STONE ANALYSIS-IMP: NORMAL
URATE MFR STONE: 100 %
WT STONE: 185 MG

## 2022-04-22 NOTE — TELEPHONE ENCOUNTER
Pt is currently at [facility] and will increase to [facility] and recheck in 1 week  Flowsheet completed

## 2022-04-22 NOTE — TELEPHONE ENCOUNTER
----- Message from Lisa Roblero DO sent at 4/22/2022  8:02 AM EDT -----  Call, increase coumadin by 1mg and repeat in one week

## 2022-04-27 DIAGNOSIS — I82.403 ACUTE EMBOLISM AND THROMBOSIS OF DEEP VEIN OF BOTH LOWER EXTREMITIES (HCC): Primary | ICD-10-CM

## 2022-04-27 DIAGNOSIS — Z79.4 TYPE 2 DIABETES MELLITUS WITH HYPERGLYCEMIA, WITH LONG-TERM CURRENT USE OF INSULIN (HCC): ICD-10-CM

## 2022-04-27 DIAGNOSIS — E11.65 TYPE 2 DIABETES MELLITUS WITH HYPERGLYCEMIA, WITH LONG-TERM CURRENT USE OF INSULIN (HCC): ICD-10-CM

## 2022-04-27 RX ORDER — WARFARIN SODIUM 1 MG/1
1 TABLET ORAL DAILY
COMMUNITY
End: 2022-04-27 | Stop reason: SDUPTHER

## 2022-04-27 RX ORDER — FLASH GLUCOSE SENSOR
KIT MISCELLANEOUS
Qty: 2 EACH | Refills: 5 | Status: SHIPPED | OUTPATIENT
Start: 2022-04-27 | End: 2022-05-23 | Stop reason: SDUPTHER

## 2022-04-27 RX ORDER — WARFARIN SODIUM 1 MG/1
1 TABLET ORAL DAILY
Qty: 30 TABLET | Refills: 3 | Status: SHIPPED | OUTPATIENT
Start: 2022-04-27 | End: 2022-06-01 | Stop reason: SDUPTHER

## 2022-04-27 NOTE — PROGRESS NOTES
Pts needs refill 1 mg Coumadin to continue to adjust dosing  See my chart message from pt   Refill sent in the absence of Dr Yuliet Fletcher

## 2022-05-05 ENCOUNTER — TELEPHONE (OUTPATIENT)
Dept: LAB | Facility: HOSPITAL | Age: 67
End: 2022-05-05

## 2022-05-06 ENCOUNTER — VBI (OUTPATIENT)
Dept: ADMINISTRATIVE | Facility: OTHER | Age: 67
End: 2022-05-06

## 2022-05-10 ENCOUNTER — TELEPHONE (OUTPATIENT)
Dept: LAB | Facility: HOSPITAL | Age: 67
End: 2022-05-10

## 2022-05-11 ENCOUNTER — TELEPHONE (OUTPATIENT)
Dept: LAB | Facility: HOSPITAL | Age: 67
End: 2022-05-11

## 2022-05-11 ENCOUNTER — TELEMEDICINE (OUTPATIENT)
Dept: INFECTIOUS DISEASES | Facility: CLINIC | Age: 67
End: 2022-05-11
Payer: COMMERCIAL

## 2022-05-11 DIAGNOSIS — N18.9 CHRONIC KIDNEY DISEASE, UNSPECIFIED CKD STAGE: ICD-10-CM

## 2022-05-11 DIAGNOSIS — T84.7XXD HARDWARE COMPLICATING WOUND INFECTION, SUBSEQUENT ENCOUNTER: Primary | ICD-10-CM

## 2022-05-11 PROCEDURE — 99214 OFFICE O/P EST MOD 30 MIN: CPT | Performed by: INTERNAL MEDICINE

## 2022-05-11 PROCEDURE — 3066F NEPHROPATHY DOC TX: CPT | Performed by: INTERNAL MEDICINE

## 2022-05-11 RX ORDER — CEFUROXIME AXETIL 500 MG/1
500 TABLET ORAL EVERY 12 HOURS SCHEDULED
Qty: 180 TABLET | Refills: 0 | Status: SHIPPED | OUTPATIENT
Start: 2022-05-11 | End: 2022-06-07 | Stop reason: ALTCHOICE

## 2022-05-11 NOTE — PATIENT INSTRUCTIONS
Continue Ceftin 500 mg every 12 hours, refill sent to pharmacy as requested  Please obtain monthly labs, nonfasting  Plan to follow-up in our office in 2 months    Please call if questions in the interim

## 2022-05-11 NOTE — PROGRESS NOTES
Virtual Regular Visit    Verification of patient location:    Patient is located in the following state in which I hold an active license [place]      Assessment/Plan:  1  Lower leg wound with exposed/compromised hardware   Patient presented initially after a fall and was found to have right femur fracture on 2/3   He was also noted to have a lower leg wound which on debridement in the [facility] 2/7 had exposed hardware present from a previous fracture repair   No gross purulence in the OR   No cultures collected  Willy Boswell may have been compromised by skin silver  Completed 6 week course of Ancef  He remains on Ceftin suppression without any signs of progression currently  We discussed given the uncertainty of true infection in this case that if the patient does not have any progressive signs of of infection at 1 year we can discontinue suppression and monitor thereafter  Continue Ceftin 500 mg every 12 hours, refills provided  Continue monthly labs with [facility] and [facility], reordered  Continue chronic follow-up with orthopedics  Continue chronic follow-up with PCP  Plan for follow-up in the ID office in 2 months, in-person/virtual  If remains well by 2/2023 can discontinue Ceftin suppression  If side effects developed to Ceftin sooner would stop and monitor clinically  Additional questions answered     2  Closed right femur fracture after fall   Patient is status post intramedullary nail of an intratrochanteric fracture after a fall  Continue to follow closely with Orthopedics      3  Chronic kidney disease stage 3  Creatinine appears stable on recent labs  Ceftin dosing as above  Continue monthly labs as above  Additional care as per primary  Above plan discussed in detail with the patient  Will forward office visit to PCP  RTO in 2 months       Problem List Items Addressed This Visit        Endocrine    Type 2 diabetes mellitus with hyperglycemia (HCC)       Genitourinary    CKD (chronic kidney disease)       Other Hardware complicating wound infection ([facility] [address] ) - Primary           Reason for visit is   Chief Complaint   Patient presents with    Virtual Regular Visit        Encounter provider Nancy Davis MD    Provider located at [address] [place] [id number] [phone]      Recent Visits  No visits were found meeting these conditions  Showing recent visits within past 7 days and meeting all other requirements  Today's Visits  Date Type Provider Dept   05/11/22 Telemedicine Nancy Davis MD [facility]   Showing today's visits and meeting all other requirements  Future Appointments  No visits were found meeting these conditions  Showing future appointments within next 150 days and meeting all other requirements       The patient was identified by name and date of birth  Imtiaz Hare was informed that this is a telemedicine visit and that the visit is being conducted through [facility] and patient was informed this is a secure, HIPAA-complaint platform  He agrees to proceed     My office door was closed  No one else was in the room  He acknowledged consent and understanding of privacy and security of the video platform  The patient has agreed to participate and understands they can discontinue the visit at any time  Part of our visit had to be conducted over the telephone as video connection was lost     Patient is aware this is a billable service  Subjective  Imtiaz Hare is a 61 y o  male presents at this time for follow-up  He currently denies having any nausea vomiting, chest pain or shortness of breath  Denies having any itching or rash  He continues to have prominence and thin skin noted at the hardware site on his lower leg which had prior issue  There has been no drainage or skin changes otherwise  Patient reports now that he is going to be using protective padding to protect the leg    He is hoping to have bone scan done to look at the integrity of his bones given the fact that he broke his hip with his fall  He reports that he was seen by Orthopedics and x-ray imaging showed stability of his hardware  There was no recommendations made for removal at this point  Patient has been tolerating Ceftin otherwise without issue  Lab work stable  He remains agreeable to continued monthly lab work and follow-up in 2 months  He requested refills of medications which I provided  He is uncertain if he will be able to attend his next follow-up visit in person due to continued poor mobility  We reviewed the possibility of stopping suppressive antibiotic after 1 year if the patient has not developed any progression or signs of infection despite antibiotic  He seems to be agreeable and he understands the difficulty in maintaining on antibiotic lifelong given the uncertainty in his case  He reports weight loss which I encouraged him to maintain as there has been improvement in his BMI      HPI     Past Medical History:   Diagnosis Date    Back pain     Diabetes mellitus ([address] )     DVT (deep venous thrombosis) ([facility] [address] )     Hard to intubate 10/18/2021    Glidescope with #3 blade and bougie used to pass 6 0 ETT    Hyperlipidemia     Hypertension        Past Surgical History:   Procedure Laterality Date    CATARACT EXTRACTION      COLONOSCOPY      6/2014    CYST REMOVAL      EXTERNAL FIXATOR APPLICATION Right 42/40/8369    Procedure: Application External Fixation Device right lower extremity;  Surgeon: Yaniv Bautista MD;  Location: [facility];  Service: Orthopedics    HAND SURGERY      HEEL SPUR SURGERY      foot surgery    INCISION AND DRAINAGE OF WOUND Right 2/7/2022    Procedure: INCISION AND DRAINAGE (I&D) EXTREMITY;  Surgeon: Yaniv Bautista MD;  [facility]: [facility];  Service: Orthopedics    ORIF TIBIA FRACTURE Right 10/25/2021    Procedure: OPEN REDUCTION W/ INTERNAL FIXATION (ORIF) RIGHT PROXIMAL TIBIA SHAFT FRACTURE, REMOVAL OF EXTERNAL FIXATOR;  Surgeon: Chandrika Patel MD;  [facility]: [facility];  Service: Orthopedics    MN OPEN RX FEMUR FX+INTRAMED JASMYNE Right 2/7/2022    Procedure: INSERTION NAIL IM FEMUR ANTEGRADE (TROCHANTERIC);   Surgeon: Chandrika Patel MD;  Location: [facility];  Service: Orthopedics       Current Outpatient Medications   Medication Sig Dispense Refill    cefuroxime (CEFTIN) 500 mg tablet Take 1 tablet (500 mg total) by mouth every 12 (twelve) hours 180 tablet 0    colchicine (COLCRYS) 0 6 mg tablet Take 1 tablet (0 6 mg total) by mouth daily 30 tablet 5    Continuous Blood Gluc  (FreeStyle Reese 14 Day [place]) JEROME Check blood sugar TID, DX E11 9 1 each 0    Continuous Blood Gluc Sensor (FreeStyle Reese 14 Day Sensor) MISC Check blood sugar TID, apply new sensor q 14 days 2 each 5    ferrous sulfate 325 (65 Fe) mg tablet Take 325 mg by mouth daily with breakfast      insulin glargine (LANTUS) 100 units/mL subcutaneous injection Inject 10 Units under the skin daily 10 mL 3    lidocaine (LIDODERM) 5 % Apply 1 patch topically daily as needed (hip pain) Remove & Discard patch within 12 hours or as directed by MD 30 patch 0    lisinopril (ZESTRIL) 10 mg tablet TAKE 1 TABLET EVERY DAY 90 tablet 3    metFORMIN (GLUCOPHAGE) 1000 MG tablet Take 1 tablet (1,000 mg total) by mouth 2 (two) times a day with meals 60 tablet 5    oxyCODONE (ROXICODONE) 10 MG TABS Take 1 tablet (10 mg total) by mouth every 4 (four) hours as needed for moderate pain Max Daily Amount: 60 mg 30 tablet 0    polyethylene glycol (MIRALAX) 17 g packet Take 17 g by mouth daily as needed       senna-docusate sodium (SENOKOT-S) 8 6-50 mg per tablet Take 1 tablet by mouth 2 (two) times a day      simvastatin (ZOCOR) 20 mg tablet TAKE 1 TABLET EVERY DAY 90 tablet 3    warfarin (COUMADIN) 1 mg tablet Take 1 tablet (1 mg total) by mouth daily 30 tablet 3    warfarin (COUMADIN) 2 5 mg tablet Take 1 tablet (2 5 mg total) by mouth daily ashwaty zeina 30 tablet 0    warfarin (Jantoven) 7 5 mg tablet Take 1 tablet (7 5 mg total) by mouth daily 30 tablet 0     No current facility-administered medications for this visit  Allergies   Allergen Reactions    Byetta 10 Mcg Pen [Exenatide] Swelling    Pollen Extract Myalgia       Review of Systems   All other systems reviewed and are negative  Video Exam    There were no vitals filed for this visit  Physical Exam  Constitutional:       General: He is not in acute distress  Appearance: Normal appearance  He is not ill-appearing  HENT:      Head: Normocephalic and atraumatic  Right Ear: External ear normal       Left Ear: External ear normal    Eyes:      General: No scleral icterus  Right eye: No discharge  Left eye: No discharge  Pulmonary:      Effort: Pulmonary effort is normal  No respiratory distress  Musculoskeletal:         General: No swelling or tenderness  Cervical back: Normal range of motion  Skin:     General: Skin is warm and dry  Comments: Again there is an area of bony prominence where his hardware is in place and surgical scar noted  There does not appear to be any concerning skin changes consistent with cellulitis or opening of the site and drainage  Neurological:      General: No focal deficit present  Mental Status: He is alert and oriented to person, place, and time  Mental status is at baseline  I spent 25 minutes directly with the patient during this visit    VIRTUAL VISIT DISCLAIMER      Marisa Rodas verbally agrees to participate in [employer]  Pt is aware that [employer] could be limited without vital signs or the ability to perform a full hands-on physical Kristofer May understands he or the provider may request at any time to terminate the video visit and request the patient to seek care or treatment in person

## 2022-05-18 ENCOUNTER — TELEPHONE (OUTPATIENT)
Dept: FAMILY MEDICINE CLINIC | Facility: CLINIC | Age: 67
End: 2022-05-18

## 2022-05-18 NOTE — TELEPHONE ENCOUNTER
T/c from Armen, pts OT from [facility] -- is requesting a direct call from Dr Chapis Alejandra regarding pts appeal with Medicare      Please call Armen at  (her direct cell)    (maria teresa Alejandra will be in office this afternoon -- is not emergency call, can wait until that time)

## 2022-05-19 ENCOUNTER — APPOINTMENT (OUTPATIENT)
Dept: LAB | Facility: HOSPITAL | Age: 67
End: 2022-05-19
Attending: INTERNAL MEDICINE
Payer: COMMERCIAL

## 2022-05-19 DIAGNOSIS — I82.403 ACUTE EMBOLISM AND THROMBOSIS OF DEEP VEIN OF BOTH LOWER EXTREMITIES (HCC): ICD-10-CM

## 2022-05-19 LAB
INR PPP: 1.38 (ref 0.84–1.19)
PROTHROMBIN TIME: 16.4 SECONDS (ref 11.6–14.5)

## 2022-05-19 PROCEDURE — 85610 PROTHROMBIN TIME: CPT

## 2022-05-20 NOTE — TELEPHONE ENCOUNTER
T/c from Jeremy Meraz, pts OT from [facility] wants to report to Dr Jelani Speasr that pt has been discharged from in home because he is capable of walking unassisted    pt is not doing exercises when in home PT is not present - pt says he is afraid he will fall - Jeremy Meraz says he only needs supervision on stairs and getting in and out of cars - Jeremy Meraz says that out pt would be more benefit for pt    pt has put in appeal to his insurance and that is what Jeremy Meraz wanted to discuss with Dr Jelani Spears     If more info is needed please call Jeremy Meraz

## 2022-05-23 ENCOUNTER — TELEPHONE (OUTPATIENT)
Dept: FAMILY MEDICINE CLINIC | Facility: CLINIC | Age: 67
End: 2022-05-23

## 2022-05-23 DIAGNOSIS — E11.65 TYPE 2 DIABETES MELLITUS WITH HYPERGLYCEMIA, WITH LONG-TERM CURRENT USE OF INSULIN (HCC): ICD-10-CM

## 2022-05-23 DIAGNOSIS — Z79.4 TYPE 2 DIABETES MELLITUS WITH HYPERGLYCEMIA, WITH LONG-TERM CURRENT USE OF INSULIN (HCC): ICD-10-CM

## 2022-05-23 RX ORDER — FLASH GLUCOSE SENSOR
KIT MISCELLANEOUS
Qty: 2 EACH | Refills: 5 | Status: SHIPPED | OUTPATIENT
Start: 2022-05-23 | End: 2022-06-06 | Stop reason: SDUPTHER

## 2022-05-23 NOTE — TELEPHONE ENCOUNTER
T/c from Heather Haywood with home health - [phone] - Heather Haywood wanted to know if Dr Ashutosh Schroeder could review the order that was put in by pt occupational therapist on home health portal 4/22 or if Dr Ashutosh Schroeder would like for her to fax it to the office for review

## 2022-05-24 ENCOUNTER — TELEPHONE (OUTPATIENT)
Dept: FAMILY MEDICINE CLINIC | Facility: CLINIC | Age: 67
End: 2022-05-24

## 2022-05-24 NOTE — TELEPHONE ENCOUNTER
Patient called back in for INR results  He is aware to increase by 2 mg and   To recheck one week   But he is still unsure this will work   States mobile lab only comes on Thursdays   Requesting to be texted if unable to reach him by phone

## 2022-06-01 DIAGNOSIS — I82.403 ACUTE EMBOLISM AND THROMBOSIS OF DEEP VEIN OF BOTH LOWER EXTREMITIES (HCC): ICD-10-CM

## 2022-06-01 RX ORDER — WARFARIN SODIUM 1 MG/1
1 TABLET ORAL DAILY
Qty: 30 TABLET | Refills: 3 | Status: SHIPPED | OUTPATIENT
Start: 2022-06-01

## 2022-06-06 DIAGNOSIS — Z79.4 TYPE 2 DIABETES MELLITUS WITH HYPERGLYCEMIA, WITH LONG-TERM CURRENT USE OF INSULIN (HCC): ICD-10-CM

## 2022-06-06 DIAGNOSIS — E11.65 TYPE 2 DIABETES MELLITUS WITH HYPERGLYCEMIA, WITH LONG-TERM CURRENT USE OF INSULIN (HCC): ICD-10-CM

## 2022-06-06 RX ORDER — FLASH GLUCOSE SENSOR
KIT MISCELLANEOUS
Qty: 2 EACH | Refills: 5 | Status: SHIPPED | OUTPATIENT
Start: 2022-06-06 | End: 2022-06-17 | Stop reason: SDUPTHER

## 2022-06-07 ENCOUNTER — TELEPHONE (OUTPATIENT)
Dept: FAMILY MEDICINE CLINIC | Facility: CLINIC | Age: 67
End: 2022-06-07

## 2022-06-07 ENCOUNTER — TELEMEDICINE (OUTPATIENT)
Dept: FAMILY MEDICINE CLINIC | Facility: CLINIC | Age: 67
End: 2022-06-07
Payer: COMMERCIAL

## 2022-06-07 DIAGNOSIS — E11.40 TYPE 2 DIABETES MELLITUS WITH DIABETIC NEUROPATHY, WITHOUT LONG-TERM CURRENT USE OF INSULIN (HCC): Primary | ICD-10-CM

## 2022-06-07 DIAGNOSIS — I82.403 ACUTE EMBOLISM AND THROMBOSIS OF DEEP VEIN OF BOTH LOWER EXTREMITIES (HCC): ICD-10-CM

## 2022-06-07 DIAGNOSIS — Z12.5 SCREENING PSA (PROSTATE SPECIFIC ANTIGEN): ICD-10-CM

## 2022-06-07 DIAGNOSIS — R22.31 ARM MASS, RIGHT: ICD-10-CM

## 2022-06-07 DIAGNOSIS — J06.9 UPPER RESPIRATORY TRACT INFECTION, UNSPECIFIED TYPE: Primary | ICD-10-CM

## 2022-06-07 DIAGNOSIS — E11.51 TYPE 2 DIABETES MELLITUS WITH DIABETIC PERIPHERAL ANGIOPATHY WITHOUT GANGRENE, WITH LONG-TERM CURRENT USE OF INSULIN (HCC): ICD-10-CM

## 2022-06-07 DIAGNOSIS — Z79.4 TYPE 2 DIABETES MELLITUS WITH DIABETIC PERIPHERAL ANGIOPATHY WITHOUT GANGRENE, WITH LONG-TERM CURRENT USE OF INSULIN (HCC): ICD-10-CM

## 2022-06-07 PROCEDURE — 99213 OFFICE O/P EST LOW 20 MIN: CPT | Performed by: FAMILY MEDICINE

## 2022-06-07 RX ORDER — AMOXICILLIN 500 MG/1
500 CAPSULE ORAL EVERY 8 HOURS SCHEDULED
Qty: 30 CAPSULE | Refills: 0 | Status: SHIPPED | OUTPATIENT
Start: 2022-06-07 | End: 2022-06-17

## 2022-06-07 RX ORDER — FLUTICASONE PROPIONATE 50 MCG
1 SPRAY, SUSPENSION (ML) NASAL DAILY
Qty: 11 ML | Refills: 5 | Status: SHIPPED | OUTPATIENT
Start: 2022-06-07

## 2022-06-07 RX ORDER — DEXTROMETHORPHAN HYDROBROMIDE AND PROMETHAZINE HYDROCHLORIDE 15; 6.25 MG/5ML; MG/5ML
5 SOLUTION ORAL 4 TIMES DAILY PRN
Qty: 118 ML | Refills: 0 | Status: SHIPPED | OUTPATIENT
Start: 2022-06-07

## 2022-06-07 NOTE — PROGRESS NOTES
Virtual Regular Visit    Verification of patient location:    Patient is located in the following state in which I hold an active license [place]      Assessment/Plan:    Problem List Items Addressed This Visit        Endocrine    Type 2 diabetes mellitus with diabetic peripheral angiopathy without gangrene ([facility] [address] )       Cardiovascular and Mediastinum    Acute embolism and thrombosis of unspecified deep veins of unspecified lower extremity ([facility] [address] )      Other Visit Diagnoses     Upper respiratory tract infection, unspecified type    -  Primary    Relevant Medications    amoxicillin (AMOXIL) 500 mg capsule    Promethazine-DM (PHENERGAN-DM) 6 25-15 mg/5 mL oral syrup    fluticasone (FLONASE) 50 mcg/act nasal spray      uri  Recommended treating environmental conditions Flonase  Antibiotics as directed  Increase oral hydration, warm tea with honey, increase nutrition   Adequate rest  Tylenol or Motrin for pain and/or fever  Nasal mist  Humidify room  Use decongestant- Mucinex  Zinc lozenges   Good handwashing  Schedule follow-up failure resolved 3 days     Diabetes  He is scheduled follow-up primary, continue medications  We discussed the importance of controlling blood glucose (hemoglobin A1c less than 7  0)Premeal , even better <110  2hr after a meal <170, even better <140  A1C <7%, even better <6 5%  blood pressure control, and cholesterol to lower the risk for complications  Encouraged advise to check home blood sugars discussed goals in range of therapy    Reviewed recommendations of annual eye exams (ophthalmology) on long foot exam (Podiatry)  Pulmonary embolism chronic anticoagulation therapy  Current therapy stressed the importance of regular follow-ups monitoring inr    Reason for visit is   Chief Complaint   Patient presents with    Virtual Regular Visit        Encounter provider LUIS ANGEL Chou    Provider located at [address] [facility] [address] [address] [phone]      Recent Visits  Date Type Provider Dept   06/07/22 Telephone Lisa Roblero DO [facility] [address]    06/07/22 100 [address], LUIS ANGEL Pg Cathryn Sainz 8 recent visits within past 7 days and meeting all other requirements  Future Appointments  No visits were found meeting these conditions  Showing future appointments within next 150 days and meeting all other requirements       The patient was identified by name and date of birth  Serena Heart was informed that this is a telemedicine visit and that the visit is being conducted through [address] [facility] and patient was informed that this is a secure, HIPAA-compliant platform  He agrees to proceed     My office door was closed  No one else was in the room  He acknowledged consent and understanding of privacy and security of the video platform  The patient has agreed to participate and understands they can discontinue the visit at any time  Patient is aware this is a billable service  Subjective  Serena Heart is a 61 y o  male    Ear pain for the past 4 day   Cough , congestion   Would also like cough medicine ,   Neg fever chill, + sinus congestion   covid neg   Will need mobile lab for inr check , coumadin taking daily as previously directed did not have inr 's checked?   Per pt continues  In wheel  Chair , not able to ambulate therefore needs mobile lab  Called for [place] sensor refills  Resolution of the cyst under the chin seen by ENT resolved  Diabetes , chinese food last night and the sugar are 85 at this moment   Continues on lantus and metformin , denies issues negative hypoglycemic  Has not been able to f/u with pcp , "inconvenient" schedule            Past Medical History:   Diagnosis Date    Back pain     Diabetes mellitus ([address] )     DVT (deep venous thrombosis) ([facility] [address] )     Hard to intubate 10/18/2021    Glidescope with #3 blade and bougie used to pass 6 0 ETT    Hyperlipidemia     Hypertension        Past Surgical History:   Procedure Laterality Date    CATARACT EXTRACTION      COLONOSCOPY      6/2014    CYST REMOVAL      EXTERNAL FIXATOR APPLICATION Right 29/30/5204    Procedure: Application External Fixation Device right lower extremity;  Surgeon: Cori Maloney MD;  Location: [facility];  Service: Orthopedics    HAND SURGERY      HEEL SPUR SURGERY      foot surgery    INCISION AND DRAINAGE OF WOUND Right 2/7/2022    Procedure: INCISION AND DRAINAGE (I&D) EXTREMITY;  Surgeon: Cori Maloney MD;  [facility]: [facility];  Service: Orthopedics    ORIF TIBIA FRACTURE Right 10/25/2021    Procedure: OPEN REDUCTION W/ INTERNAL FIXATION (ORIF) RIGHT PROXIMAL TIBIA SHAFT FRACTURE, REMOVAL OF EXTERNAL FIXATOR;  Surgeon: Cori Maloney MD;  [facility]: [facility];  Service: Orthopedics    IN OPEN RX FEMUR FX+INTRAMED JASMYNE Right 2/7/2022    Procedure: INSERTION NAIL IM FEMUR ANTEGRADE (TROCHANTERIC);   Surgeon: Cori Maloney MD;  Location: [facility];  Service: Orthopedics       Current Outpatient Medications   Medication Sig Dispense Refill    amoxicillin (AMOXIL) 500 mg capsule Take 1 capsule (500 mg total) by mouth every 8 (eight) hours for 10 days 30 capsule 0    fluticasone (FLONASE) 50 mcg/act nasal spray 1 spray into each nostril daily 11 mL 5    Promethazine-DM (PHENERGAN-DM) 6 25-15 mg/5 mL oral syrup Take 5 mL by mouth 4 (four) times a day as needed for cough 118 mL 0    warfarin (COUMADIN) 1 mg tablet Take 1 tablet (1 mg total) by mouth daily 30 tablet 3    colchicine (COLCRYS) 0 6 mg tablet Take 1 tablet (0 6 mg total) by mouth daily 30 tablet 5    Continuous Blood Gluc  (FreeStyle Reese 14 Day [place]) JEROME Check blood sugar TID, DX E11 9 1 each 0    Continuous Blood Gluc Sensor (FreeStyle Reese 14 Day Sensor) MISC Check blood sugar TID, apply new sensor q 14 days 2 each 5    ferrous sulfate 325 (65 Fe) mg tablet Take 325 mg by mouth daily with breakfast      insulin glargine (LANTUS) 100 units/mL subcutaneous injection Inject 10 Units under the skin daily 10 mL 3    lidocaine (LIDODERM) 5 % Apply 1 patch topically daily as needed (hip pain) Remove & Discard patch within 12 hours or as directed by MD 30 patch 0    lisinopril (ZESTRIL) 10 mg tablet TAKE 1 TABLET EVERY DAY 90 tablet 3    metFORMIN (GLUCOPHAGE) 1000 MG tablet Take 1 tablet (1,000 mg total) by mouth 2 (two) times a day with meals 60 tablet 5    oxyCODONE (ROXICODONE) 10 MG TABS Take 1 tablet (10 mg total) by mouth every 4 (four) hours as needed for moderate pain Max Daily Amount: 60 mg 30 tablet 0    polyethylene glycol (MIRALAX) 17 g packet Take 17 g by mouth daily as needed       senna-docusate sodium (SENOKOT-S) 8 6-50 mg per tablet Take 1 tablet by mouth 2 (two) times a day      simvastatin (ZOCOR) 20 mg tablet TAKE 1 TABLET EVERY DAY 90 tablet 3    warfarin (COUMADIN) 2 5 mg tablet Take 1 tablet (2 5 mg total) by mouth daily ashwaty zeina 30 tablet 0    warfarin (Jantoven) 7 5 mg tablet Take 1 tablet (7 5 mg total) by mouth daily 30 tablet 0     No current facility-administered medications for this visit  Allergies   Allergen Reactions    Byetta 10 Mcg Pen [Exenatide] Swelling    Pollen Extract Myalgia       Review of Systems   HENT: Positive for congestion, ear pain and sore throat  Respiratory: Positive for cough  All other systems reviewed and are negative  Video Exam    There were no vitals filed for this visit  Physical Exam  Constitutional:       General: He is not in acute distress  Appearance: He is not ill-appearing or toxic-appearing  HENT:      Head: Normocephalic and atraumatic  Pulmonary:      Effort: Pulmonary effort is normal  No respiratory distress  Skin:     Coloration: Skin is not pale  Neurological:      Mental Status: He is oriented to person, place, and time            I spent 20 minutes directly with the patient during this visit    VIRTUAL VISIT [address] verbally agrees to participate in [employer]  Pt is aware that [employer] could be limited without vital signs or the ability to perform a full hands-on physical Janeth Oh understands he or the provider may request at any time to terminate the video visit and request the patient to seek care or treatment in person

## 2022-06-07 NOTE — TELEPHONE ENCOUNTER
T/c from pt - Pt asking for labs to be ordered (INR and Kidney function test) being he is on coumadin and the diabetes  Pt scheduled appt in July and will call mobile lab for the bloodwork to be done       Please advise

## 2022-06-17 ENCOUNTER — TELEPHONE (OUTPATIENT)
Dept: FAMILY MEDICINE CLINIC | Facility: CLINIC | Age: 67
End: 2022-06-17

## 2022-06-17 DIAGNOSIS — E11.65 TYPE 2 DIABETES MELLITUS WITH HYPERGLYCEMIA, WITH LONG-TERM CURRENT USE OF INSULIN (HCC): ICD-10-CM

## 2022-06-17 DIAGNOSIS — Z79.4 TYPE 2 DIABETES MELLITUS WITH HYPERGLYCEMIA, WITH LONG-TERM CURRENT USE OF INSULIN (HCC): ICD-10-CM

## 2022-06-17 RX ORDER — FLASH GLUCOSE SENSOR
KIT MISCELLANEOUS
Qty: 2 EACH | Refills: 5 | Status: SHIPPED | OUTPATIENT
Start: 2022-06-17

## 2022-07-01 ENCOUNTER — VBI (OUTPATIENT)
Dept: ADMINISTRATIVE | Facility: OTHER | Age: 67
End: 2022-07-01

## 2022-07-01 NOTE — TELEPHONE ENCOUNTER
07/01/22 7:32 AM     See documentation in the VB [facility] SmartForm       Teagan Momo

## 2022-07-08 DIAGNOSIS — Z98.890 STATUS POST MUSCULOSKELETAL SYSTEM SURGERY: Primary | ICD-10-CM

## 2022-07-11 ENCOUNTER — OFFICE VISIT (OUTPATIENT)
Dept: FAMILY MEDICINE CLINIC | Facility: CLINIC | Age: 67
End: 2022-07-11
Payer: COMMERCIAL

## 2022-07-11 VITALS
HEART RATE: 82 BPM | OXYGEN SATURATION: 98 % | WEIGHT: 191 LBS | SYSTOLIC BLOOD PRESSURE: 122 MMHG | HEIGHT: 73 IN | DIASTOLIC BLOOD PRESSURE: 78 MMHG | BODY MASS INDEX: 25.31 KG/M2

## 2022-07-11 DIAGNOSIS — Z79.4 TYPE 2 DIABETES MELLITUS WITH HYPERGLYCEMIA, WITH LONG-TERM CURRENT USE OF INSULIN (HCC): ICD-10-CM

## 2022-07-11 DIAGNOSIS — L30.9 DERMATITIS: ICD-10-CM

## 2022-07-11 DIAGNOSIS — E11.40 TYPE 2 DIABETES MELLITUS WITH DIABETIC NEUROPATHY, WITHOUT LONG-TERM CURRENT USE OF INSULIN (HCC): Primary | ICD-10-CM

## 2022-07-11 DIAGNOSIS — I82.403 ACUTE EMBOLISM AND THROMBOSIS OF DEEP VEIN OF BOTH LOWER EXTREMITIES (HCC): ICD-10-CM

## 2022-07-11 DIAGNOSIS — I10 ESSENTIAL HYPERTENSION: ICD-10-CM

## 2022-07-11 DIAGNOSIS — F11.20 CONTINUOUS OPIOID DEPENDENCE (HCC): ICD-10-CM

## 2022-07-11 DIAGNOSIS — M10.09 ACUTE IDIOPATHIC GOUT OF MULTIPLE SITES: ICD-10-CM

## 2022-07-11 DIAGNOSIS — E11.65 TYPE 2 DIABETES MELLITUS WITH HYPERGLYCEMIA, WITH LONG-TERM CURRENT USE OF INSULIN (HCC): ICD-10-CM

## 2022-07-11 PROCEDURE — 3074F SYST BP LT 130 MM HG: CPT | Performed by: FAMILY MEDICINE

## 2022-07-11 PROCEDURE — 4010F ACE/ARB THERAPY RXD/TAKEN: CPT | Performed by: PHYSICIAN ASSISTANT

## 2022-07-11 PROCEDURE — 99214 OFFICE O/P EST MOD 30 MIN: CPT | Performed by: FAMILY MEDICINE

## 2022-07-11 PROCEDURE — 3078F DIAST BP <80 MM HG: CPT | Performed by: FAMILY MEDICINE

## 2022-07-11 RX ORDER — LISINOPRIL 10 MG/1
10 TABLET ORAL DAILY
Qty: 90 TABLET | Refills: 3 | Status: SHIPPED | OUTPATIENT
Start: 2022-07-11

## 2022-07-11 RX ORDER — WARFARIN SODIUM 2 MG/1
2 TABLET ORAL
Qty: 90 TABLET | Refills: 1 | Status: SHIPPED | OUTPATIENT
Start: 2022-07-11 | End: 2022-08-10

## 2022-07-11 RX ORDER — WARFARIN SODIUM 6 MG/1
6 TABLET ORAL
Qty: 90 TABLET | Refills: 1 | Status: SHIPPED | OUTPATIENT
Start: 2022-07-11

## 2022-07-11 RX ORDER — SIMVASTATIN 20 MG
20 TABLET ORAL DAILY
Qty: 90 TABLET | Refills: 3 | Status: SHIPPED | OUTPATIENT
Start: 2022-07-11

## 2022-07-11 RX ORDER — INSULIN GLARGINE 100 [IU]/ML
10 INJECTION, SOLUTION SUBCUTANEOUS DAILY
Qty: 10 ML | Refills: 3 | Status: SHIPPED | OUTPATIENT
Start: 2022-07-11 | End: 2022-07-21

## 2022-07-11 RX ORDER — COLCHICINE 0.6 MG/1
0.6 TABLET ORAL DAILY
Qty: 90 TABLET | Refills: 1 | Status: SHIPPED | OUTPATIENT
Start: 2022-07-11

## 2022-07-11 NOTE — ASSESSMENT & PLAN NOTE
Check CMP, hemoglobin A1c in 6 weeks after he is more consistent with his dosing of his Lantus and his diet    Lab Results   Component Value Date    HGBA1C 7 4 (H) 04/21/2022

## 2022-07-11 NOTE — PROGRESS NOTES
Assessment/Plan:         Problem List Items Addressed This Visit        Endocrine    Type 2 diabetes mellitus with diabetic neuropathy, without long-term current use of insulin ([address] ) - Primary      Continue his Lantus 10 units daily, metformin  Emphasized the importance of him being consistent with his diet and his timing of his insulin dose  Lab Results   Component Value Date    HGBA1C 7 4 (H) 04/21/2022              Relevant Medications    simvastatin (ZOCOR) 20 mg tablet    metFORMIN (GLUCOPHAGE) 1000 MG tablet    insulin glargine (LANTUS) 100 units/mL subcutaneous injection    Other Relevant Orders    Hemoglobin A1C    Comprehensive metabolic panel    Type 2 diabetes mellitus with hyperglycemia (HCC)       Check CMP, hemoglobin A1c in 6 weeks after he is more consistent with his dosing of his Lantus and his diet  Lab Results   Component Value Date    HGBA1C 7 4 (H) 04/21/2022              Relevant Medications    metFORMIN (GLUCOPHAGE) 1000 MG tablet    insulin glargine (LANTUS) 100 units/mL subcutaneous injection       Cardiovascular and Mediastinum    Acute embolism and thrombosis of unspecified deep veins of unspecified lower extremity (HCC)       Continue his Coumadin, he is due for a PT INR           Relevant Medications    warfarin (Jantoven) 6 mg tablet    warfarin (COUMADIN) 2 mg tablet    Essential hypertension      Controlled, continue his lisinopril           Relevant Medications    lisinopril (ZESTRIL) 10 mg tablet       Musculoskeletal and Integument    Acute idiopathic gout of multiple sites      Refills colchicine           Relevant Medications    colchicine (COLCRYS) 0 6 mg tablet       Other    Continuous opioid dependence (HCC)      Continue his oxycodone as needed  Other Visit Diagnoses     Dermatitis         encouraged he use Eucerin lotion twice daily            Subjective:      Patient ID: Christopher Chao is a 61 y o  male  Patient comes in today for checkup    He states that he does have some dry skin that his very itchy especially in his low back  He states that he does check his blood sugars both before and after each  He has been giving himself an occasional extra 2-3 units of Lantus depending on his blood sugars postprandial       The following portions of the patient's history were reviewed and updated as appropriate:   Past Medical History:  He has a past medical history of Back pain, Diabetes mellitus ([facility] [address] ), [facility] Hard to intubate (10/18/2021), Hyperlipidemia, and Hypertension  ,  _______________________________________________________________________  Medical Problems:  does not have any pertinent problems on file ,  _______________________________________________________________________  Past Surgical History:   has a past surgical history that includes Hand surgery; Heel spur surgery; Cyst Removal; Cataract extraction; Colonoscopy; External fixator application (Right, 99/69/7535); ORIF tibia fracture (Right, 10/25/2021); pr open rx femur fx+intramed vi (Right, 2/7/2022); and Incision and drainage of wound (Right, 2/7/2022)  ,  _______________________________________________________________________  Family History:  family history includes Dementia in his mother; Melanoma in his mother ,  _______________________________________________________________________  Social History:   reports that he has been smoking  He has been smoking about 0 25 packs per day  He has never used smokeless tobacco  He reports previous drug use  He reports that he does not drink alcohol ,  _______________________________________________________________________  Allergies:  is allergic to byetta 10 mcg pen [exenatide] and pollen extract     _______________________________________________________________________  Current Outpatient Medications   Medication Sig Dispense Refill    colchicine (COLCRYS) 0 6 mg tablet Take 1 tablet (0 6 mg total) by mouth daily 90 tablet 1    Continuous Blood Gluc Sensor (FreeStyle Reese 14 Day Sensor) MISC Check blood sugar TID, apply new sensor q 14 days 2 each 5    insulin glargine (LANTUS) 100 units/mL subcutaneous injection Inject 10 Units under the skin daily 10 mL 3    lisinopril (ZESTRIL) 10 mg tablet Take 1 tablet (10 mg total) by mouth daily 90 tablet 3    metFORMIN (GLUCOPHAGE) 1000 MG tablet Take 1 tablet (1,000 mg total) by mouth 2 (two) times a day with meals 180 tablet 5    simvastatin (ZOCOR) 20 mg tablet Take 1 tablet (20 mg total) by mouth daily 90 tablet 3    warfarin (COUMADIN) 1 mg tablet Take 1 tablet (1 mg total) by mouth daily 30 tablet 3    warfarin (COUMADIN) 2 mg tablet Take 1 tablet (2 mg total) by mouth daily 90 tablet 1    warfarin (Jantoven) 6 mg tablet Take 1 tablet (6 mg total) by mouth daily 90 tablet 1    Continuous Blood Gluc  (FreeStyle Reese 14 Day [place]) JEROME Check blood sugar TID, DX E11 9 1 each 0    ferrous sulfate 325 (65 Fe) mg tablet Take 325 mg by mouth daily with breakfast      fluticasone (FLONASE) 50 mcg/act nasal spray 1 spray into each nostril daily 11 mL 5    lidocaine (LIDODERM) 5 % Apply 1 patch topically daily as needed (hip pain) Remove & Discard patch within 12 hours or as directed by MD 30 patch 0    oxyCODONE (ROXICODONE) 10 MG TABS Take 1 tablet (10 mg total) by mouth every 4 (four) hours as needed for moderate pain Max Daily Amount: 60 mg 30 tablet 0    polyethylene glycol (MIRALAX) 17 g packet Take 17 g by mouth daily as needed       Promethazine-DM (PHENERGAN-DM) 6 25-15 mg/5 mL oral syrup Take 5 mL by mouth 4 (four) times a day as needed for cough 118 mL 0    senna-docusate sodium (SENOKOT-S) 8 6-50 mg per tablet Take 1 tablet by mouth 2 (two) times a day       No current facility-administered medications for this visit      _______________________________________________________________________  Review of Systems   Constitutional: Negative for chills, fatigue and fever    HENT: Negative for congestion, ear pain, hearing loss, postnasal drip, rhinorrhea and sore throat  Eyes: Negative for pain and visual disturbance  Respiratory: Negative for chest tightness, shortness of breath and wheezing  Cardiovascular: Negative for chest pain and leg swelling  Gastrointestinal: Negative for abdominal distention, abdominal pain, constipation, diarrhea and vomiting  Endocrine: Negative for cold intolerance and heat intolerance  Genitourinary: Negative for difficulty urinating, frequency and urgency  Musculoskeletal: Negative for arthralgias and gait problem  Skin: Positive for rash  Negative for color change  Neurological: Negative for dizziness, tremors, syncope, numbness and headaches  Hematological: Negative for adenopathy  Psychiatric/Behavioral: Negative for agitation, confusion and sleep disturbance  The patient is not nervous/anxious  Objective:  Vitals:    07/11/22 1006   BP: 122/78   Pulse: 82   SpO2: 98%   Weight: 86 6 kg (191 lb)   Height: 6' 1" (1 854 m)     Body mass index is 25 2 kg/m²  Physical Exam  Vitals and nursing note reviewed  Constitutional:       Appearance: He is well-developed  HENT:      Head: Normocephalic  Eyes:      General: No scleral icterus  Conjunctiva/sclera: Conjunctivae normal    Neck:      Thyroid: No thyromegaly  Cardiovascular:      Rate and Rhythm: Normal rate and regular rhythm  Pulses: no weak pulses          Dorsalis pedis pulses are 2+ on the right side and 2+ on the left side  Posterior tibial pulses are 2+ on the right side and 2+ on the left side  Heart sounds: Normal heart sounds  No murmur heard  Pulmonary:      Effort: Pulmonary effort is normal  No respiratory distress  Breath sounds: Normal breath sounds  No wheezing  Abdominal:      General: Bowel sounds are normal  There is no distension  Palpations: Abdomen is soft  Tenderness:  There is no abdominal tenderness  Musculoskeletal:         General: No tenderness  Normal range of motion  Cervical back: Normal range of motion  Feet:      Right foot:      Skin integrity: No ulcer, skin breakdown, erythema, warmth, callus or dry skin  Left foot:      Skin integrity: No ulcer, skin breakdown, erythema, warmth, callus or dry skin  Lymphadenopathy:      Cervical: No cervical adenopathy  Skin:     General: Skin is warm and dry  Coloration: Skin is not pale  Findings: Rash (  Slightly erythematous scaling dry skin lower back ) present  Neurological:      Mental Status: He is alert and oriented to person, place, and time  Cranial Nerves: No cranial nerve deficit  Psychiatric:         Behavior: Behavior normal        Patient's shoes and socks removed  Right Foot/Ankle   Right Foot Inspection  Skin Exam: skin normal and skin intact  No dry skin, no warmth, no callus, no erythema, no maceration, no abnormal color, no pre-ulcer, no ulcer and no callus  Sensory   Monofilament testing: diminished    Vascular  The right DP pulse is 2+  The right PT pulse is 2+  Left Foot/Ankle  Left Foot Inspection  Skin Exam: skin normal and skin intact  No dry skin, no warmth, no erythema, no maceration, normal color, no pre-ulcer, no ulcer and no callus  Sensory   Monofilament testing: diminished    Vascular  The left DP pulse is 2+  The left PT pulse is 2+       Assign Risk Category  No deformity present  Loss of protective sensation  No weak pulses  Risk: 1

## 2022-07-11 NOTE — ASSESSMENT & PLAN NOTE
Continue his Lantus 10 units daily, metformin  Emphasized the importance of him being consistent with his diet and his timing of his insulin dose    Lab Results   Component Value Date    HGBA1C 7 4 (H) 04/21/2022

## 2022-07-12 ENCOUNTER — OFFICE VISIT (OUTPATIENT)
Dept: OBGYN CLINIC | Facility: CLINIC | Age: 67
End: 2022-07-12
Payer: COMMERCIAL

## 2022-07-12 ENCOUNTER — APPOINTMENT (OUTPATIENT)
Dept: RADIOLOGY | Facility: CLINIC | Age: 67
End: 2022-07-12
Payer: COMMERCIAL

## 2022-07-12 VITALS — HEIGHT: 73 IN | BODY MASS INDEX: 25.2 KG/M2

## 2022-07-12 DIAGNOSIS — Z98.890 STATUS POST MUSCULOSKELETAL SYSTEM SURGERY: ICD-10-CM

## 2022-07-12 DIAGNOSIS — Z87.81 S/P ORIF (OPEN REDUCTION INTERNAL FIXATION) FRACTURE: ICD-10-CM

## 2022-07-12 DIAGNOSIS — M70.61 GREATER TROCHANTERIC BURSITIS OF RIGHT HIP: Primary | ICD-10-CM

## 2022-07-12 DIAGNOSIS — S82.191D OTHER CLOSED FRACTURE OF PROXIMAL END OF RIGHT TIBIA WITH ROUTINE HEALING, SUBSEQUENT ENCOUNTER: ICD-10-CM

## 2022-07-12 DIAGNOSIS — Z98.890 S/P ORIF (OPEN REDUCTION INTERNAL FIXATION) FRACTURE: ICD-10-CM

## 2022-07-12 PROCEDURE — 73502 X-RAY EXAM HIP UNI 2-3 VIEWS: CPT

## 2022-07-12 PROCEDURE — 73560 X-RAY EXAM OF KNEE 1 OR 2: CPT

## 2022-07-12 PROCEDURE — 73552 X-RAY EXAM OF FEMUR 2/>: CPT

## 2022-07-12 PROCEDURE — 99213 OFFICE O/P EST LOW 20 MIN: CPT | Performed by: ORTHOPAEDIC SURGERY

## 2022-07-12 PROCEDURE — 20610 DRAIN/INJ JOINT/BURSA W/O US: CPT | Performed by: ORTHOPAEDIC SURGERY

## 2022-07-12 RX ORDER — TRIAMCINOLONE ACETONIDE 40 MG/ML
40 INJECTION, SUSPENSION INTRA-ARTICULAR; INTRAMUSCULAR
Status: COMPLETED | OUTPATIENT
Start: 2022-07-12 | End: 2022-07-12

## 2022-07-12 RX ORDER — BUPIVACAINE HYDROCHLORIDE 2.5 MG/ML
2 INJECTION, SOLUTION INFILTRATION; PERINEURAL
Status: COMPLETED | OUTPATIENT
Start: 2022-07-12 | End: 2022-07-12

## 2022-07-12 RX ADMIN — BUPIVACAINE HYDROCHLORIDE 2 ML: 2.5 INJECTION, SOLUTION INFILTRATION; PERINEURAL at 12:18

## 2022-07-12 RX ADMIN — TRIAMCINOLONE ACETONIDE 40 MG: 40 INJECTION, SUSPENSION INTRA-ARTICULAR; INTRAMUSCULAR at 12:18

## 2022-07-12 NOTE — PROGRESS NOTES
Orthopaedics Office Visit - Follow up Patient Visit    ASSESSMENT/PLAN:    Assessment:   Aprox  5 months s/p right tibial I&D, ORIF right hip with IM nail, DOS 2/7/22  Aprox  9 months s/p right proximal tibial plateau/shaft ORIF, DOS 10/25/21  Right hip greater trochanteric bursitis   Quad atrophy   Deconditioning, bl lower extremity weakness      Plan:   · X-rays were performed in the office and reviewed, fracture are healed, hardware appears stable   · One suture was removed in the office today for the hip   · Right hip greater trochanteric bursitis CSI was performed in the office without complication, post injection protocol/expecations were reviewed   · Again discussed formal PT for strengthening exercises, he declined due to transportation and office hour issues, order was placed if he is able to attend   · Voltaren Gel was prescribed and sent to his pharmacy electronically, to be used up to 4x a day over the painful areas   · Follow up in 3 months time for re-evaluation and repeat x-rays      To Do Next Visit:  Re-evaluation, repeat x-rays     _____________________________________________________  CHIEF COMPLAINT:  Chief Complaint   Patient presents with    Right Leg - Follow-up         SUBJECTIVE:  Sj Krishna is a 61 y o  male who presents to the office 5 months s/p right tibial I&D, ORIF right hip with IM nail, DOS 2/7/22 and aprox  9 months s/p right proximal tibial plateau/shaft ORIF, DOS 10/25/21  Fabiankinga Macielimes notes pain to the lateral aspect of his right hip  He feels he cannot move his right leg as he can the left leg  he notes occasional knee pain with prolonged standing  He is generally in a wheelchair due to overall weakness and the feeling of instability  He finished home PT and has not attended formal outpatient PT due to transportation issues and the hours of outpatient PT  He notes a black stitch to the lateral right hip incision  He states that this is bothersome when he goes to scratch the area  PAST MEDICAL HISTORY:  Past Medical History:   Diagnosis Date    Back pain     [facility] [address] [facility]     DVT [facility]deep venous thrombosis) [facility] [address] [facility]     Hard to intubate 10/18/2021    Glidescope with #3 blade and bougie used to pass 6 0 ETT    Hyperlipidemia     Hypertension        PAST SURGICAL HISTORY:  Past Surgical History:   Procedure Laterality Date    CATARACT EXTRACTION      COLONOSCOPY      6/2014    CYST REMOVAL      EXTERNAL FIXATOR APPLICATION Right 06/57/2557    Procedure: Application External Fixation Device right lower extremity;  Surgeon: Torie Fuller MD;  [facility];  Service: Orthopedics    HAND SURGERY      HEEL SPUR SURGERY      foot surgery    INCISION AND DRAINAGE OF WOUND Right 2/7/2022    Procedure: INCISION AND DRAINAGE (I&D) EXTREMITY;  Surgeon: Torie Fuller MD;  [facility];  Service: Orthopedics    ORIF TIBIA FRACTURE Right 10/25/2021    Procedure: OPEN REDUCTION W/ INTERNAL FIXATION (ORIF) RIGHT PROXIMAL TIBIA SHAFT FRACTURE, REMOVAL OF EXTERNAL FIXATOR;  Surgeon: Torie Fuller MD;  [facility];  Service: Orthopedics    RI OPEN RX FEMUR FX+INTRAMED JASMYNE Right 2/7/2022    Procedure: INSERTION NAIL IM FEMUR ANTEGRADE (TROCHANTERIC);   Surgeon: Torie Fuller MD;  [facility];  Service: Orthopedics       FAMILY HISTORY:  Family History   Problem Relation Age of Onset    Dementia Mother     Melanoma Mother        SOCIAL HISTORY:  Social History     Tobacco Use    Smoking status: Light Tobacco Smoker     Packs/day: 0 25    Smokeless tobacco: Never Used    Tobacco comment: Per allscripts, current smoker   Vaping Use    Vaping Use: Never used   Substance Use Topics    Alcohol use: Never     Comment: Per allscripts, occasional use    Drug use: Not Currently       MEDICATIONS:    Current Outpatient Medications:     colchicine (COLCRYS) 0 6 mg tablet, Take 1 tablet (0 6 mg total) by mouth daily, Disp: 90 tablet, Rfl: 1    Continuous Blood Gluc  (FreeStyle Reese 14 Day [place]) JEROME, Check blood sugar TID, DX E11 9, Disp: 1 each, Rfl: 0    Continuous Blood Gluc Sensor (FreeStyle Reese 14 Day Sensor) MISC, Check blood sugar TID, apply new sensor q 14 days, Disp: 2 each, Rfl: 5    ferrous sulfate 325 (65 Fe) mg tablet, Take 325 mg by mouth daily with breakfast, Disp: , Rfl:     fluticasone (FLONASE) 50 mcg/act nasal spray, 1 spray into each nostril daily, Disp: 11 mL, Rfl: 5    insulin glargine (LANTUS) 100 units/mL subcutaneous injection, Inject 10 Units under the skin daily, Disp: 10 mL, Rfl: 3    lidocaine (LIDODERM) 5 %, Apply 1 patch topically daily as needed (hip pain) Remove & Discard patch within 12 hours or as directed by MD, Disp: 30 patch, Rfl: 0    lisinopril (ZESTRIL) 10 mg tablet, Take 1 tablet (10 mg total) by mouth daily, Disp: 90 tablet, Rfl: 3    metFORMIN (GLUCOPHAGE) 1000 MG tablet, Take 1 tablet (1,000 mg total) by mouth 2 (two) times a day with meals, Disp: 180 tablet, Rfl: 5    oxyCODONE (ROXICODONE) 10 MG TABS, Take 1 tablet (10 mg total) by mouth every 4 (four) hours as needed for moderate pain Max Daily Amount: 60 mg, Disp: 30 tablet, Rfl: 0    polyethylene glycol (MIRALAX) 17 g packet, Take 17 g by mouth daily as needed , Disp: , Rfl:     Promethazine-DM (PHENERGAN-DM) 6 25-15 mg/5 mL oral syrup, Take 5 mL by mouth 4 (four) times a day as needed for cough, Disp: 118 mL, Rfl: 0    senna-docusate sodium (SENOKOT-S) 8 6-50 mg per tablet, Take 1 tablet by mouth 2 (two) times a day, Disp: , Rfl:     simvastatin (ZOCOR) 20 mg tablet, Take 1 tablet (20 mg total) by mouth daily, Disp: 90 tablet, Rfl: 3    warfarin (COUMADIN) 1 mg tablet, Take 1 tablet (1 mg total) by mouth daily, Disp: 30 tablet, Rfl: 3    warfarin (COUMADIN) 2 mg tablet, Take 1 tablet (2 mg total) by mouth daily, Disp: 90 tablet, Rfl: 1    warfarin (Jantoven) 6 mg tablet, Take 1 tablet (6 mg total) by mouth daily, Disp: 90 tablet, Rfl: 1    ALLERGIES:  Allergies   Allergen Reactions    Byetta 10 Mcg Pen [Exenatide] Swelling    Pollen Extract Myalgia       REVIEW OF SYSTEMS:  MSK: as noted in HPI  Neuro: WNL  Pertinent items are otherwise noted in HPI  A comprehensive review of systems was otherwise negative  LABS:  HgA1c:   Lab Results   Component Value Date    HGBA1C 7 4 (H) 04/21/2022     BMP:   Lab Results   Component Value Date    GLUCOSE 83 12/11/2015    CALCIUM 9 8 04/21/2022     12/11/2015    K 4 5 04/21/2022    CO2 30 04/21/2022     04/21/2022    BUN 37 (H) 04/21/2022    CREATININE 1 63 (H) 05/19/2022     CBC: No components found for: CBC    _____________________________________________________  PHYSICAL EXAMINATION:  Vital signs: Ht 6' 1" (1 854 m)   BMI 25 20 kg/m²   General: No acute distress, awake and alert  Psychiatric: Mood and affect appear appropriate  HEENT: Trachea Midline, No torticollis, no apparent facial trauma  Cardiovascular: No audible murmurs; Extremities appear perfused  Pulmonary: No audible wheezing or stridor  Skin: No open lesions; see further details (if any) below    MUSCULOSKELETAL EXAMINATION:    Extremities: Right lower extremity:      No erythema, ecchymosis or edema  Well healed surgical incisions to lateral hip and tibial plateau   Quad atrophy noted   Right hip greater trochanteric bursitis is tender to palpation  Ambulates in a wheelchair     Extremity appears warm and well perfused     _____________________________________________________  STUDIES REVIEWED:  I personally reviewed the images and interpretation is as follows:  X-rays of the right hip/femur demonstrate orthopedic hardware in good alignment and position with a healed intertrochanteric fracture  X-rays of the right knee demonstrate orthopedic hardware in good alignment and position with a healed tibial plateau fracture         PROCEDURES PERFORMED:  Large joint arthrocentesis: R greater trochanteric bursa  Universal Protocol:  Consent: Verbal consent obtained  Written consent not obtained  Risks and benefits: risks, benefits and alternatives were discussed  Consent given by: patient  Time out: Immediately prior to procedure a "time out" was called to verify the correct patient, procedure, equipment, support staff and site/side marked as required    Site marked: the operative site was marked  Patient identity confirmed: verbally with patient    Supporting Documentation  Indications: pain   Procedure Details  Location: hip - R greater trochanteric bursa  Preparation: Patient was prepped and draped in the usual sterile fashion  Needle size: 22 G  Ultrasound guidance: no  Medications administered: 2 mL bupivacaine 0 25 %; 40 mg triamcinolone acetonide 40 mg/mL    Patient tolerance: patient tolerated the procedure well with no immediate complications  Dressing:  Sterile dressing applied        Scribe Attestation    I,:  Carmen Uriarte am acting as a scribe while in the presence of the attending physician :       I,:  Gene Kang MD personally performed the services described in this documentation    as scribed in my presence :

## 2022-07-18 ENCOUNTER — TELEMEDICINE (OUTPATIENT)
Dept: INFECTIOUS DISEASES | Facility: CLINIC | Age: 67
End: 2022-07-18
Payer: COMMERCIAL

## 2022-07-18 DIAGNOSIS — N18.9 CHRONIC KIDNEY DISEASE, UNSPECIFIED CKD STAGE: ICD-10-CM

## 2022-07-18 DIAGNOSIS — T84.7XXD HARDWARE COMPLICATING WOUND INFECTION, SUBSEQUENT ENCOUNTER: Primary | ICD-10-CM

## 2022-07-18 PROCEDURE — 99213 OFFICE O/P EST LOW 20 MIN: CPT | Performed by: PHYSICIAN ASSISTANT

## 2022-07-18 PROCEDURE — 3066F NEPHROPATHY DOC TX: CPT | Performed by: PHYSICIAN ASSISTANT

## 2022-07-18 RX ORDER — CEFUROXIME AXETIL 500 MG/1
500 TABLET ORAL EVERY 12 HOURS SCHEDULED
Qty: 60 TABLET | Refills: 2 | Status: SHIPPED | OUTPATIENT
Start: 2022-07-18 | End: 2022-08-17

## 2022-07-18 NOTE — PATIENT INSTRUCTIONS
- continue ceftin 500 mg po every 12 hours  - refills called into pharmacy today  - please have labs on file done in 6 weeks  - RTO in 8-12 weeks

## 2022-07-18 NOTE — ASSESSMENT & PLAN NOTE
RLE wound with exposed/compromised hardware   Patient presented initially after a fall and was found to have right femur fracture on 2/3  Carlylegeovannymaurisio White was also noted to have a lower leg wound which on debridement in the [facility] 2/7 had exposed hardware present from a previous fracture repair   No gross purulence in the OR   No cultures collected  Netta Cuff may have been compromised by skin silver  Completed 6 week course of Ancef  He remains on Ceftin now with plans to complete one year then stop and monitor clinically  Patient now off ceftin for 1 5 weeks as he ran out  His wound remains well healed  Discussed with the patient that if he would rather stop now and monitor that we could do it but there is a possibility the infection recurs  Patient chooses to resume the ceftin at this time  He did not have his labs done as he said his PCP told him to wait 6 weeks       Plan  - restart ceftin 500 mg po bid and continue until 2/2023 total of one year then monitor clinically  - CBC, Cr in 6 weeks  - RTO in 8-12 weeks (after labs done)

## 2022-07-18 NOTE — PROGRESS NOTES
Virtual Regular Visit    Verification of patient location:    Patient is located in the following state in which I hold an active license [place]      Assessment/Plan:    Problem List Items Addressed This Visit        Genitourinary    CKD (chronic kidney disease)     Labs not done prior to visit  Would like Cr done in 6 weeks            Other    Hardware complicating wound infection ([address] ) - Primary     RLE wound with exposed/compromised hardware   Patient presented initially after a fall and was found to have right femur fracture on 2/3  John Falk was also noted to have a lower leg wound which on debridement in the [facility] 2/7 had exposed hardware present from a previous fracture repair   No gross purulence in the OR   No cultures collected  Ger Herzog may have been compromised by skin silver  Completed 6 week course of Ancef  He remains on Ceftin now with plans to complete one year then stop and monitor clinically  Patient now off ceftin for 1 5 weeks as he ran out  His wound remains well healed  Discussed with the patient that if he would rather stop now and monitor that we could do it but there is a possibility the infection recurs  Patient chooses to resume the ceftin at this time  He did not have his labs done as he said his PCP told him to wait 6 weeks       Plan  - restart ceftin 500 mg po bid and continue until 2/2023 total of one year then monitor clinically  - CBC, Cr in 6 weeks  - RTO in 8-12 weeks (after labs done)                      Reason for visit is   Chief Complaint   Patient presents with    Follow-up        Encounter provider Arun Irwin PA-C    Provider located at [address] [place] [id number] [phone]      Recent Visits  Date Type Provider Dept   07/11/22 Office Visit Mariana Tanner, DO [facility] recent visits within past 7 days and meeting all other requirements  Today's Visits  Date Type Provider Dept   07/18/22 Telemedicine Misael Carroll PA-C [facility]   Showing today's visits and meeting all other requirements  Future Appointments  No visits were found meeting these conditions  Showing future appointments within next 150 days and meeting all other requirements       The patient was identified by name and date of birth  Harper Bhatia was informed that this is a telemedicine visit and that the visit is being conducted through [facility] and patient was informed this is a secure, HIPAA-complaint platform  He agrees to proceed     My office door was closed  No one else was in the room  He acknowledged consent and understanding of privacy and security of the video platform  The patient has agreed to participate and understands they can discontinue the visit at any time  Patient is aware this is a billable service  Subjective  Harper Bhatia is a 61 y o  male presents for virtual follow up today regarding RLE hardware infection  Patient has been on antibiotics since 2/2022 - first 6 weeks of IV ancef, now po ceftin  He states he ran out of the ceftin about 1 5 weeks ago  Prior to that he was tolerating the antibiotic well  His RLE wound remains well healed  He has no new complaints today  He states he was unable to get his labs done as his PCP told him not to have blood drawn for 6 weeks         HPI     Past Medical History:   Diagnosis Date    Back pain     Diabetes mellitus ([address] )     DVT [facility]deep [facility] thrombosis) [facility] [address] [facility]     Hard to intubate 10/18/2021    Glidescope with #3 blade and bougie used to pass 6 0 ETT    Hyperlipidemia     Hypertension        Past Surgical History:   Procedure Laterality Date    CATARACT EXTRACTION      COLONOSCOPY      6/2014    CYST REMOVAL      EXTERNAL FIXATOR APPLICATION Right 21/46/8772    Procedure: Application External Fixation Device right lower extremity;  Surgeon: Soco Wild MD;  [facility];  Service: Orthopedics    HAND SURGERY      HEEL SPUR SURGERY      foot surgery    INCISION AND DRAINAGE OF WOUND Right 2/7/2022    Procedure: INCISION AND DRAINAGE (I&D) EXTREMITY;  Surgeon: Andreea Roa MD;  [facility];  Service: Orthopedics    ORIF TIBIA FRACTURE Right 10/25/2021    Procedure: OPEN REDUCTION W/ INTERNAL FIXATION (ORIF) RIGHT PROXIMAL TIBIA SHAFT FRACTURE, REMOVAL OF EXTERNAL FIXATOR;  Surgeon: Andreea Roa MD;  [facility];  Service: Orthopedics    [place] OPEN RX FEMUR FX+INTRAMED JASMYNE Right 2/7/2022    Procedure: INSERTION NAIL IM FEMUR ANTEGRADE (TROCHANTERIC);   Surgeon: Andreea Roa MD;  [facility];  Service: Orthopedics       Current Outpatient Medications   Medication Sig Dispense Refill    colchicine (COLCRYS) 0 6 mg tablet Take 1 tablet (0 6 mg total) by mouth daily 90 tablet 1    Continuous Blood Gluc  (FreeStyle Reese 14 Day [place]) JEROME Check blood sugar TID, DX E11 9 1 each 0    Continuous Blood Gluc Sensor (FreeStyle Reese 14 Day Sensor) MISC Check blood sugar TID, apply new sensor q 14 days 2 each 5    Diclofenac Sodium (VOLTAREN) 1 % Apply 2 g topically 4 (four) times a day 2 g 1    ferrous sulfate 325 (65 Fe) mg tablet Take 325 mg by mouth daily with breakfast      fluticasone (FLONASE) 50 mcg/act nasal spray 1 spray into each nostril daily 11 mL 5    insulin glargine (LANTUS) 100 units/mL subcutaneous injection Inject 10 Units under the skin daily 10 mL 3    lidocaine (LIDODERM) 5 % Apply 1 patch topically daily as needed (hip pain) Remove & Discard patch within 12 hours or as directed by MD 30 patch 0    lisinopril (ZESTRIL) 10 mg tablet Take 1 tablet (10 mg total) by mouth daily 90 tablet 3    metFORMIN (GLUCOPHAGE) 1000 MG tablet Take 1 tablet (1,000 mg total) by mouth 2 (two) times a day with meals 180 tablet 5    oxyCODONE (ROXICODONE) 10 MG TABS Take 1 tablet (10 mg total) by mouth every 4 (four) hours as needed for moderate pain Max Daily Amount: 60 mg 30 tablet 0    polyethylene glycol (MIRALAX) 17 g packet Take 17 g by mouth daily as needed       Promethazine-DM (PHENERGAN-DM) 6 25-15 mg/5 mL oral syrup Take 5 mL by mouth 4 (four) times a day as needed for cough 118 mL 0    senna-docusate sodium (SENOKOT-S) 8 6-50 mg per tablet Take 1 tablet by mouth 2 (two) times a day      simvastatin (ZOCOR) 20 mg tablet Take 1 tablet (20 mg total) by mouth daily 90 tablet 3    warfarin (COUMADIN) 1 mg tablet Take 1 tablet (1 mg total) by mouth daily 30 tablet 3    warfarin (COUMADIN) 2 mg tablet Take 1 tablet (2 mg total) by mouth daily 90 tablet 1    warfarin (Jantoven) 6 mg tablet Take 1 tablet (6 mg total) by mouth daily 90 tablet 1     No current facility-administered medications for this visit  Allergies   Allergen Reactions    Byetta 10 Mcg Pen [Exenatide] Swelling    Pollen Extract Myalgia       Review of Systems   Constitutional: Negative for chills and fever  Respiratory: Negative for cough and shortness of breath  Gastrointestinal: Negative for abdominal pain, diarrhea, nausea and vomiting  Skin: Negative for rash  Psychiatric/Behavioral: Negative for behavioral problems and confusion  Video Exam    There were no vitals filed for this visit  Physical Exam  Vitals reviewed  Constitutional:       General: He is not in acute distress  Appearance: Normal appearance  He is not ill-appearing, toxic-appearing or diaphoretic  HENT:      Head: Normocephalic and atraumatic  Eyes:      General: No scleral icterus  Right eye: No discharge  Left eye: No discharge  Conjunctiva/sclera: Conjunctivae normal    Pulmonary:      Effort: Pulmonary effort is normal  No respiratory distress  Musculoskeletal:      Comments: RLE wound remains healed   Skin:     Coloration: Skin is not jaundiced or pale  Findings: No erythema or rash     Neurological:      Mental Status: He is alert and oriented to person, place, and time  Psychiatric:         Mood and Affect: Mood normal          Behavior: Behavior normal         No labs    Total of 30 minutes spent with the patient including face to face time, chart review and documentation  VIRTUAL VISIT DISCLAIMER      Nai Boggs verbally agrees to participate in [employer]  Pt is aware that [employer] could be limited without vital signs or the ability to perform a full hands-on physical Lynda Otero understands he or the provider may request at any time to terminate the video visit and request the patient to seek care or treatment in person

## 2022-07-21 DIAGNOSIS — E11.40 TYPE 2 DIABETES MELLITUS WITH DIABETIC NEUROPATHY, WITHOUT LONG-TERM CURRENT USE OF INSULIN (HCC): ICD-10-CM

## 2022-07-21 RX ORDER — INSULIN GLARGINE 100 [IU]/ML
12 INJECTION, SOLUTION SUBCUTANEOUS DAILY
Qty: 10 ML | Refills: 3 | Status: SHIPPED | OUTPATIENT
Start: 2022-07-21

## 2022-07-22 ENCOUNTER — TELEPHONE (OUTPATIENT)
Dept: FAMILY MEDICINE CLINIC | Facility: CLINIC | Age: 67
End: 2022-07-22

## 2022-07-22 NOTE — TELEPHONE ENCOUNTER
Clinicals from lov on 07/11/2022 faxed to [facility] to # [phone] as per Ashok Maddox  request     Confirmation fax received

## 2022-08-05 ENCOUNTER — OFFICE VISIT (OUTPATIENT)
Dept: LAB | Facility: HOSPITAL | Age: 67
End: 2022-08-05
Attending: ORTHOPAEDIC SURGERY
Payer: COMMERCIAL

## 2022-08-05 ENCOUNTER — OFFICE VISIT (OUTPATIENT)
Dept: OBGYN CLINIC | Facility: HOSPITAL | Age: 67
End: 2022-08-05
Payer: COMMERCIAL

## 2022-08-05 VITALS
BODY MASS INDEX: 25.31 KG/M2 | HEART RATE: 76 BPM | WEIGHT: 191 LBS | HEIGHT: 73 IN | SYSTOLIC BLOOD PRESSURE: 115 MMHG | DIASTOLIC BLOOD PRESSURE: 70 MMHG

## 2022-08-05 DIAGNOSIS — Z98.890 S/P ORIF (OPEN REDUCTION INTERNAL FIXATION) FRACTURE: ICD-10-CM

## 2022-08-05 DIAGNOSIS — T84.498A EXPOSED ORTHOPAEDIC HARDWARE (HCC): ICD-10-CM

## 2022-08-05 DIAGNOSIS — Z87.81 S/P ORIF (OPEN REDUCTION INTERNAL FIXATION) FRACTURE: ICD-10-CM

## 2022-08-05 DIAGNOSIS — T84.498A EXPOSED ORTHOPAEDIC HARDWARE (HCC): Primary | ICD-10-CM

## 2022-08-05 LAB
ATRIAL RATE: 74 BPM
ATRIAL RATE: 74 BPM
P AXIS: 56 DEGREES
P AXIS: 56 DEGREES
PR INTERVAL: 138 MS
PR INTERVAL: 138 MS
QRS AXIS: 21 DEGREES
QRS AXIS: 21 DEGREES
QRSD INTERVAL: 92 MS
QRSD INTERVAL: 92 MS
QT INTERVAL: 366 MS
QT INTERVAL: 366 MS
QTC INTERVAL: 406 MS
QTC INTERVAL: 406 MS
T WAVE AXIS: 62 DEGREES
T WAVE AXIS: 62 DEGREES
VENTRICULAR RATE: 74 BPM
VENTRICULAR RATE: 74 BPM

## 2022-08-05 PROCEDURE — 93005 ELECTROCARDIOGRAM TRACING: CPT

## 2022-08-05 PROCEDURE — 99213 OFFICE O/P EST LOW 20 MIN: CPT | Performed by: ORTHOPAEDIC SURGERY

## 2022-08-05 PROCEDURE — 93010 ELECTROCARDIOGRAM REPORT: CPT | Performed by: INTERNAL MEDICINE

## 2022-08-05 RX ORDER — CHLORHEXIDINE GLUCONATE 0.12 MG/ML
15 RINSE ORAL ONCE
Status: CANCELLED | OUTPATIENT
Start: 2022-08-05 | End: 2022-08-05

## 2022-08-05 NOTE — H&P (VIEW-ONLY)
Orthopaedics Office Visit - Follow-up Patient Visit    ASSESSMENT/PLAN:    Assessment:   9 months status post right proximal tibial plateau/shaft ORIF performed 10/25/2021 with exposed hardware    Plan:   Patient has proximal lateral wound has developed an open area with exposed hardware  I would recommend removal of hardware right knee  Risks, benefits, and expected recovery for above procedure discussed with the patient  Risks include but are not limited to bleeding, infection/wound breakdown, malunion, nonunion, stiffness, need for additional surgery, damage to nerves and vessels, DVT, PE, fracture  Patient would like to proceed with the above procedure  Patient was given the opportunity to ask questions  All questions were answered to the patient's satisfaction  Detailed consent reviewed and signed by the surgeon  Recommend he continue oral antibiotics at the direction of infectious disease  Wounds were redressed with Adaptic, sterile cling and Ace wrap today  Patient was instructed not to apply any ointments or creams to this wound  I will see him back in the office postoperatively for suture removal     To Do Next Visit:  X-rays of the right knee    _____________________________________________________  CHIEF COMPLAINT:  Chief Complaint   Patient presents with    Right Leg - Follow-up         SUBJECTIVE:  Farhad Terry is a 61 y o  male who presents 9 months status post right proximal tibial plateau/shaft ORIF performed 10/25/2021  Patient states that yesterday he noticed a red lump over the anterolateral incision  He denies any recent injuries to the area  Patient's wife notes that it has been draining  He has been covering it with a Band-Aid  Patient is under the care of infectious disease and was on IV antibiotics for 6 weeks and is currently on oral Ceftin      PAST MEDICAL HISTORY:  Past Medical History:   Diagnosis Date    Back pain     Diabetes mellitus ([address] )     DVT [facility]deep venous thrombosis) [address] [address]     Hard to intubate 10/18/2021    Glidescope with #3 blade and bougie used to pass 6 0 ETT    Hyperlipidemia     Hypertension        PAST SURGICAL HISTORY:  Past Surgical History:   Procedure Laterality Date    CATARACT EXTRACTION      COLONOSCOPY      6/2014    CYST REMOVAL      EXTERNAL FIXATOR APPLICATION Right 96/50/0119    Procedure: Application External Fixation Device right lower extremity;  Surgeon: Amaury Pino MD;  [facility];  Service: Orthopedics    HAND SURGERY      HEEL SPUR SURGERY      foot surgery    INCISION AND DRAINAGE OF WOUND Right 2/7/2022    Procedure: INCISION AND DRAINAGE (I&D) EXTREMITY;  Surgeon: Amaury Pino MD;  [facility];  Service: Orthopedics    ORIF TIBIA FRACTURE Right 10/25/2021    Procedure: OPEN REDUCTION W/ INTERNAL FIXATION (ORIF) RIGHT PROXIMAL TIBIA SHAFT FRACTURE, REMOVAL OF EXTERNAL FIXATOR;  Surgeon: Amaury Pino MD;  [facility];  Service: Orthopedics    [place] OPEN RX FEMUR FX+INTRAMED JASMYNE Right 2/7/2022    Procedure: INSERTION NAIL IM FEMUR ANTEGRADE (TROCHANTERIC);   Surgeon: Amaury Pino MD;  [facility];  Service: Orthopedics       FAMILY HISTORY:  Family History   Problem Relation Age of Onset    Dementia Mother     Melanoma Mother        SOCIAL HISTORY:  Social History     Tobacco Use    Smoking status: Light Tobacco Smoker     Packs/day: 0 25    Smokeless tobacco: Never Used    Tobacco comment: Per allscripts, current smoker   Vaping Use    Vaping Use: Never used   Substance Use Topics    Alcohol use: Never     Comment: Per allscripts, occasional use    Drug use: Not Currently       MEDICATIONS:    Current Outpatient Medications:     cefuroxime (CEFTIN) 500 mg tablet, Take 1 tablet (500 mg total) by mouth every 12 (twelve) hours, Disp: 60 tablet, Rfl: 2    colchicine (COLCRYS) 0 6 mg tablet, Take 1 tablet (0 6 mg total) by mouth daily, Disp: 90 tablet, Rfl: 1   Continuous Blood Gluc  (FreeStyle Reese 14 Day [place]) JEROME, Check blood sugar TID, DX E11 9, Disp: 1 each, Rfl: 0    Continuous Blood Gluc Sensor (FreeStyle Reese 14 Day Sensor) MISC, Check blood sugar TID, apply new sensor q 14 days, Disp: 2 each, Rfl: 5    Diclofenac Sodium (VOLTAREN) 1 %, Apply 2 g topically 4 (four) times a day, Disp: 2 g, Rfl: 1    ferrous sulfate 325 (65 Fe) mg tablet, Take 325 mg by mouth daily with breakfast, Disp: , Rfl:     fluticasone (FLONASE) 50 mcg/act nasal spray, 1 spray into each nostril daily, Disp: 11 mL, Rfl: 5    insulin glargine (LANTUS) 100 units/mL subcutaneous injection, Inject 12 Units under the skin daily, Disp: 10 mL, Rfl: 3    lisinopril (ZESTRIL) 10 mg tablet, Take 1 tablet (10 mg total) by mouth daily, Disp: 90 tablet, Rfl: 3    metFORMIN (GLUCOPHAGE) 1000 MG tablet, Take 1 tablet (1,000 mg total) by mouth 2 (two) times a day with meals, Disp: 180 tablet, Rfl: 5    polyethylene glycol (MIRALAX) 17 g packet, Take 17 g by mouth daily as needed , Disp: , Rfl:     Promethazine-DM (PHENERGAN-DM) 6 25-15 mg/5 mL oral syrup, Take 5 mL by mouth 4 (four) times a day as needed for cough, Disp: 118 mL, Rfl: 0    senna-docusate sodium (SENOKOT-S) 8 6-50 mg per tablet, Take 1 tablet by mouth 2 (two) times a day, Disp: , Rfl:     simvastatin (ZOCOR) 20 mg tablet, Take 1 tablet (20 mg total) by mouth daily, Disp: 90 tablet, Rfl: 3    warfarin (COUMADIN) 1 mg tablet, Take 1 tablet (1 mg total) by mouth daily, Disp: 30 tablet, Rfl: 3    warfarin (COUMADIN) 2 mg tablet, Take 1 tablet (2 mg total) by mouth daily, Disp: 90 tablet, Rfl: 1    warfarin (Jantoven) 6 mg tablet, Take 1 tablet (6 mg total) by mouth daily, Disp: 90 tablet, Rfl: 1    lidocaine (LIDODERM) 5 %, Apply 1 patch topically daily as needed (hip pain) Remove & Discard patch within 12 hours or as directed by MD, Disp: 30 patch, Rfl: 0    oxyCODONE (ROXICODONE) 10 MG TABS, Take 1 tablet (10 mg total) by mouth every 4 (four) hours as needed for moderate pain Max Daily Amount: 60 mg (Patient not taking: Reported on 8/5/2022), Disp: 30 tablet, Rfl: 0    ALLERGIES:  Allergies   Allergen Reactions    Byetta 10 Mcg Pen [Exenatide] Swelling    Pollen Extract Myalgia       REVIEW OF SYSTEMS:  MSK:  Right knee pain  Neuro: WNL  Pertinent items are otherwise noted in HPI  A comprehensive review of systems was otherwise negative  LABS:  HgA1c:   Lab Results   Component Value Date    HGBA1C 7 4 (H) 04/21/2022     BMP:   Lab Results   Component Value Date    GLUCOSE 83 12/11/2015    CALCIUM 9 8 04/21/2022     12/11/2015    K 4 5 04/21/2022    CO2 30 04/21/2022     04/21/2022    BUN 37 (H) 04/21/2022    CREATININE 1 63 (H) 05/19/2022     CBC: No components found for: CBC    _____________________________________________________  PHYSICAL EXAMINATION:  Vital signs: /70   Pulse 76   Ht 6' 1" (1 854 m)   Wt 86 6 kg (191 lb)   BMI 25 20 kg/m²   General: No acute distress, awake and alert  Psychiatric: Mood and affect appear appropriate  HEENT: Trachea Midline, No torticollis, no apparent facial trauma  Cardiovascular: No audible murmurs; Extremities appear perfused  Pulmonary: No audible wheezing or stridor  Skin: No open lesions; see further details (if any) below    MUSCULOSKELETAL EXAMINATION:  Extremities:   Right knee   Open area with exposed hardware, proximal lateral incision  Erythematous appearance, fibrinous material  ROM limited due to pain  Extremity appears warm and well perfused    _____________________________________________________  STUDIES REVIEWED:  No new imaging to review today        PROCEDURES PERFORMED:  None performed     Scribe Attestation    I,:  Britney Dawson am acting as a scribe while in the presence of the attending physician :       I,:  Guilherme Pagan MD personally performed the services described in this documentation    as scribed in my presence :

## 2022-08-05 NOTE — PROGRESS NOTES
Orthopaedics Office Visit - Follow-up Patient Visit    ASSESSMENT/PLAN:    Assessment:   9 months status post right proximal tibial plateau/shaft ORIF performed 10/25/2021 with exposed hardware    Plan:   Patient has proximal lateral wound has developed an open area with exposed hardware  I would recommend removal of hardware right knee  Risks, benefits, and expected recovery for above procedure discussed with the patient  Risks include but are not limited to bleeding, infection/wound breakdown, malunion, nonunion, stiffness, need for additional surgery, damage to nerves and vessels, DVT, PE, fracture  Patient would like to proceed with the above procedure  Patient was given the opportunity to ask questions  All questions were answered to the patient's satisfaction  Detailed consent reviewed and signed by the surgeon  Recommend he continue oral antibiotics at the direction of infectious disease  Wounds were redressed with Adaptic, sterile cling and Ace wrap today  Patient was instructed not to apply any ointments or creams to this wound  I will see him back in the office postoperatively for suture removal     To Do Next Visit:  X-rays of the right knee    _____________________________________________________  CHIEF COMPLAINT:  Chief Complaint   Patient presents with    Right Leg - Follow-up         SUBJECTIVE:  Eloisa Donnelly is a 61 y o  male who presents 9 months status post right proximal tibial plateau/shaft ORIF performed 10/25/2021  Patient states that yesterday he noticed a red lump over the anterolateral incision  He denies any recent injuries to the area  Patient's wife notes that it has been draining  He has been covering it with a Band-Aid  Patient is under the care of infectious disease and was on IV antibiotics for 6 weeks and is currently on oral Ceftin      PAST MEDICAL HISTORY:  Past Medical History:   Diagnosis Date    Back pain     Diabetes mellitus ([address] )     DVT (deep venous thrombosis) ([facility] [address] )     Hard to intubate 10/18/2021    Glidescope with #3 blade and bougie used to pass 6 0 ETT    Hyperlipidemia     Hypertension        PAST SURGICAL HISTORY:  Past Surgical History:   Procedure Laterality Date    CATARACT EXTRACTION      COLONOSCOPY      6/2014    CYST REMOVAL      EXTERNAL FIXATOR APPLICATION Right 69/22/6927    Procedure: Application External Fixation Device right lower extremity;  Surgeon: Janel Kimbrough MD;  [facility];  Service: Orthopedics    HAND SURGERY      HEEL SPUR SURGERY      foot surgery    INCISION AND DRAINAGE OF WOUND Right 2/7/2022    Procedure: INCISION AND DRAINAGE (I&D) EXTREMITY;  Surgeon: Janel Kimbrough MD;  [facility];  Service: Orthopedics    ORIF TIBIA FRACTURE Right 10/25/2021    Procedure: OPEN REDUCTION W/ INTERNAL FIXATION (ORIF) RIGHT PROXIMAL TIBIA SHAFT FRACTURE, REMOVAL OF EXTERNAL FIXATOR;  Surgeon: Janel Kimbrough MD;  [facility];  Service: Orthopedics    [place] OPEN RX FEMUR FX+INTRAMED JASMYNE Right 2/7/2022    Procedure: INSERTION NAIL IM FEMUR ANTEGRADE (TROCHANTERIC);   Surgeon: Janel Kimbrough MD;  [facility];  Service: Orthopedics       FAMILY HISTORY:  Family History   Problem Relation Age of Onset    Dementia Mother     Melanoma Mother        SOCIAL HISTORY:  Social History     Tobacco Use    Smoking status: Light Tobacco Smoker     Packs/day: 0 25    Smokeless tobacco: Never Used    Tobacco comment: Per allscripts, current smoker   Vaping Use    Vaping Use: Never used   Substance Use Topics    Alcohol use: Never     Comment: Per allscripts, occasional use    Drug use: Not Currently       MEDICATIONS:    Current Outpatient Medications:     cefuroxime (CEFTIN) 500 mg tablet, Take 1 tablet (500 mg total) by mouth every 12 (twelve) hours, Disp: 60 tablet, Rfl: 2    colchicine (COLCRYS) 0 6 mg tablet, Take 1 tablet (0 6 mg total) by mouth daily, Disp: 90 tablet, Rfl: 1   Continuous Blood Gluc  (FreeStyle Reese 14 Day [place]) JEROME, Check blood sugar TID, DX E11 9, Disp: 1 each, Rfl: 0    Continuous Blood Gluc Sensor (FreeStyle Reese 14 Day Sensor) MISC, Check blood sugar TID, apply new sensor q 14 days, Disp: 2 each, Rfl: 5    Diclofenac Sodium (VOLTAREN) 1 %, Apply 2 g topically 4 (four) times a day, Disp: 2 g, Rfl: 1    ferrous sulfate 325 (65 Fe) mg tablet, Take 325 mg by mouth daily with breakfast, Disp: , Rfl:     fluticasone (FLONASE) 50 mcg/act nasal spray, 1 spray into each nostril daily, Disp: 11 mL, Rfl: 5    insulin glargine (LANTUS) 100 units/mL subcutaneous injection, Inject 12 Units under the skin daily, Disp: 10 mL, Rfl: 3    lisinopril (ZESTRIL) 10 mg tablet, Take 1 tablet (10 mg total) by mouth daily, Disp: 90 tablet, Rfl: 3    metFORMIN (GLUCOPHAGE) 1000 MG tablet, Take 1 tablet (1,000 mg total) by mouth 2 (two) times a day with meals, Disp: 180 tablet, Rfl: 5    polyethylene glycol (MIRALAX) 17 g packet, Take 17 g by mouth daily as needed , Disp: , Rfl:     Promethazine-DM (PHENERGAN-DM) 6 25-15 mg/5 mL oral syrup, Take 5 mL by mouth 4 (four) times a day as needed for cough, Disp: 118 mL, Rfl: 0    senna-docusate sodium (SENOKOT-S) 8 6-50 mg per tablet, Take 1 tablet by mouth 2 (two) times a day, Disp: , Rfl:     simvastatin (ZOCOR) 20 mg tablet, Take 1 tablet (20 mg total) by mouth daily, Disp: 90 tablet, Rfl: 3    warfarin (COUMADIN) 1 mg tablet, Take 1 tablet (1 mg total) by mouth daily, Disp: 30 tablet, Rfl: 3    warfarin (COUMADIN) 2 mg tablet, Take 1 tablet (2 mg total) by mouth daily, Disp: 90 tablet, Rfl: 1    warfarin (Jantoven) 6 mg tablet, Take 1 tablet (6 mg total) by mouth daily, Disp: 90 tablet, Rfl: 1    lidocaine (LIDODERM) 5 %, Apply 1 patch topically daily as needed (hip pain) Remove & Discard patch within 12 hours or as directed by MD, Disp: 30 patch, Rfl: 0    oxyCODONE (ROXICODONE) 10 MG TABS, Take 1 tablet (10 mg total) by mouth every 4 (four) hours as needed for moderate pain Max Daily Amount: 60 mg (Patient not taking: Reported on 8/5/2022), Disp: 30 tablet, Rfl: 0    ALLERGIES:  Allergies   Allergen Reactions    Byetta 10 Mcg Pen [Exenatide] Swelling    Pollen Extract Myalgia       REVIEW OF SYSTEMS:  MSK:  Right knee pain  Neuro: WNL  Pertinent items are otherwise noted in HPI  A comprehensive review of systems was otherwise negative  LABS:  HgA1c:   Lab Results   Component Value Date    HGBA1C 7 4 (H) 04/21/2022     BMP:   Lab Results   Component Value Date    GLUCOSE 83 12/11/2015    CALCIUM 9 8 04/21/2022     12/11/2015    K 4 5 04/21/2022    CO2 30 04/21/2022     04/21/2022    BUN 37 (H) 04/21/2022    CREATININE 1 63 (H) 05/19/2022     CBC: No components found for: CBC    _____________________________________________________  PHYSICAL EXAMINATION:  Vital signs: /70   Pulse 76   Ht 6' 1" (1 854 m)   Wt 86 6 kg (191 lb)   BMI 25 20 kg/m²   General: No acute distress, awake and alert  Psychiatric: Mood and affect appear appropriate  HEENT: Trachea Midline, No torticollis, no apparent facial trauma  Cardiovascular: No audible murmurs; Extremities appear perfused  Pulmonary: No audible wheezing or stridor  Skin: No open lesions; see further details (if any) below    MUSCULOSKELETAL EXAMINATION:  Extremities:   Right knee   Open area with exposed hardware, proximal lateral incision  Erythematous appearance, fibrinous material  ROM limited due to pain  Extremity appears warm and well perfused    _____________________________________________________  STUDIES REVIEWED:  No new imaging to review today        PROCEDURES PERFORMED:  None performed     Scribe Attestation    I,:  Liyah Green am acting as a scribe while in the presence of the attending physician :       I,:  Humberto Rodarte MD personally performed the services described in this documentation    as scribed in my presence :

## 2022-08-06 PROBLEM — T84.498A EXPOSED ORTHOPAEDIC HARDWARE (HCC): Status: ACTIVE | Noted: 2022-08-06

## 2022-08-08 ENCOUNTER — ANESTHESIA EVENT (OUTPATIENT)
Dept: PERIOP | Facility: HOSPITAL | Age: 67
End: 2022-08-08
Payer: COMMERCIAL

## 2022-08-08 ENCOUNTER — APPOINTMENT (OUTPATIENT)
Dept: RADIOLOGY | Facility: HOSPITAL | Age: 67
End: 2022-08-08
Payer: COMMERCIAL

## 2022-08-08 ENCOUNTER — HOSPITAL ENCOUNTER (OUTPATIENT)
Facility: HOSPITAL | Age: 67
Setting detail: OUTPATIENT SURGERY
Discharge: HOME | End: 2022-08-08
Attending: ORTHOPAEDIC SURGERY | Admitting: ORTHOPAEDIC SURGERY
Payer: COMMERCIAL

## 2022-08-08 ENCOUNTER — ANESTHESIA (OUTPATIENT)
Dept: PERIOP | Facility: HOSPITAL | Age: 67
End: 2022-08-08
Payer: COMMERCIAL

## 2022-08-08 VITALS
SYSTOLIC BLOOD PRESSURE: 136 MMHG | TEMPERATURE: 98.1 F | WEIGHT: 188.27 LBS | HEART RATE: 79 BPM | HEIGHT: 74 IN | DIASTOLIC BLOOD PRESSURE: 61 MMHG | BODY MASS INDEX: 24.16 KG/M2 | OXYGEN SATURATION: 99 % | RESPIRATION RATE: 20 BRPM

## 2022-08-08 DIAGNOSIS — T84.7XXD HARDWARE COMPLICATING WOUND INFECTION, SUBSEQUENT ENCOUNTER: Primary | ICD-10-CM

## 2022-08-08 LAB — GLUCOSE SERPL-MCNC: 189 MG/DL (ref 65–140)

## 2022-08-08 PROCEDURE — 82948 REAGENT STRIP/BLOOD GLUCOSE: CPT

## 2022-08-08 PROCEDURE — 73590 X-RAY EXAM OF LOWER LEG: CPT

## 2022-08-08 PROCEDURE — 20680 REMOVAL OF IMPLANT DEEP: CPT | Performed by: ORTHOPAEDIC SURGERY

## 2022-08-08 RX ORDER — MAGNESIUM HYDROXIDE 1200 MG/15ML
LIQUID ORAL AS NEEDED
Status: DISCONTINUED | OUTPATIENT
Start: 2022-08-08 | End: 2022-08-08 | Stop reason: HOSPADM

## 2022-08-08 RX ORDER — PROPOFOL 10 MG/ML
INJECTION, EMULSION INTRAVENOUS AS NEEDED
Status: DISCONTINUED | OUTPATIENT
Start: 2022-08-08 | End: 2022-08-08

## 2022-08-08 RX ORDER — DEXMEDETOMIDINE HYDROCHLORIDE 100 UG/ML
INJECTION, SOLUTION INTRAVENOUS AS NEEDED
Status: DISCONTINUED | OUTPATIENT
Start: 2022-08-08 | End: 2022-08-08

## 2022-08-08 RX ORDER — CEFAZOLIN SODIUM 2 G/50ML
2000 SOLUTION INTRAVENOUS ONCE
Status: DISCONTINUED | OUTPATIENT
Start: 2022-08-08 | End: 2022-08-08 | Stop reason: HOSPADM

## 2022-08-08 RX ORDER — FENTANYL CITRATE 50 UG/ML
INJECTION, SOLUTION INTRAMUSCULAR; INTRAVENOUS AS NEEDED
Status: DISCONTINUED | OUTPATIENT
Start: 2022-08-08 | End: 2022-08-08

## 2022-08-08 RX ORDER — SODIUM CHLORIDE, SODIUM LACTATE, POTASSIUM CHLORIDE, CALCIUM CHLORIDE 600; 310; 30; 20 MG/100ML; MG/100ML; MG/100ML; MG/100ML
100 INJECTION, SOLUTION INTRAVENOUS CONTINUOUS
Status: DISCONTINUED | OUTPATIENT
Start: 2022-08-08 | End: 2022-08-08 | Stop reason: HOSPADM

## 2022-08-08 RX ORDER — SODIUM CHLORIDE, SODIUM LACTATE, POTASSIUM CHLORIDE, CALCIUM CHLORIDE 600; 310; 30; 20 MG/100ML; MG/100ML; MG/100ML; MG/100ML
INJECTION, SOLUTION INTRAVENOUS CONTINUOUS PRN
Status: DISCONTINUED | OUTPATIENT
Start: 2022-08-08 | End: 2022-08-08

## 2022-08-08 RX ORDER — BUPIVACAINE HYDROCHLORIDE 2.5 MG/ML
INJECTION, SOLUTION EPIDURAL; INFILTRATION; INTRACAUDAL AS NEEDED
Status: DISCONTINUED | OUTPATIENT
Start: 2022-08-08 | End: 2022-08-08 | Stop reason: HOSPADM

## 2022-08-08 RX ORDER — HYDROMORPHONE HCL/PF 1 MG/ML
0.5 SYRINGE (ML) INJECTION
Status: DISCONTINUED | OUTPATIENT
Start: 2022-08-08 | End: 2022-08-08 | Stop reason: HOSPADM

## 2022-08-08 RX ORDER — ACETAMINOPHEN 325 MG/1
650 TABLET ORAL EVERY 6 HOURS PRN
Status: DISCONTINUED | OUTPATIENT
Start: 2022-08-08 | End: 2022-08-08 | Stop reason: HOSPADM

## 2022-08-08 RX ORDER — FENTANYL CITRATE/PF 50 MCG/ML
25 SYRINGE (ML) INJECTION
Status: DISCONTINUED | OUTPATIENT
Start: 2022-08-08 | End: 2022-08-08 | Stop reason: HOSPADM

## 2022-08-08 RX ORDER — DEXAMETHASONE SODIUM PHOSPHATE 10 MG/ML
INJECTION, SOLUTION INTRAMUSCULAR; INTRAVENOUS AS NEEDED
Status: DISCONTINUED | OUTPATIENT
Start: 2022-08-08 | End: 2022-08-08

## 2022-08-08 RX ORDER — LIDOCAINE HYDROCHLORIDE 20 MG/ML
INJECTION, SOLUTION EPIDURAL; INFILTRATION; INTRACAUDAL; PERINEURAL AS NEEDED
Status: DISCONTINUED | OUTPATIENT
Start: 2022-08-08 | End: 2022-08-08

## 2022-08-08 RX ORDER — MIDAZOLAM HYDROCHLORIDE 2 MG/2ML
INJECTION, SOLUTION INTRAMUSCULAR; INTRAVENOUS AS NEEDED
Status: DISCONTINUED | OUTPATIENT
Start: 2022-08-08 | End: 2022-08-08

## 2022-08-08 RX ORDER — ONDANSETRON 2 MG/ML
4 INJECTION INTRAMUSCULAR; INTRAVENOUS ONCE AS NEEDED
Status: DISCONTINUED | OUTPATIENT
Start: 2022-08-08 | End: 2022-08-08 | Stop reason: HOSPADM

## 2022-08-08 RX ORDER — CHLORHEXIDINE GLUCONATE 0.12 MG/ML
15 RINSE ORAL ONCE
Status: COMPLETED | OUTPATIENT
Start: 2022-08-08 | End: 2022-08-08

## 2022-08-08 RX ORDER — OXYCODONE HYDROCHLORIDE 10 MG/1
10 TABLET ORAL EVERY 6 HOURS PRN
Qty: 30 TABLET | Refills: 0 | Status: SHIPPED | OUTPATIENT
Start: 2022-08-08

## 2022-08-08 RX ORDER — ONDANSETRON 2 MG/ML
INJECTION INTRAMUSCULAR; INTRAVENOUS AS NEEDED
Status: DISCONTINUED | OUTPATIENT
Start: 2022-08-08 | End: 2022-08-08

## 2022-08-08 RX ORDER — HYDROMORPHONE HCL 110MG/55ML
PATIENT CONTROLLED ANALGESIA SYRINGE INTRAVENOUS AS NEEDED
Status: DISCONTINUED | OUTPATIENT
Start: 2022-08-08 | End: 2022-08-08

## 2022-08-08 RX ORDER — TRAMADOL HYDROCHLORIDE 50 MG/1
50 TABLET ORAL EVERY 6 HOURS SCHEDULED
Status: DISCONTINUED | OUTPATIENT
Start: 2022-08-08 | End: 2022-08-08 | Stop reason: HOSPADM

## 2022-08-08 RX ORDER — ONDANSETRON 2 MG/ML
4 INJECTION INTRAMUSCULAR; INTRAVENOUS EVERY 6 HOURS PRN
Status: DISCONTINUED | OUTPATIENT
Start: 2022-08-08 | End: 2022-08-08 | Stop reason: HOSPADM

## 2022-08-08 RX ORDER — CEFAZOLIN SODIUM 1 G/3ML
INJECTION, POWDER, FOR SOLUTION INTRAMUSCULAR; INTRAVENOUS AS NEEDED
Status: DISCONTINUED | OUTPATIENT
Start: 2022-08-08 | End: 2022-08-08

## 2022-08-08 RX ADMIN — DEXMEDETOMIDINE HCL 12 MCG: 100 INJECTION INTRAVENOUS at 16:01

## 2022-08-08 RX ADMIN — FENTANYL CITRATE 50 MCG: 50 INJECTION, SOLUTION INTRAMUSCULAR; INTRAVENOUS at 15:57

## 2022-08-08 RX ADMIN — MIDAZOLAM HYDROCHLORIDE 2 MG: 1 INJECTION, SOLUTION INTRAMUSCULAR; INTRAVENOUS at 15:55

## 2022-08-08 RX ADMIN — ONDANSETRON 4 MG: 2 INJECTION INTRAMUSCULAR; INTRAVENOUS at 16:33

## 2022-08-08 RX ADMIN — FENTANYL CITRATE 50 MCG: 50 INJECTION, SOLUTION INTRAMUSCULAR; INTRAVENOUS at 16:06

## 2022-08-08 RX ADMIN — DEXAMETHASONE SODIUM PHOSPHATE 8 MG: 10 INJECTION, SOLUTION INTRAMUSCULAR; INTRAVENOUS at 16:06

## 2022-08-08 RX ADMIN — PROPOFOL 100 MG: 10 INJECTION, EMULSION INTRAVENOUS at 16:07

## 2022-08-08 RX ADMIN — PROPOFOL 150 MG: 10 INJECTION, EMULSION INTRAVENOUS at 16:03

## 2022-08-08 RX ADMIN — CHLORHEXIDINE GLUCONATE 0.12% ORAL RINSE 15 ML: 1.2 LIQUID ORAL at 15:46

## 2022-08-08 RX ADMIN — SODIUM CHLORIDE, SODIUM LACTATE, POTASSIUM CHLORIDE, AND CALCIUM CHLORIDE: .6; .31; .03; .02 INJECTION, SOLUTION INTRAVENOUS at 15:34

## 2022-08-08 RX ADMIN — HYDROMORPHONE HYDROCHLORIDE 0.5 MG: 2 INJECTION, SOLUTION INTRAMUSCULAR; INTRAVENOUS; SUBCUTANEOUS at 16:47

## 2022-08-08 RX ADMIN — PROPOFOL 50 MG: 10 INJECTION, EMULSION INTRAVENOUS at 16:00

## 2022-08-08 RX ADMIN — DEXMEDETOMIDINE HCL 12 MCG: 100 INJECTION INTRAVENOUS at 15:58

## 2022-08-08 RX ADMIN — FENTANYL CITRATE 25 MCG: 50 INJECTION, SOLUTION INTRAMUSCULAR; INTRAVENOUS at 17:39

## 2022-08-08 RX ADMIN — CEFAZOLIN SODIUM 2000 MG: 1 INJECTION, POWDER, FOR SOLUTION INTRAMUSCULAR; INTRAVENOUS at 16:05

## 2022-08-08 RX ADMIN — FENTANYL CITRATE 25 MCG: 50 INJECTION, SOLUTION INTRAMUSCULAR; INTRAVENOUS at 17:33

## 2022-08-08 RX ADMIN — LIDOCAINE HYDROCHLORIDE 100 MG: 20 INJECTION, SOLUTION EPIDURAL; INFILTRATION; INTRACAUDAL; PERINEURAL at 15:56

## 2022-08-08 RX ADMIN — TRAMADOL HYDROCHLORIDE 50 MG: 50 TABLET, COATED ORAL at 18:21

## 2022-08-08 NOTE — ANESTHESIA POSTPROCEDURE EVALUATION
Post-Op Assessment Note    CV Status:  Stable  Pain Score: 0    Pain management: adequate     Mental Status:  Alert and awake   Hydration Status:  Stable   PONV Controlled:  None   Airway Patency:  Patent and adequate      Post Op Vitals Reviewed: Yes      Staff: Anesthesiologist, CRNA         No complications documented      BP   128/75   Temp   97 4   Pulse  96   Resp   18   SpO2   100%

## 2022-08-08 NOTE — DISCHARGE INSTRUCTIONS
Discharge Instructions - Orthopedics  Marisela Woodard 61 y o  male MRN: [id number]  Unit/Bed#: [facility] MA[facility]    Weight Bearing Status:                                           Weight bearing as tolerated right lower extremity     DVT prophylaxis  Continue previous DVT prophylaxis     Pain:  Continue analgesics as directed    Dressing Instructions: May remove dressings in 3 days  Okay To begin showering at that time  Allow water to flow over the incision sites  Do not vigorously scrubbed or soak wounds until otherwise stated     Appt Instructions: If you do not have your appointment, please call the clinic at [phone] t  Otherwise followup as scheduled     Contact the office sooner if you experience any increased numbness/tingling in the extremities  Miscellaneous:  Continue antibiotics as prescribed

## 2022-08-08 NOTE — ANESTHESIA PREPROCEDURE EVALUATION
Procedure:  REMOVAL HARDWARE RIGHT TIBIA (Right Leg)    Relevant Problems   CARDIO   (+) Essential hypertension   (+) Mixed hyperlipidemia   (+) Type 2 diabetes mellitus with diabetic peripheral angiopathy without gangrene (HCC)      ENDO   (+) Type 2 diabetes mellitus with diabetic neuropathy, without long-term current use of insulin (HCC)   (+) Type 2 diabetes mellitus with diabetic peripheral angiopathy without gangrene (HCC)   (+) Type 2 diabetes mellitus with hyperglycemia (HCC)      /RENAL   (+) ROSE (acute kidney injury) (HCC)   (+) CKD (chronic kidney disease)      HEMATOLOGY   (+) Anemia      MUSCULOSKELETAL   (+) Acute idiopathic gout of multiple sites   (+) Lumbar degenerative disc disease   (+) Pseudogout of knee, left      NEURO/PSYCH   (+) Continuous opioid dependence (HCC)        Physical Exam    Airway    Mallampati score: IV  TM Distance: <3 FB  Neck ROM: limited     Dental   No notable dental hx     Cardiovascular  Rhythm: regular, Rate: normal, Cardiovascular exam normal    Pulmonary  Pulmonary exam normal Breath sounds clear to auscultation,     Other Findings        Anesthesia Plan  ASA Score- 3     Anesthesia Type- general with ASA Monitors  Additional Monitors:   Airway Plan:           Plan Factors-Exercise tolerance (METS): <4 METS  Chart reviewed  Existing labs reviewed  Patient summary reviewed  Patient is not a current smoker  Induction-     Postoperative Plan- Plan for postoperative opioid use  Informed Consent- Anesthetic plan and risks discussed with patient and spouse  I personally reviewed this patient with the CRNA  Discussed and agreed on the Anesthesia Plan with the CRNA  Serenity Yo

## 2022-08-08 NOTE — INTERVAL H&P NOTE
H&P reviewed  After examining the patient I find no changes in the patients condition since the H&P had been written      Vitals:    08/08/22 1354   BP: 114/56   Pulse: 90   Resp: 18   Temp: 98 9 °F (37 2 °C)   SpO2: 99%

## 2022-08-09 ENCOUNTER — TELEPHONE (OUTPATIENT)
Dept: OBGYN CLINIC | Facility: HOSPITAL | Age: 67
End: 2022-08-09

## 2022-08-09 NOTE — TELEPHONE ENCOUNTER
Patient is calling to advise the [facility] in [place] needs something from our office before they can fll the order for the oxyCODONE (Deejay Sheffield) 10 MG TABS [[id number]]       Please advise Yanique Chao [phone]

## 2022-08-09 NOTE — TELEPHONE ENCOUNTER
Spoke to pharmacy  They needed the supervising doctor's name and JERSEY number to run the script through since it is required in [place] on the script  Was advised this could be provided verbally  Provided the information requested and was advised this should go through now  Was advised we would be called if there was any further issue

## 2022-08-09 NOTE — OP NOTE
OPERATIVE REPORT  PATIENT NAME: Sol Garrido    :  1955  MRN: [id number]  Pt Location: [facility] OR ROOM 04    SURGERY DATE: 2022    Surgeon(s) and Role:     * Bert Thorne MD - Primary     * Rick Okeefe PA-C - Assisting    Preop Diagnosis:  Exposed orthopaedic hardware [facility]) [T84 498A]    Post-Op Diagnosis Codes:     * Exposed orthopaedic hardware ([address] ) [T84 498A]    Procedure(s) (LRB):  REMOVAL HARDWARE RIGHT TIBIA (Right)    Procedure:  Removal R tibia plate/screws (CPT 16660)    Specimen(s):  * No specimens in log *    Estimated Blood Loss:   Minimal    Drains:  * No LDAs found *    Anesthesia Type:   General    Operative Indications:  Exposed lateral tibia plate    Operative Findings:  See below    Complications:   None    Procedure and Technique:  The patient is well known to me with a history of prior tibial plateau fixation with eventual traumatic  Wound over plate after a fall resulting in a hip fracture  The wound was closed at that time and healed well, however the patient had a drastic change in his weight creating a prominent plate edge that has now developed to dehiscence and exposed plate  As fracture is healed, patient indicated for hardware removal and closure of wound    The patient's operative site, laterality, procedure, consent were verified in the preop area and the patient was transitioned to the OR  GEneral anesthesia was provided by the anesthesia team and the operative extremity was prepped and draped in the usual sterile fashion  After a timeout for safety, old proximal/lateral incision was opened and fascia was incised and ant compartment musculature was elevated from plate  There was no evidence of infection  Plate and screws were removed sequentially without incident in their entirety  Removal was confirmed on biplanar fluoroscopic imaging  Scar tissue was removed with rongeur  The wound was copiously irrigated with sterile saline   Closure took place with vicryl suture for fascia and subcu layer and nylon suture for skin  Sterile dressing was applied  All final counts were correct  I was present for the entire procedure  The patient was extubated and awakened and transitioned to the PACU in stable condition  There were no immediate complications  There was no qualified resident available for the procedure  Critical assistance from Pilar Patyon PA-C was required for all components of the procedure including positioning ,soft tissue retraction, closure  The patient will be weight bearing as tolerated on the operative extremity  He will resume coumadin for dvt prophylaxis  We will consider suture removal in two weeks      Patient Disposition:  PACU       SIGNATURE: Gene Kang MD  DATE: August 9, 2022  TIME: 4:51 AM

## 2022-08-11 ENCOUNTER — TELEPHONE (OUTPATIENT)
Dept: INFECTIOUS DISEASES | Facility: CLINIC | Age: 67
End: 2022-08-11

## 2022-08-11 NOTE — TELEPHONE ENCOUNTER
Called and spoke with pt informing him that Dr Katy Cabrera reviewed his operative report and that at this time to continue with the Ceftin and follow up with ID  If the wound remains to stay closed and no signs of relapse most likely will discontinue the Ceftin and pt can continue to follow up with PCP and Ortho  Pt thanks us for getting back to him and also will report any concerns to our office

## 2022-08-23 ENCOUNTER — OFFICE VISIT (OUTPATIENT)
Dept: OBGYN CLINIC | Facility: CLINIC | Age: 67
End: 2022-08-23

## 2022-08-23 VITALS
DIASTOLIC BLOOD PRESSURE: 72 MMHG | WEIGHT: 188 LBS | HEART RATE: 69 BPM | SYSTOLIC BLOOD PRESSURE: 138 MMHG | HEIGHT: 74 IN | BODY MASS INDEX: 24.13 KG/M2

## 2022-08-23 DIAGNOSIS — T84.498A EXPOSED ORTHOPAEDIC HARDWARE (HCC): Primary | ICD-10-CM

## 2022-08-23 DIAGNOSIS — Z87.81 S/P ORIF (OPEN REDUCTION INTERNAL FIXATION) FRACTURE: ICD-10-CM

## 2022-08-23 DIAGNOSIS — Z98.890 S/P ORIF (OPEN REDUCTION INTERNAL FIXATION) FRACTURE: ICD-10-CM

## 2022-08-23 PROCEDURE — 99024 POSTOP FOLLOW-UP VISIT: CPT | Performed by: ORTHOPAEDIC SURGERY

## 2022-08-23 NOTE — PATIENT INSTRUCTIONS
- Weight bearing as tolerated right lower extremity   - Sutures removed in the office  Patient tolerated well    - Okay To begin showering  Allow water to flow over the incision sites    Do not vigorously scrubbed or soak wounds until otherwise stated   - Over the counter analgesics as needed / directed   - Ice / heat as directed   - Follow up 4 weeks

## 2022-08-23 NOTE — PROGRESS NOTES
Orthopaedics Office Visit - Post-op Patient Visit    ASSESSMENT/PLAN:    Assessment:   15 days s/p removal hardware right tibia   DOS 22     Plan:   - Weight bearing as tolerated right lower extremity   - Sutures removed in the office  Patient tolerated well    - Okay To begin showering  Allow water to flow over the incision sites  Do not vigorously scrubbed or soak wounds until otherwise stated   - Over the counter analgesics as needed / directed   - Ice / heat as directed   - Continue ABX per ID   - Follow up 4 weeks       To Do Next Visit:  eval wound healing and gait progression  _____________________________________________________  CHIEF COMPLAINT:  Chief Complaint   Patient presents with    Right Leg - Post-op         SUBJECTIVE:  Cassidy Nelson is a 61 y o  male who presents  15 days s/p removal hardware right tibia   DOS 22  Patient states that his leg is doing well overall  Patient states he has minimal pain over the incision site  Patient states he has had no drainage noted  Patient denies any numbness or tingling his leg  Patient states he has continued trouble ambulating secondary to right hip pain  Patient is 6 5 weeks status post TFNA RIGHT HIP  The patient denies any fevers any chills  Patient denies any shortness of breath chest tightness chest pain  Patient offers no other complaints at this time        SOCIAL HISTORY:  Social History     Tobacco Use    Smoking status: Former Smoker     Packs/day: 0 25     Quit date:      Years since quittin 6    Smokeless tobacco: Never Used    Tobacco comment: Per allscripts, current smoker   Vaping Use    Vaping Use: Never used   Substance Use Topics    Alcohol use: Never     Comment: Per allscripts, occasional use    Drug use: Not Currently       MEDICATIONS:    Current Outpatient Medications:     colchicine (COLCRYS) 0 6 mg tablet, Take 1 tablet (0 6 mg total) by mouth daily, Disp: 90 tablet, Rfl: 1    Continuous Blood Gluc  ([employer] 14 Day [place]) JEROME, Check blood sugar TID, DX E11 9, Disp: 1 each, Rfl: 0    Continuous Blood Gluc Sensor ([employer] Reese 14 Day Sensor) MISC, Check blood sugar TID, apply new sensor q 14 days, Disp: 2 each, Rfl: 5    Diclofenac Sodium (VOLTAREN) 1 %, Apply 2 g topically 4 (four) times a day, Disp: 2 g, Rfl: 1    ferrous sulfate 325 (65 Fe) mg tablet, Take 325 mg by mouth daily with breakfast, Disp: , Rfl:     fluticasone (FLONASE) 50 mcg/act nasal spray, 1 spray into each nostril daily, Disp: 11 mL, Rfl: 5    insulin glargine (LANTUS) 100 units/mL subcutaneous injection, Inject 12 Units under the skin daily, Disp: 10 mL, Rfl: 3    lisinopril (ZESTRIL) 10 mg tablet, Take 1 tablet (10 mg total) by mouth daily, Disp: 90 tablet, Rfl: 3    metFORMIN (GLUCOPHAGE) 1000 MG tablet, Take 1 tablet (1,000 mg total) by mouth 2 (two) times a day with meals, Disp: 180 tablet, Rfl: 5    oxyCODONE (ROXICODONE) 10 MG TABS, Take 1 tablet (10 mg total) by mouth every 6 (six) hours as needed for moderate pain Max Daily Amount: 40 mg, Disp: 30 tablet, Rfl: 0    polyethylene glycol (MIRALAX) 17 g packet, Take 17 g by mouth daily as needed , Disp: , Rfl:     Promethazine-DM (PHENERGAN-DM) 6 25-15 mg/5 mL oral syrup, Take 5 mL by mouth 4 (four) times a day as needed for cough, Disp: 118 mL, Rfl: 0    senna-docusate sodium (SENOKOT-S) 8 6-50 mg per tablet, Take 1 tablet by mouth 2 (two) times a day, Disp: , Rfl:     simvastatin (ZOCOR) 20 mg tablet, Take 1 tablet (20 mg total) by mouth daily, Disp: 90 tablet, Rfl: 3    warfarin (COUMADIN) 1 mg tablet, Take 1 tablet (1 mg total) by mouth daily, Disp: 30 tablet, Rfl: 3    warfarin (Jantoven) 6 mg tablet, Take 1 tablet (6 mg total) by mouth daily, Disp: 90 tablet, Rfl: 1    lidocaine (LIDODERM) 5 %, Apply 1 patch topically daily as needed (hip pain) Remove & Discard patch within 12 hours or as directed by MD, Disp: 30 patch, Rfl: 0    warfarin (COUMADIN) 2 mg tablet, Take 1 tablet (2 mg total) by mouth daily, Disp: 90 tablet, Rfl: 1    REVIEW OF SYSTEMS:  MSK: right leg pain   Neuro: WNL   Pertinent items are otherwise noted in HPI  A comprehensive review of systems was otherwise negative     _____________________________________________________  PHYSICAL EXAMINATION:  Vital signs: /72   Pulse 69   Ht 6' 2" (1 88 m)   Wt 85 3 kg (188 lb)   BMI 24 14 kg/m²   General: No acute distress, awake and alert  Psychiatric: Mood and affect appear appropriate  HEENT: Trachea Midline, No torticollis, no apparent facial trauma  Cardiovascular: No audible murmurs; Extremities appear perfused  Pulmonary: No audible wheezing or stridor  Skin: No open lesions; see further details (if any) below      MUSCULOSKELETAL EXAMINATION:  Right lower leg examination:  - Patient sitting comfortably in the office in no acute distress   - healed incision site noted over the lateral aspect of the tibial plateau  No surrounding erythema or ecchymosis present  Extremity appears well-perfused overall   - mild tenderness palpation noted over incision site  No other bony or soft tissue tenderness to palpation noted at this time  - full range of motion of the knee noted with no pain   - NV intact    _____________________________________________________  STUDIES REVIEWED:  I personally reviewed the images and interpretation is as follows:   N/A       PROCEDURES PERFORMED:  Suture removal    Date/Time: 8/23/2022 9:19 AM  Performed by: Leo Menon PA-C  Authorized by: Kim Iraheta MD   Universal Protocol:  Consent: Verbal consent obtained    Risks and benefits: risks, benefits and alternatives were discussed  Consent given by: patient  Patient understanding: patient states understanding of the procedure being performed  Site marked: the operative site was marked  Patient identity confirmed: verbally with patient        Patient location:  Bedside  Location: Laterality:  Right    Location:  Lower extremity    Lower extremity location:  Leg    Leg location:  R lower leg  Procedure details: Tools used:  Suture removal kit    Wound appearance:  No sign(s) of infection, good wound healing and clean  Post-procedure details:     Post-removal:  Steri-Strips applied    Patient tolerance of procedure: Tolerated well, no immediate complications            Janelle Gallegos PA-C - assisting  Debbie Medina MD            Portions of the record may have been created with voice recognition software  Occasional wrong word or "sound a like" substitutions may have occurred due to the inherent limitations of voice recognition software  Read the chart carefully and recognize, using context, where substitutions have occurred

## 2022-08-26 ENCOUNTER — VBI (OUTPATIENT)
Dept: ADMINISTRATIVE | Facility: OTHER | Age: 67
End: 2022-08-26

## 2022-09-26 ENCOUNTER — TELEPHONE (OUTPATIENT)
Dept: LAB | Facility: HOSPITAL | Age: 67
End: 2022-09-26

## 2022-09-26 ENCOUNTER — OFFICE VISIT (OUTPATIENT)
Dept: FAMILY MEDICINE CLINIC | Facility: CLINIC | Age: 67
End: 2022-09-26
Payer: COMMERCIAL

## 2022-09-26 VITALS
WEIGHT: 189.4 LBS | OXYGEN SATURATION: 97 % | BODY MASS INDEX: 24.31 KG/M2 | HEART RATE: 96 BPM | DIASTOLIC BLOOD PRESSURE: 72 MMHG | TEMPERATURE: 97 F | HEIGHT: 74 IN | SYSTOLIC BLOOD PRESSURE: 128 MMHG

## 2022-09-26 DIAGNOSIS — N18.32 STAGE 3B CHRONIC KIDNEY DISEASE (HCC): ICD-10-CM

## 2022-09-26 DIAGNOSIS — I10 ESSENTIAL HYPERTENSION: ICD-10-CM

## 2022-09-26 DIAGNOSIS — E11.51 TYPE 2 DIABETES MELLITUS WITH DIABETIC PERIPHERAL ANGIOPATHY WITHOUT GANGRENE, WITH LONG-TERM CURRENT USE OF INSULIN (HCC): ICD-10-CM

## 2022-09-26 DIAGNOSIS — T84.7XXD HARDWARE COMPLICATING WOUND INFECTION, SUBSEQUENT ENCOUNTER: ICD-10-CM

## 2022-09-26 DIAGNOSIS — Z23 NEED FOR INFLUENZA VACCINATION: ICD-10-CM

## 2022-09-26 DIAGNOSIS — E78.2 MIXED HYPERLIPIDEMIA: ICD-10-CM

## 2022-09-26 DIAGNOSIS — Z79.4 TYPE 2 DIABETES MELLITUS WITH DIABETIC PERIPHERAL ANGIOPATHY WITHOUT GANGRENE, WITH LONG-TERM CURRENT USE OF INSULIN (HCC): ICD-10-CM

## 2022-09-26 DIAGNOSIS — E11.40 TYPE 2 DIABETES MELLITUS WITH DIABETIC NEUROPATHY, WITHOUT LONG-TERM CURRENT USE OF INSULIN (HCC): Primary | ICD-10-CM

## 2022-09-26 DIAGNOSIS — Z12.11 SCREEN FOR COLON CANCER: ICD-10-CM

## 2022-09-26 PROBLEM — N17.9 AKI (ACUTE KIDNEY INJURY) (HCC): Status: RESOLVED | Noted: 2021-10-13 | Resolved: 2022-09-26

## 2022-09-26 PROBLEM — E66.812 CLASS 2 OBESITY IN ADULT: Status: RESOLVED | Noted: 2021-06-07 | Resolved: 2022-09-26

## 2022-09-26 PROBLEM — E66.9 CLASS 2 OBESITY IN ADULT: Status: RESOLVED | Noted: 2021-06-07 | Resolved: 2022-09-26

## 2022-09-26 PROBLEM — F11.20 CONTINUOUS OPIOID DEPENDENCE (HCC): Status: RESOLVED | Noted: 2022-07-11 | Resolved: 2022-09-26

## 2022-09-26 PROCEDURE — 99214 OFFICE O/P EST MOD 30 MIN: CPT | Performed by: FAMILY MEDICINE

## 2022-09-26 PROCEDURE — 90682 RIV4 VACC RECOMBINANT DNA IM: CPT | Performed by: FAMILY MEDICINE

## 2022-09-26 PROCEDURE — 90471 IMMUNIZATION ADMIN: CPT | Performed by: FAMILY MEDICINE

## 2022-09-26 NOTE — ASSESSMENT & PLAN NOTE
Lab Results   Component Value Date    EGFR 43 05/19/2022    EGFR 40 04/21/2022    EGFR 24 04/07/2022    CREATININE 1 63 (H) 05/19/2022    CREATININE 1 75 (H) 04/21/2022    CREATININE 2 64 (H) 04/07/2022    check CMP

## 2022-09-26 NOTE — ASSESSMENT & PLAN NOTE
Continue Ceftin, will check a CBC, id has recommended that he continue the Ceftin until February however was also discussed to stop and see how he is doing will based decision based on his blood work

## 2022-09-26 NOTE — ASSESSMENT & PLAN NOTE
Continue his insulin, metformin, check hemoglobin A1c    Lab Results   Component Value Date    HGBA1C 7 4 (H) 04/21/2022

## 2022-09-26 NOTE — ASSESSMENT & PLAN NOTE
Continue his diabetic regimen, continue his Coumadin  Lab Results   Component Value Date    HGBA1C 7 4 (H) 04/21/2022

## 2022-09-26 NOTE — PROGRESS NOTES
Name: Adrienne Traylor      : 1955      MRN: [id number]  Encounter Provider: Eve Gan DO  Encounter Date: 2022   Encounter department: [facility] [address] [place] [address]     1  Type 2 diabetes mellitus with diabetic neuropathy, without long-term current use of insulin (HCC)  Assessment & Plan:    Continue his insulin, metformin, check hemoglobin A1c  Lab Results   Component Value Date    HGBA1C 7 4 (H) 2022         2  Type 2 diabetes mellitus with diabetic peripheral angiopathy without gangrene, with long-term current use of insulin (HCC)  Assessment & Plan:    Continue his diabetic regimen, continue his Coumadin  Lab Results   Component Value Date    HGBA1C 7 4 (H) 2022         3  Essential hypertension  Assessment & Plan:   Controlled, continue his lisinopril    Orders:  -     CBC; Future    4  Stage 3b chronic kidney disease [facility])  Assessment & Plan:  Lab Results   Component Value Date    EGFR 43 2022    EGFR 40 2022    EGFR 24 2022    CREATININE 1 63 (H) 2022    CREATININE 1 75 (H) 2022    CREATININE 2 64 (H) 2022    check CMP      5  Mixed hyperlipidemia  Assessment & Plan:   Continue the simvastatin, check CMP, lipid profile      6  Screen for colon cancer  -     Ambulatory referral for colonoscopy; Future    7  Need for influenza vaccination  -     influenza vaccine, quadrivalent, recombinant, PF, 0 5 mL, for patients 18 yr+ (FLUBLOK)    8  Hardware complicating wound infection, subsequent encounter  Assessment & Plan:   Continue Ceftin, will check a CBC, id has recommended that he continue the Ceftin until February however was also discussed to stop and see how he is doing will based decision based on his blood work               Subjective       Patient comes in today for checkup, he did have his orthopedic hardware removed successfully, he is currently taking Ceftin as recommended by Infectious Disease  He states that he is monitoring his blood sugars more regularly, and has noted improvement  He does continue to take his medications as prescribed, no compliance issues  Review of Systems   Constitutional: Negative for chills, fatigue and fever  HENT: Negative for congestion, ear pain, hearing loss, postnasal drip, rhinorrhea and sore throat  Eyes: Negative for pain and visual disturbance  Respiratory: Negative for chest tightness, shortness of breath and wheezing  Cardiovascular: Negative for chest pain and leg swelling  Gastrointestinal: Negative for abdominal distention, abdominal pain, constipation, diarrhea and vomiting  Endocrine: Negative for cold intolerance and heat intolerance  Genitourinary: Negative for difficulty urinating, frequency and urgency  Musculoskeletal: Negative for arthralgias and gait problem  Skin: Negative for color change  Neurological: Negative for dizziness, tremors, syncope, numbness and headaches  Hematological: Negative for adenopathy  Psychiatric/Behavioral: Negative for agitation, confusion and sleep disturbance  The patient is not nervous/anxious          Current Outpatient Medications on File Prior to Visit   Medication Sig    colchicine (COLCRYS) 0 6 mg tablet Take 1 tablet (0 6 mg total) by mouth daily    Continuous Blood Gluc  ([employer] Reese 14 Day [place]) JEROME Check blood sugar TID, DX E11 9    Continuous Blood Gluc Sensor (FreeStyle Reese 14 Day Sensor) MISC Check blood sugar TID, apply new sensor q 14 days    Diclofenac Sodium (VOLTAREN) 1 % Apply 2 g topically 4 (four) times a day    ferrous sulfate 325 (65 Fe) mg tablet Take 325 mg by mouth daily with breakfast    fluticasone (FLONASE) 50 mcg/act nasal spray 1 spray into each nostril daily    insulin glargine (LANTUS) 100 units/mL subcutaneous injection Inject 12 Units under the skin daily    lisinopril (ZESTRIL) 10 mg tablet Take 1 tablet (10 mg total) by mouth daily    metFORMIN (GLUCOPHAGE) 1000 MG tablet Take 1 tablet (1,000 mg total) by mouth 2 (two) times a day with meals    oxyCODONE (ROXICODONE) 10 MG TABS Take 1 tablet (10 mg total) by mouth every 6 (six) hours as needed for moderate pain Max Daily Amount: 40 mg    polyethylene glycol (MIRALAX) 17 g packet Take 17 g by mouth daily as needed     Promethazine-DM (PHENERGAN-DM) 6 25-15 mg/5 mL oral syrup Take 5 mL by mouth 4 (four) times a day as needed for cough    senna-docusate sodium (SENOKOT-S) 8 6-50 mg per tablet Take 1 tablet by mouth 2 (two) times a day    simvastatin (ZOCOR) 20 mg tablet Take 1 tablet (20 mg total) by mouth daily    warfarin (COUMADIN) 1 mg tablet Take 1 tablet (1 mg total) by mouth daily    warfarin (Jantoven) 6 mg tablet Take 1 tablet (6 mg total) by mouth daily    warfarin (COUMADIN) 2 mg tablet Take 1 tablet (2 mg total) by mouth daily    [DISCONTINUED] lidocaine (LIDODERM) 5 % Apply 1 patch topically daily as needed (hip pain) Remove & Discard patch within 12 hours or as directed by MD       Objective     /72 (BP Location: Left arm, Patient Position: Sitting, Cuff Size: Standard)   Pulse 96   Temp (!) 97 °F (36 1 °C) (Tympanic)   Ht 6' 2" (1 88 m)   Wt 85 9 kg (189 lb 6 4 oz)   SpO2 97%   BMI 24 32 kg/m²     Physical Exam  Vitals and nursing note reviewed  Constitutional:       Appearance: He is well-developed  HENT:      Head: Normocephalic  Eyes:      General: No scleral icterus  Conjunctiva/sclera: Conjunctivae normal    Neck:      Thyroid: No thyromegaly  Cardiovascular:      Rate and Rhythm: Normal rate and regular rhythm  Heart sounds: Normal heart sounds  No murmur heard  Pulmonary:      Effort: Pulmonary effort is normal  No respiratory distress  Breath sounds: Normal breath sounds  No wheezing  Abdominal:      General: Bowel sounds are normal  There is no distension  Palpations: Abdomen is soft  Tenderness:  There is no abdominal tenderness  Musculoskeletal:         General: No tenderness  Normal range of motion  Cervical back: Normal range of motion  Lymphadenopathy:      Cervical: No cervical adenopathy  Skin:     General: Skin is warm and dry  Coloration: Skin is not pale  Findings: No rash  Comments: Right leg incision well healed, no drainage, no erythema  Neurological:      Mental Status: He is alert and oriented to person, place, and time  Cranial Nerves: No cranial nerve deficit     Psychiatric:         Behavior: Behavior normal        Venia Creeks, DO

## 2022-09-27 ENCOUNTER — OFFICE VISIT (OUTPATIENT)
Dept: OBGYN CLINIC | Facility: CLINIC | Age: 67
End: 2022-09-27

## 2022-09-27 ENCOUNTER — APPOINTMENT (OUTPATIENT)
Dept: RADIOLOGY | Facility: CLINIC | Age: 67
End: 2022-09-27
Payer: COMMERCIAL

## 2022-09-27 ENCOUNTER — HOME HEALTH ADMISSION (OUTPATIENT)
Dept: HOME HEALTH SERVICES | Facility: HOME HEALTHCARE | Age: 67
End: 2022-09-27
Payer: COMMERCIAL

## 2022-09-27 VITALS
BODY MASS INDEX: 24.26 KG/M2 | SYSTOLIC BLOOD PRESSURE: 125 MMHG | HEIGHT: 74 IN | DIASTOLIC BLOOD PRESSURE: 63 MMHG | WEIGHT: 189 LBS | HEART RATE: 76 BPM

## 2022-09-27 DIAGNOSIS — Z98.890 STATUS POST MUSCULOSKELETAL SYSTEM SURGERY: ICD-10-CM

## 2022-09-27 DIAGNOSIS — Z98.890 S/P ORIF (OPEN REDUCTION INTERNAL FIXATION) FRACTURE: ICD-10-CM

## 2022-09-27 DIAGNOSIS — T84.498A EXPOSED ORTHOPAEDIC HARDWARE (HCC): Primary | ICD-10-CM

## 2022-09-27 DIAGNOSIS — Z87.81 S/P ORIF (OPEN REDUCTION INTERNAL FIXATION) FRACTURE: ICD-10-CM

## 2022-09-27 PROCEDURE — 73502 X-RAY EXAM HIP UNI 2-3 VIEWS: CPT

## 2022-09-27 PROCEDURE — 99024 POSTOP FOLLOW-UP VISIT: CPT | Performed by: ORTHOPAEDIC SURGERY

## 2022-09-27 NOTE — PROGRESS NOTES
Orthopaedics Office Visit - Post-op Patient Visit    ASSESSMENT/PLAN:    Assessment:   Approximately 7 5 months status post right TFNA he performed on 02/07/2022   Approximately 7 weeks status post hardware removal right tibia performed on 08/8/2022  Spinal stenosis likely source of bilateral leg weakness and gait impairment    Plan:   · X-rays were reviewed today  · Patients wife has to take off of work to get him to appointments  · A referral was placed to home therapy due to patients home bound status  · Patient was advised to follow-up with infectious disease in regard to antibiotics   · Follow-up in 3 months     To Do Next Visit:  Re-evaluation    _____________________________________________________  CHIEF COMPLAINT:  Chief Complaint   Patient presents with    Right Leg - Post-op         SUBJECTIVE:  Farhad Terry is a 61 y o  male who presents approximately 7 5 months status post right TFNA he performed on 02/07/2022, approximately 7 weeks status post hardware removal right tibia performed on 08/8/2022  Patient was last seen in the office on 08/23/2022 where he was told to weight bear as tolerated, continue antibiotics  He presents to the office today for evaluation of wound healing and gait progression  Patient presents to the office today in a wheelchair  He states he uses a wheelchair at home and cannot walk very well  He complains of right hip pain  Patient is not taking anything for pain  He reports he occassionally takes oxycodone which makes him constipated so he only takes it when in great pain  Pain is described as a sharp pain in his hip  He reports he cannot sleep on the right  He also states both hips are bothering him  Patient is on coumadin so he cannot take NSAIDs  He reports his legs feel very weak when he does walk  Patient is a diabetic, last A1C was 4/21/22 and was 7 4  Patient is not a smoker  Patient reports he has a herniated disc in his back   He reports he has had injections in the past that lasted for 2 weeks  He was told there is nothing that can be done for his back other than surgery and he is not willing to have back surgery         SOCIAL HISTORY:  Social History     Tobacco Use    Smoking status: Former Smoker     Packs/day: 0 25     Quit date:      Years since quittin 7    Smokeless tobacco: Never Used    Tobacco comment: Per allscripts, current smoker   Vaping Use    Vaping Use: Never used   Substance Use Topics    Alcohol use: Never     Comment: Per allscripts, occasional use    Drug use: Not Currently       MEDICATIONS:    Current Outpatient Medications:     colchicine (COLCRYS) 0 6 mg tablet, Take 1 tablet (0 6 mg total) by mouth daily, Disp: 90 tablet, Rfl: 1    Continuous Blood Gluc  ([employer] Reese 14 Day [place]) JEROME, Check blood sugar TID, DX E11 9, Disp: 1 each, Rfl: 0    Continuous Blood Gluc Sensor ([employer] Reese 14 Day Sensor) MISC, Check blood sugar TID, apply new sensor q 14 days, Disp: 2 each, Rfl: 5    Diclofenac Sodium (VOLTAREN) 1 %, Apply 2 g topically 4 (four) times a day, Disp: 2 g, Rfl: 1    ferrous sulfate 325 (65 Fe) mg tablet, Take 325 mg by mouth daily with breakfast, Disp: , Rfl:     fluticasone (FLONASE) 50 mcg/act nasal spray, 1 spray into each nostril daily, Disp: 11 mL, Rfl: 5    insulin glargine (LANTUS) 100 units/mL subcutaneous injection, Inject 12 Units under the skin daily, Disp: 10 mL, Rfl: 3    lisinopril (ZESTRIL) 10 mg tablet, Take 1 tablet (10 mg total) by mouth daily, Disp: 90 tablet, Rfl: 3    metFORMIN (GLUCOPHAGE) 1000 MG tablet, Take 1 tablet (1,000 mg total) by mouth 2 (two) times a day with meals, Disp: 180 tablet, Rfl: 5    oxyCODONE (ROXICODONE) 10 MG TABS, Take 1 tablet (10 mg total) by mouth every 6 (six) hours as needed for moderate pain Max Daily Amount: 40 mg, Disp: 30 tablet, Rfl: 0    polyethylene glycol (MIRALAX) 17 g packet, Take 17 g by mouth daily as needed , Disp: , Rfl:    Promethazine-DM (PHENERGAN-DM) 6 25-15 mg/5 mL oral syrup, Take 5 mL by mouth 4 (four) times a day as needed for cough, Disp: 118 mL, Rfl: 0    senna-docusate sodium (SENOKOT-S) 8 6-50 mg per tablet, Take 1 tablet by mouth 2 (two) times a day, Disp: , Rfl:     simvastatin (ZOCOR) 20 mg tablet, Take 1 tablet (20 mg total) by mouth daily, Disp: 90 tablet, Rfl: 3    warfarin (COUMADIN) 1 mg tablet, Take 1 tablet (1 mg total) by mouth daily, Disp: 30 tablet, Rfl: 3    warfarin (Jantoven) 6 mg tablet, Take 1 tablet (6 mg total) by mouth daily, Disp: 90 tablet, Rfl: 1    warfarin (COUMADIN) 2 mg tablet, Take 1 tablet (2 mg total) by mouth daily, Disp: 90 tablet, Rfl: 1    REVIEW OF SYSTEMS:  MSK: right ankle, right hip  Neuro: WNL  Pertinent items are otherwise noted in HPI  A comprehensive review of systems was otherwise negative     _____________________________________________________  PHYSICAL EXAMINATION:  Vital signs: /63   Pulse 76   Ht 6' 2" (1 88 m)   Wt 85 7 kg (189 lb)   BMI 24 27 kg/m²   General: No acute distress, awake and alert  Psychiatric: Mood and affect appear appropriate  HEENT: Trachea Midline, No torticollis, no apparent facial trauma  Cardiovascular: No audible murmurs; Extremities appear perfused  Pulmonary: No audible wheezing or stridor  Skin: No open lesions; see further details (if any) below    MUSCULOSKELETAL EXAMINATION:  Extremities:     Right Lower Leg  Active range of motion: maintains ROM at ankle  Maintains ROM at knee  There is no swelling and ecchymosis present over the incision site  There is no tenderness present over the incision site  Skin is intact  No erythema  The incision is clean, dry and intact         Right Hip:  Patient sits comfortably with hips flexed to 90 degrees  He is able to perform hip flexion with knee flexed  5/5 strength with hip flexion and knee flexed  Tenderness to palpation over greater trochanter  able to perform straight leg raise  Sensation normal to L2-S1 dermatomes   Extremity warm and well perfused     _____________________________________________________  STUDIES REVIEWED:  I personally reviewed the images and interpretation is as follows:  X-rays, R hip, 9/27/22: Demonstrates orthopedic hardware in appropriate alignment with no evidence of loosening  Previous fracture has healed     PROCEDURES PERFORMED:  Procedures   None performed         Scribe Attestation    I,:  Dinora Alberto am acting as a scribe while in the presence of the attending physician :       I,:  Chris Byrnes MD personally performed the services described in this documentation    as scribed in my presence :

## 2022-09-28 ENCOUNTER — HOME CARE VISIT (OUTPATIENT)
Dept: HOME HEALTH SERVICES | Facility: HOME HEALTHCARE | Age: 67
End: 2022-09-28
Payer: COMMERCIAL

## 2022-09-28 VITALS — DIASTOLIC BLOOD PRESSURE: 60 MMHG | SYSTOLIC BLOOD PRESSURE: 100 MMHG | OXYGEN SATURATION: 99 % | HEART RATE: 73 BPM

## 2022-09-28 PROCEDURE — 400013 VN SOC

## 2022-09-28 PROCEDURE — G0151 HHCP-SERV OF PT,EA 15 MIN: HCPCS

## 2022-09-29 ENCOUNTER — HOME CARE VISIT (OUTPATIENT)
Dept: HOME HEALTH SERVICES | Facility: HOME HEALTHCARE | Age: 67
End: 2022-09-29
Payer: COMMERCIAL

## 2022-09-29 NOTE — HOME HEALTH
had surgery on aug 8 2022 to remove hardware from lateral calf  surgical scar is dry and clean without erythema or drainage  medial scar clean and dry  goals  standing up straighter  Does not take pain meds bc of concerns of addiction  1  Pain  2  standing/ walking   3  weakness in bles yasemin with stairs  4  tripping over feet    possible lumbar disc herniation and bulging with shift during last fall 2  3 2022 fx  right hip pins lateral femur with screws and oct 12 2021 fx  tibia  using rw      back pain  with standing  6/10 in sitting sore in buttock  neck pain  6/10

## 2022-09-29 NOTE — CASE COMMUNICATION
[facility] has admitted your patient to [facility] service with the following disciplines:      [facility] admisison disiplines: PT  Primary focus of home health care  Gait and transfer training including stairs and car transfers  Pain management  Patient stated goals of care  To be able to walk better  Anticipated visit pattern and next visit date  2 x / wk for 6 wks  Significant clinical findings  Patient has an extension i mpairment of the lumbar and cervical spine limiting his ability to transfer and gait with  Weakness in luciana les  right hip and ankle more than the left  Potential barriers to goal achievement  Pain  Thank you for allowing us to participate in the care of your patient  Please call with any questions, [phone]  [employer]   PT

## 2022-09-30 ENCOUNTER — HOME CARE VISIT (OUTPATIENT)
Dept: HOME HEALTH SERVICES | Facility: HOME HEALTHCARE | Age: 67
End: 2022-09-30
Payer: COMMERCIAL

## 2022-09-30 VITALS — HEART RATE: 71 BPM | DIASTOLIC BLOOD PRESSURE: 64 MMHG | SYSTOLIC BLOOD PRESSURE: 98 MMHG | OXYGEN SATURATION: 98 %

## 2022-09-30 PROCEDURE — G0151 HHCP-SERV OF PT,EA 15 MIN: HCPCS

## 2022-09-30 NOTE — CASE COMMUNICATION
Called patient to schedule OT evaluation  Spoke with patients wife  Patient refused OT at this time

## 2022-10-03 ENCOUNTER — HOME CARE VISIT (OUTPATIENT)
Dept: HOME HEALTH SERVICES | Facility: HOME HEALTHCARE | Age: 67
End: 2022-10-03
Payer: COMMERCIAL

## 2022-10-03 VITALS — DIASTOLIC BLOOD PRESSURE: 70 MMHG | SYSTOLIC BLOOD PRESSURE: 120 MMHG | OXYGEN SATURATION: 93 % | HEART RATE: 78 BPM

## 2022-10-03 PROCEDURE — G0151 HHCP-SERV OF PT,EA 15 MIN: HCPCS

## 2022-10-03 PROCEDURE — G0180 MD CERTIFICATION HHA PATIENT: HCPCS | Performed by: ORTHOPAEDIC SURGERY

## 2022-10-03 NOTE — CASE COMMUNICATION
upon drug review the following potential  drug interactions were noted    Major   between colchicine and atorvastatin  major between coumadin and diclofenac    Thankyou

## 2022-10-03 NOTE — CASE COMMUNICATION
TC to the patient for med review  No answer  Message left to call in to [phone] when able  If this has not been completed please remind the patient to call in for review at next therapy session  Booster Pack

## 2022-10-05 ENCOUNTER — HOME CARE VISIT (OUTPATIENT)
Dept: HOME HEALTH SERVICES | Facility: HOME HEALTHCARE | Age: 67
End: 2022-10-05
Payer: COMMERCIAL

## 2022-10-05 VITALS — OXYGEN SATURATION: 96 % | HEART RATE: 97 BPM | DIASTOLIC BLOOD PRESSURE: 68 MMHG | SYSTOLIC BLOOD PRESSURE: 110 MMHG

## 2022-10-05 PROCEDURE — G0151 HHCP-SERV OF PT,EA 15 MIN: HCPCS

## 2022-10-06 ENCOUNTER — APPOINTMENT (OUTPATIENT)
Dept: LAB | Facility: HOSPITAL | Age: 67
End: 2022-10-06
Payer: COMMERCIAL

## 2022-10-06 DIAGNOSIS — E11.40 TYPE 2 DIABETES MELLITUS WITH DIABETIC NEUROPATHY, WITHOUT LONG-TERM CURRENT USE OF INSULIN (HCC): ICD-10-CM

## 2022-10-06 DIAGNOSIS — I10 ESSENTIAL HYPERTENSION: ICD-10-CM

## 2022-10-06 DIAGNOSIS — I82.403 ACUTE EMBOLISM AND THROMBOSIS OF DEEP VEIN OF BOTH LOWER EXTREMITIES (HCC): ICD-10-CM

## 2022-10-06 DIAGNOSIS — Z12.5 SCREENING PSA (PROSTATE SPECIFIC ANTIGEN): ICD-10-CM

## 2022-10-06 LAB
ALBUMIN SERPL BCP-MCNC: 2.8 G/DL (ref 3.5–5)
ALP SERPL-CCNC: 80 U/L (ref 46–116)
ALT SERPL W P-5'-P-CCNC: 12 U/L (ref 12–78)
ANION GAP SERPL CALCULATED.3IONS-SCNC: 7 MMOL/L (ref 4–13)
AST SERPL W P-5'-P-CCNC: 8 U/L (ref 5–45)
BILIRUB SERPL-MCNC: 0.76 MG/DL (ref 0.2–1)
BUN SERPL-MCNC: 41 MG/DL (ref 5–25)
CALCIUM ALBUM COR SERPL-MCNC: 10.6 MG/DL (ref 8.3–10.1)
CALCIUM SERPL-MCNC: 9.6 MG/DL (ref 8.3–10.1)
CHLORIDE SERPL-SCNC: 107 MMOL/L (ref 96–108)
CO2 SERPL-SCNC: 23 MMOL/L (ref 21–32)
CREAT SERPL-MCNC: 2.28 MG/DL (ref 0.6–1.3)
EST. AVERAGE GLUCOSE BLD GHB EST-MCNC: 169 MG/DL
GFR SERPL CREATININE-BSD FRML MDRD: 29 ML/MIN/1.73SQ M
GLUCOSE P FAST SERPL-MCNC: 164 MG/DL (ref 65–99)
HBA1C MFR BLD: 7.5 %
INR PPP: 1.58 (ref 0.84–1.19)
POTASSIUM SERPL-SCNC: 4.8 MMOL/L (ref 3.5–5.3)
PROT SERPL-MCNC: 7.2 G/DL (ref 6.4–8.4)
PROTHROMBIN TIME: 19.1 SECONDS (ref 11.6–14.5)
PSA SERPL-MCNC: 0.9 NG/ML (ref 0–4)
SODIUM SERPL-SCNC: 137 MMOL/L (ref 135–147)

## 2022-10-06 PROCEDURE — 85610 PROTHROMBIN TIME: CPT

## 2022-10-06 PROCEDURE — 83036 HEMOGLOBIN GLYCOSYLATED A1C: CPT

## 2022-10-06 PROCEDURE — G0103 PSA SCREENING: HCPCS

## 2022-10-06 PROCEDURE — 80053 COMPREHEN METABOLIC PANEL: CPT

## 2022-10-07 ENCOUNTER — TELEPHONE (OUTPATIENT)
Dept: FAMILY MEDICINE CLINIC | Facility: CLINIC | Age: 67
End: 2022-10-07

## 2022-10-07 ENCOUNTER — ANTICOAG VISIT (OUTPATIENT)
Dept: FAMILY MEDICINE CLINIC | Facility: CLINIC | Age: 67
End: 2022-10-07

## 2022-10-07 ENCOUNTER — TELEPHONE (OUTPATIENT)
Dept: INFECTIOUS DISEASES | Facility: CLINIC | Age: 67
End: 2022-10-07

## 2022-10-07 NOTE — PROGRESS NOTES
Pt notified to increase by 1 mg and recheck in 1 week  Pt states he will have to wait 3 weeks due to lab not being open

## 2022-10-07 NOTE — TELEPHONE ENCOUNTER
Patient calls to verify upcoming appt  Requires virtual per patient  He is still taking the ceftin and does not need additional quantity

## 2022-10-10 ENCOUNTER — HOME CARE VISIT (OUTPATIENT)
Dept: HOME HEALTH SERVICES | Facility: HOME HEALTHCARE | Age: 67
End: 2022-10-10
Payer: COMMERCIAL

## 2022-10-10 VITALS — SYSTOLIC BLOOD PRESSURE: 110 MMHG | OXYGEN SATURATION: 98 % | HEART RATE: 86 BPM | DIASTOLIC BLOOD PRESSURE: 60 MMHG

## 2022-10-10 PROCEDURE — G0151 HHCP-SERV OF PT,EA 15 MIN: HCPCS

## 2022-10-13 ENCOUNTER — HOME CARE VISIT (OUTPATIENT)
Dept: HOME HEALTH SERVICES | Facility: HOME HEALTHCARE | Age: 67
End: 2022-10-13
Payer: COMMERCIAL

## 2022-10-13 VITALS — HEART RATE: 79 BPM | OXYGEN SATURATION: 100 % | SYSTOLIC BLOOD PRESSURE: 100 MMHG | DIASTOLIC BLOOD PRESSURE: 60 MMHG

## 2022-10-13 PROCEDURE — G0151 HHCP-SERV OF PT,EA 15 MIN: HCPCS

## 2022-10-18 ENCOUNTER — HOME CARE VISIT (OUTPATIENT)
Dept: HOME HEALTH SERVICES | Facility: HOME HEALTHCARE | Age: 67
End: 2022-10-18
Payer: COMMERCIAL

## 2022-10-18 VITALS — DIASTOLIC BLOOD PRESSURE: 60 MMHG | SYSTOLIC BLOOD PRESSURE: 100 MMHG | OXYGEN SATURATION: 98 % | HEART RATE: 80 BPM

## 2022-10-18 PROCEDURE — G0151 HHCP-SERV OF PT,EA 15 MIN: HCPCS

## 2022-10-19 ENCOUNTER — TELEMEDICINE (OUTPATIENT)
Dept: INFECTIOUS DISEASES | Facility: CLINIC | Age: 67
End: 2022-10-19
Payer: COMMERCIAL

## 2022-10-19 DIAGNOSIS — T84.7XXD HARDWARE COMPLICATING WOUND INFECTION, SUBSEQUENT ENCOUNTER: Primary | ICD-10-CM

## 2022-10-19 PROCEDURE — 99213 OFFICE O/P EST LOW 20 MIN: CPT | Performed by: PHYSICIAN ASSISTANT

## 2022-10-19 RX ORDER — CEFUROXIME AXETIL 500 MG/1
500 TABLET ORAL EVERY 12 HOURS SCHEDULED
COMMUNITY

## 2022-10-19 NOTE — PROGRESS NOTES
Virtual Regular Visit    Verification of patient location:    Patient is located in the following state in which I hold an active license [place]      Assessment/Plan:    Problem List Items Addressed This Visit        Other    Hardware complicating wound infection ([address] ) - Primary     RLE wound with exposed/compromised hardware   Patient presented initially after a fall and was found to have right femur fracture on 2/3  Shearon Begin was also noted to have a lower leg wound which on debridement in the [facility] 2/7 had exposed hardware present from a previous fracture repair   No gross purulence in the OR   No cultures collected  Onofre Hernandez may have been compromised by skin silver   Completed 6 week course of Ancef  He remains on Ceftin now with plans to complete one year then stop and monitor clinically  Patient now s/p removal of hardware on 8/8/2022 due to exposure  He has remained on the ceftin now for well over 6 weeks post op  I discussed with the patient that at this time we can stop the antibiotic and watch the leg closely  He has an appt with Ortho in November to follow up  The patient states he is going to finish the antibiotics he has left in the bottle  I explained that was not necessary but he would like to do so anyway  ID follow up prn                   Relevant Medications    cefuroxime (CEFTIN) 500 mg tablet               Reason for visit is   Chief Complaint   Patient presents with   • Follow-up        Encounter provider Fransisca Blandon PA-C    Provider located at [address] [place] [id number] [phone]      Recent Visits  No visits were found meeting these conditions    Showing recent visits within past 7 days and meeting all other requirements  Today's Visits  Date Type Provider Dept   10/19/22 Telemedicine Fransisca Blandon PA-C [facility]   Showing today's visits and meeting all other requirements  Future Appointments  No visits were found meeting these conditions  Showing future appointments within next 150 days and meeting all other requirements       The patient was identified by name and date of birth  Bautista Piña was informed that this is a telemedicine visit and that the visit is being conducted through the [employer]  He agrees to proceed     My office door was closed  No one else was in the room  He acknowledged consent and understanding of privacy and security of the video platform  The patient has agreed to participate and understands they can discontinue the visit at any time  Patient is aware this is a billable service  Subjective  Bautista Piña is a 74 y o  male presents for virtual follow up today regarding RLE hardware infection  Patient remains on po ceftin  He states that due to weight loss he developed break down of the skin over his plate and underwent hardware removal in August 2022  His wound remains healed  He has not had any further issues  He has been tolerating the ceftin         HPI     Past Medical History:   Diagnosis Date   • Back pain    • Diabetes mellitus ([address] )    • DVT (deep venous thrombosis) ([facility] [address] )    • Hard to intubate 10/18/2021    Glidescope with #3 blade and bougie used to pass 6 0 ETT   • Hyperlipidemia    • Hypertension        Past Surgical History:   Procedure Laterality Date   • CATARACT EXTRACTION     • COLONOSCOPY      6/2014   • CYST REMOVAL     • EXTERNAL FIXATOR APPLICATION Right 31/96/4657    Procedure: Application External Fixation Device right lower extremity;  Surgeon: Socorro Byrd MD;  [facility];  Service: Orthopedics   • HAND SURGERY     • HEEL SPUR SURGERY      foot surgery   • INCISION AND DRAINAGE OF WOUND Right 2/7/2022    Procedure: INCISION AND DRAINAGE (I&D) EXTREMITY;  Surgeon: Socorro Byrd MD;  [facility];  Service: Orthopedics   • ORIF TIBIA FRACTURE Right 10/25/2021    Procedure: OPEN REDUCTION W/ INTERNAL FIXATION (ORIF) RIGHT PROXIMAL TIBIA SHAFT FRACTURE, REMOVAL OF EXTERNAL FIXATOR;  Surgeon: Ana Paula Grayosn MD;  [facility];  Service: Orthopedics   • CT OPEN RX FEMUR FX+INTRAMED JASMYNE Right 2/7/2022    Procedure: INSERTION NAIL IM FEMUR ANTEGRADE (TROCHANTERIC);   Surgeon: Ana Paula Grayson MD;  [facility];  Service: Orthopedics   • CT REMOVAL DEEP IMPLANT Right 8/8/2022    Procedure: REMOVAL HARDWARE RIGHT TIBIA;  Surgeon: Ana Paula Grayson MD;  [facility];  Service: Orthopedics       Current Outpatient Medications   Medication Sig Dispense Refill   • cefuroxime (CEFTIN) 500 mg tablet Take 500 mg by mouth every 12 (twelve) hours     • colchicine (COLCRYS) 0 6 mg tablet Take 1 tablet (0 6 mg total) by mouth daily 90 tablet 1   • Continuous Blood Gluc  (FreeStyle Reese 14 Day Rose) JEROME Check blood sugar TID, DX E11 9 1 each 0   • Continuous Blood Gluc Sensor (FreeStyle Reese 14 Day Sensor) MISC Check blood sugar TID, apply new sensor q 14 days 2 each 5   • Diclofenac Sodium (VOLTAREN) 1 % Apply 2 g topically 4 (four) times a day 2 g 1   • ferrous sulfate 325 (65 Fe) mg tablet Take 325 mg by mouth daily with breakfast     • fluticasone (FLONASE) 50 mcg/act nasal spray 1 spray into each nostril daily 11 mL 5   • insulin glargine (LANTUS) 100 units/mL subcutaneous injection Inject 12 Units under the skin daily 10 mL 3   • lisinopril (ZESTRIL) 10 mg tablet Take 1 tablet (10 mg total) by mouth daily 90 tablet 3   • metFORMIN (GLUCOPHAGE) 1000 MG tablet Take 1 tablet (1,000 mg total) by mouth 2 (two) times a day with meals 180 tablet 5   • oxyCODONE (ROXICODONE) 10 MG TABS Take 1 tablet (10 mg total) by mouth every 6 (six) hours as needed for moderate pain Max Daily Amount: 40 mg 30 tablet 0   • polyethylene glycol (MIRALAX) 17 g packet Take 17 g by mouth daily as needed      • Promethazine-DM (PHENERGAN-DM) 6 25-15 mg/5 mL oral syrup Take 5 mL by mouth 4 (four) times a day as needed for cough 118 mL 0   • senna-docusate sodium (SENOKOT-S) 8 6-50 mg per tablet Take 1 tablet by mouth 2 (two) times a day     • simvastatin (ZOCOR) 20 mg tablet Take 1 tablet (20 mg total) by mouth daily 90 tablet 3   • warfarin (COUMADIN) 1 mg tablet Take 1 tablet (1 mg total) by mouth daily 30 tablet 3   • warfarin (COUMADIN) 2 mg tablet Take 2 mg by mouth daily  Indications: Disease involving a Thrombosis or an Embolism, Blood Clot in Veins     • warfarin (Jantoven) 6 mg tablet Take 1 tablet (6 mg total) by mouth daily 90 tablet 1   • warfarin (COUMADIN) 2 mg tablet Take 1 tablet (2 mg total) by mouth daily 90 tablet 1     No current facility-administered medications for this visit  Allergies   Allergen Reactions   • Byetta 10 Mcg Pen [Exenatide] Swelling   • Pollen Extract Myalgia       Review of Systems   Constitutional: Negative for chills and fever  Respiratory: Negative for cough and shortness of breath  Gastrointestinal: Negative for abdominal pain, diarrhea, nausea and vomiting  Skin: Negative for rash  Psychiatric/Behavioral: Negative for behavioral problems and confusion  Video Exam    There were no vitals filed for this visit  Physical Exam  Vitals reviewed  Constitutional:       General: He is not in acute distress  Appearance: Normal appearance  He is not ill-appearing, toxic-appearing or diaphoretic  HENT:      Head: Normocephalic and atraumatic  Eyes:      General: No scleral icterus  Right eye: No discharge  Left eye: No discharge  Conjunctiva/sclera: Conjunctivae normal    Cardiovascular:      Rate and Rhythm: Normal rate  Pulmonary:      Effort: Pulmonary effort is normal  No respiratory distress  Breath sounds: Normal breath sounds  No stridor  No wheezing, rhonchi or rales  Chest:      Chest wall: No tenderness  Abdominal:      General: Bowel sounds are normal  There is no distension        Palpations: Abdomen is soft       Tenderness: There is no abdominal tenderness  Musculoskeletal:      Comments: RLE incision healed  No sign of erythema or induration   Skin:     General: Skin is warm and dry  Coloration: Skin is not jaundiced or pale  Findings: No erythema or rash  Neurological:      Mental Status: He is alert and oriented to person, place, and time     Psychiatric:         Mood and Affect: Mood normal          Behavior: Behavior normal         Labs:   10/6/2022  Wbc: 7 66  Hgb: 12 4  Plt: 247  Cr: 2 28  LFTS: WNL    Total of 30 minutes spent on today's visit including face to face time with the patient as well as chart/lab review and documentation

## 2022-10-19 NOTE — ASSESSMENT & PLAN NOTE
RLE wound with exposed/compromised hardware   Patient presented initially after a fall and was found to have right femur fracture on 2/3  Monica Kim was also noted to have a lower leg wound which on debridement in the [facility] 2/7 had exposed hardware present from a previous fracture repair   No gross purulence in the OR   No cultures collected  Ros Perez may have been compromised by skin silver   Completed 6 week course of Ancef  He remains on Ceftin now with plans to complete one year then stop and monitor clinically  Patient now s/p removal of hardware on 8/8/2022 due to exposure  He has remained on the ceftin now for well over 6 weeks post op  I discussed with the patient that at this time we can stop the antibiotic and watch the leg closely  He has an appt with Ortho in November to follow up  The patient states he is going to finish the antibiotics he has left in the bottle  I explained that was not necessary but he would like to do so anyway     ID follow up prn

## 2022-10-21 ENCOUNTER — HOME CARE VISIT (OUTPATIENT)
Dept: HOME HEALTH SERVICES | Facility: HOME HEALTHCARE | Age: 67
End: 2022-10-21
Payer: COMMERCIAL

## 2022-10-21 VITALS — SYSTOLIC BLOOD PRESSURE: 110 MMHG | OXYGEN SATURATION: 100 % | HEART RATE: 77 BPM | DIASTOLIC BLOOD PRESSURE: 64 MMHG

## 2022-10-21 PROCEDURE — G0151 HHCP-SERV OF PT,EA 15 MIN: HCPCS

## 2022-10-26 ENCOUNTER — HOME CARE VISIT (OUTPATIENT)
Dept: HOME HEALTH SERVICES | Facility: HOME HEALTHCARE | Age: 67
End: 2022-10-26
Payer: COMMERCIAL

## 2022-10-26 VITALS — DIASTOLIC BLOOD PRESSURE: 70 MMHG | SYSTOLIC BLOOD PRESSURE: 100 MMHG | OXYGEN SATURATION: 95 % | HEART RATE: 78 BPM

## 2022-10-26 PROCEDURE — G0151 HHCP-SERV OF PT,EA 15 MIN: HCPCS

## 2022-10-28 ENCOUNTER — HOME CARE VISIT (OUTPATIENT)
Dept: HOME HEALTH SERVICES | Facility: HOME HEALTHCARE | Age: 67
End: 2022-10-28
Payer: COMMERCIAL

## 2022-10-28 VITALS — SYSTOLIC BLOOD PRESSURE: 96 MMHG | OXYGEN SATURATION: 97 % | HEART RATE: 79 BPM | DIASTOLIC BLOOD PRESSURE: 10 MMHG

## 2022-10-28 PROCEDURE — 400013 VN SOC

## 2022-10-28 PROCEDURE — G0151 HHCP-SERV OF PT,EA 15 MIN: HCPCS

## 2022-11-02 ENCOUNTER — HOME CARE VISIT (OUTPATIENT)
Dept: HOME HEALTH SERVICES | Facility: HOME HEALTHCARE | Age: 67
End: 2022-11-02

## 2022-11-02 VITALS — DIASTOLIC BLOOD PRESSURE: 60 MMHG | OXYGEN SATURATION: 97 % | HEART RATE: 80 BPM | SYSTOLIC BLOOD PRESSURE: 98 MMHG

## 2022-11-02 NOTE — CASE COMMUNICATION
3rd attempt TC to the patient for med review  No answer  Message left to call in to [phone] when able  If this has not been completed please remind the patient to call in for review at next therapy session  [place]

## 2022-11-03 ENCOUNTER — HOME CARE VISIT (OUTPATIENT)
Dept: HOME HEALTH SERVICES | Facility: HOME HEALTHCARE | Age: 67
End: 2022-11-03

## 2022-11-03 NOTE — CASE COMMUNICATION
Patient has missed his treatment frequency this week x 2 due to awaited authorization and patients insurance company requeting a chart review  Will hold PT until determination from insurance companies decision for visit authorization  Patient and his spouse were contacted and in agreement due to inability to pay privately

## 2022-11-03 NOTE — CASE COMMUNICATION
Patient is on hold at this time due to awaited insurance medical review with determination of cont PT authorization  Patient and his spouse are aware and agree as they are unable to afford private pay if insurance denies visits  Missed tx  frequency for today and tenatively tomorrow 11 4 pending medical review

## 2022-11-04 ENCOUNTER — HOME CARE VISIT (OUTPATIENT)
Dept: HOME HEALTH SERVICES | Facility: HOME HEALTHCARE | Age: 67
End: 2022-11-04

## 2022-11-08 ENCOUNTER — VBI (OUTPATIENT)
Dept: ADMINISTRATIVE | Facility: OTHER | Age: 67
End: 2022-11-08

## 2022-11-09 ENCOUNTER — HOME CARE VISIT (OUTPATIENT)
Dept: HOME HEALTH SERVICES | Facility: HOME HEALTHCARE | Age: 67
End: 2022-11-09

## 2022-11-09 VITALS — DIASTOLIC BLOOD PRESSURE: 70 MMHG | SYSTOLIC BLOOD PRESSURE: 120 MMHG | HEART RATE: 87 BPM | OXYGEN SATURATION: 98 %

## 2022-11-09 DIAGNOSIS — Z79.4 TYPE 2 DIABETES MELLITUS WITH HYPERGLYCEMIA, WITH LONG-TERM CURRENT USE OF INSULIN (HCC): ICD-10-CM

## 2022-11-09 DIAGNOSIS — M10.09 ACUTE IDIOPATHIC GOUT OF MULTIPLE SITES: Primary | ICD-10-CM

## 2022-11-09 DIAGNOSIS — E11.65 TYPE 2 DIABETES MELLITUS WITH HYPERGLYCEMIA, WITH LONG-TERM CURRENT USE OF INSULIN (HCC): ICD-10-CM

## 2022-11-09 DIAGNOSIS — E11.40 TYPE 2 DIABETES MELLITUS WITH DIABETIC NEUROPATHY, WITHOUT LONG-TERM CURRENT USE OF INSULIN (HCC): ICD-10-CM

## 2022-11-09 DIAGNOSIS — I82.403 ACUTE EMBOLISM AND THROMBOSIS OF DEEP VEIN OF BOTH LOWER EXTREMITIES (HCC): ICD-10-CM

## 2022-11-09 DIAGNOSIS — I10 ESSENTIAL HYPERTENSION: ICD-10-CM

## 2022-11-09 RX ORDER — SIMVASTATIN 20 MG
20 TABLET ORAL DAILY
Qty: 90 TABLET | Refills: 3 | Status: SHIPPED | OUTPATIENT
Start: 2022-11-09

## 2022-11-09 RX ORDER — LISINOPRIL 10 MG/1
10 TABLET ORAL DAILY
Qty: 90 TABLET | Refills: 3 | Status: SHIPPED | OUTPATIENT
Start: 2022-11-09

## 2022-11-09 RX ORDER — PROBENECID AND COLCHICINE 500; .5 MG/1; MG/1
1 TABLET ORAL DAILY
Qty: 90 TABLET | Refills: 1 | Status: SHIPPED | OUTPATIENT
Start: 2022-11-09

## 2022-11-09 RX ORDER — WARFARIN SODIUM 6 MG/1
6 TABLET ORAL
Qty: 90 TABLET | Refills: 1 | Status: SHIPPED | OUTPATIENT
Start: 2022-11-09

## 2022-11-09 RX ORDER — INSULIN GLARGINE 100 [IU]/ML
12 INJECTION, SOLUTION SUBCUTANEOUS DAILY
Qty: 10 ML | Refills: 3 | Status: SHIPPED | OUTPATIENT
Start: 2022-11-09

## 2022-11-09 RX ORDER — WARFARIN SODIUM 2 MG/1
2 TABLET ORAL
Qty: 90 TABLET | Refills: 1 | Status: SHIPPED | OUTPATIENT
Start: 2022-11-09 | End: 2022-12-09

## 2022-11-09 NOTE — TELEPHONE ENCOUNTER
Patient states he spoke to someone on Monday that he needs his rx's printed out  He said he did not get a name of the person  There is no documentation in the chart that he called  Please print all the prescriptions  that are ordered  He will pick them up at 1619 office      Thank you

## 2022-11-10 ENCOUNTER — HOME CARE VISIT (OUTPATIENT)
Dept: HOME HEALTH SERVICES | Facility: HOME HEALTHCARE | Age: 67
End: 2022-11-10

## 2022-11-10 VITALS — SYSTOLIC BLOOD PRESSURE: 108 MMHG | DIASTOLIC BLOOD PRESSURE: 60 MMHG | OXYGEN SATURATION: 98 % | HEART RATE: 83 BPM

## 2022-11-15 ENCOUNTER — HOME CARE VISIT (OUTPATIENT)
Dept: HOME HEALTH SERVICES | Facility: HOME HEALTHCARE | Age: 67
End: 2022-11-15

## 2022-11-17 DIAGNOSIS — Z98.890 S/P ORIF (OPEN REDUCTION INTERNAL FIXATION) FRACTURE: Primary | ICD-10-CM

## 2022-11-17 DIAGNOSIS — Z87.81 S/P ORIF (OPEN REDUCTION INTERNAL FIXATION) FRACTURE: Primary | ICD-10-CM

## 2022-11-22 ENCOUNTER — APPOINTMENT (OUTPATIENT)
Dept: RADIOLOGY | Facility: CLINIC | Age: 67
End: 2022-11-22

## 2022-11-22 ENCOUNTER — OFFICE VISIT (OUTPATIENT)
Dept: OBGYN CLINIC | Facility: CLINIC | Age: 67
End: 2022-11-22

## 2022-11-22 VITALS — WEIGHT: 189 LBS | BODY MASS INDEX: 24.26 KG/M2 | HEIGHT: 74 IN

## 2022-11-22 DIAGNOSIS — Z98.890 S/P ORIF (OPEN REDUCTION INTERNAL FIXATION) FRACTURE: Primary | ICD-10-CM

## 2022-11-22 DIAGNOSIS — S82.191D OTHER CLOSED FRACTURE OF PROXIMAL END OF RIGHT TIBIA WITH ROUTINE HEALING, SUBSEQUENT ENCOUNTER: ICD-10-CM

## 2022-11-22 DIAGNOSIS — Z87.81 S/P ORIF (OPEN REDUCTION INTERNAL FIXATION) FRACTURE: Primary | ICD-10-CM

## 2022-11-22 DIAGNOSIS — Z98.890 S/P ORIF (OPEN REDUCTION INTERNAL FIXATION) FRACTURE: ICD-10-CM

## 2022-11-22 DIAGNOSIS — Z87.81 S/P ORIF (OPEN REDUCTION INTERNAL FIXATION) FRACTURE: ICD-10-CM

## 2022-11-22 DIAGNOSIS — M21.70 LEG LENGTH DISCREPANCY: ICD-10-CM

## 2022-11-22 NOTE — PROGRESS NOTES
Orthopaedics Office Visit - Follow up Patient Visit    ASSESSMENT/PLAN:    Assessment:   Approximately 9 months status post right TFNA  performed on 02/07/2022 , short RLE compared to L, fracture appears to have healed with some shortening  Approximately 3 months status post hardware removal right tibia performed on 08/8/2022      Spinal stenosis likely main source of bilateral leg weakness and gait impairment however patient is feeling unsteady from leg length discrepancy    Right hip greater trochanteric bursitis persists, however no relief from previous CSI to area    Plan:   · X-rays were performed in the office and reviewed   · DME script was provided for a shoe lift for RLE, provided with contact information in AVS   · Advised to start outpatient PT, script was provided   · WBAT RLE   · Follow up in 2 months time for re-evaluation     To Do Next Visit:  Re-evaluation, no x-rays needed     _____________________________________________________  CHIEF COMPLAINT:  Chief Complaint   Patient presents with   • Right Leg - Follow-up         SUBJECTIVE:  Marisa Rodas is a 74 y o  male who presents to the office approximately 9 months status post right TFNA he performed on 02/07/2022 and approximately 3 months status post hardware removal right tibia performed on 08/8/2022  He is undergoing home PT  He states that his therapist thinks his right leg is shorter then the left  He will use a walker/cane or wheelchair when in the house for ambulation due to weakness  He notes that he is running out of home PT visits through his insurance         PAST MEDICAL HISTORY:  Past Medical History:   Diagnosis Date   • Back pain    • Diabetes mellitus ([address] )    • DVT (deep venous thrombosis) ([facility] [address] )    • Hard to intubate 10/18/2021    Glidescope with #3 blade and bougie used to pass 6 0 ETT   • Hyperlipidemia    • Hypertension        PAST SURGICAL HISTORY:  Past Surgical History:   Procedure Laterality Date   • CATARACT EXTRACTION     • COLONOSCOPY      2014   • CYST REMOVAL     • EXTERNAL FIXATOR APPLICATION Right     Procedure: Application External Fixation Device right lower extremity;  Surgeon: Sydnee Joseph MD;  [facility]: [facility];  Service: Orthopedics   • HAND SURGERY     • HEEL SPUR SURGERY      foot surgery   • INCISION AND DRAINAGE OF WOUND Right 2022    Procedure: INCISION AND DRAINAGE (I&D) EXTREMITY;  Surgeon: Sydnee Joseph MD;  [facility];  Service: Orthopedics   • ORIF TIBIA FRACTURE Right 10/25/2021    Procedure: OPEN REDUCTION W/ INTERNAL FIXATION (ORIF) RIGHT PROXIMAL TIBIA SHAFT FRACTURE, REMOVAL OF EXTERNAL FIXATOR;  Surgeon: Sydnee Joseph MD;  [facility];  Service: Orthopedics   • NC OPEN RX FEMUR FX+INTRAMED JASMYNE Right 2022    Procedure: INSERTION NAIL IM FEMUR ANTEGRADE (TROCHANTERIC);   Surgeon: Sydnee Joseph MD;  [facility];  Service: Orthopedics   • NC REMOVAL DEEP IMPLANT Right 2022    Procedure: REMOVAL HARDWARE RIGHT TIBIA;  Surgeon: Sydnee Joseph MD;  [facility];  Service: Orthopedics       FAMILY HISTORY:  Family History   Problem Relation Age of Onset   • Dementia Mother    • Melanoma Mother        SOCIAL HISTORY:  Social History     Tobacco Use   • Smoking status: Former     Packs/day: 0 25     Types: Cigarettes     Quit date:      Years since quittin 8   • Smokeless tobacco: Never   • Tobacco comments:     Per allscripts, current smoker   Vaping Use   • Vaping Use: Never used   Substance Use Topics   • Alcohol use: Never     Comment: Per allscripts, occasional use   • Drug use: Not Currently       MEDICATIONS:    Current Outpatient Medications:   •  cefuroxime (CEFTIN) 500 mg tablet, Take 500 mg by mouth every 12 (twelve) hours, Disp: , Rfl:   •  colchicine-probenecid 0 5-500 MG per tablet, Take 1 tablet by mouth daily, Disp: 90 tablet, Rfl: 1  •  Continuous Blood Gluc  (FreeStyle Reese 14 Day Preston) Tiffanie Merritt Check blood sugar TID, DX E11 9, Disp: 1 each, Rfl: 0  •  Continuous Blood Gluc Sensor (FreeStyle Reese 14 Day Sensor) [facility], Check blood sugar TID, apply new sensor q 14 days, Disp: 2 each, Rfl: 5  •  Diclofenac Sodium (VOLTAREN) 1 %, Apply 2 g topically 4 (four) times a day, Disp: 2 g, Rfl: 1  •  ferrous sulfate 325 (65 Fe) mg tablet, Take 325 mg by mouth daily with breakfast, Disp: , Rfl:   •  fluticasone (FLONASE) 50 mcg/act nasal spray, 1 spray into each nostril daily, Disp: 11 mL, Rfl: 5  •  insulin glargine (LANTUS) 100 units/mL subcutaneous injection, Inject 12 Units under the skin daily, Disp: 10 mL, Rfl: 3  •  lisinopril (ZESTRIL) 10 mg tablet, Take 1 tablet (10 mg total) by mouth daily, Disp: 90 tablet, Rfl: 3  •  metFORMIN (GLUCOPHAGE) 1000 MG tablet, Take 1 tablet (1,000 mg total) by mouth 2 (two) times a day with meals, Disp: 180 tablet, Rfl: 5  •  polyethylene glycol (MIRALAX) 17 g packet, Take 17 g by mouth daily as needed , Disp: , Rfl:   •  Promethazine-DM (PHENERGAN-DM) 6 25-15 mg/5 mL oral syrup, Take 5 mL by mouth 4 (four) times a day as needed for cough, Disp: 118 mL, Rfl: 0  •  senna-docusate sodium (SENOKOT-S) 8 6-50 mg per tablet, Take 1 tablet by mouth 2 (two) times a day, Disp: , Rfl:   •  simvastatin (ZOCOR) 20 mg tablet, Take 1 tablet (20 mg total) by mouth daily, Disp: 90 tablet, Rfl: 3  •  warfarin (COUMADIN) 1 mg tablet, Take 1 tablet (1 mg total) by mouth daily, Disp: 30 tablet, Rfl: 3  •  warfarin (COUMADIN) 2 mg tablet, Take 2 mg by mouth daily   Indications: Disease involving a Thrombosis or an Embolism, Blood Clot in Veins, Disp: , Rfl:   •  warfarin (COUMADIN) 2 mg tablet, Take 1 tablet (2 mg total) by mouth daily, Disp: 90 tablet, Rfl: 1  •  warfarin (Jantoven) 6 mg tablet, Take 1 tablet (6 mg total) by mouth daily, Disp: 90 tablet, Rfl: 1  •  oxyCODONE (ROXICODONE) 10 MG TABS, Take 1 tablet (10 mg total) by mouth every 6 (six) hours as needed for moderate pain Max Daily Amount: 40 mg (Patient not taking: Reported on 11/22/2022), Disp: 30 tablet, Rfl: 0    ALLERGIES:  Allergies   Allergen Reactions   • Byetta 10 Mcg Pen [Exenatide] Swelling   • Pollen Extract Myalgia       REVIEW OF SYSTEMS:  MSK: as noted in HPI  Neuro: WNL  Pertinent items are otherwise noted in HPI  A comprehensive review of systems was otherwise negative  LABS:  HgA1c:   Lab Results   Component Value Date    HGBA1C 7 5 (H) 10/06/2022     BMP:   Lab Results   Component Value Date    GLUCOSE 83 12/11/2015    CALCIUM 9 6 10/06/2022     12/11/2015    K 4 8 10/06/2022    CO2 23 10/06/2022     10/06/2022    BUN 41 (H) 10/06/2022    CREATININE 2 28 (H) 10/06/2022     CBC: No components found for: CBC    _____________________________________________________  PHYSICAL EXAMINATION:  Vital signs: Ht 6' 2" (1 88 m)   Wt 85 7 kg (189 lb)   BMI 24 27 kg/m²   General: No acute distress, awake and alert  Psychiatric: Mood and affect appear appropriate  HEENT: Trachea Midline, No torticollis, no apparent facial trauma  Cardiovascular: No audible murmurs; Extremities appear perfused  Pulmonary: No audible wheezing or stridor  Skin: No open lesions; see further details (if any) below    MUSCULOSKELETAL EXAMINATION:    Extremities: Right lower extremity   No erythema, ecchymosis or edema  Well healed surgical incision   Right hip greater trochanteric bursa tenderness   Right leg is shorter then the left   Quad atrophy noted   Ambulates in a wheelchair today   Extremity appears warm and well perfused       _____________________________________________________  STUDIES REVIEWED:  I personally reviewed the images and interpretation is as follows:  X-rays of the right hip/femur and knee demonstrates healed fracture with orthopedic hardware in good alignment and position without signs of failure or loosening          PROCEDURES PERFORMED:  Procedures      Scribe Attestation    I,:  Jp Uriarte am acting as a scribe while in the presence of the attending physician :       I,:  Lisa Lang MD personally performed the services described in this documentation    as scribed in my presence :

## 2022-11-22 NOTE — PATIENT INSTRUCTIONS
Damon Jimenez  Certified Orthotist / Licensed Orthotist    [address] [facility], [place] [address] [address], [address]    Phone:[email or web address] will be at clinic on  for brace adjustments as needed  ____________________________________________________________________________________________________________          [address] [facility] Location:  [employer] , [address] [facility], [address], [place], [address]    Mouna/Ericka:  [address], [address], [place], [address] [facility] Area:   [facility], [address] [facility], [address]    Phone: [phone]  [phone]  Fax: [phone] pzq

## 2022-11-23 ENCOUNTER — HOME CARE VISIT (OUTPATIENT)
Dept: HOME HEALTH SERVICES | Facility: HOME HEALTHCARE | Age: 67
End: 2022-11-23

## 2022-11-23 VITALS — SYSTOLIC BLOOD PRESSURE: 110 MMHG | HEART RATE: 76 BPM | OXYGEN SATURATION: 97 % | DIASTOLIC BLOOD PRESSURE: 60 MMHG

## 2022-11-24 NOTE — HOME HEALTH
Md ordered Outpatient at [facility] in [facility] with patient in agreement  Xrays were good and revealed a right leg length discrepancy  Right is shorter than left, but patient is unsure of actual discrepancy  Md ordered shoe lift via orthotist at [facility]

## 2022-11-29 ENCOUNTER — TELEPHONE (OUTPATIENT)
Dept: FAMILY MEDICINE CLINIC | Facility: CLINIC | Age: 67
End: 2022-11-29

## 2022-12-07 ENCOUNTER — EVALUATION (OUTPATIENT)
Dept: PHYSICAL THERAPY | Facility: CLINIC | Age: 67
End: 2022-12-07

## 2022-12-07 DIAGNOSIS — Z87.81 S/P ORIF (OPEN REDUCTION INTERNAL FIXATION) FRACTURE: ICD-10-CM

## 2022-12-07 DIAGNOSIS — M21.70 LEG LENGTH DISCREPANCY: ICD-10-CM

## 2022-12-07 DIAGNOSIS — R29.898 WEAKNESS OF RIGHT LEG: ICD-10-CM

## 2022-12-07 DIAGNOSIS — S82.191D OTHER CLOSED FRACTURE OF PROXIMAL END OF RIGHT TIBIA WITH ROUTINE HEALING, SUBSEQUENT ENCOUNTER: ICD-10-CM

## 2022-12-07 DIAGNOSIS — R26.89 BALANCE DISORDER: Primary | ICD-10-CM

## 2022-12-07 DIAGNOSIS — Z98.890 S/P ORIF (OPEN REDUCTION INTERNAL FIXATION) FRACTURE: ICD-10-CM

## 2022-12-07 NOTE — PROGRESS NOTES
PT Evaluation     Today's date: 2022  Patient name: Sj Krishna  : 1955  MRN: [id number]  Referring provider: Jj Stuart MD  Dx:   Encounter Diagnosis     ICD-10-CM    1  Balance disorder  R26 89       2  Weakness of right leg  R29 898       3  Leg length discrepancy  M21 70 Ambulatory Referral to Physical Therapy      4  S/P ORIF (open reduction internal fixation) fracture  Z98 890 Ambulatory Referral to Physical Therapy    Z87 81       5  Other closed fracture of proximal end of right tibia with routine healing, subsequent encounter  S82 191D Ambulatory Referral to Physical Therapy        Start Time: 1030  Stop Time: 1115  Total time in clinic (min): 45 minutes    Assessment/Plan   Assessment details: Patient is a 74 y o  Male who presents to skilled outpatient PT after  tibial and hip surgeries  He reports improvement since rehabilitation and home PT  His current LOF is ambulating with RW/SPC at home, and w/c for community ambulation  He has a significant PMHx of DMII, HTN, hyperlipidemia, falls and DVT  Upon initial evaluation, he presents with pain, RLE weakness, dec sensation on R shin, dec R knee ROM, as well as dec protective sensation in B/L feet, dec balance, gait dysfunction, and dec coordination making transfers and ambulation difficult  Wife and pt present throughout session  Significant amount of time spent completing patient education on HEP, POC, orthotic benefits, and continued PT  Pt and wife have no further questions at this time  Pt would benefit from skilled PT to improve above impairments, improve functional mobility independence, and dec risk for falls  Will see pt 2x/week as able  NV plan to formally assess balance, due to time limitations during this session      Impairments: Abnormal coordination, Abnormal gait, Abnormal or restricted ROM, Activity intolerance, Impaired balance, Impaired physical strength, Lacks appropriate HEP, Poor posture, Poor body mechanics, Pain with function, Weight-bearing intolerance and Abnormal movement  Understanding of Dx/Px/POC: Good  Prognosis: Good  Patient verbalized understanding of POC  Please contact me if you have any questions or recommendations  Thank you for the referral and the opportunity to share in [address] [place] [address],[address] care  Plan  Planned modality interventions: Cryotherapy, TENS  Planned therapy interventions: ADL training, Balance, Balance/WB training, Breathing training, Body mechanics training, Coordination, Functional ROM exercises, Gait training, HEP, Manual therapy, Motor coordination training, Neuromuscular re-education, Patient education, Postural training, Strengthening, Stretching, Therapeutic activities, Therapeutic exercises, Therapeutic training and Transfer training  Frequency: 2x/wk  Duration in weeks: 12  Plan of Care beginning date: 12/7/22  Plan of Care expiration date: 12 weeks - 3/1/2023  Treatment plan discussed with: Patient and Family     Goals  Short Term Goals (4 weeks):    - Patient will improve time on TUG by 2 9 seconds to facilitate improved safety in all ambulation  - Patient will be independent in basic HEP 2-3 weeks  - Patient will improve 5xSTS score by 2 3 seconds to promote improved LE functional strength needed for ADLs  - Pt will improve MEJÍA score by 5 6pt to facilitate improved transfers and functional mobility    - Pt will improve RLE strength to 4/5 or better       Long Term Goals (12 weeks):  - Patient will be independent in a comprehensive home exercise program  - Patient will improve gait speed by 0 18 m/s to improve safety with community ambulation  - Patient will improve MEJÍA by 6 points to improve static balance and reduce risk for falls  - Patient will report 50% reduction in near falls in order to improve safety with functional tasks and reduce his risk for falls  - Patient will report going on walks at least 3 days per week to promote independence and improved cardiovascular endurance  - Patient will be able to ascend/descend stairs reciprocally with 1 UE assist to promote independence and safety with ADLs  - Patient will report 50% reduction in near falls when ambulating on uneven terrain    Subjective Evaluation    History of Present Illness  Mechanism of injury: Pt presents to OP PT with a primary c/o pain, mobility and gait impairments post surgery  Pt reports he had a hip surgery in 2021, and then fell and broke tibia in 2021  He has since had short term rehabilitation and home PT  He was found to have a leg length discrepancy RLE shorter than LLE, and has been ordered a shoe lift  He primarily uses a RW and SPC for ambulation within the house, and uses w/c for community negotiation  At home he lives with his wife in a [address] with 7STE (RHR), and stays on the first level with full bath/bedroom  He negotiates stairs with HR fwd, and descending bwd  He reports fear of falling and difficulty sleeping, due to inc urinary frequency  He also has a sig PMHx of cervical arthritis with limited and painful mobility  Quality of life: good    Pain  Current pain ratin  At best pain ratin  At worst pain ratin  Location: R hip and L hip  Quality: sharp  Relieving factors: medications  Aggravating factors: standing, walking and stair climbing    Patient Goals  Patient goals for therapy: increased strength, increased motion, improved balance and decreased pain  Patient goal: get orthotic,         Objective     Strength/Myotome Testing     Left Hip   Planes of Motion   Flexion: WFL  Abduction: WFL    Right Hip   Planes of Motion   Flexion: 3+  Extension: 3+  Abduction: 3+    Left Knee   Flexion: WFL  Extension: WFL    Right Knee   Flexion: 3+  Extension: 3+    Left Ankle/Foot   Dorsiflexion: WFL  Plantar flexion: WFL       Right Ankle/Foot   Dorsiflexion: 3+  Plantar flexion: 4  Neuro Exam:     Cervical exam   Modified VBI   Seated posture: forward head posture and internally rotated shoulders    Sensation   Light touch LE: right impaired  Light touch LE: left WNL  Proprioception LE: right impaired    Coordination   Heel to shin: left WNL  Heel to shin: right dysmetric (painful)    Modified David   Right gastroc: 0  Right hamstrin    Transfers Sit to stand: independent     Functional outcomes   Functional outcome gait comment: Pt ambulates with SPC, with antalgic gait pattern, dec step length on L side, significant trunk flexion with inability to correct, L trunk lean, R trendelenberg      NV plan to assess TUG, Wilkins, and ABC        Reflexes  - Martino's Reflex: -     Precautions:     Past Medical History:   Diagnosis Date   • Back pain    • Diabetes mellitus ([facility] [address] )    • DVT (deep venous thrombosis) ([facility])    • Hard to intubate 10/18/2021    Glidescope with #3 blade and bougie used to pass 6 0 ETT   • Hyperlipidemia    • Hypertension      Manuals                                                                 Neuro Re-Ed                                                                                                        Ther Ex                                                                                                                     Ther Activity                                       Gait Training                                       Modalities

## 2022-12-14 ENCOUNTER — OFFICE VISIT (OUTPATIENT)
Dept: PHYSICAL THERAPY | Facility: CLINIC | Age: 67
End: 2022-12-14

## 2022-12-14 DIAGNOSIS — R29.898 WEAKNESS OF RIGHT LEG: ICD-10-CM

## 2022-12-14 DIAGNOSIS — M21.70 LEG LENGTH DISCREPANCY: ICD-10-CM

## 2022-12-14 DIAGNOSIS — R26.89 BALANCE DISORDER: Primary | ICD-10-CM

## 2022-12-14 NOTE — PROGRESS NOTES
Daily Note     Today's date: 2022  Patient name: Christopher Chao  : 1955  MRN: [id number]  Referring provider: Yasmeen Rod MD  Dx:   Encounter Diagnosis     ICD-10-CM    1  Balance disorder  R26 89       2  Weakness of right leg  R29 898       3  Leg length discrepancy  M21 70         Start Time: 0930  Stop Time: 1015  Total time in clinic (min): 45 minutes    Subjective: Pt reports nothing new since last session  No falls  Objective: See treatment diary below    Assessment: Pt tolerated treatment fair  Initiated LE strengthening program  Noted inc R hip pain with all exercises, treatment modified to accommodate pain  Patient education included HEP, and POC moving forward  Pt agreeable  Patient would benefit from continued PT to improve functional mobility independence  Plan: Continue per plan of care        Precautions:     Past Medical History:   Diagnosis Date   • Back pain    • Diabetes mellitus ([address] )    • DVT (deep venous thrombosis) ([facility])    • Hard to intubate 10/18/2021    Glidescope with #3 blade and bougie used to pass 6 0 ETT   • Hyperlipidemia    • Hypertension      Manuals                                                                Neuro Re-Ed                                                                                                        Ther Ex             LAQ   2x10 3SH           Hip abd  RTB 2x20 3SH           Hip add  Pillow 3SH 1x10x           Heel raises  4# DB on knees 3x10           gastroc stretch  30s x4            marches  20 x2                                      Ther Activity             STS  2x10  HEP                        Patient education  EB           Gait Training                                       Modalities

## 2022-12-21 ENCOUNTER — APPOINTMENT (OUTPATIENT)
Dept: PHYSICAL THERAPY | Facility: CLINIC | Age: 67
End: 2022-12-21

## 2022-12-24 DIAGNOSIS — J06.9 UPPER RESPIRATORY TRACT INFECTION, UNSPECIFIED TYPE: ICD-10-CM

## 2022-12-24 RX ORDER — DEXTROMETHORPHAN HYDROBROMIDE AND PROMETHAZINE HYDROCHLORIDE 15; 6.25 MG/5ML; MG/5ML
SYRUP ORAL
Qty: 118 ML | Refills: 0 | Status: SHIPPED | OUTPATIENT
Start: 2022-12-24

## 2022-12-28 ENCOUNTER — APPOINTMENT (OUTPATIENT)
Dept: PHYSICAL THERAPY | Facility: CLINIC | Age: 67
End: 2022-12-28

## 2022-12-30 ENCOUNTER — TELEMEDICINE (OUTPATIENT)
Dept: FAMILY MEDICINE CLINIC | Facility: CLINIC | Age: 67
End: 2022-12-30

## 2022-12-30 VITALS — WEIGHT: 189 LBS | HEIGHT: 74 IN | BODY MASS INDEX: 24.26 KG/M2

## 2022-12-30 DIAGNOSIS — U07.1 COVID-19: Primary | ICD-10-CM

## 2022-12-30 RX ORDER — NIRMATRELVIR AND RITONAVIR 150-100 MG
2 KIT ORAL 2 TIMES DAILY
Qty: 20 TABLET | Refills: 0 | Status: SHIPPED | OUTPATIENT
Start: 2022-12-30 | End: 2023-01-04

## 2022-12-30 NOTE — PROGRESS NOTES
COVID-19 Outpatient Progress Note    Assessment/Plan:    Problem List Items Addressed This Visit    None  Visit Diagnoses     COVID-19    -  Primary    Relevant Medications    nirmatrelvir & ritonavir (Paxlovid, 150/100,) tablet therapy pack         Disposition:     Exposure was high risk and he has all symptoms, will treat with paxlovid  I have spent 15 minutes directly with the patient  Greater than 50% of this time was spent in counseling/coordination of care regarding: diagnostic results, prognosis, risks and benefits of treatment options and instructions for management  Stop warfarin and simvastatin while on paxlovid      Encounter provider: Jodie Vu DO     Provider located at: [facility] [address]  [address] [facility],[address] [place]  2  [address]  [phone]     Recent Visits  No visits were found meeting these conditions  Showing recent visits within past 7 days and meeting all other requirements  Today's Visits  Date Type Provider Dept   12/30/22 Telemedicine Jodie Vu DO [facility] [address] today's visits and meeting all other requirements  Future Appointments  No visits were found meeting these conditions  Showing future appointments within next 150 days and meeting all other requirements     This virtual check-in was done via [employer] and patient was informed that this is a secure, HIPAA-compliant platform  He agrees to proceed  Patient agrees to participate in a virtual check in via telephone or video visit instead of presenting to the office to address urgent/immediate medical needs  Patient is aware this is a billable service  He acknowledged consent and understanding of privacy and security of the video platform  The patient has agreed to participate and understands they can discontinue the visit at any time      After connecting through [facility], the patient was identified by name and date of birth  Gabriel Manuel was informed that this was a telemedicine visit and that the exam was being conducted confidentially over secure lines  My office door was closed  No one else was in the room  Gabriel Manuel acknowledged consent and understanding of privacy and security of the telemedicine visit  I informed the patient that I have reviewed his record in Epic and presented the opportunity for him to ask any questions regarding the visit today  The patient agreed to participate  Verification of patient location:  Patient is located in the following state in which I hold an active license: [place]    Subjective:   Gabriel Manuel is a 74 y o  male who is concerned about COVID-19  Patient's symptoms include fever, chills, nasal congestion, sore throat, cough, chest tightness and nausea  Patient denies shortness of breath  - Date of symptom onset: 12/26/2022      COVID-19 vaccination status: Fully vaccinated (primary series)    Exposure:   Contact with a person who is under investigation (PUI) for or who is positive for COVID-19 within the last 14 days?: Yes    Lab Results   Component Value Date    SARSCOV2 Negative 02/03/2022       Review of Systems   Constitutional: Positive for chills and fever  HENT: Positive for congestion and sore throat  Respiratory: Positive for cough and chest tightness  Negative for shortness of breath  Gastrointestinal: Positive for nausea       Current Outpatient Medications on File Prior to Visit   Medication Sig   • cefuroxime (CEFTIN) 500 mg tablet Take 500 mg by mouth every 12 (twelve) hours   • colchicine-probenecid 0 5-500 MG per tablet Take 1 tablet by mouth daily   • Continuous Blood Gluc  (FreeStyle Reese 14 Day [place]) JEROME Check blood sugar TID, DX E11 9   • Continuous Blood Gluc Sensor (FreeStyle Reese 14 Day Sensor) MISC Check blood sugar TID, apply new sensor q 14 days   • Diclofenac Sodium (VOLTAREN) 1 % Apply 2 g topically 4 (four) times a day   • ferrous sulfate 325 (65 Fe) mg tablet Take 325 mg by mouth daily with breakfast   • fluticasone (FLONASE) 50 mcg/act nasal spray 1 spray into each nostril daily   • insulin glargine (LANTUS) 100 units/mL subcutaneous injection Inject 12 Units under the skin daily   • lisinopril (ZESTRIL) 10 mg tablet Take 1 tablet (10 mg total) by mouth daily   • metFORMIN (GLUCOPHAGE) 1000 MG tablet Take 1 tablet (1,000 mg total) by mouth 2 (two) times a day with meals   • oxyCODONE (ROXICODONE) 10 MG TABS Take 1 tablet (10 mg total) by mouth every 6 (six) hours as needed for moderate pain Max Daily Amount: 40 mg   • polyethylene glycol (MIRALAX) 17 g packet Take 17 g by mouth daily as needed    • promethazine-dextromethorphan (PHENERGAN-DM) 6 25-15 mg/5 mL oral syrup TAKE 5 MILLILITERS BY MOUTH 4 TIMES A DAY AS NEEDED FOR COUGH   • senna-docusate sodium (SENOKOT-S) 8 6-50 mg per tablet Take 1 tablet by mouth 2 (two) times a day   • simvastatin (ZOCOR) 20 mg tablet Take 1 tablet (20 mg total) by mouth daily   • warfarin (COUMADIN) 1 mg tablet Take 1 tablet (1 mg total) by mouth daily   • warfarin (COUMADIN) 2 mg tablet Take 2 mg by mouth daily  Indications: Disease involving a Thrombosis or an Embolism, Blood Clot in Veins   • warfarin (Jantoven) 6 mg tablet Take 1 tablet (6 mg total) by mouth daily   • warfarin (COUMADIN) 2 mg tablet Take 1 tablet (2 mg total) by mouth daily       Objective:    Ht 6' 2" (1 88 m) Comment: pulled from last office visit  Wt 85 7 kg (189 lb) Comment: pt reported  BMI 24 27 kg/m²      Physical Exam  Constitutional:       Appearance: He is well-developed  HENT:      Head: Normocephalic  Pulmonary:      Effort: Pulmonary effort is normal    Neurological:      Mental Status: He is alert and oriented to person, place, and time  Psychiatric:         Behavior: Behavior normal          Thought Content:  Thought content normal          Judgment: Judgment normal        Victor Manuel Balint, DO

## 2023-01-04 DIAGNOSIS — J06.9 UPPER RESPIRATORY TRACT INFECTION, UNSPECIFIED TYPE: ICD-10-CM

## 2023-01-04 RX ORDER — DEXTROMETHORPHAN HYDROBROMIDE AND PROMETHAZINE HYDROCHLORIDE 15; 6.25 MG/5ML; MG/5ML
5 SYRUP ORAL 4 TIMES DAILY PRN
Qty: 180 ML | Refills: 0 | Status: SHIPPED | OUTPATIENT
Start: 2023-01-04

## 2023-01-05 ENCOUNTER — TELEPHONE (OUTPATIENT)
Dept: FAMILY MEDICINE CLINIC | Facility: CLINIC | Age: 68
End: 2023-01-05

## 2023-01-05 NOTE — TELEPHONE ENCOUNTER
Spoke with pt,     He is c/o weakness, SOB, difficulty breathing and chest pain for the past days, he is been coughing and feeling sick for the past days  I advised pt to go to the ER but he stated that he doesn't want to do that  I strongly suggested ER evaluation since they can do appropriate testing and treatment for him  He stated that he will go to the emergency room today       Cassia Oropeza  01/05/23  2:21 PM

## 2023-01-05 NOTE — TELEPHONE ENCOUNTER
JASSI stein from pt would like dr Wilver Saravia to know he is still not any better and is now having trouble breathing - would like advisement from covering doctor

## 2023-01-10 DIAGNOSIS — J06.9 UPPER RESPIRATORY TRACT INFECTION, UNSPECIFIED TYPE: Primary | ICD-10-CM

## 2023-01-10 RX ORDER — ALBUTEROL SULFATE 90 UG/1
2 AEROSOL, METERED RESPIRATORY (INHALATION) EVERY 4 HOURS PRN
Qty: 18 G | Refills: 0 | Status: SHIPPED | OUTPATIENT
Start: 2023-01-10

## 2023-01-17 ENCOUNTER — TELEPHONE (OUTPATIENT)
Dept: FAMILY MEDICINE CLINIC | Facility: CLINIC | Age: 68
End: 2023-01-17

## 2023-01-24 ENCOUNTER — OFFICE VISIT (OUTPATIENT)
Dept: OBGYN CLINIC | Facility: CLINIC | Age: 68
End: 2023-01-24

## 2023-01-24 VITALS — HEIGHT: 74 IN | BODY MASS INDEX: 24.26 KG/M2 | WEIGHT: 189 LBS

## 2023-01-24 DIAGNOSIS — R26.81 GAIT INSTABILITY: Primary | ICD-10-CM

## 2023-01-24 NOTE — PATIENT INSTRUCTIONS
Today, We recommended a brace/prosthesis for you  [facility] certified pedorthotists make orthotics but not braces or prosthesis  Below are the companies in the area that make these, Please call ahead for an appointment and to ensure the company accepts your insurance  Make sure to bring the prescription given to you in the office today to the company for the brace/prosthesis  [place]  [address] [place] [place]  [address] [facility] [address] Kelli Arellano [address] Phone: [phone]  [facility] Fax: [phone]    [address]  [address]  [place], [address] [place] [address] [address]  (By Appointment Only)  Directions  [address] [place]  [place] Phone: [phone]  [place] Fax: 210 [facility] Location  [address]  [phone] (fax)    [facility]  [address] [facility], [address] [place] [address],[address]  [place], [facility]  [phone] (fax)    [facility]  [address]  [phone] (fax)    [facility] Location  [address]  [address] [place] [address] Drive  [phone] (fax)    [place] [address] , [address], Yasmin Cash   [phone] (fax)

## 2023-01-24 NOTE — PROGRESS NOTES
Orthopaedics Office Visit - Post-op Patient Visit    ASSESSMENT/PLAN:    Assessment:   Approximately 12 months status post right TFNA  performed on 2022 , short RLE compared to L, fracture appears to have healed with some shortening  Approximately 6 months status post hardware removal right tibia performed on 2022  Plan:   - Weight bearing as tolerated right lower extremity   - Shoe lift recommended for the right lower extremity - this rx was also placed previously   -   Information provided to patient   - Neurology referral placed for gait instability   - Stressed importance of physical therapy with patient   - Over the counter analgesics as needed / directed   - Ice / heat as directed   - Follow up 3 months       To Do Next Visit:  REpeat images, eval improvement clinically    _____________________________________________________  CHIEF COMPLAINT:  Chief Complaint   Patient presents with   • Right Leg - Follow-up         SUBJECTIVE:  Deisy Hampton is a 74 y o  male who presents  Approximately 12 months status post right TFNA  performed on 2022 , short RLE compared to L, fracture appears to have healed with some shortening  Approximately 6 months status post hardware removal right tibia performed on 2022  Patient states that his right leg is doing well overall and has no pain currently  Patient states that his main complaint is balance issues  Patient states he is unable to stand up straight secondary to the subjective feeling of instability  Patient has been attending therapy  Patient offers no other complaints at this time  Patient has a history of both cervical and lumbar pathology         SOCIAL HISTORY:  Social History     Tobacco Use   • Smoking status: Former     Packs/day: 0 25     Types: Cigarettes     Quit date: 2017     Years since quittin 0   • Smokeless tobacco: Never   • Tobacco comments:     Per allscripts, current smoker   Vaping Use   • Vaping Use: Never used Substance Use Topics   • Alcohol use: Never     Comment: Per allscripts, occasional use   • Drug use: Not Currently       MEDICATIONS:    Current Outpatient Medications:   •  albuterol (ProAir HFA) 90 mcg/act inhaler, Inhale 2 puffs every 4 (four) hours as needed for wheezing, Disp: 18 g, Rfl: 0  •  cefuroxime (CEFTIN) 500 mg tablet, Take 500 mg by mouth every 12 (twelve) hours, Disp: , Rfl:   •  colchicine-probenecid 0 5-500 MG per tablet, Take 1 tablet by mouth daily, Disp: 90 tablet, Rfl: 1  •  Continuous Blood Gluc  (FreeStyle Reese 14 Day [place]) JEROME, Check blood sugar TID, DX E11 9, Disp: 1 each, Rfl: 0  •  Continuous Blood Gluc Sensor (FreeStyle Reese 14 Day Sensor) MISC, Check blood sugar TID, apply new sensor q 14 days, Disp: 2 each, Rfl: 5  •  Diclofenac Sodium (VOLTAREN) 1 %, Apply 2 g topically 4 (four) times a day, Disp: 2 g, Rfl: 1  •  ferrous sulfate 325 (65 Fe) mg tablet, Take 325 mg by mouth daily with breakfast, Disp: , Rfl:   •  fluticasone (FLONASE) 50 mcg/act nasal spray, 1 spray into each nostril daily, Disp: 11 mL, Rfl: 5  •  insulin glargine (LANTUS) 100 units/mL subcutaneous injection, Inject 12 Units under the skin daily, Disp: 10 mL, Rfl: 3  •  lisinopril (ZESTRIL) 10 mg tablet, Take 1 tablet (10 mg total) by mouth daily, Disp: 90 tablet, Rfl: 3  •  metFORMIN (GLUCOPHAGE) 1000 MG tablet, Take 1 tablet (1,000 mg total) by mouth 2 (two) times a day with meals, Disp: 180 tablet, Rfl: 5  •  oxyCODONE (ROXICODONE) 10 MG TABS, Take 1 tablet (10 mg total) by mouth every 6 (six) hours as needed for moderate pain Max Daily Amount: 40 mg, Disp: 30 tablet, Rfl: 0  •  polyethylene glycol (MIRALAX) 17 g packet, Take 17 g by mouth daily as needed , Disp: , Rfl:   •  promethazine-dextromethorphan (PHENERGAN-DM) 6 25-15 mg/5 mL oral syrup, Take 5 mL by mouth 4 (four) times a day as needed for cough, Disp: 180 mL, Rfl: 0  •  senna-docusate sodium (SENOKOT-S) 8 6-50 mg per tablet, Take 1 tablet by mouth 2 (two) times a day, Disp: , Rfl:   •  simvastatin (ZOCOR) 20 mg tablet, Take 1 tablet (20 mg total) by mouth daily, Disp: 90 tablet, Rfl: 3  •  warfarin (COUMADIN) 1 mg tablet, Take 1 tablet (1 mg total) by mouth daily, Disp: 30 tablet, Rfl: 3  •  warfarin (COUMADIN) 2 mg tablet, Take 2 mg by mouth daily  Indications: Disease involving a Thrombosis or an Embolism, Blood Clot in Veins, Disp: , Rfl:   •  warfarin (Jantoven) 6 mg tablet, Take 1 tablet (6 mg total) by mouth daily, Disp: 90 tablet, Rfl: 1  •  warfarin (COUMADIN) 2 mg tablet, Take 1 tablet (2 mg total) by mouth daily, Disp: 90 tablet, Rfl: 1    REVIEW OF SYSTEMS:  MSK: right leg pain   Neuro: Balance abnormalities   Pertinent items are otherwise noted in HPI  A comprehensive review of systems was otherwise negative     _____________________________________________________  PHYSICAL EXAMINATION:  Vital signs: Ht 6' 2" (1 88 m)   Wt 85 7 kg (189 lb)   BMI 24 27 kg/m²   General: No acute distress, awake and alert  Psychiatric: Mood and affect appear appropriate  HEENT: Trachea Midline, No torticollis, no apparent facial trauma  Cardiovascular: No audible murmurs; Extremities appear perfused  Pulmonary: No audible wheezing or stridor  Skin: No open lesions; see further details (if any) below      MUSCULOSKELETAL EXAMINATION:  Right lower extremity examination:  - Patient sitting comfortably in the office in no acute distress   - slight leg length discrepency (right shorter than left) with healed incision sites throughout the right lower extremity   - No bony or soft tissue ttp noted in the right lower extremity   - full ROM of the right hip and knee with no pain  - NV intact    _____________________________________________________  STUDIES REVIEWED:  I personally reviewed the images and interpretation is as follows:  N/A       PROCEDURES PERFORMED:  No procedures were performed at this time           Cornell Nguyen PA-C - assisting  Fabiola Salinas Otto Regan            Portions of the record may have been created with voice recognition software  Occasional wrong word or "sound a like" substitutions may have occurred due to the inherent limitations of voice recognition software  Read the chart carefully and recognize, using context, where substitutions have occurred

## 2023-03-20 ENCOUNTER — OFFICE VISIT (OUTPATIENT)
Dept: FAMILY MEDICINE CLINIC | Facility: CLINIC | Age: 68
End: 2023-03-20

## 2023-03-20 VITALS
OXYGEN SATURATION: 99 % | SYSTOLIC BLOOD PRESSURE: 110 MMHG | TEMPERATURE: 97.3 F | WEIGHT: 189.4 LBS | DIASTOLIC BLOOD PRESSURE: 72 MMHG | HEART RATE: 73 BPM | HEIGHT: 74 IN | BODY MASS INDEX: 24.31 KG/M2

## 2023-03-20 DIAGNOSIS — E11.51 TYPE 2 DIABETES MELLITUS WITH DIABETIC PERIPHERAL ANGIOPATHY WITHOUT GANGRENE, WITH LONG-TERM CURRENT USE OF INSULIN (HCC): ICD-10-CM

## 2023-03-20 DIAGNOSIS — I10 ESSENTIAL HYPERTENSION: ICD-10-CM

## 2023-03-20 DIAGNOSIS — I73.9 PERIPHERAL VASCULAR DISEASE (HCC): ICD-10-CM

## 2023-03-20 DIAGNOSIS — E11.40 TYPE 2 DIABETES MELLITUS WITH DIABETIC NEUROPATHY, WITHOUT LONG-TERM CURRENT USE OF INSULIN (HCC): Primary | ICD-10-CM

## 2023-03-20 DIAGNOSIS — E78.2 MIXED HYPERLIPIDEMIA: ICD-10-CM

## 2023-03-20 DIAGNOSIS — Z79.4 TYPE 2 DIABETES MELLITUS WITH DIABETIC PERIPHERAL ANGIOPATHY WITHOUT GANGRENE, WITH LONG-TERM CURRENT USE OF INSULIN (HCC): ICD-10-CM

## 2023-03-20 DIAGNOSIS — E11.65 TYPE 2 DIABETES MELLITUS WITH HYPERGLYCEMIA, WITH LONG-TERM CURRENT USE OF INSULIN (HCC): ICD-10-CM

## 2023-03-20 DIAGNOSIS — Z79.4 TYPE 2 DIABETES MELLITUS WITH HYPERGLYCEMIA, WITH LONG-TERM CURRENT USE OF INSULIN (HCC): ICD-10-CM

## 2023-03-20 DIAGNOSIS — N18.4 CHRONIC KIDNEY DISEASE, STAGE 4 (SEVERE) (HCC): ICD-10-CM

## 2023-03-20 PROBLEM — R22.31 ARM MASS, RIGHT: Status: RESOLVED | Noted: 2021-11-02 | Resolved: 2023-03-20

## 2023-03-20 PROBLEM — M25.462 PAIN AND SWELLING OF LEFT KNEE: Status: RESOLVED | Noted: 2021-10-16 | Resolved: 2023-03-20

## 2023-03-20 PROBLEM — M79.661 PAIN AND SWELLING OF RIGHT LOWER LEG: Status: RESOLVED | Noted: 2021-11-26 | Resolved: 2023-03-20

## 2023-03-20 PROBLEM — Z01.818 PRE-OPERATIVE CLEARANCE: Status: RESOLVED | Noted: 2022-02-04 | Resolved: 2023-03-20

## 2023-03-20 PROBLEM — M25.562 PAIN AND SWELLING OF LEFT KNEE: Status: RESOLVED | Noted: 2021-10-16 | Resolved: 2023-03-20

## 2023-03-20 PROBLEM — Z98.890 STATUS POST MUSCULOSKELETAL SYSTEM SURGERY: Status: RESOLVED | Noted: 2021-12-29 | Resolved: 2023-03-20

## 2023-03-20 PROBLEM — M79.89 PAIN AND SWELLING OF RIGHT LOWER LEG: Status: RESOLVED | Noted: 2021-11-26 | Resolved: 2023-03-20

## 2023-03-20 NOTE — PROGRESS NOTES
Name: Tobias Flowers      : 1955      MRN: [id number]  Encounter Provider: Linus Jolley DO  Encounter Date: 3/20/2023   Encounter department: [facility] [address] [place] [address]     1  Type 2 diabetes mellitus with diabetic neuropathy, without long-term current use of insulin ([facility])  Assessment & Plan:  Continue his insulin and metformin, check CMP, hemoglobin A1c  Lab Results   Component Value Date    HGBA1C 7 5 (H) 10/06/2022         2  Type 2 diabetes mellitus with diabetic peripheral angiopathy without gangrene, with long-term current use of insulin ([facility])  Assessment & Plan:  Continue his insulin, metformin, continue his warfarin, he is due for PT/INR  Lab Results   Component Value Date    HGBA1C 7 5 (H) 10/06/2022         3  Type 2 diabetes mellitus with hyperglycemia, with long-term current use of insulin ([facility])  -     CBC; Future  -     Comprehensive metabolic panel; Future  -     Hemoglobin A1C; Future  -     Microalbumin / creatinine urine ratio; Future    4  Essential hypertension  Assessment & Plan:  Controlled, continue his lisinopril    Orders:  -     CBC; Future    5  Peripheral vascular disease ([facility] [address] )  Assessment & Plan:  Continue his blood sugar, cholesterol, blood pressure control  Continue his warfarin      6  Chronic kidney disease, stage 4 (severe) ([facility])  Assessment & Plan:  Lab Results   Component Value Date    EGFR 29 10/06/2022    EGFR 43 2022    EGFR 40 2022    CREATININE 2 28 (H) 10/06/2022    CREATININE 1 63 (H) 2022    CREATININE 1 75 (H) 2022   Check CMP, make sure he is getting at least 64 ounces of fluid a day      7  Mixed hyperlipidemia  Assessment & Plan:  Continue simvastatin, check CMP, lipid profile    Orders:  -     Lipid panel; Future        Falls Plan of Care: balance, strength, and gait training instructions were provided  Recommended assistive device to help with gait and balance           Subjective Patient comes in today for checkup, he has not gotten any blood work done for his PT/INR  He is due for routine blood work  He states that his sugars have been fluctuating from low 100s to high 100s, he states that he is not consistent with his eating habits  Review of Systems   Constitutional: Negative for chills, fatigue and fever  HENT: Negative for congestion, ear pain, hearing loss, postnasal drip, rhinorrhea and sore throat  Eyes: Negative for pain and visual disturbance  Respiratory: Negative for chest tightness, shortness of breath and wheezing  Cardiovascular: Negative for chest pain and leg swelling  Gastrointestinal: Negative for abdominal distention, abdominal pain, constipation, diarrhea and vomiting  Endocrine: Negative for cold intolerance and heat intolerance  Genitourinary: Negative for difficulty urinating, frequency and urgency  Musculoskeletal: Positive for arthralgias ( Right hip) and back pain  Negative for gait problem  Skin: Negative for color change  Neurological: Negative for dizziness, tremors, syncope, numbness and headaches  Hematological: Negative for adenopathy  Psychiatric/Behavioral: Negative for agitation, confusion and sleep disturbance  The patient is not nervous/anxious          Current Outpatient Medications on File Prior to Visit   Medication Sig   • albuterol (ProAir HFA) 90 mcg/act inhaler Inhale 2 puffs every 4 (four) hours as needed for wheezing   • colchicine-probenecid 0 5-500 MG per tablet Take 1 tablet by mouth daily   • Continuous Blood Gluc  (FreeStyle Reese 14 Day [place]) JEROME Check blood sugar TID, DX E11 9   • Continuous Blood Gluc Sensor (FreeStyle Reese 14 Day Sensor) MISC Check blood sugar TID, apply new sensor q 14 days   • Diclofenac Sodium (VOLTAREN) 1 % Apply 2 g topically 4 (four) times a day   • ferrous sulfate 325 (65 Fe) mg tablet Take 325 mg by mouth daily with breakfast   • fluticasone (FLONASE) 50 mcg/act nasal spray 1 spray into each nostril daily   • insulin glargine (LANTUS) 100 units/mL subcutaneous injection Inject 12 Units under the skin daily   • lisinopril (ZESTRIL) 10 mg tablet Take 1 tablet (10 mg total) by mouth daily   • metFORMIN (GLUCOPHAGE) 1000 MG tablet Take 1 tablet (1,000 mg total) by mouth 2 (two) times a day with meals   • polyethylene glycol (MIRALAX) 17 g packet Take 17 g by mouth daily as needed    • senna-docusate sodium (SENOKOT-S) 8 6-50 mg per tablet Take 1 tablet by mouth 2 (two) times a day   • simvastatin (ZOCOR) 20 mg tablet Take 1 tablet (20 mg total) by mouth daily   • warfarin (COUMADIN) 1 mg tablet Take 1 tablet (1 mg total) by mouth daily   • warfarin (COUMADIN) 2 mg tablet Take 2 mg by mouth daily  Indications: Disease involving a Thrombosis or an Embolism, Blood Clot in Veins   • warfarin (Jantoven) 6 mg tablet Take 1 tablet (6 mg total) by mouth daily   • [DISCONTINUED] cefuroxime (CEFTIN) 500 mg tablet Take 500 mg by mouth every 12 (twelve) hours   • [DISCONTINUED] oxyCODONE (ROXICODONE) 10 MG TABS Take 1 tablet (10 mg total) by mouth every 6 (six) hours as needed for moderate pain Max Daily Amount: 40 mg   • [DISCONTINUED] promethazine-dextromethorphan (PHENERGAN-DM) 6 25-15 mg/5 mL oral syrup Take 5 mL by mouth 4 (four) times a day as needed for cough   • warfarin (COUMADIN) 2 mg tablet Take 1 tablet (2 mg total) by mouth daily       Objective     /72 (BP Location: Left arm, Patient Position: Sitting, Cuff Size: Standard)   Pulse 73   Temp (!) 97 3 °F (36 3 °C) (Tympanic)   Ht 6' 2" (1 88 m)   Wt 85 9 kg (189 lb 6 4 oz)   SpO2 99%   BMI 24 32 kg/m²     Physical Exam  Vitals and nursing note reviewed  Constitutional:       Appearance: He is well-developed  HENT:      Head: Normocephalic  Eyes:      General: No scleral icterus  Conjunctiva/sclera: Conjunctivae normal    Neck:      Thyroid: No thyromegaly     Cardiovascular:      Rate and Rhythm: Normal rate and regular rhythm  Heart sounds: Normal heart sounds  No murmur heard  Pulmonary:      Effort: Pulmonary effort is normal  No respiratory distress  Breath sounds: Normal breath sounds  No wheezing  Abdominal:      General: Bowel sounds are normal  There is no distension  Palpations: Abdomen is soft  Tenderness: There is no abdominal tenderness  Musculoskeletal:         General: Tenderness ( Lumbar paraspinal muscles, right lateral hip) present  Normal range of motion  Cervical back: Normal range of motion  Lymphadenopathy:      Cervical: No cervical adenopathy  Skin:     General: Skin is warm and dry  Coloration: Skin is not pale  Findings: No rash  Neurological:      Mental Status: He is alert and oriented to person, place, and time  Cranial Nerves: No cranial nerve deficit     Psychiatric:         Behavior: Behavior normal        Roger Espinosa DO

## 2023-03-20 NOTE — ASSESSMENT & PLAN NOTE
Lab Results   Component Value Date    EGFR 29 10/06/2022    EGFR 43 05/19/2022    EGFR 40 04/21/2022    CREATININE 2 28 (H) 10/06/2022    CREATININE 1 63 (H) 05/19/2022    CREATININE 1 75 (H) 04/21/2022   Check CMP, make sure he is getting at least 64 ounces of fluid a day

## 2023-03-20 NOTE — ASSESSMENT & PLAN NOTE
Continue his insulin and metformin, check CMP, hemoglobin A1c    Lab Results   Component Value Date    HGBA1C 7 5 (H) 10/06/2022

## 2023-03-20 NOTE — ASSESSMENT & PLAN NOTE
Continue his insulin, metformin, continue his warfarin, he is due for PT/INR  Lab Results   Component Value Date    HGBA1C 7 5 (H) 10/06/2022

## 2023-03-26 ENCOUNTER — TELEPHONE (OUTPATIENT)
Dept: OTHER | Facility: OTHER | Age: 68
End: 2023-03-26

## 2023-03-26 NOTE — TELEPHONE ENCOUNTER
Radha from [facility] pharmacy is calling to verify it is ok to fill medication due to some interactions       Kindly call Jake Saha back at [phone]

## 2023-03-27 NOTE — TELEPHONE ENCOUNTER
Spoke with pharmacist  Notified her you are aware of the interaction and there is no need to change anything

## 2023-03-27 NOTE — TELEPHONE ENCOUNTER
I spoke with the pharmacist and she states that the Colchicine and Simvastatin have a flagged interaction of myalgia  She wanted to make sure that you were aware of this potential reaction  She states that she is aware that this was prescribed back in November, but wanted to make you aware

## 2023-04-06 ENCOUNTER — TELEPHONE (OUTPATIENT)
Dept: NEUROLOGY | Facility: CLINIC | Age: 68
End: 2023-04-06

## 2023-04-06 ENCOUNTER — TELEPHONE (OUTPATIENT)
Dept: OBGYN CLINIC | Facility: HOSPITAL | Age: 68
End: 2023-04-06

## 2023-04-06 NOTE — TELEPHONE ENCOUNTER
Caller: Patient     Doctor: Srinath Byrd     Reason for call: Patient called today stating he will need auth for his EMG that was ordered by Dr silva the EMG is on 4/11/23    Call back#: [phone]

## 2023-04-07 NOTE — TELEPHONE ENCOUNTER
Called patient regarding this request      I was not able to get through   Message was left asking him to contact me back at

## 2023-04-07 NOTE — TELEPHONE ENCOUNTER
Called patient back to let him know that I spoke with both [employer] and [employer] on his behalf and no prior authorization is needed for the EMG  I also let him know that the EMG will need to be done prior to his neurology appt due to them needing to review the testing at that appt

## 2023-04-07 NOTE — TELEPHONE ENCOUNTER
Patient called me back stating he is requesting us to call to see if prior authorizations are required for the EMG due to past experiences

## 2023-05-16 ENCOUNTER — HOSPITAL ENCOUNTER (EMERGENCY)
Facility: HOSPITAL | Age: 68
Discharge: HOME | End: 2023-05-16
Attending: EMERGENCY MEDICINE

## 2023-05-16 ENCOUNTER — APPOINTMENT (OUTPATIENT)
Dept: RADIOLOGY | Facility: HOSPITAL | Age: 68
End: 2023-05-16

## 2023-05-16 VITALS
RESPIRATION RATE: 18 BRPM | SYSTOLIC BLOOD PRESSURE: 141 MMHG | HEART RATE: 71 BPM | DIASTOLIC BLOOD PRESSURE: 77 MMHG | OXYGEN SATURATION: 99 % | TEMPERATURE: 98.7 F

## 2023-05-16 DIAGNOSIS — L03.031 PARONYCHIA OF GREAT TOE OF RIGHT FOOT: Primary | ICD-10-CM

## 2023-05-16 RX ORDER — CEPHALEXIN 500 MG/1
500 CAPSULE ORAL EVERY 6 HOURS SCHEDULED
Qty: 28 CAPSULE | Refills: 0 | Status: SHIPPED | OUTPATIENT
Start: 2023-05-16 | End: 2023-05-23

## 2023-05-16 RX ORDER — CEPHALEXIN 250 MG/1
500 CAPSULE ORAL ONCE
Status: COMPLETED | OUTPATIENT
Start: 2023-05-16 | End: 2023-05-16

## 2023-05-16 RX ADMIN — CEPHALEXIN 500 MG: 250 CAPSULE ORAL at 22:25

## 2023-05-17 ENCOUNTER — VBI (OUTPATIENT)
Dept: ADMINISTRATIVE | Facility: OTHER | Age: 68
End: 2023-05-17

## 2023-05-17 NOTE — DISCHARGE INSTRUCTIONS
Soak foot in warm water 2-3 times a day for 10-15 minutes  Take antibiotics as prescribed  Follow up with Podiatry as soon as possible  Return to the ER with any fevers, chills, worsening redness, pus draining from toe

## 2023-05-17 NOTE — TELEPHONE ENCOUNTER
ED Visit Information     Ed visit date: 5/16/23  Diagnosis Description: toe pain  In Network? Yes Sherifkwesi Jeane  Discharge status: Home  Discharged with meds ? Yes  Number of ED visits to date: 1  ED Severity:n/a     Outreach Information    Outreach successful:No 3  Date letter mailed: 5/19/23  Date Finalized:5/19/23    Care Coordination    Follow up appointment with pcp: no not able to reach - my chart letter sent  Transportation issues ?  No    [employer] Outreach    Emergent necessity warranted by diagnosis:  Yes  [facility] PCP:  Yes  Transportation:  Friend/Family Transport  Called PCP first?:  No  Feels able to call PCP for urgent problems ?:  Yes  Understands what emergencies can be handled by PCP ?:  Yes  Ever any problems getting appointment with PCP for minor emergency/urgency problems?:  No  Practice Contacted Patient ?:  No  Pt had ED follow up with pcp/staff ?:  No    Seen for follow-up out of network ?:  No  Urgent care Education?:  Yes

## 2023-05-17 NOTE — ED PROVIDER NOTES
"History  Chief Complaint   Patient presents with   • Toe Pain     Right big toe redness and swelling that patient reports first noticing today however patient states \"I had a compression stocking on for 2-3 days\"  Patient presents with redness to anterior big toe, clear liquid draining from skin  Patient denies any abnormal numbness or tingling from their baseline of \"vascular issues\" to right leg  Cap refill less than 2 seconds, dorsalis pedis pulse 1+     Patient is a 46-year-old male with a past medical history significant for diabetes, hypertension, hyperlipidemia presenting to the emergency department for evaluation of redness to his right great toe  He noticed this this evening  He is seeing clear drainage coming from the right great toe  Patient reports numbness to his right foot, however this is chronic due to diabetic neuropathy and nerve damage from previous surgeries  Denies any injury to the right great toe  He is denying any pain  No fevers, chills, chest pain, difficulty breathing  No other complaints at this time  Prior to Admission Medications   Prescriptions Last Dose Informant Patient Reported? Taking?    Continuous Blood Gluc  (FreeStyle Reese 14 Day [place]) JEROME   No No   Sig: Check blood sugar TID, DX E11 9   Continuous Blood Gluc Sensor (ImmusoftStyle Reese 14 Day Sensor) MISC   No No   Sig: Check blood sugar TID, apply new sensor q 14 days   Diclofenac Sodium (VOLTAREN) 1 %   No No   Sig: Apply 2 g topically 4 (four) times a day   albuterol (ProAir HFA) 90 mcg/act inhaler   No No   Sig: Inhale 2 puffs every 4 (four) hours as needed for wheezing   colchicine-probenecid 0 5-500 MG per tablet   No No   Sig: Take 1 tablet by mouth daily   ferrous sulfate 325 (65 Fe) mg tablet   Yes No   Sig: Take 325 mg by mouth daily with breakfast   fluticasone (FLONASE) 50 mcg/act nasal spray   No No   Si spray into each nostril daily   insulin glargine (LANTUS) 100 units/mL subcutaneous " injection   No No   Sig: Inject 12 Units under the skin daily   lisinopril (ZESTRIL) 10 mg tablet   No No   Sig: Take 1 tablet (10 mg total) by mouth daily   metFORMIN (GLUCOPHAGE) 1000 MG tablet   No No   Sig: Take 1 tablet (1,000 mg total) by mouth 2 (two) times a day with meals   polyethylene glycol (MIRALAX) 17 g packet   Yes No   Sig: Take 17 g by mouth daily as needed    senna-docusate sodium (SENOKOT-S) 8 6-50 mg per tablet   Yes No   Sig: Take 1 tablet by mouth 2 (two) times a day   simvastatin (ZOCOR) 20 mg tablet   No No   Sig: Take 1 tablet (20 mg total) by mouth daily   warfarin (COUMADIN) 1 mg tablet   No No   Sig: Take 1 tablet (1 mg total) by mouth daily   warfarin (COUMADIN) 2 mg tablet   Yes No   Sig: Take 2 mg by mouth daily   Indications: Disease involving a Thrombosis or an Embolism, Blood Clot in Veins   warfarin (COUMADIN) 2 mg tablet   No No   Sig: Take 1 tablet (2 mg total) by mouth daily   warfarin (Jantoven) 6 mg tablet   No No   Sig: Take 1 tablet (6 mg total) by mouth daily      Facility-Administered Medications: None       Past Medical History:   Diagnosis Date   • Back pain    • Diabetes mellitus ([facility] [address] )    • DVT (deep venous thrombosis) ([facility] [address] )    • Hard to intubate 10/18/2021    Glidescope with #3 blade and bougie used to pass 6 0 ETT   • Hyperlipidemia    • Hypertension        Past Surgical History:   Procedure Laterality Date   • CATARACT EXTRACTION     • COLONOSCOPY      6/2014   • CYST REMOVAL     • EXTERNAL FIXATOR APPLICATION Right 75/24/8833    Procedure: Application External Fixation Device right lower extremity;  Surgeon: Camille Serrano MD;  Location: [facility];  Service: Orthopedics   • HAND SURGERY     • HEEL SPUR SURGERY      foot surgery   • INCISION AND DRAINAGE OF WOUND Right 2/7/2022    Procedure: INCISION AND DRAINAGE (I&D) EXTREMITY;  Surgeon: Camille Serrano MD;  [facility]: [facility];  Service: Orthopedics   • ORIF TIBIA FRACTURE Right 10/25/2021    Procedure: OPEN REDUCTION W/ INTERNAL FIXATION (ORIF) RIGHT PROXIMAL TIBIA SHAFT FRACTURE, REMOVAL OF EXTERNAL FIXATOR;  Surgeon: Angy Castillo MD;  [facility]: [facility];  Service: Orthopedics   • ND OPTX FEM SHFT FX W/INSJ IMED IMPLT W/WO SCREW Right 2022    Procedure: INSERTION NAIL IM FEMUR ANTEGRADE (TROCHANTERIC); Surgeon: Angy Castillo MD;  [facility];  Service: Orthopedics   • ND REMOVAL IMPLANT DEEP Right 2022    Procedure: REMOVAL HARDWARE RIGHT TIBIA;  Surgeon: Angy Castillo MD;  [facility];  Service: Orthopedics       Family History   Problem Relation Age of Onset   • Dementia Mother    • Melanoma Mother      I have reviewed and agree with the history as documented  E-Cigarette/Vaping   • E-Cigarette Use Never User      E-Cigarette/Vaping Substances   • Nicotine No    • THC No    • CBD No    • Flavoring No    • Other No    • Unknown No      Social History     Tobacco Use   • Smoking status: Former     Packs/day: 0 25     Types: Cigarettes     Quit date:      Years since quittin 3   • Smokeless tobacco: Never   • Tobacco comments:     Per allscripts, current smoker   Vaping Use   • Vaping Use: Never used   Substance Use Topics   • Alcohol use: Never     Comment: Per allscripts, occasional use   • Drug use: Not Currently       Review of Systems   Constitutional: Negative for chills and fever  HENT: Negative for congestion, rhinorrhea and sore throat  Respiratory: Negative for cough, chest tightness and shortness of breath  Cardiovascular: Negative for chest pain and palpitations  Gastrointestinal: Negative for abdominal pain, diarrhea, nausea and vomiting  Musculoskeletal: Positive for arthralgias and joint swelling  Skin: Positive for color change and rash  All other systems reviewed and are negative  Physical Exam  Physical Exam  Vitals reviewed  Constitutional:       General: He is not in acute distress  Appearance: Normal appearance  He is normal weight  He is ill-appearing (chronic)  He is not toxic-appearing or diaphoretic  HENT:      Head: Normocephalic and atraumatic  Right Ear: External ear normal       Left Ear: External ear normal    Eyes:      General: No scleral icterus  Right eye: No discharge  Left eye: No discharge  Extraocular Movements: Extraocular movements intact  Conjunctiva/sclera: Conjunctivae normal    Cardiovascular:      Rate and Rhythm: Normal rate and regular rhythm  Heart sounds: Normal heart sounds  No murmur heard  No friction rub  No gallop  Pulmonary:      Effort: Pulmonary effort is normal  No respiratory distress  Breath sounds: Normal breath sounds  No stridor  No wheezing, rhonchi or rales  Musculoskeletal:      Cervical back: Normal range of motion and neck supple  Right foot: Normal range of motion and normal capillary refill  Swelling (swelling and erythema to the R great toe at the base of the nailbed consistent with developing paronychia  No drainable fluid collection ) present  No tenderness  Comments: Patient with significant onychomycosis to all 10 toes   Skin:     General: Skin is warm and dry  Neurological:      Mental Status: He is alert and oriented to person, place, and time     Psychiatric:         Mood and Affect: Mood normal          Behavior: Behavior normal          Vital Signs  ED Triage Vitals [05/16/23 2036]   Temperature Pulse Respirations Blood Pressure SpO2   98 7 °F (37 1 °C) 71 18 141/77 99 %      Temp src Heart Rate Source Patient Position - Orthostatic VS BP Location FiO2 (%)   -- Monitor Sitting Left arm --      Pain Score       --           Vitals:    05/16/23 2036   BP: 141/77   Pulse: 71   Patient Position - Orthostatic VS: Sitting         Visual Acuity      ED Medications  Medications   cephalexin (KEFLEX) capsule 500 mg (500 mg Oral Given 5/16/23 2225)       Diagnostic Studies  Results Reviewed     None No orders to display              Procedures  Procedures         ED Course                               SBIRT 22yo+    Flowsheet Row Most Recent Value   Initial Alcohol Screen: US AUDIT-C     1  How often do you have a drink containing alcohol? 0 Filed at: 05/16/2023 2243   2  How many drinks containing alcohol do you have on a typical day you are drinking? 0 Filed at: 05/16/2023 2243   Audit-C Score 0 Filed at: 05/16/2023 2243   MEÑO: How many times in the past year have you    Used an illegal drug or used a prescription medication for non-medical reasons? Never Filed at: 05/16/2023 2243                    Medical Decision Making  Patient presenting for evaluation of redness and swelling to the right great toe  Upon arrival patient appears comfortable  He is not in any acute distress  Vital signs are unremarkable  On examination he does have redness and swelling to the base of the nailbed of the right great toe  There is no drainable fluid collection  This appears to be an early paronychia  Pulses and capillary refill normal   Patient was started on Keflex  He was discharged home with instructions to follow-up with his primary care provider and podiatry  Strict return precautions were discussed  He is in stable condition at time of discharge  Paronychia of great toe of right foot: acute illness or injury  Risk  Prescription drug management  Disposition  Final diagnoses:   Paronychia of great toe of right foot     Time reflects when diagnosis was documented in both MDM as applicable and the Disposition within this note     Time User Action Codes Description Comment    5/16/2023 10:17 PM Carmen Jacobs Add [L03 031] Paronychia of great toe of right foot       ED Disposition     ED Disposition   Discharge    Condition   Stable    Date/Time   Tue May 16, 2023 10:16 PM    Comment   Candida Koroma discharge to home/self care                 Follow-up Information     Follow up With Specialties Details Why Contact Info Additional 2000 [facility] Emergency Department Emergency Medicine Go to  If symptoms worsen 34 [facility] 109 [facility] Emergency Department, [address] [place], [place], [address] [place] [address] Schedule an appointment as soon as possible for a visit  for follow up [address]  [phone]             Discharge Medication List as of 5/16/2023 10:18 PM      START taking these medications    Details   cephalexin (KEFLEX) 500 mg capsule Take 1 capsule (500 mg total) by mouth every 6 (six) hours for 7 days, Starting Tue 5/16/2023, Until Tue 5/23/2023, Print         CONTINUE these medications which have NOT CHANGED    Details   albuterol (ProAir HFA) 90 mcg/act inhaler Inhale 2 puffs every 4 (four) hours as needed for wheezing, Starting Tue 1/10/2023, Normal      colchicine-probenecid 0 5-500 MG per tablet Take 1 tablet by mouth daily, Starting Wed 11/9/2022, Print      Continuous Blood Gluc  (Smart Wire Gridyle Reese 14 Day [place]) JEROME Check blood sugar TID, DX E11 9, Normal      Continuous Blood Gluc Sensor (FreeStyle Reese 14 Day Sensor) MISC Check blood sugar TID, apply new sensor q 14 days, Normal      Diclofenac Sodium (VOLTAREN) 1 % Apply 2 g topically 4 (four) times a day, Starting Tue 7/12/2022, Normal      ferrous sulfate 325 (65 Fe) mg tablet Take 325 mg by mouth daily with breakfast, Historical Med      fluticasone (FLONASE) 50 mcg/act nasal spray 1 spray into each nostril daily, Starting Tue 6/7/2022, Normal      insulin glargine (LANTUS) 100 units/mL subcutaneous injection Inject 12 Units under the skin daily, Starting Wed 11/9/2022, Print      lisinopril (ZESTRIL) 10 mg tablet Take 1 tablet (10 mg total) by mouth daily, Starting Wed 11/9/2022, Print      metFORMIN (GLUCOPHAGE) 1000 MG tablet Take 1 tablet (1,000 mg total) by mouth 2 (two) times a day with meals, Starting Wed 11/9/2022, Print      polyethylene glycol (MIRALAX) 17 g packet Take 17 g by mouth daily as needed , Historical Med      senna-docusate sodium (SENOKOT-S) 8 6-50 mg per tablet Take 1 tablet by mouth 2 (two) times a day, Historical Med      simvastatin (ZOCOR) 20 mg tablet Take 1 tablet (20 mg total) by mouth daily, Starting Wed 11/9/2022, Print      !! warfarin (COUMADIN) 1 mg tablet Take 1 tablet (1 mg total) by mouth daily, Starting Wed 6/1/2022, Normal      !! warfarin (COUMADIN) 2 mg tablet Take 2 mg by mouth daily  Indications: Disease involving a Thrombosis or an Embolism, Blood Clot in Veins, Historical Med      !! warfarin (Jantoven) 6 mg tablet Take 1 tablet (6 mg total) by mouth daily, Starting Wed 11/9/2022, Print       !! - Potential duplicate medications found  Please discuss with provider  No discharge procedures on file      PDMP Review       Value Time User    PDMP Reviewed  Yes 8/8/2022  5:01 PM Enrique Mayer PA-C          ED Provider  Electronically Signed by           Richie Joseph PA-C  05/17/23 [id number]

## 2023-05-17 NOTE — LETTER
VALUE BASED VIR  [address] [facility] [address]    Date: 05/19/23  Dolores Guzman  [address] [facility] [address]    Dear Malia Martínez:                                                                                                                              Thank you for choosing [facility] emergency department for care  Your primary care provider wants to make sure that your ongoing medical care is being addressed  If you require follow up care as a result of your emergency department visit, there are a few things the practice would like you to know  As part of the network's continuing commitment to caring for our patients, we have added more same day appointments and have extended office hours to meet your medical needs  After hours, on-call physicians are available via your primary care provider's main office line  We encourage you to contact our office prior to seeking treatment to discuss your symptoms with the medical staff  Together, we can determine the correct course of action  A majority of non-emergent conditions such as: common cold, flu-like symptoms, fevers, strains/sprains, dislocations, minor burns, cuts and animal bites can be treated at [address] [place] [address] facilities  Diagnostic testing is available at some sites  Of course, if you are experiencing a life threatening medical emergency call 911 or proceed directly to the nearest emergency room  Your nearest [facility] facility is conveniently located at:    [facility] [address]  [address] [facility], [address] [place] [address] offered at most [address] your spot online at www [facility] [email or web address] or on the [facility]    Sincerely,    VALUE BASED VIR  No information on file

## 2023-06-16 DIAGNOSIS — E11.40 TYPE 2 DIABETES MELLITUS WITH DIABETIC NEUROPATHY, WITHOUT LONG-TERM CURRENT USE OF INSULIN (HCC): ICD-10-CM

## 2023-06-16 DIAGNOSIS — I10 ESSENTIAL HYPERTENSION: ICD-10-CM

## 2023-06-16 DIAGNOSIS — Z79.4 TYPE 2 DIABETES MELLITUS WITH HYPERGLYCEMIA, WITH LONG-TERM CURRENT USE OF INSULIN (HCC): ICD-10-CM

## 2023-06-16 DIAGNOSIS — E11.65 TYPE 2 DIABETES MELLITUS WITH HYPERGLYCEMIA, WITH LONG-TERM CURRENT USE OF INSULIN (HCC): ICD-10-CM

## 2023-06-16 DIAGNOSIS — I82.403 ACUTE EMBOLISM AND THROMBOSIS OF DEEP VEIN OF BOTH LOWER EXTREMITIES (HCC): ICD-10-CM

## 2023-06-16 RX ORDER — FLASH GLUCOSE SENSOR
KIT MISCELLANEOUS
Qty: 2 EACH | Refills: 5 | Status: SHIPPED | OUTPATIENT
Start: 2023-06-16

## 2023-06-16 RX ORDER — WARFARIN SODIUM 6 MG/1
6 TABLET ORAL
Qty: 90 TABLET | Refills: 1 | Status: SHIPPED | OUTPATIENT
Start: 2023-06-16

## 2023-06-16 RX ORDER — SIMVASTATIN 20 MG
20 TABLET ORAL DAILY
Qty: 90 TABLET | Refills: 3 | Status: SHIPPED | OUTPATIENT
Start: 2023-06-16

## 2023-06-16 RX ORDER — WARFARIN SODIUM 2 MG/1
2 TABLET ORAL DAILY
Qty: 90 TABLET | Refills: 1 | Status: SHIPPED | OUTPATIENT
Start: 2023-06-16

## 2023-06-16 RX ORDER — LISINOPRIL 10 MG/1
10 TABLET ORAL DAILY
Qty: 90 TABLET | Refills: 3 | Status: SHIPPED | OUTPATIENT
Start: 2023-06-16

## 2023-07-02 ENCOUNTER — RA CDI HCC (OUTPATIENT)
Dept: OTHER | Facility: HOSPITAL | Age: 68
End: 2023-07-02

## 2023-07-02 NOTE — PROGRESS NOTES
E11.22  [address] [facility] coding opportunities          Chart Reviewed number of suggestions sent to Provider: 1     Patients Insurance     Medicare Insurance: [employer]age

## 2023-07-10 ENCOUNTER — OFFICE VISIT (OUTPATIENT)
Dept: FAMILY MEDICINE CLINIC | Facility: CLINIC | Age: 68
End: 2023-07-10
Payer: COMMERCIAL

## 2023-07-10 VITALS
SYSTOLIC BLOOD PRESSURE: 142 MMHG | TEMPERATURE: 98 F | DIASTOLIC BLOOD PRESSURE: 82 MMHG | BODY MASS INDEX: 24.32 KG/M2 | OXYGEN SATURATION: 100 % | HEIGHT: 74 IN | HEART RATE: 76 BPM

## 2023-07-10 DIAGNOSIS — N18.4 CHRONIC KIDNEY DISEASE, STAGE 4 (SEVERE) (HCC): ICD-10-CM

## 2023-07-10 DIAGNOSIS — Z00.00 HEALTH CARE MAINTENANCE: Primary | ICD-10-CM

## 2023-07-10 DIAGNOSIS — I82.403 ACUTE EMBOLISM AND THROMBOSIS OF DEEP VEIN OF BOTH LOWER EXTREMITIES (HCC): ICD-10-CM

## 2023-07-10 DIAGNOSIS — I10 ESSENTIAL HYPERTENSION: ICD-10-CM

## 2023-07-10 DIAGNOSIS — R35.1 NOCTURIA: ICD-10-CM

## 2023-07-10 DIAGNOSIS — J06.9 UPPER RESPIRATORY TRACT INFECTION, UNSPECIFIED TYPE: ICD-10-CM

## 2023-07-10 DIAGNOSIS — T14.8XXA ABRASION: ICD-10-CM

## 2023-07-10 DIAGNOSIS — E11.40 TYPE 2 DIABETES MELLITUS WITH DIABETIC NEUROPATHY, WITHOUT LONG-TERM CURRENT USE OF INSULIN (HCC): ICD-10-CM

## 2023-07-10 PROBLEM — S82.141D: Status: RESOLVED | Noted: 2021-11-03 | Resolved: 2023-07-10

## 2023-07-10 PROBLEM — E11.65 TYPE 2 DIABETES MELLITUS WITH HYPERGLYCEMIA (HCC): Status: RESOLVED | Noted: 2021-10-05 | Resolved: 2023-07-10

## 2023-07-10 PROBLEM — T84.498A EXPOSED ORTHOPAEDIC HARDWARE (HCC): Status: RESOLVED | Noted: 2022-08-06 | Resolved: 2023-07-10

## 2023-07-10 PROBLEM — S72.141A CLOSED INTERTROCHANTERIC FRACTURE OF HIP, RIGHT, INITIAL ENCOUNTER (HCC): Status: RESOLVED | Noted: 2022-02-03 | Resolved: 2023-07-10

## 2023-07-10 PROCEDURE — 99214 OFFICE O/P EST MOD 30 MIN: CPT | Performed by: FAMILY MEDICINE

## 2023-07-10 PROCEDURE — G0439 PPPS, SUBSEQ VISIT: HCPCS | Performed by: FAMILY MEDICINE

## 2023-07-10 RX ORDER — CEFUROXIME AXETIL 500 MG/1
500 TABLET ORAL EVERY 12 HOURS
COMMUNITY
Start: 2023-06-03 | End: 2023-07-10

## 2023-07-10 RX ORDER — TOLTERODINE TARTRATE 2 MG/1
2 TABLET, EXTENDED RELEASE ORAL 2 TIMES DAILY
Qty: 60 TABLET | Refills: 1 | Status: SHIPPED | OUTPATIENT
Start: 2023-07-10

## 2023-07-10 RX ORDER — ALBUTEROL SULFATE 90 UG/1
2 AEROSOL, METERED RESPIRATORY (INHALATION) EVERY 4 HOURS PRN
Qty: 18 G | Refills: 3 | Status: SHIPPED | OUTPATIENT
Start: 2023-07-10

## 2023-07-10 NOTE — ASSESSMENT & PLAN NOTE
Continue the insulin and metformin, get his blood work done as previously ordered.   Lab Results   Component Value Date    HGBA1C 7.5 (H) 10/06/2022

## 2023-07-10 NOTE — PATIENT INSTRUCTIONS
Medicare Preventive Visit Patient Instructions  Thank you for completing your Welcome to Medicare Visit or Medicare Annual Wellness Visit today. Your next wellness visit will be due in one year (7/10/2024). The screening/preventive services that you may require over the next 5-10 years are detailed below. Some tests may not apply to you based off risk factors and/or age. Screening tests ordered at today's visit but not completed yet may show as past due. Also, please note that scanned in results may not display below. Preventive Screenings:  Service Recommendations Previous Testing/Comments   Colorectal Cancer Screening  · Colonoscopy    · Fecal Occult Blood Test (FOBT)/Fecal Immunochemical Test (FIT)  · Fecal DNA/Cologuard Test  · Flexible Sigmoidoscopy Age: 43-73 years old   Colonoscopy: every 10 years (May be performed more frequently if at higher risk)  OR  FOBT/FIT: every 1 year  OR  Cologuard: every 3 years  OR  Sigmoidoscopy: every 5 years  Screening may be recommended earlier than age 39 if at higher risk for colorectal cancer. Also, an individualized decision between you and your healthcare provider will decide whether screening between the ages of 77-80 would be appropriate.  Colonoscopy: 06/26/2014  FOBT/FIT: Not on file  Cologuard: Not on file  Sigmoidoscopy: Not on file    Screening Current     Prostate Cancer Screening Individualized decision between patient and health care provider in men between ages of 53-66   Medicare will cover every 12 months beginning on the day after your 50th birthday PSA: 0.9 ng/mL     Screening Current     Hepatitis C Screening Once for adults born between 1945 and 1965  More frequently in patients at high risk for Hepatitis C Hep C Antibody: 08/03/2020    Screening Current   Diabetes Screening 1-2 times per year if you're at risk for diabetes or have pre-diabetes Fasting glucose: 164 mg/dL (10/6/2022)  A1C: 7.5 % (10/6/2022)  Screening Not Indicated  History Diabetes Cholesterol Screening Once every 5 years if you don't have a lipid disorder. May order more often based on risk factors. Lipid panel: 04/21/2022  Screening Not Indicated  History Lipid Disorder      Other Preventive Screenings Covered by Medicare:  1. Abdominal Aortic Aneurysm (AAA) Screening: covered once if your at risk. You're considered to be at risk if you have a family history of AAA or a male between the age of 70-76 who smoking at least 100 cigarettes in your lifetime. 2. Lung Cancer Screening: covers low dose CT scan once per year if you meet all of the following conditions: (1) Age 48-67; (2) No signs or symptoms of lung cancer; (3) Current smoker or have quit smoking within the last 15 years; (4) You have a tobacco smoking history of at least 20 pack years (packs per day x number of years you smoked); (5) You get a written order from a healthcare provider. 3. Glaucoma Screening: covered annually if you're considered high risk: (1) You have diabetes OR (2) Family history of glaucoma OR (3)  aged 48 and older OR (3)  American aged 72 and older  3. Osteoporosis Screening: covered every 2 years if you meet one of the following conditions: (1) Have a vertebral abnormality; (2) On glucocorticoid therapy for more than 3 months; (3) Have primary hyperparathyroidism; (4) On osteoporosis medications and need to assess response to drug therapy. 5. HIV Screening: covered annually if you're between the age of 14-79. Also covered annually if you are younger than 13 and older than 72 with risk factors for HIV infection. For pregnant patients, it is covered up to 3 times per pregnancy.     Immunizations:  Immunization Recommendations   Influenza Vaccine Annual influenza vaccination during flu season is recommended for all persons aged >= 6 months who do not have contraindications   Pneumococcal Vaccine   * Pneumococcal conjugate vaccine = PCV13 (Prevnar 13), PCV15 (Vaxneuvance), PCV20 (Prevnar 20)  * Pneumococcal polysaccharide vaccine = PPSV23 (Pneumovax) Adults 2364 years old: 1-3 doses may be recommended based on certain risk factors  Adults 72 years old: 1-2 doses may be recommended based off what pneumonia vaccine you previously received   Hepatitis B Vaccine 3 dose series if at intermediate or high risk (ex: diabetes, end stage renal disease, liver disease)   Tetanus (Td) Vaccine - COST NOT COVERED BY MEDICARE PART B Following completion of primary series, a booster dose should be given every 10 years to maintain immunity against tetanus. Td may also be given as tetanus wound prophylaxis. Tdap Vaccine - COST NOT COVERED BY MEDICARE PART B Recommended at least once for all adults. For pregnant patients, recommended with each pregnancy. Shingles Vaccine (Shingrix) - COST NOT COVERED BY MEDICARE PART B  2 shot series recommended in those aged 48 and above     Health Maintenance Due:      Topic Date Due   • Colorectal Cancer Screening  02/25/2022   • Hepatitis C Screening  Completed   • HIV Screening  Addressed     Immunizations Due:      Topic Date Due   • Pneumococcal Vaccine: 65+ Years (2 - PCV) 09/29/2018   • COVID-19 Vaccine (2 - Booster for Jane series) 07/22/2021   • Influenza Vaccine (1) 09/01/2023     Advance Directives   What are advance directives? Advance directives are legal documents that state your wishes and plans for medical care. These plans are made ahead of time in case you lose your ability to make decisions for yourself. Advance directives can apply to any medical decision, such as the treatments you want, and if you want to donate organs. What are the types of advance directives? There are many types of advance directives, and each state has rules about how to use them. You may choose a combination of any of the following:  · Living will: This is a written record of the treatment you want.  You can also choose which treatments you do not want, which to limit, and which to stop at a certain time. This includes surgery, medicine, IV fluid, and tube feedings. · Durable power of  for healthcare [facility]): This is a written record that states who you want to make healthcare choices for you when you are unable to make them for yourself. This person, called a proxy, is usually a family member or a friend. You may choose more than 1 proxy. · Do not resuscitate (DNR) order:  A DNR order is used in case your heart stops beating or you stop breathing. It is a request not to have certain forms of treatment, such as CPR. A DNR order may be included in other types of advance directives. · Medical directive: This covers the care that you want if you are in a coma, near death, or unable to make decisions for yourself. You can list the treatments you want for each condition. Treatment may include pain medicine, surgery, blood transfusions, dialysis, IV or tube feedings, and a ventilator (breathing machine). · Values history: This document has questions about your views, beliefs, and how you feel and think about life. This information can help others choose the care that you would choose. Why are advance directives important? An advance directive helps you control your care. Although spoken wishes may be used, it is better to have your wishes written down. Spoken wishes can be misunderstood, or not followed. Treatments may be given even if you do not want them. An advance directive may make it easier for your family to make difficult choices about your care. Fall Prevention    Fall prevention  includes ways to make your home and other areas safer. It also includes ways you can move more carefully to prevent a fall. Health conditions that cause changes in your blood pressure, vision, or muscle strength and coordination may increase your risk for falls. Medicines may also increase your risk for falls if they make you dizzy, weak, or sleepy.    Fall prevention tips:   · Stand or sit up slowly. · Use assistive devices as directed. · Wear shoes that fit well and have soles that . · Wear a personal alarm. · Stay active. · Manage your medical conditions. Home Safety Tips:  · Add items to prevent falls in the bathroom. · Keep paths clear. · Install bright lights in your home. · Keep items you use often on shelves within reach. · Paint or place reflective tape on the edges of your stairs. © Copyright [employer] 2018 Information is for End User's use only and may not be sold, redistributed or otherwise used for commercial purposes.  All illustrations and images included in CareNotes® are the copyrighted property of A.D.A.M., Inc. or [address] [place] [address]

## 2023-07-10 NOTE — ASSESSMENT & PLAN NOTE
Lab Results   Component Value Date    EGFR 29 10/06/2022    EGFR 43 05/19/2022    EGFR 40 04/21/2022    CREATININE 2.28 (H) 10/06/2022    CREATININE 1.63 (H) 05/19/2022    CREATININE 1.75 (H) 04/21/2022   Stable, continue to monitor, he is to get his blood work done as previously ordered.

## 2023-07-12 ENCOUNTER — APPOINTMENT (OUTPATIENT)
Dept: LAB | Facility: HOSPITAL | Age: 68
End: 2023-07-12
Payer: COMMERCIAL

## 2023-07-12 DIAGNOSIS — E11.65 TYPE 2 DIABETES MELLITUS WITH HYPERGLYCEMIA, WITH LONG-TERM CURRENT USE OF INSULIN (HCC): ICD-10-CM

## 2023-07-12 DIAGNOSIS — E78.2 MIXED HYPERLIPIDEMIA: ICD-10-CM

## 2023-07-12 DIAGNOSIS — I10 ESSENTIAL HYPERTENSION: ICD-10-CM

## 2023-07-12 DIAGNOSIS — Z79.4 TYPE 2 DIABETES MELLITUS WITH HYPERGLYCEMIA, WITH LONG-TERM CURRENT USE OF INSULIN (HCC): ICD-10-CM

## 2023-07-12 LAB
ALBUMIN SERPL BCP-MCNC: 4.1 G/DL (ref 3.5–5)
ALP SERPL-CCNC: 83 U/L (ref 34–104)
ALT SERPL W P-5'-P-CCNC: 6 U/L (ref 7–52)
ANION GAP SERPL CALCULATED.3IONS-SCNC: 7 MMOL/L
AST SERPL W P-5'-P-CCNC: 10 U/L (ref 13–39)
BILIRUB SERPL-MCNC: 0.76 MG/DL (ref 0.2–1)
BUN SERPL-MCNC: 49 MG/DL (ref 5–25)
CALCIUM SERPL-MCNC: 9.4 MG/DL (ref 8.4–10.2)
CHLORIDE SERPL-SCNC: 108 MMOL/L (ref 96–108)
CHOLEST SERPL-MCNC: 140 MG/DL
CO2 SERPL-SCNC: 21 MMOL/L (ref 21–32)
CREAT SERPL-MCNC: 2 MG/DL (ref 0.6–1.3)
ERYTHROCYTE [DISTWIDTH] IN BLOOD BY AUTOMATED COUNT: 13.4 % (ref 11.6–15.1)
GFR SERPL CREATININE-BSD FRML MDRD: 34 ML/MIN/1.73SQ M
GLUCOSE P FAST SERPL-MCNC: 157 MG/DL (ref 65–99)
HCT VFR BLD AUTO: 39.7 % (ref 36.5–49.3)
HDLC SERPL-MCNC: 39 MG/DL
HGB BLD-MCNC: 13.1 G/DL (ref 12–17)
LDLC SERPL CALC-MCNC: 78 MG/DL (ref 0–100)
MCH RBC QN AUTO: 31.3 PG (ref 26.8–34.3)
MCHC RBC AUTO-ENTMCNC: 33 G/DL (ref 31.4–37.4)
MCV RBC AUTO: 95 FL (ref 82–98)
NONHDLC SERPL-MCNC: 101 MG/DL
PLATELET # BLD AUTO: 212 THOUSANDS/UL (ref 149–390)
PMV BLD AUTO: 9.3 FL (ref 8.9–12.7)
POTASSIUM SERPL-SCNC: 5.1 MMOL/L (ref 3.5–5.3)
PROT SERPL-MCNC: 7.7 G/DL (ref 6.4–8.4)
RBC # BLD AUTO: 4.19 MILLION/UL (ref 3.88–5.62)
SODIUM SERPL-SCNC: 136 MMOL/L (ref 135–147)
TRIGL SERPL-MCNC: 113 MG/DL
WBC # BLD AUTO: 8.19 THOUSAND/UL (ref 4.31–10.16)

## 2023-07-12 PROCEDURE — 82570 ASSAY OF URINE CREATININE: CPT

## 2023-07-12 PROCEDURE — 85027 COMPLETE CBC AUTOMATED: CPT

## 2023-07-12 PROCEDURE — 80061 LIPID PANEL: CPT

## 2023-07-12 PROCEDURE — 82043 UR ALBUMIN QUANTITATIVE: CPT

## 2023-07-12 PROCEDURE — 80053 COMPREHEN METABOLIC PANEL: CPT

## 2023-07-12 PROCEDURE — 83036 HEMOGLOBIN GLYCOSYLATED A1C: CPT

## 2023-07-13 LAB
CREAT UR-MCNC: 83 MG/DL
EST. AVERAGE GLUCOSE BLD GHB EST-MCNC: 160 MG/DL
HBA1C MFR BLD: 7.2 %
MICROALBUMIN UR-MCNC: 29.3 MG/L (ref 0–20)
MICROALBUMIN/CREAT 24H UR: 35 MG/G CREATININE (ref 0–30)

## 2023-09-19 ENCOUNTER — VBI (OUTPATIENT)
Dept: ADMINISTRATIVE | Facility: OTHER | Age: 68
End: 2023-09-19

## 2023-09-19 NOTE — TELEPHONE ENCOUNTER
09/19/23 10:51 AM    The patient was called and a message was left to call the provider's office. Please review Cologuard completion with the patient. Thank you.   Trina Sylvester  [facility] VALUE BASED VIR

## 2023-09-24 DIAGNOSIS — M10.09 ACUTE IDIOPATHIC GOUT OF MULTIPLE SITES: ICD-10-CM

## 2023-09-25 RX ORDER — PROBENECID AND COLCHICINE 500; .5 MG/1; MG/1
1 TABLET ORAL DAILY
Qty: 30 TABLET | Refills: 5 | Status: SHIPPED | OUTPATIENT
Start: 2023-09-25

## 2023-10-09 NOTE — TELEPHONE ENCOUNTER
Requested medication(s) are due for refill today: Yes  Patient has already received a courtesy refill: No  Other reason request has been forwarded to provider:Requirements not met Deep Sutures: 4-0 Monocryl

## 2023-10-30 DIAGNOSIS — I82.403 ACUTE EMBOLISM AND THROMBOSIS OF DEEP VEIN OF BOTH LOWER EXTREMITIES (HCC): ICD-10-CM

## 2023-10-30 RX ORDER — WARFARIN SODIUM 2 MG/1
2 TABLET ORAL DAILY
Qty: 90 TABLET | Refills: 1 | Status: SHIPPED | OUTPATIENT
Start: 2023-10-30

## 2023-10-30 RX ORDER — WARFARIN SODIUM 6 MG/1
6 TABLET ORAL
Qty: 90 TABLET | Refills: 1 | Status: SHIPPED | OUTPATIENT
Start: 2023-10-30

## 2023-11-28 DIAGNOSIS — E11.40 TYPE 2 DIABETES MELLITUS WITH DIABETIC NEUROPATHY, WITHOUT LONG-TERM CURRENT USE OF INSULIN (HCC): ICD-10-CM

## 2023-11-28 RX ORDER — INSULIN GLARGINE-YFGN 100 [IU]/ML
INJECTION, SOLUTION SUBCUTANEOUS
Qty: 10 ML | Refills: 3 | Status: CANCELLED | OUTPATIENT
Start: 2023-11-28

## 2023-11-28 RX ORDER — INSULIN GLARGINE 100 [IU]/ML
INJECTION, SOLUTION SUBCUTANEOUS
Qty: 10 ML | Refills: 3 | Status: SHIPPED | OUTPATIENT
Start: 2023-11-28 | End: 2023-12-04 | Stop reason: SDUPTHER

## 2023-11-29 RX ORDER — INSULIN GLARGINE 100 [IU]/ML
12 INJECTION, SOLUTION SUBCUTANEOUS DAILY
Qty: 10 ML | Refills: 5 | Status: SHIPPED | OUTPATIENT
Start: 2023-11-29

## 2023-12-04 DIAGNOSIS — E11.40 TYPE 2 DIABETES MELLITUS WITH DIABETIC NEUROPATHY, WITHOUT LONG-TERM CURRENT USE OF INSULIN (HCC): ICD-10-CM

## 2023-12-04 DIAGNOSIS — E11.65 TYPE 2 DIABETES MELLITUS WITH HYPERGLYCEMIA, WITH LONG-TERM CURRENT USE OF INSULIN (HCC): ICD-10-CM

## 2023-12-04 DIAGNOSIS — I82.403 ACUTE EMBOLISM AND THROMBOSIS OF DEEP VEIN OF BOTH LOWER EXTREMITIES (HCC): ICD-10-CM

## 2023-12-04 DIAGNOSIS — Z79.4 TYPE 2 DIABETES MELLITUS WITH HYPERGLYCEMIA, WITH LONG-TERM CURRENT USE OF INSULIN (HCC): ICD-10-CM

## 2023-12-04 DIAGNOSIS — I10 ESSENTIAL HYPERTENSION: ICD-10-CM

## 2023-12-04 RX ORDER — SIMVASTATIN 20 MG
20 TABLET ORAL DAILY
Qty: 90 TABLET | Refills: 3 | Status: SHIPPED | OUTPATIENT
Start: 2023-12-04

## 2023-12-04 RX ORDER — WARFARIN SODIUM 2 MG/1
2 TABLET ORAL DAILY
Qty: 90 TABLET | Refills: 1 | Status: SHIPPED | OUTPATIENT
Start: 2023-12-04

## 2023-12-04 RX ORDER — LISINOPRIL 10 MG/1
10 TABLET ORAL DAILY
Qty: 90 TABLET | Refills: 3 | Status: SHIPPED | OUTPATIENT
Start: 2023-12-04

## 2023-12-04 RX ORDER — WARFARIN SODIUM 6 MG/1
6 TABLET ORAL
Qty: 90 TABLET | Refills: 1 | Status: SHIPPED | OUTPATIENT
Start: 2023-12-04

## 2023-12-04 RX ORDER — INSULIN GLARGINE 100 [IU]/ML
12 INJECTION, SOLUTION SUBCUTANEOUS DAILY
Qty: 10 ML | Refills: 3 | Status: SHIPPED | OUTPATIENT
Start: 2023-12-04 | End: 2023-12-08 | Stop reason: SDUPTHER

## 2023-12-08 ENCOUNTER — TELEPHONE (OUTPATIENT)
Dept: FAMILY MEDICINE CLINIC | Facility: CLINIC | Age: 68
End: 2023-12-08

## 2023-12-08 DIAGNOSIS — E11.40 TYPE 2 DIABETES MELLITUS WITH DIABETIC NEUROPATHY, WITHOUT LONG-TERM CURRENT USE OF INSULIN (HCC): ICD-10-CM

## 2023-12-08 RX ORDER — INSULIN GLARGINE 100 [IU]/ML
12 INJECTION, SOLUTION SUBCUTANEOUS DAILY
Qty: 10 ML | Refills: 3 | Status: SHIPPED | OUTPATIENT
Start: 2023-12-08

## 2023-12-08 NOTE — TELEPHONE ENCOUNTER
T/c from pt -- states he received lantus solostar from the pharmacy, pt states he does not use this and is asking for Dr Sergo Terrell to resend his Lantus in the vial.     Please advise

## 2023-12-11 DIAGNOSIS — E11.40 TYPE 2 DIABETES MELLITUS WITH DIABETIC NEUROPATHY, WITHOUT LONG-TERM CURRENT USE OF INSULIN (HCC): ICD-10-CM

## 2023-12-12 RX ORDER — INSULIN GLARGINE 100 [IU]/ML
INJECTION, SOLUTION SUBCUTANEOUS
OUTPATIENT
Start: 2023-12-12

## 2023-12-12 NOTE — TELEPHONE ENCOUNTER
I have sent this several times, they are probably not feeling it because it is not covered, will he accept a substitute that is covered or he will have to pay for it.

## 2023-12-21 DIAGNOSIS — E11.65 TYPE 2 DIABETES MELLITUS WITH HYPERGLYCEMIA, WITH LONG-TERM CURRENT USE OF INSULIN (HCC): ICD-10-CM

## 2023-12-21 DIAGNOSIS — Z79.4 TYPE 2 DIABETES MELLITUS WITH HYPERGLYCEMIA, WITH LONG-TERM CURRENT USE OF INSULIN (HCC): ICD-10-CM

## 2023-12-21 RX ORDER — FLASH GLUCOSE SENSOR
KIT MISCELLANEOUS
Qty: 2 EACH | Refills: 5 | Status: SHIPPED | OUTPATIENT
Start: 2023-12-21

## 2024-01-30 ENCOUNTER — RA CDI HCC (OUTPATIENT)
Dept: OTHER | Facility: HOSPITAL | Age: 69
End: 2024-01-30

## 2024-01-30 NOTE — PROGRESS NOTES
HCC coding opportunities          Chart Reviewed number of suggestions sent to Provider: 2   E11.42  E11.51    Patients Insurance     Medicare Insurance: [employer] Ad[employer]age

## 2024-02-05 ENCOUNTER — OFFICE VISIT (OUTPATIENT)
Dept: FAMILY MEDICINE CLINIC | Facility: CLINIC | Age: 69
End: 2024-02-05
Payer: COMMERCIAL

## 2024-02-05 VITALS
OXYGEN SATURATION: 100 % | SYSTOLIC BLOOD PRESSURE: 122 MMHG | TEMPERATURE: 97.1 F | HEART RATE: 96 BPM | WEIGHT: 209.4 LBS | DIASTOLIC BLOOD PRESSURE: 78 MMHG | BODY MASS INDEX: 26.87 KG/M2 | HEIGHT: 74 IN

## 2024-02-05 DIAGNOSIS — E11.40 TYPE 2 DIABETES MELLITUS WITH DIABETIC NEUROPATHY, WITHOUT LONG-TERM CURRENT USE OF INSULIN (HCC): ICD-10-CM

## 2024-02-05 DIAGNOSIS — E11.51 TYPE 2 DIABETES MELLITUS WITH DIABETIC PERIPHERAL ANGIOPATHY WITHOUT GANGRENE, WITH LONG-TERM CURRENT USE OF INSULIN (HCC): Primary | ICD-10-CM

## 2024-02-05 DIAGNOSIS — R26.2 AMBULATORY DYSFUNCTION: ICD-10-CM

## 2024-02-05 DIAGNOSIS — I82.403 ACUTE EMBOLISM AND THROMBOSIS OF DEEP VEIN OF BOTH LOWER EXTREMITIES (HCC): ICD-10-CM

## 2024-02-05 DIAGNOSIS — M51.36 LUMBAR DEGENERATIVE DISC DISEASE: ICD-10-CM

## 2024-02-05 DIAGNOSIS — Z79.4 TYPE 2 DIABETES MELLITUS WITH DIABETIC PERIPHERAL ANGIOPATHY WITHOUT GANGRENE, WITH LONG-TERM CURRENT USE OF INSULIN (HCC): Primary | ICD-10-CM

## 2024-02-05 DIAGNOSIS — N18.4 CHRONIC KIDNEY DISEASE, STAGE 4 (SEVERE) (HCC): ICD-10-CM

## 2024-02-05 DIAGNOSIS — I10 ESSENTIAL HYPERTENSION: ICD-10-CM

## 2024-02-05 DIAGNOSIS — I73.9 PERIPHERAL VASCULAR DISEASE (HCC): ICD-10-CM

## 2024-02-05 DIAGNOSIS — L03.032 PARONYCHIA OF TOE, LEFT: ICD-10-CM

## 2024-02-05 DIAGNOSIS — Z12.5 PROSTATE CANCER SCREENING: ICD-10-CM

## 2024-02-05 PROCEDURE — 99214 OFFICE O/P EST MOD 30 MIN: CPT | Performed by: FAMILY MEDICINE

## 2024-02-05 RX ORDER — CEPHALEXIN 500 MG/1
500 CAPSULE ORAL 3 TIMES DAILY
Qty: 21 CAPSULE | Refills: 0 | Status: SHIPPED | OUTPATIENT
Start: 2024-02-05 | End: 2024-02-12

## 2024-02-05 NOTE — ASSESSMENT & PLAN NOTE
Continue Coumadin for his history of DVT as well as for his peripheral vascular disease.  Continue his blood pressure, cholesterol, sugar control.

## 2024-02-05 NOTE — PROGRESS NOTES
Name: Galen Willams      : 1955      MRN: [id number]  Encounter Provider: Joel Jernigan DO  Encounter Date: 2024   Encounter department: [facility] [address]    Assessment & Plan     1. Type 2 diabetes mellitus with diabetic peripheral angiopathy without gangrene, with long-term current use of insulin ([facility])  Assessment & Plan:  Get his hemoglobin A1c done, continue his metformin, insulin.  Lab Results   Component Value Date    HGBA1C 7.2 (H) 2023       Orders:  -     Comprehensive metabolic panel; Future  -     Lipid panel; Future  -     Hemoglobin A1C; Future    2. Type 2 diabetes mellitus with diabetic neuropathy, without long-term current use of insulin ([facility])  Assessment & Plan:  Continue his metformin, insulin, check CMP, hemoglobin A1c  Lab Results   Component Value Date    HGBA1C 7.2 (H) 2023         3. Peripheral vascular disease ([facility])  Assessment & Plan:  Continue Coumadin for his history of DVT as well as for his peripheral vascular disease.  Continue his blood pressure, cholesterol, sugar control.      4. Acute embolism and thrombosis of deep vein of both lower extremities ([facility])  Assessment & Plan:  Continue Coumadin, check PT and INR    Orders:  -     Protime-INR; Standing    5. Essential hypertension  Assessment & Plan:  Controlled, continue his lisinopril    Orders:  -     CBC; Future    6. Chronic kidney disease, stage 4 (severe) ([facility])  Assessment & Plan:  Lab Results   Component Value Date    EGFR 34 2023    EGFR 29 10/06/2022    EGFR 43 2022    CREATININE 2.00 (H) 2023    CREATININE 2.28 (H) 10/06/2022    CREATININE 1.63 (H) 2022   Continue blood pressure control, check a CMP      7. Lumbar degenerative disc disease  Assessment & Plan:  Physical therapy evaluation    Orders:  -     Ambulatory Referral to Physical Therapy; Future    8. Prostate cancer screening  -     PSA, Total Screen; Future; Expected date:  02/05/2024    9. Paronychia of toe, left  -     cephalexin (KEFLEX) 500 mg capsule; Take 1 capsule (500 mg total) by mouth 3 (three) times a day for 7 days    10. Ambulatory dysfunction  Assessment & Plan:  PT evaluation    Orders:  -     Ambulatory Referral to Physical Therapy; Future           Subjective      Patient comes in today for checkup, he does complain of worsening weakness in his legs and difficulty standing up straight due to his back.  He also complains of redness and discharge at the base of his left fifth toe.  He has not gotten any blood work done since his last visit.  He has not done his PT/INR as advised.      Review of Systems   Constitutional:  Negative for chills, fatigue and fever.   HENT:  Negative for congestion, ear pain, hearing loss, postnasal drip, rhinorrhea and sore throat.    Eyes:  Negative for pain and visual disturbance.   Respiratory:  Negative for chest tightness, shortness of breath and wheezing.    Cardiovascular:  Negative for chest pain and leg swelling.   Gastrointestinal:  Negative for abdominal distention, abdominal pain, constipation, diarrhea and vomiting.   Endocrine: Negative for cold intolerance and heat intolerance.   Genitourinary:  Negative for difficulty urinating, frequency and urgency.   Musculoskeletal:  Positive for back pain. Negative for arthralgias and gait problem.   Skin:  Negative for color change.   Neurological:  Positive for weakness. Negative for dizziness, tremors, syncope, numbness and headaches.   Hematological:  Negative for adenopathy.   Psychiatric/Behavioral:  Negative for agitation, confusion and sleep disturbance. The patient is not nervous/anxious.        Current Outpatient Medications on File Prior to Visit   Medication Sig    albuterol (ProAir HFA) 90 mcg/act inhaler Inhale 2 puffs every 4 (four) hours as needed for wheezing    colchicine-probenecid 0.5-500 MG per tablet TAKE 1 TABLET BY MOUTH DAILY    Continuous Blood Gluc   "(FreeStyle Reese 14 Day McLean) JEROME Check blood sugar TID, DX E11.9    Continuous Blood Gluc Sensor (FreeStyle Reese 14 Day Sensor) MISC Check blood sugar TID, apply new sensor q 14 days    Diclofenac Sodium (VOLTAREN) 1 % Apply 2 g topically 4 (four) times a day    ferrous sulfate 325 (65 Fe) mg tablet Take 325 mg by mouth daily with breakfast    fluticasone (FLONASE) 50 mcg/act nasal spray 1 spray into each nostril daily    insulin glargine (Lantus) 100 units/mL subcutaneous injection Inject 12 Units under the skin daily    Insulin Glargine Solostar (Lantus SoloStar) 100 UNIT/ML SOPN Inject 0.12 mL (12 Units total) under the skin in the morning    lisinopril (ZESTRIL) 10 mg tablet Take 1 tablet (10 mg total) by mouth daily    metFORMIN (GLUCOPHAGE) 1000 MG tablet Take 1 tablet (1,000 mg total) by mouth 2 (two) times a day with meals    mupirocin (BACTROBAN) 2 % ointment Apply topically 3 (three) times a day    polyethylene glycol (MIRALAX) 17 g packet Take 17 g by mouth daily as needed     senna-docusate sodium (SENOKOT-S) 8.6-50 mg per tablet Take 1 tablet by mouth 2 (two) times a day    simvastatin (ZOCOR) 20 mg tablet Take 1 tablet (20 mg total) by mouth daily    tolterodine (DETROL) 2 mg tablet Take 1 tablet (2 mg total) by mouth 2 (two) times a day    warfarin (COUMADIN) 1 mg tablet Take 1 tablet (1 mg total) by mouth daily    warfarin (COUMADIN) 2 mg tablet Take 1 tablet (2 mg total) by mouth daily    warfarin (Jantoven) 6 mg tablet Take 1 tablet (6 mg total) by mouth daily       Objective     /78 (BP Location: Right arm, Patient Position: Sitting, Cuff Size: Standard)   Pulse 96   Temp (!) 97.1 °F (36.2 °C) (Tympanic)   Ht 6' 2\" (1.88 m)   Wt 95 kg (209 lb 6.4 oz)   SpO2 100%   BMI 26.89 kg/m²     Physical Exam  Vitals and nursing note reviewed.   Constitutional:       General: He is not in acute distress.     Appearance: Normal appearance. He is well-developed.   HENT:      Head: " Normocephalic.   Eyes:      General: No scleral icterus.     Conjunctiva/sclera: Conjunctivae normal.   Neck:      Thyroid: No thyromegaly.   Cardiovascular:      Rate and Rhythm: Normal rate and regular rhythm.      Heart sounds: Normal heart sounds. No murmur heard.  Pulmonary:      Effort: Pulmonary effort is normal. No respiratory distress.      Breath sounds: Normal breath sounds. No wheezing.   Abdominal:      General: Bowel sounds are normal. There is no distension.      Palpations: Abdomen is soft.      Tenderness: There is no abdominal tenderness.   Musculoskeletal:         General: Deformity (Increased kyphosis of the thoracic spine) present. No tenderness. Normal range of motion.      Cervical back: Normal range of motion.      Right lower leg: No edema.      Left lower leg: No edema.   Lymphadenopathy:      Cervical: No cervical adenopathy.   Skin:     General: Skin is warm and dry.      Coloration: Skin is not pale.      Findings: Erythema (Base of the nail of the left fifth toe) present. No rash.   Neurological:      Mental Status: He is alert and oriented to person, place, and time.      Cranial Nerves: No cranial nerve deficit.   Psychiatric:         Behavior: Behavior normal.       Joel Jernigan DO

## 2024-02-05 NOTE — ASSESSMENT & PLAN NOTE
Continue his metformin, insulin, check CMP, hemoglobin A1c  Lab Results   Component Value Date    HGBA1C 7.2 (H) 07/12/2023

## 2024-02-05 NOTE — ASSESSMENT & PLAN NOTE
Donna, school nurse called to clarify if the patient should be using crutches.  Please update school note to include this information.     If you have any questions please contact Donna  [phone]       Fax note to  Attn: School Nurse- Donna Fax [phone]   Lab Results   Component Value Date    EGFR 34 07/12/2023    EGFR 29 10/06/2022    EGFR 43 05/19/2022    CREATININE 2.00 (H) 07/12/2023    CREATININE 2.28 (H) 10/06/2022    CREATININE 1.63 (H) 05/19/2022   Continue blood pressure control, check a CMP

## 2024-02-05 NOTE — ASSESSMENT & PLAN NOTE
Get his hemoglobin A1c done, continue his metformin, insulin.  Lab Results   Component Value Date    HGBA1C 7.2 (H) 07/12/2023

## 2024-02-15 DIAGNOSIS — L03.032 PARONYCHIA OF TOE, LEFT: Primary | ICD-10-CM

## 2024-02-15 RX ORDER — CEPHALEXIN 500 MG/1
500 CAPSULE ORAL 3 TIMES DAILY
Qty: 21 CAPSULE | Refills: 0 | Status: SHIPPED | OUTPATIENT
Start: 2024-02-15 | End: 2024-02-22

## 2024-03-07 ENCOUNTER — APPOINTMENT (OUTPATIENT)
Dept: LAB | Facility: HOSPITAL | Age: 69
End: 2024-03-07
Payer: COMMERCIAL

## 2024-03-07 DIAGNOSIS — E11.51 TYPE 2 DIABETES MELLITUS WITH DIABETIC PERIPHERAL ANGIOPATHY WITHOUT GANGRENE, WITH LONG-TERM CURRENT USE OF INSULIN (HCC): ICD-10-CM

## 2024-03-07 DIAGNOSIS — I82.403 ACUTE EMBOLISM AND THROMBOSIS OF DEEP VEIN OF BOTH LOWER EXTREMITIES (HCC): Primary | ICD-10-CM

## 2024-03-07 DIAGNOSIS — I82.403 ACUTE EMBOLISM AND THROMBOSIS OF DEEP VEIN OF BOTH LOWER EXTREMITIES (HCC): ICD-10-CM

## 2024-03-07 DIAGNOSIS — E87.5 HYPERKALEMIA: Primary | ICD-10-CM

## 2024-03-07 DIAGNOSIS — Z79.4 TYPE 2 DIABETES MELLITUS WITH DIABETIC PERIPHERAL ANGIOPATHY WITHOUT GANGRENE, WITH LONG-TERM CURRENT USE OF INSULIN (HCC): ICD-10-CM

## 2024-03-07 DIAGNOSIS — I10 ESSENTIAL HYPERTENSION: ICD-10-CM

## 2024-03-07 DIAGNOSIS — Z12.5 PROSTATE CANCER SCREENING: ICD-10-CM

## 2024-03-07 LAB
ALBUMIN SERPL BCP-MCNC: 3.9 G/DL (ref 3.5–5)
ALP SERPL-CCNC: 94 U/L (ref 34–104)
ALT SERPL W P-5'-P-CCNC: 5 U/L (ref 7–52)
ANION GAP SERPL CALCULATED.3IONS-SCNC: 4 MMOL/L
AST SERPL W P-5'-P-CCNC: 10 U/L (ref 13–39)
BILIRUB SERPL-MCNC: 0.59 MG/DL (ref 0.2–1)
BUN SERPL-MCNC: 52 MG/DL (ref 5–25)
CALCIUM SERPL-MCNC: 9.5 MG/DL (ref 8.4–10.2)
CHLORIDE SERPL-SCNC: 112 MMOL/L (ref 96–108)
CHOLEST SERPL-MCNC: 126 MG/DL
CO2 SERPL-SCNC: 22 MMOL/L (ref 21–32)
CREAT SERPL-MCNC: 1.85 MG/DL (ref 0.6–1.3)
ERYTHROCYTE [DISTWIDTH] IN BLOOD BY AUTOMATED COUNT: 13.5 % (ref 11.6–15.1)
EST. AVERAGE GLUCOSE BLD GHB EST-MCNC: 192 MG/DL
GFR SERPL CREATININE-BSD FRML MDRD: 37 ML/MIN/1.73SQ M
GLUCOSE P FAST SERPL-MCNC: 115 MG/DL (ref 65–99)
HBA1C MFR BLD: 8.3 %
HCT VFR BLD AUTO: 39.6 % (ref 36.5–49.3)
HDLC SERPL-MCNC: 35 MG/DL
HGB BLD-MCNC: 13.4 G/DL (ref 12–17)
INR PPP: 1.88 (ref 0.84–1.19)
LDLC SERPL CALC-MCNC: 65 MG/DL (ref 0–100)
MCH RBC QN AUTO: 31.3 PG (ref 26.8–34.3)
MCHC RBC AUTO-ENTMCNC: 33.8 G/DL (ref 31.4–37.4)
MCV RBC AUTO: 93 FL (ref 82–98)
NONHDLC SERPL-MCNC: 91 MG/DL
PLATELET # BLD AUTO: 193 THOUSANDS/UL (ref 149–390)
PMV BLD AUTO: 9.2 FL (ref 8.9–12.7)
POTASSIUM SERPL-SCNC: 5.6 MMOL/L (ref 3.5–5.3)
PROT SERPL-MCNC: 7 G/DL (ref 6.4–8.4)
PROTHROMBIN TIME: 22.5 SECONDS (ref 11.6–14.5)
PSA SERPL-MCNC: 1.19 NG/ML (ref 0–4)
RBC # BLD AUTO: 4.28 MILLION/UL (ref 3.88–5.62)
SODIUM SERPL-SCNC: 138 MMOL/L (ref 135–147)
TRIGL SERPL-MCNC: 131 MG/DL
WBC # BLD AUTO: 8.21 THOUSAND/UL (ref 4.31–10.16)

## 2024-03-07 PROCEDURE — 85610 PROTHROMBIN TIME: CPT

## 2024-03-07 PROCEDURE — 83036 HEMOGLOBIN GLYCOSYLATED A1C: CPT

## 2024-03-07 PROCEDURE — 80053 COMPREHEN METABOLIC PANEL: CPT

## 2024-03-07 PROCEDURE — 85027 COMPLETE CBC AUTOMATED: CPT

## 2024-03-07 PROCEDURE — G0103 PSA SCREENING: HCPCS

## 2024-03-07 PROCEDURE — 36415 COLL VENOUS BLD VENIPUNCTURE: CPT

## 2024-03-07 PROCEDURE — 80061 LIPID PANEL: CPT

## 2024-03-07 RX ORDER — WARFARIN SODIUM 1 MG/1
TABLET ORAL
Qty: 30 TABLET | Refills: 1 | Status: SHIPPED | OUTPATIENT
Start: 2024-03-07

## 2024-03-08 DIAGNOSIS — E11.40 TYPE 2 DIABETES MELLITUS WITH DIABETIC NEUROPATHY, WITHOUT LONG-TERM CURRENT USE OF INSULIN (HCC): ICD-10-CM

## 2024-03-08 RX ORDER — INSULIN GLARGINE 100 [IU]/ML
15 INJECTION, SOLUTION SUBCUTANEOUS DAILY
Start: 2024-03-08

## 2024-03-20 DIAGNOSIS — J06.9 UPPER RESPIRATORY TRACT INFECTION, UNSPECIFIED TYPE: ICD-10-CM

## 2024-03-20 RX ORDER — ALBUTEROL SULFATE 90 UG/1
AEROSOL, METERED RESPIRATORY (INHALATION)
Qty: 8.5 G | Refills: 2 | Status: SHIPPED | OUTPATIENT
Start: 2024-03-20

## 2024-04-05 DIAGNOSIS — E11.40 TYPE 2 DIABETES MELLITUS WITH DIABETIC NEUROPATHY, WITHOUT LONG-TERM CURRENT USE OF INSULIN (HCC): ICD-10-CM

## 2024-04-05 RX ORDER — INSULIN GLARGINE 100 [IU]/ML
INJECTION, SOLUTION SUBCUTANEOUS
Qty: 10 ML | Refills: 3 | Status: SHIPPED | OUTPATIENT
Start: 2024-04-05

## 2024-05-06 DIAGNOSIS — I82.403 ACUTE EMBOLISM AND THROMBOSIS OF DEEP VEIN OF BOTH LOWER EXTREMITIES (HCC): ICD-10-CM

## 2024-05-07 RX ORDER — WARFARIN SODIUM 1 MG/1
TABLET ORAL
Qty: 30 TABLET | Refills: 1 | Status: SHIPPED | OUTPATIENT
Start: 2024-05-07

## 2024-05-16 ENCOUNTER — VBI (OUTPATIENT)
Dept: ADMINISTRATIVE | Facility: OTHER | Age: 69
End: 2024-05-16

## 2024-05-24 DIAGNOSIS — I82.403 ACUTE EMBOLISM AND THROMBOSIS OF DEEP VEIN OF BOTH LOWER EXTREMITIES (HCC): ICD-10-CM

## 2024-05-24 RX ORDER — WARFARIN SODIUM 6 MG/1
6 TABLET ORAL DAILY
Qty: 90 TABLET | Refills: 1 | Status: SHIPPED | OUTPATIENT
Start: 2024-05-24

## 2024-05-24 RX ORDER — WARFARIN SODIUM 2 MG/1
2 TABLET ORAL DAILY
Qty: 90 TABLET | Refills: 1 | Status: SHIPPED | OUTPATIENT
Start: 2024-05-24

## 2024-06-18 DIAGNOSIS — Z79.4 TYPE 2 DIABETES MELLITUS WITH HYPERGLYCEMIA, WITH LONG-TERM CURRENT USE OF INSULIN (HCC): ICD-10-CM

## 2024-06-18 DIAGNOSIS — E11.65 TYPE 2 DIABETES MELLITUS WITH HYPERGLYCEMIA, WITH LONG-TERM CURRENT USE OF INSULIN (HCC): ICD-10-CM

## 2024-06-18 RX ORDER — FLASH GLUCOSE SENSOR
KIT MISCELLANEOUS
Qty: 2 EACH | Refills: 5 | Status: SHIPPED | OUTPATIENT
Start: 2024-06-18 | End: 2024-06-19 | Stop reason: SDUPTHER

## 2024-06-19 DIAGNOSIS — Z79.4 TYPE 2 DIABETES MELLITUS WITH HYPERGLYCEMIA, WITH LONG-TERM CURRENT USE OF INSULIN (HCC): ICD-10-CM

## 2024-06-19 DIAGNOSIS — E11.65 TYPE 2 DIABETES MELLITUS WITH HYPERGLYCEMIA, WITH LONG-TERM CURRENT USE OF INSULIN (HCC): ICD-10-CM

## 2024-06-19 RX ORDER — FLASH GLUCOSE SENSOR
KIT MISCELLANEOUS
Qty: 2 EACH | Refills: 5 | Status: SHIPPED | OUTPATIENT
Start: 2024-06-19

## 2024-06-26 DIAGNOSIS — R60.0 LOCALIZED EDEMA: Primary | ICD-10-CM

## 2024-06-26 RX ORDER — FUROSEMIDE 20 MG/1
20 TABLET ORAL DAILY
Qty: 30 TABLET | Refills: 0 | Status: SHIPPED | OUTPATIENT
Start: 2024-06-26

## 2024-07-09 DIAGNOSIS — I82.403 ACUTE EMBOLISM AND THROMBOSIS OF DEEP VEIN OF BOTH LOWER EXTREMITIES (HCC): ICD-10-CM

## 2024-07-09 RX ORDER — WARFARIN SODIUM 1 MG/1
TABLET ORAL
Qty: 30 TABLET | Refills: 1 | Status: SHIPPED | OUTPATIENT
Start: 2024-07-09

## 2024-07-10 ENCOUNTER — VBI (OUTPATIENT)
Dept: ADMINISTRATIVE | Facility: OTHER | Age: 69
End: 2024-07-10

## 2024-07-10 NOTE — TELEPHONE ENCOUNTER
07/10/24 2:59 PM     Chart reviewed for CRC: Colonoscopy ; nothing is submitted to the patient's insurance at this time.     Kayley Tate MA   [facility] VALUE BASED [facility]

## 2024-07-25 DIAGNOSIS — R60.0 LOCALIZED EDEMA: ICD-10-CM

## 2024-07-25 RX ORDER — FUROSEMIDE 20 MG/1
20 TABLET ORAL DAILY
Qty: 30 TABLET | Refills: 5 | Status: SHIPPED | OUTPATIENT
Start: 2024-07-25

## 2024-08-22 DIAGNOSIS — I82.403 ACUTE EMBOLISM AND THROMBOSIS OF DEEP VEIN OF BOTH LOWER EXTREMITIES (HCC): ICD-10-CM

## 2024-08-22 RX ORDER — WARFARIN SODIUM 1 MG/1
TABLET ORAL
Qty: 30 TABLET | Refills: 1 | Status: SHIPPED | OUTPATIENT
Start: 2024-08-22

## 2024-08-26 DIAGNOSIS — E11.40 TYPE 2 DIABETES MELLITUS WITH DIABETIC NEUROPATHY, WITHOUT LONG-TERM CURRENT USE OF INSULIN (HCC): ICD-10-CM

## 2024-08-26 DIAGNOSIS — J06.9 UPPER RESPIRATORY TRACT INFECTION, UNSPECIFIED TYPE: ICD-10-CM

## 2024-08-27 RX ORDER — ALBUTEROL SULFATE 90 UG/1
AEROSOL, METERED RESPIRATORY (INHALATION)
Qty: 6.7 G | Refills: 3 | Status: SHIPPED | OUTPATIENT
Start: 2024-08-27

## 2024-08-27 RX ORDER — INSULIN GLARGINE 100 [IU]/ML
INJECTION, SOLUTION SUBCUTANEOUS
Qty: 10 ML | Refills: 3 | Status: SHIPPED | OUTPATIENT
Start: 2024-08-27

## 2024-09-17 ENCOUNTER — VBI (OUTPATIENT)
Dept: ADMINISTRATIVE | Facility: OTHER | Age: 69
End: 2024-09-17

## 2024-09-17 NOTE — TELEPHONE ENCOUNTER
09/17/24 10:22 AM     Chart reviewed for Diabetic Eye Exam ; nothing is submitted to the patient's insurance at this time.     Domingo Harper MA   [facility] VALUE BASED VIR

## 2024-09-25 ENCOUNTER — VBI (OUTPATIENT)
Dept: ADMINISTRATIVE | Facility: OTHER | Age: 69
End: 2024-09-25

## 2024-09-25 NOTE — TELEPHONE ENCOUNTER
09/25/24 8:41 AM     Chart reviewed for CRC: Colonoscopy ; nothing is submitted to the patient's insurance at this time.     Viviane Maldonado MA   [facility] VALUE BASED VIR

## 2024-11-07 ENCOUNTER — IMMUNIZATIONS (OUTPATIENT)
Dept: FAMILY MEDICINE CLINIC | Facility: CLINIC | Age: 69
End: 2024-11-07
Payer: COMMERCIAL

## 2024-11-07 DIAGNOSIS — Z23 ENCOUNTER FOR IMMUNIZATION: Primary | ICD-10-CM

## 2024-11-07 PROCEDURE — 90662 IIV NO PRSV INCREASED AG IM: CPT

## 2024-11-07 PROCEDURE — G0008 ADMIN INFLUENZA VIRUS VAC: HCPCS

## 2024-11-19 DIAGNOSIS — I82.403 ACUTE EMBOLISM AND THROMBOSIS OF DEEP VEIN OF BOTH LOWER EXTREMITIES (HCC): ICD-10-CM

## 2024-11-19 DIAGNOSIS — E11.40 TYPE 2 DIABETES MELLITUS WITH DIABETIC NEUROPATHY, WITHOUT LONG-TERM CURRENT USE OF INSULIN (HCC): ICD-10-CM

## 2024-11-19 DIAGNOSIS — I10 ESSENTIAL HYPERTENSION: ICD-10-CM

## 2024-11-20 RX ORDER — WARFARIN SODIUM 2 MG/1
2 TABLET ORAL DAILY
Qty: 90 TABLET | Refills: 1 | Status: SHIPPED | OUTPATIENT
Start: 2024-11-20

## 2024-11-20 RX ORDER — LISINOPRIL 10 MG/1
10 TABLET ORAL DAILY
Qty: 90 TABLET | Refills: 1 | Status: SHIPPED | OUTPATIENT
Start: 2024-11-20

## 2024-11-20 RX ORDER — SIMVASTATIN 20 MG
20 TABLET ORAL DAILY
Qty: 90 TABLET | Refills: 1 | Status: SHIPPED | OUTPATIENT
Start: 2024-11-20

## 2024-11-20 RX ORDER — WARFARIN SODIUM 1 MG/1
1 TABLET ORAL DAILY
Qty: 90 TABLET | Refills: 1 | Status: SHIPPED | OUTPATIENT
Start: 2024-11-20

## 2024-11-20 RX ORDER — WARFARIN SODIUM 6 MG/1
6 TABLET ORAL DAILY
Qty: 90 TABLET | Refills: 1 | Status: SHIPPED | OUTPATIENT
Start: 2024-11-20

## 2024-11-29 DIAGNOSIS — Z79.4 TYPE 2 DIABETES MELLITUS WITH HYPERGLYCEMIA, WITH LONG-TERM CURRENT USE OF INSULIN (HCC): ICD-10-CM

## 2024-11-29 DIAGNOSIS — E11.65 TYPE 2 DIABETES MELLITUS WITH HYPERGLYCEMIA, WITH LONG-TERM CURRENT USE OF INSULIN (HCC): ICD-10-CM

## 2024-11-29 RX ORDER — FLASH GLUCOSE SENSOR
KIT MISCELLANEOUS
Qty: 2 EACH | Refills: 5 | Status: SHIPPED | OUTPATIENT
Start: 2024-11-29

## 2024-12-05 ENCOUNTER — TELEPHONE (OUTPATIENT)
Age: 69
End: 2024-12-05

## 2024-12-05 NOTE — TELEPHONE ENCOUNTER
Patient called stating his [employer] insurance sent him a Cologuard kit which resulted in a positive result. He is concerned and would like to know what PCP advises. He states he had a Colonoscopy done about 8 years ago and is not sure if he should have another done.      Patient would appreciate a call back. Please advise.   Patients number is [phone]

## 2024-12-06 DIAGNOSIS — R19.5 POSITIVE COLORECTAL CANCER SCREENING USING COLOGUARD TEST: Primary | ICD-10-CM

## 2024-12-12 ENCOUNTER — TELEPHONE (OUTPATIENT)
Age: 69
End: 2024-12-12

## 2024-12-12 NOTE — TELEPHONE ENCOUNTER
12/12/24  Screened by: Vanesa Carrasco    Referring Provider Dr. Jernigan    Pre- Screening:     There is no height or weight on file to calculate BMI.   26.89  Has patient been referred for a routine screening Colonoscopy? yes  Is the patient between 45-75 years old? yes      Previous Colonoscopy yes   If yes:    Date: 2014    Facility:     Reason:         Does the patient want to see a Gastroenterologist prior to their procedure OR are they having any GI symptoms? no    Has the patient been hospitalized or had abdominal surgery in the past 6 months? no    Does the patient use supplemental oxygen? no    Does the patient take Coumadin, Lovenox, Plavix, Elliquis, Xarelto, or other blood thinning medication? yes    Has the patient had a stroke, cardiac event, or stent placed in the past year? no        If patient is between 45yrs - 49yrs, please advise patient that we will have to confirm benefits & coverage with their insurance company for a routine screening colonoscopy.

## 2024-12-16 DIAGNOSIS — E11.40 TYPE 2 DIABETES MELLITUS WITH DIABETIC NEUROPATHY, WITHOUT LONG-TERM CURRENT USE OF INSULIN (HCC): ICD-10-CM

## 2024-12-17 RX ORDER — INSULIN GLARGINE 100 [IU]/ML
INJECTION, SOLUTION SUBCUTANEOUS
Qty: 10 ML | Refills: 0 | Status: SHIPPED | OUTPATIENT
Start: 2024-12-17

## 2024-12-30 ENCOUNTER — VBI (OUTPATIENT)
Dept: ADMINISTRATIVE | Facility: OTHER | Age: 69
End: 2024-12-30

## 2024-12-30 NOTE — TELEPHONE ENCOUNTER
12/30/24 7:13 AM     Chart reviewed for CRC: Colonoscopy was/were not submitted to the patient's insurance.     Viviane Maldonado MA   [facility] VALUE BASED VIR

## 2025-01-02 ENCOUNTER — CONSULT (OUTPATIENT)
Dept: GASTROENTEROLOGY | Facility: CLINIC | Age: 70
End: 2025-01-02
Payer: COMMERCIAL

## 2025-01-02 VITALS
HEIGHT: 74 IN | TEMPERATURE: 97.5 F | DIASTOLIC BLOOD PRESSURE: 78 MMHG | OXYGEN SATURATION: 100 % | HEART RATE: 78 BPM | SYSTOLIC BLOOD PRESSURE: 132 MMHG | WEIGHT: 204 LBS | BODY MASS INDEX: 26.18 KG/M2

## 2025-01-02 DIAGNOSIS — R30.0 DYSURIA: Primary | ICD-10-CM

## 2025-01-02 DIAGNOSIS — R19.5 POSITIVE COLORECTAL CANCER SCREENING USING COLOGUARD TEST: ICD-10-CM

## 2025-01-02 PROCEDURE — 99204 OFFICE O/P NEW MOD 45 MIN: CPT | Performed by: INTERNAL MEDICINE

## 2025-01-03 ENCOUNTER — TELEPHONE (OUTPATIENT)
Dept: GASTROENTEROLOGY | Facility: CLINIC | Age: 70
End: 2025-01-03

## 2025-01-03 NOTE — TELEPHONE ENCOUNTER
L/M for patient to call back to schedule his Colon with Dr Daryl Jernigan ok'd 5 Day Coumadin Hold    Joel Jernigan, DO Daniela Bonilla  Yes, it is okay to hold.  Happy New Year to you.

## 2025-01-03 NOTE — PROGRESS NOTES
Name: Galen Willams      : 1955      MRN: [id number]  Encounter Provider: Ray Brito DO  Encounter Date: 2025   Encounter department: [facility] [place]  :  Assessment & Plan  Positive colorectal cancer screening using Cologuard test    Orders:    Ambulatory Referral to Gastroenterology    Colonoscopy; Future    Patient stated that he would require a [facility] on staff anesthesiologist.  I pointed out that the anesthesia group that works with [facility] has a contractual relationship, but the anesthesiologist is not actually an employee of [facility]  Patient states that he cannot stop taking his warfarin.  I pointed out that all patients with a prior history of DVT generally stop warfarin to prevent serious postprocedural bleeding complications.  He is requesting that we clear this with his PCP, Dr. Jernigan.  I informed the patient that we will make Dr. Jernigan aware of this planned procedure with the desire to stop warfarin 3 days prior to the procedure.  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  We discussed the differential diagnosis of a positive Cologuard test which could indicate either a false positive test, the presence of blood, or the presence of 1 or more polyp.  We discussed the option of a CT colonography, however I also informed the patient that colonoscopy would be the best option at this point in time.  The patient's wife was in favor of colonoscopy.  Ultimately, the patient decided that colonoscopy would be the best approach.      History of Present Illness Galen Willams is a 69 y.o. male who presents with a positive Cologuard test.  The abnormal  "Cologuard test was performed on November 18, 2024.  His last routine colonoscopy was performed in 2014 and was described as being normal.  He denied any problems with the procedure at that time related to anesthesia, the procedure, or the bowel prep.  [facility]  I reviewed the procedural notes from [facility] for this colonoscopy performed June 26, 2014.  The procedure was described as uneventful and the results were negative.  The patient is unaware of his family history.  He denies any significant problems with his bowel movements.  There is been no rectal bleeding.  There is been no change in bowel habits.  The patient had multiple concerns about this upcoming colonoscopy.  First, he stated that he needed to be certain that the anesthesiologist was on staff with [facility].  He states that he was given a hefty anesthesia bill the last time he performed the procedure 10 years ago.  He indicated to me initially that he could not stop his warfarin because of a history of DVT which occurred many years ago.  He also pointed out to me that he is a diabetic and he does not want fingersticks performed prior to the procedure.  Rather he would like to use his freestyle glucose monitor instead.  He also indicated to me that he urinates every 2-3 hours and he wanted me to be aware of this.  He also stated he can only lay on his right side and never on his left side.  Finally he stated that he needed more pillows for his head because of a neck deformity.  Additionally, he states that if he was going to consider coming off of Coumadin temporarily then he would want this to be cleared by his PCP, Dr. Jernigan.    Objective /78 (BP Location: Right arm, Patient Position: Sitting, Cuff Size: Standard)   Pulse 78   Temp 97.5 °F (36.4 °C) (Temporal)   Ht 6' 2\" (1.88 m)   Wt 92.5 kg (204 lb)   SpO2 100%   BMI 26.19 kg/m²      Physical Exam  Constitutional:       Appearance: Normal " appearance. He is normal weight. He is not ill-appearing.   HENT:      Head: Normocephalic and atraumatic.   Eyes:      Extraocular Movements: Extraocular movements intact.      Pupils: Pupils are equal, round, and reactive to light.   Cardiovascular:      Rate and Rhythm: Normal rate and regular rhythm.      Heart sounds: No murmur heard.  Pulmonary:      Effort: Pulmonary effort is normal.      Breath sounds: Normal breath sounds. No wheezing, rhonchi or rales.   Abdominal:      General: Abdomen is flat. There is no distension.      Palpations: Abdomen is soft.      Tenderness: There is no abdominal tenderness. There is no guarding or rebound.   Musculoskeletal:         General: No swelling or deformity.      Cervical back: Rigidity present.      Right lower leg: No edema.      Left lower leg: No edema.   Skin:     General: Skin is warm and dry.      Coloration: Skin is not jaundiced.      Findings: No rash.   Neurological:      General: No focal deficit present.      Mental Status: He is alert and oriented to person, place, and time.   Psychiatric:         Mood and Affect: Mood normal.         Behavior: Behavior normal.

## 2025-01-07 ENCOUNTER — PATIENT MESSAGE (OUTPATIENT)
Dept: FAMILY MEDICINE CLINIC | Facility: CLINIC | Age: 70
End: 2025-01-07

## 2025-01-07 DIAGNOSIS — I10 ESSENTIAL HYPERTENSION: ICD-10-CM

## 2025-01-07 DIAGNOSIS — E11.40 TYPE 2 DIABETES MELLITUS WITH DIABETIC NEUROPATHY, WITHOUT LONG-TERM CURRENT USE OF INSULIN (HCC): ICD-10-CM

## 2025-01-07 DIAGNOSIS — Z79.4 TYPE 2 DIABETES MELLITUS WITH HYPERGLYCEMIA, WITH LONG-TERM CURRENT USE OF INSULIN (HCC): ICD-10-CM

## 2025-01-07 DIAGNOSIS — J06.9 UPPER RESPIRATORY TRACT INFECTION, UNSPECIFIED TYPE: ICD-10-CM

## 2025-01-07 DIAGNOSIS — E11.65 TYPE 2 DIABETES MELLITUS WITH HYPERGLYCEMIA, WITH LONG-TERM CURRENT USE OF INSULIN (HCC): ICD-10-CM

## 2025-01-07 DIAGNOSIS — I82.403 ACUTE EMBOLISM AND THROMBOSIS OF DEEP VEIN OF BOTH LOWER EXTREMITIES (HCC): ICD-10-CM

## 2025-01-07 RX ORDER — ALBUTEROL SULFATE 90 UG/1
2 INHALANT RESPIRATORY (INHALATION) EVERY 4 HOURS PRN
Qty: 6.7 G | Refills: 3 | Status: SHIPPED | OUTPATIENT
Start: 2025-01-07

## 2025-01-07 RX ORDER — LISINOPRIL 10 MG/1
10 TABLET ORAL DAILY
Qty: 90 TABLET | Refills: 1 | Status: SHIPPED | OUTPATIENT
Start: 2025-01-07

## 2025-01-07 RX ORDER — WARFARIN SODIUM 2 MG/1
2 TABLET ORAL DAILY
Qty: 90 TABLET | Refills: 1 | Status: SHIPPED | OUTPATIENT
Start: 2025-01-07

## 2025-01-07 RX ORDER — WARFARIN SODIUM 1 MG/1
1 TABLET ORAL DAILY
Qty: 90 TABLET | Refills: 1 | Status: SHIPPED | OUTPATIENT
Start: 2025-01-07

## 2025-01-07 RX ORDER — INSULIN GLARGINE 100 [IU]/ML
12 INJECTION, SOLUTION SUBCUTANEOUS DAILY
Qty: 10 ML | Refills: 3 | Status: SHIPPED | OUTPATIENT
Start: 2025-01-07

## 2025-01-07 RX ORDER — WARFARIN SODIUM 6 MG/1
6 TABLET ORAL DAILY
Qty: 90 TABLET | Refills: 1 | Status: SHIPPED | OUTPATIENT
Start: 2025-01-07

## 2025-01-07 RX ORDER — SIMVASTATIN 20 MG
20 TABLET ORAL DAILY
Qty: 90 TABLET | Refills: 1 | Status: SHIPPED | OUTPATIENT
Start: 2025-01-07

## 2025-01-07 NOTE — PATIENT COMMUNICATION
Patient called stating at this time he cannot schedule his AWV due to the weather.     However, he is asking if Dr. Jernigan will refill his meds.     Please advise patient

## 2025-01-08 ENCOUNTER — VBI (OUTPATIENT)
Dept: ADMINISTRATIVE | Facility: OTHER | Age: 70
End: 2025-01-08

## 2025-01-08 DIAGNOSIS — E11.65 TYPE 2 DIABETES MELLITUS WITH HYPERGLYCEMIA, WITH LONG-TERM CURRENT USE OF INSULIN (HCC): ICD-10-CM

## 2025-01-08 DIAGNOSIS — Z79.4 TYPE 2 DIABETES MELLITUS WITH HYPERGLYCEMIA, WITH LONG-TERM CURRENT USE OF INSULIN (HCC): ICD-10-CM

## 2025-01-08 RX ORDER — FLASH GLUCOSE SCANNING READER
EACH MISCELLANEOUS
Qty: 1 EACH | Refills: 5 | Status: SHIPPED | OUTPATIENT
Start: 2025-01-08

## 2025-01-08 NOTE — TELEPHONE ENCOUNTER
01/08/25 4:55 PM     Chart reviewed for Diabetic Eye Exam was/were not submitted to the patient's insurance.     Viviane Maldonado MA   [facility]

## 2025-01-10 ENCOUNTER — TELEPHONE (OUTPATIENT)
Dept: GASTROENTEROLOGY | Facility: CLINIC | Age: 70
End: 2025-01-10

## 2025-01-21 ENCOUNTER — RA CDI HCC (OUTPATIENT)
Dept: OTHER | Facility: HOSPITAL | Age: 70
End: 2025-01-21

## 2025-01-21 NOTE — PROGRESS NOTES
HCC coding opportunities          Chart Reviewed number of suggestions sent to Provider: 2   E11.22  E11.51    Please review and document all HCC diagnoses, using MEAT criteria, as risk scores reset with the new year.      Patients Insurance     Medicare Insurance: [employer]

## 2025-01-27 ENCOUNTER — OFFICE VISIT (OUTPATIENT)
Dept: FAMILY MEDICINE CLINIC | Facility: CLINIC | Age: 70
End: 2025-01-27
Payer: COMMERCIAL

## 2025-01-27 ENCOUNTER — TELEPHONE (OUTPATIENT)
Dept: LAB | Facility: HOSPITAL | Age: 70
End: 2025-01-27

## 2025-01-27 VITALS
BODY MASS INDEX: 25.72 KG/M2 | DIASTOLIC BLOOD PRESSURE: 88 MMHG | SYSTOLIC BLOOD PRESSURE: 130 MMHG | WEIGHT: 200.4 LBS | HEIGHT: 74 IN | TEMPERATURE: 96.8 F | RESPIRATION RATE: 16 BRPM | HEART RATE: 68 BPM | OXYGEN SATURATION: 99 %

## 2025-01-27 DIAGNOSIS — I10 ESSENTIAL HYPERTENSION: ICD-10-CM

## 2025-01-27 DIAGNOSIS — Z23 ENCOUNTER FOR IMMUNIZATION: ICD-10-CM

## 2025-01-27 DIAGNOSIS — R35.0 BENIGN PROSTATIC HYPERPLASIA WITH URINARY FREQUENCY: ICD-10-CM

## 2025-01-27 DIAGNOSIS — M51.360 DEGENERATION OF INTERVERTEBRAL DISC OF LUMBAR REGION WITH DISCOGENIC BACK PAIN: ICD-10-CM

## 2025-01-27 DIAGNOSIS — N18.4 CHRONIC KIDNEY DISEASE, STAGE 4 (SEVERE) (HCC): ICD-10-CM

## 2025-01-27 DIAGNOSIS — Z79.4 TYPE 2 DIABETES MELLITUS WITH DIABETIC PERIPHERAL ANGIOPATHY WITHOUT GANGRENE, WITH LONG-TERM CURRENT USE OF INSULIN (HCC): Primary | ICD-10-CM

## 2025-01-27 DIAGNOSIS — N40.1 BENIGN PROSTATIC HYPERPLASIA WITH URINARY FREQUENCY: ICD-10-CM

## 2025-01-27 DIAGNOSIS — R19.5 POSITIVE COLORECTAL CANCER SCREENING USING COLOGUARD TEST: ICD-10-CM

## 2025-01-27 DIAGNOSIS — Z00.00 MEDICARE ANNUAL WELLNESS VISIT, SUBSEQUENT: ICD-10-CM

## 2025-01-27 DIAGNOSIS — E11.51 TYPE 2 DIABETES MELLITUS WITH DIABETIC PERIPHERAL ANGIOPATHY WITHOUT GANGRENE, WITH LONG-TERM CURRENT USE OF INSULIN (HCC): Primary | ICD-10-CM

## 2025-01-27 DIAGNOSIS — E78.2 MIXED HYPERLIPIDEMIA: ICD-10-CM

## 2025-01-27 DIAGNOSIS — I73.9 PERIPHERAL VASCULAR DISEASE (HCC): ICD-10-CM

## 2025-01-27 DIAGNOSIS — E11.40 TYPE 2 DIABETES MELLITUS WITH DIABETIC NEUROPATHY, WITHOUT LONG-TERM CURRENT USE OF INSULIN (HCC): ICD-10-CM

## 2025-01-27 DIAGNOSIS — Z99.3 DEPENDENCE ON WHEELCHAIR: ICD-10-CM

## 2025-01-27 PROCEDURE — G2211 COMPLEX E/M VISIT ADD ON: HCPCS | Performed by: FAMILY MEDICINE

## 2025-01-27 PROCEDURE — 99214 OFFICE O/P EST MOD 30 MIN: CPT | Performed by: FAMILY MEDICINE

## 2025-01-27 PROCEDURE — 90677 PCV20 VACCINE IM: CPT | Performed by: FAMILY MEDICINE

## 2025-01-27 PROCEDURE — G0009 ADMIN PNEUMOCOCCAL VACCINE: HCPCS | Performed by: FAMILY MEDICINE

## 2025-01-27 PROCEDURE — G0439 PPPS, SUBSEQ VISIT: HCPCS | Performed by: FAMILY MEDICINE

## 2025-01-27 RX ORDER — TAMSULOSIN HYDROCHLORIDE 0.4 MG/1
0.4 CAPSULE ORAL
Qty: 30 CAPSULE | Refills: 5 | Status: SHIPPED | OUTPATIENT
Start: 2025-01-27

## 2025-01-27 NOTE — PATIENT INSTRUCTIONS
Medicare Preventive Visit Patient Instructions  Thank you for completing your Welcome to Medicare Visit or Medicare Annual Wellness Visit today. Your next wellness visit will be due in one year (1/28/2026).  The screening/preventive services that you may require over the next 5-10 years are detailed below. Some tests may not apply to you based off risk factors and/or age. Screening tests ordered at today's visit but not completed yet may show as past due. Also, please note that scanned in results may not display below.  Preventive Screenings:  Service Recommendations Previous Testing/Comments   Colorectal Cancer Screening  Colonoscopy    Fecal Occult Blood Test (FOBT)/Fecal Immunochemical Test (FIT)  Fecal DNA/Cologuard Test  Flexible Sigmoidoscopy Age: 45-75 years old   Colonoscopy: every 10 years (May be performed more frequently if at higher risk)  OR  FOBT/FIT: every 1 year  OR  Cologuard: every 3 years  OR  Sigmoidoscopy: every 5 years  Screening may be recommended earlier than age 45 if at higher risk for colorectal cancer. Also, an individualized decision between you and your healthcare provider will decide whether screening between the ages of 76-85 would be appropriate. Colonoscopy: Not on file  FOBT/FIT: Not on file  Cologuard: 11/18/2024  Sigmoidoscopy: Not on file    Screening Current     Prostate Cancer Screening Individualized decision between patient and health care provider in men between ages of 55-69   Medicare will cover every 12 months beginning on the day after your 50th birthday PSA: 1.19 ng/mL     Screening Current     Hepatitis C Screening Once for adults born between 1945 and 1965  More frequently in patients at high risk for Hepatitis C Hep C Antibody: 08/03/2020    Screening Current   Diabetes Screening 1-2 times per year if you're at risk for diabetes or have pre-diabetes Fasting glucose: 115 mg/dL (3/7/2024)  A1C: 8.3 % (3/7/2024)  Screening Not Indicated  History Diabetes   Cholesterol  Screening Once every 5 years if you don't have a lipid disorder. May order more often based on risk factors. Lipid panel: 03/07/2024  Screening Not Indicated  History Lipid Disorder      Other Preventive Screenings Covered by Medicare:  Abdominal Aortic Aneurysm (AAA) Screening: covered once if your at risk. You're considered to be at risk if you have a family history of AAA or a male between the age of 65-75 who smoking at least 100 cigarettes in your lifetime.  Lung Cancer Screening: covers low dose CT scan once per year if you meet all of the following conditions: (1) Age 55-77; (2) No signs or symptoms of lung cancer; (3) Current smoker or have quit smoking within the last 15 years; (4) You have a tobacco smoking history of at least 20 pack years (packs per day x number of years you smoked); (5) You get a written order from a healthcare provider.  Glaucoma Screening: covered annually if you're considered high risk: (1) You have diabetes OR (2) Family history of glaucoma OR (3)  aged 50 and older OR (4)  American aged 65 and older  Osteoporosis Screening: covered every 2 years if you meet one of the following conditions: (1) Have a vertebral abnormality; (2) On glucocorticoid therapy for more than 3 months; (3) Have primary hyperparathyroidism; (4) On osteoporosis medications and need to assess response to drug therapy.  HIV Screening: covered annually if you're between the age of 15-65. Also covered annually if you are younger than 15 and older than 65 with risk factors for HIV infection. For pregnant patients, it is covered up to 3 times per pregnancy.    Immunizations:  Immunization Recommendations   Influenza Vaccine Annual influenza vaccination during flu season is recommended for all persons aged >= 6 months who do not have contraindications   Pneumococcal Vaccine   * Pneumococcal conjugate vaccine = PCV13 (Prevnar 13), PCV15 (Vaxneuvance), PCV20 (Prevnar 20)  * Pneumococcal  polysaccharide vaccine = PPSV23 (Pneumovax) Adults 19-63 yo with certain risk factors or if 65+ yo  If never received any pneumonia vaccine: recommend Prevnar 20 (PCV20)  Give PCV20 if previously received 1 dose of PCV13 or PPSV23   Hepatitis B Vaccine 3 dose series if at intermediate or high risk (ex: diabetes, end stage renal disease, liver disease)   Respiratory syncytial virus (RSV) Vaccine - COVERED BY MEDICARE PART D  * RSVPreF3 (Arexvy) CDC recommends that adults 60 years of age and older may receive a single dose of RSV vaccine using shared clinical decision-making (SCDM)   Tetanus (Td) Vaccine - COST NOT COVERED BY MEDICARE PART B Following completion of primary series, a booster dose should be given every 10 years to maintain immunity against tetanus. Td may also be given as tetanus wound prophylaxis.   Tdap Vaccine - COST NOT COVERED BY MEDICARE PART B Recommended at least once for all adults. For pregnant patients, recommended with each pregnancy.   Shingles Vaccine (Shingrix) - COST NOT COVERED BY MEDICARE PART B  2 shot series recommended in those 19 years and older who have or will have weakened immune systems or those 50 years and older     Health Maintenance Due:      Topic Date Due   • Colorectal Cancer Screening  11/18/2027   • Hepatitis C Screening  Completed     Immunizations Due:      Topic Date Due   • Pneumococcal Vaccine: 65+ Years (2 of 2 - PCV) 09/29/2018   • COVID-19 Vaccine (2 - 2024-25 season) 09/01/2024     Advance Directives   What are advance directives?  Advance directives are legal documents that state your wishes and plans for medical care. These plans are made ahead of time in case you lose your ability to make decisions for yourself. Advance directives can apply to any medical decision, such as the treatments you want, and if you want to donate organs.   What are the types of advance directives?  There are many types of advance directives, and each state has rules about how to  use them. You may choose a combination of any of the following:  Living will:  This is a written record of the treatment you want. You can also choose which treatments you do not want, which to limit, and which to stop at a certain time. This includes surgery, medicine, IV fluid, and tube feedings.   Durable power of  for healthcare (DPAHC):  This is a written record that states who you want to make healthcare choices for you when you are unable to make them for yourself. This person, called a proxy, is usually a family member or a friend. You may choose more than 1 proxy.  Do not resuscitate (DNR) order:  A DNR order is used in case your heart stops beating or you stop breathing. It is a request not to have certain forms of treatment, such as CPR. A DNR order may be included in other types of advance directives.  Medical directive:  This covers the care that you want if you are in a coma, near death, or unable to make decisions for yourself. You can list the treatments you want for each condition. Treatment may include pain medicine, surgery, blood transfusions, dialysis, IV or tube feedings, and a ventilator (breathing machine).  Values history:  This document has questions about your views, beliefs, and how you feel and think about life. This information can help others choose the care that you would choose.  Why are advance directives important?  An advance directive helps you control your care. Although spoken wishes may be used, it is better to have your wishes written down. Spoken wishes can be misunderstood, or not followed. Treatments may be given even if you do not want them. An advance directive may make it easier for your family to make difficult choices about your care.   Weight Management   Why it is important to manage your weight:  Being overweight increases your risk of health conditions such as heart disease, high blood pressure, type 2 diabetes, and certain types of cancer. It can also  increase your risk for osteoarthritis, sleep apnea, and other respiratory problems. Aim for a slow, steady weight loss. Even a small amount of weight loss can lower your risk of health problems.  How to lose weight safely:  A safe and healthy way to lose weight is to eat fewer calories and get regular exercise. You can lose up about 1 pound a week by decreasing the number of calories you eat by 500 calories each day.   Healthy meal plan for weight management:  A healthy meal plan includes a variety of foods, contains fewer calories, and helps you stay healthy. A healthy meal plan includes the following:  Eat whole-grain foods more often.  A healthy meal plan should contain fiber. Fiber is the part of grains, fruits, and vegetables that is not broken down by your body. Whole-grain foods are healthy and provide extra fiber in your diet. Some examples of whole-grain foods are whole-wheat breads and pastas, oatmeal, brown rice, and bulgur.  Eat a variety of vegetables every day.  Include dark, leafy greens such as spinach, kale, ruby greens, and mustard greens. Eat yellow and orange vegetables such as carrots, sweet potatoes, and winter squash.   Eat a variety of fruits every day.  Choose fresh or canned fruit (canned in its own juice or light syrup) instead of juice. Fruit juice has very little or no fiber.  Eat low-fat dairy foods.  Drink fat-free (skim) milk or 1% milk. Eat fat-free yogurt and low-fat cottage cheese. Try low-fat cheeses such as mozzarella and other reduced-fat cheeses.  Choose meat and other protein foods that are low in fat.  Choose beans or other legumes such as split peas or lentils. Choose fish, skinless poultry (chicken or turkey), or lean cuts of red meat (beef or pork). Before you cook meat or poultry, cut off any visible fat.   Use less fat and oil.  Try baking foods instead of frying them. Add less fat, such as margarine, sour cream, regular salad dressing and mayonnaise to foods. Eat  fewer high-fat foods. Some examples of high-fat foods include french fries, doughnuts, ice cream, and cakes.  Eat fewer sweets.  Limit foods and drinks that are high in sugar. This includes candy, cookies, regular soda, and sweetened drinks.  Exercise:  Exercise at least 30 minutes per day on most days of the week. Some examples of exercise include walking, biking, dancing, and swimming. You can also fit in more physical activity by taking the stairs instead of the elevator or parking farther away from stores. Ask your healthcare provider about the best exercise plan for you.      © Copyright [employer] 2018 Information is for End User's use only and may not be sold, redistributed or otherwise used for commercial purposes. All illustrations and images included in CareNotes® are the copyrighted property of [employer].D.A.M., Inc[employer] or [employer]

## 2025-01-27 NOTE — ASSESSMENT & PLAN NOTE
Continue his Lantus, metformin, check CMP, hemoglobin A1c.  Lab Results   Component Value Date    HGBA1C 8.3 (H) 03/07/2024     Orders:  •  Albumin / creatinine urine ratio; Future

## 2025-01-27 NOTE — PROGRESS NOTES
Name: Galen Willams      : 1955      MRN: [id number]  Encounter Provider: Joel Jernigan DO  Encounter Date: 2025   Encounter department: [facility] [address]    Assessment & Plan  Medicare annual wellness visit, subsequent         Type 2 diabetes mellitus with diabetic peripheral angiopathy without gangrene, with long-term current use of insulin (HCC)  Continue his Lantus, metformin, check CMP, hemoglobin A1c.  Lab Results   Component Value Date    HGBA1C 8.3 (H) 2024     Orders:  •  Albumin / creatinine urine ratio; Future    Type 2 diabetes mellitus with diabetic neuropathy, without long-term current use of insulin (HCC)  As above  Lab Results   Component Value Date    HGBA1C 8.3 (H) 2024     Orders:  •  Hemoglobin A1C; Future    Chronic kidney disease, stage 4 (severe) (HCC)  Lab Results   Component Value Date    EGFR 37 2024    EGFR 34 2023    EGFR 29 10/06/2022    CREATININE 1.85 (H) 2024    CREATININE 2.00 (H) 2023    CREATININE 2.28 (H) 10/06/2022          Mixed hyperlipidemia  Continue simvastatin, check a CMP, lipid profile  Orders:  •  CBC; Future  •  Comprehensive metabolic panel; Future  •  Lipid panel; Future    Degeneration of intervertebral disc of lumbar region with discogenic back pain  Doing well off medications       Peripheral vascular disease (HCC)  Continues Coumadin, blood pressure, cholesterol and blood sugar control.       Essential hypertension  Stable, continue his lisinopril       Encounter for immunization    Orders:  •  Pneumococcal Conjugate Vaccine 20-valent (Pcv20)    Benign prostatic hyperplasia with urinary frequency  Start tamsulosin 0.4 mg at bedtime  Orders:  •  tamsulosin (FLOMAX) 0.4 mg; Take 1 capsule (0.4 mg total) by mouth daily with dinner    Dependence on wheelchair         Positive colorectal cancer screening using Cologuard test  Emphasized the importance of him getting the follow-up  colonoscopy.          Preventive health issues were discussed with patient, and age appropriate screening tests were ordered as noted in patient's After Visit Summary. Personalized health advice and appropriate referrals for health education or preventive services given if needed, as noted in patient's After Visit Summary.    History of Present Illness     Patient comes in today for checkup.  He has not gotten any blood work done recently as he has difficulty getting out of the house this time of year.  He has lost some weight and has noticed that his sugars have been better controlled.  He has noticed sugars have been as low as 45 in the overnight hours.  He states that he is no longer taking the furosemide because he is getting up at night and urinating often.  He has not scheduled his colonoscopy yet.       Patient Care Team:  Joel Jernigan DO as PCP - General (Family Medicine)  Joel Jernigan DO    Review of Systems   Constitutional:  Negative for chills, fatigue and fever.   HENT:  Negative for congestion, ear pain, hearing loss, postnasal drip, rhinorrhea and sore throat.    Eyes:  Negative for pain and visual disturbance.   Respiratory:  Negative for chest tightness, shortness of breath and wheezing.    Cardiovascular:  Negative for chest pain and leg swelling.   Gastrointestinal:  Negative for abdominal distention, abdominal pain, constipation, diarrhea and vomiting.   Endocrine: Negative for cold intolerance and heat intolerance.   Genitourinary:  Positive for frequency. Negative for difficulty urinating and urgency.   Musculoskeletal:  Negative for arthralgias and gait problem.   Skin:  Negative for color change.   Neurological:  Negative for dizziness, tremors, syncope, numbness and headaches.   Hematological:  Negative for adenopathy.   Psychiatric/Behavioral:  Negative for agitation, confusion and sleep disturbance. The patient is not nervous/anxious.      Medical History Reviewed by provider this  encounter:  Tobacco  Allergies  Meds  Problems  Med Hx  Surg Hx  Fam Hx       Annual Wellness Visit Questionnaire   Galen is here for his Subsequent Wellness visit. Last Medicare Wellness visit information reviewed, patient interviewed and updates made to the record today.      Health Risk Assessment:   Patient rates overall health as very good. Patient feels that their physical health rating is same. Patient is very satisfied with their life. Eyesight was rated as same. Hearing was rated as same. Patient feels that their emotional and mental health rating is same. Patients states they are never, rarely angry. Patient states they are sometimes unusually tired/fatigued. Pain experienced in the last 7 days has been a lot. Patient's pain rating has been 7/10. Walking has gotten a little worse   Bone aches and pain     Depression Screening:   PHQ-2 Score: 0      Fall Risk Screening:   In the past year, patient has experienced: no history of falling in past year      Home Safety:  Patient has trouble with stairs inside or outside of their home. Patient has working smoke alarms and has working carbon monoxide detector. Home safety hazards include: none.     Nutrition:   Current diet is Regular.     Medications:   Patient is currently taking over-the-counter supplements. OTC medications include: see medication list. Patient is able to manage medications.     Activities of Daily Living (ADLs)/Instrumental Activities of Daily Living (IADLs):   Walk and transfer into and out of bed and chair?: No  Dress and groom yourself?: Yes    Bathe or shower yourself?: Yes    Feed yourself? Yes  Do your laundry/housekeeping?: Yes  Manage your money, pay your bills and track your expenses?: Yes  Make your own meals?: Yes    Do your own shopping?: No    ADL comments: Uses wheel chair a lot     Previous Hospitalizations:   Any hospitalizations or ED visits within the last 12 months?: No      Cognitive Screening:   Provider or  family/friend/caregiver concerned regarding cognition?: No    PREVENTIVE SCREENINGS      Cardiovascular Screening:    General: Screening Not Indicated and History Lipid Disorder      Diabetes Screening:     General: Screening Not Indicated and History Diabetes      Colorectal Cancer Screening:     General: Screening Current      Prostate Cancer Screening:    General: Screening Current      Osteoporosis Screening:    General: Screening Not Indicated      Abdominal Aortic Aneurysm (AAA) Screening:    Risk factors include: age between 65-74 yo and tobacco use        Lung Cancer Screening:     General: Screening Not Indicated      Hepatitis C Screening:    General: Screening Current    Screening, Brief Intervention, and Referral to Treatment (SBIRT)    Screening      Single Item Drug Screening:  How often have you used an illegal drug (including marijuana) or a prescription medication for non-medical reasons in the past year? never    Single Item Drug Screen Score: 0  Interpretation: Negative screen for possible drug use disorder    Social Drivers of Health     Financial Resource Strain: Low Risk  (7/10/2023)    Overall Financial Resource Strain (CARDIA)    • Difficulty of Paying Living Expenses: Not very hard   Food Insecurity: No Food Insecurity (1/27/2025)    Hunger Vital Sign    • Worried About Running Out of Food in the Last Year: Never true    • Ran Out of Food in the Last Year: Never true   Transportation Needs: No Transportation Needs (1/27/2025)    PRAPARE - Transportation    • Lack of Transportation (Medical): No    • Lack of Transportation (Non-Medical): No   Housing Stability: Unknown (1/27/2025)    Housing Stability Vital Sign    • Unable to Pay for Housing in the Last Year: No    • Homeless in the Last Year: No   Utilities: Not At Risk (1/27/2025)    [facility] Utilities    • Threatened with loss of utilities: No     No results found.    Objective   /88   Pulse 68   Temp (!) 96.8 °F (36 °C)   Resp 16    "Ht 6' 2\" (1.88 m)   Wt 90.9 kg (200 lb 6.4 oz)   SpO2 99%   BMI 25.73 kg/m²     Physical Exam  Constitutional:       Appearance: He is well-developed.   HENT:      Head: Normocephalic.      Right Ear: External ear normal.      Left Ear: External ear normal.      Nose: Nose normal.   Eyes:      Extraocular Movements: Extraocular movements intact.      Conjunctiva/sclera: Conjunctivae normal.      Pupils: Pupils are equal, round, and reactive to light.   Neck:      Thyroid: No thyromegaly.   Cardiovascular:      Rate and Rhythm: Normal rate and regular rhythm.      Pulses: no weak pulses.           Dorsalis pedis pulses are 2+ on the right side and 2+ on the left side.        Posterior tibial pulses are 2+ on the right side and 2+ on the left side.      Heart sounds: Normal heart sounds.   Pulmonary:      Effort: Pulmonary effort is normal.      Breath sounds: Normal breath sounds.   Abdominal:      General: Bowel sounds are normal.      Palpations: Abdomen is soft.   Musculoskeletal:         General: Normal range of motion.      Cervical back: Normal range of motion.   Feet:      Right foot:      Skin integrity: No ulcer, skin breakdown, erythema, warmth, callus or dry skin.      Left foot:      Skin integrity: No ulcer, skin breakdown, erythema, warmth, callus or dry skin.   Skin:     General: Skin is warm and dry.   Neurological:      Mental Status: He is alert and oriented to person, place, and time.      Motor: Weakness (Bilateral legs) present.      Gait: Gait abnormal (Uses wheelchair).   Psychiatric:         Mood and Affect: Mood normal.         Behavior: Behavior normal.     Patient's shoes and socks removed.    Right Foot/Ankle   Right Foot Inspection  Skin Exam: skin normal and skin intact. No dry skin, no warmth, no callus, no erythema, no maceration, no abnormal color, no pre-ulcer, no ulcer and no callus.     Toe Exam: ROM and strength within normal limits. No swelling    Sensory   Monofilament " testing: intact    Vascular  The right DP pulse is 2+. The right PT pulse is 2+.     Left Foot/Ankle  Left Foot Inspection  Skin Exam: skin normal and skin intact. No dry skin, no warmth, no erythema, no maceration, normal color, no pre-ulcer, no ulcer and no callus.     Toe Exam: ROM and strength within normal limits. No swelling.     Sensory   Monofilament testing: intact    Vascular  The left DP pulse is 2+. The left PT pulse is 2+.     Assign Risk Category  No deformity present  No loss of protective sensation  No weak pulses  Risk: 0

## 2025-01-27 NOTE — ASSESSMENT & PLAN NOTE
Lab Results   Component Value Date    EGFR 37 03/07/2024    EGFR 34 07/12/2023    EGFR 29 10/06/2022    CREATININE 1.85 (H) 03/07/2024    CREATININE 2.00 (H) 07/12/2023    CREATININE 2.28 (H) 10/06/2022

## 2025-01-27 NOTE — ASSESSMENT & PLAN NOTE
Start tamsulosin 0.4 mg at bedtime  Orders:  •  tamsulosin (FLOMAX) 0.4 mg; Take 1 capsule (0.4 mg total) by mouth daily with dinner

## 2025-01-27 NOTE — ASSESSMENT & PLAN NOTE
Continue simvastatin, check a CMP, lipid profile  Orders:  •  CBC; Future  •  Comprehensive metabolic panel; Future  •  Lipid panel; Future

## 2025-01-27 NOTE — ASSESSMENT & PLAN NOTE
As above  Lab Results   Component Value Date    HGBA1C 8.3 (H) 03/07/2024     Orders:  •  Hemoglobin A1C; Future

## 2025-02-05 ENCOUNTER — APPOINTMENT (OUTPATIENT)
Dept: LAB | Facility: HOSPITAL | Age: 70
End: 2025-02-05
Payer: COMMERCIAL

## 2025-02-05 DIAGNOSIS — E87.5 HYPERKALEMIA: ICD-10-CM

## 2025-02-05 DIAGNOSIS — E78.2 MIXED HYPERLIPIDEMIA: ICD-10-CM

## 2025-02-05 DIAGNOSIS — Z79.4 TYPE 2 DIABETES MELLITUS WITH DIABETIC PERIPHERAL ANGIOPATHY WITHOUT GANGRENE, WITH LONG-TERM CURRENT USE OF INSULIN (HCC): ICD-10-CM

## 2025-02-05 DIAGNOSIS — E11.40 TYPE 2 DIABETES MELLITUS WITH DIABETIC NEUROPATHY, WITHOUT LONG-TERM CURRENT USE OF INSULIN (HCC): ICD-10-CM

## 2025-02-05 DIAGNOSIS — R30.0 DYSURIA: ICD-10-CM

## 2025-02-05 DIAGNOSIS — E11.51 TYPE 2 DIABETES MELLITUS WITH DIABETIC PERIPHERAL ANGIOPATHY WITHOUT GANGRENE, WITH LONG-TERM CURRENT USE OF INSULIN (HCC): ICD-10-CM

## 2025-02-05 DIAGNOSIS — I82.403 ACUTE EMBOLISM AND THROMBOSIS OF DEEP VEIN OF BOTH LOWER EXTREMITIES (HCC): ICD-10-CM

## 2025-02-05 LAB
ALBUMIN SERPL BCG-MCNC: 4 G/DL (ref 3.5–5)
ALP SERPL-CCNC: 91 U/L (ref 34–104)
ALT SERPL W P-5'-P-CCNC: 8 U/L (ref 7–52)
ANION GAP SERPL CALCULATED.3IONS-SCNC: 8 MMOL/L (ref 4–13)
AST SERPL W P-5'-P-CCNC: 12 U/L (ref 13–39)
BILIRUB SERPL-MCNC: 0.64 MG/DL (ref 0.2–1)
BILIRUB UR QL STRIP: NEGATIVE
BUN SERPL-MCNC: 58 MG/DL (ref 5–25)
CALCIUM SERPL-MCNC: 9.4 MG/DL (ref 8.4–10.2)
CHLORIDE SERPL-SCNC: 109 MMOL/L (ref 96–108)
CHOLEST SERPL-MCNC: 142 MG/DL (ref ?–200)
CLARITY UR: CLEAR
CO2 SERPL-SCNC: 21 MMOL/L (ref 21–32)
COLOR UR: NORMAL
CREAT SERPL-MCNC: 2.09 MG/DL (ref 0.6–1.3)
ERYTHROCYTE [DISTWIDTH] IN BLOOD BY AUTOMATED COUNT: 13.1 % (ref 11.6–15.1)
EST. AVERAGE GLUCOSE BLD GHB EST-MCNC: 163 MG/DL
GFR SERPL CREATININE-BSD FRML MDRD: 31 ML/MIN/1.73SQ M
GLUCOSE P FAST SERPL-MCNC: 143 MG/DL (ref 65–99)
GLUCOSE UR STRIP-MCNC: NEGATIVE MG/DL
HBA1C MFR BLD: 7.3 %
HCT VFR BLD AUTO: 41.4 % (ref 36.5–49.3)
HDLC SERPL-MCNC: 40 MG/DL
HGB BLD-MCNC: 13.2 G/DL (ref 12–17)
HGB UR QL STRIP.AUTO: NEGATIVE
INR PPP: 1.91 (ref 0.85–1.19)
KETONES UR STRIP-MCNC: NEGATIVE MG/DL
LDLC SERPL CALC-MCNC: 77 MG/DL (ref 0–100)
LEUKOCYTE ESTERASE UR QL STRIP: NEGATIVE
MCH RBC QN AUTO: 31 PG (ref 26.8–34.3)
MCHC RBC AUTO-ENTMCNC: 31.9 G/DL (ref 31.4–37.4)
MCV RBC AUTO: 97 FL (ref 82–98)
NITRITE UR QL STRIP: NEGATIVE
NONHDLC SERPL-MCNC: 102 MG/DL
PH UR STRIP.AUTO: 5 [PH]
PLATELET # BLD AUTO: 190 THOUSANDS/UL (ref 149–390)
PMV BLD AUTO: 9.5 FL (ref 8.9–12.7)
POTASSIUM SERPL-SCNC: 6.1 MMOL/L (ref 3.5–5.3)
PROT SERPL-MCNC: 6.8 G/DL (ref 6.4–8.4)
PROT UR STRIP-MCNC: NEGATIVE MG/DL
PROTHROMBIN TIME: 22.7 SECONDS (ref 12.3–15)
RBC # BLD AUTO: 4.26 MILLION/UL (ref 3.88–5.62)
SODIUM SERPL-SCNC: 138 MMOL/L (ref 135–147)
SP GR UR STRIP.AUTO: 1.01 (ref 1–1.03)
TRIGL SERPL-MCNC: 127 MG/DL (ref ?–150)
UROBILINOGEN UR STRIP-ACNC: <2 MG/DL
WBC # BLD AUTO: 6.68 THOUSAND/UL (ref 4.31–10.16)

## 2025-02-05 PROCEDURE — 81003 URINALYSIS AUTO W/O SCOPE: CPT

## 2025-02-05 PROCEDURE — 87591 N.GONORRHOEAE DNA AMP PROB: CPT

## 2025-02-05 PROCEDURE — 82043 UR ALBUMIN QUANTITATIVE: CPT

## 2025-02-05 PROCEDURE — 83036 HEMOGLOBIN GLYCOSYLATED A1C: CPT

## 2025-02-05 PROCEDURE — 36415 COLL VENOUS BLD VENIPUNCTURE: CPT

## 2025-02-05 PROCEDURE — 85027 COMPLETE CBC AUTOMATED: CPT

## 2025-02-05 PROCEDURE — 80061 LIPID PANEL: CPT

## 2025-02-05 PROCEDURE — 80053 COMPREHEN METABOLIC PANEL: CPT

## 2025-02-05 PROCEDURE — 82570 ASSAY OF URINE CREATININE: CPT

## 2025-02-05 PROCEDURE — 87491 CHLMYD TRACH DNA AMP PROBE: CPT

## 2025-02-05 PROCEDURE — 85610 PROTHROMBIN TIME: CPT

## 2025-02-06 ENCOUNTER — APPOINTMENT (OUTPATIENT)
Dept: LAB | Facility: HOSPITAL | Age: 70
End: 2025-02-06

## 2025-02-06 ENCOUNTER — TRANSCRIBE ORDERS (OUTPATIENT)
Dept: LAB | Facility: HOSPITAL | Age: 70
End: 2025-02-06

## 2025-02-06 ENCOUNTER — RESULTS FOLLOW-UP (OUTPATIENT)
Dept: FAMILY MEDICINE CLINIC | Facility: CLINIC | Age: 70
End: 2025-02-06

## 2025-02-06 DIAGNOSIS — R30.0 DYSURIA: Primary | ICD-10-CM

## 2025-02-06 DIAGNOSIS — R30.0 DYSURIA: ICD-10-CM

## 2025-02-06 LAB
CREAT UR-MCNC: 69.8 MG/DL
MICROALBUMIN UR-MCNC: 24.1 MG/L
MICROALBUMIN/CREAT 24H UR: 35 MG/G CREATININE (ref 0–30)

## 2025-02-07 ENCOUNTER — ANTICOAG VISIT (OUTPATIENT)
Dept: FAMILY MEDICINE CLINIC | Facility: CLINIC | Age: 70
End: 2025-02-07

## 2025-02-07 DIAGNOSIS — E87.5 HYPERKALEMIA: Primary | ICD-10-CM

## 2025-02-07 LAB
C TRACH DNA SPEC QL NAA+PROBE: NEGATIVE
N GONORRHOEA DNA SPEC QL NAA+PROBE: NEGATIVE

## 2025-02-07 NOTE — TELEPHONE ENCOUNTER
Patient returned call regarding his recent labs.     Advised of Dr. Jernigan's message regarding both INR and Metformin.     Patient is questioning the increase in his Coumadin. He said he is currently on 9 mg and this would increase it to 10 mg.     Also, he will be scheduling a colonoscopy soon and was told he has to stop Coumadin for 5 days prior     Patient stated he will be needing a nurse to come to his home to draw blood.    Please advise

## 2025-02-11 DIAGNOSIS — Z79.4 TYPE 2 DIABETES MELLITUS WITH HYPERGLYCEMIA, WITH LONG-TERM CURRENT USE OF INSULIN (HCC): ICD-10-CM

## 2025-02-11 DIAGNOSIS — E11.65 TYPE 2 DIABETES MELLITUS WITH HYPERGLYCEMIA, WITH LONG-TERM CURRENT USE OF INSULIN (HCC): ICD-10-CM

## 2025-02-12 DIAGNOSIS — Z79.4 TYPE 2 DIABETES MELLITUS WITH HYPERGLYCEMIA, WITH LONG-TERM CURRENT USE OF INSULIN (HCC): Primary | ICD-10-CM

## 2025-02-12 DIAGNOSIS — E11.65 TYPE 2 DIABETES MELLITUS WITH HYPERGLYCEMIA, WITH LONG-TERM CURRENT USE OF INSULIN (HCC): Primary | ICD-10-CM

## 2025-02-16 DIAGNOSIS — Z79.4 TYPE 2 DIABETES MELLITUS WITH HYPERGLYCEMIA, WITH LONG-TERM CURRENT USE OF INSULIN (HCC): ICD-10-CM

## 2025-02-16 DIAGNOSIS — E11.65 TYPE 2 DIABETES MELLITUS WITH HYPERGLYCEMIA, WITH LONG-TERM CURRENT USE OF INSULIN (HCC): ICD-10-CM

## 2025-03-25 ENCOUNTER — ANESTHESIA (OUTPATIENT)
Dept: GASTROENTEROLOGY | Facility: HOSPITAL | Age: 70
End: 2025-03-25
Payer: COMMERCIAL

## 2025-03-25 ENCOUNTER — HOSPITAL ENCOUNTER (OUTPATIENT)
Dept: GASTROENTEROLOGY | Facility: HOSPITAL | Age: 70
Setting detail: OUTPATIENT SURGERY
Discharge: HOME | End: 2025-03-25
Attending: INTERNAL MEDICINE
Payer: COMMERCIAL

## 2025-03-25 ENCOUNTER — ANESTHESIA EVENT (OUTPATIENT)
Dept: GASTROENTEROLOGY | Facility: HOSPITAL | Age: 70
End: 2025-03-25
Payer: COMMERCIAL

## 2025-03-25 VITALS
HEART RATE: 79 BPM | SYSTOLIC BLOOD PRESSURE: 120 MMHG | WEIGHT: 199.74 LBS | HEIGHT: 74 IN | RESPIRATION RATE: 18 BRPM | TEMPERATURE: 98.1 F | OXYGEN SATURATION: 98 % | BODY MASS INDEX: 25.63 KG/M2 | DIASTOLIC BLOOD PRESSURE: 64 MMHG

## 2025-03-25 DIAGNOSIS — R19.5 POSITIVE COLORECTAL CANCER SCREENING USING COLOGUARD TEST: ICD-10-CM

## 2025-03-25 LAB
GLUCOSE SERPL-MCNC: 117 MG/DL (ref 65–140)
GLUCOSE SERPL-MCNC: 124 MG/DL (ref 65–140)

## 2025-03-25 PROCEDURE — 88305 TISSUE EXAM BY PATHOLOGIST: CPT | Performed by: PATHOLOGY

## 2025-03-25 PROCEDURE — 82948 REAGENT STRIP/BLOOD GLUCOSE: CPT

## 2025-03-25 RX ORDER — ASPIRIN 325 MG
325 TABLET ORAL EVERY 4 HOURS PRN
COMMUNITY

## 2025-03-25 RX ORDER — PROPOFOL 10 MG/ML
INJECTION, EMULSION INTRAVENOUS AS NEEDED
Status: DISCONTINUED | OUTPATIENT
Start: 2025-03-25 | End: 2025-03-25

## 2025-03-25 RX ORDER — SODIUM CHLORIDE, SODIUM LACTATE, POTASSIUM CHLORIDE, CALCIUM CHLORIDE 600; 310; 30; 20 MG/100ML; MG/100ML; MG/100ML; MG/100ML
125 INJECTION, SOLUTION INTRAVENOUS CONTINUOUS
Status: DISCONTINUED | OUTPATIENT
Start: 2025-03-25 | End: 2025-03-29 | Stop reason: HOSPADM

## 2025-03-25 RX ADMIN — PROPOFOL 20 MG: 10 INJECTION, EMULSION INTRAVENOUS at 08:43

## 2025-03-25 RX ADMIN — PROPOFOL 30 MG: 10 INJECTION, EMULSION INTRAVENOUS at 08:40

## 2025-03-25 RX ADMIN — SODIUM CHLORIDE, SODIUM LACTATE, POTASSIUM CHLORIDE, AND CALCIUM CHLORIDE 125 ML/HR: .6; .31; .03; .02 INJECTION, SOLUTION INTRAVENOUS at 07:33

## 2025-03-25 RX ADMIN — PROPOFOL 50 MG: 10 INJECTION, EMULSION INTRAVENOUS at 08:37

## 2025-03-25 RX ADMIN — PROPOFOL 30 MG: 10 INJECTION, EMULSION INTRAVENOUS at 08:35

## 2025-03-25 RX ADMIN — PROPOFOL 80 MG: 10 INJECTION, EMULSION INTRAVENOUS at 08:33

## 2025-03-25 RX ADMIN — PROPOFOL 30 MG: 10 INJECTION, EMULSION INTRAVENOUS at 08:46

## 2025-03-25 NOTE — H&P
History and Physical - [facility]  Galen Willams 69 y.o. male MRN: [id number]      HPI: Galen Willams is a 69 y.o. year old male who presents for screening colonoscopy and a positive Cologuard test.      REVIEW OF SYSTEMS: Per the HPI, and otherwise unremarkable.    Historical Information   Past Medical History:   Diagnosis Date    Back pain     Diabetes mellitus (HCC)     DVT (deep venous thrombosis) (HCC)     Hard to intubate 10/18/2021    Glidescope with #3 blade and bougie used to pass 6.0 ETT    Hyperlipidemia     Hypertension      Past Surgical History:   Procedure Laterality Date    CATARACT EXTRACTION      COLONOSCOPY      6/2014    CYST REMOVAL      EXTERNAL FIXATOR APPLICATION Right 10/18/2021    Procedure: Application External Fixation Device right lower extremity;  Surgeon: Ralph Regan MD;  Location: [facility];  Service: Orthopedics    HAND SURGERY      HEEL SPUR SURGERY      foot surgery    INCISION AND DRAINAGE OF WOUND Right 2/7/2022    Procedure: INCISION AND DRAINAGE (I&D) EXTREMITY;  Surgeon: Ralph Regan MD;  Location: [facility];  Service: Orthopedics    ORIF TIBIA FRACTURE Right 10/25/2021    Procedure: OPEN REDUCTION W/ INTERNAL FIXATION (ORIF) RIGHT PROXIMAL TIBIA SHAFT FRACTURE, REMOVAL OF EXTERNAL FIXATOR;  Surgeon: Ralph Regan MD;  Location: [facility];  Service: Orthopedics    SC OPTX FEM SHFT FX W/INSJ IMED IMPLT W/WO SCREW Right 2/7/2022    Procedure: INSERTION NAIL IM FEMUR ANTEGRADE (TROCHANTERIC);  Surgeon: Ralph Regan MD;  Location: [facility];  Service: Orthopedics    SC REMOVAL IMPLANT DEEP Right 8/8/2022    Procedure: REMOVAL HARDWARE RIGHT TIBIA;  Surgeon: Ralph Regan MD;  Location: [facility];  Service: Orthopedics     Social History   Social History     Substance and Sexual Activity   Alcohol Use Yes    Comment: Per allscripts, occasional use     Social History     Substance and Sexual Activity   Drug Use Not Currently  "    Social History     Tobacco Use   Smoking Status Former    Current packs/day: 0.00    Average packs/day: 0.3 packs/day for 40.0 years (10.0 ttl pk-yrs)    Types: Cigarettes    Start date:     Quit date:     Years since quittin.2   Smokeless Tobacco Never   Tobacco Comments    Per allscripts, current smoker     Family History   Problem Relation Age of Onset    Dementia Mother     Melanoma Mother        Meds/Allergies     Not in a hospital admission.    Allergies   Allergen Reactions    Byetta 10 Mcg Pen [Exenatide] Swelling    Pollen Extract Myalgia       Objective     Blood pressure 139/75, pulse 72, temperature 98.9 °F (37.2 °C), temperature source Temporal, resp. rate 18, height 6' 2\" (1.88 m), weight 90.6 kg (199 lb 11.8 oz), SpO2 99%.      PHYSICAL EXAM    Gen: NAD  CV: RRR  CHEST: Clear  ABD: soft, NT/ND  EXT: no edema      ASSESSMENT/PLAN:  This is a 69 y.o. year old male here for colonoscopy, and he is stable and optimized for his procedure.          "

## 2025-03-25 NOTE — ANESTHESIA POSTPROCEDURE EVALUATION
Post-Op Assessment Note    CV Status:  Stable    Pain management: adequate       Mental Status:  Awake and sleepy   Hydration Status:  Euvolemic   PONV Controlled:  Controlled   Airway Patency:  Patent     Post Op Vitals Reviewed: Yes    No anethesia notable event occurred.    Staff: CRNA           Last Filed PACU Vitals:  Vitals Value Taken Time   Temp 98.1    Pulse 67    BP 85/53    Resp 17    SpO2 95

## 2025-03-25 NOTE — ANESTHESIA PREPROCEDURE EVALUATION
Procedure:  COLONOSCOPY    Relevant Problems   CARDIO   (+) Acute embolism and thrombosis of unspecified deep veins of unspecified lower extremity (HCC)   (+) Essential hypertension   (+) Mixed hyperlipidemia   (+) Peripheral vascular disease (HCC)   (+) Type 2 diabetes mellitus with diabetic peripheral angiopathy without gangrene (HCC)      ENDO   (+) Type 2 diabetes mellitus with diabetic neuropathy, without long-term current use of insulin (HCC)   (+) Type 2 diabetes mellitus with diabetic peripheral angiopathy without gangrene (HCC)      /RENAL   (+) Chronic kidney disease, stage 4 (severe) (HCC)      HEMATOLOGY   (+) Anemia      MUSCULOSKELETAL   (+) Acute idiopathic gout of multiple sites      NEURO/PSYCH   (+) Type 2 diabetes mellitus with diabetic neuropathy, without long-term current use of insulin (HCC)        Physical Exam    Airway    Mallampati score: IV  TM Distance: >3 FB  Neck ROM: limited     Dental        Cardiovascular      Pulmonary      Other Findings        Anesthesia Plan  ASA Score- 3     Anesthesia Type- IV sedation with anesthesia with ASA Monitors.         Additional Monitors:     Airway Plan:     Comment: True difficult airway. Intubation notes reviewed and discussed with patient in the event of emergency.       Plan Factors-Exercise tolerance (METS): >4 METS.    Chart reviewed.   Existing labs reviewed. Patient summary reviewed.                  Induction- intravenous.    Postoperative Plan-         Informed Consent- Anesthetic plan and risks discussed with patient.  I personally reviewed this patient with the CRNA. Discussed and agreed on the Anesthesia Plan with the CRNA..      NPO Status:  Vitals Value Taken Time   Date of last liquid 03/25/25 03/25/25 0714   Time of last liquid 0530 03/25/25 0714   Date of last solid 03/23/25 03/25/25 0714   Time of last solid 1930 03/25/25 0714

## 2025-03-31 PROCEDURE — 88305 TISSUE EXAM BY PATHOLOGIST: CPT | Performed by: PATHOLOGY

## 2025-04-01 ENCOUNTER — RESULTS FOLLOW-UP (OUTPATIENT)
Dept: GASTROENTEROLOGY | Facility: CLINIC | Age: 70
End: 2025-04-01

## 2025-04-02 ENCOUNTER — VBI (OUTPATIENT)
Dept: ADMINISTRATIVE | Facility: OTHER | Age: 70
End: 2025-04-02

## 2025-04-02 NOTE — TELEPHONE ENCOUNTER
04/02/25 9:11 AM     Chart reviewed for Diabetic Eye Exam ; nothing is submitted to the patient's insurance at this time.     Domingo Harper MA   [facility]

## 2025-04-24 ENCOUNTER — PATIENT MESSAGE (OUTPATIENT)
Dept: FAMILY MEDICINE CLINIC | Facility: CLINIC | Age: 70
End: 2025-04-24

## 2025-05-19 DIAGNOSIS — E11.40 TYPE 2 DIABETES MELLITUS WITH DIABETIC NEUROPATHY, WITHOUT LONG-TERM CURRENT USE OF INSULIN (HCC): ICD-10-CM

## 2025-05-19 RX ORDER — INSULIN GLARGINE 100 [IU]/ML
INJECTION, SOLUTION SUBCUTANEOUS
Qty: 10 ML | Refills: 3 | Status: SHIPPED | OUTPATIENT
Start: 2025-05-19

## 2025-05-27 ENCOUNTER — APPOINTMENT (OUTPATIENT)
Dept: LAB | Facility: HOSPITAL | Age: 70
End: 2025-05-27
Payer: COMMERCIAL

## 2025-05-27 ENCOUNTER — NURSE TRIAGE (OUTPATIENT)
Age: 70
End: 2025-05-27

## 2025-05-27 ENCOUNTER — OFFICE VISIT (OUTPATIENT)
Dept: FAMILY MEDICINE CLINIC | Facility: CLINIC | Age: 70
End: 2025-05-27
Payer: COMMERCIAL

## 2025-05-27 VITALS
HEART RATE: 67 BPM | SYSTOLIC BLOOD PRESSURE: 120 MMHG | TEMPERATURE: 98 F | OXYGEN SATURATION: 99 % | DIASTOLIC BLOOD PRESSURE: 78 MMHG | BODY MASS INDEX: 25.64 KG/M2 | HEIGHT: 74 IN

## 2025-05-27 DIAGNOSIS — E87.5 HYPERKALEMIA: ICD-10-CM

## 2025-05-27 DIAGNOSIS — R31.0 GROSS HEMATURIA: ICD-10-CM

## 2025-05-27 DIAGNOSIS — Z87.442 HISTORY OF NEPHROLITHIASIS: ICD-10-CM

## 2025-05-27 DIAGNOSIS — R31.0 GROSS HEMATURIA: Primary | ICD-10-CM

## 2025-05-27 LAB
ALBUMIN SERPL BCG-MCNC: 4 G/DL (ref 3.5–5)
ALP SERPL-CCNC: 88 U/L (ref 34–104)
ALT SERPL W P-5'-P-CCNC: 10 U/L (ref 7–52)
ANION GAP SERPL CALCULATED.3IONS-SCNC: 5 MMOL/L (ref 4–13)
AST SERPL W P-5'-P-CCNC: 12 U/L (ref 13–39)
BACTERIA UR QL AUTO: ABNORMAL /HPF
BASOPHILS # BLD AUTO: 0.05 THOUSANDS/ÂΜL (ref 0–0.1)
BASOPHILS NFR BLD AUTO: 1 % (ref 0–1)
BILIRUB SERPL-MCNC: 0.57 MG/DL (ref 0.2–1)
BILIRUB UR QL STRIP: NEGATIVE
BUN SERPL-MCNC: 67 MG/DL (ref 5–25)
CALCIUM SERPL-MCNC: 8.8 MG/DL (ref 8.4–10.2)
CHLORIDE SERPL-SCNC: 113 MMOL/L (ref 96–108)
CLARITY UR: ABNORMAL
CO2 SERPL-SCNC: 19 MMOL/L (ref 21–32)
COLOR UR: ABNORMAL
CREAT SERPL-MCNC: 1.92 MG/DL (ref 0.6–1.3)
EOSINOPHIL # BLD AUTO: 0.21 THOUSAND/ÂΜL (ref 0–0.61)
EOSINOPHIL NFR BLD AUTO: 4 % (ref 0–6)
ERYTHROCYTE [DISTWIDTH] IN BLOOD BY AUTOMATED COUNT: 13.3 % (ref 11.6–15.1)
GFR SERPL CREATININE-BSD FRML MDRD: 35 ML/MIN/1.73SQ M
GLUCOSE P FAST SERPL-MCNC: 97 MG/DL (ref 65–99)
GLUCOSE UR STRIP-MCNC: NEGATIVE MG/DL
HCT VFR BLD AUTO: 40.9 % (ref 36.5–49.3)
HGB BLD-MCNC: 13.2 G/DL (ref 12–17)
HGB UR QL STRIP.AUTO: ABNORMAL
IMM GRANULOCYTES # BLD AUTO: 0.02 THOUSAND/UL (ref 0–0.2)
IMM GRANULOCYTES NFR BLD AUTO: 0 % (ref 0–2)
INR PPP: 2.21 (ref 0.85–1.19)
KETONES UR STRIP-MCNC: NEGATIVE MG/DL
LEUKOCYTE ESTERASE UR QL STRIP: ABNORMAL
LYMPHOCYTES # BLD AUTO: 1.5 THOUSANDS/ÂΜL (ref 0.6–4.47)
LYMPHOCYTES NFR BLD AUTO: 25 % (ref 14–44)
MCH RBC QN AUTO: 30.6 PG (ref 26.8–34.3)
MCHC RBC AUTO-ENTMCNC: 32.3 G/DL (ref 31.4–37.4)
MCV RBC AUTO: 95 FL (ref 82–98)
MONOCYTES # BLD AUTO: 0.48 THOUSAND/ÂΜL (ref 0.17–1.22)
MONOCYTES NFR BLD AUTO: 8 % (ref 4–12)
NEUTROPHILS # BLD AUTO: 3.66 THOUSANDS/ÂΜL (ref 1.85–7.62)
NEUTS SEG NFR BLD AUTO: 62 % (ref 43–75)
NITRITE UR QL STRIP: NEGATIVE
NON-SQ EPI CELLS URNS QL MICRO: ABNORMAL /HPF
NRBC BLD AUTO-RTO: 0 /100 WBCS
PH UR STRIP.AUTO: 6 [PH]
PLATELET # BLD AUTO: 186 THOUSANDS/UL (ref 149–390)
PMV BLD AUTO: 9.4 FL (ref 8.9–12.7)
POTASSIUM SERPL-SCNC: 6.2 MMOL/L (ref 3.5–5.3)
PROT SERPL-MCNC: 6.9 G/DL (ref 6.4–8.4)
PROT UR STRIP-MCNC: ABNORMAL MG/DL
PROTHROMBIN TIME: 25.2 SECONDS (ref 12.3–15)
RBC # BLD AUTO: 4.31 MILLION/UL (ref 3.88–5.62)
RBC #/AREA URNS AUTO: ABNORMAL /HPF
SL AMB  POCT GLUCOSE, UA: NEGATIVE
SL AMB LEUKOCYTE ESTERASE,UA: 70
SL AMB POCT BILIRUBIN,UA: NEGATIVE
SL AMB POCT BLOOD,UA: ABNORMAL
SL AMB POCT CLARITY,UA: CLEAR
SL AMB POCT COLOR,UA: ABNORMAL
SL AMB POCT KETONES,UA: NEGATIVE
SL AMB POCT NITRITE,UA: NEGATIVE
SL AMB POCT PH,UA: 5
SL AMB POCT SPECIFIC GRAVITY,UA: 1.01
SL AMB POCT URINE PROTEIN: 100
SL AMB POCT UROBILINOGEN: 0.2
SODIUM SERPL-SCNC: 137 MMOL/L (ref 135–147)
SP GR UR STRIP.AUTO: >=1.03 (ref 1–1.03)
UROBILINOGEN UR STRIP-ACNC: <2 MG/DL
WBC # BLD AUTO: 5.92 THOUSAND/UL (ref 4.31–10.16)
WBC #/AREA URNS AUTO: ABNORMAL /HPF

## 2025-05-27 PROCEDURE — 36415 COLL VENOUS BLD VENIPUNCTURE: CPT

## 2025-05-27 PROCEDURE — 80053 COMPREHEN METABOLIC PANEL: CPT

## 2025-05-27 PROCEDURE — G2211 COMPLEX E/M VISIT ADD ON: HCPCS | Performed by: FAMILY MEDICINE

## 2025-05-27 PROCEDURE — 87086 URINE CULTURE/COLONY COUNT: CPT | Performed by: FAMILY MEDICINE

## 2025-05-27 PROCEDURE — 85610 PROTHROMBIN TIME: CPT

## 2025-05-27 PROCEDURE — 88112 CYTOPATH CELL ENHANCE TECH: CPT | Performed by: STUDENT IN AN ORGANIZED HEALTH CARE EDUCATION/TRAINING PROGRAM

## 2025-05-27 PROCEDURE — 99214 OFFICE O/P EST MOD 30 MIN: CPT | Performed by: FAMILY MEDICINE

## 2025-05-27 PROCEDURE — 85025 COMPLETE CBC W/AUTO DIFF WBC: CPT

## 2025-05-27 PROCEDURE — 81002 URINALYSIS NONAUTO W/O SCOPE: CPT | Performed by: FAMILY MEDICINE

## 2025-05-27 PROCEDURE — 81001 URINALYSIS AUTO W/SCOPE: CPT | Performed by: FAMILY MEDICINE

## 2025-05-27 NOTE — TELEPHONE ENCOUNTER
"REASON FOR CONVERSATION: Blood in Urine    SYMPTOMS: started with blood in urine last night. Had 5-6 episodes with small amount of blood and had 2 normal voids without blood. Denies other symptoms.     OTHER HEALTH INFORMATION: Patient states he is currently taking 10 mg coumadin daily, unsure of when last INR was. Had a nose bleed yesterday out of the left nostril which he was able to stop.    PROTOCOL DISPOSITION: Go to [facility]/Urgent Care patient coming in to office today     CARE ADVICE PROVIDED: N/A    PRACTICE FOLLOW-UP: appointment made for today          Reason for Disposition   Taking Coumadin (warfarin) or other strong blood thinner, or known bleeding disorder (e.g., thrombocytopenia)    Answer Assessment - Initial Assessment Questions  1. COLOR of URINE: \"Describe the color of the urine.\"  (e.g., tea-colored, pink, red, bloody) \"Do you have blood clots in your urine?\" (e.g., none, pea, grape, small coin)      Some small clots at one time, yellow urine, with blood     2. ONSET: \"When did the bleeding start?\"       Last night   3. EPISODES: \"How many times has there been blood in the urine?\" or \"How many times today?\"      4 times, 2 times normal without blood   4. PAIN with URINATION: \"Is there any pain with passing your urine?\" If Yes, ask: \"How bad is the pain?\"  (Scale 1-10; or mild, moderate, severe)      Nothing at all   5. FEVER: \"Do you have a fever?\" If Yes, ask: \"What is your temperature, how was it measured, and when did it start?\"      Denies   6. ASSOCIATED SYMPTOMS: \"Are you passing urine more frequently than usual?\"      Always frequent, every 2 hours   7. OTHER SYMPTOMS: \"Do you have any other symptoms?\" (e.g., back/flank pain, abdomen pain, vomiting)      Denies    Protocols used: Urine - Blood In-Adult-OH    "

## 2025-05-27 NOTE — PROGRESS NOTES
"Name: Galen Willams      : 1955      MRN: [id number]  Encounter Provider: Bria Gonzales DO  Encounter Date: 2025   Encounter department: [facility] [address]  :  Assessment & Plan  Gross hematuria    Orders:  •  POCT urine dip  •  Urine culture; Future  •  Urinalysis with microscopic; Future  •  Protime-INR; Future  •  Cytology, urine; Future  •  Comprehensive metabolic panel; Future  •  CBC and differential; Future  •  US kidney and bladder with pvr; Future    History of nephrolithiasis    Orders:  •  US kidney and bladder with pvr; Future    Patient to complete labs tonight. Suspect possible kidney stone.      History of Present Illness   Blood in Urine  Irritative symptoms include frequency. Pertinent negatives include no dysuria or flank pain.     Started with blood in his urine about 2 days ago. Denies burning or discomfort. Denies flank pain or fever. Notes that he has been having urinary frequency. States that he started Flomax in 2025, has not had any changes.     States that he has a history of kidney stones.     Bleeding started after lifting and pushing stuff in the garage.     Review of Systems   Genitourinary:  Positive for frequency and hematuria. Negative for difficulty urinating, dysuria and flank pain.       Objective   /78 (BP Location: Left arm, Patient Position: Sitting, Cuff Size: Standard)   Pulse 67   Temp 98 °F (36.7 °C) (Temporal)   Ht 6' 2\" (1.88 m)   SpO2 99%   BMI 25.64 kg/m²      Physical Exam  Vitals reviewed.   Constitutional:       General: He is not in acute distress.     Appearance: Normal appearance.      Comments: Sitting in wheelchair   HENT:      Head: Normocephalic and atraumatic.      Right Ear: External ear normal.      Left Ear: External ear normal.      Nose: Nose normal.      Mouth/Throat:      Mouth: Mucous membranes are moist.     Eyes:      Extraocular Movements: Extraocular movements intact.      " Conjunctiva/sclera: Conjunctivae normal.       Cardiovascular:      Rate and Rhythm: Normal rate and regular rhythm.   Pulmonary:      Effort: Pulmonary effort is normal.      Breath sounds: No wheezing, rhonchi or rales.   Abdominal:      General: Bowel sounds are normal. There is no distension.      Palpations: Abdomen is soft.      Tenderness: There is no abdominal tenderness. There is no right CVA tenderness or left CVA tenderness.     Musculoskeletal:      Right lower leg: No edema.      Left lower leg: No edema.     Skin:     General: Skin is warm.      Capillary Refill: Capillary refill takes less than 2 seconds.      Findings: No rash.     Neurological:      Mental Status: He is alert. Mental status is at baseline.           DO Florin Perez [facility]  5/27/2025 3:37 PM

## 2025-05-28 ENCOUNTER — RESULTS FOLLOW-UP (OUTPATIENT)
Dept: FAMILY MEDICINE CLINIC | Facility: CLINIC | Age: 70
End: 2025-05-28

## 2025-05-28 DIAGNOSIS — E87.5 HYPERKALEMIA: Primary | ICD-10-CM

## 2025-05-28 LAB — BACTERIA UR CULT: NORMAL

## 2025-05-29 PROCEDURE — 88112 CYTOPATH CELL ENHANCE TECH: CPT | Performed by: STUDENT IN AN ORGANIZED HEALTH CARE EDUCATION/TRAINING PROGRAM

## 2025-06-09 ENCOUNTER — OFFICE VISIT (OUTPATIENT)
Dept: FAMILY MEDICINE CLINIC | Facility: CLINIC | Age: 70
End: 2025-06-09
Payer: COMMERCIAL

## 2025-06-09 VITALS
HEIGHT: 74 IN | OXYGEN SATURATION: 98 % | SYSTOLIC BLOOD PRESSURE: 120 MMHG | RESPIRATION RATE: 16 BRPM | WEIGHT: 206 LBS | HEART RATE: 68 BPM | DIASTOLIC BLOOD PRESSURE: 80 MMHG | TEMPERATURE: 97.8 F | BODY MASS INDEX: 26.44 KG/M2

## 2025-06-09 DIAGNOSIS — N18.4 CHRONIC KIDNEY DISEASE, STAGE 4 (SEVERE) (HCC): ICD-10-CM

## 2025-06-09 DIAGNOSIS — E78.2 MIXED HYPERLIPIDEMIA: ICD-10-CM

## 2025-06-09 DIAGNOSIS — E87.5 HYPERKALEMIA: ICD-10-CM

## 2025-06-09 DIAGNOSIS — R31.21 ASYMPTOMATIC MICROSCOPIC HEMATURIA: ICD-10-CM

## 2025-06-09 DIAGNOSIS — Z99.3 DEPENDENCE ON WHEELCHAIR: ICD-10-CM

## 2025-06-09 DIAGNOSIS — E11.51 TYPE 2 DIABETES MELLITUS WITH DIABETIC PERIPHERAL ANGIOPATHY WITHOUT GANGRENE, WITH LONG-TERM CURRENT USE OF INSULIN (HCC): ICD-10-CM

## 2025-06-09 DIAGNOSIS — Z79.4 TYPE 2 DIABETES MELLITUS WITH DIABETIC PERIPHERAL ANGIOPATHY WITHOUT GANGRENE, WITH LONG-TERM CURRENT USE OF INSULIN (HCC): ICD-10-CM

## 2025-06-09 DIAGNOSIS — I10 ESSENTIAL HYPERTENSION: ICD-10-CM

## 2025-06-09 DIAGNOSIS — E11.40 TYPE 2 DIABETES MELLITUS WITH DIABETIC NEUROPATHY, WITHOUT LONG-TERM CURRENT USE OF INSULIN (HCC): Primary | ICD-10-CM

## 2025-06-09 PROBLEM — T84.7XXA HARDWARE COMPLICATING WOUND INFECTION (HCC): Status: RESOLVED | Noted: 2022-04-13 | Resolved: 2025-06-09

## 2025-06-09 LAB — SL AMB POCT HEMOGLOBIN AIC: 7 (ref ?–6.5)

## 2025-06-09 PROCEDURE — 99214 OFFICE O/P EST MOD 30 MIN: CPT | Performed by: FAMILY MEDICINE

## 2025-06-09 PROCEDURE — G2211 COMPLEX E/M VISIT ADD ON: HCPCS | Performed by: FAMILY MEDICINE

## 2025-06-09 PROCEDURE — 83036 HEMOGLOBIN GLYCOSYLATED A1C: CPT | Performed by: FAMILY MEDICINE

## 2025-06-09 NOTE — ASSESSMENT & PLAN NOTE
Continue his Lantus, check CMP and hemoglobin A1c in 6 months.  Lab Results   Component Value Date    HGBA1C 7.0 (A) 06/09/2025       Orders:  •  POCT hemoglobin A1c

## 2025-06-09 NOTE — ASSESSMENT & PLAN NOTE
Continue insulin as above.  Lab Results   Component Value Date    HGBA1C 7.0 (A) 06/09/2025

## 2025-06-09 NOTE — PROGRESS NOTES
Name: Galen Willams      : 1955      MRN: [id number]  Encounter Provider: Joel Jernigan DO  Encounter Date: 2025   Encounter department: [facility] [address]  :  Assessment & Plan  Type 2 diabetes mellitus with diabetic neuropathy, without long-term current use of insulin (HCC)  Continue his Lantus, check CMP and hemoglobin A1c in 6 months.  Lab Results   Component Value Date    HGBA1C 7.0 (A) 2025       Orders:  •  POCT hemoglobin A1c    Essential hypertension  Controlled, however we will discontinue his lisinopril due to his elevated potassium.       Type 2 diabetes mellitus with diabetic peripheral angiopathy without gangrene, with long-term current use of insulin ([facility])  Continue insulin as above.  Lab Results   Component Value Date    HGBA1C 7.0 (A) 2025            Mixed hyperlipidemia  Controlled, continue simvastatin.       Chronic kidney disease, stage 4 (severe) ([facility])  Lab Results   Component Value Date    EGFR 35 2025    EGFR 31 2025    EGFR 37 2024    CREATININE 1.92 (H) 2025    CREATININE 2.09 (H) 2025    CREATININE 1.85 (H) 2024   Repeat a BMP off the lisinopril         Hyperkalemia  Discontinue lisinopril, repeat BMP         Dependence on wheelchair         Asymptomatic microscopic hematuria    Orders:  •  UA (URINE) with reflex to Scope; Future      Assessment & Plan           History of Present Illness   Patient comes in today for a checkup.  States that he is no longer having any blood in his urine from his previous visit in May.  He states he did pass a kidney stone.  It was noted on his blood work that he did have an elevated potassium he has not gone for follow-up testing yet.  He is only using his insulin to manage his blood sugars due to his increased creatinine we stopped his metformin at his last visit.      Review of Systems   Constitutional:  Negative for chills, fatigue and fever.   HENT:   "Negative for congestion, ear pain, hearing loss, postnasal drip, rhinorrhea and sore throat.    Eyes:  Negative for pain and visual disturbance.   Respiratory:  Negative for chest tightness, shortness of breath and wheezing.    Cardiovascular:  Negative for chest pain and leg swelling.   Gastrointestinal:  Negative for abdominal distention, abdominal pain, constipation, diarrhea and vomiting.   Endocrine: Negative for cold intolerance and heat intolerance.   Genitourinary:  Negative for difficulty urinating, frequency and urgency.   Musculoskeletal:  Negative for arthralgias and gait problem.   Skin:  Negative for color change.   Neurological:  Negative for dizziness, tremors, syncope, numbness and headaches.   Hematological:  Negative for adenopathy.   Psychiatric/Behavioral:  Negative for agitation, confusion and sleep disturbance. The patient is not nervous/anxious.        Objective   /80 (BP Location: Right arm, Cuff Size: Standard)   Pulse 68   Temp 97.8 °F (36.6 °C)   Resp 16   Ht 6' 2\" (1.88 m)   Wt 93.4 kg (206 lb)   SpO2 98%   BMI 26.45 kg/m²      Physical Exam  Constitutional:       Appearance: He is well-developed.   HENT:      Head: Normocephalic.      Right Ear: External ear normal.      Left Ear: External ear normal.      Nose: Nose normal.     Eyes:      Extraocular Movements: Extraocular movements intact.      Conjunctiva/sclera: Conjunctivae normal.      Pupils: Pupils are equal, round, and reactive to light.     Neck:      Thyroid: No thyromegaly.     Cardiovascular:      Rate and Rhythm: Normal rate and regular rhythm.      Heart sounds: Normal heart sounds.   Pulmonary:      Effort: Pulmonary effort is normal.      Breath sounds: Normal breath sounds.   Abdominal:      General: Bowel sounds are normal.      Palpations: Abdomen is soft.     Musculoskeletal:         General: Normal range of motion.      Cervical back: Normal range of motion.     Skin:     General: Skin is warm and dry. "     Neurological:      Mental Status: He is alert and oriented to person, place, and time.     Psychiatric:         Mood and Affect: Mood normal.         Behavior: Behavior normal.

## 2025-06-09 NOTE — ASSESSMENT & PLAN NOTE
Lab Results   Component Value Date    EGFR 35 05/27/2025    EGFR 31 02/05/2025    EGFR 37 03/07/2024    CREATININE 1.92 (H) 05/27/2025    CREATININE 2.09 (H) 02/05/2025    CREATININE 1.85 (H) 03/07/2024   Repeat a BMP off the lisinopril

## (undated) DEVICE — SUT VICRYL 2-0 CT-1 27 IN J259H

## (undated) DEVICE — ABDOMINAL PAD: Brand: DERMACEA

## (undated) DEVICE — DRAPE C-ARM X-RAY

## (undated) DEVICE — 3M™ STERI-STRIP™ REINFORCED ADHESIVE SKIN CLOSURES, R1547, 1/2 IN X 4 IN (12 MM X 100 MM), 6 STRIPS/ENVELOPE: Brand: 3M™ STERI-STRIP™

## (undated) DEVICE — ARTHROSCOPY FLOOR MAT

## (undated) DEVICE — SPONGE SCRUB 4 PCT CHLORHEXIDINE

## (undated) DEVICE — INTENDED FOR TISSUE SEPARATION, AND OTHER PROCEDURES THAT REQUIRE A SHARP SURGICAL BLADE TO PUNCTURE OR CUT.: Brand: BARD-PARKER SAFETY BLADES SIZE 10, STERILE

## (undated) DEVICE — DRAPE EQUIPMENT RF WAND

## (undated) DEVICE — SUT VICRYL 0 CT-1 36 IN J946H

## (undated) DEVICE — WEBRIL 6 IN UNSTERILE

## (undated) DEVICE — 3.5MM CORTEX SCREW SELF-TAPPING 85MM: Type: IMPLANTABLE DEVICE | Site: TIBIA | Status: NON-FUNCTIONAL

## (undated) DEVICE — DRAPE C-ARMOUR

## (undated) DEVICE — IMPERVIOUS STOCKINETTE: Brand: DEROYAL

## (undated) DEVICE — INTENDED FOR TISSUE SEPARATION, AND OTHER PROCEDURES THAT REQUIRE A SHARP SURGICAL BLADE TO PUNCTURE OR CUT.: Brand: BARD-PARKER SAFETY BLADES SIZE 15, STERILE

## (undated) DEVICE — SPONGE PVP SCRUB WING STERILE

## (undated) DEVICE — 2.0MM DRILL BIT/QC/125MM

## (undated) DEVICE — CHLORAPREP HI-LITE 26ML ORANGE

## (undated) DEVICE — PLUMEPEN PRO 10FT

## (undated) DEVICE — GLOVE PI ULTRA TOUCH SZ.8.0

## (undated) DEVICE — 2.5MM DRILL BIT/QC/GOLD/180MM

## (undated) DEVICE — TIBURON SPLIT SHEET: Brand: CONVERTORS

## (undated) DEVICE — SUT VICRYL PLUS 2-0 CTB-1 27 IN VCPB259H

## (undated) DEVICE — 3.5MM CORTEX SCREW SELF-TAPPING 42MM: Type: IMPLANTABLE DEVICE | Site: TIBIA | Status: NON-FUNCTIONAL

## (undated) DEVICE — PROXIMATE PLUS MD MULTI-DIRECTIONAL RELEASE SKIN STAPLERS CONTAINS 35 STAINLESS STEEL STAPLES APPROXIMATE CLOSED DIMENSIONS: 6.9MM X 3.9MM WIDE: Brand: PROXIMATE

## (undated) DEVICE — UNIVERSAL MAJOR EXTREMITY,KIT: Brand: CARDINAL HEALTH

## (undated) DEVICE — 2.5MM REAMING ROD WITH BALL TIP/950MM-STERILE

## (undated) DEVICE — 2.5MM DRILL BIT/QC/GOLD/110MM

## (undated) DEVICE — MEDI-VAC YANK SUCT HNDL W/TPRD BULBOUS TIP: Brand: CARDINAL HEALTH

## (undated) DEVICE — PAD CAST 4 IN COTTON NON STERILE

## (undated) DEVICE — BETHLEHEM UNIVERSAL OUTPATIENT: Brand: CARDINAL HEALTH

## (undated) DEVICE — BETHLEHEM UNIV MAJ EXT ,KIT: Brand: CARDINAL HEALTH

## (undated) DEVICE — PACK MAJOR ORTHO W/SPLITS PBDS

## (undated) DEVICE — SUT ETHILON 3-0 PS-1 18 IN 1663G

## (undated) DEVICE — SUT VICRYL 1 CT-1 27 IN J261H

## (undated) DEVICE — 2.8MM PERCUTANEOUS DRILL BIT F/LCP® PL QC/200MM/100MM CALIB: Brand: LCP

## (undated) DEVICE — 2.7MM THREE-FLUTED DRILL BIT QC/125MM

## (undated) DEVICE — PAD GROUNDING ADULT

## (undated) DEVICE — SCD SEQUENTIAL COMPRESSION COMFORT SLEEVE MEDIUM KNEE LENGTH: Brand: KENDALL SCD

## (undated) DEVICE — 3M™ TEGADERM™ TRANSPARENT FILM DRESSING FRAME STYLE, 1626W, 4 IN X 4-3/4 IN (10 CM X 12 CM), 50/CT 4CT/CASE: Brand: 3M™ TEGADERM™

## (undated) DEVICE — IMMOBILIZER KNEE UNIVERSAL 22 IN

## (undated) DEVICE — SUT VICRYL PLUS 0 CTB-1 27 IN VCPB260H

## (undated) DEVICE — 3M™ IOBAN™ 2 ANTIMICROBIAL INCISE DRAPE 6650EZ: Brand: IOBAN™ 2

## (undated) DEVICE — GLOVE SRG BIOGEL ORTHOPEDIC 7

## (undated) DEVICE — GLOVE SRG BIOGEL ORTHOPEDIC 8

## (undated) DEVICE — 3M™ STERI-STRIP™ REINFORCED ADHESIVE SKIN CLOSURES, R1546, 1/4 IN X 4 IN (6 MM X 100 MM), 10 STRIPS/ENVELOPE: Brand: 3M™ STERI-STRIP™

## (undated) DEVICE — ACE WRAP 6 IN UNSTERILE

## (undated) DEVICE — KERLIX BANDAGE ROLL: Brand: KERLIX

## (undated) DEVICE — PADDING CAST 4 IN  COTTON STRL

## (undated) DEVICE — GAUZE SPONGES,16 PLY: Brand: CURITY

## (undated) DEVICE — ACE WRAP 4 IN UNSTERILE

## (undated) DEVICE — CURITY NON-ADHERENT STRIPS: Brand: CURITY

## (undated) DEVICE — SPONGE LAP 18 X 18 IN STRL RFD

## (undated) DEVICE — 6617 IOBAN II PATIENT ISOLATION DRAPE 5/BX,4BX/CS: Brand: STERI-DRAPE™ IOBAN™ 2

## (undated) DEVICE — GLOVE INDICATOR PI UNDERGLOVE SZ 7 BLUE

## (undated) DEVICE — STERILE ORIF HIP PACK: Brand: CARDINAL HEALTH

## (undated) DEVICE — DRAPE SHEET THREE QUARTER

## (undated) DEVICE — 1.6MM KIRSCHNER WIRE WITH 5MM THREAD-TROCAR POINT 150MM
Type: IMPLANTABLE DEVICE | Site: TIBIA | Status: NON-FUNCTIONAL
Removed: 2021-10-25

## (undated) DEVICE — DRESSING MEPILEX AG BORDER 4 X 8 IN

## (undated) DEVICE — TUBING SUCTION 5MM X 12 FT

## (undated) DEVICE — ELECTRODE BLADE MOD E-Z CLEAN 2.5IN 6.4CM -0012M

## (undated) DEVICE — 3.5MM DRILL BIT/QC/195MM

## (undated) DEVICE — 3.5MM DRILL BIT/QC/110MM

## (undated) DEVICE — BANDAGE, ESMARK LF STR 6"X9' (20/CS): Brand: CYPRESS

## (undated) DEVICE — DRESSING MEPILEX AG BORDER 4 X 4 IN

## (undated) DEVICE — 3.2MM GUIDE WIRE 400MM

## (undated) DEVICE — GLOVE INDICATOR PI UNDERGLOVE SZ 8 BLUE

## (undated) DEVICE — LIGHT HANDLE COVER SLEEVE DISP BLUE STELLAR

## (undated) DEVICE — FINGER TUBING + CABLE MANAGER

## (undated) DEVICE — SUT ETHILON 3-0 FSLX 30 IN 1673H

## (undated) DEVICE — GLOVE SRG BIOGEL 7.5

## (undated) DEVICE — U-DRAPE: Brand: CONVERTORS

## (undated) DEVICE — MEDI-VAC YANKAUER SUCTION HANDLE W/STRAIGHT TIP & CONTROL VENT: Brand: CARDINAL HEALTH

## (undated) DEVICE — SPECIMEN CONTAINER STERILE PEEL PACK

## (undated) DEVICE — DRAPE SURGIKIT SADDLE BAG

## (undated) DEVICE — BULB SYRINGE,IRRIGATION WITH PROTECTIVE CAP: Brand: DOVER

## (undated) DEVICE — 4.2MM THREE-FLUTED DRILL BIT QC/NEEDLE POINT/145MM-STERILE

## (undated) DEVICE — 3M™ STERI-DRAPE™ U-DRAPE 1015: Brand: STERI-DRAPE™

## (undated) DEVICE — DRESSING XEROFORM 5 X 9